# Patient Record
Sex: FEMALE | Race: WHITE | Employment: OTHER | URBAN - METROPOLITAN AREA
[De-identification: names, ages, dates, MRNs, and addresses within clinical notes are randomized per-mention and may not be internally consistent; named-entity substitution may affect disease eponyms.]

---

## 2019-03-29 ENCOUNTER — HOSPITAL ENCOUNTER (OUTPATIENT)
Dept: WOUND CARE | Age: 84
Discharge: HOME | End: 2019-03-29
Payer: MEDICARE

## 2019-03-29 VITALS
BODY MASS INDEX: 26.37 KG/M2 | HEART RATE: 60 BPM | HEIGHT: 66 IN | SYSTOLIC BLOOD PRESSURE: 141 MMHG | TEMPERATURE: 97.5 F | RESPIRATION RATE: 16 BRPM | DIASTOLIC BLOOD PRESSURE: 76 MMHG | WEIGHT: 164.1 LBS

## 2019-03-29 DIAGNOSIS — R60.0 BILATERAL LOWER EXTREMITY EDEMA: ICD-10-CM

## 2019-03-29 DIAGNOSIS — M81.0 SENILE OSTEOPOROSIS: ICD-10-CM

## 2019-03-29 DIAGNOSIS — M47.817 LUMBOSACRAL SPONDYLOSIS WITHOUT MYELOPATHY: ICD-10-CM

## 2019-03-29 DIAGNOSIS — L97.922 CHRONIC ULCER OF LEFT LEG WITH FAT LAYER EXPOSED (HCC): ICD-10-CM

## 2019-03-29 DIAGNOSIS — N18.30 CKD (CHRONIC KIDNEY DISEASE) STAGE 3, GFR 30-59 ML/MIN (HCC): ICD-10-CM

## 2019-03-29 DIAGNOSIS — D64.9 NORMOCYTIC ANEMIA: ICD-10-CM

## 2019-03-29 PROCEDURE — 11042 DBRDMT SUBQ TIS 1ST 20SQCM/<: CPT | Performed by: INTERNAL MEDICINE

## 2019-03-29 PROCEDURE — 29581 APPL MULTLAYER CMPRN SYS LEG: CPT

## 2019-03-29 PROCEDURE — 11042 DBRDMT SUBQ TIS 1ST 20SQCM/<: CPT

## 2019-03-29 PROCEDURE — 99204 OFFICE O/P NEW MOD 45 MIN: CPT

## 2019-03-29 PROCEDURE — 99203 OFFICE O/P NEW LOW 30 MIN: CPT | Performed by: INTERNAL MEDICINE

## 2019-03-29 RX ORDER — LIDOCAINE 40 MG/G
CREAM TOPICAL ONCE
Status: DISCONTINUED | OUTPATIENT
Start: 2019-03-29 | End: 2019-03-30 | Stop reason: HOSPADM

## 2019-04-05 ENCOUNTER — HOSPITAL ENCOUNTER (OUTPATIENT)
Dept: WOUND CARE | Age: 84
Discharge: HOME | End: 2019-04-05
Payer: MEDICARE

## 2019-04-05 VITALS
TEMPERATURE: 97.4 F | DIASTOLIC BLOOD PRESSURE: 77 MMHG | SYSTOLIC BLOOD PRESSURE: 140 MMHG | RESPIRATION RATE: 18 BRPM | BODY MASS INDEX: 26.2 KG/M2 | HEART RATE: 59 BPM | HEIGHT: 66 IN | WEIGHT: 163 LBS

## 2019-04-05 PROBLEM — G62.9 PERIPHERAL NEUROPATHY: Status: ACTIVE | Noted: 2019-01-21

## 2019-04-05 PROBLEM — L97.922 CHRONIC ULCER OF LEFT LEG WITH FAT LAYER EXPOSED (HCC): Status: ACTIVE | Noted: 2019-04-05

## 2019-04-05 PROBLEM — I10 ESSENTIAL HYPERTENSION, BENIGN: Status: RESOLVED | Noted: 2019-04-05 | Resolved: 2019-04-05

## 2019-04-05 PROBLEM — R60.0 BILATERAL LOWER EXTREMITY EDEMA: Status: ACTIVE | Noted: 2019-04-05

## 2019-04-05 PROBLEM — I87.303 CHRONIC VENOUS HYPERTENSION INVOLVING BOTH SIDES: Status: ACTIVE | Noted: 2019-04-05

## 2019-04-05 PROBLEM — N18.30 CKD (CHRONIC KIDNEY DISEASE) STAGE 3, GFR 30-59 ML/MIN (HCC): Status: ACTIVE | Noted: 2019-04-05

## 2019-04-05 PROBLEM — I10 ESSENTIAL HYPERTENSION, BENIGN: Status: ACTIVE | Noted: 2019-04-05

## 2019-04-05 PROBLEM — M21.371 FOOT DROP, RIGHT: Status: ACTIVE | Noted: 2019-01-21

## 2019-04-05 PROBLEM — D64.9 NORMOCYTIC ANEMIA: Status: ACTIVE | Noted: 2019-04-05

## 2019-04-05 PROCEDURE — 11042 DBRDMT SUBQ TIS 1ST 20SQCM/<: CPT

## 2019-04-05 PROCEDURE — 29581 APPL MULTLAYER CMPRN SYS LEG: CPT

## 2019-04-05 PROCEDURE — 11042 DBRDMT SUBQ TIS 1ST 20SQCM/<: CPT | Performed by: INTERNAL MEDICINE

## 2019-04-05 RX ORDER — LIDOCAINE 40 MG/G
CREAM TOPICAL ONCE
Status: DISCONTINUED | OUTPATIENT
Start: 2019-04-05 | End: 2019-04-06 | Stop reason: HOSPADM

## 2019-04-05 NOTE — PLAN OF CARE
Patient will continue with same wound care regime but will add triamcinolone and trade 100% cast padding for 1st layer of Compri 2 this week. MD discussed garment options for compression long term once wound is healed. Discharge instructions reviewed with patient, all questions answered, copy given to patient. Dressings were applied to all wounds per M.D. Instructions at this visit.

## 2019-04-12 ENCOUNTER — HOSPITAL ENCOUNTER (OUTPATIENT)
Dept: WOUND CARE | Age: 84
Discharge: HOME | End: 2019-04-12
Payer: MEDICARE

## 2019-04-12 VITALS
SYSTOLIC BLOOD PRESSURE: 142 MMHG | HEIGHT: 66 IN | DIASTOLIC BLOOD PRESSURE: 86 MMHG | WEIGHT: 165 LBS | HEART RATE: 61 BPM | RESPIRATION RATE: 18 BRPM | TEMPERATURE: 97.3 F | BODY MASS INDEX: 26.52 KG/M2

## 2019-04-12 DIAGNOSIS — L03.116 CELLULITIS OF LEFT LOWER LEG: ICD-10-CM

## 2019-04-12 PROCEDURE — 87070 CULTURE OTHR SPECIMN AEROBIC: CPT

## 2019-04-12 PROCEDURE — 97597 DBRDMT OPN WND 1ST 20 CM/<: CPT | Performed by: INTERNAL MEDICINE

## 2019-04-12 PROCEDURE — 97597 DBRDMT OPN WND 1ST 20 CM/<: CPT

## 2019-04-12 PROCEDURE — 29581 APPL MULTLAYER CMPRN SYS LEG: CPT

## 2019-04-12 PROCEDURE — 87077 CULTURE AEROBIC IDENTIFY: CPT

## 2019-04-12 PROCEDURE — 87205 SMEAR GRAM STAIN: CPT

## 2019-04-12 PROCEDURE — 87186 SC STD MICRODIL/AGAR DIL: CPT

## 2019-04-12 RX ORDER — GABAPENTIN 100 MG/1
100 CAPSULE ORAL
COMMUNITY
End: 2020-08-05

## 2019-04-12 RX ORDER — LIDOCAINE 40 MG/G
CREAM TOPICAL ONCE
Status: DISCONTINUED | OUTPATIENT
Start: 2019-04-12 | End: 2019-04-13 | Stop reason: HOSPADM

## 2019-04-12 ASSESSMENT — PAIN - FUNCTIONAL ASSESSMENT: PAIN_FUNCTIONAL_ASSESSMENT: PREVENTS OR INTERFERES SOME ACTIVE ACTIVITIES AND ADLS

## 2019-04-12 ASSESSMENT — PAIN DESCRIPTION - PROGRESSION: CLINICAL_PROGRESSION: GRADUALLY WORSENING

## 2019-04-12 ASSESSMENT — PAIN DESCRIPTION - DESCRIPTORS: DESCRIPTORS: SHOOTING;ACHING

## 2019-04-12 ASSESSMENT — PAIN SCALES - GENERAL: PAINLEVEL_OUTOF10: 9

## 2019-04-12 ASSESSMENT — PAIN DESCRIPTION - ONSET: ONSET: ON-GOING

## 2019-04-12 ASSESSMENT — PAIN DESCRIPTION - ORIENTATION: ORIENTATION: LEFT

## 2019-04-12 ASSESSMENT — PAIN DESCRIPTION - FREQUENCY: FREQUENCY: CONTINUOUS

## 2019-04-12 ASSESSMENT — PAIN DESCRIPTION - PAIN TYPE: TYPE: ACUTE PAIN

## 2019-04-12 ASSESSMENT — PAIN DESCRIPTION - LOCATION: LOCATION: LEG

## 2019-04-14 LAB
GRAM STAIN RESULT: ABNORMAL
ORGANISM: ABNORMAL
WOUND/ABSCESS: ABNORMAL
WOUND/ABSCESS: ABNORMAL

## 2019-04-14 NOTE — PROGRESS NOTES
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 Factors contributing to occurrence and/or persistence of the chronic ulcer include edema and venous stasis. Medical necessity of today's visit is shown by the above documentation. Sharp debridement is indicated today, based upon the exam findings in the wound(s) above. Procedure note:     Consent obtained. Time out performed per [facility] policy. Anesthetic  Anesthetic: 4% Lidocaine Liquid Topical     Using a curette, I sharply debrided the lower leg (left ) ulcer(s) down through and including the removal of subcutaneous tissue. The type(s) of tissue debrided included fibrin and necrotic/eschar. Total Surface Area Debrided: 1 sq cm. The ulcers were then irrigated with normal saline solution. The procedure was completed with a small amount of bleeding, and hemostasis was with pressure. The patient tolerated the procedure well, with no significant complications. The patient's level of pain during and after the procedure was monitored. Post-debridement measurements, if different from pre-debridement, are in the flowsheet as well. Discharge plan:     Treatment in the wound care center today: Wound 03/29/19 #1, left medial leg, Venous, full thickness,(onset 1/1/2019) unknown-Dressing/Treatment: Other (comment)(triamcinolone,triad,4x4,100%cotton,compri2, tubigrip).     Per physician order, left application of multilayer compression wrap was

## 2019-04-15 RX ORDER — CIPROFLOXACIN 500 MG/1
500 TABLET, FILM COATED ORAL EVERY 12 HOURS
Qty: 14 TABLET | Refills: 0 | Status: SHIPPED | OUTPATIENT
Start: 2019-04-15 | End: 2019-04-22

## 2019-04-19 ENCOUNTER — HOSPITAL ENCOUNTER (OUTPATIENT)
Dept: WOUND CARE | Age: 84
Discharge: HOME | End: 2019-04-19
Payer: MEDICARE

## 2019-04-19 VITALS
HEIGHT: 66 IN | HEART RATE: 59 BPM | RESPIRATION RATE: 20 BRPM | WEIGHT: 167 LBS | TEMPERATURE: 97.2 F | DIASTOLIC BLOOD PRESSURE: 82 MMHG | BODY MASS INDEX: 26.84 KG/M2 | SYSTOLIC BLOOD PRESSURE: 137 MMHG

## 2019-04-19 PROCEDURE — 97597 DBRDMT OPN WND 1ST 20 CM/<: CPT | Performed by: INTERNAL MEDICINE

## 2019-04-19 PROCEDURE — 29581 APPL MULTLAYER CMPRN SYS LEG: CPT

## 2019-04-19 PROCEDURE — 97597 DBRDMT OPN WND 1ST 20 CM/<: CPT

## 2019-04-19 RX ORDER — LIDOCAINE 40 MG/G
CREAM TOPICAL ONCE
Status: DISCONTINUED | OUTPATIENT
Start: 2019-04-19 | End: 2019-04-20 | Stop reason: HOSPADM

## 2019-04-19 ASSESSMENT — PAIN DESCRIPTION - ORIENTATION: ORIENTATION: LEFT

## 2019-04-19 ASSESSMENT — PAIN DESCRIPTION - DESCRIPTORS: DESCRIPTORS: ACHING

## 2019-04-19 ASSESSMENT — PAIN DESCRIPTION - LOCATION: LOCATION: LEG

## 2019-04-19 ASSESSMENT — PAIN DESCRIPTION - ONSET: ONSET: ON-GOING

## 2019-04-19 ASSESSMENT — PAIN DESCRIPTION - PROGRESSION: CLINICAL_PROGRESSION: GRADUALLY WORSENING

## 2019-04-19 ASSESSMENT — PAIN DESCRIPTION - FREQUENCY: FREQUENCY: CONTINUOUS

## 2019-04-19 ASSESSMENT — PAIN - FUNCTIONAL ASSESSMENT: PAIN_FUNCTIONAL_ASSESSMENT: PREVENTS OR INTERFERES SOME ACTIVE ACTIVITIES AND ADLS

## 2019-04-19 ASSESSMENT — PAIN DESCRIPTION - DIRECTION: RADIATING_TOWARDS: DENIES

## 2019-04-19 ASSESSMENT — PAIN SCALES - GENERAL: PAINLEVEL_OUTOF10: 4

## 2019-04-19 ASSESSMENT — PAIN DESCRIPTION - PAIN TYPE: TYPE: CHRONIC PAIN

## 2019-04-19 NOTE — PLAN OF CARE
Improvement noted in wound appearance this week after debridement. Patient is tolerating PO Cipro as ordered and will complete this weekend. Discharge instructions reviewed with patient, all questions answered, copy given to patient. Dressings were applied to all wounds per M.D. Instructions at this visit.

## 2019-04-21 PROBLEM — L03.116 CELLULITIS OF LEFT LOWER LEG: Status: ACTIVE | Noted: 2019-04-21

## 2019-04-21 NOTE — PROGRESS NOTES
[address] [facility] Progress Note    Travis Lee     : 2/3/1933    DATE OF VISIT:  2019    Subjective: Travis Lee is a 80 y.o. female who has a venous ulcer located on the lower leg (left ). Significant symptoms or pertinent wound history since last visit: increased pain this week, some mild redness, stable swelling, no fever, modest drainage. Additional ulcer(s) noted? no.      Her current medication list consists of Lisinopril, aspirin, calcium-vitamin D, furosemide, gabapentin, and metoprolol succinate. Allergies: Erythromycin; Hydrochlorothiazide; Silver sulfadiazine; and Doxycycline    Objective:     Vitals:    19 0844   BP: (!) 142/86   Pulse: 61   Resp: 18   Temp: 97.3 °F (36.3 °C)   TempSrc: Oral   Weight: 165 lb (74.8 kg)   Height: 5' 6\" (1.676 m)     AAOx3, fatigued, in mild discomfort, NAD  Intact distal pulses, foot warm, good cap refill  Mild-mod LLE edema  Possible mild cellulitis versus just some stasis erythema, no angitis, no fluctuance  Some allodynia around the ulcer  Gay-ulcer skin: indurated, pink, erythematous  and warm. Ulcer(s): more fibrotic than granular, some fibrin, debris, +/- biofilm, serous exudate, no pus. Photos also saved in electronic chart.     Today's wound measurements:  Wound 19 #1, left medial leg, Venous, full thickness,(onset 2019) unknown-Wound Length (cm): 1.3 cm    Wound 19 #1, left medial leg, Venous, full thickness,(onset 2019) unknown-Wound Width (cm): 0.6 cm    Wound 19 #1, left medial leg, Venous, full thickness,(onset 2019) unknown-Wound Depth (cm): 0.2 cm    Assessment:     Patient Active Problem List   Diagnosis Code    HTN (hypertension) I10    Localized osteoarthrosis, lower leg M17.10    Chronic venous hypertension involving both sides I87.303    Eczema L30.9    Foot drop, right M21.371    Lumbosacral spondylosis without myelopathy M47.817    Normocytic anemia D64.9    Peripheral neuropathy G62.9    Senile osteoporosis M81.0    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 Factors contributing to occurrence and/or persistence of the chronic ulcer include edema and venous stasis. Medical necessity of today's visit is shown by the above documentation. Sharp debridement is indicated today, based upon the exam findings in the wound(s) above. Procedure note:     Consent obtained. Time out performed per [facility] policy. Anesthetic  Anesthetic: 4% Lidocaine Liquid Topical     Using a curette, I sharply debrided the lower leg (left ) ulcer(s) down through and including the removal of dermis. The type(s) of tissue debrided included fibrin and exudate. Total Surface Area Debrided: 1 sq cm. The ulcers were then irrigated with normal saline solution. The procedure was completed with a small amount of bleeding, and hemostasis was with pressure. The patient tolerated the procedure well, with no significant complications. The patient's level of pain during and after the procedure was monitored. Post-debridement measurements, if different from pre-debridement, are in the flowsheet as well. I then used a sterile swab to collect fresh wound exudate for Gram stain and Cx. Discharge plan:     Treatment in the wound care center today: Wound 03/29/19 #1, left medial leg, Venous, full thickness,(onset 1/1/2019) unknown-Dressing/Treatment: Other (comment)(triamcinolone,triad,4x4,compri2 lite,100% cast,tubi).     Per

## 2019-04-26 ENCOUNTER — HOSPITAL ENCOUNTER (OUTPATIENT)
Dept: WOUND CARE | Age: 84
Discharge: HOME | End: 2019-04-26
Payer: MEDICARE

## 2019-04-26 VITALS
RESPIRATION RATE: 18 BRPM | SYSTOLIC BLOOD PRESSURE: 162 MMHG | WEIGHT: 168.4 LBS | HEIGHT: 66 IN | BODY MASS INDEX: 27.06 KG/M2 | TEMPERATURE: 97.4 F | DIASTOLIC BLOOD PRESSURE: 97 MMHG | HEART RATE: 61 BPM

## 2019-04-26 PROCEDURE — 29581 APPL MULTLAYER CMPRN SYS LEG: CPT

## 2019-04-26 PROCEDURE — 97597 DBRDMT OPN WND 1ST 20 CM/<: CPT

## 2019-04-26 PROCEDURE — 97597 DBRDMT OPN WND 1ST 20 CM/<: CPT | Performed by: INTERNAL MEDICINE

## 2019-04-26 RX ORDER — LIDOCAINE 40 MG/G
CREAM TOPICAL ONCE
Status: DISCONTINUED | OUTPATIENT
Start: 2019-04-26 | End: 2019-04-27 | Stop reason: HOSPADM

## 2019-04-26 ASSESSMENT — PAIN DESCRIPTION - FREQUENCY: FREQUENCY: CONTINUOUS

## 2019-04-26 ASSESSMENT — PAIN - FUNCTIONAL ASSESSMENT: PAIN_FUNCTIONAL_ASSESSMENT: PREVENTS OR INTERFERES SOME ACTIVE ACTIVITIES AND ADLS

## 2019-04-26 ASSESSMENT — PAIN DESCRIPTION - PROGRESSION: CLINICAL_PROGRESSION: NOT CHANGED

## 2019-04-26 ASSESSMENT — PAIN DESCRIPTION - PAIN TYPE: TYPE: CHRONIC PAIN

## 2019-04-26 ASSESSMENT — PAIN DESCRIPTION - LOCATION: LOCATION: LEG

## 2019-04-26 ASSESSMENT — PAIN SCALES - GENERAL: PAINLEVEL_OUTOF10: 5

## 2019-04-26 ASSESSMENT — PAIN DESCRIPTION - ONSET: ONSET: ON-GOING

## 2019-04-26 ASSESSMENT — PAIN DESCRIPTION - DESCRIPTORS: DESCRIPTORS: ACHING;SHOOTING

## 2019-04-26 ASSESSMENT — PAIN DESCRIPTION - ORIENTATION: ORIENTATION: LEFT

## 2019-04-26 NOTE — PLAN OF CARE
Patient with stated improvement in wound pain this week. Patient states she has 3 antibiotic pills left and after discussion it was noted she has only been taking 1 pill per day. Will begin using Triamolone and Vashe this week. Will plan for skin substitute next week. Discharge instructions reviewed with patient, all questions answered, copy given to patient. Dressings were applied to all wounds per M.D. Instructions at this visit.

## 2019-05-02 PROBLEM — L03.116 CELLULITIS OF LEFT LOWER LEG: Status: RESOLVED | Noted: 2019-04-21 | Resolved: 2019-05-02

## 2019-05-03 ENCOUNTER — HOSPITAL ENCOUNTER (OUTPATIENT)
Dept: WOUND CARE | Age: 84
Discharge: HOME | End: 2019-05-03
Payer: MEDICARE

## 2019-05-03 VITALS
SYSTOLIC BLOOD PRESSURE: 151 MMHG | WEIGHT: 166.38 LBS | HEART RATE: 58 BPM | TEMPERATURE: 97.8 F | DIASTOLIC BLOOD PRESSURE: 78 MMHG | HEIGHT: 66 IN | BODY MASS INDEX: 26.74 KG/M2 | RESPIRATION RATE: 18 BRPM

## 2019-05-03 DIAGNOSIS — I87.332 CHRONIC VENOUS HYPERTENSION (IDIOPATHIC) WITH ULCER AND INFLAMMATION OF LEFT LOWER EXTREMITY (CODE) (HCC): ICD-10-CM

## 2019-05-03 DIAGNOSIS — L97.222 NON-PRESSURE CHRONIC ULCER OF LEFT CALF WITH FAT LAYER EXPOSED (HCC): ICD-10-CM

## 2019-05-03 PROCEDURE — 97597 DBRDMT OPN WND 1ST 20 CM/<: CPT

## 2019-05-03 PROCEDURE — 97597 DBRDMT OPN WND 1ST 20 CM/<: CPT | Performed by: INTERNAL MEDICINE

## 2019-05-03 PROCEDURE — 29581 APPL MULTLAYER CMPRN SYS LEG: CPT

## 2019-05-03 RX ORDER — LIDOCAINE 40 MG/G
CREAM TOPICAL ONCE
Status: DISCONTINUED | OUTPATIENT
Start: 2019-05-03 | End: 2019-05-04 | Stop reason: HOSPADM

## 2019-05-03 ASSESSMENT — PAIN SCALES - GENERAL: PAINLEVEL_OUTOF10: 0

## 2019-05-03 NOTE — PLAN OF CARE
Debridement per Dr Britney Garcia in [facility]. Patient educated about importance of elevation and wound healing. Patient will be placed in multilayer wrap this week and follow up as scheduled.

## 2019-05-04 NOTE — PROGRESS NOTES
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 [address] L97.922    Bilateral lower extremity edema R60.0    CKD (chronic kidney disease) stage 3, GFR 30-59 ml/min ([facility]) N18.3       Assessment of today's active condition(s): venous insufficiency, chronic skin changes, LE edema, small but stubborn and fibrotic venous leg ulcer, no ischemia, possible recent Pseudomonal infection resolved; lipodermatosclerosis, fibrotic wound bed and delay in coming in for care are slowly things down the most, I think. Factors contributing to occurrence and/or persistence of the chronic ulcer include edema and venous stasis. Medical necessity of today's visit is shown by the above documentation. Sharp debridement is indicated today, based upon the exam findings in the wound(s) above. Procedure note:     Consent obtained. Time out performed per [facility] policy. Anesthetic  Anesthetic: 4% Lidocaine Liquid Topical     Using a curette, I sharply debrided the lower leg (left ) ulcer(s) down through and including the removal of dermis. The type(s) of tissue debrided included fibrin, necrotic/eschar and exudate. Total Surface Area Debrided: 1 sq cm. I also used a #15 blade to score some of the fibrotic, white tissue within the center of the wound bed, to induce a bit more bleeding    The ulcers were then irrigated with normal saline solution. The procedure was completed with a small amount of bleeding, and hemostasis was with pressure. The patient tolerated the procedure well, with no significant complications. The patient's level of pain during and after the procedure was monitored. Post-debridement measurements, if different from pre-debridement, are in the flowsheet as well.     Discharge plan:     Treatment in the wound care center today: Wound 03/29/19 #1, left medial leg, Venous, full thickness,(onset 1/1/2019) unknown-Dressing/Treatment: Other (comment)(vashe,triamcinolone,triad,4x4,100%cotton,compri2 lite,tubi). Per physician order, left application of multilayer compression wrap was performed in the wound care center today, to help manage edema, stasis dermatitis, and/or venous ulcers. Leave primary dressing and multi-layer wrap(s) in place until the next appointment. Also discussed ways to keep the wrap dry for a shower, including a plastic cast-guard, available in retail pharmacies. She should call before her next visit if there is any significant pain, significant strike-through of drainage to the surface of the wrap, or any significant sense of the wrap sliding down more than an inch or so, bunching-up and abrading her skin. I reminded the patient of the importance of weight management and smoking cessation, if applicable; also encouraged ambulation as tolerated, additional lower extremity exercises as instructed in our education sheet, leg elevation when at rest, and compliance with any recommended dietary, diuretic and compression therapies. Complete Cipro. Continue Neurontin (100 mg daily and 300 mg QHS). Will send paperwork to [facility] to confirm coverage for [employer] application, perhaps starting next week. Written discharge instructions given to patient. Follow up in 1 week.     Electronically signed by Pollo Ly MD on 5/4/2019 at 12:45 PM.

## 2019-05-04 NOTE — PROGRESS NOTES
[address] [facility] Progress Note    Catia Atkins     : 2/3/1933    DATE OF VISIT:  2019    Subjective: Catia Atkins is a 80 y.o. female who has a venous ulcer located on the lower leg (left ). Significant symptoms or pertinent wound history since last visit: still some times of at least moderate pain, sounds mostly neuropathic, radiating up the leg. Increased gabapentin from 300 QHS to 100 during the day and 300 QHS, perhaps had a bit of improvement, no dizziness or somnolence. No F/C/D, no sore throat or mouth from Cipro, no N/V/D. Additional ulcer(s) noted? no.      Her current medication list consists of Lisinopril, aspirin, calcium-vitamin D, furosemide, gabapentin, and metoprolol succinate. Also Cipro 500 mg q12 starting Monday, after results of last week's culture came back. Allergies: Erythromycin; Hydrochlorothiazide; Silver sulfadiazine; and Doxycycline    Objective:     Vitals:    19 0858   BP: 137/82   Pulse: 59   Resp: 20   Temp: 97.2 °F (36.2 °C)   TempSrc: Oral   Weight: 167 lb (75.8 kg)   Height: 5' 6\" (1.676 m)     AAO3, fatigued, NAD  Intact distal pulses, feet warm, good cap refill  Mild-moderate LE edema (mostly on the calf on the left side, with lipodermatosclerosis of the more distal tissues)  Some patchy stasis erythema, I don't think any true spreading cellulitis, no angitis or fluctuance  Some atrophe miroslava  Gay-ulcer skin: indurated, pink, erythematous  and pale. Ulcer(s): a bit larger, part granular, part fibrotic, some fibrinous debris +/- biofilm, no pus. Photos also saved in electronic chart.     Today's wound measurements:  Wound 19 #1, left medial leg, Venous, full thickness,(onset 2019) unknown-Wound Length (cm): 1.5 cm    Wound 19 #1, left medial leg, Venous, full thickness,(onset 2019) unknown-Wound Width (cm): 0.8 cm    Wound 19 #1, left medial leg, Venous, full thickness,(onset 2019) unknown-Wound Depth (cm): 0.2 cm    WCx -- light growth of pan-(S) Pseudomonas, but no WBCs or organisms on Gram stain. 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 Factors contributing to occurrence and/or persistence of the chronic ulcer include edema and venous stasis. Medical necessity of today's visit is shown by the above documentation. Sharp debridement is indicated today, based upon the exam findings in the wound(s) above. Procedure note:     Consent obtained. Time out performed per [facility] policy. Anesthetic  Anesthetic: 4% Lidocaine Liquid Topical     Using a curette, I sharply debrided the lower leg (left ) ulcer(s) down through and including the removal of dermis. The type(s) of tissue debrided included fibrin, biofilm and exudate. Total Surface Area Debrided: 1 sq cm. The ulcers were then irrigated with normal saline solution. The procedure was completed with a small amount of bleeding, and hemostasis was with pressure. The patient tolerated the procedure well, with no significant complications. The patient's level of pain during and after the procedure was monitored.  Post-debridement measurements, if different from pre-debridement, are in the flowsheet as well. Discharge plan:     Treatment in the wound care center today: Wound 03/29/19 #1, left medial leg, Venous, full thickness,(onset 1/1/2019) unknown-Dressing/Treatment: Other (comment)(triad,4x4,100% cast pad,compri2 lite,tubi). Per physician order, left application of multilayer compression wrap was performed in the wound care center today, to help manage edema, stasis dermatitis, and/or venous ulcers. Leave primary dressing and multi-layer wrap(s) in place until the next appointment. Also discussed ways to keep the wrap dry for a shower, including a plastic cast-guard, available in retail pharmacies. She should call before her next visit if there is any significant pain, significant strike-through of drainage to the surface of the wrap, or any significant sense of the wrap sliding down more than an inch or so, bunching-up and abrading her skin. I reminded the patient of the importance of weight management and smoking cessation, if applicable; also encouraged ambulation as tolerated, additional lower extremity exercises as instructed in our education sheet, leg elevation when at rest, and compliance with any recommended dietary, diuretic and compression therapies. Complete Cipro as prescribed; if infection is clearly resolved but the ulcer has signs of persistent fibrosis and cellular failure despite current therapy, would consider adjunctive Piggott in a couple of weeks. If neuropathic pains continue, and disrupt ADLs, I would be in favor of a very slow increase in dosage (no more than 100 mg/d, every few days). She can add these dosages in where she thinks they'd be most helpful (e.g., increase to 400 mg QHS, or add another 100 mg during the day, etc). Written discharge instructions given to patient. Follow up in 1 week.     Electronically signed by Chelly Pillai MD on 5/4/2019 at 12:36 PM.

## 2019-05-09 PROBLEM — L97.222 NON-PRESSURE CHRONIC ULCER OF LEFT CALF WITH FAT LAYER EXPOSED (HCC): Status: ACTIVE | Noted: 2019-05-09

## 2019-05-09 PROBLEM — I87.332 CHRONIC VENOUS HYPERTENSION (IDIOPATHIC) WITH ULCER AND INFLAMMATION OF LEFT LOWER EXTREMITY (CODE) (HCC): Status: ACTIVE | Noted: 2019-05-09

## 2019-05-09 NOTE — PROGRESS NOTES
[address] [facility] Progress Note    Morelia Dempsey     : 2/3/1933    DATE OF VISIT:  5/3/2019    Subjective: Morelia Dempsey is a 80 y.o. female who has a venous ulcer located on the lower leg (left ). Significant symptoms or pertinent wound history since last visit: feeling ok, still with some moderate pain at times, sounds partly inflammatory / nociceptive but part neuropathic. Sounds like she's doing a good job walking during the day, elevating at bedtime, but not really elevating much during the day. Modest drainage, no malodor, a bit of pruritus, no F/C/D. Additional ulcer(s) noted? no.      Her current medication list consists of Lisinopril, aspirin, calcium-vitamin D, furosemide, gabapentin, and metoprolol succinate. Allergies: Erythromycin; Hydrochlorothiazide; Silver sulfadiazine; and Doxycycline    Objective:     Vitals:    19 1521   BP: (!) 151/78   Pulse: 58   Resp: 18   Temp: 97.8 °F (36.6 °C)   TempSrc: Oral   Weight: 166 lb 6 oz (75.5 kg)   Height: 5' 6\" (1.676 m)     AAOx3, fatigued, in a bit of discomfort, NAD  Intact distal pulses, foot warm, good cap refill  Overall mild LLE edema, but mostly in the calf, with some lipodermatosclerosis and skin tightness at the ankle  No clear cellulitis, angitis, fluctuance; some persistent erythema that I think is from stasis congestion  Still a bit of allodynia  Gay-ulcer skin: some pink induration, a little less inflamed at the immediate periwound (where triamcinolone was placed), also stable atrophe miroslava. Ulcer(s): a mix of red granulation and still some SQ fibrosis, overlying fibrin and biofilm. Photos also saved in electronic chart.     Today's wound measurements:  Wound 19 #1, left medial leg, Venous, full thickness,(onset 2019) unknown-Wound Length (cm): 3.7 cm    Wound 19 #1, left medial leg, Venous, full thickness,(onset 2019) unknown-Wound Width (cm): 3 cm    Wound 19 #1, left medial leg, Venous, full thickness,(onset 1/1/2019) unknown-Wound Depth (cm): 0.3 cm    Assessment:     Patient Active Problem List   Diagnosis Code    HTN (hypertension) I10    Localized osteoarthrosis, lower leg M17.10    Eczema L30.9    Foot drop, right M21.371    Lumbosacral spondylosis without myelopathy M47.817    Normocytic anemia D64.9    Peripheral neuropathy G62.9    Senile osteoporosis M81.0    Bilateral lower extremity edema R60.0    CKD (chronic kidney disease) stage 3, GFR 30-59 ml/min ([facility]) N18.3    Chronic venous hypertension (idiopathic) with ulcer and inflammation of left lower extremity (CODE) ([facility]) I87.332    Non-pressure chronic ulcer of left calf with fat layer exposed ([facility] [address] L97.222       Assessment of today's active condition(s): venous insufficiency, chronic skin changes, LE edema, nonhealing venous leg ulcer, no infection or ischemia; still a bit more inflammation than would be ideal, I think just from venous congestion, though might need to keep the possibility of pyoderma in mind; wound still rather fibrotic also, given her underlying lipodermatosclerosis and the age of the ulcer at first presentation to us. Factors contributing to occurrence and/or persistence of the chronic ulcer include edema and venous stasis. Medical necessity of today's visit is shown by the above documentation. Sharp debridement is indicated today, based upon the exam findings in the wound(s) above. Procedure note:     Consent obtained. Time out performed per [facility] policy. Anesthetic  Anesthetic: 4% Lidocaine Liquid Topical     Using a curette, I sharply debrided the lower leg (left ) ulcer(s) down through and including the removal of dermis. The type(s) of tissue debrided included fibrin and biofilm. Total Surface Area Debrided: approx 5 sq cm.  I also used a #15 blade to make a few tiny incisions in the most fibrotic part of the wound base, to encourage a tiny bit of bleeding and hopefully a bit more granulation tissue formation. The ulcers were then irrigated with normal saline solution. The procedure was completed with a small amount of bleeding, and hemostasis was with pressure. The patient tolerated the procedure well, with no significant complications. The patient's level of pain during and after the procedure was monitored. Post-debridement measurements, if different from pre-debridement, are in the flowsheet as well. Discharge plan:     Treatment in the wound care center today: HClO, periwound triamcinolone, wound bed Triad, gauze. Per physician order, left application of multilayer compression wrap was performed in the wound care center today, to help manage edema, stasis dermatitis, and/or venous ulcers. Leave primary dressing and multi-layer wrap(s) in place until the next appointment. Also discussed ways to keep the wrap dry for a shower, including a plastic cast-guard, available in retail pharmacies. She should call before her next visit if there is any significant pain, significant strike-through of drainage to the surface of the wrap, or any significant sense of the wrap sliding down more than an inch or so, bunching-up and abrading her skin. I reminded the patient of the importance of weight management and smoking cessation, if applicable; also encouraged ambulation as tolerated, additional lower extremity exercises as instructed in our education sheet, leg elevation when at rest, and compliance with any recommended dietary, diuretic and compression therapies.  Encouraged her to think of this week as a therapeutic trial of really pushing more periods of elevation during the day; if her leg looks less inflamed and feels better, then we're really on to something; if not, then at least she tried, and we can be assured that she's elevating and exercising enough already, will continue to push ahead with debridement, local care, compression, probably addition of Oasis Wound Matrix (just

## 2019-05-10 ENCOUNTER — HOSPITAL ENCOUNTER (OUTPATIENT)
Dept: WOUND CARE | Age: 84
Discharge: HOME | End: 2019-05-10
Payer: MEDICARE

## 2019-05-10 VITALS
RESPIRATION RATE: 18 BRPM | BODY MASS INDEX: 26.26 KG/M2 | TEMPERATURE: 97.4 F | SYSTOLIC BLOOD PRESSURE: 149 MMHG | DIASTOLIC BLOOD PRESSURE: 88 MMHG | WEIGHT: 163.4 LBS | HEIGHT: 66 IN | HEART RATE: 63 BPM

## 2019-05-10 PROCEDURE — 15271 SKIN SUB GRAFT TRNK/ARM/LEG: CPT | Performed by: INTERNAL MEDICINE

## 2019-05-10 PROCEDURE — C5271 LOW COST SKIN SUBSTITUTE APP: HCPCS

## 2019-05-10 PROCEDURE — 29581 APPL MULTLAYER CMPRN SYS LEG: CPT

## 2019-05-10 RX ORDER — LIDOCAINE 40 MG/G
CREAM TOPICAL ONCE
Status: DISCONTINUED | OUTPATIENT
Start: 2019-05-10 | End: 2019-05-11 | Stop reason: HOSPADM

## 2019-05-10 ASSESSMENT — PAIN DESCRIPTION - ONSET: ONSET: ON-GOING

## 2019-05-10 ASSESSMENT — PAIN DESCRIPTION - DESCRIPTORS: DESCRIPTORS: ACHING;SHOOTING

## 2019-05-10 ASSESSMENT — PAIN DESCRIPTION - FREQUENCY: FREQUENCY: INTERMITTENT

## 2019-05-10 ASSESSMENT — PAIN - FUNCTIONAL ASSESSMENT: PAIN_FUNCTIONAL_ASSESSMENT: ACTIVITIES ARE NOT PREVENTED

## 2019-05-10 ASSESSMENT — PAIN DESCRIPTION - ORIENTATION: ORIENTATION: LEFT

## 2019-05-10 ASSESSMENT — PAIN SCALES - GENERAL: PAINLEVEL_OUTOF10: 3

## 2019-05-10 ASSESSMENT — PAIN DESCRIPTION - LOCATION: LOCATION: LEG

## 2019-05-10 ASSESSMENT — PAIN DESCRIPTION - PROGRESSION: CLINICAL_PROGRESSION: GRADUALLY IMPROVING

## 2019-05-10 ASSESSMENT — PAIN DESCRIPTION - PAIN TYPE: TYPE: CHRONIC PAIN

## 2019-05-10 NOTE — PLAN OF CARE
88 [facility]  Care Plan Summary    General --     Active wound etiologies: Venous      PCP and pertinent consultants: Galdino Hull, Sarah [facility] 635: Not applicable    Wound Care Supplies: Not applicable     Most recent [facility] lab results:    No results found for: LABA1C  No results found for: LABALBU   Lab Results   Component Value Date    CREATININE 0.9 09/05/2014       Lab Results   Component Value Date    HGB 11.9 (L) 09/05/2014            Other plans for overall health: compression    2. Circulatory status, if lower extremity ulcer --    [facility] past [facility] (counting back from 5/10/19): Right TODD: 1.07 mmHg     Left TODD: 1.04 mmHg    Most recent arterial imaging:      Most recent arterial Rx:      Current management of edema: Compri 2 Lite     Most recent venous imaging:     3. Debridement methods used, past or present -- Sharp     Pertinent surgical history for wound(s): Not applicable    4. Infection --      Recent culture results:      Recent imaging:       Recent antibiotic Rx:      5. Topical therapies --     Dressings previously tried on current wound(s):  home treatment-antibiotic ointment       Current dressing, started on 5/10/19:  OASIS #1, Mepitel AG, Compri 2 Lite    6. Offloading --     Pressure ulcer offloading: Not applicable     Neuropathic ulcer offloading:  Not Applicable    7. Adjunctive therapies --     Date       Wnd # Location  CTP   HBO  NPWT          placed  start/stop start/stop     5/10/19  LLE medial OASIS #1                        Discharge instructions reviewed with patient, all questions answered, copy given to patient. Dressings were applied to all wounds per M.D. Instructions at this visit.

## 2019-05-16 NOTE — PROGRESS NOTES
88 [facility] Progress Note    Ioana Quintero     : 2/3/1933    DATE OF VISIT:  5/10/2019    Subjective: Ioana Quintero is a 80 y.o. female who has a venous ulcer located on the left  lower leg. Significant symptoms or pertinent wound history since last visit: feeling a little better this week, with greater effort in elevating periodically during the day; mild pruritus, no F/C/D, modest drainage. Additional ulcer(s) noted? no.      Her current medication list consists of Lisinopril, aspirin, calcium-vitamin D, furosemide, gabapentin, and metoprolol succinate. Allergies: Erythromycin; Hydrochlorothiazide; Silver sulfadiazine; and Doxycycline    Objective:     Vitals:    05/10/19 0848 05/10/19 0855   BP:  (!) 149/88   Pulse:  63   Resp:  18   Temp:  97.4 °F (36.3 °C)   TempSrc:  Oral   Weight: 163 lb 6.4 oz (74.1 kg)    Height: 5' 6\" (1.676 m)      AAOx3, fatigued, NAD  Intact distal pulses, foot warm, good cap refill  Mild LLE edema, with stable lipodermatosclerosis and atrophe miroslava  No cellulitis, angitis, fluctuance, probably a bit less stasis erythema and allodynia  Gay-ulcer skin: indurated, pink, erythematous , pale and warm. Ulcer(s): part red and granular, part SQ fibrotic, overlying fibrin +/- biofilm, serous exudate. Photos also saved in electronic chart.     Today's wound measurements:  Wound 19 #1, left medial leg, Venous, full thickness,(onset 2019) unknown-Wound Length (cm): 3.5 cm    Wound 19 #1, left medial leg, Venous, full thickness,(onset 2019) unknown-Wound Width (cm): 2 cm    Wound 19 #1, left medial leg, Venous, full thickness,(onset 2019) unknown-Wound Depth (cm): 0.3 cm    Assessment:     Patient Active Problem List   Diagnosis Code    HTN (hypertension) I10    Localized osteoarthrosis, lower leg M17.10    Eczema L30.9    Foot drop, right M21.371    Lumbosacral spondylosis without myelopathy M47.817    Normocytic anemia moist wound environment, good compliance with compression) for more than 4 weeks, I recommended [place] Wound Matrix as adjunctive therapy to help speed healing of this ulcer. As per previous documentation, peripheral pulse exam is acceptable, and/or TODD is at least 0.65. There is no evidence of untreated infection today. Ms. Christian Ruiz does not currently smoke. After cleansing the left leg ulcer with saline, spraying the ulcer with HClO and applying Skin-Prep to the mauro-wound skin, an 11 sqcm piece of Oasis Wound Matrix was cut to fit the ulcer, placed into the ulcer bed, affixed to the surrounding skin with Steri-Strips, moistened with saline and covered with Mepitel Ag. Eleven sqcm of the product was used in the ulcer, and 0 sqcm of the product was discarded. If applicable, any wastage is due to the fact the smallest available product was larger than what was needed for her ulcer(s). The patient tolerated the procedure well, without complications. Discharge plan:     Treatment in the wound care center today: Wound 03/29/19 #1, left medial leg, Venous, full thickness,(onset 1/1/2019) unknown-Dressing/Treatment: Other (comment)(4x4's,compri 2 lite,single F tubi). Per physician order, left application of multilayer compression wrap was performed in the wound care center today, to help manage edema, stasis dermatitis, and/or venous ulcers. Leave primary dressing and multi-layer wrap(s) in place until the next appointment. Also discussed ways to keep the wrap dry for a shower, including a plastic cast-guard, available in retail pharmacies. She should call before her next visit if there is any significant pain, significant strike-through of drainage to the surface of the wrap, or any significant sense of the wrap sliding down more than an inch or so, bunching-up and abrading her skin.      I reminded the patient of the importance of weight management and smoking cessation, if applicable; also encouraged ambulation as tolerated, additional lower extremity exercises as instructed in our education sheet, leg elevation when at rest, and compliance with any recommended dietary, diuretic and compression therapies. Keep up with increased efforts in periodic daytime elevation. Written discharge instructions given to patient. Follow up in 1 week.     Electronically signed by Shae Packer MD on 5/16/2019 at 9:14 AM.

## 2019-05-17 ENCOUNTER — HOSPITAL ENCOUNTER (OUTPATIENT)
Dept: WOUND CARE | Age: 84
Discharge: HOME | End: 2019-05-17
Payer: MEDICARE

## 2019-05-17 VITALS
HEIGHT: 66 IN | WEIGHT: 161.2 LBS | RESPIRATION RATE: 16 BRPM | SYSTOLIC BLOOD PRESSURE: 175 MMHG | TEMPERATURE: 97.2 F | HEART RATE: 55 BPM | DIASTOLIC BLOOD PRESSURE: 89 MMHG | BODY MASS INDEX: 25.91 KG/M2

## 2019-05-17 PROCEDURE — 29581 APPL MULTLAYER CMPRN SYS LEG: CPT

## 2019-05-17 PROCEDURE — 29581 APPL MULTLAYER CMPRN SYS LEG: CPT | Performed by: INTERNAL MEDICINE

## 2019-05-17 RX ORDER — LIDOCAINE 40 MG/G
CREAM TOPICAL ONCE
Status: DISCONTINUED | OUTPATIENT
Start: 2019-05-17 | End: 2019-05-18 | Stop reason: HOSPADM

## 2019-05-17 ASSESSMENT — PAIN SCALES - GENERAL: PAINLEVEL_OUTOF10: 0

## 2019-05-17 NOTE — PLAN OF CARE
88 [facility]  Care Plan Summary     General --                 Active wound etiologies:         Venous                            PCP and pertinent consultants:          Girma Greene [address]:            Not applicable     Wound Care Supplies:            Not applicable                 Most recent [facility] lab results:     No results found for: LABA1C  No results found for: LABALBU         Lab Results   Component Value Date     CREATININE 0.9 09/05/2014               Lab Results   Component Value Date     HGB 11.9 (L) 09/05/2014               Other plans for overall health:            compression     2. Circulatory status, if lower extremity ulcer --     [facility] TODD in past year (counting back from 5/10/19): Right TODD: 1.07 mmHg      Left TODD: 1.04 mmHg     Most recent arterial imaging:                   Most recent arterial Rx:                           Current management of edema:        Compri 2 Lite                 Most recent venous imaging:       3. Debridement methods used, past or present --    Sharp                 Pertinent surgical history for wound(s):          Not applicable     4. Infection --                  Recent culture results:                             Recent imaging:                                       Recent antibiotic Rx:                    5.  Topical therapies --                 Dressings previously tried on current wound(s):  home treatment-antibiotic ointment                   Current dressing, started on 5/10/19:             OASIS #1, Mepitel AG, Compri 2 Lite     6. Offloading --                 Pressure ulcer offloading:       Not applicable                 Neuropathic ulcer offloading:   Not Applicable     7.   Adjunctive therapies --      Date           Wnd #        Location                      CTP                 HBO                NPWT placed              start/stop         start/stop              5/10/19   LLE medial OASIS #1                                      Discharge instructions reviewed with patient, all questions answered, copy given to patient. Dressings were applied to all wounds per M.D. Instructions at this visit.

## 2019-05-24 ENCOUNTER — HOSPITAL ENCOUNTER (OUTPATIENT)
Dept: WOUND CARE | Age: 84
Discharge: HOME | End: 2019-05-24
Payer: MEDICARE

## 2019-05-24 VITALS
BODY MASS INDEX: 25.88 KG/M2 | SYSTOLIC BLOOD PRESSURE: 148 MMHG | HEART RATE: 55 BPM | RESPIRATION RATE: 16 BRPM | TEMPERATURE: 97 F | DIASTOLIC BLOOD PRESSURE: 88 MMHG | WEIGHT: 161 LBS | HEIGHT: 66 IN

## 2019-05-24 PROCEDURE — 15271 SKIN SUB GRAFT TRNK/ARM/LEG: CPT | Performed by: INTERNAL MEDICINE

## 2019-05-24 PROCEDURE — C5271 LOW COST SKIN SUBSTITUTE APP: HCPCS

## 2019-05-24 PROCEDURE — 29581 APPL MULTLAYER CMPRN SYS LEG: CPT

## 2019-05-24 RX ORDER — LIDOCAINE 40 MG/G
CREAM TOPICAL ONCE
Status: DISCONTINUED | OUTPATIENT
Start: 2019-05-24 | End: 2019-05-25 | Stop reason: HOSPADM

## 2019-05-24 ASSESSMENT — PAIN SCALES - GENERAL: PAINLEVEL_OUTOF10: 0

## 2019-05-24 NOTE — PROGRESS NOTES
MD requested a layer be left on patient when removing dressing. Upon gently removing cast padding, the entire dressing underneath was attached and subsequently removed. , Nikita Oviedo RN notified.

## 2019-05-24 NOTE — PLAN OF CARE
Additional specific goal for this week: Patient will receive safe and proper application of Oasis Wound Matrix. Patient will comply with caring for wound and reporting any concerns.  guidelines for application followed. Expiration date of Oasis checked immediately prior to use. Package intact prior to use, with no damage noted. Product storage at ambient temperature, per  guideline. Product lot #:   [id number]             Expiration date:  2021-01-25        Saline lot #:     [id number]           Expiration date: 12/2020  Patient/family/caregiver instructed to keep dressing clean, dry and intact. Patient/family/caregiver instructed on signs and symptoms to report, such as draining through dressing, dressing slipping or falling down, becoming wet, or patient having severe pain, severe itching, severe malodor, fever, etc.      Date of first application of a cellular / tissue product for this current wound is May 10, 2019. Today was application #2 for this wound.      Electronically signed by Matt Daniel RN on 5/24/2019 at [id number]

## 2019-05-28 NOTE — PROGRESS NOTES
plan:     Treatment in the wound care center today: Wound 03/29/19 #1, left medial leg, Venous, full thickness,(onset 1/1/2019) unknown-Dressing/Treatment: Other (comment)(Mepitel Ag, Vashe, compri2 lite, 100% cast pad, tubi ). Leave primary dressing and multi-layer wrap(s) in place until the next appointment. She should call before her next visit if there is any significant pain, significant strike-through of drainage to the surface of the wrap, or any significant sense of the wrap sliding down more than an inch or so, bunching-up and abrading her skin. I reminded the patient of the importance of weight management and smoking cessation, if applicable; also encouraged ambulation as tolerated, additional lower extremity exercises as instructed in our education sheet, leg elevation when at rest, and compliance with any recommended dietary, diuretic and compression therapies. Written discharge instructions given to patient. Follow up in 1 week. Tentative plan for [place] #2 at that time.      Electronically signed by Donovan Nair MD on 5/28/2019 at 1:09 PM.

## 2019-05-31 ENCOUNTER — HOSPITAL ENCOUNTER (OUTPATIENT)
Dept: WOUND CARE | Age: 84
Discharge: HOME | End: 2019-05-31
Payer: MEDICARE

## 2019-05-31 VITALS
WEIGHT: 161.4 LBS | DIASTOLIC BLOOD PRESSURE: 88 MMHG | BODY MASS INDEX: 25.94 KG/M2 | HEART RATE: 59 BPM | SYSTOLIC BLOOD PRESSURE: 156 MMHG | RESPIRATION RATE: 16 BRPM | TEMPERATURE: 96.9 F | HEIGHT: 66 IN

## 2019-05-31 PROCEDURE — 29581 APPL MULTLAYER CMPRN SYS LEG: CPT

## 2019-05-31 PROCEDURE — 15271 SKIN SUB GRAFT TRNK/ARM/LEG: CPT | Performed by: INTERNAL MEDICINE

## 2019-05-31 PROCEDURE — C5271 LOW COST SKIN SUBSTITUTE APP: HCPCS

## 2019-05-31 RX ORDER — LIDOCAINE 40 MG/G
CREAM TOPICAL ONCE
Status: DISCONTINUED | OUTPATIENT
Start: 2019-05-31 | End: 2019-06-01 | Stop reason: HOSPADM

## 2019-05-31 ASSESSMENT — PAIN SCALES - GENERAL
PAINLEVEL_OUTOF10: 0
PAINLEVEL_OUTOF10: 0

## 2019-05-31 NOTE — PLAN OF CARE
Additional specific goal for this week: Patient will receive safe and proper application of Oasis Wound Matrix. Patient will comply with caring for wound and reporting any concerns.  guidelines for application followed. Expiration date of Oasis checked immediately prior to use. Package intact prior to use, with no damage noted. Product storage at ambient temperature, per  guideline. Product lot #:   [id number]             Expiration date:  2021-01-25        Saline lot #:     [id number]           Expiration date: 12/2020  Patient/family/caregiver instructed to keep dressing clean, dry and intact. Patient/family/caregiver instructed on signs and symptoms to report, such as draining through dressing, dressing slipping or falling down, becoming wet, or patient having severe pain, severe itching, severe malodor, fever, etc.      Date of first application of a cellular / tissue product for this current wound is May 10, 2019. Today was application #3 for this wound.      Electronically signed by Sebastian Cotto RN on 5/31/2019 at 2666

## 2019-06-07 ENCOUNTER — HOSPITAL ENCOUNTER (OUTPATIENT)
Dept: WOUND CARE | Age: 84
Discharge: HOME | End: 2019-06-07
Payer: MEDICARE

## 2019-06-07 VITALS
HEART RATE: 58 BPM | SYSTOLIC BLOOD PRESSURE: 131 MMHG | DIASTOLIC BLOOD PRESSURE: 76 MMHG | TEMPERATURE: 96.9 F | HEIGHT: 66 IN | RESPIRATION RATE: 16 BRPM | BODY MASS INDEX: 25.07 KG/M2 | WEIGHT: 156 LBS

## 2019-06-07 PROCEDURE — 15271 SKIN SUB GRAFT TRNK/ARM/LEG: CPT | Performed by: INTERNAL MEDICINE

## 2019-06-07 PROCEDURE — C5271 LOW COST SKIN SUBSTITUTE APP: HCPCS

## 2019-06-07 PROCEDURE — 29581 APPL MULTLAYER CMPRN SYS LEG: CPT

## 2019-06-07 RX ORDER — LIDOCAINE 40 MG/G
CREAM TOPICAL ONCE
Status: DISCONTINUED | OUTPATIENT
Start: 2019-06-07 | End: 2019-06-08 | Stop reason: HOSPADM

## 2019-06-07 ASSESSMENT — PAIN SCALES - GENERAL
PAINLEVEL_OUTOF10: 0
PAINLEVEL_OUTOF10: 0

## 2019-06-07 NOTE — PROGRESS NOTES
88 [facility] Progress Note    Gisell Whalen     : 2/3/1933    DATE OF VISIT:  2019    Subjective: Gisell Whalen is a 80 y.o. female who has a venous ulcer located on the left  lower leg. Significant symptoms or pertinent wound history since last visit: feeling ok overall, fairly mild pain this week, good control of swelling, mild pruritus, no F/C/D. Hoping that her ulcer will make faster progress toward healing, but we've discussed the fact that venous ulcers are typically slow to heal, and in her case in particular, we're working against her lipodermatosclerosis, the delay from wound onset to start of care here, and the one infection that she had recently. Additional ulcer(s) noted? no.      Her current medication list consists of Lisinopril, aspirin, calcium-vitamin D, furosemide, gabapentin, and metoprolol succinate. Allergies: Erythromycin; Hydrochlorothiazide; Silver sulfadiazine; and Doxycycline    Objective:     Vitals:    19 0850   BP: (!) 148/88   Pulse: 55   Resp: 16   Temp: 97 °F (36.1 °C)   TempSrc: Oral   Weight: 161 lb (73 kg)   Height: 5' 6\" (1.676 m)     AAOx3, a bit fatigued, NAD  Intact distal pulses, foot warm, good cap refill  Mild LLE edema, stable signs of lipodermatosclerosis  No cellulitis, angitis or fluctuance, not much allodynia, mild stasis erythema, no real contact dermatitis  Gay-ulcer skin: indurated, pink, erythematous  and warm. Ulcer(s): a mix of SQ fibrosis, a bit of increasing granulation, fibrin, biofilm, serous exudate. Photos also saved in electronic chart.     Today's wound measurements:  Wound 19 #1, left medial leg, Venous, full thickness,(onset 2019) unknown-Wound Length (cm): 4 cm    Wound 19 #1, left medial leg, Venous, full thickness,(onset 2019) unknown-Wound Width (cm): 1.5 cm    Wound 19 #1, left medial leg, Venous, full thickness,(onset 2019) unknown-Wound Depth (cm): 0.2 cm    Assessment: Patient Active Problem List   Diagnosis Code    HTN (hypertension) I10    Localized osteoarthrosis, lower leg M17.10    Eczema L30.9    Foot drop, right M21.371    Lumbosacral spondylosis without myelopathy M47.817    Normocytic anemia D64.9    Peripheral neuropathy G62.9    Senile osteoporosis M81.0    Bilateral lower extremity edema R60.0    CKD (chronic kidney disease) stage 3, GFR 30-59 ml/min ([facility]) N18.3    Chronic venous hypertension (idiopathic) with ulcer and inflammation of left lower extremity (CODE) ([facility]) I87.332    Non-pressure chronic ulcer of left calf with fat layer exposed ([facility] [address] L97.222       Assessment of today's active condition(s): venous insufficiency, chronic skin changes, lipodermatosclerosis, LE edema, slowly healing venous leg ulcer, no signs of ischemia, no active infection, starting to improve a bit with addition of Oasis, I believe, but the fibrotic tissue IS being stubborn. Factors contributing to occurrence and/or persistence of the chronic ulcer include edema and venous stasis. Medical necessity of today's visit is shown by the above documentation. Sharp debridement is indicated today, based upon the exam findings in the wound(s) above. Procedure note:     Consent obtained. Time out performed per [facility] policy. Anesthetic  Anesthetic: 4% Lidocaine Liquid Topical     Using a curette and #15 blade scalpel, I sharply debrided the left  lower leg ulcer(s) down through and including the removal of subcutaneous tissue. The type(s) of tissue debrided included fibrin, biofilm and necrotic/eschar. Total Surface Area Debrided: 6 sq cm. This also included making a small number of tiny stab incisions in the most fibrotic areas of the wound base, with the #15 blade, to encourage a bit more bleeding and hopefully some new buds of granulation there. The ulcers were then irrigated with normal saline solution.  The procedure was completed with a small amount of bleeding, and hemostasis was with pressure. The patient tolerated the procedure well, with no significant complications. The patient's level of pain during and after the procedure was monitored. Post-debridement measurements, if different from pre-debridement, are in the flowsheet as well.  ______________    Because this patient's venous leg ulcer has failed to respond to standard wound-care measures (including treatment of infection, debridement of necrosis, treatment of edema, provision of a moist wound environment, good compliance with compression) for more than 4 weeks, I recommended [place] Wound Matrix as adjunctive therapy to help speed healing of this ulcer. As per previous documentation, peripheral pulse exam is acceptable, and/or TODD is at least 0.65. There is no evidence of untreated infection today. Ms. Alfonso De Oliveira does not currently smoke. After cleansing the left leg ulcer with saline, spraying the ulcer with HClO and applying Skin-Prep to the mauro-wound skin, an 11 sqcm piece of Oasis Wound Matrix was cut to fit the ulcer, placed into the ulcer bed, affixed to the surrounding skin with Steri-Strips, moistened with saline and covered with Mepitel Ag. Eleven sqcm of the product was used in the ulcer, and 0 sqcm of the product was discarded. If applicable, any wastage is due to the fact the smallest available product was larger than what was needed for her ulcer(s). The patient tolerated the procedure well, without complications. Discharge plan:     Treatment in the wound care center today: Wound 03/29/19 #1, left medial leg, Venous, full thickness,(onset 1/1/2019) unknown-Dressing/Treatment: Other (comment)(4x4,compri2,tubi). Per physician order, left application of multilayer compression wrap was performed in the wound care center today, to help manage edema, stasis dermatitis, and/or venous ulcers. Leave primary dressing and multi-layer wrap(s) in place until the next appointment.  Also discussed ways to keep the wrap dry for a shower, including a plastic cast-guard, available in retail pharmacies. She should call before her next visit if there is any significant pain, significant strike-through of drainage to the surface of the wrap, or any significant sense of the wrap sliding down more than an inch or so, bunching-up and abrading her skin. I reminded the patient of the importance of weight management and smoking cessation, if applicable; also encouraged ambulation as tolerated, additional lower extremity exercises as instructed in our education sheet, leg elevation when at rest, and compliance with any recommended dietary, diuretic and compression therapies. Written discharge instructions given to patient. Follow up in 1 week. When I tried to skip a week between [facility] [employer] applications, I believe that did more to allow accumulation of biofilm than it did to allow the [place] to better incorporate into the wound, so tentative plans for now are for weekly re-application, if it still appears appropriate.      Electronically signed by Nando Rodney MD on 6/6/2019 at 9:34 PM.

## 2019-06-07 NOTE — PLAN OF CARE
Additional specific goal for this week: Patient will receive safe and proper application of Oasis Wound Matrix. Patient will comply with caring for wound and reporting any concerns.  guidelines for application followed. Expiration date of Oasis checked immediately prior to use. Package intact prior to use, with no damage noted. Product storage at ambient temperature, per  guideline. Product lot #:   [id number]             Expiration date:  2020-10-07        Saline lot #:     [id number]           Expiration date: 12/2020  Patient/family/caregiver instructed to keep dressing clean, dry and intact. Patient/family/caregiver instructed on signs and symptoms to report, such as draining through dressing, dressing slipping or falling down, becoming wet, or patient having severe pain, severe itching, severe malodor, fever, etc.      Date of first application of a cellular / tissue product for this current wound is May 10, 2019. Today was application #4 for this wound.      Electronically signed by Marco Zapata RN on 6/7/2019 at 4595

## 2019-06-10 NOTE — PROGRESS NOTES
88 [facility] Progress Note    Lydia Lucero     : 2/3/1933    DATE OF VISIT:  2019    Subjective: Lydia Lucero is a 80 y.o. female who has a venous ulcer located on the left  lower leg. Significant symptoms or pertinent wound history since last visit: feeling ok, pain generally mild, no F/C/D, modest drainage, no pus or malodor. Additional ulcer(s) noted? no.      Her current medication list consists of Lisinopril, aspirin, calcium-vitamin D, furosemide, gabapentin, and metoprolol succinate. Allergies: Erythromycin; Hydrochlorothiazide; Silver sulfadiazine; and Doxycycline    Objective:     Vitals:    19 0856   BP: (!) 156/88   Pulse: 59   Resp: 16   Temp: 96.9 °F (36.1 °C)   TempSrc: Oral   Weight: 161 lb 6.4 oz (73.2 kg)   Height: 5' 6\" (1.676 m)     AAOx3, fatigued, NAD  Intact distal pulses, foot warm, good cap refill  Mild LLE edema, changes of lipodermatosclerosis, minimal stasis erythema  No cellulitis, angitis or fluctuance  Gay-ulcer skin: indurated, pink, erythematous  and warm. Ulcer(s): slowly changing (for the better) mix of red granulation and pale-yellow SQ fibronecrosis, with some fibrin, biofilm, serous exudate. Photos also saved in electronic chart.     Today's wound measurements:  Wound 19 #1, left medial leg, Venous, full thickness,(onset 2019) unknown-Wound Length (cm): 4 cm    Wound 19 #1, left medial leg, Venous, full thickness,(onset 2019) unknown-Wound Width (cm): 1.5 cm    Wound 19 #1, left medial leg, Venous, full thickness,(onset 2019) unknown-Wound Depth (cm): 0.2 cm    Assessment:     Patient Active Problem List   Diagnosis Code    HTN (hypertension) I10    Localized osteoarthrosis, lower leg M17.10    Eczema L30.9    Foot drop, right M21.371    Lumbosacral spondylosis without myelopathy M47.817    Normocytic anemia D64.9    Peripheral neuropathy G62.9    Senile osteoporosis M81.0    Bilateral lower extremity edema R60.0    CKD (chronic kidney disease) stage 3, GFR 30-59 ml/min ([facility]) N18.3    Chronic venous hypertension (idiopathic) with ulcer and inflammation of left lower extremity (CODE) ([facility]) I87.332    Non-pressure chronic ulcer of left calf with fat layer exposed ([facility] [address] L97.222       Assessment of today's active condition(s): venous stasis, chronic skin changes including lipodermatosclerosis, leg edema, longstanding venous leg ulcer, starting to make a bit of progress with [place]; no infection or ischemia. Factors contributing to occurrence and/or persistence of the chronic ulcer include edema and venous stasis. Medical necessity of today's visit is shown by the above documentation. Sharp debridement is indicated today, based upon the exam findings in the wound(s) above. Procedure note:     Consent obtained. Time out performed per [facility] policy. Anesthetic  Anesthetic: 4% Lidocaine Liquid Topical     Using a curette and #15 blade scalpel, I sharply debrided the left  lower leg ulcer(s) down through and including the removal of subcutaneous tissue. The type(s) of tissue debrided included fibrin, biofilm and necrotic/eschar. Total Surface Area Debrided: 6 sq cm. The ulcers were then irrigated with normal saline solution. The procedure was completed with a small amount of bleeding, and hemostasis was with pressure. The patient tolerated the procedure well, with no significant complications. The patient's level of pain during and after the procedure was monitored.  Post-debridement measurements, if different from pre-debridement, are in the flowsheet as well.  ______________    Because this patient's venous leg ulcer has failed to respond to standard wound-care measures (including treatment of infection, debridement of necrosis, treatment of edema, provision of a moist wound environment, good compliance with compression) for more than 4 weeks, I recommended [place] Wound Matrix as adjunctive therapy to help speed healing of this ulcer. As per previous documentation, peripheral pulse exam is acceptable, and/or TODD is at least 0.65. There is no evidence of untreated infection today. Ms. Bharath Yung does not currently smoke. After cleansing the left lower leg ulcer with saline, spraying the ulcer with HClO and applying Skin-Prep and Calmoseptine to the mauro-wound skin, an 11 sqcm piece of Oasis Wound Matrix was cut to fit the ulcer, placed into the ulcer bed, affixed to the surrounding skin with Steri-Strips, moistened with saline and covered with Mepitel Ag. Eleven sqcm of the product was used in the ulcer, and 0 sqcm of the product was discarded. If applicable, any wastage is due to the fact the smallest available product was larger than what was needed for her ulcer(s). The patient tolerated the procedure well, without complications. Discharge plan:     Treatment in the wound care center today: Wound 03/29/19 #1, left medial leg, Venous, full thickness,(onset 1/1/2019) unknown-Dressing/Treatment: Other (comment)(cast pad,compri2 lite,tubi). Per physician order, left application of multilayer compression wrap was performed in the wound care center today, to help manage edema, stasis dermatitis, and/or venous ulcers. Leave primary dressing and multi-layer wrap(s) in place until the next appointment. Also discussed ways to keep the wrap dry for a shower, including a plastic cast-guard, available in retail pharmacies. She should call before her next visit if there is any significant pain, significant strike-through of drainage to the surface of the wrap, or any significant sense of the wrap sliding down more than an inch or so, bunching-up and abrading her skin.      I reminded the patient of the importance of weight management and smoking cessation, if applicable; also encouraged ambulation as tolerated, additional lower extremity exercises as instructed in our education sheet, leg elevation when at rest, and compliance with any recommended dietary, diuretic and compression therapies. Written discharge instructions given to patient. Follow up in 1 week.     Electronically signed by Yulisa Mccord MD on 6/10/2019 at 7:02 PM.

## 2019-06-13 NOTE — PROGRESS NOTES
88 [facility] Progress Note    Kinsey Gaytan     : 2/3/1933    DATE OF VISIT:  2019    Subjective: Kinsey Gaytan is a 80 y.o. female who has a venous ulcer located on the left  lower leg. Significant symptoms or pertinent wound history since last visit: feeling well, very little pain (still some back pain unfortunately), swelling very mild, no F/C/D, no real pruritus, no malodor from within wrap. Additional ulcer(s) noted? no.      Her current medication list consists of Lisinopril, aspirin, calcium-vitamin D, furosemide, gabapentin, and metoprolol succinate. Allergies: Erythromycin; Hydrochlorothiazide; Silver sulfadiazine; and Doxycycline    Objective:     Vitals:    19 0849   BP: 131/76   Pulse: 58   Resp: 16   Temp: 96.9 °F (36.1 °C)   TempSrc: Oral   Weight: 156 lb (70.8 kg)   Height: 5' 6\" (1.676 m)     AAOx3, a bit fatigued, NAD  Intact distal pulses, foot warm, good cap refill  Mild LLE edema, stable lipodermatosclerosis changes, minimal stasis erythema  No cellulitis, angitis, fluctuance; no real allodynia, but some increased proximal wound tenderness (at a focus of increased depth)   Gay-ulcer skin: indurated, pink and warm. Ulcer(s): overall still slowly getting more granular, still some SQ fibronecrosis, fibrin + biofilm, serous exudate, a couple of areas with enouraging new epidermal coverage this week. Photos also saved in electronic chart.     Today's wound measurements:  Wound 19 #1, left medial leg, Venous, full thickness,(onset 2019) unknown-Wound Length (cm): 3.9 cm    Wound 19 #1, left medial leg, Venous, full thickness,(onset 2019) unknown-Wound Width (cm): 1.4 cm    Wound 19 #1, left medial leg, Venous, full thickness,(onset 2019) unknown-Wound Depth (cm): 0.2 cm    Assessment:     Patient Active Problem List   Diagnosis Code    HTN (hypertension) I10    Localized osteoarthrosis, lower leg M17.10    Eczema L30.9    bunching-up and abrading her skin. I reminded the patient of the importance of weight management and smoking cessation, if applicable; also encouraged ambulation as tolerated, additional lower extremity exercises as instructed in our education sheet, leg elevation when at rest, and compliance with any recommended dietary, diuretic and compression therapies. Written discharge instructions given to patient. Follow up in 1 week.     Electronically signed by Augusto Duarte MD on 6/13/2019 at 3:14 PM.

## 2019-06-14 ENCOUNTER — HOSPITAL ENCOUNTER (OUTPATIENT)
Dept: WOUND CARE | Age: 84
Discharge: HOME | End: 2019-06-14
Payer: MEDICARE

## 2019-06-14 VITALS
WEIGHT: 159.6 LBS | TEMPERATURE: 97.2 F | HEIGHT: 66 IN | HEART RATE: 57 BPM | BODY MASS INDEX: 25.65 KG/M2 | DIASTOLIC BLOOD PRESSURE: 72 MMHG | RESPIRATION RATE: 18 BRPM | SYSTOLIC BLOOD PRESSURE: 129 MMHG

## 2019-06-14 PROCEDURE — 29581 APPL MULTLAYER CMPRN SYS LEG: CPT

## 2019-06-14 PROCEDURE — 15271 SKIN SUB GRAFT TRNK/ARM/LEG: CPT | Performed by: INTERNAL MEDICINE

## 2019-06-14 RX ORDER — LIDOCAINE 40 MG/G
CREAM TOPICAL ONCE
Status: DISCONTINUED | OUTPATIENT
Start: 2019-06-14 | End: 2019-06-15 | Stop reason: HOSPADM

## 2019-06-14 ASSESSMENT — PAIN SCALES - GENERAL: PAINLEVEL_OUTOF10: 0

## 2019-06-14 NOTE — PLAN OF CARE
Skin substitute placed today per MD. Patient wounds continue to improve with weekly visits and proper wound management. Patient provided with discharge instructions and follow up appointment.

## 2019-06-14 NOTE — PLAN OF CARE
Additional specific goal for this week: Patient will receive safe and proper application of Oasis Wound Matrix. Patient will comply with caring for wound and reporting any concerns.  guidelines for application followed. Expiration date of Oasis checked immediately prior to use. Package intact prior to use, with no damage noted. Product storage at ambient temperature, per  guideline. Product lot #: [id number] Expiration date:10/07/2020   Saline lot #: [id number] Expiration date: 7/2020   Patient/family/caregiver instructed to keep dressing clean, dry and intact. Patient/family/caregiver instructed on signs and symptoms to report, such as draining through dressing, dressing slipping or falling down, becoming wet, or patient having severe pain, severe itching, severe malodor, fever, etc.     Date of first application of a cellular / tissue product for this current wound is 5/ 10/2019. Today was application # 5 for this wound.     Electronically signed by Sean Linn RN on 6/14/2019 at 5:20 PM.

## 2019-06-21 ENCOUNTER — HOSPITAL ENCOUNTER (OUTPATIENT)
Dept: WOUND CARE | Age: 84
Discharge: HOME | End: 2019-06-21
Payer: MEDICARE

## 2019-06-21 NOTE — PROGRESS NOTES
88 [facility] Progress Note    Imani Pulido     : 2/3/1933    DATE OF VISIT:  2019    Subjective: Imani Pulido is a 80 y.o. female who has a venous ulcer located on the left  lower leg. Significant symptoms or pertinent wound history since last visit: feeling ok, minimal leg pain, minimal swelling, mild-mod drainage, no fever, mild pruritus. Back feeling a little better. Wearing her right AFO now. Additional ulcer(s) noted? no.      Her current medication list consists of Lisinopril, aspirin, calcium-vitamin D, furosemide, gabapentin, and metoprolol succinate. Allergies: Erythromycin; Hydrochlorothiazide; Silver sulfadiazine; and Doxycycline    Objective:     Vitals:    19 0858 19 0906   BP:  129/72   Pulse:  57   Resp:  18   Temp:  97.2 °F (36.2 °C)   TempSrc:  Oral   Weight: 159 lb 9.6 oz (72.4 kg)    Height: 5' 6\" (1.676 m)      AAOx3, fatigued, NAD  Intact distal pulses, foot warm, good cap refill  Minimal LLE edema, stable lipodermatosclerosis changes; mild pretib dermatitis, I think from dressings more than stasis  No cellulitis, angitis, allodynia, with one deeper area still having some tenderness, but not unexpected  Gay-ulcer skin: indurated, pink and warm. Ulcer(s): overall smaller, more granulation, less SQ fibronecrosis, a bit of new epidermal coverage, some fibrin and biofilm, serous exudate. Photos also saved in electronic chart.     Today's wound measurements:  Wound 19 #1, left medial leg, Venous, full thickness,(onset 2019) unknown-Wound Length (cm): 3.8 cm    Wound 19 #1, left medial leg, Venous, full thickness,(onset 2019) unknown-Wound Width (cm): 1.4 cm    Wound 19 #1, left medial leg, Venous, full thickness,(onset 2019) unknown-Wound Depth (cm): 0.3 cm    Assessment:     Patient Active Problem List   Diagnosis Code    HTN (hypertension) I10    Localized osteoarthrosis, lower leg M17.10    Eczema L30.9    Foot drop, right M21.371    Lumbosacral spondylosis without myelopathy M47.817    Normocytic anemia D64.9    Peripheral neuropathy G62.9    Senile osteoporosis M81.0    Bilateral lower extremity edema R60.0    CKD (chronic kidney disease) stage 3, GFR 30-59 ml/min ([facility] N18.3    Chronic venous hypertension (idiopathic) with ulcer and inflammation of left lower extremity (CODE) ([facility]) I87.332    Non-pressure chronic ulcer of left calf with fat layer exposed ([facility]) L97.222       Assessment of today's active condition(s): venous insufficiency, chronic skin changes, including lipodermatosclerosis, chronic leg edema, longstanding venous leg ulcer, making some progress with debridement, compression and Oasis, no signs of ischemia or infection. Factors contributing to occurrence and/or persistence of the chronic ulcer include edema and venous stasis. Medical necessity of today's visit is shown by the above documentation. Sharp debridement is indicated today, based upon the exam findings in the wound(s) above. Procedure note:     Consent obtained. Time out performed per [facility] policy. Anesthetic  Anesthetic: 4% Lidocaine Liquid Topical     Using a curette and #15 blade scalpel, I sharply debrided the left , anterior lower leg ulcer(s) down through and including the removal of subcutaneous tissue. The type(s) of tissue debrided included fibrin, biofilm and necrotic/eschar. Total Surface Area Debrided: 5 sq cm. The ulcers were then irrigated with normal saline solution. The procedure was completed with a small amount of bleeding, and hemostasis was with pressure. The patient tolerated the procedure well, with no significant complications. The patient's level of pain during and after the procedure was monitored.  Post-debridement measurements, if different from pre-debridement, are in the flowsheet as well.  ________________    Because this patient's venous leg ulcer has failed to respond to standard wound-care measures (including treatment of infection, debridement of necrosis, treatment of edema, provision of a moist wound environment, good compliance with compression) for more than 4 weeks, I recommended [place] Wound Matrix as adjunctive therapy to help speed healing of this ulcer. As per previous documentation, peripheral pulse exam is acceptable, and/or TODD is at least 0.65. There is no evidence of untreated infection today. Ms. Joseph Vale does not currently smoke. After cleansing the left leg ulcer with saline, spraying the ulcer with HClO and applying Calmoseptine to the mauro-wound skin, an 11 sqcm piece of Oasis Wound Matrix was cut to fit the ulcer, placed into the ulcer bed, affixed to the surrounding skin with Steri-Strips, moistened with saline and covered with Mepitel Ag. Nine sqcm of the product was used in the ulcer, and two sqcm of the product was discarded. If applicable, any wastage is due to the fact the smallest available product was larger than what was needed for her ulcer(s). The patient tolerated the procedure well, without complications. Discharge plan:     Treatment in the wound care center today: Wound 03/29/19 #1, left medial leg, Venous, full thickness,(onset 1/1/2019) unknown-Dressing/Treatment: (compri2 lite, F-tubi). Also a bit of triamcinolone to the pretibial dermatitis. Per physician order, left application of multilayer compression wrap was performed in the wound care center today, to help manage edema, stasis dermatitis, and/or venous ulcers. Leave primary dressing and multi-layer wrap(s) in place until the next appointment. Also discussed ways to keep the wrap dry for a shower, including a plastic cast-guard, available in retail pharmacies.  She should call before her next visit if there is any significant pain, significant strike-through of drainage to the surface of the wrap, or any significant sense of the wrap sliding down more than an inch or so, bunching-up and abrading her skin. I reminded the patient of the importance of weight management and smoking cessation, if applicable; also encouraged ambulation as tolerated, additional lower extremity exercises as instructed in our education sheet, leg elevation when at rest, and compliance with any recommended dietary, diuretic and compression therapies. Written discharge instructions given to patient. Follow up in 1 week.     Electronically signed by Yoseph Au MD on 6/20/2019 at 9:44 PM.

## 2019-06-24 ENCOUNTER — HOSPITAL ENCOUNTER (OUTPATIENT)
Dept: WOUND CARE | Age: 84
Discharge: HOME | End: 2019-06-24
Payer: MEDICARE

## 2019-06-24 VITALS
HEIGHT: 66 IN | DIASTOLIC BLOOD PRESSURE: 78 MMHG | SYSTOLIC BLOOD PRESSURE: 127 MMHG | HEART RATE: 66 BPM | WEIGHT: 155 LBS | BODY MASS INDEX: 24.91 KG/M2 | RESPIRATION RATE: 18 BRPM | TEMPERATURE: 97.9 F

## 2019-06-24 PROCEDURE — C5271 LOW COST SKIN SUBSTITUTE APP: HCPCS

## 2019-06-24 PROCEDURE — 29581 APPL MULTLAYER CMPRN SYS LEG: CPT

## 2019-06-24 PROCEDURE — 15271 SKIN SUB GRAFT TRNK/ARM/LEG: CPT | Performed by: INTERNAL MEDICINE

## 2019-06-24 RX ORDER — LIDOCAINE 40 MG/G
CREAM TOPICAL ONCE
Status: DISCONTINUED | OUTPATIENT
Start: 2019-06-24 | End: 2019-06-25 | Stop reason: HOSPADM

## 2019-06-24 ASSESSMENT — PAIN SCALES - GENERAL
PAINLEVEL_OUTOF10: 0
PAINLEVEL_OUTOF10: 0

## 2019-06-24 NOTE — PLAN OF CARE
Signed                    Additional specific goal for this week: Patient will receive safe and proper application of Oasis Wound Matrix. Patient will comply with caring for wound and reporting any concerns.  guidelines for application followed. Expiration date of Oasis checked immediately prior to use. Package intact prior to use, with no damage noted. Product storage at ambient temperature, per  guideline. Product lot #:   [id number]      Expiration date:10/07/2020        Saline lot #:     [id number] Expiration date: 12/2020             Patient/family/caregiver instructed to keep dressing clean, dry and intact. Patient/family/caregiver instructed on signs and symptoms to report, such as draining through dressing, dressing slipping or falling down, becoming wet, or patient having severe pain, severe itching, severe malodor, fever, etc.      Date of first application of a cellular / tissue product for this current wound is 5/10/2019. Today was application # 6 for this wound.      Electronically signed by Madhav Vázquez RN on 6/24/2019 at [phone] PM.

## 2019-06-26 NOTE — PROGRESS NOTES
88 [facility] Progress Note    Yony Adams     : 2/3/1933    DATE OF VISIT:  2019    Subjective: Yony Adams is a 80 y.o. female who has a venous ulcer located on the left  lower leg. Significant symptoms or pertinent wound history since last visit: was out of town on vacation this past week, did well in general, though probably stressed her leg a bit more than in recent months (a couple of car rides, a bit more walking). It looks like the wrap may have slid down a bit (upper calf more edematous, lower calf very thin, with a couple of ridges from the wrap getting tighter). She denies any real pain in the leg, however, no pruritus, moderate drainage, no F/C/D. Additional ulcer(s) noted? no.      Her current medication list consists of Lisinopril, aspirin, calcium-vitamin D, furosemide, gabapentin, and metoprolol succinate. Allergies: Erythromycin; Hydrochlorothiazide; Silver sulfadiazine; and Doxycycline    Objective:     Vitals:    19 1349   BP: 127/78   Pulse: 66   Resp: 18   Temp: 97.9 °F (36.6 °C)   TempSrc: Oral   Weight: 155 lb (70.3 kg)   Height: 5' 6\" (1.676 m)     AAOx3, NAD  Intact distal pulses, foot warm, good cap refill  No ankle edema, mild-mod proximal calf  No cellulitis, angitis, fluctuance; faded pretib dermatitis this week  No allodynia  Gay-ulcer skin: indurated, pink, warm and very slight erythema. Ulcer(s): smaller overall, distal portion mostly red and granular, some fibrin +/- biofilm, proximal portion still with a small focus of SQ fibronecrosis but a bit more granulation there too. Photos also saved in electronic chart.     Today's wound measurements:  Wound 19 #1, left medial leg, Venous, full thickness,(onset 2019) unknown-Wound Length (cm): 3.8 cm    Wound 19 #1, left medial leg, Venous, full thickness,(onset 2019) unknown-Wound Width (cm): 1.8 cm    Wound 19 #1, left medial leg, Venous, full thickness,(onset 1/1/2019) unknown-Wound Depth (cm): 0.1 cm    Assessment:     Patient Active Problem List   Diagnosis Code    HTN (hypertension) I10    Localized osteoarthrosis, lower leg M17.10    Eczema L30.9    Foot drop, right M21.371    Lumbosacral spondylosis without myelopathy M47.817    Normocytic anemia D64.9    Peripheral neuropathy G62.9    Senile osteoporosis M81.0    Bilateral lower extremity edema R60.0    CKD (chronic kidney disease) stage 3, GFR 30-59 ml/min ([facility]) N18.3    Chronic venous hypertension (idiopathic) with ulcer and inflammation of left lower extremity (CODE) ([facility]) I87.332    Non-pressure chronic ulcer of left calf with fat layer exposed ([facility] [address] L97.222       Assessment of today's active condition(s): venous stasis, lipodermatosclerosis, chronic leg edema, slowly healing VLU with debridement, good local care, compression, adjunctive Oasis; no infection or ischemia; if I can get that one deeper proximal focus to start to granulate, she ought to be close to healed in the coming weeks. Factors contributing to occurrence and/or persistence of the chronic ulcer include edema and venous stasis. Medical necessity of today's visit is shown by the above documentation. Sharp debridement is indicated today, based upon the exam findings in the wound(s) above. Procedure note:     Consent obtained. Time out performed per [facility] policy. Anesthetic  Anesthetic: 4% Lidocaine Liquid Topical     Using a curette and #15 blade scalpel, I sharply debrided the left  lower leg ulcer(s) down through and including the removal of subcutaneous tissue. The type(s) of tissue debrided included fibrin, biofilm and necrotic/eschar. Total Surface Area Debrided: 7 sq cm. The ulcers were then irrigated with normal saline solution. The procedure was completed with a small amount of bleeding, and hemostasis was with pressure. The patient tolerated the procedure well, with no significant complications.  The patient's level of pain during and after the procedure was monitored. Post-debridement measurements, if different from pre-debridement, are in the flowsheet as well.  ______________    Because this patient's venous leg ulcer has failed to respond to standard wound-care measures (including treatment of infection, debridement of necrosis, treatment of edema, provision of a moist wound environment, good compliance with compression) for more than 4 weeks, I recommended [place] Wound Matrix as adjunctive therapy to help speed healing of this ulcer. As per previous documentation, peripheral pulse exam is acceptable, and/or TODD is at least 0.65. There is no evidence of untreated infection today. Ms. Devora Goodwin does not currently smoke. After cleansing the left lower leg ulcer with saline, spraying the ulcer with HClO and applying Clamoseptine to the mauro-wound skin, an 11 sqcm piece of [place] Wound Matrix was cut to fit the ulcer, placed into the ulcer bed, affixed to the surrounding skin with Steri-Strips, moistened with saline and covered with Mepitel Ag. Eleven sqcm of the product was used in the ulcer, and 0 sqcm of the product was discarded. If applicable, any wastage is due to the fact the smallest available product was larger than what was needed for her ulcer(s). The patient tolerated the procedure well, without complications. Discharge plan:     Treatment in the wound care center today: Wound 03/29/19 #1, left medial leg, Venous, full thickness,(onset 1/1/2019) unknown-Dressing/Treatment: Other (comment)(4x4's, ABD, Compri 2 Lite). Per physician order, left application of multilayer compression wrap was performed in the wound care center today, to help manage edema, stasis dermatitis, and/or venous ulcers. Leave primary dressing and multi-layer wrap(s) in place until the next appointment. Also discussed ways to keep the wrap dry for a shower, including a plastic cast-guard, available in retail pharmacies.  She should call before her next visit if there is any significant pain, significant strike-through of drainage to the surface of the wrap, or any significant sense of the wrap sliding down more than an inch or so, bunching-up and abrading her skin. I reminded the patient of the importance of weight management and smoking cessation, if applicable; also encouraged ambulation as tolerated, additional lower extremity exercises as instructed in our education sheet, leg elevation when at rest, and compliance with any recommended dietary, diuretic and compression therapies. Written discharge instructions given to patient. Follow up in 1 week.     Electronically signed by Nando Rodney MD on 6/26/2019 at 8:29 AM.

## 2019-07-05 ENCOUNTER — HOSPITAL ENCOUNTER (OUTPATIENT)
Dept: WOUND CARE | Age: 84
Discharge: HOME | End: 2019-07-05
Payer: MEDICARE

## 2019-07-05 VITALS
WEIGHT: 160.6 LBS | HEIGHT: 66 IN | TEMPERATURE: 97.6 F | HEART RATE: 63 BPM | BODY MASS INDEX: 25.81 KG/M2 | RESPIRATION RATE: 18 BRPM | DIASTOLIC BLOOD PRESSURE: 75 MMHG | SYSTOLIC BLOOD PRESSURE: 127 MMHG

## 2019-07-05 PROCEDURE — 29581 APPL MULTLAYER CMPRN SYS LEG: CPT

## 2019-07-05 PROCEDURE — 11042 DBRDMT SUBQ TIS 1ST 20SQCM/<: CPT | Performed by: INTERNAL MEDICINE

## 2019-07-05 PROCEDURE — 17250 CHEM CAUT OF GRANLTJ TISSUE: CPT

## 2019-07-05 PROCEDURE — 11042 DBRDMT SUBQ TIS 1ST 20SQCM/<: CPT

## 2019-07-05 RX ORDER — LIDOCAINE 40 MG/G
CREAM TOPICAL ONCE
Status: DISCONTINUED | OUTPATIENT
Start: 2019-07-05 | End: 2019-07-06 | Stop reason: HOSPADM

## 2019-07-05 ASSESSMENT — PAIN SCALES - GENERAL: PAINLEVEL_OUTOF10: 0

## 2019-07-12 ENCOUNTER — HOSPITAL ENCOUNTER (OUTPATIENT)
Dept: WOUND CARE | Age: 84
Discharge: HOME | End: 2019-07-12
Payer: MEDICARE

## 2019-07-12 VITALS
HEART RATE: 59 BPM | DIASTOLIC BLOOD PRESSURE: 89 MMHG | TEMPERATURE: 98.1 F | BODY MASS INDEX: 25.01 KG/M2 | RESPIRATION RATE: 18 BRPM | SYSTOLIC BLOOD PRESSURE: 134 MMHG | HEIGHT: 66 IN | WEIGHT: 155.6 LBS

## 2019-07-12 PROCEDURE — 29581 APPL MULTLAYER CMPRN SYS LEG: CPT

## 2019-07-12 PROCEDURE — 15271 SKIN SUB GRAFT TRNK/ARM/LEG: CPT | Performed by: INTERNAL MEDICINE

## 2019-07-12 PROCEDURE — C5271 LOW COST SKIN SUBSTITUTE APP: HCPCS

## 2019-07-12 RX ORDER — LIDOCAINE 40 MG/G
CREAM TOPICAL ONCE
Status: DISCONTINUED | OUTPATIENT
Start: 2019-07-12 | End: 2019-07-13 | Stop reason: HOSPADM

## 2019-07-12 ASSESSMENT — PAIN SCALES - GENERAL: PAINLEVEL_OUTOF10: 0

## 2019-07-12 NOTE — PROGRESS NOTES
[facility] Progress Note    Isabel Ortega     : 2/3/1933    DATE OF VISIT:  2019    Subjective: Isabel Ortega is a 80 y.o. female who has a venous ulcer located on the left  lower leg. Significant symptoms or pertinent wound history since last visit: feeling well overall, minimal pain, mild pruritus, minimal swelling, no fever, modest drainage. Additional ulcer(s) noted? no.      Her current medication list consists of Lisinopril, aspirin, calcium-vitamin D, furosemide, gabapentin, and metoprolol succinate. Allergies: Erythromycin; Hydrochlorothiazide; Silver sulfadiazine; and Doxycycline    Objective:     Vitals:    19 0903   BP: 127/75   Pulse: 63   Resp: 18   Temp: 97.6 °F (36.4 °C)   TempSrc: Oral   Weight: 160 lb 9.6 oz (72.8 kg)   Height: 5' 6\" (1.676 m)     AAOx3, NAD  Intact distal pulses, foot warm, good cap refill  Very mild LLE edema, stable features of lipodermatosclerosis  No cellulitis, angitis, fluctuance, some mild stasis dermatitis  Gay-ulcer skin: indurated, pink, warm and stasis dermatitis. Ulcer(s): proximal aspect small but still with a bit of depth, part granular, part SQ fibronecrosis, mild epibole; distal aspect red, hypergranular, clean, modest serous exudate. Photos also saved in electronic chart.     Today's wound measurements:  Wound 19 #1, left medial leg, Venous, full thickness,(onset 2019) unknown-Wound Length (cm): 3.7 cm    Wound 19 #1, left medial leg, Venous, full thickness,(onset 2019) unknown-Wound Width (cm): 1.7 cm    Wound 19 #1, left medial leg, Venous, full thickness,(onset 2019) unknown-Wound Depth (cm): 0.1 cm    Assessment:     Patient Active Problem List   Diagnosis Code    HTN (hypertension) I10    Localized osteoarthrosis, lower leg M17.10    Eczema L30.9    Foot drop, right M21.371    Lumbosacral spondylosis without myelopathy M47.817    Normocytic anemia D64.9    Peripheral neuropathy G62.9    Senile osteoporosis M81.0    Bilateral lower extremity edema R60.0    CKD (chronic kidney disease) stage 3, GFR 30-59 ml/min ([facility]) N18.3    Chronic venous hypertension (idiopathic) with ulcer and inflammation of left lower extremity (CODE) ([facility]) I87.332    Non-pressure chronic ulcer of left calf with fat layer exposed ([address] L97.222       Assessment of today's active condition(s): venous stasis, lipodermatosclerosis, mild chronic leg edema, slowly healing venous leg ulcer, had a series of Oasis applications with some definite help toward healing, but I'm hopeful that she has enough momentum now to heal with ongoing conservative care alone. Factors contributing to occurrence and/or persistence of the chronic ulcer include edema and venous stasis. Medical necessity of today's visit is shown by the above documentation. Sharp debridement is indicated today, based upon the exam findings in the wound(s) above. Procedure note:     Consent obtained. Time out performed per [facility] policy. Anesthetic  Anesthetic: 4% Lidocaine Liquid Topical     Using a curette and #15 blade scalpel, I sharply debrided the left  lower leg ulcer(s) down through and including the removal of subcutaneous tissue. The type(s) of tissue debrided included fibrin and necrotic/eschar. Total Surface Area Debrided: 1 sq cm. The ulcers were then irrigated with normal saline solution. The procedure was completed with a small amount of bleeding, and hemostasis was with pressure. The patient tolerated the procedure well, with no significant complications. The patient's level of pain during and after the procedure was monitored. Post-debridement measurements, if different from pre-debridement, are in the flowsheet as well.  ______________    To encourage better epithelial cell coverage, I did use AgNO3 to chemically cauterize hypergranulation tissue on the left leg ulcer(s), after application of 4% lidocaine topical solution.

## 2019-07-18 NOTE — PROGRESS NOTES
significant strike-through of drainage to the surface of the wrap, or any significant sense of the wrap sliding down more than an inch or so, bunching-up and abrading her skin. I reminded the patient of the importance of weight management and smoking cessation, if applicable; also encouraged ambulation as tolerated, additional lower extremity exercises as instructed in our education sheet, leg elevation when at rest, and compliance with any recommended dietary, diuretic and compression therapies. Written discharge instructions given to patient. Follow up in 1 week.     Electronically signed by Dedrick Mullins MD on 7/18/2019 at 2:52 PM.

## 2019-07-19 ENCOUNTER — HOSPITAL ENCOUNTER (OUTPATIENT)
Dept: WOUND CARE | Age: 84
Discharge: HOME | End: 2019-07-19
Payer: MEDICARE

## 2019-07-19 VITALS
HEART RATE: 65 BPM | SYSTOLIC BLOOD PRESSURE: 121 MMHG | DIASTOLIC BLOOD PRESSURE: 73 MMHG | TEMPERATURE: 97.2 F | RESPIRATION RATE: 18 BRPM | BODY MASS INDEX: 24.91 KG/M2 | HEIGHT: 66 IN | WEIGHT: 155 LBS

## 2019-07-19 PROCEDURE — 29581 APPL MULTLAYER CMPRN SYS LEG: CPT | Performed by: INTERNAL MEDICINE

## 2019-07-19 PROCEDURE — 29580 STRAPPING UNNA BOOT: CPT

## 2019-07-19 PROCEDURE — 97597 DBRDMT OPN WND 1ST 20 CM/<: CPT

## 2019-07-19 PROCEDURE — 29581 APPL MULTLAYER CMPRN SYS LEG: CPT

## 2019-07-19 RX ORDER — LIDOCAINE 40 MG/G
CREAM TOPICAL
Status: DISPENSED | OUTPATIENT
Start: 2019-07-19 | End: 2019-07-19

## 2019-07-25 NOTE — PROGRESS NOTES
[facility] Progress Note    Lisa Ho     : 2/3/1933    DATE OF VISIT:  2019    Subjective: Lisa Ho is a 80 y.o. female who has a venous ulcer located on the left  lower leg. Significant symptoms or pertinent wound history since last visit: feeling ok, no real pain, minimal swelling, no pruritus, no F/C/D, modest drainage. Additional ulcer(s) noted? no.      Her current medication list consists of Alendronate Sodium, Lisinopril, calcium-vitamin D, furosemide, gabapentin, and metoprolol succinate. Allergies: Erythromycin; Hydrochlorothiazide; Silver sulfadiazine; and Doxycycline    Objective:     Vitals:    19 0900   BP: 121/73   Pulse: 65   Resp: 18   Temp: 97.2 °F (36.2 °C)   TempSrc: Oral   Weight: 155 lb (70.3 kg)   Height: 5' 6\" (1.676 m)     AAOx3, a bit fatigued, NAD  Wearing her right AFO for foot drop  Intact left pedal pulses, foot warm, good cap refill  Mild LLE edema, stable lipodermatosclerosis findings  Mild acute stasis dermatitis, no cellulitis, angitis or fluctuance, no allodynia  Gay-ulcer skin: indurated, pink, warm and stasis dermatitis. Ulcer(s): distal hypergranular area delicately epithelialized; proximal area with relatively more depth, with probably some residual Oasis mixed with fibrin, biofilm, serous exudate,  no signs of infection. Photos also saved in electronic chart.     Today's Wound Measurements:  Wound 19 #1, left medial leg, Venous, full thickness,(onset 2019) unknown-Wound Length (cm): 0.2 cm    Wound 19 #1, left medial leg, Venous, full thickness,(onset 2019) unknown-Wound Width (cm): 0.2 cm    Wound 19 #1, left medial leg, Venous, full thickness,(onset 2019) unknown-Wound Depth (cm): 0.2 cm    Assessment:     Patient Active Problem List   Diagnosis Code    HTN (hypertension) I10    Localized osteoarthrosis, lower leg M17.10    Eczema L30.9    Foot drop, right M21.371    Lumbosacral spondylosis without myelopathy M47.817    Normocytic anemia D64.9    Peripheral neuropathy G62.9    Senile osteoporosis M81.0    Bilateral lower extremity edema R60.0    CKD (chronic kidney disease) stage 3, GFR 30-59 ml/min ([facility]) N18.3    Chronic venous hypertension (idiopathic) with ulcer and inflammation of left lower extremity (CODE) ([facility]) I87.332    Non-pressure chronic ulcer of left calf with fat layer exposed ([address] L97.222       Assessment of today's active condition(s): venous insufficiency, chronic skin changes, LE edema, steadily healing venous leg ulcer, no infection or ischemia; enough [place] material remains that I don't think we need to debride and potentially reapply this week. Factors contributing to occurrence and/or persistence of the chronic ulcer include edema and venous stasis. Medical necessity of today's visit is shown by the above documentation. Sharp debridement is not indicated today, based upon the exam findings in the wound(s) above. I just used a 27g needle to puncture a few tiny holes through the [place] and into the underlying wound bed, to cause just a tiny amount of bleeding, and encourage her wound to continue to incorporate the graft material -- tolerated well, with no pain (after lidocaine application), minimal bleeding that stopped on its own. She does have an indication for a weekly compression wrap. Procedure note:     Consent obtained. Time out performed per [facility] policy. Anesthetic  Anesthetic: 4% Lidocaine Liquid Topical     After cleansing of the wounds with saline and applying skin-care and/or dressing materials as listed below, Left unilateral application of a multi-layer compression wrap was performed per physician order, to help manage edema, stasis dermatitis, and/or venous ulcers. The patient's level of pain during and after the procedure was monitored, and is noted in the wound documentation flowsheet.     Discharge plan:     Treatment in the wound care center today: Wound 03/29/19 #1, left medial leg, Venous, full thickness,(onset 1/1/2019) unknown-Dressing/Treatment: (4x4, comp 2 lite, tubi ). Leave primary dressing and multi-layer wrap(s) in place until the next appointment. She should call before her next visit if there is any significant pain, significant strike-through of drainage to the surface of the wrap, or any significant sense of the wrap sliding down more than an inch or so, bunching-up and abrading her skin. I reminded the patient of the importance of weight management and smoking cessation, if applicable; also encouraged ambulation as tolerated, additional lower extremity exercises as instructed in our education sheet, leg elevation when at rest, and compliance with any recommended dietary, diuretic and compression therapies. She'll bring in her compression stocking next week, for me to take a look at. If it doesn't seem adequate for secondary prevention of ulcers, we'll discuss other options. Still planning on saphenous vein ablation, preferably after the ulcer is healed. Written discharge instructions given to patient. Follow up in 1 week.     Electronically signed by Dedrick Mullins MD on 7/25/2019 at 3:29 PM.

## 2019-07-26 ENCOUNTER — HOSPITAL ENCOUNTER (OUTPATIENT)
Dept: WOUND CARE | Age: 84
Discharge: HOME | End: 2019-07-26
Payer: MEDICARE

## 2019-07-26 VITALS
DIASTOLIC BLOOD PRESSURE: 77 MMHG | HEIGHT: 66 IN | WEIGHT: 154 LBS | TEMPERATURE: 97 F | HEART RATE: 61 BPM | BODY MASS INDEX: 24.75 KG/M2 | SYSTOLIC BLOOD PRESSURE: 129 MMHG | RESPIRATION RATE: 18 BRPM

## 2019-07-26 PROCEDURE — 29581 APPL MULTLAYER CMPRN SYS LEG: CPT

## 2019-07-26 PROCEDURE — 29581 APPL MULTLAYER CMPRN SYS LEG: CPT | Performed by: INTERNAL MEDICINE

## 2019-07-26 RX ORDER — LIDOCAINE 40 MG/G
CREAM TOPICAL ONCE
Status: DISCONTINUED | OUTPATIENT
Start: 2019-07-26 | End: 2019-07-27 | Stop reason: HOSPADM

## 2019-07-26 ASSESSMENT — PAIN SCALES - GENERAL: PAINLEVEL_OUTOF10: 0

## 2019-07-31 ENCOUNTER — HOSPITAL ENCOUNTER (OUTPATIENT)
Dept: WOUND CARE | Age: 84
Discharge: HOME | End: 2019-07-31
Payer: MEDICARE

## 2019-07-31 VITALS
BODY MASS INDEX: 25.5 KG/M2 | DIASTOLIC BLOOD PRESSURE: 79 MMHG | RESPIRATION RATE: 18 BRPM | TEMPERATURE: 97.7 F | WEIGHT: 158 LBS | SYSTOLIC BLOOD PRESSURE: 123 MMHG | HEART RATE: 59 BPM

## 2019-07-31 PROCEDURE — 15271 SKIN SUB GRAFT TRNK/ARM/LEG: CPT | Performed by: INTERNAL MEDICINE

## 2019-07-31 PROCEDURE — 29581 APPL MULTLAYER CMPRN SYS LEG: CPT

## 2019-07-31 PROCEDURE — C5271 LOW COST SKIN SUBSTITUTE APP: HCPCS

## 2019-07-31 ASSESSMENT — PAIN DESCRIPTION - PROGRESSION: CLINICAL_PROGRESSION: RESOLVED

## 2019-07-31 ASSESSMENT — PAIN SCALES - GENERAL
PAINLEVEL_OUTOF10: 0
PAINLEVEL_OUTOF10: 0

## 2019-07-31 NOTE — PLAN OF CARE
CTP Rx           HBOT              NPWT     7/12/19 1 Left Lower Leg OASIS #7       7/31/19 1 Left Lower Leg [place] #8                        Wound injected with 2% Lidocaine with epinephrine & debridement per MD. Elton Cox applied per MD & will cont. With compression wrap as ordered above. F/u in [address] [place] [address],[address] in 1 week as ordered. Discharge instructions reviewed with patient, all questions answered, copy given to patient. Dressings were applied to all wounds per M.D. Instructions at this visit. Additional specific goal for this week: Patient will receive safe and proper application of Oasis Wound Matrix. Patient will comply with caring for wound and reporting any concerns.  guidelines for application followed. Expiration date of Oasis checked immediately prior to use. Package intact prior to use, with no damage noted. Product storage at ambient temperature, per  guideline. Product lot #: [id number]  Expiration date: 10/07/2020   Saline lot #: [id number]  Expiration date: 1/2021  Patient/family/caregiver instructed to keep dressing clean, dry and intact. Patient/family/caregiver instructed on signs and symptoms to report, such as draining through dressing, dressing slipping or falling down, becoming wet, or patient having severe pain, severe itching, severe malodor, fever, etc.     Date of first application of a cellular / tissue product for this current wound is May 10, 2019. Today was application # 8 for this wound.

## 2019-08-03 NOTE — PROGRESS NOTES
more completely, etc.     Follow up in 1 week.     Electronically signed by Carmela Hickman MD on 8/2/2019 at 8:34 PM.

## 2019-08-09 ENCOUNTER — HOSPITAL ENCOUNTER (OUTPATIENT)
Dept: WOUND CARE | Age: 84
Discharge: HOME | End: 2019-08-09
Payer: MEDICARE

## 2019-08-09 VITALS
HEART RATE: 62 BPM | BODY MASS INDEX: 25.39 KG/M2 | HEIGHT: 66 IN | DIASTOLIC BLOOD PRESSURE: 84 MMHG | SYSTOLIC BLOOD PRESSURE: 137 MMHG | TEMPERATURE: 97.4 F | WEIGHT: 158 LBS | RESPIRATION RATE: 18 BRPM

## 2019-08-09 PROCEDURE — 29581 APPL MULTLAYER CMPRN SYS LEG: CPT

## 2019-08-09 PROCEDURE — 29581 APPL MULTLAYER CMPRN SYS LEG: CPT | Performed by: INTERNAL MEDICINE

## 2019-08-09 ASSESSMENT — PAIN SCALES - GENERAL: PAINLEVEL_OUTOF10: 0

## 2019-08-09 NOTE — PLAN OF CARE
[facility] Summary     1. General --     Active wound etiologies:                     venous. PCP and pertinent consultants:          Raphael Ryan MD         Other plans for overall health:                             [address]:                        [address]:                        n/a     Most recent [address] lab results:     No results found for: LABA1C  No results found for: LABALBU             Lab Results   Component Value Date     CREATININE 0.9 09/05/2014                Lab Results   Component Value Date     HGB 11.9 (L) 09/05/2014         2. Circulatory status, if lower extremity ulcer --     Most recent TODD:                     Right TODD: 1.07 mmHg     Left TODD: 1.04 mmHg     Most recent arterial imaging:                   Most recent arterial Rx:                           Current management of edema:        Compri 2      Most recent venous imaging:              n/a     3.         Debridement --     Debridement methods, past or present:         Sharp     Pertinent surgical history for wound(s):          n/a     4. Infection --      Recent culture results:             n/a     Recent imaging:                           Recent antibiotic Rx:                    5.         Topical therapies --     Previous dressings on current wound(s):            Current dressings being used:      Left Lower Leg:    Vashe, Oasis, Meptiel Ag applied per MD     Use ABD to build up above ankle before the cast padding  Compri 2 LITE BUT use 100% cotton cast pad in place of 1st layer of Compri 2 Lite  Single Tubi to keep dressing in place  Leave this dressing in place, clean and dry until you follow up next week in wound clinic           6. Offloading --     Pressure ulcer offloading:       n/a     Neuropathic ulcer offloading:  n/a     7.          Adjunctive therapies --      Date           Wnd #        Location Date           Wnd #        Location                      CTP Rx           HBOT              NPWT     7/12/19 1 Left Lower Leg OASIS #7       7/31/19 1 Left Lower Leg [place] #8                        Improvement noted in wound today and patient will have same primary dressing and compression again this week. Discharge instructions reviewed with patient, all questions answered, copy given to patient. Dressings were applied to all wounds per M.D. Instructions at this visit.

## 2019-08-16 ENCOUNTER — HOSPITAL ENCOUNTER (OUTPATIENT)
Dept: WOUND CARE | Age: 84
Discharge: HOME | End: 2019-08-16
Payer: MEDICARE

## 2019-08-16 VITALS
DIASTOLIC BLOOD PRESSURE: 90 MMHG | BODY MASS INDEX: 25.39 KG/M2 | TEMPERATURE: 98.3 F | WEIGHT: 158 LBS | SYSTOLIC BLOOD PRESSURE: 146 MMHG | RESPIRATION RATE: 16 BRPM | HEIGHT: 66 IN | HEART RATE: 61 BPM

## 2019-08-16 PROCEDURE — 97597 DBRDMT OPN WND 1ST 20 CM/<: CPT | Performed by: INTERNAL MEDICINE

## 2019-08-16 PROCEDURE — 29581 APPL MULTLAYER CMPRN SYS LEG: CPT

## 2019-08-16 PROCEDURE — 97597 DBRDMT OPN WND 1ST 20 CM/<: CPT

## 2019-08-16 RX ORDER — LIDOCAINE 40 MG/G
CREAM TOPICAL ONCE
Status: DISCONTINUED | OUTPATIENT
Start: 2019-08-16 | End: 2019-08-17 | Stop reason: HOSPADM

## 2019-08-16 ASSESSMENT — PAIN SCALES - GENERAL: PAINLEVEL_OUTOF10: 0

## 2019-08-16 NOTE — PLAN OF CARE
CTP Rx           HBOT              NPWT     7/12/19 1 Left Lower Leg OASIS #7       7/31/19 1 Left Lower Leg [place] #8                      [facility] Summary     1. General --     Active wound etiologies:                     venous. PCP and pertinent consultants:          Yoselyn Rabago MD         Other plans for overall health:                             [address]:                        [address]:                        n/a     Most recent [facility] lab results:     No results found for: LABA1C  No results found for: LABALBU             Lab Results   Component Value Date     CREATININE 0.9 09/05/2014                Lab Results   Component Value Date     HGB 11.9 (L) 09/05/2014         2. Circulatory status, if lower extremity ulcer --     Most recent TODD:                     Right TODD: 1.07 mmHg     Left TODD: 1.04 mmHg     Most recent arterial imaging:                   Most recent arterial Rx:                           Current management of edema:        Compri 2      Most recent venous imaging:              n/a     3.         Debridement --     Debridement methods, past or present:         Sharp     Pertinent surgical history for wound(s):          n/a     4. Infection --      Recent culture results:             n/a     Recent imaging:                           Recent antibiotic Rx:                    5.         Topical therapies --     Previous dressings on current wound(s):            Current dressings being used: 8/16/19     Left Lower Leg:    Glo to open wound  Hydrogel then 4x4  Foam on dorsal ankle  Compri 2 LITE BUT use 100% cotton cast pad in place of 1st layer of Compri 2 Lite  Single Tubi to keep dressing in place  Leave this dressing in place, clean and dry until you follow up next week in wound clinic        6.          Offloading --     Pressure ulcer offloading:       n/a     Neuropathic ulcer offloading:

## 2019-08-23 ENCOUNTER — HOSPITAL ENCOUNTER (OUTPATIENT)
Dept: WOUND CARE | Age: 84
Discharge: HOME | End: 2019-08-23
Payer: MEDICARE

## 2019-08-23 VITALS
SYSTOLIC BLOOD PRESSURE: 121 MMHG | WEIGHT: 160.6 LBS | DIASTOLIC BLOOD PRESSURE: 75 MMHG | HEART RATE: 66 BPM | RESPIRATION RATE: 16 BRPM | BODY MASS INDEX: 25.81 KG/M2 | HEIGHT: 66 IN | TEMPERATURE: 97 F

## 2019-08-23 PROCEDURE — 99212 OFFICE O/P EST SF 10 MIN: CPT

## 2019-08-23 PROCEDURE — 99212 OFFICE O/P EST SF 10 MIN: CPT | Performed by: INTERNAL MEDICINE

## 2019-08-23 ASSESSMENT — PAIN SCALES - GENERAL
PAINLEVEL_OUTOF10: 0
PAINLEVEL_OUTOF10: 0

## 2019-08-29 PROBLEM — I87.2 PERIPHERAL VENOUS INSUFFICIENCY: Status: ACTIVE | Noted: 2019-05-09

## 2019-08-29 PROBLEM — L97.221 NON-PRESSURE CHRONIC ULCER OF LEFT CALF, LIMITED TO BREAKDOWN OF SKIN (HCC): Status: ACTIVE | Noted: 2019-05-09

## 2019-08-29 PROBLEM — L97.221 NON-PRESSURE CHRONIC ULCER OF LEFT CALF, LIMITED TO BREAKDOWN OF SKIN (HCC): Status: RESOLVED | Noted: 2019-05-09 | Resolved: 2019-08-29

## 2020-08-05 ENCOUNTER — HOSPITAL ENCOUNTER (OUTPATIENT)
Dept: WOUND CARE | Age: 85
Discharge: HOME | End: 2020-08-05
Payer: MEDICARE

## 2020-08-05 VITALS
SYSTOLIC BLOOD PRESSURE: 155 MMHG | WEIGHT: 155 LBS | BODY MASS INDEX: 24.91 KG/M2 | HEIGHT: 66 IN | RESPIRATION RATE: 16 BRPM | DIASTOLIC BLOOD PRESSURE: 89 MMHG | HEART RATE: 68 BPM | TEMPERATURE: 99 F

## 2020-08-05 PROCEDURE — 29580 STRAPPING UNNA BOOT: CPT

## 2020-08-05 PROCEDURE — 29580 STRAPPING UNNA BOOT: CPT | Performed by: INTERNAL MEDICINE

## 2020-08-05 PROCEDURE — 99213 OFFICE O/P EST LOW 20 MIN: CPT | Performed by: INTERNAL MEDICINE

## 2020-08-05 PROCEDURE — 99213 OFFICE O/P EST LOW 20 MIN: CPT

## 2020-08-05 RX ORDER — LIDOCAINE 40 MG/G
CREAM TOPICAL ONCE
Status: DISCONTINUED | OUTPATIENT
Start: 2020-08-05 | End: 2020-08-06 | Stop reason: HOSPADM

## 2020-08-05 ASSESSMENT — PAIN SCALES - GENERAL
PAINLEVEL_OUTOF10: 0
PAINLEVEL_OUTOF10: 0

## 2020-08-05 NOTE — PLAN OF CARE
Pt. New to [address] today for small right medial ankle wound. No debridement today. TODD noted per MD. Will apply dressing with unna boot & coban for compression. Reinforced importance of exercise, elevation & compression to help with circulation, edema control & wound healing. Pt. States she has compression stockings, but has not been able to wear them d/t complications with her drop foot. Pt. Has an AFO brace, but states she hasn't been wearing it at home. Pt. Has an old brace she ordered online & also a custom brace from [facility], but states it doesn't fit properly. Dr Ammy Zhang advised pt. To bring in her brace next week to be evaluated. [facility] [facility] 1 week as ordered. Discharge instructions reviewed with patient, all questions answered, copy given to patient. Dressings were applied to all wounds per M.D. Instructions at this visit.

## 2020-08-06 PROBLEM — L97.311 ULCER OF RIGHT ANKLE, LIMITED TO BREAKDOWN OF SKIN (HCC): Status: ACTIVE | Noted: 2020-08-06

## 2020-08-07 NOTE — PROGRESS NOTES
[facility]), and Venous ulcer of right leg ([facility] [address] She has a past surgical history that includes Tonsillectomy and Vein Surgery (Right, 10/27/2016). Her family history includes Stroke in her father and mother. Ms. Shanthi Mata reports that she has never smoked. She has never used smokeless tobacco. She reports current alcohol use. She reports that she does not use drugs. Her current medication list consists of Lisinopril, calcium-vitamin D, furosemide, and metoprolol succinate. Allergies: Erythromycin; Hydrochlorothiazide; Silver sulfadiazine; and Doxycycline    Pertinent items from the review of systems are discussed in the HPI; the remainder of the ROS was reviewed and is negative. Objective:     Vitals:    08/05/20 1013   BP: (!) 155/89   Pulse: 68   Resp: 16   Temp: 99 °F (37.2 °C)   TempSrc: Oral   Weight: 155 lb (70.3 kg)   Height: 5' 6\" (1.676 m)     TODD today -- right 0.89, left 9.05 (arm systolics 635 & 883, right ankle 122 & 124, left ankle 134 & 138). Constitutional:  well-developed, well-nourished, NAD  Psychiatric:  oriented to person, place and time; mood and affect appropriate for the situation   Eyes:  pupils equal, round and reactive to light; sclerae anicteric, conjunctivae not pale  Cardiovascular:  bilateral pedal pulses palpable, feet warm, good cap refill; mild-mod BL lower extremity edema, mild hemosiderin changes  Lymphatic:  no inguinal or popliteal adenopathy, no angitis or cellulitis  Musculoskeletal:  no clubbing, cyanosis or petechiae; RLE and LLE with no gross effusions, joint misalignment or acute arthritis; right foot drop noted  Gay-ulcer skin: indurated, pink and atrophe miroslava, scarring from prior venous ulcers. Ulcer(s): small area of red granulation and fibrosis covered with a bit of loose hyperkeratotic skin and fibrinous debris. Photos also saved in electronic chart.     Today's Wound Measurements, per RN documentation:  Wound 08/05/20 #2, Right Medial Ankle, Right Medial Ankle, Venous, Partial Thickness, Onset 7/15/20-Dressing/Treatment: Other (comment)(Purachol Ag, Hydrogel, Gauze Bolster, Pink Unna, coban). Per physician order, right application of Unna boot was performed in the wound care center today, to help manage edema, stasis dermatitis, and/or venous ulcers. Leave primary dressing, Unna boot and secondary layer(s) in place until the next appointment. Also discussed ways to keep the wrap dry for a shower, including a plastic cast-guard, available in retail pharmacies. I reminded the patient of the importance of weight management and smoking cessation, if applicable; also encouraged ambulation as tolerated, additional lower extremity exercises as instructed in our education sheet, leg elevation when at rest, and compliance with any recommended dietary, diuretic and compression therapies. I asked her to bring both AFOs with her next week, and then I'll be in touch with a local orthotist to see what they can do for the long-term, to give her one that feels like it fits better, without as much bulk and weight as her old one. Written discharge instructions given to patient. Follow up in 1 week.     Electronically signed by Cassie House MD on 8/6/2020 at 8:53 PM.

## 2020-08-14 ENCOUNTER — HOSPITAL ENCOUNTER (OUTPATIENT)
Dept: WOUND CARE | Age: 85
Discharge: HOME | End: 2020-08-14
Payer: MEDICARE

## 2020-08-14 VITALS
HEIGHT: 66 IN | TEMPERATURE: 98.7 F | WEIGHT: 157.6 LBS | RESPIRATION RATE: 18 BRPM | HEART RATE: 63 BPM | BODY MASS INDEX: 25.33 KG/M2 | SYSTOLIC BLOOD PRESSURE: 142 MMHG | DIASTOLIC BLOOD PRESSURE: 92 MMHG

## 2020-08-14 PROCEDURE — 97597 DBRDMT OPN WND 1ST 20 CM/<: CPT

## 2020-08-14 PROCEDURE — 97597 DBRDMT OPN WND 1ST 20 CM/<: CPT | Performed by: INTERNAL MEDICINE

## 2020-08-14 PROCEDURE — 29580 STRAPPING UNNA BOOT: CPT

## 2020-08-14 RX ORDER — LIDOCAINE 40 MG/G
CREAM TOPICAL PRN
Status: DISCONTINUED | OUTPATIENT
Start: 2020-08-14 | End: 2020-08-15 | Stop reason: HOSPADM

## 2020-08-19 NOTE — PROGRESS NOTES
88 [facility] Progress Note    Juanita Jett     : 2/3/1933    DATE OF VISIT:  2020    Subjective: Juanita Jett is a 80 y.o. female who has a venous ulcer located on the right, medial ankle. Significant symptoms or pertinent wound history since last visit: feeling ok overall, minor wound pain, minimal drainage, no fever. She brought her previous AFO with her today to show to me (it's an articulated device, a bit bulkier and heavier than the off-the-shelf one that she later purchased, but that off-the-shelf product doesn't fit appropriately). Mild pruritus of the RLE. Additional ulcer(s) noted? no.      Her current medication list consists of Lisinopril, calcium-vitamin D, furosemide, and metoprolol succinate. Allergies: Erythromycin; Hydrochlorothiazide; Silver sulfadiazine; and Doxycycline    Objective:     Vitals:    20 1104   BP: (!) 142/92   Pulse: 63   Resp: 18   Temp: 98.7 °F (37.1 °C)   TempSrc: Oral   Weight: 157 lb 9.6 oz (71.5 kg)   Height: 5' 6\" (1.676 m)     AAOx3, NAD  Intact distal pulses, foot warm, good cap refill  Mild RLE edema  No cellulitis, angitis, fluctuance  Mild atrophe miroslava, some prominent distal venous telangiectasias, mild hyperkeratosis  Gay-ulcer skin: pink, warm and partly atrophic, part fibrotic. Ulcer(s): a couple of tiny buds of granulation with a bit of fibrotic dermal tissue, fibrin, no signs of infection, no deeper tissue exposure. Photos also saved in electronic chart.     Today's wound measurements, per RN documentation:  Wound 20 #2, Right Medial Ankle, Venous, Partial Thickness, Onset 7/15/20-Wound Length (cm): 0.8 cm    Wound 20 #2, Right Medial Ankle, Venous, Partial Thickness, Onset 7/15/20-Wound Width (cm): 0.5 cm    Wound 20 #2, Right Medial Ankle, Venous, Partial Thickness, Onset 7/15/20-Wound Depth (cm): 0.1 cm    Assessment:     Patient Active Problem List   Diagnosis Code    HTN (hypertension) I10  Localized osteoarthrosis, lower leg M17.10    Eczema L30.9    Foot drop, right M21.371    Lumbosacral spondylosis without myelopathy M47.817    Normocytic anemia D64.9    Peripheral neuropathy G62.9    Senile osteoporosis M81.0    Bilateral lower extremity edema R60.0    CKD (chronic kidney disease) stage 3, GFR 30-59 ml/min ([facility]) N18.3    Peripheral venous insufficiency I87.2    Ulcer of right ankle, limited to breakdown of skin ([address] L97.311       Assessment of today's active condition(s): venous insufficiency, chronic skin changes, recurrent venous ankle ulcer in the setting of difficulty wearing her compression stockings (because she feels they accentuate her foot drop, and she's not completely happy with either of her AFOs right now). No signs of infection or ischemia. Factors contributing to occurrence and/or persistence of the chronic ulcer include edema and venous stasis. Medical necessity of today's visit is shown by the above documentation. Sharp debridement is indicated today, based upon the exam findings in the wound(s) above. Procedure note:     Consent obtained. Time out performed per [facility] policy. Anesthetic  Anesthetic: 4% Lidocaine Cream     Using a curette, I sharply debrided the right, medial ankle ulcer(s) down through and including the removal of dermis. The type(s) of tissue debrided included fibrin and necrotic/eschar. Total Surface Area Debrided: 1 sq cm. The ulcers were then irrigated with normal saline solution. The procedure was completed with a small amount of bleeding, and hemostasis was with pressure. The patient tolerated the procedure well, with no significant complications. The patient's level of pain during and after the procedure was monitored. Post-debridement measurements, if different from pre-debridement, are in the flowsheet as well.     Discharge plan:     Treatment in the wound care center today, per RN documentation: Wound 08/05/20 #2, Right Medial

## 2020-08-21 ENCOUNTER — HOSPITAL ENCOUNTER (OUTPATIENT)
Dept: WOUND CARE | Age: 85
Discharge: HOME | End: 2020-08-21
Payer: MEDICARE

## 2020-08-21 VITALS
BODY MASS INDEX: 25.1 KG/M2 | HEART RATE: 61 BPM | HEIGHT: 66 IN | RESPIRATION RATE: 20 BRPM | TEMPERATURE: 98.3 F | DIASTOLIC BLOOD PRESSURE: 78 MMHG | SYSTOLIC BLOOD PRESSURE: 128 MMHG | WEIGHT: 156.2 LBS

## 2020-08-21 PROCEDURE — 97597 DBRDMT OPN WND 1ST 20 CM/<: CPT

## 2020-08-21 PROCEDURE — 97597 DBRDMT OPN WND 1ST 20 CM/<: CPT | Performed by: INTERNAL MEDICINE

## 2020-08-21 PROCEDURE — 29580 STRAPPING UNNA BOOT: CPT

## 2020-08-21 RX ORDER — LIDOCAINE 40 MG/G
CREAM TOPICAL ONCE
Status: DISCONTINUED | OUTPATIENT
Start: 2020-08-21 | End: 2020-08-22 | Stop reason: HOSPADM

## 2020-08-21 ASSESSMENT — PAIN SCALES - GENERAL: PAINLEVEL_OUTOF10: 0

## 2020-08-21 NOTE — PLAN OF CARE
Wound debrided and patient tolerated well. Patient with some noted improvement in wound today and will continue with same wound care regime as last week and follow up as ordered in 1 week in wound clinic. Discharge instructions reviewed with patient, all questions answered, copy given to patient. Dressings were applied to all wounds per M.D. Instructions at this visit.

## 2020-08-24 NOTE — PROGRESS NOTES
88 [facility] Progress Note    Justina Akhtar     : 2/3/1933    DATE OF VISIT:  2020    Subjective: Justina Akhtar is a 80 y.o. female who has a venous ulcer located on the right, medial ankle. Significant symptoms or pertinent wound history since last visit: feeling ok overall, little pain in the ankle, tolerating compression well, no fever. Says that she's not heard from [facility] about an appointment to get a new AFO, but I spoke to their orthotist, and they called her, and left a message. Additional ulcer(s) noted? no.      Her current medication list consists of Lisinopril, calcium-vitamin D, furosemide, and metoprolol succinate. Allergies: Erythromycin; Hydrochlorothiazide; Silver sulfadiazine; and Doxycycline    Objective:     Vitals:    20 1000   BP: 128/78   Pulse: 61   Resp: 20   Temp: 98.3 °F (36.8 °C)   TempSrc: Oral   Weight: 156 lb 3.2 oz (70.9 kg)   Height: 5' 6\" (1.676 m)     AAOx3, fatigued, NAD  Intact distal pulses, foot warm, good cap refill  Very mild RLE stage 2 lymphedema, with some skin thickening  Mild venous erythema and hemosiderin changes, atrophe miroslava  Gay-ulcer skin: indurated, pink, pale and warm. Ulcer(s): a bit of superficial fibrinous debris, biofilm and I think yellowish dermal necrosis, but then a layer of dense white fibrous tissue beneath that, viable, but not bleeding and not really supporting new granulation tissue. Photos also saved in electronic chart.     Today's wound measurements, per RN documentation:  Wound 20 #2, Right Medial Ankle, Venous, Partial Thickness, Onset 7/15/20-Wound Length (cm): 1 cm    Wound 20 #2, Right Medial Ankle, Venous, Partial Thickness, Onset 7/15/20-Wound Width (cm): 0.6 cm    Wound 20 #2, Right Medial Ankle, Venous, Partial Thickness, Onset 7/15/20-Wound Depth (cm): 0.1 cm    Assessment:     Patient Active Problem List   Diagnosis Code    HTN (hypertension) I10    Localized osteoarthrosis, lower leg M17.10    Eczema L30.9    Foot drop, right M21.371    Lumbosacral spondylosis without myelopathy M47.817    Normocytic anemia D64.9    Peripheral neuropathy G62.9    Senile osteoporosis M81.0    Bilateral lower extremity edema R60.0    CKD (chronic kidney disease) stage 3, GFR 30-59 ml/min ([facility]) N18.3    Peripheral venous insufficiency I87.2    Ulcer of right ankle, limited to breakdown of skin ([address] L97.311       Assessment of today's active condition(s): venous insufficiency, chronic skin changes, lymphedema, recurrent venous ankle ulcer, I think healing with difficulty largely because of the fibrotic wound base in the setting of prior ulceration there; no signs of ischemia or infection. Factors contributing to occurrence and/or persistence of the chronic ulcer include venous stasis and lymphedema. Medical necessity of today's visit is shown by the above documentation. Sharp debridement is indicated today, based upon the exam findings in the wound(s) above. Procedure note:     Consent obtained. Time out performed per [facility] policy. Anesthetic  Anesthetic: 4% Lidocaine Cream     Using a curette, #15 blade scalpel and forceps, I sharply debrided the right, medial ankle ulcer(s) down through and including the removal of dermis. The type(s) of tissue debrided included fibrin, biofilm and necrotic/eschar. Total Surface Area Debrided: 1 sq cm. I also used the #15 blade to make a few small vertical incisions through the fibrotic wound base, to try to cause a bit of bleeding and encourage a bit more granulation. The ulcers were then irrigated with normal saline solution. The procedure was completed with a small amount of bleeding, and hemostasis was with pressure. The patient tolerated the procedure well, with no significant complications. The patient's level of pain during and after the procedure was monitored.  Post-debridement measurements, if different from pre-debridement, are in the flowsheet as well. Discharge plan:     Treatment in the wound care center today, per RN documentation: Wound 08/05/20 #2, Right Medial Ankle, Venous, Partial Thickness, Onset 7/15/20-Dressing/Treatment: Other (comment)(vashe,hydrogel,PuracholAg,4x4,pink unna boot,coban). Per physician order, right application of Unna boot was performed in the wound care center today, to help manage edema, stasis dermatitis, and/or venous ulcers. Leave primary dressing, Unna boot and secondary layer(s) in place until the next appointment. Also discussed ways to keep the wrap dry for a shower, including a plastic cast-guard, available in retail pharmacies. I reminded the patient of the importance of weight management and smoking cessation, if applicable; also encouraged ambulation as tolerated, additional lower extremity exercises as instructed in our education sheet, leg elevation when at rest, and compliance with any recommended dietary, diuretic and compression therapies. I'll make sure that she and [address] [place] [address] [employer] connect with one another, to work on a new right AFO for her foot drop. Written discharge instructions given to patient. Follow up in 1 week.     Electronically signed by Amada Elias MD on 8/24/2020 at 3:25 PM.

## 2020-08-28 ENCOUNTER — HOSPITAL ENCOUNTER (OUTPATIENT)
Dept: WOUND CARE | Age: 85
Discharge: HOME | End: 2020-08-28
Payer: MEDICARE

## 2020-08-28 VITALS
TEMPERATURE: 98.5 F | HEIGHT: 66 IN | HEART RATE: 58 BPM | RESPIRATION RATE: 20 BRPM | WEIGHT: 158.2 LBS | SYSTOLIC BLOOD PRESSURE: 153 MMHG | BODY MASS INDEX: 25.43 KG/M2 | DIASTOLIC BLOOD PRESSURE: 81 MMHG

## 2020-08-28 PROCEDURE — 29581 APPL MULTLAYER CMPRN SYS LEG: CPT

## 2020-08-28 PROCEDURE — 11042 DBRDMT SUBQ TIS 1ST 20SQCM/<: CPT | Performed by: INTERNAL MEDICINE

## 2020-08-28 PROCEDURE — 29580 STRAPPING UNNA BOOT: CPT

## 2020-08-28 PROCEDURE — 11042 DBRDMT SUBQ TIS 1ST 20SQCM/<: CPT

## 2020-08-28 RX ORDER — LIDOCAINE 40 MG/G
CREAM TOPICAL ONCE
Status: DISCONTINUED | OUTPATIENT
Start: 2020-08-28 | End: 2020-08-29 | Stop reason: HOSPADM

## 2020-08-28 ASSESSMENT — PAIN DESCRIPTION - PAIN TYPE: TYPE: CHRONIC PAIN

## 2020-08-28 ASSESSMENT — PAIN DESCRIPTION - ORIENTATION: ORIENTATION: LEFT

## 2020-08-28 ASSESSMENT — PAIN SCALES - GENERAL: PAINLEVEL_OUTOF10: 2

## 2020-08-28 ASSESSMENT — PAIN DESCRIPTION - PROGRESSION: CLINICAL_PROGRESSION: NOT CHANGED

## 2020-08-28 ASSESSMENT — PAIN DESCRIPTION - LOCATION: LOCATION: LEG

## 2020-08-28 ASSESSMENT — PAIN DESCRIPTION - DESCRIPTORS: DESCRIPTORS: DISCOMFORT

## 2020-08-28 ASSESSMENT — PAIN DESCRIPTION - ONSET: ONSET: ON-GOING

## 2020-08-28 ASSESSMENT — PAIN DESCRIPTION - FREQUENCY: FREQUENCY: INTERMITTENT

## 2020-08-28 ASSESSMENT — PAIN - FUNCTIONAL ASSESSMENT: PAIN_FUNCTIONAL_ASSESSMENT: ACTIVITIES ARE NOT PREVENTED

## 2020-08-28 NOTE — PLAN OF CARE
Patient states she does not want to see hangar while she has a wrap and wound. Wound debrided today per MD and patient tolerated well. Some improvement noted today. Will continue with same wound are regime this week. Discharge instructions reviewed with patient, all questions answered, copy given to patient. Dressings were applied to all wounds per M.D. Instructions at this visit.

## 2020-09-01 PROBLEM — L97.312 ULCER OF RIGHT ANKLE, WITH FAT LAYER EXPOSED (HCC): Status: ACTIVE | Noted: 2020-08-06

## 2020-09-01 NOTE — PROGRESS NOTES
88 [facility] Progress Note    Alf Barrera     : 2/3/1933    DATE OF VISIT:  2020    Subjective: Alf Barrera is a 80 y.o. female who has a venous ulcer located on the right, medial ankle. Significant symptoms or pertinent wound history since last visit: feeling ok overall, mild pain, no real swelling. Decided not to go back to [facility] right now for a new AFO, because she's concerned about leaving her  at home alone. Happened to mention today that she has a compression pump from [employer], which I didn't realize. Is using it once in a while now, asked what her goal for usage should be (in terms of time and pressure). No F/C/D, modest wound drainage, no pruritus. Additional ulcer(s) noted? no.      Her current medication list consists of Lisinopril, calcium-vitamin D, furosemide, and metoprolol succinate. Allergies: Erythromycin; Hydrochlorothiazide; Silver sulfadiazine; and Doxycycline    Objective:     Vitals:    20 1112 20 1114   BP:  (!) 153/81   Pulse:  58   Resp: 20    Temp: 98.5 °F (36.9 °C)    TempSrc: Oral    Weight: 158 lb 3.2 oz (71.8 kg)    Height: 5' 6\" (1.676 m)      AAOx3, fatigued, NAD  Intact distal pulses, foot warm, good cap refill  Very mild RLE edema, some chronic skin thickening interspersed with atrophe miroslava  No acute stasis dermatitis, no cellulitis, angitis, fluctuance, allodynia  Gay-ulcer skin: indurated, pink and warm. Ulcer(s): a bit larger this week, still mostly fibrotic tissue into the SQ layer, a bit of fibrin and biofilm, but in one area that I incised some of that fibrotic wound bed last week, there IS just a bit of red tissue starting to appear. Photos also saved in electronic chart.     Today's wound measurements, per RN documentation:  Wound 20 #2, Right Medial Ankle, Venous, Partial Thickness, Onset 7/15/20-Wound Length (cm): 1.2 cm    Wound 20 #2, Right Medial Ankle, Venous, Partial Thickness, Onset 7/15/20-Wound Width (cm): 0.7 cm    Wound 08/05/20 #2, Right Medial Ankle, Venous, Partial Thickness, Onset 7/15/20-Wound Depth (cm): 0.2 cm    Assessment:     Patient Active Problem List   Diagnosis Code    HTN (hypertension) I10    Localized osteoarthrosis, lower leg M17.10    Eczema L30.9    Foot drop, right M21.371    Lumbosacral spondylosis without myelopathy M47.817    Normocytic anemia D64.9    Peripheral neuropathy G62.9    Senile osteoporosis M81.0    Bilateral lower extremity edema R60.0    CKD (chronic kidney disease) stage 3, GFR 30-59 ml/min ([facility]) N18.3    Peripheral venous insufficiency I87.2    Ulcer of right ankle, with fat layer exposed ([facility] L97.312       Assessment of today's active condition(s): venous stasis, chronic skin changes, very little in the way of edema, but a recurrent fibrotic venous ankle ulcer; no signs of arterial disease or infection - I think we need to maintain compression, be a bit more aggressive with debridement of fibrotic tissue, and if she doesn't start to heal soon, would consider a short series of cellular-tissue products. Factors contributing to occurrence and/or persistence of the chronic ulcer include edema and venous stasis. Medical necessity of today's visit is shown by the above documentation. Sharp debridement is indicated today, based upon the exam findings in the wound(s) above. Procedure note:     Consent obtained. Time out performed per [facility]. Anesthetic  Anesthetic: 4% Lidocaine Cream     Using a curette, #15 blade scalpel and forceps, I sharply debrided the bilateral ankle ulcer(s) down through and including the removal of subcutaneous tissue. The type(s) of tissue debrided included fibrin, biofilm and necrotic/eschar. Total Surface Area Debrided: 1 sq cm. The ulcers were then irrigated with normal saline solution. The procedure was completed with a small amount of bleeding, and hemostasis was with pressure.  The patient tolerated the procedure well, with no significant complications. The patient's level of pain during and after the procedure was monitored. Post-debridement measurements, if different from pre-debridement, are in the flowsheet as well. Discharge plan:     Treatment in the wound care center today, per RN documentation: Wound 08/05/20 #2, Right Medial Ankle, Venous, Partial Thickness, Onset 7/15/20-Dressing/Treatment: Other (comment)(Vashe,Triad/PSO,4x4's,pink unna,coban). Per physician order, right application of Unna boot was performed in the wound care center today, to help manage edema, stasis dermatitis, and/or venous ulcers. Leave primary dressing, Unna boot and secondary layer(s) in place until the next appointment. Also discussed ways to keep the wrap dry for a shower, including a plastic cast-guard, available in retail pharmacies. I reminded the patient of the importance of weight management and smoking cessation, if applicable; also encouraged ambulation as tolerated, additional lower extremity exercises as instructed in our education sheet, leg elevation when at rest, and compliance with any recommended dietary, diuretic and compression therapies. In terms of the compression pump, one hour BID is the standard. From her description of the controls, I'm not sure how to advise her on how to set the pressure. If she can call back with a model number of the exact pump that she has, I can look into it and let her know. Written discharge instructions given to patient. Follow up in 1 week.     Electronically signed by Ángela Ho MD on 9/1/2020 at 2:50 PM.

## 2020-09-04 ENCOUNTER — HOSPITAL ENCOUNTER (OUTPATIENT)
Dept: WOUND CARE | Age: 85
Discharge: HOME | End: 2020-09-04
Payer: MEDICARE

## 2020-09-04 VITALS
TEMPERATURE: 98.7 F | HEIGHT: 66 IN | SYSTOLIC BLOOD PRESSURE: 141 MMHG | DIASTOLIC BLOOD PRESSURE: 83 MMHG | WEIGHT: 157.6 LBS | RESPIRATION RATE: 18 BRPM | BODY MASS INDEX: 25.33 KG/M2 | HEART RATE: 56 BPM

## 2020-09-04 PROCEDURE — 29580 STRAPPING UNNA BOOT: CPT

## 2020-09-04 PROCEDURE — 29580 STRAPPING UNNA BOOT: CPT | Performed by: INTERNAL MEDICINE

## 2020-09-04 PROCEDURE — 97597 DBRDMT OPN WND 1ST 20 CM/<: CPT | Performed by: INTERNAL MEDICINE

## 2020-09-04 PROCEDURE — 97597 DBRDMT OPN WND 1ST 20 CM/<: CPT

## 2020-09-04 RX ORDER — LIDOCAINE 40 MG/G
CREAM TOPICAL PRN
Status: DISCONTINUED | OUTPATIENT
Start: 2020-09-04 | End: 2020-09-05 | Stop reason: HOSPADM

## 2020-09-04 ASSESSMENT — PAIN SCALES - GENERAL: PAINLEVEL_OUTOF10: 0

## 2020-09-04 NOTE — PLAN OF CARE
Wound debrided today per MD and patient tolerated well. Dr. Jaja Green went over benefits of skin sub application. Patient is agreeable with this plan. Dr. Jaja rGeen explained that he will reach out to patients insurance for approval and pre auth if needed, otherwise we will continue with same wound care regime this week. Discharge instructions reviewed with patient, all questions answered, copy given to patient. Dressings were applied to all wounds per M.D. Instructions at this visit.

## 2020-09-08 NOTE — PROGRESS NOTES
88 [facility] Progress Note    Marbin Richardson     : 2/3/1933    DATE OF VISIT:  2020    Subjective: Marbin Richardson is a 80 y.o. female who has a venous ulcer located on the right, medial ankle. Significant symptoms or pertinent wound history since last visit: feeling ok overall, rarely a bit of pain, modest drainage, no pruritus, no F/C/D. Tolerating Unna boot well, elevating her leg frequently. No falls. Additional ulcer(s) noted? no.      Her current medication list consists of Lisinopril, calcium-vitamin D, furosemide, and metoprolol succinate. Allergies: Erythromycin; Hydrochlorothiazide; Silver sulfadiazine; and Doxycycline    Objective:     Vitals:    20 0948   BP: (!) 141/83   Pulse: 56   Resp: 18   Temp: 98.7 °F (37.1 °C)   TempSrc: Oral   Weight: 157 lb 9.6 oz (71.5 kg)   Height: 5' 6\" (1.676 m)     AAOx3, a bit fatigued, NAD  Palpable right pedal pulses, foot warm, good cap refill  Very mild RLE edema  No cellulitis, angitis, fluctuance  No stasis or contact dermatitis  No allodynia in or around the ulcer  Gay-ulcer skin: combination of pink induration and some atrophe miroslava. Ulcer(s): not getting smaller, still mostly fibrotic, a bit of fibrin and presumed biofilm, modest serous exudate, minimal epidermal necrosis, just a couple of buds of new granulation starting to show through the fibrotic wound base into which I made a few small stab incisions last week. Photos also saved in electronic chart.     Today's wound measurements, per RN documentation:  Wound 20 #2, Right Medial Ankle, Venous, Partial Thickness, Onset 7/15/20-Wound Length (cm): 1.8 cm    Wound 20 #2, Right Medial Ankle, Venous, Partial Thickness, Onset 7/15/20-Wound Width (cm): 1.2 cm    Wound 20 #2, Right Medial Ankle, Venous, Partial Thickness, Onset 7/15/20-Wound Depth (cm): 0.2 cm   _____________________________    Overall wound measurement trends since she first came here 4 weeks ago --        Assessment:     Patient Active Problem List   Diagnosis Code    HTN (hypertension) I10    Localized osteoarthrosis, lower leg M17.10    Eczema L30.9    Foot drop, right M21.371    Lumbosacral spondylosis without myelopathy M47.817    Normocytic anemia D64.9    Peripheral neuropathy G62.9    Senile osteoporosis M81.0    Bilateral lower extremity edema R60.0    CKD (chronic kidney disease) stage 3, GFR 30-59 ml/min ([facility]) N18.3    Peripheral venous insufficiency I87.2    Ulcer of right ankle, with fat layer exposed ([facility] L97.312       Assessment of today's active condition(s): venous insufficiency, chronic skin changes, mild leg edema, recurrent right medial ankle venous ulcer; no signs of infection or ischemia; ulcer larger than it was several weeks ago, and most of the wound bed still fibrotic, owing to scar tissue from prior venous ulcer, and her chronic venous HTN; no smoking, no diabetes, no signs of recent infection, no excess edema, no signs of ischemia (TODD from a month ago was 0.89), and with no progress despite weekly debridement, standard dressings and good compression, I believe we need to add a series of cellular-tissue products to try to stimulate better granulation tissue formation, and healing. Factors contributing to occurrence and/or persistence of the chronic ulcer include edema and venous stasis. Medical necessity of today's visit is shown by the above documentation. Sharp debridement is indicated today, based upon the exam findings in the wound(s) above. Procedure note:     Consent obtained. Time out performed per [facility] policy. Anesthetic  Anesthetic: 4% Lidocaine Cream     Using a curette and #15 blade scalpel, I sharply debrided the right, medial ankle ulcer(s) down through and including the removal of dermis. The type(s) of tissue debrided included fibrin, biofilm and necrotic/eschar. Total Surface Area Debrided: 2 sq cm.     The ulcers were then irrigated with normal saline solution. The procedure was completed with a small amount of bleeding, and hemostasis was with pressure. The patient tolerated the procedure well, with no significant complications. The patient's level of pain during and after the procedure was monitored. Post-debridement measurements, if different from pre-debridement, are in the flowsheet as well. Discharge plan:     Treatment in the wound care center today, per RN documentation: Wound 08/05/20 #2, Right Medial Ankle, Venous, Partial Thickness, Onset 7/15/20-Dressing/Treatment: Other (comment)(Vashe, triad/PSO, 4x4, pink unna, coban, stocking). Per physician order, right application of Unna boot was performed in the wound care center today, to help manage edema, stasis dermatitis, and/or venous ulcers. Leave primary dressing, Unna boot and secondary layer(s) in place until the next appointment. Also discussed ways to keep the wrap dry for a shower, including a plastic cast-guard, available in retail pharmacies. I reminded the patient of the importance of weight management and smoking cessation, if applicable; also encouraged ambulation as tolerated, additional lower extremity exercises as instructed in our education sheet, leg elevation when at rest, and compliance with any recommended dietary, diuretic and compression therapies. [facility] to check on coverage of adjunctive EpiFix. Written discharge instructions given to patient. Follow up in 1 week.     Electronically signed by Dionna Lo MD on 9/8/2020 at 9:51 AM.

## 2020-09-11 ENCOUNTER — HOSPITAL ENCOUNTER (OUTPATIENT)
Dept: WOUND CARE | Age: 85
Discharge: HOME | End: 2020-09-11
Payer: MEDICARE

## 2020-09-11 VITALS
HEART RATE: 61 BPM | WEIGHT: 156.2 LBS | BODY MASS INDEX: 25.1 KG/M2 | TEMPERATURE: 98.3 F | HEIGHT: 66 IN | SYSTOLIC BLOOD PRESSURE: 152 MMHG | DIASTOLIC BLOOD PRESSURE: 86 MMHG | RESPIRATION RATE: 18 BRPM

## 2020-09-11 PROCEDURE — 15271 SKIN SUB GRAFT TRNK/ARM/LEG: CPT

## 2020-09-11 PROCEDURE — 97597 DBRDMT OPN WND 1ST 20 CM/<: CPT

## 2020-09-11 PROCEDURE — 15271 SKIN SUB GRAFT TRNK/ARM/LEG: CPT | Performed by: INTERNAL MEDICINE

## 2020-09-11 PROCEDURE — 99213 OFFICE O/P EST LOW 20 MIN: CPT | Performed by: INTERNAL MEDICINE

## 2020-09-11 PROCEDURE — 29580 STRAPPING UNNA BOOT: CPT

## 2020-09-11 RX ORDER — LIDOCAINE 40 MG/G
CREAM TOPICAL ONCE
Status: DISCONTINUED | OUTPATIENT
Start: 2020-09-11 | End: 2020-09-12 | Stop reason: HOSPADM

## 2020-09-11 RX ORDER — SULFAMETHOXAZOLE AND TRIMETHOPRIM 400; 80 MG/1; MG/1
1 TABLET ORAL 2 TIMES DAILY
Qty: 28 TABLET | Refills: 0 | Status: SHIPPED | OUTPATIENT
Start: 2020-09-11 | End: 2020-09-25 | Stop reason: ALTCHOICE

## 2020-09-11 ASSESSMENT — PAIN SCALES - GENERAL: PAINLEVEL_OUTOF10: 0

## 2020-09-11 NOTE — PLAN OF CARE
Additional specific goal for this week: Patient will receive safe and proper application of EpiFix. Patient will comply with caring for wound and reporting any concerns.  guidelines for application followed. Expiration date of EpiFix checked immediately prior to use. Package intact prior to use, with no damage noted. Product storage at ambient temperature, per  guideline. Product lot #: [id number]   Expiration date:   2025-05-01   Saline lot #: N/a  Expiration date: N/a  Patient/family/caregiver instructed to keep dressing clean, dry and intact. Patient/family/caregiver instructed on signs and symptoms to report, such as draining through dressing, dressing slipping or falling down, becoming wet, or patient having severe pain, severe itching, severe malodor, fever, etc.     Date of first application of a cellular / tissue product for this current wound is September 11, 2020. Today was application # 1 for this wound.     Electronically signed by Jeovanny Lynch RN on 9/11/2020 at 5:58 PM.

## 2020-09-15 PROBLEM — M70.41 PREPATELLAR BURSITIS OF RIGHT KNEE: Status: ACTIVE | Noted: 2020-09-15

## 2020-09-15 NOTE — PROGRESS NOTES
[facility] Progress Note    Abdi Beck     : 2/3/1933    DATE OF VISIT:  2020    Subjective: Abdi Beck is a 80 y.o. female who has a venous ulcer located on the right, medial ankle. Current complaint of pain in this ulcer? yes. Quality of pain: aching and sharp  Timing: intermittent and stable  Severity: mild  Associated Signs/Symptoms:  drainage (light, clear)  Other significant symptoms or pertinent ulcer history: no F/C/D, no falls. Does have a new area of redness, swelling and intermittent pain at the anterior aspect of her right knee this week; started spontaneously as far as she knows, no recalled trauma, only really hurts if she happens to kneel down to do something for a moment. No systemic malaise, nausea, etc. Doesn't recall a similar problem before; no other new joint pains elsewhere. Additional ulcer(s) noted? no.      Ms. Betty Chung has a past medical history of Cellulitis of left lower leg, Closed pelvic rim fracture, Foot drop, right foot, Fractures, History of blood transfusion, Hx of blood clots, PNA (pneumonia), Venous ulcer of left leg ([address] and Venous ulcer of right leg ([address] She has a past surgical history that includes Tonsillectomy and Vein Surgery (Right, 10/27/2016). Her family history includes Stroke in her father and mother. Ms. Betty Chung reports that she has never smoked. She has never used smokeless tobacco. She reports current alcohol use. She reports that she does not use drugs. Her current medication list consists of Lisinopril, calcium-vitamin D, furosemide, metoprolol succinate. Allergies: Erythromycin; Hydrochlorothiazide; Silver sulfadiazine; and Doxycycline    Pertinent items from the review of systems are discussed in the HPI; the remainder of the ROS was reviewed and is negative.      Objective:     Vitals:    20 0956   BP: (!) 152/86   Pulse: 61   Resp: 18   Temp: 98.3 °F (36.8 °C)   TempSrc: Oral tissue. The ulcers were then irrigated with normal saline solution. The procedure was completed with a small amount of bleeding, and hemostasis was with pressure. The patient tolerated the procedure well, with no significant complications. The patient's level of pain during and after the procedure was monitored. Post-debridement measurements, if different from pre-debridement, are in the flowsheet as well.   _______________    Because this patient's venous ankle ulcer has failed to respond to standard wound-care measures (including treatment of infection, debridement of necrosis, treatment of edema, provision of a moist wound environment, good compliance with compression) for more than 4 weeks, I recommended EpiFix as adjunctive therapy to help speed healing of this ulcer. As per previous documentation, peripheral pulse exam is acceptable, and/or TODD is at least 0.65. There is no evidence of untreated infection today. Ms. Sam Flores does not currently smoke. After cleansing the right ankle ulcer with saline, spraying the ulcer with HClO and applying Skin-Prep to the mauro-wound skin, a four sqcm piece of EpiFix was cut to fit the ulcer, placed into the ulcer bed, affixed to the surrounding skin with Steri-Strips, moistened with saline and covered with Mepitel Ag. Four sqcm of the product was used in the ulcer, and 0 sqcm of the product was discarded. If applicable, any wastage is due to the fact the smallest available product was larger than what was needed for her ulcer(s). The patient tolerated the procedure well, without complications. Discharge plan:     Treatment in the wound care center today, per RN documentation: Wound 08/05/20 #2, Right Medial Ankle, Venous, Partial Thickness, Onset 7/15/20-Dressing/Treatment: (4x4, pink unna, coban). Per physician order, right application of Unna boot was performed in the wound care center today, to help manage edema, stasis dermatitis, and/or venous ulcers. Leave primary dressing, Unna boot and secondary layer(s) in place until the next appointment. Also discussed ways to keep the wrap dry for a shower, including a plastic cast-guard, available in retail pharmacies. I reminded the patient of the importance of weight management and smoking cessation, if applicable; also encouraged ambulation as tolerated, additional lower extremity exercises as instructed in our education sheet, leg elevation when at rest, and compliance with any recommended dietary, diuretic and compression therapies. Called in an empiric course of Bactrim, 2 weeks, just a single-strength tablet BID. Discussed possible adverse effects, and symptoms that should prompt a phone call to me, or even an ER visit if severe. Can use cool compresses PRN to help swelling and mild pain. Written discharge instructions given to patient. Follow up in 1 week.     Electronically signed by Justin Bello MD on 9/15/2020 at 10:54 AM.

## 2020-09-18 ENCOUNTER — HOSPITAL ENCOUNTER (OUTPATIENT)
Dept: WOUND CARE | Age: 85
Discharge: HOME | End: 2020-09-18
Payer: MEDICARE

## 2020-09-18 VITALS
BODY MASS INDEX: 24.59 KG/M2 | RESPIRATION RATE: 16 BRPM | DIASTOLIC BLOOD PRESSURE: 66 MMHG | SYSTOLIC BLOOD PRESSURE: 119 MMHG | WEIGHT: 153 LBS | TEMPERATURE: 98.7 F | HEIGHT: 66 IN | HEART RATE: 57 BPM

## 2020-09-18 PROCEDURE — 29580 STRAPPING UNNA BOOT: CPT

## 2020-09-18 PROCEDURE — 29580 STRAPPING UNNA BOOT: CPT | Performed by: INTERNAL MEDICINE

## 2020-09-18 ASSESSMENT — PAIN SCALES - GENERAL: PAINLEVEL_OUTOF10: 0

## 2020-09-18 NOTE — PLAN OF CARE
Patient wound checked per MD after skin graft placed last week. Wound continues to show improvement. Patient to continue with triamcinlone to red areas, vashe, collagen, 4x4, pink unna and coban applied as ordered. Discharge instructions reviewed and follow up provided next Friday with dr. Ammy Zhang.

## 2020-09-22 NOTE — PROGRESS NOTES
08/05/20 #2, Right Medial Ankle, Venous, Partial Thickness, Onset 7/15/20-Dressing/Treatment: Other (comment)(Vashe, triam to rash,puracolAG,4x4,pink unna,coban). Leave primary dressing, Unna boot and secondary layer(s) in place until the next appointment. She should call before her next visit if there is any significant pain, significant strike-through of drainage to the surface of the wrap, or any significant sense of the wrap sliding down more than an inch or so, bunching-up and abrading her skin. I reminded the patient of the importance of weight management and smoking cessation, if applicable; also encouraged ambulation as tolerated, additional lower extremity exercises as instructed in our education sheet, leg elevation when at rest, and compliance with any recommended dietary, diuretic and compression therapies. Complete the 2nd week of bactrim; call me if any perceived side effects, or if knee symptoms flare up. Written discharge instructions given to patient. Follow up in 1 week.     Electronically signed by Yocasta Lomeli MD on 9/22/2020 at 8:59 AM.

## 2020-09-25 ENCOUNTER — HOSPITAL ENCOUNTER (OUTPATIENT)
Dept: WOUND CARE | Age: 85
Discharge: HOME | End: 2020-09-25
Payer: MEDICARE

## 2020-09-25 VITALS
WEIGHT: 154 LBS | SYSTOLIC BLOOD PRESSURE: 153 MMHG | RESPIRATION RATE: 16 BRPM | HEIGHT: 66 IN | TEMPERATURE: 98.8 F | DIASTOLIC BLOOD PRESSURE: 87 MMHG | HEART RATE: 63 BPM | BODY MASS INDEX: 24.75 KG/M2

## 2020-09-25 PROCEDURE — 15271 SKIN SUB GRAFT TRNK/ARM/LEG: CPT

## 2020-09-25 PROCEDURE — 29580 STRAPPING UNNA BOOT: CPT

## 2020-09-25 PROCEDURE — 15271 SKIN SUB GRAFT TRNK/ARM/LEG: CPT | Performed by: INTERNAL MEDICINE

## 2020-09-25 RX ORDER — PREDNISONE 1 MG/1
7.5 TABLET ORAL DAILY
COMMUNITY
End: 2022-07-15

## 2020-09-25 RX ORDER — LIDOCAINE 40 MG/G
CREAM TOPICAL ONCE
Status: DISCONTINUED | OUTPATIENT
Start: 2020-09-25 | End: 2020-09-26 | Stop reason: HOSPADM

## 2020-09-25 ASSESSMENT — PAIN SCALES - GENERAL: PAINLEVEL_OUTOF10: 0

## 2020-09-29 PROBLEM — M35.3 PMR (POLYMYALGIA RHEUMATICA) (HCC): Status: ACTIVE | Noted: 2020-09-29

## 2020-09-29 PROBLEM — M70.41 PREPATELLAR BURSITIS OF RIGHT KNEE: Status: RESOLVED | Noted: 2020-09-15 | Resolved: 2020-09-29

## 2020-09-29 NOTE — PROGRESS NOTES
88 [facility] Progress Note    Marbin Richardson     : 2/3/1933    DATE OF VISIT:  2020    Subjective: Marbin Richardson is a 80 y.o. female who has a venous ulcer located on the right, medial ankle. Significant symptoms or pertinent wound history since last visit: feeling ok overall, mild pain, mild pruritus, little swelling. Knee did fine these last two weeks, no more redness or swelling. Tolerated Bactrim well, no F/C/D, no sore throat or mouth, no rash, no N/V/D. Additional ulcer(s) noted? no.      Her current medication list consists of Lisinopril, calcium-vitamin D, furosemide, metoprolol succinate, and predniSONE. The prednisone is for a new (I believe) diagnosis of PMR. Allergies: Erythromycin; Hydrochlorothiazide; Silver sulfadiazine; and Doxycycline    Objective:     Vitals:    20 0959 20 1000   BP:  (!) 153/87   Pulse:  63   Resp: 16    Temp: 98.8 °F (37.1 °C)    TempSrc: Oral    Weight: 154 lb (69.9 kg)    Height: 5' 6\" (1.676 m)      AAOx3, NAD  No icterus, thrush, drug rash  Intact distal pulses, foot warm, good cap refill  Acute phase of right prepatellar bursitis resolved, just mild residual swelling  Very mild RLE edema overall, stable chronic fibrotic and atrophic skin changes  Gay-ulcer skin: indurated, pink, warm, dry and hyperkeratotic. Ulcer(s): perhaps a bit smaller, more fibrotic than granular still, a bit of fibrin and biofilm, mild serous exudate. Photos also saved in electronic chart.     Today's wound measurements, per RN documentation:  Wound 20 #2, Right Medial Ankle, Venous, Partial Thickness, Onset 7/15/20-Wound Length (cm): 1.7 cm    Wound 20 #2, Right Medial Ankle, Venous, Partial Thickness, Onset 7/15/20-Wound Width (cm): 0.8 cm    Wound 20 #2, Right Medial Ankle, Venous, Partial Thickness, Onset 7/15/20-Wound Depth (cm): 0.2 cm    Assessment:     Patient Active Problem List   Diagnosis Code    HTN (hypertension) I10  Localized osteoarthrosis, lower leg M17.10    Eczema L30.9    Foot drop, right M21.371    Lumbosacral spondylosis without myelopathy M47.817    Normocytic anemia D64.9    Peripheral neuropathy G62.9    Senile osteoporosis M81.0    Bilateral lower extremity edema R60.0    CKD (chronic kidney disease) stage 3, GFR 30-59 ml/min ([facility]) N18.3    Peripheral venous insufficiency I87.2    Ulcer of right ankle, with fat layer exposed ([address] L97.312       Assessment of today's active condition(s): venous insufficiency, chronic skin changes, mild leg edema, recurrent venous ankle ulcer, no good progress with standard care, hopefully stabilizing and soon starting to heal with addition of EpiFix. Unrelated (unless the ulcer was the portal of entry for the pathogen that then migrated via lymphatics to her knee?) prepatellar bursitis, seems resolved this week. Factors contributing to occurrence and/or persistence of the chronic ulcer include edema and venous stasis. Medical necessity of today's visit is shown by the above documentation. Sharp debridement is indicated today, based upon the exam findings in the wound(s) above. Procedure note:     Consent obtained. Time out performed per [facility]. Anesthetic  Anesthetic: 4% Lidocaine Cream     Using a curette and #15 blade, I sharply debrided the right, medial ankle ulcer(s) down through and including the removal of dermis. The type(s) of tissue debrided included fibrin and biofilm. Total Surface Area Debrided: 2 sq cm. The ulcers were then irrigated with normal saline solution. The procedure was completed with a small amount of bleeding, and hemostasis was with pressure. The patient tolerated the procedure well, with no significant complications. The patient's level of pain during and after the procedure was monitored.  Post-debridement measurements, if different from pre-debridement, are in the flowsheet as well.  _____________    Because this patient's also encouraged ambulation as tolerated, additional lower extremity exercises as instructed in our education sheet, leg elevation when at rest, and compliance with any recommended dietary, diuretic and compression therapies. Written discharge instructions given to patient. Follow up in 1 week.     Electronically signed by Kat Lyons MD on 9/29/2020 at 2:35 PM.

## 2020-10-02 ENCOUNTER — HOSPITAL ENCOUNTER (OUTPATIENT)
Dept: WOUND CARE | Age: 85
Discharge: HOME | End: 2020-10-02
Payer: MEDICARE

## 2020-10-02 VITALS
HEART RATE: 63 BPM | BODY MASS INDEX: 24.72 KG/M2 | HEIGHT: 66 IN | SYSTOLIC BLOOD PRESSURE: 121 MMHG | RESPIRATION RATE: 16 BRPM | DIASTOLIC BLOOD PRESSURE: 78 MMHG | TEMPERATURE: 98.3 F | WEIGHT: 153.8 LBS

## 2020-10-02 PROCEDURE — 29580 STRAPPING UNNA BOOT: CPT

## 2020-10-02 PROCEDURE — 29580 STRAPPING UNNA BOOT: CPT | Performed by: INTERNAL MEDICINE

## 2020-10-02 ASSESSMENT — PAIN SCALES - GENERAL: PAINLEVEL_OUTOF10: 0

## 2020-10-02 NOTE — PLAN OF CARE
Pt seen in [address] [place] [address],[address] - no bebride per Dr. Lisa Lopez from last week remains in place - applied shaquille to mauor wound, collagen w/ ag to wound w/ bolster and unna /coban for compression - reviewed AVS - f/u in 1 week

## 2020-10-06 NOTE — PROGRESS NOTES
88 [facility] Progress Note    Trenton Coe     : 2/3/1933    DATE OF VISIT:  10/2/2020    Subjective: Trenton Coe is a 80 y.o. female who has a venous ulcer located on the right, medial ankle. Significant symptoms or pertinent wound history since last visit: feeling ok overall, little ankle pain, modest drainage, no pruritus, no fever. Some muscle aches better on low-dose prednisone, and she tells me that there are plans for a temporal artery biopsy soon. Also sounds like she's considering a second cardiology opinion for her , in lieu of continuing hospice care. Additional ulcer(s) noted? no.      Her current medication list consists of Lisinopril, calcium-vitamin D, furosemide, metoprolol succinate, and predniSONE. Allergies: Erythromycin; Hydrochlorothiazide; Silver sulfadiazine; and Doxycycline    Objective:     Vitals:    10/02/20 1000 10/02/20 1002   BP:  121/78   Pulse:  63   Resp: 16    Temp: 98.3 °F (36.8 °C)    TempSrc: Oral    Weight: 153 lb 12.8 oz (69.8 kg)    Height: 5' 6\" (1.676 m)      AAOx3, fatigued, NAD  Intact distal pulses, foot warm, good cap refill  Trace RLE edema  Usual skin thickening, atrophe miroslava, mild hemosiderin changes  No cellulitis, angitis, fluctuance, no contact dermatitis with Versatel  Gay-ulcer skin: indurated, pink, warm and dry. Ulcer(s): maybe a bit smaller, still more fibrotic than granular, some dissolving EpiFix mixed with fibrin +/- biofilm, serous exudate. Photos also saved in electronic chart.     Today's Wound Measurements, per RN documentation:  Wound 20 #2, Right Medial Ankle, Venous, Partial Thickness, Onset 7/15/20-Wound Length (cm): (Bib Gong left in place per MD order)              Assessment:     Patient Active Problem List   Diagnosis Code    HTN (hypertension) I10    Localized osteoarthrosis, lower leg M17.10    Eczema L30.9    Foot drop, right M21.371    Lumbosacral spondylosis without myelopathy M47.817    Normocytic anemia D64.9    Peripheral neuropathy G62.9    Senile osteoporosis M81.0    Bilateral lower extremity edema R60.0    CKD (chronic kidney disease) stage 3, GFR 30-59 ml/min N18.30    Peripheral venous insufficiency I87.2    Ulcer of right ankle, with fat layer exposed ([address] L97.312    PMR (polymyalgia rheumatica) ([facility]) M35.3       Assessment of today's active condition(s): venous insufficiency, chronic skin changes, mild LE edema, recurrent venous ankle ulcer, making slow progress toward closure, doing a bit better with recent addition of EpiFix. Factors contributing to occurrence and/or persistence of the chronic ulcer include edema and venous stasis. Medical necessity of today's visit is shown by the above documentation. Sharp debridement is not indicated today, based upon the exam findings in the wound(s) above. She does have an indication for a weekly compression wrap. Procedure note:     Consent obtained. Time out performed per [facility]. Anesthetic  Anesthetic: 4% Lidocaine Cream     I just used a 27g needle to puncture a few tiny holes through the graft and into the underlying wound bed, to encourage a small amount of bleeding (which stopped spontaneously), and allow the wound bed to continue to be stimulated by the graft material.   _____________________    After cleansing of the wounds with saline and applying skin-care and/or dressing materials as listed below, Right unilateral application of Unna boot was performed per physician order, to help manage edema, stasis dermatitis, and/or venous ulcers. The patient's level of pain during and after the procedure was monitored, and is noted in the wound documentation flowsheet.     Discharge plan:     Treatment in the wound care center today, per RN documentation: Wound 08/05/20 #2, Right Medial Ankle, Venous, Partial Thickness, Onset 7/15/20-Dressing/Treatment: (shaquille(mauro), collagen w/ ag,gauze bolster , pink unna Abdiel Sandoval). Leave primary dressing, Unna boot and secondary layer(s) in place until the next appointment. She should call before her next visit if there is any significant pain, significant strike-through of drainage to the surface of the wrap, or any significant sense of the wrap sliding down more than an inch or so, bunching-up and abrading her skin. I reminded the patient of the importance of weight management and smoking cessation, if applicable; also encouraged ambulation as tolerated, additional lower extremity exercises as instructed in our education sheet, leg elevation when at rest, and compliance with any recommended dietary, diuretic and compression therapies. Written discharge instructions given to patient. Follow up in 1 week.     Electronically signed by Marley Mcgrath MD on 10/6/2020 at 11:08 AM.

## 2020-10-09 ENCOUNTER — HOSPITAL ENCOUNTER (OUTPATIENT)
Dept: WOUND CARE | Age: 85
Discharge: HOME | End: 2020-10-09
Payer: MEDICARE

## 2020-10-09 VITALS
WEIGHT: 156 LBS | DIASTOLIC BLOOD PRESSURE: 84 MMHG | TEMPERATURE: 97.8 F | RESPIRATION RATE: 18 BRPM | BODY MASS INDEX: 25.07 KG/M2 | HEIGHT: 66 IN | HEART RATE: 57 BPM | SYSTOLIC BLOOD PRESSURE: 149 MMHG

## 2020-10-09 PROCEDURE — 29580 STRAPPING UNNA BOOT: CPT

## 2020-10-09 PROCEDURE — 15271 SKIN SUB GRAFT TRNK/ARM/LEG: CPT

## 2020-10-09 PROCEDURE — 15271 SKIN SUB GRAFT TRNK/ARM/LEG: CPT | Performed by: INTERNAL MEDICINE

## 2020-10-09 RX ORDER — LIDOCAINE 40 MG/G
CREAM TOPICAL PRN
Status: DISCONTINUED | OUTPATIENT
Start: 2020-10-09 | End: 2020-10-10 | Stop reason: HOSPADM

## 2020-10-09 ASSESSMENT — PAIN SCALES - GENERAL: PAINLEVEL_OUTOF10: 0

## 2020-10-13 NOTE — PROGRESS NOTES
88 [facility] Progress Note    Ankit Mota     : 2/3/1933    DATE OF VISIT:  10/9/2020    Subjective: Ankit Mota is a 80 y.o. female who has a venous ulcer located on the right, medial ankle. Significant symptoms or pertinent wound history since last visit: feeling ok overall, minimal ankle pain, no pruritus, minimal swelling, no F/C/D. Had her temporal artery biopsy last week, getting results soon. Additional ulcer(s) noted? no.      Her current medication list consists of Lisinopril, calcium-vitamin D, furosemide, metoprolol succinate, and predniSONE. Allergies: Erythromycin; Hydrochlorothiazide; Silver sulfadiazine; and Doxycycline    Objective:     Vitals:    10/09/20 1019   BP: (!) 149/84   Pulse: 57   Resp: 18   Temp: 97.8 °F (36.6 °C)   TempSrc: Oral   Weight: 156 lb (70.8 kg)   Height: 5' 6\" (1.676 m)     AAOx3, fatigued, NAD  Intact distal pulses, foot warm, good cap refill  Very mild RLE edema  No cellulitis, angitis, fluctuance, usual fibrotic changes and atrophe miroslava  Gay-ulcer skin: indurated, pink, warm and hyperkeratotic. Ulcer(s): a bit smaller, some early granulation, fibrosis, some fibrin and biofilm, no signs of infection. Photos also saved in electronic chart.     Today's wound measurements, per RN documentation:  Wound 20 #2, Right Medial Ankle, Venous, Partial Thickness, Onset 7/15/20-Wound Length (cm): 1.9 cm    Wound 20 #2, Right Medial Ankle, Venous, Partial Thickness, Onset 7/15/20-Wound Width (cm): 0.5 cm    Wound 20 #2, Right Medial Ankle, Venous, Partial Thickness, Onset 7/15/20-Wound Depth (cm): 0.2 cm    Assessment:     Patient Active Problem List   Diagnosis Code    HTN (hypertension) I10    Localized osteoarthrosis, lower leg M17.10    Eczema L30.9    Foot drop, right M21.371    Lumbosacral spondylosis without myelopathy M47.817    Normocytic anemia D64.9    Peripheral neuropathy G62.9    Senile osteoporosis M81.0  Bilateral lower extremity edema R60.0    CKD (chronic kidney disease) stage 3, GFR 30-59 ml/min N18.30    Peripheral venous insufficiency I87.2    Ulcer of right ankle, with fat layer exposed ([address] L97.312    PMR (polymyalgia rheumatica) ([facility]) M35.3       Assessment of today's active condition(s): venous insufficiency, chronic skin changes, mild edema, recurrent venous ankle ulcer, healing slowly, no signs of infection or ischemia. Factors contributing to occurrence and/or persistence of the chronic ulcer include edema and venous stasis. Medical necessity of today's visit is shown by the above documentation. Sharp debridement is indicated today, based upon the exam findings in the wound(s) above. Procedure note:     Consent obtained. Time out performed per [facility]. Anesthetic  Anesthetic: 4% Lidocaine Cream     Using a curette, I sharply debrided the right, medial ankle ulcer(s) down through and including the removal of dermis. The type(s) of tissue debrided included fibrin and biofilm. Total Surface Area Debrided: 1 sq cm. The ulcers were then irrigated with normal saline solution. The procedure was completed with a small amount of bleeding, and hemostasis was with pressure. The patient tolerated the procedure well, with no significant complications. The patient's level of pain during and after the procedure was monitored. Post-debridement measurements, if different from pre-debridement, are in the flowsheet as well.  _____________    Because this patient's venous ankle ulcer has failed to respond to standard wound-care measures (including treatment of infection, debridement of necrosis, treatment of edema, provision of a moist wound environment, good compliance with compression) for more than 4 weeks, I recommended EpiFix as adjunctive therapy to help speed healing of this ulcer. As per previous documentation, peripheral pulse exam is acceptable, and/or TODD is at least 0.65.  There is no evidence of untreated infection today. Ms. Denis Desai does not currently smoke. After cleansing the right ankle ulcer with saline, spraying the ulcer with HClO and applying Skin-Prep to the mauro-wound skin, a two sqcm piece of EpiFix was cut to fit the ulcer, placed into the ulcer bed, affixed to the surrounding skin with Steri-Strips, moistened with saline and covered with Versatel, hydrogel and gauze. Two sqcm of the product was used in the ulcer, and 0 sqcm of the product was discarded. If applicable, any wastage is due to the fact the smallest available product was larger than what was needed for her ulcer(s). The patient tolerated the procedure well, without complications. Discharge plan:     Treatment in the wound care center today, per RN documentation: Wound 08/05/20 #2, Right Medial Ankle, Venous, Partial Thickness, Onset 7/15/20-Dressing/Treatment: Other (comment)(unna boot, coban). Per physician order, right application of Unna boot was performed in the wound care center today, to help manage edema, stasis dermatitis, and/or venous ulcers. Leave primary dressing, Unna boot and secondary layer(s) in place until the next appointment. Also discussed ways to keep the wrap dry for a shower, including a plastic cast-guard, available in retail pharmacies. I reminded the patient of the importance of weight management and smoking cessation, if applicable; also encouraged ambulation as tolerated, additional lower extremity exercises as instructed in our education sheet, leg elevation when at rest, and compliance with any recommended dietary, diuretic and compression therapies. Written discharge instructions given to patient. Follow up in 1 week.     Electronically signed by Marley Mcgrath MD on 10/12/2020 at 11:06 PM.

## 2020-10-16 ENCOUNTER — HOSPITAL ENCOUNTER (OUTPATIENT)
Dept: WOUND CARE | Age: 85
Discharge: HOME | End: 2020-10-16
Payer: MEDICARE

## 2020-10-16 VITALS
RESPIRATION RATE: 18 BRPM | TEMPERATURE: 98.6 F | HEIGHT: 66 IN | HEART RATE: 65 BPM | WEIGHT: 158.4 LBS | SYSTOLIC BLOOD PRESSURE: 139 MMHG | DIASTOLIC BLOOD PRESSURE: 86 MMHG | BODY MASS INDEX: 25.46 KG/M2

## 2020-10-16 PROCEDURE — 29580 STRAPPING UNNA BOOT: CPT

## 2020-10-16 PROCEDURE — 15271 SKIN SUB GRAFT TRNK/ARM/LEG: CPT | Performed by: INTERNAL MEDICINE

## 2020-10-16 PROCEDURE — 15271 SKIN SUB GRAFT TRNK/ARM/LEG: CPT

## 2020-10-16 RX ORDER — LIDOCAINE 40 MG/G
CREAM TOPICAL PRN
Status: DISCONTINUED | OUTPATIENT
Start: 2020-10-16 | End: 2020-10-17 | Stop reason: HOSPADM

## 2020-10-16 ASSESSMENT — PAIN SCALES - GENERAL
PAINLEVEL_OUTOF10: 0
PAINLEVEL_OUTOF10: 0

## 2020-10-16 NOTE — PLAN OF CARE
Additional specific goal for this week: Patient will receive safe and proper application of EpiFix. Patient will comply with caring for wound and reporting any concerns.  guidelines for application followed. Expiration date of EpiFix checked immediately prior to use. Package intact prior to use, with no damage noted. Product storage at ambient temperature, per  guideline. Product lot #: [id number]   Expiration date: 2025-05-01   Saline lot #: N/a  Expiration date: N/a  Patient/family/caregiver instructed to keep dressing clean, dry and intact. Patient/family/caregiver instructed on signs and symptoms to report, such as draining through dressing, dressing slipping or falling down, becoming wet, or patient having severe pain, severe itching, severe malodor, fever, etc.     Date of first application of a cellular / tissue product for this current wound is September 11, 2020. Today was application # 4 for this wound.     Electronically signed by Mya Joya RN on 10/16/2020 at 5:38 PM.

## 2020-10-19 PROBLEM — M31.6 GIANT CELL ARTERITIS (HCC): Status: ACTIVE | Noted: 2020-10-19

## 2020-10-19 NOTE — PROGRESS NOTES
osteoarthrosis, lower leg M17.10    Eczema L30.9    Foot drop, right M21.371    Lumbosacral spondylosis without myelopathy M47.817    Normocytic anemia D64.9    Peripheral neuropathy G62.9    Senile osteoporosis M81.0    Bilateral lower extremity edema R60.0    CKD (chronic kidney disease) stage 3, GFR 30-59 ml/min N18.30    Peripheral venous insufficiency I87.2    Ulcer of right ankle, with fat layer exposed ([address] L97.312    PMR (polymyalgia rheumatica) ([facility]) M35.3    Giant cell arteritis ([facility]) M31.6       Assessment of today's active condition(s): venous stasis, chronic skin changes, mild lower extremity edema, very slowly healing venous ankle ulcer. Factors contributing to occurrence and/or persistence of the chronic ulcer include edema, venous stasis and immunosuppression. Medical necessity of today's visit is shown by the above documentation. Sharp debridement is indicated today, based upon the exam findings in the wound(s) above. Procedure note:     Consent obtained. Time out performed per [facility]. Anesthetic  Anesthetic: 4% Lidocaine Cream     Using a curette and #15 blade scalpel, I sharply debrided the right, medial ankle ulcer(s) down through and including the removal of dermis. The type(s) of tissue debrided included fibrin, biofilm and exudate. Total Surface Area Debrided: 2 sq cm. The ulcers were then irrigated with normal saline solution. The procedure was completed with a small amount of bleeding, and hemostasis was with pressure. The patient tolerated the procedure well, with no significant complications. The patient's level of pain during and after the procedure was monitored.  Post-debridement measurements, if different from pre-debridement, are in the flowsheet as well.  ______________    Because this patient's venous ankle ulcer has failed to respond to standard wound-care measures (including treatment of infection, debridement of necrosis, treatment of edema, provision of a moist wound environment, good compliance with compression) for more than 4 weeks, I recommended EpiFix as adjunctive therapy to help speed healing of this ulcer. As per previous documentation, peripheral pulse exam is acceptable, and/or TODD is at least 0.65. There is no evidence of untreated infection today. Ms. Jade Varela does not currently smoke. After cleansing the right ankle ulcer with saline, spraying the ulcer with HClO and applying Skin-Prep to the mauro-wound skin, a two sqcm piece of EpiFix was cut to fit the ulcer, placed into the ulcer bed, affixed to the surrounding skin with Steri-Strips, moistened with saline and covered with Versatel, hydrogel and gauze. Two sqcm of the product was used in the ulcer, and 0 sqcm of the product was discarded. If applicable, any wastage is due to the fact the smallest available product was larger than what was needed for her ulcer(s). The patient tolerated the procedure well, without complications. Discharge plan:     Treatment in the wound care center today, per RN documentation: Wound 08/05/20 #2, Right Medial Ankle, Venous, Partial Thickness, Onset 7/15/20-Dressing/Treatment: Other (comment)(pink unna, coban, nylon ). Per physician order, right application of Unna boot was performed in the wound care center today, to help manage edema, stasis dermatitis, and/or venous ulcers. Leave primary dressing, Unna boot and secondary layer(s) in place until the next appointment. Also discussed ways to keep the wrap dry for a shower, including a plastic cast-guard, available in retail pharmacies. I reminded the patient of the importance of weight management and smoking cessation, if applicable; also encouraged ambulation as tolerated, additional lower extremity exercises as instructed in our education sheet, leg elevation when at rest, and compliance with any recommended dietary, diuretic and compression therapies.     Written discharge instructions given to patient. Follow up in 1 week.     Electronically signed by Jennifer Sharma MD on 10/19/2020 at 4:26 PM.

## 2020-10-23 ENCOUNTER — HOSPITAL ENCOUNTER (OUTPATIENT)
Dept: WOUND CARE | Age: 85
Discharge: HOME | End: 2020-10-23
Payer: MEDICARE

## 2020-10-23 VITALS
SYSTOLIC BLOOD PRESSURE: 140 MMHG | BODY MASS INDEX: 25.07 KG/M2 | DIASTOLIC BLOOD PRESSURE: 73 MMHG | TEMPERATURE: 98.3 F | RESPIRATION RATE: 18 BRPM | HEART RATE: 58 BPM | HEIGHT: 66 IN | WEIGHT: 156 LBS

## 2020-10-23 PROCEDURE — 15271 SKIN SUB GRAFT TRNK/ARM/LEG: CPT

## 2020-10-23 PROCEDURE — 29580 STRAPPING UNNA BOOT: CPT

## 2020-10-23 PROCEDURE — 15271 SKIN SUB GRAFT TRNK/ARM/LEG: CPT | Performed by: INTERNAL MEDICINE

## 2020-10-23 RX ORDER — LIDOCAINE 40 MG/G
CREAM TOPICAL ONCE
Status: DISCONTINUED | OUTPATIENT
Start: 2020-10-23 | End: 2020-10-24 | Stop reason: HOSPADM

## 2020-10-23 ASSESSMENT — PAIN SCALES - GENERAL: PAINLEVEL_OUTOF10: 0

## 2020-10-28 NOTE — PROGRESS NOTES
[facility] Progress Note    Diane Jimenes     : 2/3/1933    DATE OF VISIT:  10/23/2020    Subjective: Diane Jimenes is a 80 y.o. female who has a venous ulcer located on the right, medial ankle. Significant symptoms or pertinent wound history since last visit: doing ok in terms of the ankle; little pain, no pruritus, no increased swelling. 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 Still on a modest dose of prednisone for now. Additional ulcer(s) noted? no.      Her current medication list consists of Lisinopril, calcium-vitamin D, furosemide, metoprolol succinate, and predniSONE. Allergies: Erythromycin; Hydrochlorothiazide; Silver sulfadiazine; and Doxycycline    Objective:     Vitals:    10/23/20 0958   BP: (!) 140/73   Pulse: 58   Resp: 18   Temp: 98.3 °F (36.8 °C)   TempSrc: Oral   Weight: 156 lb (70.8 kg)   Height: 5' 6\" (1.676 m)     AAOx3, fatigued, NAD  Intact distal pulses, foot warm, good cap refill  Trace RLE edema  No cellulitis, angitis, fluctuance, acute stasis dermatitis  Gay-ulcer skin: indurated, pink, warm and dry. Ulcer(s): slowly smaller, less fibrotic, a bit more granular, fibrin, biofilm, serous exudate, 1 or 2 edges still a bit steep. Photos also saved in electronic chart.     Today's wound measurements, per RN documentation:  Wound 20 #2, Right Medial Ankle, Venous, Full Thickness, Onset 7/15/20-Wound Length (cm): 1.8 cm    Wound 20 #2, Right Medial Ankle, Venous, Full Thickness, Onset 7/15/20-Wound Width (cm): 0.8 cm    Wound 20 #2, Right Medial Ankle, Venous, Full Thickness, Onset 7/15/20-Wound Depth (cm): 0.2 cm    Assessment:     Patient Active Problem List   Diagnosis Code    HTN (hypertension) I10    Localized a moist wound environment, good compliance with compression) for more than 4 weeks, I recommended EpiFix as adjunctive therapy to help speed healing of this ulcer. As per previous documentation, peripheral pulse exam is acceptable, and/or TODD is at least 0.65. There is no evidence of untreated infection today. Ms. Lionel Griffin does not currently smoke. After cleansing the right ankle ulcer with saline, spraying the ulcer with HClO and applying Skin-Prep to the mauro-wound skin, a two sqcm piece of EpiFix was cut to fit the ulcer, placed into the ulcer bed, affixed to the surrounding skin with Steri-Strips, moistened with saline and covered with Versatel. Two sqcm of the product was used in the ulcer, and 0 sqcm of the product was discarded. If applicable, any wastage is due to the fact the smallest available product was larger than what was needed for her ulcer(s). The patient tolerated the procedure well, without complications. Discharge plan:     Treatment in the wound care center today, per RN documentation: Wound 08/05/20 #2, Right Medial Ankle, Venous, Full Thickness, Onset 7/15/20-Dressing/Treatment: Other (comment)(pink unna, coban ). Per physician order, right application of Unna boot was performed in the wound care center today, to help manage edema, stasis dermatitis, and/or venous ulcers. Leave primary dressing, Unna boot and secondary layer(s) in place until the next appointment. Also discussed ways to keep the wrap dry for a shower, including a plastic cast-guard, available in retail pharmacies. I reminded the patient of the importance of weight management and smoking cessation, if applicable; also encouraged ambulation as tolerated, additional lower extremity exercises as instructed in our education sheet, leg elevation when at rest, and compliance with any recommended dietary, diuretic and compression therapies. Written discharge instructions given to patient.  Follow up in 1 week.    Electronically signed by Prateek Burr MD on 10/28/2020 at 3:11 PM.

## 2020-10-30 ENCOUNTER — HOSPITAL ENCOUNTER (OUTPATIENT)
Dept: WOUND CARE | Age: 85
Discharge: HOME | End: 2020-10-30
Payer: MEDICARE

## 2020-10-30 VITALS
SYSTOLIC BLOOD PRESSURE: 133 MMHG | HEART RATE: 59 BPM | TEMPERATURE: 98.7 F | BODY MASS INDEX: 25.26 KG/M2 | WEIGHT: 157.2 LBS | RESPIRATION RATE: 18 BRPM | HEIGHT: 66 IN | DIASTOLIC BLOOD PRESSURE: 81 MMHG

## 2020-10-30 PROCEDURE — 15271 SKIN SUB GRAFT TRNK/ARM/LEG: CPT

## 2020-10-30 PROCEDURE — 29581 APPL MULTLAYER CMPRN SYS LEG: CPT

## 2020-10-30 PROCEDURE — 15271 SKIN SUB GRAFT TRNK/ARM/LEG: CPT | Performed by: INTERNAL MEDICINE

## 2020-10-30 RX ORDER — LIDOCAINE 40 MG/G
CREAM TOPICAL ONCE
Status: DISCONTINUED | OUTPATIENT
Start: 2020-10-30 | End: 2020-10-31 | Stop reason: HOSPADM

## 2020-10-30 ASSESSMENT — PAIN SCALES - GENERAL: PAINLEVEL_OUTOF10: 0

## 2020-10-30 NOTE — PLAN OF CARE
Additional specific goal for this week: Patient will receive safe and proper application of EpiFix. Patient will comply with caring for wound and reporting any concerns.  guidelines for application followed. Expiration date of EpiFix checked immediately prior to use. Package intact prior to use, with no damage noted. Product storage at ambient temperature, per  guideline. Product lot #: [id number]  Expiration date:  2025-08-01   Saline lot #: N/a  Expiration date: N/a  Patient/family/caregiver instructed to keep dressing clean, dry and intact. Patient/family/caregiver instructed on signs and symptoms to report, such as draining through dressing, dressing slipping or falling down, becoming wet, or patient having severe pain, severe itching, severe malodor, fever, etc.     Date of first application of a cellular / tissue product for this current wound is September 11, 2020. Today was application # 6 for this wound.     Electronically signed by Marilia Toney RN on 10/30/2020 at 5:40 PM.

## 2020-11-03 NOTE — PROGRESS NOTES
[facility] Progress Note    Florencia Tran     : 2/3/1933    DATE OF VISIT:  10/30/2020    Subjective: Florencia Tran is a 80 y.o. female who has a venous ulcer located on the right, medial ankle. Significant symptoms or pertinent wound history since last visit: feeling well overall, little ankle pain, no pruritus, no fever, a bit of increased forefoot edema distal to her Unna boot. Started her injectable therapy for GCA this week. Additional ulcer(s) noted? no.      Her current medication list consists of Lisinopril, Tocilizumab, calcium-vitamin D, furosemide, metoprolol succinate, and predniSONE. Allergies: Erythromycin; Hydrochlorothiazide; Silver sulfadiazine; and Doxycycline    Objective:     Vitals:    10/30/20 1005   BP: 133/81   Pulse: 59   Resp: 18   Temp: 98.7 °F (37.1 °C)   TempSrc: Oral   Weight: 157 lb 3.2 oz (71.3 kg)   Height: 5' 6\" (1.676 m)     AAOx3, fatigued, NAD  Intact distal pulses, foot warm, good cap refill  A bit of increased right forefoot edema, but minimal in the lower leg and ankle  No cellulitis, angitis, fluctuance, no allodynia  No contact dermatitis from dressings or wrap  Gay-ulcer skin: indurated, pink, warm and dry. Ulcer(s): very slowly smaller, still a good bit of fibrosis, a couple of buds of granulation, some fibrin, biofilm, serous exudate, no signs of infection, no undermining. Photos also saved in electronic chart.     Today's wound measurements, per RN documentation:  Wound 20 #2, Right Medial Ankle, Venous, Full Thickness, Onset 7/15/20-Wound Length (cm): 1.5 cm    Wound 20 #2, Right Medial Ankle, Venous, Full Thickness, Onset 7/15/20-Wound Width (cm): 0.3 cm    Wound 20 #2, Right Medial Ankle, Venous, Full Thickness, Onset 7/15/20-Wound Depth (cm): 0.2 cm    Assessment:     Patient Active Problem List   Diagnosis Code    HTN (hypertension) I10    Localized osteoarthrosis, lower leg M17.10    Eczema L30.9    Foot drop, right M21.371    Lumbosacral spondylosis without myelopathy M47.817    Normocytic anemia D64.9    Peripheral neuropathy G62.9    Senile osteoporosis M81.0    Bilateral lower extremity edema R60.0    CKD (chronic kidney disease) stage 3, GFR 30-59 ml/min N18.30    Peripheral venous insufficiency I87.2    Ulcer of right ankle, with fat layer exposed ([address] L97.312    PMR (polymyalgia rheumatica) ([facility]) M35.3    Giant cell arteritis ([facility]) M31.6       Assessment of today's active condition(s): venous stasis, chronic skin changes, edema, recurrent venous ankle ulcer, making slow progress with debridement, compression, adjunctive EpiFix, no signs of infection or ischemia. Factors contributing to occurrence and/or persistence of the chronic ulcer include edema, venous stasis and immunosuppression. Medical necessity of today's visit is shown by the above documentation. Sharp debridement is indicated today, based upon the exam findings in the wound(s) above. Procedure note:     Consent obtained. Time out performed per [facility] policy. Anesthetic  Anesthetic: 4% Lidocaine Cream     Using a curette and #15 blade scalpel, I sharply debrided the right ankle ulcer(s) down through and including the removal of subcutaneous tissue. The type(s) of tissue debrided included fibrin, biofilm and necrotic/eschar. Total Surface Area Debrided: 1 sq cm. The ulcers were then irrigated with normal saline solution. The procedure was completed with a small amount of bleeding, and hemostasis was with pressure. The patient tolerated the procedure well, with no significant complications. The patient's level of pain during and after the procedure was monitored.  Post-debridement measurements, if different from pre-debridement, are in the flowsheet as well.  _________________    Because this patient's venous ankle ulcer has failed to respond to standard wound-care measures (including treatment of infection, debridement of necrosis, treatment of edema, provision of a moist wound environment, good compliance with compression) for more than 4 weeks, I recommended EpiFix as adjunctive therapy to help speed healing of this ulcer. As per previous documentation, peripheral pulse exam is acceptable, and/or TODD is at least 0.65. There is no evidence of untreated infection today. Ms. Neris Diaz does not currently smoke. \    After cleansing the right ankle ulcer with saline, spraying the ulcer with HClO and applying Skin-Prep to the mauro-wound skin, a two sqcm piece of EpiFix was cut to fit the ulcer, placed into the ulcer bed, affixed to the surrounding skin with Steri-Strips, moistened with saline and covered with Versatel and hydrogel with gauze. Two sqcm of the product was used in the ulcer, and 0 sqcm of the product was discarded. If applicable, any wastage is due to the fact the smallest available product was larger than what was needed for her ulcer(s). The patient tolerated the procedure well, without complications. Discharge plan:     Treatment in the wound care center today, per RN documentation: Wound 08/05/20 #2, Right Medial Ankle, Venous, Full Thickness, Onset 7/15/20-Dressing/Treatment: Other (comment)(pink unna,coban,nylon). Per physician order, right application of Unna boot was performed in the wound care center today, to help manage edema, stasis dermatitis, and/or venous ulcers. Leave primary dressing, Unna boot and secondary layer(s) in place until the next appointment. Also discussed ways to keep the wrap dry for a shower, including a plastic cast-guard, available in retail pharmacies. I reminded the patient of the importance of weight management and smoking cessation, if applicable; also encouraged ambulation as tolerated, additional lower extremity exercises as instructed in our education sheet, leg elevation when at rest, and compliance with any recommended dietary, diuretic and compression therapies.     Written discharge instructions given to patient. Follow up in 1 week.     Electronically signed by Nessa Adkins MD on 11/3/2020 at 10:26 AM.

## 2020-11-06 ENCOUNTER — HOSPITAL ENCOUNTER (OUTPATIENT)
Dept: WOUND CARE | Age: 85
Discharge: HOME | End: 2020-11-06
Payer: MEDICARE

## 2020-11-06 VITALS
HEIGHT: 66 IN | WEIGHT: 162 LBS | SYSTOLIC BLOOD PRESSURE: 157 MMHG | HEART RATE: 58 BPM | BODY MASS INDEX: 26.03 KG/M2 | TEMPERATURE: 98.6 F | DIASTOLIC BLOOD PRESSURE: 89 MMHG | RESPIRATION RATE: 16 BRPM

## 2020-11-06 PROCEDURE — 29580 STRAPPING UNNA BOOT: CPT

## 2020-11-06 PROCEDURE — 15271 SKIN SUB GRAFT TRNK/ARM/LEG: CPT

## 2020-11-06 PROCEDURE — C5271 LOW COST SKIN SUBSTITUTE APP: HCPCS

## 2020-11-06 PROCEDURE — 15271 SKIN SUB GRAFT TRNK/ARM/LEG: CPT | Performed by: INTERNAL MEDICINE

## 2020-11-06 RX ORDER — LIDOCAINE 40 MG/G
CREAM TOPICAL ONCE
Status: DISCONTINUED | OUTPATIENT
Start: 2020-11-06 | End: 2020-11-07 | Stop reason: HOSPADM

## 2020-11-06 ASSESSMENT — PAIN SCALES - GENERAL: PAINLEVEL_OUTOF10: 0

## 2020-11-06 NOTE — PLAN OF CARE
Additional specific goal for this week: Patient will receive safe and proper application of EpiFix. Patient will comply with caring for wound and reporting any concerns.  guidelines for application followed. Expiration date of EpiFix checked immediately prior to use. Package intact prior to use, with no damage noted. Product storage at ambient temperature, per  guideline. Product lot #: [id number]  Expiration date:  2025-04-01   Saline lot #: N/a  Expiration date: N/a  Patient/family/caregiver instructed to keep dressing clean, dry and intact. Patient/family/caregiver instructed on signs and symptoms to report, such as draining through dressing, dressing slipping or falling down, becoming wet, or patient having severe pain, severe itching, severe malodor, fever, etc.     Date of first application of a cellular / tissue product for this current wound is September 11, 2020. Today was application # 7 for this wound.     Electronically signed by Brian Mays RN on 11/6/2020 at 5:12 PM.

## 2020-11-12 NOTE — PROGRESS NOTES
[facility] Progress Note    Alysha Tamayo     : 2/3/1933    DATE OF VISIT:  2020    Subjective: Alysha Tamayo is a 80 y.o. female who has a venous ulcer located on the right, medial ankle. Significant symptoms or pertinent wound history since last visit: feeling well overall, just a bit more edema, perhaps from her prednisone. No recent headache or neck pain. No F/C/D.  going back into the hospital this weekend, for what sounds like an upgrade of his cardiac device to a Bi-V device, to help symptoms of CHF. Additional ulcer(s) noted? no.      Her current medication list consists of Lisinopril, Tocilizumab, calcium-vitamin D, furosemide, metoprolol succinate, and predniSONE. Allergies: Erythromycin; Hydrochlorothiazide; Silver sulfadiazine; and Doxycycline    Objective:     Vitals:    20 1000 20 1002   BP:  (!) 157/89   Pulse:  58   Resp: 16    Temp: 98.6 °F (37 °C)    TempSrc: Oral    Weight: 162 lb (73.5 kg)    Height: 5' 6\" (1.676 m)      AAOx3, fatigued, NAD  Intact distal pulses, foot warm, good cap refill  Mild right pedal edema  No cellulitis, angitis, fluctuance  No acute stasis or contact dermatitis  Gay-ulcer skin: indurated, pink, warm and dry. Ulcer(s): slowly smaller, still mostly fibrotic, a couple of buds of granulation, some fibrin +/- biofilm, one wound edge still rather steep, no deep necrosis, no signs of infection. Photos also saved in electronic chart.     Today's wound measurements, per RN documentation:  Wound 20 #2, Right Medial Ankle, Venous, Full Thickness, Onset 7/15/20-Wound Length (cm): 1.8 cm    Wound 20 #2, Right Medial Ankle, Venous, Full Thickness, Onset 7/15/20-Wound Width (cm): 0.7 cm    Wound 20 #2, Right Medial Ankle, Venous, Full Thickness, Onset 7/15/20-Wound Depth (cm): 0.2 cm    Assessment:     Patient Active Problem List   Diagnosis Code    HTN (hypertension) I10    Localized osteoarthrosis, lower leg M17.10    Eczema L30.9    Foot drop, right M21.371    Lumbosacral spondylosis without myelopathy M47.817    Normocytic anemia D64.9    Peripheral neuropathy G62.9    Senile osteoporosis M81.0    Bilateral lower extremity edema R60.0    CKD (chronic kidney disease) stage 3, GFR 30-59 ml/min N18.30    Peripheral venous insufficiency I87.2    Ulcer of right ankle, with fat layer exposed ([facility]) L97.312    PMR (polymyalgia rheumatica) ([facility]) M35.3    Giant cell arteritis ([facility]) M31.6       Assessment of today's active condition(s): venous stasis, chronic skin changes and lipodermatofibrosis, mild edema, recurrent venous ankle ulcer, making slow but steady progress toward healing with debridement, compression, addition of EpiFix, no signs of ischemia or infection. Factors contributing to occurrence and/or persistence of the chronic ulcer include edema, venous stasis and decreased mobility. Medical necessity of today's visit is shown by the above documentation. Sharp debridement is indicated today, based upon the exam findings in the wound(s) above. Procedure note:     Consent obtained. Time out performed per [facility]. Anesthetic  Anesthetic: 4% Lidocaine Cream     Using a curette and #15 blade scalpel, I sharply debrided the right ankle ulcer(s) down through and including the removal of subcutaneous tissue. The type(s) of tissue debrided included fibrin, biofilm and necrotic/eschar. Total Surface Area Debrided: 1 sq cm. The ulcers were then irrigated with normal saline solution. The procedure was completed with a small amount of bleeding, and hemostasis was with pressure. The patient tolerated the procedure well, with no significant complications. The patient's level of pain during and after the procedure was monitored.  Post-debridement measurements, if different from pre-debridement, are in the flowsheet as well.  ______________    Because this patient's venous ankle ulcer has failed to compliance with any recommended dietary, diuretic and compression therapies. Written discharge instructions given to patient. Follow up in 1 week.     Electronically signed by Brisa Logan MD on 11/12/2020 at 10:25 AM.

## 2020-11-13 ENCOUNTER — HOSPITAL ENCOUNTER (OUTPATIENT)
Dept: WOUND CARE | Age: 85
Discharge: HOME | End: 2020-11-13
Payer: MEDICARE

## 2020-11-13 VITALS
WEIGHT: 160.6 LBS | DIASTOLIC BLOOD PRESSURE: 90 MMHG | SYSTOLIC BLOOD PRESSURE: 142 MMHG | RESPIRATION RATE: 16 BRPM | HEART RATE: 57 BPM | TEMPERATURE: 98.8 F | HEIGHT: 66 IN | BODY MASS INDEX: 25.81 KG/M2

## 2020-11-13 PROCEDURE — 15271 SKIN SUB GRAFT TRNK/ARM/LEG: CPT

## 2020-11-13 PROCEDURE — 29580 STRAPPING UNNA BOOT: CPT

## 2020-11-13 PROCEDURE — 15271 SKIN SUB GRAFT TRNK/ARM/LEG: CPT | Performed by: INTERNAL MEDICINE

## 2020-11-13 RX ORDER — LIDOCAINE 40 MG/G
CREAM TOPICAL PRN
Status: DISCONTINUED | OUTPATIENT
Start: 2020-11-13 | End: 2020-11-14 | Stop reason: HOSPADM

## 2020-11-13 ASSESSMENT — PAIN SCALES - GENERAL: PAINLEVEL_OUTOF10: 0

## 2020-11-13 NOTE — PLAN OF CARE
Additional specific goal for this week: Patient will receive safe and proper application of EpiFix. Patient will comply with caring for wound and reporting any concerns.  guidelines for application followed. Expiration date of EpiFix checked immediately prior to use. Package intact prior to use, with no damage noted. Product storage at ambient temperature, per  guideline. Product lot #: [id number]  Expiration date:  2025-0801   Saline lot #: N/a Expiration date: N/a  Patient/family/caregiver instructed to keep dressing clean, dry and intact. Patient/family/caregiver instructed on signs and symptoms to report, such as draining through dressing, dressing slipping or falling down, becoming wet, or patient having severe pain, severe itching, severe malodor, fever, etc.     Date of first application of a cellular / tissue product for this current wound is September 11, 2020. Today was application # 8 for this wound.     Electronically signed by Alee Boswell RN on 11/13/2020 at 6:14 PM.

## 2020-11-17 NOTE — PROGRESS NOTES
88 [facility] Progress Note    Mayte Hooks     : 2/3/1933    DATE OF VISIT:  2020    Subjective: Mayte Hooks is a 80 y.o. female who has a venous ulcer located on the right, medial ankle. Significant symptoms or pertinent wound history since last visit: feeling ok in general, not bothered by increased forefoot swelling, no pruritus, modest drainage, no fever. Additional ulcer(s) noted? no.      Her current medication list consists of Lisinopril, Tocilizumab, calcium-vitamin D, furosemide, metoprolol succinate, and predniSONE. Allergies: Erythromycin; Hydrochlorothiazide; Silver sulfadiazine; and Doxycycline    Objective:     Vitals:    20 1008   BP: (!) 142/90   Pulse: 57   Resp: 16   Temp: 98.8 °F (37.1 °C)   TempSrc: Oral   Weight: 160 lb 9.6 oz (72.8 kg)   Height: 5' 6\" (1.676 m)     AAOx3, fatigued, NAD  Intact distal pulses, foot warm, good cap refill  Trace right lower leg and ankle edema, mild-mod at forefoot and toes  No cellulitis, angitis, fluctuance  No allodynia, no contact dermatitis from dressings  Gay-ulcer skin: indurated, pink, hot and dry. Ulcer(s): small distal focus healed; main proximal focus slowly getting smaller, probably more fibrotic than granular, one steep edge with a bit of epibole, some fibrin and biofilm, serous exudate. Photos also saved in electronic chart.     Today's wound measurements, per RN documentation:  Wound 20 #2, Right Medial Ankle, Venous, Full Thickness, Onset 7/15/20-Wound Length (cm): 1 cm    Wound 20 #2, Right Medial Ankle, Venous, Full Thickness, Onset 7/15/20-Wound Width (cm): 0.2 cm    Wound 20 #2, Right Medial Ankle, Venous, Full Thickness, Onset 7/15/20-Wound Depth (cm): 0.2 cm    Assessment:     Patient Active Problem List   Diagnosis Code    HTN (hypertension) I10    Localized osteoarthrosis, lower leg M17.10    Eczema L30.9    Foot drop, right M21.371    Lumbosacral spondylosis without environment, good compliance with compression) for more than 4 weeks, I recommended EpiFix as adjunctive therapy to help speed healing of this ulcer. As per previous documentation, peripheral pulse exam is acceptable, and/or TODD is at least 0.65. There is no evidence of untreated infection today. Ms. Christine Serna does not currently smoke. After cleansing the right ankle ulcer with saline, spraying the ulcer with HClO and applying Skin-Prep to the mauro-wound skin, a two sqcm piece of EpiFix was cut to fit the ulcer, placed into the ulcer bed, affixed to the surrounding skin with Steri-Strips, moistened with hydrogel and covered with Versatel and gauze. Two sqcm of the product was used in the ulcer, and 0 sqcm of the product was discarded. If applicable, any wastage is due to the fact the smallest available product was larger than what was needed for her ulcer(s). The patient tolerated the procedure well, without complications. Discharge plan:     Treatment in the wound care center today, per RN documentation: Wound 08/05/20 #2, Right Medial Ankle, Venous, Full Thickness, Onset 7/15/20-Dressing/Treatment: (pink unna, coban, nylon). Per physician order, right application of Unna boot was performed in the wound care center today, to help manage edema, stasis dermatitis, and/or venous ulcers. Leave primary dressing, Unna boot and secondary layer(s) in place until the next appointment. Also discussed ways to keep the wrap dry for a shower, including a plastic cast-guard, available in retail pharmacies. I reminded the patient of the importance of weight management and smoking cessation, if applicable; also encouraged ambulation as tolerated, additional lower extremity exercises as instructed in our education sheet, leg elevation when at rest, and compliance with any recommended dietary, diuretic and compression therapies. Written discharge instructions given to patient.  Follow up in 1 week.    Electronically signed by Fred Kovacs MD on 11/17/2020 at 1:35 PM.

## 2020-11-20 ENCOUNTER — HOSPITAL ENCOUNTER (OUTPATIENT)
Dept: WOUND CARE | Age: 85
Discharge: HOME | End: 2020-11-20
Payer: MEDICARE

## 2020-11-20 VITALS
DIASTOLIC BLOOD PRESSURE: 86 MMHG | WEIGHT: 162 LBS | RESPIRATION RATE: 16 BRPM | HEIGHT: 66 IN | BODY MASS INDEX: 26.03 KG/M2 | TEMPERATURE: 98.7 F | HEART RATE: 60 BPM | SYSTOLIC BLOOD PRESSURE: 147 MMHG

## 2020-11-20 PROCEDURE — 29580 STRAPPING UNNA BOOT: CPT | Performed by: INTERNAL MEDICINE

## 2020-11-20 PROCEDURE — 29580 STRAPPING UNNA BOOT: CPT

## 2020-11-20 RX ORDER — LIDOCAINE 40 MG/G
CREAM TOPICAL ONCE
Status: DISCONTINUED | OUTPATIENT
Start: 2020-11-20 | End: 2020-11-21 | Stop reason: HOSPADM

## 2020-11-20 ASSESSMENT — PAIN SCALES - GENERAL: PAINLEVEL_OUTOF10: 0

## 2020-11-20 NOTE — PLAN OF CARE
Dr. Michelle Duarte stimulated wound bed with 27G needle today and reapplied the Versatil. No changes on secondary dressing today. May potentially apply another Epifix next week. Discharge instructions reviewed with patient, all questions answered, copy given to patient. Dressings were applied to all wounds per M.D. Instructions at this visit.

## 2020-11-23 NOTE — PROGRESS NOTES
88 [facility] Progress Note    Magdalena Iraheta     : 2/3/1933    DATE OF VISIT:  2020    Subjective: Magdalena Iraheta is a 80 y.o. female who has a venous ulcer located on the right, medial ankle. Significant symptoms or pertinent wound history since last visit: feeling well overall, no ankle pain, no pruritus, I think less forefoot swelling. No fever. Additional ulcer(s) noted? no.      Her current medication list consists of Lisinopril, Tocilizumab, calcium-vitamin D, furosemide, metoprolol succinate, and predniSONE. Allergies: Erythromycin; Hydrochlorothiazide; Silver sulfadiazine; and Doxycycline    Objective:     Vitals:    20 0950   BP: (!) 147/86   Pulse: 60   Resp: 16   Temp: 98.7 °F (37.1 °C)   TempSrc: Oral   Weight: 162 lb (73.5 kg)   Height: 5' 6\" (1.676 m)     AAOx3, NAD  Intact distal pulses, foot warm, good cap refill  Mild RLE edema, no cellulitis, angitis, fluctuance  Usual mild hemosiderin changes, lipodermatosclerosis, atrophe miroslava  Gay-ulcer skin: indurated, pink, warm, dry and hyperkeratotic. Ulcer(s): smaller, maybe a bit more granular, less fibrotic, some residual EpiFix material, mixed with a bit of fibrin +/- biofilm, serous exudate. Photos also saved in electronic chart.       Assessment:     Patient Active Problem List   Diagnosis Code    HTN (hypertension) I10    Localized osteoarthrosis, lower leg M17.10    Eczema L30.9    Foot drop, right M21.371    Lumbosacral spondylosis without myelopathy M47.817    Normocytic anemia D64.9    Peripheral neuropathy G62.9    Senile osteoporosis M81.0    Bilateral lower extremity edema R60.0    CKD (chronic kidney disease) stage 3, GFR 30-59 ml/min N18.30    Peripheral venous insufficiency I87.2    Ulcer of right ankle, with fat layer exposed ([facility] [address] L97.312    PMR (polymyalgia rheumatica) (HCC) M35.3    Giant cell arteritis ([facility] [address] M31.6       Assessment of today's active condition(s): venous insufficiency, chronic skin changes, lipodermatosclerosis, mild edema, slowly healing venous ankle ulcer, no signs of infection or ischemia, good progress with EpiFix. Factors contributing to occurrence and/or persistence of the chronic ulcer include edema and venous stasis. Medical necessity of today's visit is shown by the above documentation. Sharp debridement is not indicated today, based upon the exam findings in the wound(s) above. She does have an indication for a weekly compression wrap. Procedure note:     Consent obtained. Time out performed per [facility]. Anesthetic  Anesthetic: 4% Lidocaine Cream     I just used a 27g needle to puncture a few tiny holes through the graft and into the underlying wound bed, to encourage a small amount of bleeding (which stopped spontaneously), and allow the wound bed to continue to be stimulated by the graft material.     After cleansing of the wounds with saline and applying skin-care and/or dressing materials as listed below, Right unilateral application of Unna boot was performed per physician order, to help manage edema, stasis dermatitis, and/or venous ulcers. The patient's level of pain during and after the procedure was monitored, and is noted in the wound documentation flowsheet. Discharge plan:     Treatment in the wound care center today, per RN documentation: Wound 08/05/20 #2, Right Medial Ankle, Venous, Full Thickness, Onset 7/15/20-Dressing/Treatment: Other (comment)(hydrogel, pink unna, coban, nylon stocking ). Leave primary dressing, Unna boot and secondary layer(s) in place until the next appointment. She should call before her next visit if there is any significant pain, significant strike-through of drainage to the surface of the wrap, or any significant sense of the wrap sliding down more than an inch or so, bunching-up and abrading her skin.      I reminded the patient of the importance of weight management and smoking cessation, if applicable; also encouraged ambulation as tolerated, additional lower extremity exercises as instructed in our education sheet, leg elevation when at rest, and compliance with any recommended dietary, diuretic and compression therapies. Written discharge instructions given to patient. Follow up in 1 week.     Electronically signed by Jennifer Sharma MD on 11/23/2020 at 12:41 PM.

## 2020-11-25 ENCOUNTER — HOSPITAL ENCOUNTER (OUTPATIENT)
Dept: WOUND CARE | Age: 85
Discharge: HOME | End: 2020-11-25
Payer: MEDICARE

## 2020-11-25 VITALS
WEIGHT: 160.8 LBS | HEIGHT: 66 IN | SYSTOLIC BLOOD PRESSURE: 139 MMHG | HEART RATE: 60 BPM | BODY MASS INDEX: 25.84 KG/M2 | TEMPERATURE: 98.1 F | RESPIRATION RATE: 16 BRPM | DIASTOLIC BLOOD PRESSURE: 80 MMHG

## 2020-11-25 PROCEDURE — 29580 STRAPPING UNNA BOOT: CPT

## 2020-11-25 PROCEDURE — 15271 SKIN SUB GRAFT TRNK/ARM/LEG: CPT

## 2020-11-25 PROCEDURE — 15271 SKIN SUB GRAFT TRNK/ARM/LEG: CPT | Performed by: INTERNAL MEDICINE

## 2020-11-25 RX ORDER — LIDOCAINE 40 MG/G
CREAM TOPICAL ONCE
Status: DISCONTINUED | OUTPATIENT
Start: 2020-11-25 | End: 2020-11-26 | Stop reason: HOSPADM

## 2020-11-25 ASSESSMENT — PAIN SCALES - GENERAL: PAINLEVEL_OUTOF10: 0

## 2020-11-30 NOTE — PROGRESS NOTES
88 [facility] Progress Note    Micaela Soto     : 2/3/1933    DATE OF VISIT:  2020    Subjective: Micaela Soto is a 80 y.o. female who has a venous ulcer located on the right, medial ankle. Significant symptoms or pertinent wound history since last visit: feeling ok overall, no real ankle pain, very mild foot and toe swelling, no pruritus, no fever. GCA symptoms seem well controlled right now. Additional ulcer(s) noted? no.      Her current medication list consists of Lisinopril, Tocilizumab, calcium-vitamin D, furosemide, metoprolol succinate, and predniSONE. Allergies: Erythromycin; Hydrochlorothiazide; Silver sulfadiazine; and Doxycycline    Objective:     Vitals:    20 1031 20 1033   BP:  139/80   Pulse:  60   Resp: 16    Temp: 98.1 °F (36.7 °C)    TempSrc: Oral    Weight: 160 lb 12.8 oz (72.9 kg)    Height: 5' 6\" (1.676 m)      AAOx3, fatigued, NAD  Intact distal pulses, foot warm, good cap refill  Mild forefoot and toe edema  Stable lipodermatosclerosis, atrophe miroslava  No cellulitis, angitis, fluctuance  Gay-ulcer skin: indurated, pink and warm. Ulcer(s): smaller, one edge still with epibole, slight depth, fibrosis mixed with granulation, some fibrin and biofilm, no signs of infection. Photos also saved in electronic chart.     Today's wound measurements, per RN documentation:  Wound 20 #2, Right Medial Ankle, Venous, Full Thickness, Onset 7/15/20-Wound Length (cm): 0.9 cm    Wound 20 #2, Right Medial Ankle, Venous, Full Thickness, Onset 7/15/20-Wound Width (cm): 0.4 cm    Wound 20 #2, Right Medial Ankle, Venous, Full Thickness, Onset 7/15/20-Wound Depth (cm): 0.2 cm    Assessment:     Patient Active Problem List   Diagnosis Code    HTN (hypertension) I10    Localized osteoarthrosis, lower leg M17.10    Eczema L30.9    Foot drop, right M21.371    Lumbosacral spondylosis without myelopathy M47.817    Normocytic anemia D64.9    Peripheral neuropathy G62.9    Senile osteoporosis M81.0    Bilateral lower extremity edema R60.0    CKD (chronic kidney disease) stage 3, GFR 30-59 ml/min N18.30    Peripheral venous insufficiency I87.2    Ulcer of right ankle, with fat layer exposed ([address] L97.312    PMR (polymyalgia rheumatica) ([facility]) M35.3    Giant cell arteritis ([facility]) M31.6       Assessment of today's active condition(s): venous stasis, chronic skin changes, lipodermatosclerosis > edema, very slowly healing venous ankle ulcer, no signs of infection or ischemia. Factors contributing to occurrence and/or persistence of the chronic ulcer include edema and venous stasis. Medical necessity of today's visit is shown by the above documentation. Sharp debridement is indicated today, based upon the exam findings in the wound(s) above. Procedure note:     Consent obtained. Time out performed per [facility]. Anesthetic  Anesthetic: 4% Lidocaine Cream     Using a curette and #15 blade scalpel, I sharply debrided the right ankle ulcer(s) down through and including the removal of dermis. The type(s) of tissue debrided included fibrin, biofilm, necrotic/eschar and epibole. Total Surface Area Debrided: 1 sq cm. The ulcers were then irrigated with normal saline solution. The procedure was completed with a small amount of bleeding, and hemostasis was with pressure. The patient tolerated the procedure well, with no significant complications. The patient's level of pain during and after the procedure was monitored.  Post-debridement measurements, if different from pre-debridement, are in the flowsheet as well.  _______________    Because this patient's venous ankle ulcer has failed to respond to standard wound-care measures (including treatment of infection, debridement of necrosis, treatment of edema, provision of a moist wound environment, good compliance with compression) for more than 4 weeks, I recommended EpiFix as adjunctive therapy to help compression stocking in place, to help her ulcer to remain healed. Written discharge instructions given to patient. Follow up in 1 week.     Electronically signed by Galilea Mayer MD on 11/30/2020 at 11:44 AM.

## 2020-12-04 ENCOUNTER — HOSPITAL ENCOUNTER (OUTPATIENT)
Dept: WOUND CARE | Age: 85
Discharge: HOME | End: 2020-12-04
Payer: MEDICARE

## 2020-12-04 VITALS
SYSTOLIC BLOOD PRESSURE: 117 MMHG | BODY MASS INDEX: 25.88 KG/M2 | DIASTOLIC BLOOD PRESSURE: 72 MMHG | HEIGHT: 66 IN | TEMPERATURE: 98.2 F | WEIGHT: 161 LBS | RESPIRATION RATE: 18 BRPM | HEART RATE: 59 BPM

## 2020-12-04 PROCEDURE — 29580 STRAPPING UNNA BOOT: CPT | Performed by: INTERNAL MEDICINE

## 2020-12-04 PROCEDURE — 29580 STRAPPING UNNA BOOT: CPT

## 2020-12-04 ASSESSMENT — PAIN SCALES - GENERAL: PAINLEVEL_OUTOF10: 0

## 2020-12-07 NOTE — PROGRESS NOTES
88 [facility] Progress Note    Piotr Cruz     : 2/3/1933    DATE OF VISIT:  2020    Subjective: Piotr Cruz is a 80 y.o. female who has a venous ulcer located on the right, medial ankle. Significant symptoms or pertinent wound history since last visit: feeling ok overall, no real wound pain, minor swelling, no pruritus, no fever, no falls. Additional ulcer(s) noted? no.      Her current medication list consists of Lisinopril, Tocilizumab, calcium-vitamin D, furosemide, metoprolol succinate, and predniSONE. Allergies: Erythromycin; Hydrochlorothiazide; Silver sulfadiazine; and Doxycycline    Objective:     Vitals:    20 1012   BP: 117/72   Pulse: 59   Resp: 18   Temp: 98.2 °F (36.8 °C)   TempSrc: Oral   Weight: 161 lb (73 kg)   Height: 5' 6\" (1.676 m)     AAOx3, fatigued, NAD  Intact distal pulses, foot warm, good cap refill  Mild forefoot edema, stable lipodermatosclerosis and atrophe miroslava at the ankle, mild hemosiderin  No cellulitis, angitis, fluctuance  Gay-ulcer skin: indurated, pink, warm, dry and hyperkeratotic. Ulcer(s): small, pink, more granular than fibrotic, serous exudate, a bit of fibrin, dissolving EpiFix. Photos also saved in electronic chart.     Today's Wound Measurements, per RN documentation:  Wound 20 #2, Right Medial Ankle, Venous, Full Thickness, Onset 7/15/20-Wound Length (cm): 0.6 cm    Wound 20 #2, Right Medial Ankle, Venous, Full Thickness, Onset 7/15/20-Wound Width (cm): 0.4 cm    Wound 20 #2, Right Medial Ankle, Venous, Full Thickness, Onset 7/15/20-Wound Depth (cm): 0.2 cm    Assessment:     Patient Active Problem List   Diagnosis Code    HTN (hypertension) I10    Localized osteoarthrosis, lower leg M17.10    Eczema L30.9    Foot drop, right M21.371    Lumbosacral spondylosis without myelopathy M47.817    Normocytic anemia D64.9    Peripheral neuropathy G62.9    Senile osteoporosis M81.0    Bilateral lower extremity edema R60.0    CKD (chronic kidney disease) stage 3, GFR 30-59 ml/min N18.30    Peripheral venous insufficiency I87.2    Ulcer of right ankle, with fat layer exposed ([facility] [address] L97.312    PMR (polymyalgia rheumatica) ([facility]) M35.3    Giant cell arteritis ([facility]) M31.6       Assessment of today's active condition(s): venous stasis, chronic skin changes, more lipodermatosclerosis than edema, very slowly healing venous ankle ulcer, no signs of infection or ischemia. Factors contributing to occurrence and/or persistence of the chronic ulcer include edema, venous stasis and decreased mobility. Medical necessity of today's visit is shown by the above documentation. Sharp debridement is not indicated today, based upon the exam findings in the wound(s) above. She does have an indication for a weekly compression wrap. Procedure note:     Consent obtained. Time out performed per [facility]. Anesthetic  Anesthetic: None(N/A)     I just used a 27g needle to puncture a few tiny holes through the graft and into the underlying wound bed, to encourage a small amount of bleeding (which stopped spontaneously), and allow the wound bed to continue to be stimulated by the graft material.     After cleansing of the wounds with saline and applying skin-care and/or dressing materials as listed below, Right unilateral application of Unna boot was performed per physician order, to help manage edema, stasis dermatitis, and/or venous ulcers. The patient's level of pain during and after the procedure was monitored, and is noted in the wound documentation flowsheet. Discharge plan:     Treatment in the wound care center today, per RN documentation: Wound 08/05/20 #2, Right Medial Ankle, Venous, Full Thickness, Onset 7/15/20-Dressing/Treatment: Other (comment)(Vashe, Versatel, hydrogel, gauze, pink unna boot, coban). Leave primary dressing, Unna boot and secondary layer(s) in place until the next appointment.  She should call before her next visit if there is any significant pain, significant strike-through of drainage to the surface of the wrap, or any significant sense of the wrap sliding down more than an inch or so, bunching-up and abrading her skin. I reminded the patient of the importance of weight management and smoking cessation, if applicable; also encouraged ambulation as tolerated, additional lower extremity exercises as instructed in our education sheet, leg elevation when at rest, and compliance with any recommended dietary, diuretic and compression therapies. Written discharge instructions given to patient. Follow up in 1 week.     Electronically signed by Alonzo Blackburn MD on 12/7/2020 at 2:43 PM.

## 2020-12-07 NOTE — PLAN OF CARE
Improvement noted in wound size this visit. MD stimulated wound bed with 27G needle today plan to apply another Epifix next visit if needed. Discharge instructions reviewed with patient, all questions answered, copy given to patient. Dressings were applied to all wounds per M.D. Instructions at this visit.

## 2020-12-11 ENCOUNTER — HOSPITAL ENCOUNTER (OUTPATIENT)
Dept: WOUND CARE | Age: 85
Discharge: HOME | End: 2020-12-11
Payer: MEDICARE

## 2020-12-11 VITALS
TEMPERATURE: 98.3 F | DIASTOLIC BLOOD PRESSURE: 82 MMHG | HEART RATE: 59 BPM | RESPIRATION RATE: 18 BRPM | SYSTOLIC BLOOD PRESSURE: 149 MMHG | HEIGHT: 66 IN | WEIGHT: 160 LBS | BODY MASS INDEX: 25.71 KG/M2

## 2020-12-11 PROCEDURE — 29580 STRAPPING UNNA BOOT: CPT | Performed by: INTERNAL MEDICINE

## 2020-12-11 PROCEDURE — 15271 SKIN SUB GRAFT TRNK/ARM/LEG: CPT | Performed by: INTERNAL MEDICINE

## 2020-12-11 PROCEDURE — 29580 STRAPPING UNNA BOOT: CPT

## 2020-12-11 PROCEDURE — 15271 SKIN SUB GRAFT TRNK/ARM/LEG: CPT

## 2020-12-11 RX ORDER — LIDOCAINE 40 MG/G
CREAM TOPICAL ONCE
Status: DISCONTINUED | OUTPATIENT
Start: 2020-12-11 | End: 2020-12-12 | Stop reason: HOSPADM

## 2020-12-11 RX ORDER — ACETAMINOPHEN 500 MG
500 TABLET ORAL EVERY 6 HOURS PRN
COMMUNITY

## 2020-12-11 ASSESSMENT — PAIN SCALES - GENERAL: PAINLEVEL_OUTOF10: 0

## 2020-12-11 NOTE — PLAN OF CARE
Additional specific goal for this week: Patient will receive safe and proper application of EpiFix. Patient will comply with caring for wound and reporting any concerns.  guidelines for application followed. Expiration date of EpiFix checked immediately prior to use. Package intact prior to use, with no damage noted. Product storage at ambient temperature, per  guideline. Product lot #: [id number]  Expiration date: 2025-08-01   Saline lot #: n/a  Expiration date: n/a  Patient/family/caregiver instructed to keep dressing clean, dry and intact. Patient/family/caregiver instructed on signs and symptoms to report, such as draining through dressing, dressing slipping or falling down, becoming wet, or patient having severe pain, severe itching, severe malodor, fever, etc.     Date of first application of a cellular / tissue product for this current wound is September 11, 2020. Today was application # 10 for this wound.     Electronically signed by Celia العلي RN on 12/11/2020 at 5:46 PM.

## 2020-12-14 NOTE — PROGRESS NOTES
[facility] Progress Note    Bree Mtz     : 2/3/1933    DATE OF VISIT:  2020    Subjective: Bree Mtz is a 80 y.o. female who has a venous ulcer located on the right, medial ankle. Significant symptoms or pertinent wound history since last visit: feeling well overall, no real wound pain or pruritus, not bothered by forefoot swelling (it looks like her Unna boot migrated proximally during the week). No F/C/D. No falls. Additional ulcer(s) noted? no.      Her current medication list consists of Lisinopril, Tocilizumab, acetaminophen, calcium-vitamin D, furosemide, metoprolol succinate, and predniSONE. Allergies: Erythromycin, Hydrochlorothiazide, Silver sulfadiazine, and Doxycycline    Objective:     Vitals:    20 0958 20 1000   BP:  (!) 149/82   Pulse:  59   Resp: 18    Temp: 98.3 °F (36.8 °C)    TempSrc: Oral    Weight: 160 lb (72.6 kg)    Height: 5' 6\" (1.676 m)      AAOx3, fatigued, NAD  Intact distal pulses, foot warm, good cap refill  Very mild RLE edema overall, mild-mod at forefoot  Mild hemosiderin changes, some atrophe miroslava, lipodermatosclerosis  No cellulitis, angitis, fluctuance  No allodynia  Gay-ulcer skin: indurated, pink, warm and hyperkeratotic. Ulcer(s): smaller, part fibrotic, part granular, fibrinous debris, a bit of biofilm, serous exudate. Photos also saved in electronic chart.     Today's wound measurements, per RN documentation:  Wound 20 #2, Right Medial Ankle, Venous, Full Thickness, Onset 7/15/20-Wound Length (cm): 0.4 cm    Wound 20 #2, Right Medial Ankle, Venous, Full Thickness, Onset 7/15/20-Wound Width (cm): 0.3 cm    Wound 20 #2, Right Medial Ankle, Venous, Full Thickness, Onset 7/15/20-Wound Depth (cm): 0.2 cm    Assessment:     Patient Active Problem List   Diagnosis Code    HTN (hypertension) I10    Localized osteoarthrosis, lower leg M17.10    Eczema L30.9    Foot drop, right M21.371    Lumbosacral spondylosis without myelopathy M47.817    Normocytic anemia D64.9    Peripheral neuropathy G62.9    Senile osteoporosis M81.0    Bilateral lower extremity edema R60.0    CKD (chronic kidney disease) stage 3, GFR 30-59 ml/min N18.30    Peripheral venous insufficiency I87.2    Ulcer of right ankle, with fat layer exposed ([address] L97.312    PMR (polymyalgia rheumatica) ([facility]) M35.3    Giant cell arteritis ([facility]) M31.6       Assessment of today's active condition(s): venous stasis, more lipodermatosclerosis than edema, chronic skin changes, small but slowly healing venous ankle ulcer, doing well overall with debridement, Unna boots, EpiFix -- today would be #10 (and the final one) of that graft. Factors contributing to occurrence and/or persistence of the chronic ulcer include edema, venous stasis and decreased mobility. Medical necessity of today's visit is shown by the above documentation. Sharp debridement is indicated today, based upon the exam findings in the wound(s) above. Procedure note:     Consent obtained. Time out performed per [facility]. Anesthetic  Anesthetic: 4% Lidocaine Cream     Using a curette and #15 blade scalpel, I sharply debrided the right, medial ankle ulcer(s) down through and including the removal of dermis. The type(s) of tissue debrided included fibrin, biofilm and necrotic/eschar. Total Surface Area Debrided: 1 sq cm. The ulcers were then irrigated with normal saline solution. The procedure was completed with a small amount of bleeding, and hemostasis was with pressure. The patient tolerated the procedure well, with no significant complications. The patient's level of pain during and after the procedure was monitored.  Post-debridement measurements, if different from pre-debridement, are in the flowsheet as well.  _____________    Because this patient's venous ankle ulcer has failed to respond to standard wound-care measures (including treatment of infection, debridement of necrosis, treatment of edema, provision of a moist wound environment, good compliance with compression) for more than 4 weeks, I recommended EpiFix as adjunctive therapy to help speed healing of this ulcer. As per previous documentation, peripheral pulse exam is acceptable, and/or TODD is at least 0.65. There is no evidence of untreated infection today. Ms. Grace Cash does not currently smoke. After cleansing the right ankle ulcer with saline, spraying the ulcer with HClO and applying Skin-Prep to the mauro-wound skin, a two sqcm piece of EpiFix was cut to fit the ulcer, placed into the ulcer bed, affixed to the surrounding skin with Steri-Strips, moistened with saline and covered with Versatel. Two sqcm of the product was used in the ulcer, and 0 sqcm of the product was discarded. If applicable, any wastage is due to the fact the smallest available product was larger than what was needed for her ulcer(s). The patient tolerated the procedure well, without complications. Discharge plan:     Treatment in the wound care center today, per RN documentation: Wound 08/05/20 #2, Right Medial Ankle, Venous, Full Thickness, Onset 7/15/20-Dressing/Treatment: Other (comment)(hydrogel, gauze, unna boot coban stockinette). Per physician order, right application of Unna boot was performed in the wound care center today, to help manage edema, stasis dermatitis, and/or venous ulcers. Leave primary dressing, Unna boot and secondary layer(s) in place until the next appointment. Also discussed ways to keep the wrap dry for a shower, including a plastic cast-guard, available in retail pharmacies.      I reminded the patient of the importance of weight management and smoking cessation, if applicable; also encouraged ambulation as tolerated, additional lower extremity exercises as instructed in our education sheet, leg elevation when at rest, and compliance with any recommended dietary, diuretic and compression therapies. Plan to refer her back to [facility] for a new, thinner and less bulky, AFO once her wound is healed and she can be back in daily compression stockings. Written discharge instructions given to patient. Follow up in 1 week.     Electronically signed by Talita Truong MD on 12/14/2020 at 2:46 PM.

## 2020-12-18 ENCOUNTER — HOSPITAL ENCOUNTER (OUTPATIENT)
Dept: WOUND CARE | Age: 85
Discharge: HOME | End: 2020-12-18
Payer: MEDICARE

## 2020-12-18 VITALS
RESPIRATION RATE: 18 BRPM | SYSTOLIC BLOOD PRESSURE: 130 MMHG | BODY MASS INDEX: 26.16 KG/M2 | HEART RATE: 60 BPM | HEIGHT: 66 IN | DIASTOLIC BLOOD PRESSURE: 80 MMHG | WEIGHT: 162.8 LBS | TEMPERATURE: 97.6 F

## 2020-12-18 PROCEDURE — 29580 STRAPPING UNNA BOOT: CPT

## 2020-12-18 PROCEDURE — 29580 STRAPPING UNNA BOOT: CPT | Performed by: INTERNAL MEDICINE

## 2020-12-18 RX ORDER — LIDOCAINE 40 MG/G
CREAM TOPICAL PRN
Status: DISCONTINUED | OUTPATIENT
Start: 2020-12-18 | End: 2020-12-19 | Stop reason: HOSPADM

## 2020-12-21 NOTE — PLAN OF CARE
Continued improvement noted in wound and very close to healing. No changes in wound car erigme as patient is responding well. Discharge instructions reviewed with patient, all questions answered, copy given to patient. Dressings were applied to all wounds per M.D. Instructions at this visit.

## 2020-12-23 ENCOUNTER — HOSPITAL ENCOUNTER (OUTPATIENT)
Dept: WOUND CARE | Age: 85
Discharge: HOME | End: 2020-12-23
Payer: MEDICARE

## 2020-12-23 VITALS
SYSTOLIC BLOOD PRESSURE: 137 MMHG | RESPIRATION RATE: 18 BRPM | BODY MASS INDEX: 26.03 KG/M2 | HEART RATE: 69 BPM | TEMPERATURE: 98.7 F | HEIGHT: 66 IN | WEIGHT: 162 LBS | DIASTOLIC BLOOD PRESSURE: 85 MMHG

## 2020-12-23 PROCEDURE — 97597 DBRDMT OPN WND 1ST 20 CM/<: CPT

## 2020-12-23 PROCEDURE — 97597 DBRDMT OPN WND 1ST 20 CM/<: CPT | Performed by: INTERNAL MEDICINE

## 2020-12-23 PROCEDURE — 29580 STRAPPING UNNA BOOT: CPT

## 2020-12-23 RX ORDER — LIDOCAINE 40 MG/G
CREAM TOPICAL
Status: DISPENSED | OUTPATIENT
Start: 2020-12-23 | End: 2020-12-23

## 2020-12-23 ASSESSMENT — PAIN SCALES - GENERAL: PAINLEVEL_OUTOF10: 0

## 2020-12-23 NOTE — PLAN OF CARE
Wound stable, debridement per MD & pt. Tolerated well. Distal foot swelling noted, appears that the wrap was pushed back on her foot. Will return to using coban over the unna boot & make sure the wraps are started at the base of toes. Reinforced importance of elevation & compression to help with circulation, edema control & wound healing. F/u in [address] [place] [address],[address] in 1 week as ordered. Discharge instructions reviewed with patient, all questions answered, copy given to patient. Dressings were applied to all wounds per M.D. Instructions at this visit.

## 2020-12-23 NOTE — PROGRESS NOTES
[facility] Progress Note    Isa Smoker     : 2/3/1933    DATE OF VISIT:  2020    Subjective: Isa Smoker is a 80 y.o. female who has a venous ulcer located on the right, medial ankle. Significant symptoms or pertinent wound history since last visit: feeling well, no pain, minimal wound drainage, no fever, no falls. Forefoot is a bit more swollen and discolored again today (light reddish purple), but she denies discomfort there. Additional ulcer(s) noted? no.      Her current medication list consists of Lisinopril, Tocilizumab, acetaminophen, calcium-vitamin D, furosemide, metoprolol succinate, and predniSONE. Allergies: Erythromycin, Hydrochlorothiazide, Silver sulfadiazine, and Doxycycline    Objective:     Vitals:    20 1017   BP: 130/80   Pulse: 60   Resp: 18   Temp: 97.6 °F (36.4 °C)   TempSrc: Oral   Weight: 162 lb 12.8 oz (73.8 kg)   Height: 5' 6\" (1.676 m)     AAOx3, fatigued, NAD  Intact distal pulses, most of foot warm with good cap refill, toes cooler  No real right lower leg or ankle edema, mild-mod at forefoot  Stable hemosiderin, lipodermatosclerosis, atrophe miroslava  No cellulitis, angitis, fluctuance, no allodynia  Gay-ulcer skin: indurated, pink, warm and hyperkeratotic. Ulcer(s): smaller, more red and granular, less fibrotic, some EpiFix material, fibrin, serous exudate, no signs of infection. Photos also saved in electronic chart.     Today's Wound Measurements, per RN documentation:  Wound 20 #2, Right Medial Ankle, Venous, Full Thickness, Onset 7/15/20-Wound Length (cm): 0.4 cm    Wound 20 #2, Right Medial Ankle, Venous, Full Thickness, Onset 7/15/20-Wound Width (cm): 0.3 cm    Wound 20 #2, Right Medial Ankle, Venous, Full Thickness, Onset 7/15/20-Wound Depth (cm): 0.2 cm    Assessment:     Patient Active Problem List   Diagnosis Code    HTN (hypertension) I10    Localized osteoarthrosis, lower leg M17.10    Eczema L30.9  Foot drop, right M21.371    Lumbosacral spondylosis without myelopathy M47.817    Normocytic anemia D64.9    Peripheral neuropathy G62.9    Senile osteoporosis M81.0    Bilateral lower extremity edema R60.0    CKD (chronic kidney disease) stage 3, GFR 30-59 ml/min N18.30    Peripheral venous insufficiency I87.2    Ulcer of right ankle, with fat layer exposed ([address] L97.312    PMR (polymyalgia rheumatica) ([facility]) M35.3    Giant cell arteritis ([facility]) M31.6       Assessment of today's active condition(s): venous insufficiency, chronic skin changes, more lipodermatosclerosis than edema, small but very slowly healing venous ankle ulcer, making decent progress with Unna boots and EpiFix, but with a bit more forefoot swelling than I'd like, though asymptomatic so far. Factors contributing to occurrence and/or persistence of the chronic ulcer include edema, venous stasis and immunosuppression. Medical necessity of today's visit is shown by the above documentation. Sharp debridement is not indicated today, based upon the exam findings in the wound(s) above. She does have an indication for a weekly compression wrap. Procedure note:     Consent obtained. Time out performed per [facility]. Anesthetic  Anesthetic: 4% Lidocaine Cream     I just used a 27g needle to puncture a few tiny holes through the graft and into the underlying wound bed, to encourage a small amount of bleeding (which stopped spontaneously), and allow the wound bed to continue to be stimulated by the graft material.     After cleansing of the wounds with saline and applying skin-care and/or dressing materials as listed below, Right unilateral application of Unna boot was performed per physician order, to help manage edema, stasis dermatitis, and/or venous ulcers. The patient's level of pain during and after the procedure was monitored, and is noted in the wound documentation flowsheet.     Discharge plan:     Treatment in the wound care center today, per RN documentation: Wound 08/05/20 #2, Right Medial Ankle, Venous, Full Thickness, Onset 7/15/20-Dressing/Treatment: (vashe,hydrogel,4x4,pink unna, ace, nylon). Backed off on the secondary Coban layer to just an Ace wrap today. Leave primary dressing, Unna boot and secondary layer(s) in place until the next appointment. She should call before her next visit if there is any significant pain, significant strike-through of drainage to the surface of the wrap, or any significant sense of the wrap sliding down more than an inch or so, bunching-up and abrading her skin. I reminded the patient of the importance of weight management and smoking cessation, if applicable; also encouraged ambulation as tolerated, additional lower extremity exercises as instructed in our education sheet, leg elevation when at rest, and compliance with any recommended dietary, diuretic and compression therapies. When just about healed, will refer her back to [facility] for a thinner AFO, for her foot drop. Written discharge instructions given to patient. Follow up in 1 week.     Electronically signed by Remedios Serrano MD on 12/23/2020 at 8:20 AM.

## 2020-12-28 NOTE — PROGRESS NOTES
[facility] Progress Note    Ankit Mota     : 2/3/1933    DATE OF VISIT:  2020    Subjective: Ankit Mota is a 80 y.o. female who has a venous ulcer located on the right, medial ankle. Significant symptoms or pertinent wound history since last visit: feeling well, minor pain, mild forefoot swelling and occasional toe discoloration, no pruritus, no fever. Additional ulcer(s) noted? no.      Her current medication list consists of Lisinopril, Tocilizumab, acetaminophen, calcium-vitamin D, furosemide, metoprolol succinate, and predniSONE. Allergies: Erythromycin, Hydrochlorothiazide, Silver sulfadiazine, and Doxycycline    Objective:     Vitals:    20 1100 20 1104   BP: 137/85    Pulse: 69    Resp: 18    Temp:  98.7 °F (37.1 °C)   TempSrc: Oral    Weight: 162 lb (73.5 kg)    Height: 5' 6\" (1.676 m)      AAOx3, fatigued, NAD  Intact distal pulses, foot warm, good cap refill  Mild RLE edema, mostly at the forefoot; edema still accentuated there, from the Unna boot being pushed back by her shoe, I think  No cellulitis, angitis, fluctuance  No allodynia, no acute stasis dermatitis  Gay-ulcer skin: indurated, pink and warm. Ulcer(s): quite small, still a bit fibrotic, fibrin, biofilm, a bit of red tissue working through the fibrotic base, no signs of infection. Photos also saved in electronic chart.     Today's wound measurements, per RN documentation:  Wound 20 #2, Right Medial Ankle, Venous, Full Thickness, Onset 7/15/20-Wound Length (cm): 0.4 cm    Wound 20 #2, Right Medial Ankle, Venous, Full Thickness, Onset 7/15/20-Wound Width (cm): 0.4 cm    Wound 20 #2, Right Medial Ankle, Venous, Full Thickness, Onset 7/15/20-Wound Depth (cm): 0.2 cm    Assessment:     Patient Active Problem List   Diagnosis Code    HTN (hypertension) I10    Localized osteoarthrosis, lower leg M17.10    Eczema L30.9    Foot drop, right M21.371    Lumbosacral spondylosis without myelopathy M47.817    Normocytic anemia D64.9    Peripheral neuropathy G62.9    Senile osteoporosis M81.0    Bilateral lower extremity edema R60.0    CKD (chronic kidney disease) stage 3, GFR 30-59 ml/min N18.30    Peripheral venous insufficiency I87.2    Ulcer of right ankle, with fat layer exposed ([facility] [address] L97.312    PMR (polymyalgia rheumatica) ([facility]) M35.3    Giant cell arteritis ([facility]) M31.6       Assessment of today's active condition(s): venous stasis, chronic skin changes, lipodermatosclerosis more than chronic edema, slowly healing venous ankle ulcer, completed a course of EpiFix, hopefully has enough healing momentum to get there in the next few weeks. No signs of infection or ischemia. Factors contributing to occurrence and/or persistence of the chronic ulcer include edema and venous stasis. Medical necessity of today's visit is shown by the above documentation. Sharp debridement is indicated today, based upon the exam findings in the wound(s) above. Procedure note:     Consent obtained. Time out performed per [facility]. Anesthetic  Anesthetic: 4% Lidocaine Cream     Using a curette and #15 blade scalpel, I sharply debrided the right, medial ankle ulcer(s) down through and including the removal of dermis. The type(s) of tissue debrided included fibrin and biofilm. Total Surface Area Debrided: 1 sq cm. The ulcers were then irrigated with normal saline solution. The procedure was completed with a small amount of bleeding, and hemostasis was with pressure. The patient tolerated the procedure well, with no significant complications. The patient's level of pain during and after the procedure was monitored. Post-debridement measurements, if different from pre-debridement, are in the flowsheet as well.     Discharge plan:     Treatment in the wound care center today, per RN documentation: Wound 08/05/20 #2, Right Medial Ankle, Venous, Full Thickness, Onset 7/15/20-Dressing/Treatment: Other (comment)(Purachol Ag, hydrogel, 4x4's,pink,Unna boot, coban). Per physician order, right application of Unna boot was performed in the wound care center today, to help manage edema, stasis dermatitis, and/or venous ulcers. Leave primary dressing, Unna boot and secondary layer(s) in place until the next appointment. Also discussed ways to keep the wrap dry for a shower, including a plastic cast-guard, available in retail pharmacies. I reminded the patient of the importance of weight management and smoking cessation, if applicable; also encouraged ambulation as tolerated, additional lower extremity exercises as instructed in our education sheet, leg elevation when at rest, and compliance with any recommended dietary, diuretic and compression therapies. Written discharge instructions given to patient. Follow up in 1 week.     Electronically signed by Deion Vallecillo MD on 12/28/2020 at [address] PM.

## 2021-01-08 ENCOUNTER — HOSPITAL ENCOUNTER (OUTPATIENT)
Dept: WOUND CARE | Age: 86
Discharge: HOME | End: 2021-01-08
Payer: MEDICARE

## 2021-01-08 VITALS
WEIGHT: 161.2 LBS | BODY MASS INDEX: 25.91 KG/M2 | SYSTOLIC BLOOD PRESSURE: 121 MMHG | HEIGHT: 66 IN | HEART RATE: 68 BPM | DIASTOLIC BLOOD PRESSURE: 69 MMHG | RESPIRATION RATE: 16 BRPM | TEMPERATURE: 98.5 F

## 2021-01-08 PROCEDURE — 29580 STRAPPING UNNA BOOT: CPT

## 2021-01-08 PROCEDURE — 97607 NEG PRS WND THR NDME<=50SQCM: CPT

## 2021-01-08 PROCEDURE — 97597 DBRDMT OPN WND 1ST 20 CM/<: CPT | Performed by: INTERNAL MEDICINE

## 2021-01-08 PROCEDURE — 97597 DBRDMT OPN WND 1ST 20 CM/<: CPT

## 2021-01-08 RX ORDER — LIDOCAINE 40 MG/G
CREAM TOPICAL ONCE
Status: DISCONTINUED | OUTPATIENT
Start: 2021-01-08 | End: 2021-01-09 | Stop reason: HOSPADM

## 2021-01-08 ASSESSMENT — PAIN SCALES - GENERAL: PAINLEVEL_OUTOF10: 0

## 2021-01-11 NOTE — PROGRESS NOTES
and necrotic/eschar. Total Surface Area Debrided: 1 sq cm. Also used the #15 blade to make a couple of tiny stab incisions in the wound base, to encourage a bit more bleeding from the fibrotic layer there. The ulcers were then irrigated with normal saline solution. The procedure was completed with a small amount of bleeding, and hemostasis was with pressure. The patient tolerated the procedure well, with no significant complications. The patient's level of pain during and after the procedure was monitored. Post-debridement measurements, if different from pre-debridement, are in the flowsheet as well. Discharge plan:     Treatment in the wound care center today, per RN documentation: Wound 08/05/20 #2, Right Medial Ankle, Venous, Full Thickness, Onset 7/15/20-Dressing/Treatment: (vashe, purachol, hydrogel). To try to improve granulation tissue, encourage the ulcer to contract, and/or to manage the higher-than usual level of exudate, we elected to apply a disposable NPWT system today (YANET). Wound was cleansed with saline after debridement; skin prep applied to periwound skin; first dressing layer(s) were applied as above, then a 10 x 20 cm YANET dressing was applied, with the Port in the superior position, and secured with adhesive strips. Pump turned on, functioning well, no leaks. Patient educated to keep dressing dry, watch occasionally for alarm lights, and note if drainage becomes excessive, or accumulates around [place]. Per physician order, right application of Unna boot was performed in the wound care center today, to help manage edema, stasis dermatitis, and/or venous ulcers. Leave primary dressing, Unna boot and secondary layer(s) in place until the next appointment. Also discussed ways to keep the wrap dry for a shower, including a plastic cast-guard, available in retail pharmacies.      I reminded the patient of the importance of weight management and smoking cessation, if applicable; also encouraged ambulation as tolerated, additional lower extremity exercises as instructed in our education sheet, leg elevation when at rest, and compliance with any recommended dietary, diuretic and compression therapies. Written discharge instructions given to patient. Follow up in 1 week.     Electronically signed by Sarah Rosales MD on 1/11/2021 at 1:28 PM.

## 2021-01-11 NOTE — PLAN OF CARE
Patient with minimal change in wound size noted today. Wound debrided today per MD and angel tolerated well. Will begin using YANET NPWT in addition to current dressing regime. Discharge instructions reviewed with patient, all questions answered, copy given to patient. Dressings were applied to all wounds per M.D. Instructions at this visit.

## 2021-01-15 ENCOUNTER — HOSPITAL ENCOUNTER (OUTPATIENT)
Dept: WOUND CARE | Age: 86
Discharge: HOME | End: 2021-01-15
Payer: MEDICARE

## 2021-01-15 VITALS
WEIGHT: 162.2 LBS | DIASTOLIC BLOOD PRESSURE: 85 MMHG | RESPIRATION RATE: 16 BRPM | TEMPERATURE: 98 F | BODY MASS INDEX: 26.07 KG/M2 | HEART RATE: 59 BPM | HEIGHT: 66 IN | SYSTOLIC BLOOD PRESSURE: 162 MMHG

## 2021-01-15 PROCEDURE — 29580 STRAPPING UNNA BOOT: CPT

## 2021-01-15 PROCEDURE — 97607 NEG PRS WND THR NDME<=50SQCM: CPT

## 2021-01-15 PROCEDURE — 11042 DBRDMT SUBQ TIS 1ST 20SQCM/<: CPT | Performed by: INTERNAL MEDICINE

## 2021-01-15 PROCEDURE — 97597 DBRDMT OPN WND 1ST 20 CM/<: CPT

## 2021-01-15 PROCEDURE — 11042 DBRDMT SUBQ TIS 1ST 20SQCM/<: CPT

## 2021-01-15 RX ORDER — LIDOCAINE 40 MG/G
CREAM TOPICAL ONCE
Status: DISCONTINUED | OUTPATIENT
Start: 2021-01-15 | End: 2021-01-16 | Stop reason: HOSPADM

## 2021-01-15 NOTE — PLAN OF CARE
Some improvement noted in wound bed this week per MD.  Debrided per MD and patient tolerated well. Will continue with YANET and same primary wound dressing. Discharge instructions reviewed with patient, all questions answered, copy given to patient. Dressings were applied to all wounds per M.D. Instructions at this visit.

## 2021-01-18 NOTE — PROGRESS NOTES
[facility] Progress Note    Elena Estrada     : 2/3/1933    DATE OF VISIT:  1/15/2021    Subjective: Elena Estrada is a 80 y.o. female who has a venous ulcer located on the right, medial ankle. Significant symptoms or pertinent wound history since last visit: feeling ok overall, no concerns with YANET, no F/C/D, no pruritus, mild forefoot edema. Additional ulcer(s) noted? no.      Her current medication list consists of Denosumab, Lisinopril, Tocilizumab, acetaminophen, furosemide, metoprolol succinate, and predniSONE. Allergies: Erythromycin, Hydrochlorothiazide, Silver sulfadiazine, and Doxycycline    Objective:     Vitals:    01/15/21 0933 01/15/21 0936   BP:  (!) 162/85   Pulse:  59   Resp: 16    Temp: 98 °F (36.7 °C)    TempSrc: Oral    Weight: 162 lb 3.2 oz (73.6 kg)    Height: 5' 6\" (1.676 m)      AAOx3, fatigued, NAD  Intact distal pulses, foot warm, good cap refill  Mild right forefoot stage 2 lymphedema  No cellulitis, angitis, fluctuance  Stable mild hemosiderin changes, atrophe miroslava, no acute stasis dermatitis  Gay-ulcer skin: indurated, pink and warm. Ulcer(s): a bit smaller, still with a bit of depth, but it does look more granular and less fibrotic, small amount of SQ necrosis, biofilm, serous exudate. Photos also saved in electronic chart.     Today's wound measurements, per RN documentation:  Wound 20 #2, Right Medial Ankle, Venous, Full Thickness, Onset 7/15/20-Wound Length (cm): 0.3 cm    Wound 20 #2, Right Medial Ankle, Venous, Full Thickness, Onset 7/15/20-Wound Width (cm): 0.2 cm    Wound 20 #2, Right Medial Ankle, Venous, Full Thickness, Onset 7/15/20-Wound Depth (cm): 0.2 cm    Assessment:     Patient Active Problem List   Diagnosis Code    HTN (hypertension) I10    Localized osteoarthrosis, lower leg M17.10    Eczema L30.9    Foot drop, right M21.371    Lumbosacral spondylosis without myelopathy M47.817    Normocytic anemia D64.9    Peripheral neuropathy G62.9    Senile osteoporosis M81.0    Bilateral lower extremity edema R60.0    CKD (chronic kidney disease) stage 3, GFR 30-59 ml/min N18.30    Peripheral venous insufficiency I87.2    Ulcer of right ankle, with fat layer exposed ([address] L97.312    PMR (polymyalgia rheumatica) ([facility]) M35.3    Giant cell arteritis ([facility]) M31.6       Assessment of today's active condition(s): venous insufficiency, chronic skin changes, lipodermatosclerosis, mild RLE edema, small but very stubborn venous ankle ulcer, no signs of ischemia of infection, had improved somewhat with EpiFix, then stagnated again, looks a bit more granular and lively again with YANET NPWT. Factors contributing to occurrence and/or persistence of the chronic ulcer include edema and venous stasis. Medical necessity of today's visit is shown by the above documentation. Sharp debridement is indicated today, based upon the exam findings in the wound(s) above. Procedure note:     Consent obtained. Time out performed per [facility]. Anesthetic  Anesthetic: 4% Lidocaine Cream     Using a curette and #15 blade scalpel, I sharply debrided the right ankle ulcer(s) down through and including the removal of subcutaneous tissue. The type(s) of tissue debrided included biofilm and slough. Total Surface Area Debrided: 1 sq cm. The ulcers were then irrigated with normal saline solution. The procedure was completed with a small amount of bleeding, and hemostasis was with pressure. The patient tolerated the procedure well, with no significant complications. The patient's level of pain during and after the procedure was monitored. Post-debridement measurements, if different from pre-debridement, are in the flowsheet as well. Discharge plan:     Treatment in the wound care center today, per RN documentation: Wound 08/05/20 #2, Right Medial Ankle, Venous, Full Thickness, Onset 7/15/20-Dressing/Treatment: (vashe, purachol).     To try to improve granulation tissue, encourage the ulcer to contract, and/or to manage the higher-than usual level of exudate, we elected to apply a disposable NPWT system today (YANET). Wound was cleansed with saline after debridement; skin prep applied to periwound skin; first dressing layer(s) were applied as above, then a 10 x 20 cm YANET dressing was applied, with the Port in the superior position, and secured with adhesive strips. Pump turned on, functioning well, no leaks. Patient educated to keep dressing dry, watch occasionally for alarm lights, and note if drainage becomes excessive, or accumulates around [place]. Per physician order, right application of Unna boot was performed in the wound care center today, to help manage edema, stasis dermatitis, and/or venous ulcers. Leave primary dressing, Unna boot and secondary layer(s) in place until the next appointment. Also discussed ways to keep the wrap dry for a shower, including a plastic cast-guard, available in retail pharmacies. I reminded the patient of the importance of weight management and smoking cessation, if applicable; also encouraged ambulation as tolerated, additional lower extremity exercises as instructed in our education sheet, leg elevation when at rest, and compliance with any recommended dietary, diuretic and compression therapies. Written discharge instructions given to patient. Follow up in 1 week.     Electronically signed by Shaji Boswell MD on 1/18/2021 at 3:31 PM.

## 2021-01-22 ENCOUNTER — HOSPITAL ENCOUNTER (OUTPATIENT)
Dept: WOUND CARE | Age: 86
Discharge: HOME | End: 2021-01-22
Payer: MEDICARE

## 2021-01-22 VITALS
DIASTOLIC BLOOD PRESSURE: 78 MMHG | HEIGHT: 66 IN | BODY MASS INDEX: 25.58 KG/M2 | HEART RATE: 60 BPM | TEMPERATURE: 98.2 F | RESPIRATION RATE: 16 BRPM | SYSTOLIC BLOOD PRESSURE: 139 MMHG | WEIGHT: 159.2 LBS

## 2021-01-22 PROCEDURE — 97597 DBRDMT OPN WND 1ST 20 CM/<: CPT

## 2021-01-22 PROCEDURE — 97607 NEG PRS WND THR NDME<=50SQCM: CPT

## 2021-01-22 PROCEDURE — 97597 DBRDMT OPN WND 1ST 20 CM/<: CPT | Performed by: INTERNAL MEDICINE

## 2021-01-22 PROCEDURE — 29580 STRAPPING UNNA BOOT: CPT

## 2021-01-22 RX ORDER — LIDOCAINE 40 MG/G
CREAM TOPICAL ONCE
Status: DISCONTINUED | OUTPATIENT
Start: 2021-01-22 | End: 2021-01-23 | Stop reason: HOSPADM

## 2021-01-25 NOTE — PROGRESS NOTES
88 [facility] Progress Note    Shakila Person     : 2/3/1933    DATE OF VISIT:  2021    Subjective: Shakila Perosn is a 80 y.o. female who has a venous ulcer located on the right, medial ankle. Significant symptoms or pertinent wound history since last visit: feeling ok, no real ankle pain, mild forefoot swelling, stable color change there, no pruritus, no fever, no problems with YANET. Additional ulcer(s) noted? no.      Her current medication list consists of Denosumab, Lisinopril, Tocilizumab, acetaminophen, furosemide, metoprolol succinate, and predniSONE. Allergies: Erythromycin, Hydrochlorothiazide, Silver sulfadiazine, and Doxycycline    Objective:     Vitals:    21 0956   BP: 139/78   Pulse: 60   Resp: 16   Temp: 98.2 °F (36.8 °C)   TempSrc: Oral   Weight: 159 lb 3.2 oz (72.2 kg)   Height: 5' 6\" (1.676 m)     AAOx3, fatigued, NAD  Intact distal pulses, toes a bit purplish, edematous and cool, but that's not new for her  Mild RLE edema overall, stable hemosiderin and atrophe miroslava  No acute dermatitis, no allodynia, no active cellulitis, angitis, fluctuance  Gay-ulcer skin: indurated, pink, warm and dry. Ulcer(s): still with a bit of depth, but mostly granular, a bit of fibrin and biofilm, no definite SQ layer necrosis, no signs of infection. Photos also saved in electronic chart.     Today's wound measurements, per RN documentation:  Wound 20 #2, Right Medial Ankle, Venous, Full Thickness, Onset 7/15/20-Wound Length (cm): 0.2 cm    Wound 20 #2, Right Medial Ankle, Venous, Full Thickness, Onset 7/15/20-Wound Width (cm): 0.1 cm    Wound 20 #2, Right Medial Ankle, Venous, Full Thickness, Onset 7/15/20-Wound Depth (cm): 0.2 cm    Assessment:     Patient Active Problem List   Diagnosis Code    HTN (hypertension) I10    Localized osteoarthrosis, lower leg M17.10    Eczema L30.9    Foot drop, right M21.371    Lumbosacral spondylosis without myelopathy M47.817    Normocytic anemia D64.9    Peripheral neuropathy G62.9    Senile osteoporosis M81.0    Bilateral lower extremity edema R60.0    CKD (chronic kidney disease) stage 3, GFR 30-59 ml/min N18.30    Peripheral venous insufficiency I87.2    Ulcer of right ankle, with fat layer exposed ([facility] [address] L97.312    PMR (polymyalgia rheumatica) ([facility]) M35.3    Giant cell arteritis ([facility]) M31.6       Assessment of today's active condition(s): venous insufficiency, chronic skin changes, lipodermatosclerosis more than chronic edema, recurrent and very slowly healing venous ankle ulcer, doing better again these last couple of weeks with addition of YANET NPWT; no signs of infection or ischemia. Factors contributing to occurrence and/or persistence of the chronic ulcer include edema and venous stasis. Medical necessity of today's visit is shown by the above documentation. Sharp debridement is indicated today, based upon the exam findings in the wound(s) above. Procedure note:     Consent obtained. Time out performed per [facility]. Anesthetic  Anesthetic: 4% Lidocaine Cream     Using a curette, I sharply debrided the right, medial ankle ulcer(s) down through and including the removal of dermis. The type(s) of tissue debrided included fibrin and biofilm. Total Surface Area Debrided: 1 sq cm. The ulcers were then irrigated with normal saline solution. The procedure was completed with a small amount of bleeding, and hemostasis was with pressure. The patient tolerated the procedure well, with no significant complications. The patient's level of pain during and after the procedure was monitored. Post-debridement measurements, if different from pre-debridement, are in the flowsheet as well.     Discharge plan:     Treatment in the wound care center today, per RN documentation: Wound 08/05/20 #2, Right Medial Ankle, Venous, Full Thickness, Onset 7/15/20-Dressing/Treatment: Other (comment)(Vashe, puracholAg, hydrogel). To try to improve granulation tissue, encourage the ulcer to contract, and/or to manage the higher-than usual level of exudate, we elected to apply a disposable NPWT system today (YANET). Wound was cleansed with saline after debridement; skin prep applied to periwound skin; first dressing layer(s) were applied as above, then a 10 x 20 cm YANET dressing was applied, with the Port in the superior position, and secured with adhesive strips. Pump turned on, functioning well, no leaks. Patient educated to keep dressing dry, watch occasionally for alarm lights, and note if drainage becomes excessive, or accumulates around [place]. Per physician order, right application of Unna boot was performed in the wound care center today, to help manage edema, stasis dermatitis, and/or venous ulcers. Leave primary dressing, Unna boot and secondary layer(s) in place until the next appointment. Also discussed ways to keep the wrap dry for a shower, including a plastic cast-guard, available in retail pharmacies. I reminded the patient of the importance of weight management and smoking cessation, if applicable; also encouraged ambulation as tolerated, additional lower extremity exercises as instructed in our education sheet, leg elevation when at rest, and compliance with any recommended dietary, diuretic and compression therapies. Written discharge instructions given to patient. Follow up in 1 week.     Electronically signed by Jeff Rodriguez MD on 1/25/2021 at 4:51 PM.

## 2021-01-29 ENCOUNTER — HOSPITAL ENCOUNTER (OUTPATIENT)
Dept: WOUND CARE | Age: 86
Discharge: HOME | End: 2021-01-29
Payer: MEDICARE

## 2021-01-29 VITALS
RESPIRATION RATE: 16 BRPM | HEART RATE: 61 BPM | BODY MASS INDEX: 25.07 KG/M2 | WEIGHT: 156 LBS | DIASTOLIC BLOOD PRESSURE: 88 MMHG | TEMPERATURE: 98.1 F | SYSTOLIC BLOOD PRESSURE: 142 MMHG | HEIGHT: 66 IN

## 2021-01-29 PROCEDURE — 29580 STRAPPING UNNA BOOT: CPT | Performed by: INTERNAL MEDICINE

## 2021-01-29 PROCEDURE — 29580 STRAPPING UNNA BOOT: CPT

## 2021-01-29 PROCEDURE — 97607 NEG PRS WND THR NDME<=50SQCM: CPT

## 2021-01-29 RX ORDER — LIDOCAINE 40 MG/G
CREAM TOPICAL ONCE
Status: DISCONTINUED | OUTPATIENT
Start: 2021-01-29 | End: 2021-01-30 | Stop reason: HOSPADM

## 2021-01-29 ASSESSMENT — PAIN SCALES - GENERAL: PAINLEVEL_OUTOF10: 0

## 2021-02-01 NOTE — PROGRESS NOTES
88 [facility] Progress Note    Yovany Kearney     : 2/3/1933    DATE OF VISIT:  2021    Subjective: Yovany Kearney is a 80 y.o. female who has a venous ulcer located on the right, medial ankle. Significant symptoms or pertinent wound history since last visit: feeling well overall, no real wound pain, no pruritus, no fever, very mild swelling. No falls; wearing her old AFO. Would like to get a new one (thin and lightweight) when she's out of weekly compression wraps. Also would like to get back into compression stockings at that point, but she had some trouble donning and doffing those, which I wasn't aware of. Additional ulcer(s) noted? no.      Her current medication list consists of Denosumab, Lisinopril, Tocilizumab, acetaminophen, furosemide, metoprolol succinate, and predniSONE. Allergies: Erythromycin, Hydrochlorothiazide, Silver sulfadiazine, and Doxycycline    Objective:     Vitals:    21 1006   BP: (!) 142/88   Pulse: 61   Resp: 16   Temp: 98.1 °F (36.7 °C)   TempSrc: Oral   Weight: 156 lb (70.8 kg)   Height: 5' 6\" (1.676 m)     AAOx3, fatigued, thin, NAD  Intact distal pulses, toes cool and a bit purplish, but that's been her intermittent baseline for years  Very mild RLE edema, mild-mod at forefoot and toes  No cellulitis, angitis, fluctuance  Stable hemosiderin changes and atrophe miroslava, no contact dermatitis from YANET or Unna boot  Gay-ulcer skin: indurated, pink, warm, dry and hemosiderin changes. Ulcer(s): down to a very tiny dimple, I thought perhaps healed, but on probing with a cotton-tipped swab, there was still just a bit of depth with a bit of crusted exudate and fibrinous debris, base of the ulcer red, very close to being healed. Photos also saved in electronic chart.     Today's Wound Measurements, per RN documentation:  Wound 20 #2, Right Medial Ankle, Venous, Full Thickness, Onset 7/15/20-Wound Length (cm): 0.2 cm    Wound 20 #2, Right Medial Ankle, Venous, Full Thickness, Onset 7/15/20-Wound Width (cm): 0.1 cm    Wound 08/05/20 #2, Right Medial Ankle, Venous, Full Thickness, Onset 7/15/20-Wound Depth (cm): 0.2 cm    Assessment:     Patient Active Problem List   Diagnosis Code    HTN (hypertension) I10    Localized osteoarthrosis, lower leg M17.10    Eczema L30.9    Foot drop, right M21.371    Lumbosacral spondylosis without myelopathy M47.817    Normocytic anemia D64.9    Peripheral neuropathy G62.9    Senile osteoporosis M81.0    Bilateral lower extremity edema R60.0    CKD (chronic kidney disease) stage 3, GFR 30-59 ml/min N18.30    Peripheral venous insufficiency I87.2    Ulcer of right ankle, with fat layer exposed ([facility] [address] L97.312    PMR (polymyalgia rheumatica) ([facility]) M35.3    Giant cell arteritis ([facility]) M31.6       Assessment of today's active condition(s): venous insufficiency, chronic skin changes, lipodermatosclerosis > edema, small but very slowly healing venous ankle ulcer, actually might be making quicker progress now with the YANET than she was with her last few EpiFix grafts. Factors contributing to occurrence and/or persistence of the chronic ulcer include edema, venous stasis and immunosuppression. Medical necessity of today's visit is shown by the above documentation. Sharp debridement is not indicated today, based upon the exam findings in the wound(s) above. Procedure note:     Consent obtained. Time out performed per [facility] policy. Anesthetic  Anesthetic: 4% Lidocaine Cream     To try to improve granulation tissue, encourage the ulcer to contract, and/or to manage the higher-than usual level of exudate, we elected to apply a disposable NPWT system today (YANET).  Wound was cleansed with saline after debridement; skin prep applied to periwound skin; first dressing layer(s) were applied as below, then a 10 x 20 cm YANET dressing was applied, with the Port in the superior position, and secured with adhesive

## 2021-02-01 NOTE — PLAN OF CARE
Wound does have some depth but is very close to healing. No changes in wound care regime, no debridement today. Very likely wound will heal in next couple of weeks. Discharge instructions reviewed with patient, all questions answered, copy given to patient. Dressings were applied to all wounds per M.D. Instructions at this visit.

## 2021-02-05 ENCOUNTER — HOSPITAL ENCOUNTER (OUTPATIENT)
Dept: WOUND CARE | Age: 86
Discharge: HOME | End: 2021-02-05
Payer: MEDICARE

## 2021-02-05 VITALS
HEART RATE: 63 BPM | RESPIRATION RATE: 16 BRPM | DIASTOLIC BLOOD PRESSURE: 80 MMHG | TEMPERATURE: 97.6 F | WEIGHT: 157 LBS | BODY MASS INDEX: 25.23 KG/M2 | SYSTOLIC BLOOD PRESSURE: 127 MMHG | HEIGHT: 66 IN

## 2021-02-05 PROCEDURE — 99212 OFFICE O/P EST SF 10 MIN: CPT | Performed by: INTERNAL MEDICINE

## 2021-02-05 PROCEDURE — 99212 OFFICE O/P EST SF 10 MIN: CPT

## 2021-02-05 RX ORDER — LIDOCAINE 40 MG/G
CREAM TOPICAL PRN
Status: DISCONTINUED | OUTPATIENT
Start: 2021-02-05 | End: 2021-02-05

## 2021-02-05 ASSESSMENT — PAIN SCALES - GENERAL: PAINLEVEL_OUTOF10: 0

## 2021-02-08 PROBLEM — L97.312 ULCER OF RIGHT ANKLE, WITH FAT LAYER EXPOSED (HCC): Status: RESOLVED | Noted: 2020-08-06 | Resolved: 2021-02-08

## 2021-02-08 NOTE — PROGRESS NOTES
88 [facility] Progress Note    Eduar Gutiérrez     : 2/3/1933    DATE OF VISIT:  2021    Subjective: Eduar Gutiérrez is a 80 y.o. female who has a venous ulcer located on the right, medial ankle. Current complaint of pain in this ulcer? yes. Quality of pain: aching  Timing: intermittent  Severity: mild  Associated Signs/Symptoms: none  Other significant symptoms or pertinent ulcer history: no drainage noted this week, no redness, no fever. She found some zippered stockings that she has an easier time with, so she's not sure that she needs the grippy gloves to don her stockings, or the trick with the plastic shopping bag to get them on. Additional ulcer(s) noted? no.      Ms. Deon Tobin has a past medical history of Cellulitis of left lower leg, Closed pelvic rim fracture, Fibromyalgia, Foot drop, right foot, Fractures, History of blood transfusion, History of temporal artery biopsy, Hx of blood clots, PNA (pneumonia), Prepatellar bursitis of right knee, Rheumatoid arthritis ([address] Venous ulcer of left leg ([address] and Venous ulcer of right leg ([address] She has a past surgical history that includes Tonsillectomy and Vein Surgery (Right, 10/27/2016). Her family history includes Stroke in her father and mother. Ms. Deon Tobin reports that she has never smoked. She has never used smokeless tobacco. She reports current alcohol use. She reports that she does not use drugs. Her current medication list consists of Denosumab, Lisinopril, Tocilizumab, acetaminophen, furosemide, metoprolol succinate, and predniSONE. Allergies: Erythromycin, Hydrochlorothiazide, Silver sulfadiazine, and Doxycycline    Pertinent items from the review of systems are discussed in the HPI; the remainder of the ROS was reviewed and is negative.      Objective:     Vitals:    21 0953   BP: 127/80   Pulse: 63   Resp: 16   Temp: 97.6 °F (36.4 °C)   TempSrc: Oral   Weight: 157 lb (71.2 kg)   Height: 5' 6\" (1.676 m)       Constitutional:  well-developed, well-nourished, fatigued, NAD  Psychiatric:  oriented to person, place and time; mood and affect appropriate for the situation   Eyes:  pupils equal, round and reactive to light; sclerae anicteric, conjunctivae not pale  Cardiovascular:  bilateral pedal pulses palpable, feet warm, good cap refill; trace right lower extremity edema, stable hemosiderin and atrophe miroslava, and some patchy areas of indurated skin thickening  Lymphatic:  no inguinal or popliteal adenopathy, no angitis or cellulitis   Musculoskeletal:  no clubbing, cyanosis or petechiae; RLE and LLE with no gross effusions, joint misalignment or acute arthritis  Gay-ulcer skin: indurated, pink, warm and dry. Ulcer(s): still a couple of mm of a depression there, but I think it's healed. Photos also saved in electronic chart.     Today's Wound Measurements, per RN documentation:  [REMOVED] Wound 08/05/20 #2, Right Medial Ankle, Venous, Full Thickness, Onset 7/15/20-Wound Length (cm): 0 cm    [REMOVED] Wound 08/05/20 #2, Right Medial Ankle, Venous, Full Thickness, Onset 7/15/20-Wound Width (cm): 0 cm    [REMOVED] Wound 08/05/20 #2, Right Medial Ankle, Venous, Full Thickness, Onset 7/15/20-Wound Depth (cm): 0 cm  ______________________________    No results found for: LABALBU  Lab Results   Component Value Date    CREATININE 0.9 09/05/2014     Lab Results   Component Value Date    HGB 11.9 (L) 09/05/2014     Assessment:     Patient Active Problem List   Diagnosis Code    HTN (hypertension) I10    Localized osteoarthrosis, lower leg M17.10    Eczema L30.9    Foot drop, right M21.371    Lumbosacral spondylosis without myelopathy M47.817    Normocytic anemia D64.9    Peripheral neuropathy G62.9    Senile osteoporosis M81.0    Bilateral lower extremity edema R60.0    CKD (chronic kidney disease) stage 3, GFR 30-59 ml/min N18.30    Peripheral venous insufficiency I87.2    PMR (polymyalgia rheumatica)

## 2021-09-29 NOTE — PLAN OF CARE
Wound stable, debridement & EpiFix #9 per MD. Will cont. With pink unna & coban wrap as ordered. Reinforced importance of exercise, elevation & compression to help with circulation, edema control & wound healing. F/u in [facility] in 1 week with Dr Meg Noel as ordered. Discharge instructions reviewed with patient, all questions answered, copy given to patient. Dressings were applied to all wounds per M.D. Instructions at this visit. Additional specific goal for this week: Patient will receive safe and proper application of EpiFix. Patient will comply with caring for wound and reporting any concerns.  guidelines for application followed. Expiration date of EpiFix checked immediately prior to use. Package intact prior to use, with no damage noted. Product storage at ambient temperature, per  guideline. Product lot #: [id number]  Expiration date: 05/01/2025   Saline lot #: None Used  Expiration date: N/a  Patient/family/caregiver instructed to keep dressing clean, dry and intact. Patient/family/caregiver instructed on signs and symptoms to report, such as draining through dressing, dressing slipping or falling down, becoming wet, or patient having severe pain, severe itching, severe malodor, fever, etc.     Date of first application of a cellular / tissue product for this current wound is September 11, 2020. Today was application # 9 for this wound.
Introduction Text (Please End With A Colon): The following procedure was deferred:
Detail Level: Detailed
Procedure To Be Performed At Next Visit: Cryotherapy

## 2021-11-12 ENCOUNTER — APPOINTMENT (OUTPATIENT)
Dept: CT IMAGING | Age: 86
DRG: 177 | End: 2021-11-12
Payer: MEDICARE

## 2021-11-12 ENCOUNTER — HOSPITAL ENCOUNTER (INPATIENT)
Age: 86
LOS: 18 days | Discharge: NURSING, REHAB OR LONG-TERM CARE | DRG: 177 | End: 2021-11-30
Attending: EMERGENCY MEDICINE | Admitting: INTERNAL MEDICINE
Payer: MEDICARE

## 2021-11-12 ENCOUNTER — APPOINTMENT (OUTPATIENT)
Dept: GENERAL RADIOLOGY | Age: 86
DRG: 177 | End: 2021-11-12
Payer: MEDICARE

## 2021-11-12 DIAGNOSIS — U07.1 COVID-19: ICD-10-CM

## 2021-11-12 DIAGNOSIS — J96.01 ACUTE RESPIRATORY FAILURE WITH HYPOXIA (HCC): Primary | ICD-10-CM

## 2021-11-12 DIAGNOSIS — N30.00 ACUTE CYSTITIS WITHOUT HEMATURIA: ICD-10-CM

## 2021-11-12 PROBLEM — J96.00 ACUTE RESPIRATORY FAILURE DUE TO SEVERE ACUTE RESPIRATORY SYNDROME CORONAVIRUS 2 (SARS-COV-2) INFECTION (HCC): Status: ACTIVE | Noted: 2021-11-12

## 2021-11-12 PROBLEM — Z79.52 CURRENT CHRONIC USE OF SYSTEMIC STEROIDS: Status: ACTIVE | Noted: 2021-11-12

## 2021-11-12 LAB
A/G RATIO: 1.6 (ref 1.1–2.2)
ALBUMIN SERPL-MCNC: 4 G/DL (ref 3.4–5)
ALP BLD-CCNC: 69 U/L (ref 40–129)
ALT SERPL-CCNC: 24 U/L (ref 10–40)
ANION GAP SERPL CALCULATED.3IONS-SCNC: 10 MMOL/L (ref 3–16)
AST SERPL-CCNC: 42 U/L (ref 15–37)
BACTERIA: ABNORMAL /HPF
BASOPHILS ABSOLUTE: 0 K/UL (ref 0–0.2)
BASOPHILS RELATIVE PERCENT: 0.2 %
BILIRUB SERPL-MCNC: 0.4 MG/DL (ref 0–1)
BILIRUBIN URINE: NEGATIVE
BLOOD, URINE: NEGATIVE
BUN BLDV-MCNC: 17 MG/DL (ref 7–20)
CALCIUM SERPL-MCNC: 10 MG/DL (ref 8.3–10.6)
CHLORIDE BLD-SCNC: 102 MMOL/L (ref 99–110)
CLARITY: CLEAR
CO2: 26 MMOL/L (ref 21–32)
COLOR: YELLOW
CREAT SERPL-MCNC: 0.8 MG/DL (ref 0.6–1.2)
D DIMER: 572 NG/ML DDU (ref 0–229)
EKG ATRIAL RATE: 71 BPM
EKG DIAGNOSIS: NORMAL
EKG P AXIS: 76 DEGREES
EKG P-R INTERVAL: 190 MS
EKG Q-T INTERVAL: 406 MS
EKG QRS DURATION: 82 MS
EKG QTC CALCULATION (BAZETT): 441 MS
EKG R AXIS: -7 DEGREES
EKG T AXIS: 0 DEGREES
EKG VENTRICULAR RATE: 71 BPM
EOSINOPHILS ABSOLUTE: 0 K/UL (ref 0–0.6)
EOSINOPHILS RELATIVE PERCENT: 0.1 %
EPITHELIAL CELLS, UA: ABNORMAL /HPF (ref 0–5)
GFR AFRICAN AMERICAN: >60
GFR NON-AFRICAN AMERICAN: >60
GLUCOSE BLD-MCNC: 129 MG/DL (ref 70–99)
GLUCOSE URINE: NEGATIVE MG/DL
HCT VFR BLD CALC: 44.2 % (ref 36–48)
HEMOGLOBIN: 14.9 G/DL (ref 12–16)
KETONES, URINE: NEGATIVE MG/DL
LACTATE DEHYDROGENASE: 398 U/L (ref 100–190)
LACTIC ACID: 1.6 MMOL/L (ref 0.4–2)
LEUKOCYTE ESTERASE, URINE: ABNORMAL
LYMPHOCYTES ABSOLUTE: 0.6 K/UL (ref 1–5.1)
LYMPHOCYTES RELATIVE PERCENT: 15.1 %
MAGNESIUM: 1.9 MG/DL (ref 1.8–2.4)
MCH RBC QN AUTO: 32.9 PG (ref 26–34)
MCHC RBC AUTO-ENTMCNC: 33.6 G/DL (ref 31–36)
MCV RBC AUTO: 97.9 FL (ref 80–100)
MICROSCOPIC EXAMINATION: YES
MONOCYTES ABSOLUTE: 0.4 K/UL (ref 0–1.3)
MONOCYTES RELATIVE PERCENT: 8.5 %
NEUTROPHILS ABSOLUTE: 3.2 K/UL (ref 1.7–7.7)
NEUTROPHILS RELATIVE PERCENT: 76.1 %
NITRITE, URINE: POSITIVE
PDW BLD-RTO: 13.7 % (ref 12.4–15.4)
PH UA: 6.5 (ref 5–8)
PLATELET # BLD: 120 K/UL (ref 135–450)
PMV BLD AUTO: 7.7 FL (ref 5–10.5)
POTASSIUM SERPL-SCNC: 4.7 MMOL/L (ref 3.5–5.1)
PRO-BNP: 966 PG/ML (ref 0–449)
PROCALCITONIN: 0.06 NG/ML (ref 0–0.15)
PROTEIN UA: NEGATIVE MG/DL
RAPID INFLUENZA  B AGN: NEGATIVE
RAPID INFLUENZA A AGN: NEGATIVE
RBC # BLD: 4.52 M/UL (ref 4–5.2)
RBC UA: ABNORMAL /HPF (ref 0–4)
RENAL EPITHELIAL, UA: ABNORMAL /HPF (ref 0–1)
SARS-COV-2, NAAT: DETECTED
SODIUM BLD-SCNC: 138 MMOL/L (ref 136–145)
SPECIFIC GRAVITY UA: 1.02 (ref 1–1.03)
TOTAL PROTEIN: 6.5 G/DL (ref 6.4–8.2)
TROPONIN: <0.01 NG/ML
URINE TYPE: ABNORMAL
UROBILINOGEN, URINE: 1 E.U./DL
WBC # BLD: 4.2 K/UL (ref 4–11)
WBC UA: ABNORMAL /HPF (ref 0–5)

## 2021-11-12 PROCEDURE — 87086 URINE CULTURE/COLONY COUNT: CPT

## 2021-11-12 PROCEDURE — 93005 ELECTROCARDIOGRAM TRACING: CPT | Performed by: EMERGENCY MEDICINE

## 2021-11-12 PROCEDURE — 87635 SARS-COV-2 COVID-19 AMP PRB: CPT

## 2021-11-12 PROCEDURE — 94761 N-INVAS EAR/PLS OXIMETRY MLT: CPT

## 2021-11-12 PROCEDURE — 85379 FIBRIN DEGRADATION QUANT: CPT

## 2021-11-12 PROCEDURE — 93010 ELECTROCARDIOGRAM REPORT: CPT | Performed by: INTERNAL MEDICINE

## 2021-11-12 PROCEDURE — 96375 TX/PRO/DX INJ NEW DRUG ADDON: CPT

## 2021-11-12 PROCEDURE — 84145 PROCALCITONIN (PCT): CPT

## 2021-11-12 PROCEDURE — 71045 X-RAY EXAM CHEST 1 VIEW: CPT

## 2021-11-12 PROCEDURE — 2700000000 HC OXYGEN THERAPY PER DAY

## 2021-11-12 PROCEDURE — 6370000000 HC RX 637 (ALT 250 FOR IP): Performed by: EMERGENCY MEDICINE

## 2021-11-12 PROCEDURE — 86140 C-REACTIVE PROTEIN: CPT

## 2021-11-12 PROCEDURE — 2580000003 HC RX 258: Performed by: EMERGENCY MEDICINE

## 2021-11-12 PROCEDURE — 80053 COMPREHEN METABOLIC PANEL: CPT

## 2021-11-12 PROCEDURE — 83615 LACTATE (LD) (LDH) ENZYME: CPT

## 2021-11-12 PROCEDURE — 83880 ASSAY OF NATRIURETIC PEPTIDE: CPT

## 2021-11-12 PROCEDURE — 87186 SC STD MICRODIL/AGAR DIL: CPT

## 2021-11-12 PROCEDURE — 71260 CT THORAX DX C+: CPT

## 2021-11-12 PROCEDURE — 87804 INFLUENZA ASSAY W/OPTIC: CPT

## 2021-11-12 PROCEDURE — 6360000004 HC RX CONTRAST MEDICATION: Performed by: EMERGENCY MEDICINE

## 2021-11-12 PROCEDURE — 81001 URINALYSIS AUTO W/SCOPE: CPT

## 2021-11-12 PROCEDURE — 99285 EMERGENCY DEPT VISIT HI MDM: CPT

## 2021-11-12 PROCEDURE — 85025 COMPLETE CBC W/AUTO DIFF WBC: CPT

## 2021-11-12 PROCEDURE — 83605 ASSAY OF LACTIC ACID: CPT

## 2021-11-12 PROCEDURE — 83735 ASSAY OF MAGNESIUM: CPT

## 2021-11-12 PROCEDURE — 84484 ASSAY OF TROPONIN QUANT: CPT

## 2021-11-12 PROCEDURE — 6370000000 HC RX 637 (ALT 250 FOR IP): Performed by: INTERNAL MEDICINE

## 2021-11-12 PROCEDURE — 87088 URINE BACTERIA CULTURE: CPT

## 2021-11-12 PROCEDURE — 96365 THER/PROPH/DIAG IV INF INIT: CPT

## 2021-11-12 PROCEDURE — 1200000000 HC SEMI PRIVATE

## 2021-11-12 PROCEDURE — 94640 AIRWAY INHALATION TREATMENT: CPT

## 2021-11-12 PROCEDURE — 82728 ASSAY OF FERRITIN: CPT

## 2021-11-12 PROCEDURE — 6360000002 HC RX W HCPCS: Performed by: EMERGENCY MEDICINE

## 2021-11-12 RX ORDER — DEXAMETHASONE SODIUM PHOSPHATE 4 MG/ML
4 INJECTION, SOLUTION INTRA-ARTICULAR; INTRALESIONAL; INTRAMUSCULAR; INTRAVENOUS; SOFT TISSUE ONCE
Status: DISCONTINUED | OUTPATIENT
Start: 2021-11-12 | End: 2021-11-13

## 2021-11-12 RX ORDER — 0.9 % SODIUM CHLORIDE 0.9 %
30 INTRAVENOUS SOLUTION INTRAVENOUS PRN
Status: DISCONTINUED | OUTPATIENT
Start: 2021-11-12 | End: 2021-11-30 | Stop reason: HOSPADM

## 2021-11-12 RX ORDER — ALBUTEROL SULFATE 90 UG/1
2 AEROSOL, METERED RESPIRATORY (INHALATION) ONCE
Status: COMPLETED | OUTPATIENT
Start: 2021-11-12 | End: 2021-11-12

## 2021-11-12 RX ORDER — ALBUTEROL SULFATE 90 UG/1
2 AEROSOL, METERED RESPIRATORY (INHALATION) EVERY 4 HOURS PRN
Status: DISCONTINUED | OUTPATIENT
Start: 2021-11-12 | End: 2021-11-30 | Stop reason: HOSPADM

## 2021-11-12 RX ORDER — BENZONATATE 100 MG/1
200 CAPSULE ORAL 3 TIMES DAILY PRN
Status: DISCONTINUED | OUTPATIENT
Start: 2021-11-12 | End: 2021-11-30 | Stop reason: HOSPADM

## 2021-11-12 RX ORDER — DEXAMETHASONE SODIUM PHOSPHATE 10 MG/ML
6 INJECTION INTRAMUSCULAR; INTRAVENOUS ONCE
Status: COMPLETED | OUTPATIENT
Start: 2021-11-12 | End: 2021-11-12

## 2021-11-12 RX ADMIN — DEXAMETHASONE SODIUM PHOSPHATE 6 MG: 10 INJECTION INTRAMUSCULAR; INTRAVENOUS at 16:14

## 2021-11-12 RX ADMIN — Medication 2 PUFF: at 16:53

## 2021-11-12 RX ADMIN — IOPAMIDOL 75 ML: 755 INJECTION, SOLUTION INTRAVENOUS at 18:03

## 2021-11-12 RX ADMIN — CEFTRIAXONE SODIUM 1000 MG: 1 INJECTION, POWDER, FOR SOLUTION INTRAMUSCULAR; INTRAVENOUS at 21:32

## 2021-11-12 ASSESSMENT — PAIN DESCRIPTION - PAIN TYPE: TYPE: ACUTE PAIN

## 2021-11-12 ASSESSMENT — PAIN SCALES - GENERAL: PAINLEVEL_OUTOF10: 2

## 2021-11-12 NOTE — ED NOTES
Pt brought to bathroom via wheelchair. Pt remained on O2 4L via NC. Upon return to room pt's oxygen down to 87%. Pt's oxygen up to 98% after sitting in bed for short time. Dr. Argenis Acosta notified.      Dieter Dutton RN  11/12/21 3497

## 2021-11-12 NOTE — ED PROVIDER NOTES
Negative for flank pain. Musculoskeletal: Positive for gait problem (due to generalized weakness). Negative for back pain and neck pain. Skin: Negative for color change. Neurological: Positive for weakness (generalized). Negative for syncope, light-headedness and numbness. Psychiatric/Behavioral: Negative for confusion. The patient is nervous/anxious. Positives and Pertinent negatives as per HPI. PASTMEDICAL HISTORY     Past Medical History:   Diagnosis Date    Cellulitis of left lower leg 4/21/2019    Closed pelvic rim fracture 9/4/2014    Fibromyalgia     Foot drop, right foot     Fractures     History of blood transfusion     1997    History of temporal artery biopsy 10/06/2020    Hx of blood clots     PNA (pneumonia) 12/1997    Prepatellar bursitis of right knee 9/15/2020    Rheumatoid arthritis ([address]     Venous ulcer of left leg ([address] 08/2019    Venous ulcer of right leg ([address] 10/2016         SURGICAL HISTORY       Past Surgical History:   Procedure Laterality Date    TONSILLECTOMY      VEIN SURGERY Right 10/27/2016    saphenous VNUS closure         CURRENT MEDICATIONS       Current Discharge Medication List      CONTINUE these medications which have NOT CHANGED    Details   Denosumab (PROLIA SC) Inject into the skin Injection Q 6 months      acetaminophen (TYLENOL) 500 MG tablet Take 500 mg by mouth every 6 hours as needed for Pain      Tocilizumab (ACTEMRA SC) Inject into the skin every 14 days      predniSONE (DELTASONE) 5 MG tablet Take 7.5 mg by mouth daily       LISINOPRIL PO Take 10 mg by mouth daily       furosemide (LASIX) 20 MG tablet Take 20 mg by mouth daily. metoprolol (TOPROL-XL) 100 MG XL tablet Take 100 mg by mouth daily.              ALLERGIES     Erythromycin, Hydrochlorothiazide, Silver sulfadiazine, and Doxycycline    FAMILY HISTORY       Family History   Problem Relation Age of Onset    Stroke Mother     Stroke Father           SOCIAL HISTORY Social History     Socioeconomic History    Marital status:      Spouse name: None    Number of children: None    Years of education: None    Highest education level: None   Occupational History    None   Tobacco Use    Smoking status: Never Smoker    Smokeless tobacco: Never Used   Vaping Use    Vaping Use: Never used   Substance and Sexual Activity    Alcohol use: Yes     Comment: socially    Drug use: No    Sexual activity: None   Other Topics Concern    None   Social History Narrative    None     Social Determinants of Health     Financial Resource Strain:     Difficulty of Paying Living Expenses: Not on file   Food Insecurity:     Worried About Running Out of Food in the Last Year: Not on file    Nona of Food in the Last Year: Not on file   Transportation Needs:     Lack of Transportation (Medical): Not on file    Lack of Transportation (Non-Medical):  Not on file   Physical Activity:     Days of Exercise per Week: Not on file    Minutes of Exercise per Session: Not on file   Stress:     Feeling of Stress : Not on file   Social Connections:     Frequency of Communication with Friends and Family: Not on file    Frequency of Social Gatherings with Friends and Family: Not on file    Attends Evangelical Services: Not on file    Active Member of [address] [place] [address] or Organizations: Not on file    Attends Club or Organization Meetings: Not on file    Marital Status: Not on file   Intimate Partner Violence:     Fear of Current or Ex-Partner: Not on file    Emotionally Abused: Not on file    Physically Abused: Not on file    Sexually Abused: Not on file   Housing Stability:     Unable to Pay for Housing in the Last Year: Not on file    Number of [place] in the Last Year: Not on file    Unstable Housing in the Last Year: Not on file       SCREENINGS    Debbie Coma Scale  Eye Opening: Spontaneous  Best Verbal Response: Oriented  Best Motor Response: Obeys commands  Debbie Coma Scale Score: 15           PHYSICAL EXAM    (up to 7 for level 4, 8 or more for level 5)     ED Triage Vitals   BP Temp Temp src Pulse Resp SpO2 Height Weight   -- -- -- -- -- -- -- --       Physical Exam  Vitals and nursing note reviewed. Constitutional:       General: She is awake. She is not in acute distress. Appearance: Normal appearance. She is well-developed and well-groomed. She is ill-appearing. She is not toxic-appearing or diaphoretic. Interventions: She is not intubated. HENT:      Head: Normocephalic and atraumatic. Right Ear: External ear normal.      Left Ear: External ear normal.      Nose: Nose normal.      Mouth/Throat:      Mouth: Mucous membranes are dry. Eyes:      General:         Right eye: No discharge. Left eye: No discharge. Conjunctiva/sclera: Conjunctivae normal.      Pupils: Pupils are equal, round, and reactive to light. Neck:      Trachea: No tracheal deviation. Cardiovascular:      Rate and Rhythm: Normal rate and regular rhythm. Pulses: Normal pulses. Radial pulses are 2+ on the right side and 2+ on the left side. Pulmonary:      Effort: Pulmonary effort is normal. No tachypnea, bradypnea, accessory muscle usage, prolonged expiration, respiratory distress or retractions. She is not intubated. Breath sounds: Normal air entry. No stridor, decreased air movement or transmitted upper airway sounds. Examination of the right-upper field reveals rhonchi. Examination of the left-upper field reveals rhonchi. Examination of the right-middle field reveals rhonchi. Examination of the left-middle field reveals rhonchi. Examination of the right-lower field reveals rhonchi. Examination of the left-lower field reveals rhonchi. Rhonchi present. No decreased breath sounds, wheezing or rales. Chest:      Chest wall: No tenderness. Abdominal:      General: Bowel sounds are normal. There is no distension. Palpations: Abdomen is soft.  Abdomen is not rigid. Tenderness: There is no abdominal tenderness. There is no guarding or rebound. Negative signs include Palacios's sign and McBurney's sign. Musculoskeletal:         General: No tenderness, deformity or signs of injury. Normal range of motion. Cervical back: Full passive range of motion without pain, normal range of motion and neck supple. No edema, erythema, rigidity or tenderness. Normal range of motion. Right lower leg: No edema. Left lower leg: No edema. Skin:     General: Skin is warm and dry. Coloration: Skin is not jaundiced or pale. Findings: No erythema or rash. Neurological:      General: No focal deficit present. Mental Status: She is alert and oriented to person, place, and time. Mental status is at baseline. GCS: GCS eye subscore is 4. GCS verbal subscore is 5. GCS motor subscore is 6. Cranial Nerves: No dysarthria. Sensory: Sensation is intact. No sensory deficit. Motor: Motor function is intact. No weakness, tremor, atrophy, abnormal muscle tone or seizure activity. Psychiatric:         Attention and Perception: Attention normal.         Mood and Affect: Mood is depressed (briefly but overall mood was normal unless talking about her ). Affect is tearful (appropriate with recent loss of ). Speech: Speech normal. Speech is not slurred. Behavior: Behavior normal. Behavior is cooperative.              DIAGNOSTIC RESULTS   :    Labs Reviewed   COVID-19, RAPID - Abnormal; Notable for the following components:       Result Value    SARS-CoV-2, NAAT DETECTED (*)     All other components within normal limits    Narrative:     Eliud Rosales tel. [phone],  Microbiology results called to and read back by Aydee ISAAC, 11/12/2021  16:39, by Rex Piper  Performed at:  [address],  [place], [address] [facility], [place]   Phone [phone]   CBC WITH AUTO DIFFERENTIAL - Abnormal; Notable for the following components:    Platelets 452 (*)     Lymphocytes Absolute 0.6 (*)     All other components within normal limits    Narrative:     Performed at:  [address],  [address] [place] [address], [address]   Phone [phone]   COMPREHENSIVE METABOLIC PANEL - Abnormal; Notable for the following components:    Glucose 129 (*)     AST 42 (*)     All other components within normal limits    Narrative:     Performed at:  [address],  [address] [place] [address], [address]   Phone [phone]   D-DIMER, QUANTITATIVE - Abnormal; Notable for the following components:    D-Dimer, Quant 572 (*)     All other components within normal limits    Narrative:     Performed at:  [address],  [address] [place] [address], [address]   Phone [phone] - Abnormal; Notable for the following components:    Pro- (*)     All other components within normal limits    Narrative:     Performed at:  [address],  [address] [place] [address], [address]   Phone [phone] - Abnormal; Notable for the following components:    Nitrite, Urine POSITIVE (*)     Leukocyte Esterase, Urine SMALL (*)     All other components within normal limits    Narrative:     Performed at:  [address],  [address] [place] [address], [address]   Phone [phone]   MICROSCOPIC URINALYSIS - Abnormal; Notable for the following components:    WBC, UA 10-20 (*)     Renal Epithelial, UA 2-5 (*)     Bacteria, UA 4+ (*)     All other components within normal limits    Narrative:     Performed at:  [address],  [address] [place] [address], [address]   Phone [phone]   LACTATE DEHYDROGENASE - Abnormal; Notable for the following components:     (*)     All other components within normal limits    Narrative:     Performed at:  [address], [facility]   Phone [phone]   RAPID INFLUENZA A/B ANTIGENS    Narrative:     Performed at:  [address], [facility]   Phone [phone]   CULTURE, URINE   MAGNESIUM    Narrative:     Performed at:  [address], [facility]   Phone [phone]   TROPONIN    Narrative:     Performed at:  [address], [facility]   Phone [phone]   LACTIC ACID, PLASMA    Narrative:     Performed at:  [place] [address] [facility]   Phone [phone]   PROCALCITONIN    Narrative:     Performed at:  [address], [facility]   Phone [phone]   FIBRINOGEN   PROTIME-INR   C-REACTIVE PROTEIN   FERRITIN   CBC WITH AUTO DIFFERENTIAL   COMPREHENSIVE METABOLIC PANEL W/ REFLEX TO MG FOR LOW K   PROTIME-INR   APTT   FIBRINOGEN   PROCALCITONIN   C-REACTIVE PROTEIN   LACTATE DEHYDROGENASE   FERRITIN   D-DIMER, QUANTITATIVE       All other labs were within normal range or not returned asof this dictation. EKG: All EKG's are interpreted by the Emergency Department Physician who either signs or Co-signs this chart in the absence of a cardiologist.    The Ekg interpreted by me shows  normal sinus rhythm with a rate of 71  Axis is   Normal  QTc is  normal  Intervals and Durations are unremarkable.       ST Segments: nonspecific changes  No significant change from prior EKG dated - no old EKG  No STEMI           RADIOLOGY:   Non-plain film images such as CT, Ultrasound and MRI are read by the radiologist. Plainradiographic images are visualized and preliminarily interpreted by the  ED Provider with the belowfindings:        Interpretation per the Radiologist below, if available at the time of this note:    CT CHEST PULMONARY EMBOLISM W CONTRAST   Final Result   No evidence of a pulmonary embolus. Mildly dilated and atherosclerotic thoracic aorta with no aneurysm or   dissection. Moderate subpleural and interstitial ground-glass opacities scattered   throughout both lungs which probably represents multisegmental   bronchopneumonia secondary to the patient's known COVID-19 disease      Single moderate compression fracture of T11 which may be chronic. Recommend   clinical follow-up of the area. XR CHEST PORTABLE   Final Result   Bilateral airspace disease suggestive of atypical viral pneumonia. Superimposed edema not excluded. PROCEDURES   Unless otherwise noted below, none     Procedures    CRITICAL CARE TIME   Time: 30 minutes  Includes repeat examinations, lab interpretation, charting, treating for acute respiratory failure requiring new oxygen requirement with nasal cannula along with giving IV steroids due to concern for acute respiratory failure related to COVID-19  Excludes separate billable procedures. Patient at risk for serious decompensation if not treated for this life-threatening condition. CONSULTS: Paged hospitalist for admission.   IP CONSULT TO HOSPITALIST  IP CONSULT TO PHARMACY    EMERGENCY DEPARTMENT COURSE and DIFFERENTIAL DIAGNOSIS/MDM:   Vitals:    Vitals:    11/12/21 2147 11/12/21 2249 11/12/21 2317 11/13/21 0000   BP: (!) 151/80 (!) 154/104 (!) 158/97 (!) 167/82   Pulse: 64 68 60 61   Resp: 21 19 23 16   Temp:   97.9 °F (36.6 °C) 97.5 °F (36.4 °C)   TempSrc:   Oral Oral   SpO2: 97% 94% 95% 95%   Weight:    149 lb 11.1 oz (67.9 kg)   Height:    5' 5\" (1.651 m)       Patient was given the following medications:  Medications   Dexamethasone Sodium Phosphate injection 4 mg (has no administration in time range)   benzonatate (TESSALON) capsule 200 mg (has no administration in time range)   albuterol sulfate  (90 Base) MCG/ACT inhaler 2 puff (has no administration in time range)   metoprolol succinate (TOPROL XL) extended release tablet 100 mg (has no administration in time range)   sodium chloride flush 0.9 % injection 10 mL (has no administration in time range)   0.9 % sodium chloride infusion (has no administration in time range)   potassium chloride (KLOR-CON M) extended release tablet 40 mEq (has no administration in time range)     Or   potassium bicarb-citric acid (EFFER-K) effervescent tablet 40 mEq (has no administration in time range)     Or   potassium chloride 10 mEq/100 mL IVPB (Peripheral Line) (has no administration in time range)   magnesium sulfate 1000 mg in dextrose 5% 100 mL IVPB (has no administration in time range)   ondansetron (ZOFRAN-ODT) disintegrating tablet 4 mg (has no administration in time range)     Or   ondansetron (ZOFRAN) injection 4 mg (has no administration in time range)   acetaminophen (TYLENOL) tablet 650 mg (has no administration in time range)     Or   acetaminophen (TYLENOL) suppository 650 mg (has no administration in time range)   guaiFENesin-dextromethorphan (ROBITUSSIN DM) 100-10 MG/5ML syrup 5 mL (has no administration in time range)   Vitamin D (CHOLECALCIFEROL) tablet 2,000 Units (has no administration in time range)   dexamethasone (DECADRON) injection 10 mg (has no administration in time range)     Followed by   dexamethasone (DECADRON) injection 10 mg (has no administration in time range)   hydrALAZINE (APRESOLINE) tablet 25 mg (has no administration in time range)   remdesivir 200 mg in sodium chloride 0.9 % 250 mL IVPB (has no administration in time range)     Followed by   remdesivir 100 mg in sodium chloride 0.9 % 250 mL IVPB (has no administration in time range)   0.9 % sodium chloride bolus (has no administration in time range)   enoxaparin (LOVENOX) injection 30 mg (has no administration in time range)   albuterol sulfate  (90 Base) MCG/ACT inhaler 2 puff (2 puffs Inhalation Given 11/12/21 1653)   dexamethasone (DECADRON) injection 6 mg (6 mg IntraVENous Given 11/12/21 1614)   iopamidol (ISOVUE-370) 76 % injection 75 mL (75 mLs IntraVENous Given 11/12/21 1803)   cefTRIAXone (ROCEPHIN) 1000 mg IVPB in 50 mL D5W minibag (0 mg IntraVENous Stopped 11/12/21 2306)     Patient was evaluated due to concern for increasing shortness of breath with cough and generalized fatigue over the last couple of days and having a Covid test positive as an outpatient yesterday. She initially was hypoxic and required nasal cannula. She did receive IV steroids along with albuterol inhaler. She did report having some improvement of symptoms on repeat assessment although nurse states that she was hypoxic again during attempted transfer in the bed. She also received Rocephin due to concern for possibility of urinary tract infection although denies any significant urinary symptoms. She will need further evaluation in the hospital due to concern for Covid. She did get her vaccine. D-dimer was elevated and therefore CT was obtained showing concern for pulmonary embolism per radiologist but did show concern for Covid pneumonia. Troponin was negative and story not suggestive of acute coronary syndrome. The patient tolerated their visit well. The patient and / or the family were informed of the results of any tests, a time was given to answer questions. FINAL IMPRESSION      1. Acute respiratory failure with hypoxia ([address]    2. COVID-19    3. Acute cystitis without hematuria          DISPOSITION/PLAN   DISPOSITION Admitted 11/12/2021 10:19:34 PM      PATIENT REFERRED TO:  No follow-up provider specified.     DISCHARGEMEDICATIONS:  Current Discharge Medication List          DISCONTINUED MEDICATIONS:  Current Discharge Medication List                 (Please note that portions of this note were completed with a voicerecognition program.  Efforts were made to edit the dictations but occasionally words are mis-transcribed.)    Adrian Pyle MD (electronically signed)            Adrian Pyle MD  11/13/21 1284

## 2021-11-13 LAB
A/G RATIO: 1.4 (ref 1.1–2.2)
ALBUMIN SERPL-MCNC: 3.2 G/DL (ref 3.4–5)
ALP BLD-CCNC: 59 U/L (ref 40–129)
ALT SERPL-CCNC: 22 U/L (ref 10–40)
ANION GAP SERPL CALCULATED.3IONS-SCNC: 10 MMOL/L (ref 3–16)
APTT: 27.1 SEC (ref 26.2–38.6)
AST SERPL-CCNC: 36 U/L (ref 15–37)
BASOPHILS ABSOLUTE: 0 K/UL (ref 0–0.2)
BASOPHILS RELATIVE PERCENT: 0.1 %
BILIRUB SERPL-MCNC: <0.2 MG/DL (ref 0–1)
BUN BLDV-MCNC: 18 MG/DL (ref 7–20)
C-REACTIVE PROTEIN: 11.6 MG/L (ref 0–5.1)
C-REACTIVE PROTEIN: 7.6 MG/L (ref 0–5.1)
CALCIUM SERPL-MCNC: 9 MG/DL (ref 8.3–10.6)
CHLORIDE BLD-SCNC: 105 MMOL/L (ref 99–110)
CO2: 22 MMOL/L (ref 21–32)
CREAT SERPL-MCNC: 0.7 MG/DL (ref 0.6–1.2)
D DIMER: 553 NG/ML DDU (ref 0–229)
EOSINOPHILS ABSOLUTE: 0 K/UL (ref 0–0.6)
EOSINOPHILS RELATIVE PERCENT: 0 %
FERRITIN: 590.8 NG/ML (ref 15–150)
FERRITIN: 711.8 NG/ML (ref 15–150)
FIBRINOGEN: 196 MG/DL (ref 200–397)
FIBRINOGEN: 197 MG/DL (ref 200–397)
GFR AFRICAN AMERICAN: >60
GFR NON-AFRICAN AMERICAN: >60
GLUCOSE BLD-MCNC: 152 MG/DL (ref 70–99)
HCT VFR BLD CALC: 38.9 % (ref 36–48)
HEMOGLOBIN: 13.3 G/DL (ref 12–16)
INR BLD: 0.97 (ref 0.88–1.12)
INR BLD: 0.97 (ref 0.88–1.12)
LACTATE DEHYDROGENASE: 319 U/L (ref 100–190)
LYMPHOCYTES ABSOLUTE: 0.5 K/UL (ref 1–5.1)
LYMPHOCYTES RELATIVE PERCENT: 17.3 %
MCH RBC QN AUTO: 32.9 PG (ref 26–34)
MCHC RBC AUTO-ENTMCNC: 34.1 G/DL (ref 31–36)
MCV RBC AUTO: 96.4 FL (ref 80–100)
MONOCYTES ABSOLUTE: 0.2 K/UL (ref 0–1.3)
MONOCYTES RELATIVE PERCENT: 7.5 %
NEUTROPHILS ABSOLUTE: 2.3 K/UL (ref 1.7–7.7)
NEUTROPHILS RELATIVE PERCENT: 75.1 %
PDW BLD-RTO: 13.6 % (ref 12.4–15.4)
PLATELET # BLD: 116 K/UL (ref 135–450)
PMV BLD AUTO: 8.5 FL (ref 5–10.5)
POTASSIUM REFLEX MAGNESIUM: 4.2 MMOL/L (ref 3.5–5.1)
PROCALCITONIN: 0.05 NG/ML (ref 0–0.15)
PROTHROMBIN TIME: 10.9 SEC (ref 9.9–12.7)
PROTHROMBIN TIME: 11 SEC (ref 9.9–12.7)
RBC # BLD: 4.04 M/UL (ref 4–5.2)
SODIUM BLD-SCNC: 137 MMOL/L (ref 136–145)
TOTAL PROTEIN: 5.5 G/DL (ref 6.4–8.2)
WBC # BLD: 3.1 K/UL (ref 4–11)

## 2021-11-13 PROCEDURE — 85379 FIBRIN DEGRADATION QUANT: CPT

## 2021-11-13 PROCEDURE — 2580000003 HC RX 258: Performed by: INTERNAL MEDICINE

## 2021-11-13 PROCEDURE — 6370000000 HC RX 637 (ALT 250 FOR IP): Performed by: INTERNAL MEDICINE

## 2021-11-13 PROCEDURE — 85730 THROMBOPLASTIN TIME PARTIAL: CPT

## 2021-11-13 PROCEDURE — 85025 COMPLETE CBC W/AUTO DIFF WBC: CPT

## 2021-11-13 PROCEDURE — XW033E5 INTRODUCTION OF REMDESIVIR ANTI-INFECTIVE INTO PERIPHERAL VEIN, PERCUTANEOUS APPROACH, NEW TECHNOLOGY GROUP 5: ICD-10-PCS | Performed by: INTERNAL MEDICINE

## 2021-11-13 PROCEDURE — 6360000002 HC RX W HCPCS: Performed by: INTERNAL MEDICINE

## 2021-11-13 PROCEDURE — 1200000000 HC SEMI PRIVATE

## 2021-11-13 PROCEDURE — 2500000003 HC RX 250 WO HCPCS: Performed by: INTERNAL MEDICINE

## 2021-11-13 PROCEDURE — 2700000000 HC OXYGEN THERAPY PER DAY

## 2021-11-13 PROCEDURE — 85384 FIBRINOGEN ACTIVITY: CPT

## 2021-11-13 PROCEDURE — 80053 COMPREHEN METABOLIC PANEL: CPT

## 2021-11-13 PROCEDURE — 82728 ASSAY OF FERRITIN: CPT

## 2021-11-13 PROCEDURE — 94761 N-INVAS EAR/PLS OXIMETRY MLT: CPT

## 2021-11-13 PROCEDURE — 83615 LACTATE (LD) (LDH) ENZYME: CPT

## 2021-11-13 PROCEDURE — 85610 PROTHROMBIN TIME: CPT

## 2021-11-13 PROCEDURE — 86140 C-REACTIVE PROTEIN: CPT

## 2021-11-13 PROCEDURE — 36415 COLL VENOUS BLD VENIPUNCTURE: CPT

## 2021-11-13 PROCEDURE — 84145 PROCALCITONIN (PCT): CPT

## 2021-11-13 RX ORDER — POTASSIUM CHLORIDE 20 MEQ/1
40 TABLET, EXTENDED RELEASE ORAL PRN
Status: DISCONTINUED | OUTPATIENT
Start: 2021-11-13 | End: 2021-11-30 | Stop reason: HOSPADM

## 2021-11-13 RX ORDER — VITAMIN B COMPLEX
2000 TABLET ORAL DAILY
Status: DISCONTINUED | OUTPATIENT
Start: 2021-11-13 | End: 2021-11-30 | Stop reason: HOSPADM

## 2021-11-13 RX ORDER — HYDRALAZINE HYDROCHLORIDE 25 MG/1
25 TABLET, FILM COATED ORAL 2 TIMES DAILY
Status: DISCONTINUED | OUTPATIENT
Start: 2021-11-13 | End: 2021-11-15

## 2021-11-13 RX ORDER — ONDANSETRON 2 MG/ML
4 INJECTION INTRAMUSCULAR; INTRAVENOUS EVERY 6 HOURS PRN
Status: DISCONTINUED | OUTPATIENT
Start: 2021-11-13 | End: 2021-11-30 | Stop reason: HOSPADM

## 2021-11-13 RX ORDER — DEXAMETHASONE SODIUM PHOSPHATE 4 MG/ML
4 INJECTION, SOLUTION INTRA-ARTICULAR; INTRALESIONAL; INTRAMUSCULAR; INTRAVENOUS; SOFT TISSUE ONCE
Status: COMPLETED | OUTPATIENT
Start: 2021-11-13 | End: 2021-11-13

## 2021-11-13 RX ORDER — DEXAMETHASONE SODIUM PHOSPHATE 10 MG/ML
10 INJECTION INTRAMUSCULAR; INTRAVENOUS EVERY 24 HOURS
Status: DISCONTINUED | OUTPATIENT
Start: 2021-11-17 | End: 2021-11-20

## 2021-11-13 RX ORDER — METOPROLOL SUCCINATE 50 MG/1
100 TABLET, EXTENDED RELEASE ORAL DAILY
Status: DISCONTINUED | OUTPATIENT
Start: 2021-11-13 | End: 2021-11-30 | Stop reason: HOSPADM

## 2021-11-13 RX ORDER — SODIUM CHLORIDE 0.9 % (FLUSH) 0.9 %
10 SYRINGE (ML) INJECTION PRN
Status: DISCONTINUED | OUTPATIENT
Start: 2021-11-13 | End: 2021-11-30 | Stop reason: HOSPADM

## 2021-11-13 RX ORDER — SODIUM CHLORIDE 9 MG/ML
25 INJECTION, SOLUTION INTRAVENOUS PRN
Status: DISCONTINUED | OUTPATIENT
Start: 2021-11-13 | End: 2021-11-30 | Stop reason: HOSPADM

## 2021-11-13 RX ORDER — ACETAMINOPHEN 650 MG/1
650 SUPPOSITORY RECTAL EVERY 6 HOURS PRN
Status: DISCONTINUED | OUTPATIENT
Start: 2021-11-13 | End: 2021-11-30 | Stop reason: HOSPADM

## 2021-11-13 RX ORDER — ACETAMINOPHEN 325 MG/1
650 TABLET ORAL EVERY 6 HOURS PRN
Status: DISCONTINUED | OUTPATIENT
Start: 2021-11-13 | End: 2021-11-30 | Stop reason: HOSPADM

## 2021-11-13 RX ORDER — ONDANSETRON 4 MG/1
4 TABLET, ORALLY DISINTEGRATING ORAL EVERY 8 HOURS PRN
Status: DISCONTINUED | OUTPATIENT
Start: 2021-11-13 | End: 2021-11-30 | Stop reason: HOSPADM

## 2021-11-13 RX ORDER — DEXAMETHASONE SODIUM PHOSPHATE 10 MG/ML
10 INJECTION INTRAMUSCULAR; INTRAVENOUS EVERY 12 HOURS
Status: COMPLETED | OUTPATIENT
Start: 2021-11-13 | End: 2021-11-17

## 2021-11-13 RX ORDER — GUAIFENESIN/DEXTROMETHORPHAN 100-10MG/5
5 SYRUP ORAL EVERY 4 HOURS PRN
Status: DISCONTINUED | OUTPATIENT
Start: 2021-11-13 | End: 2021-11-30 | Stop reason: HOSPADM

## 2021-11-13 RX ORDER — POTASSIUM CHLORIDE 7.45 MG/ML
10 INJECTION INTRAVENOUS PRN
Status: DISCONTINUED | OUTPATIENT
Start: 2021-11-13 | End: 2021-11-30 | Stop reason: HOSPADM

## 2021-11-13 RX ORDER — MAGNESIUM SULFATE 1 G/100ML
1000 INJECTION INTRAVENOUS PRN
Status: DISCONTINUED | OUTPATIENT
Start: 2021-11-13 | End: 2021-11-30 | Stop reason: HOSPADM

## 2021-11-13 RX ADMIN — ENOXAPARIN SODIUM 30 MG: 30 INJECTION SUBCUTANEOUS at 09:38

## 2021-11-13 RX ADMIN — METOPROLOL SUCCINATE 100 MG: 50 TABLET, EXTENDED RELEASE ORAL at 09:38

## 2021-11-13 RX ADMIN — Medication 2000 UNITS: at 09:37

## 2021-11-13 RX ADMIN — ACETAMINOPHEN 650 MG: 325 TABLET ORAL at 14:42

## 2021-11-13 RX ADMIN — REMDESIVIR 200 MG: 100 INJECTION, POWDER, LYOPHILIZED, FOR SOLUTION INTRAVENOUS at 01:31

## 2021-11-13 RX ADMIN — ACETAMINOPHEN 650 MG: 325 TABLET ORAL at 01:17

## 2021-11-13 RX ADMIN — DEXAMETHASONE SODIUM PHOSPHATE 10 MG: 10 INJECTION INTRAMUSCULAR; INTRAVENOUS at 09:38

## 2021-11-13 RX ADMIN — ENOXAPARIN SODIUM 30 MG: 30 INJECTION SUBCUTANEOUS at 22:55

## 2021-11-13 RX ADMIN — REMDESIVIR 100 MG: 100 INJECTION, POWDER, LYOPHILIZED, FOR SOLUTION INTRAVENOUS at 23:03

## 2021-11-13 RX ADMIN — ACETAMINOPHEN 650 MG: 325 TABLET ORAL at 23:17

## 2021-11-13 RX ADMIN — BENZONATATE 200 MG: 100 CAPSULE ORAL at 01:17

## 2021-11-13 RX ADMIN — HYDRALAZINE HYDROCHLORIDE 25 MG: 25 TABLET, FILM COATED ORAL at 09:37

## 2021-11-13 RX ADMIN — DEXAMETHASONE SODIUM PHOSPHATE 4 MG: 4 INJECTION, SOLUTION INTRAMUSCULAR; INTRAVENOUS at 02:09

## 2021-11-13 RX ADMIN — DEXAMETHASONE SODIUM PHOSPHATE 10 MG: 10 INJECTION INTRAMUSCULAR; INTRAVENOUS at 23:05

## 2021-11-13 RX ADMIN — HYDRALAZINE HYDROCHLORIDE 25 MG: 25 TABLET, FILM COATED ORAL at 22:56

## 2021-11-13 RX ADMIN — CEFTRIAXONE SODIUM 1000 MG: 1 INJECTION, POWDER, FOR SOLUTION INTRAMUSCULAR; INTRAVENOUS at 12:45

## 2021-11-13 RX ADMIN — SODIUM CHLORIDE 25 ML: 9 INJECTION, SOLUTION INTRAVENOUS at 12:43

## 2021-11-13 ASSESSMENT — PAIN SCALES - GENERAL
PAINLEVEL_OUTOF10: 0
PAINLEVEL_OUTOF10: 0
PAINLEVEL_OUTOF10: 2
PAINLEVEL_OUTOF10: 4
PAINLEVEL_OUTOF10: 2

## 2021-11-13 ASSESSMENT — ENCOUNTER SYMPTOMS
SHORTNESS OF BREATH: 1
COLOR CHANGE: 0
VOMITING: 0
CHEST TIGHTNESS: 0
BACK PAIN: 0
COUGH: 1
ABDOMINAL PAIN: 0

## 2021-11-13 ASSESSMENT — PAIN DESCRIPTION - LOCATION: LOCATION: GENERALIZED

## 2021-11-13 ASSESSMENT — PAIN DESCRIPTION - PAIN TYPE: TYPE: ACUTE PAIN

## 2021-11-13 ASSESSMENT — PAIN DESCRIPTION - DESCRIPTORS: DESCRIPTORS: ACHING;DISCOMFORT

## 2021-11-13 NOTE — PLAN OF CARE
Problem: Falls - Risk of:  Goal: Will remain free from falls  Description: Will remain free from falls  11/13/2021 1102 by Himanshu Mercado RN  Outcome: Ongoing     Problem: Gas Exchange - Impaired  Goal: Absence of hypoxia  11/13/2021 1102 by Himnashu Mercado RN  Outcome: Ongoing     Problem: Pain:  Goal: Pain level will decrease  Description: Pain level will decrease  11/13/2021 1102 by Himanshu Mercado RN  Outcome: Ongoing

## 2021-11-13 NOTE — RT PROTOCOL NOTE
RT Inhaler-Nebulizer Bronchodilator Protocol Note    There is a bronchodilator order in the chart from a provider indicating to follow the RT Bronchodilator Protocol and there is an Initiate RT Inhaler-Nebulizer Bronchodilator Protocol order as well (see protocol at bottom of note). CXR Findings:  XR CHEST PORTABLE    Result Date: 11/12/2021  Bilateral airspace disease suggestive of atypical viral pneumonia. Superimposed edema not excluded. The findings from the last RT Protocol Assessment were as follows:   History Pulmonary Disease: None or smoker <15 pack years  Respiratory Pattern: Regular pattern and RR 12-20 bpm  Breath Sounds: Slightly diminished and/or crackles  Cough: Strong, spontaneous, non-productive  Indication for Bronchodilator Therapy:    Bronchodilator Assessment Score: 2    Aerosolized bronchodilator medication orders have been revised according to the RT Inhaler-Nebulizer Bronchodilator Protocol below. Respiratory Therapist to perform RT Therapy Protocol Assessment initially then follow the protocol. Repeat RT Therapy Protocol Assessment PRN for score 0-3 or on second treatment, BID, and PRN for scores above 3. No Indications - adjust the frequency to every 6 hours PRN wheezing or bronchospasm, if no treatments needed after 48 hours then discontinue using Per Protocol order mode. If indication present, adjust the RT bronchodilator orders based on the Bronchodilator Assessment Score as indicated below. Use Inhaler orders unless patient has one or more of the following: on home nebulizer, not able to hold breath for 10 seconds, is not alert and oriented, cannot activate and use MDI correctly, or respiratory rate 25 breaths per minute or more, then use the equivalent nebulizer order(s) with same Frequency and PRN reasons based on the score. If a patient is on this medication at home then do not decrease Frequency below that used at home.     0-3 - enter or revise RT bronchodilator order(s) to equivalent RT Bronchodilator order with Frequency of every 4 hours PRN for wheezing or increased work of breathing using Per Protocol order mode. 4-6 - enter or revise RT Bronchodilator order(s) to two equivalent RT bronchodilator orders with one order with BID Frequency and one order with Frequency of every 4 hours PRN wheezing or increased work of breathing using Per Protocol order mode. 7-10 - enter or revise RT Bronchodilator order(s) to two equivalent RT bronchodilator orders with one order with TID Frequency and one order with Frequency of every 4 hours PRN wheezing or increased work of breathing using Per Protocol order mode. 11-13 - enter or revise RT Bronchodilator order(s) to one equivalent RT bronchodilator order with QID Frequency and an Albuterol order with Frequency of every 4 hours PRN wheezing or increased work of breathing using Per Protocol order mode. Greater than 13 - enter or revise RT Bronchodilator order(s) to one equivalent RT bronchodilator order with every 4 hours Frequency and an Albuterol order with Frequency of every 2 hours PRN wheezing or increased work of breathing using Per Protocol order mode. RT to enter RT Home Evaluation for COPD & MDI Assessment order using Per Protocol order mode.     Electronically signed by Mustapha Padilla RCP on 11/13/2021 at 2:02 AM

## 2021-11-13 NOTE — PROGRESS NOTES
[place] or Facility: [facility] From: Leatha Paul RE: Aydee Teran 02/03/1933 RM: 402 patient complaining of getting the hiccups when eating, and has some epigastric pain and into her back. I sat her up higher in bed to eat. anything to do or just watch her?  Need Callback: NO CALLBACK REQ  Unread

## 2021-11-13 NOTE — ED NOTES
Pt assisted too and from bedside commode. Pt visibly SOB upon return to bed. Pt verbalizes feeling SOB. Pt's oxygen 91%. Pt back to bed, call light in reach, bed rails up x2.      Caryl Guillen RN  11/12/21 3088

## 2021-11-13 NOTE — PROGRESS NOTES
Patient admitted from ED to bed 521. Pt alert and oriented x4. Patient oriented to room, call light, bed rails, phone, lights and bathroom. Patient instructed about the schedule of the day including: vital sign frequency, lab draws, possible tests, frequency of MD and staff rounds, daily weights, and I &O's. Telemetry box in place, patient aware of placement and reason. Bed alarm on. Bed locked, in lowest position, side rails up 2/4, call light within reach.  Will continue to monitor

## 2021-11-13 NOTE — H&P
Hospital Medicine History & Physical      Patient: Timur Sharma  :   2/3/1933  MRN:   [id number]  Date of Service: 21    Chief Complaint   Patient presents with    Positive For Covid-19     Symptoms started on Wednesday, +COVID test yesterday. pt went to Urgent Care and told that her O2 levels were low and to go to ED. upon arrival, pt is 88% on RA. Placed on 2L and increased to 95%       HISTORY OF PRESENT ILLNESS:    Timur Sharma is a 80 y.o. female. She began to feel generally ill 11/10/21. She noticed severe fatigue which started after participating in PT. She thought she might have over exerted herself, but fatigue persisted. She sought care at an urgent care center. When evaluated there she has noted to be mildly hypoxemic so was sent to the ER. While in the ER O2 flow rate was increased to 4L/min. Patient would still desaturate into the mid 80's with ambulation even on 4L/min. She has received 2 doses of the Moderna vaccine. Per [address] her second dose was 21. Review of Systems:  All pertinent positives and negatives are as noted in the HPI section. All other systems were reviewed and are negative. Past Medical History:   Diagnosis Date    Cellulitis of left lower leg 2019    Closed pelvic rim fracture 2014    Fibromyalgia     Foot drop, right foot     Fractures     History of blood transfusion         History of temporal artery biopsy 10/06/2020    Hx of blood clots     PNA (pneumonia) 1997    Prepatellar bursitis of right knee 9/15/2020    Rheumatoid arthritis ([address]     Venous ulcer of left leg ([address] 2019    Venous ulcer of right leg ([address] 10/2016       Past Surgical History:   Procedure Laterality Date    TONSILLECTOMY      VEIN SURGERY Right 10/27/2016    saphenous VNUS closure         Prior to Admission medications    Medication Sig Start Date End Date Taking?  Authorizing Provider   Denosumab (PROLIA SC) Inject into the skin Injection Q 6 months    Historical Provider, MD   acetaminophen (TYLENOL) 500 MG tablet Take 500 mg by mouth every 6 hours as needed for Pain    Historical Provider, MD   Tocilizumab (ACTEMRA SC) Inject into the skin every 14 days    Historical Provider, MD   predniSONE (DELTASONE) 5 MG tablet Take 7.5 mg by mouth daily     Historical Provider, MD   LISINOPRIL PO Take 10 mg by mouth daily     Historical Provider, MD   furosemide (LASIX) 20 MG tablet Take 20 mg by mouth daily. Historical Provider, MD   metoprolol (TOPROL-XL) 100 MG XL tablet Take 100 mg by mouth daily. Historical Provider, MD       Allergies:   Erythromycin, Hydrochlorothiazide, Silver sulfadiazine, and Doxycycline    Social:   reports that she has never smoked. She has never used smokeless tobacco.   reports current alcohol use. Social History     Substance and Sexual Activity   Drug Use No       Family History   Problem Relation Age of Onset    Stroke Mother     Stroke Father        PHYSICAL EXAM:  I performed this physical examination. Vitals:  Patient Vitals for the past 24 hrs:   BP Temp Temp src Pulse Resp SpO2 Height Weight   11/12/21 2048 (!) 154/101 -- -- 65 21 96 % -- --   11/12/21 2018 (!) 171/93 -- -- 63 16 98 % -- --   11/12/21 1918 (!) 169/88 -- -- 62 22 97 % -- --   11/12/21 1848 (!) 162/95 -- -- 67 14 97 % -- --   11/12/21 1747 (!) 166/94 -- -- 64 23 96 % -- --   11/12/21 1718 (!) 164/91 -- -- 68 24 96 % -- --   11/12/21 1653 -- -- -- -- 24 98 % -- --   11/12/21 1617 (!) 172/102 -- -- 72 21 98 % -- --   11/12/21 1545 (!) 181/109 -- -- 66 20 97 % -- --   11/12/21 1455 (!) 159/90 98.9 °F (37.2 °C) Oral 70 17 96 % 5' 5\" (1.651 m) 146 lb (66.2 kg)     4L/min O2    GEN:  Appearance:  Age appropriate female in NAD . Level of Consciousness:  alert . Orientation:  full    HEENT: Sclera anicteric.  no conjunctival chemosis. moist mucus membranes. no specific or diagnostic oral lesions. NECK:  no signs of meningismus.   Jugular veins not distended. Carotid pulses  2+.  no cervical lymphadenopathy. no thyromegaly. CV:  regular rhythm. normal S1 & S2.    no murmur. no rub.  no gallop. PULM:  Chest excursion is symmetric. Breath sounds are generally vesicular. Adventitious sounds:  Scattered inspiratory crackles. AB:  Abdominal shape is normal.  Bowel sounds are active. Generally soft to palpation. no tenderness is present. No bladder pain. no involuntary guarding. no rebound guarding. EXTR:  Skin is no. Capillary refill brisk. No specific or pathognomic rash. no clubbing. no pitting edema. no active wound or ulcer. Pulses 2+ x 4  Right ankle posterior brace in place. LABS:  Lab Results   Component Value Date    WBC 4.2 11/12/2021    HGB 14.9 11/12/2021    HCT 44.2 11/12/2021    MCV 97.9 11/12/2021     (L) 11/12/2021     Lab Results   Component Value Date    CREATININE 0.8 11/12/2021    BUN 17 11/12/2021     11/12/2021    K 4.7 11/12/2021     11/12/2021    CO2 26 11/12/2021     Lab Results   Component Value Date    ALT 24 11/12/2021    AST 42 (H) 11/12/2021    ALKPHOS 69 11/12/2021    BILITOT 0.4 11/12/2021     Lab Results   Component Value Date    TROPONINI <0.01 11/12/2021     No results for input(s): PHART, AFL4FWV, PO2ART in the last 72 hours. IMAGING:  XR CHEST PORTABLE    Result Date: 11/12/2021  EXAMINATION: ONE XRAY VIEW OF THE CHEST 11/12/2021 3:32 pm COMPARISON: None. HISTORY: ORDERING SYSTEM PROVIDED HISTORY: hypoxia, sob, covid TECHNOLOGIST PROVIDED HISTORY: Reason for exam:->hypoxia, sob, covid Reason for Exam: covid + Acuity: Acute Type of Exam: Initial FINDINGS: There is patchy bilateral airspace disease, most pronounced in the right upper lung. There is mild prominence of the pulmonary vasculature and interstitial thickening. No pneumothorax or pleural effusion identified.  Cardiac and mediastinal contours are without acute process on limited evaluation due to rotation. No acute osseous abnormality. Bilateral airspace disease suggestive of atypical viral pneumonia. Superimposed edema not excluded. CT CHEST PULMONARY EMBOLISM W CONTRAST    Result Date: 11/12/2021  EXAMINATION: CTA OF THE CHEST 11/12/2021 5:46 pm TECHNIQUE: CTA of the chest was performed after the administration of intravenous contrast.  Multiplanar reformatted images are provided for review. MIP images are provided for review. Dose modulation, iterative reconstruction, and/or weight based adjustment of the mA/kV was utilized to reduce the radiation dose to as low as reasonably achievable. COMPARISON: None. HISTORY: ORDERING SYSTEM PROVIDED HISTORY: shortness of breath TECHNOLOGIST PROVIDED HISTORY: Reason for exam:->shortness of breath Decision Support Exception - unselect if not a suspected or confirmed emergency medical condition->Emergency Medical Condition (MA) Reason for Exam: low oxygen level-sob x 3 days-Covid + Acuity: Acute Type of Exam: Initial Additional signs and symptoms: non-smoker FINDINGS: Pulmonary Arteries: Pulmonary arteries are adequately opacified for evaluation. No evidence of intraluminal filling defect to suggest pulmonary embolism. Main pulmonary artery is normal in caliber. Mediastinum: No evidence of mediastinal lymphadenopathy. The heart is mildly enlarged. The pericardium demonstrates no acute abnormality. There is moderate calcified plaque throughout the aorta which is mildly dilated throughout with no obvious aneurysm or dissection is seen. Lungs/pleura: There are moderate subpleural and interstitial ground-glass opacities scattered along the upper lobes peripherally and extending inferiorly into the perihilar regions and both lung bases. No effusion is seen. There is no pulmonary nodule or mass. There is no pneumothorax. Soft tissue/bones:  There is a moderate wedge compression fracture of T11 with no displaced a retropulsed fragment Upper Abdomen: Limited images of the upper abdomen are unremarkable. No evidence of a pulmonary embolus. Mildly dilated and atherosclerotic thoracic aorta with no aneurysm or dissection. Moderate subpleural and interstitial ground-glass opacities scattered throughout both lungs which probably represents multisegmental bronchopneumonia secondary to the patient's known COVID-19 disease Single moderate compression fracture of T11 which may be chronic. Recommend clinical follow-up of the area. I directly reviewed all recent imaging studies as well as pertinent prior studies. Radiology reports may or may not be available at the time of my review. EKG:  New and pertinent prior tracings were directly reviewed. My interpretation is as follows:  Normal sinus rhythm. Active Hospital Problems    Diagnosis Date Noted    Acute respiratory failure due to severe acute respiratory syndrome coronavirus 2 (SARS-CoV-2) infection ([facility]) [U07.1, J96.00] 11/12/2021    Current chronic use of systemic steroids [Z79.52] 11/12/2021    Giant cell arteritis [facility] [M31.6] 10/19/2020    PMR (polymyalgia rheumatica) ([facility] [M35.3] 09/29/2020    HTN (hypertension) [I10] 09/04/2014       ASSESSMENT & PLAN  Respiratory Failure, COVID-19  -  Symptom onset 11/10. Diagnosis 11/12.  -  Start dexamethasone 10mg IV q12h x 5 days then 10mg IV daily.  -  Presenting early enough to benefit from remdesivir. Start 5-day infusion protocol.  -  Supportive care. Target SpO2 = 90-94%  -  Monitor inflammatory indices. Asymptomatic bacturia  -  Received ceftraixone in the ER. No plan to continue. HTN  -  Continue home metoprolol XL, hydralazine. PMR & temporal arteritis  -  Chronic steroid user. Taper back to baseline as tolerated. DVT prophylaxis: SCDs, lovenox 30 BID  Code Status:  LIMITED per our discussion. She has a HCPOA, her daughter Camilo Vera. She would not want to be on a ventilator under any circumstances.   She was confused about the difference between CPR and ventilator support. I made code status LIMITED x 1. Other components of code status may be addressed during future discussions.   Disposition:  Inpatient    Radha Kimball MD MD

## 2021-11-14 LAB
A/G RATIO: 1.3 (ref 1.1–2.2)
ALBUMIN SERPL-MCNC: 3.3 G/DL (ref 3.4–5)
ALP BLD-CCNC: 65 U/L (ref 40–129)
ALT SERPL-CCNC: 27 U/L (ref 10–40)
ANION GAP SERPL CALCULATED.3IONS-SCNC: 12 MMOL/L (ref 3–16)
APTT: 27.4 SEC (ref 26.2–38.6)
AST SERPL-CCNC: 39 U/L (ref 15–37)
BASOPHILS ABSOLUTE: 0 K/UL (ref 0–0.2)
BASOPHILS RELATIVE PERCENT: 0.2 %
BILIRUB SERPL-MCNC: 0.3 MG/DL (ref 0–1)
BUN BLDV-MCNC: 34 MG/DL (ref 7–20)
C-REACTIVE PROTEIN: 4.7 MG/L (ref 0–5.1)
CALCIUM SERPL-MCNC: 9.8 MG/DL (ref 8.3–10.6)
CHLORIDE BLD-SCNC: 104 MMOL/L (ref 99–110)
CO2: 19 MMOL/L (ref 21–32)
CREAT SERPL-MCNC: 0.7 MG/DL (ref 0.6–1.2)
D DIMER: 716 NG/ML DDU (ref 0–229)
EOSINOPHILS ABSOLUTE: 0 K/UL (ref 0–0.6)
EOSINOPHILS RELATIVE PERCENT: 0 %
FIBRINOGEN: 200 MG/DL (ref 200–397)
GFR AFRICAN AMERICAN: >60
GFR NON-AFRICAN AMERICAN: >60
GLUCOSE BLD-MCNC: 145 MG/DL (ref 70–99)
HCT VFR BLD CALC: 43 % (ref 36–48)
HEMOGLOBIN: 14.5 G/DL (ref 12–16)
INR BLD: 0.98 (ref 0.88–1.12)
LYMPHOCYTES ABSOLUTE: 0.7 K/UL (ref 1–5.1)
LYMPHOCYTES RELATIVE PERCENT: 14.1 %
MCH RBC QN AUTO: 33 PG (ref 26–34)
MCHC RBC AUTO-ENTMCNC: 33.8 G/DL (ref 31–36)
MCV RBC AUTO: 97.7 FL (ref 80–100)
MONOCYTES ABSOLUTE: 0.5 K/UL (ref 0–1.3)
MONOCYTES RELATIVE PERCENT: 8.7 %
NEUTROPHILS ABSOLUTE: 4 K/UL (ref 1.7–7.7)
NEUTROPHILS RELATIVE PERCENT: 77 %
PDW BLD-RTO: 13.8 % (ref 12.4–15.4)
PLATELET # BLD: 150 K/UL (ref 135–450)
PLATELET SLIDE REVIEW: ADEQUATE
PMV BLD AUTO: 8.2 FL (ref 5–10.5)
POTASSIUM REFLEX MAGNESIUM: 4.2 MMOL/L (ref 3.5–5.1)
PROTHROMBIN TIME: 11.1 SEC (ref 9.9–12.7)
RBC # BLD: 4.4 M/UL (ref 4–5.2)
SLIDE REVIEW: ABNORMAL
SODIUM BLD-SCNC: 135 MMOL/L (ref 136–145)
TOTAL PROTEIN: 5.8 G/DL (ref 6.4–8.2)
WBC # BLD: 5.3 K/UL (ref 4–11)

## 2021-11-14 PROCEDURE — 6370000000 HC RX 637 (ALT 250 FOR IP): Performed by: INTERNAL MEDICINE

## 2021-11-14 PROCEDURE — 86140 C-REACTIVE PROTEIN: CPT

## 2021-11-14 PROCEDURE — 80053 COMPREHEN METABOLIC PANEL: CPT

## 2021-11-14 PROCEDURE — 94761 N-INVAS EAR/PLS OXIMETRY MLT: CPT

## 2021-11-14 PROCEDURE — 2580000003 HC RX 258: Performed by: INTERNAL MEDICINE

## 2021-11-14 PROCEDURE — 85610 PROTHROMBIN TIME: CPT

## 2021-11-14 PROCEDURE — 6360000002 HC RX W HCPCS: Performed by: INTERNAL MEDICINE

## 2021-11-14 PROCEDURE — 85384 FIBRINOGEN ACTIVITY: CPT

## 2021-11-14 PROCEDURE — 36415 COLL VENOUS BLD VENIPUNCTURE: CPT

## 2021-11-14 PROCEDURE — 85730 THROMBOPLASTIN TIME PARTIAL: CPT

## 2021-11-14 PROCEDURE — 85025 COMPLETE CBC W/AUTO DIFF WBC: CPT

## 2021-11-14 PROCEDURE — 1200000000 HC SEMI PRIVATE

## 2021-11-14 PROCEDURE — 2700000000 HC OXYGEN THERAPY PER DAY

## 2021-11-14 PROCEDURE — 85379 FIBRIN DEGRADATION QUANT: CPT

## 2021-11-14 RX ADMIN — DEXAMETHASONE SODIUM PHOSPHATE 10 MG: 10 INJECTION INTRAMUSCULAR; INTRAVENOUS at 22:51

## 2021-11-14 RX ADMIN — DEXAMETHASONE SODIUM PHOSPHATE 10 MG: 10 INJECTION INTRAMUSCULAR; INTRAVENOUS at 09:40

## 2021-11-14 RX ADMIN — HYDRALAZINE HYDROCHLORIDE 25 MG: 25 TABLET, FILM COATED ORAL at 09:41

## 2021-11-14 RX ADMIN — CEFTRIAXONE SODIUM 1000 MG: 1 INJECTION, POWDER, FOR SOLUTION INTRAMUSCULAR; INTRAVENOUS at 12:49

## 2021-11-14 RX ADMIN — SODIUM CHLORIDE 25 ML: 9 INJECTION, SOLUTION INTRAVENOUS at 12:47

## 2021-11-14 RX ADMIN — Medication 10 ML: at 21:56

## 2021-11-14 RX ADMIN — Medication 10 ML: at 09:40

## 2021-11-14 RX ADMIN — ACETAMINOPHEN 650 MG: 325 TABLET ORAL at 07:21

## 2021-11-14 RX ADMIN — Medication 2000 UNITS: at 09:40

## 2021-11-14 RX ADMIN — ENOXAPARIN SODIUM 30 MG: 30 INJECTION SUBCUTANEOUS at 09:41

## 2021-11-14 RX ADMIN — HYDRALAZINE HYDROCHLORIDE 25 MG: 25 TABLET, FILM COATED ORAL at 21:55

## 2021-11-14 RX ADMIN — METOPROLOL SUCCINATE 100 MG: 50 TABLET, EXTENDED RELEASE ORAL at 09:40

## 2021-11-14 RX ADMIN — ENOXAPARIN SODIUM 30 MG: 30 INJECTION SUBCUTANEOUS at 21:55

## 2021-11-14 RX ADMIN — ACETAMINOPHEN 650 MG: 325 TABLET ORAL at 21:55

## 2021-11-14 ASSESSMENT — PAIN SCALES - GENERAL
PAINLEVEL_OUTOF10: 2
PAINLEVEL_OUTOF10: 2
PAINLEVEL_OUTOF10: 0
PAINLEVEL_OUTOF10: 4
PAINLEVEL_OUTOF10: 0
PAINLEVEL_OUTOF10: 4

## 2021-11-14 ASSESSMENT — PAIN DESCRIPTION - LOCATION: LOCATION: HEAD

## 2021-11-14 ASSESSMENT — PAIN DESCRIPTION - DESCRIPTORS: DESCRIPTORS: ACHING

## 2021-11-14 ASSESSMENT — PAIN DESCRIPTION - PAIN TYPE: TYPE: ACUTE PAIN

## 2021-11-14 NOTE — PROGRESS NOTES
Hospitalist Progress Note      PCP: Tor Hampton DO    Chief Complaint. Patient is a 17-year-old female with past medical history of fibromyalgia, rheumatoid arthritis who presented to hospital for shortness of breath. Date of Admission: 11/12/2021    Subjective: Patient is on 6 L nasal cannula, denies any acute events overnight mentions overall her shortness of breath feels better, has no questions, discussed with family over the phone, updated on plan of care-answered all questions to the best of their satisfaction. .  Denies chest pain, nausea, vomiting, shortness of breath, fever or chills. mention feels overall better    Medications:  Reviewed    Infusion Medications    sodium chloride 25 mL (11/14/21 1247)     Scheduled Medications    metoprolol succinate  100 mg Oral Daily    Vitamin D  2,000 Units Oral Daily    dexamethasone  10 mg IntraVENous Q12H    Followed by   Sunny Weaver ON 11/17/2021] dexamethasone  10 mg IntraVENous Q24H    hydrALAZINE  25 mg Oral BID    enoxaparin  30 mg SubCUTAneous BID    cefTRIAXone (ROCEPHIN) IV  1,000 mg IntraVENous Q24H    remdesivir IVPB  100 mg IntraVENous Q24H     PRN Meds: sodium chloride flush, sodium chloride, potassium chloride **OR** potassium alternative oral replacement **OR** potassium chloride, magnesium sulfate, ondansetron **OR** ondansetron, acetaminophen **OR** acetaminophen, guaiFENesin-dextromethorphan, benzonatate, albuterol sulfate HFA, sodium chloride    No intake or output data in the 24 hours ending 11/14/21 1811    Physical Exam Performed:    BP (!) 147/76   Pulse 68   Temp 97.2 °F (36.2 °C) (Oral)   Resp 18   Ht 5' 5\" (1.651 m)   Wt 148 lb (67.1 kg)   SpO2 91%   BMI 24.63 kg/m²     General appearance: No apparent distress, on 6 L  HEENT:  Conjunctivae/corneas clear. Neck: Supple, with full range of motion. Respiratory:  Normal respiratory effort.  Clear to auscultation, bilaterally without Rales/Wheezes/Rhonchi. Cardiovascular: Regular rate and rhythm with normal S1/S2 without murmurs or rubs  Abdomen: Soft, non-tender, non-distended, normal bowel sounds. Musculoskeletal: No cyanosis or edema bilaterally  Neurologic:  without any focal sensory/motor deficits. grossly non-focal.  Psychiatric: Alert and oriented, Normal mood  Peripheral Pulses: +2 palpable, equal bilaterally       Labs:   Recent Labs     11/12/21  1602 11/13/21  0429 11/14/21  0555   WBC 4.2 3.1* 5.3   HGB 14.9 13.3 14.5   HCT 44.2 38.9 43.0   * 116* 150     Recent Labs     11/12/21  1602 11/13/21  0429 11/14/21  0555    137 135*   K 4.7 4.2 4.2    105 104   CO2 26 22 19*   BUN 17 18 34*   CREATININE 0.8 0.7 0.7   CALCIUM 10.0 9.0 9.8     Recent Labs     11/12/21  1602 11/13/21  0429 11/14/21  0555   AST 42* 36 39*   ALT 24 22 27   BILITOT 0.4 <0.2 0.3   ALKPHOS 69 59 65     Recent Labs     11/13/21  0429 11/14/21  0556   INR 0.97  0.97 0.98     Recent Labs     11/12/21 1602   TROPONINI <0.01       Urinalysis:      Lab Results   Component Value Date    NITRU POSITIVE 11/12/2021    WBCUA 10-20 11/12/2021    BACTERIA 4+ 11/12/2021    RBCUA 3-4 11/12/2021    BLOODU Negative 11/12/2021    SPECGRAV 1.020 11/12/2021    GLUCOSEU Negative 11/12/2021       Radiology:  CT CHEST PULMONARY EMBOLISM W CONTRAST   Final Result   No evidence of a pulmonary embolus. Mildly dilated and atherosclerotic thoracic aorta with no aneurysm or   dissection. Moderate subpleural and interstitial ground-glass opacities scattered   throughout both lungs which probably represents multisegmental   bronchopneumonia secondary to the patient's known COVID-19 disease      Single moderate compression fracture of T11 which may be chronic. Recommend   clinical follow-up of the area. XR CHEST PORTABLE   Final Result   Bilateral airspace disease suggestive of atypical viral pneumonia. Superimposed edema not excluded.

## 2021-11-14 NOTE — PROGRESS NOTES
Hospitalist Progress Note      PCP: Marcello Negrete DO    Chief Complaint. Patient is a 70-year-old female with past medical history of fibromyalgia, rheumatoid arthritis who presented to hospital for shortness of breath. Date of Admission: 11/12/2021      Subjective:   denies chest pain, nausea, vomiting, shortness of breath, fever or chills. mention feels overall better    Medications:  Reviewed    Infusion Medications    sodium chloride 25 mL (11/13/21 1243)     Scheduled Medications    metoprolol succinate  100 mg Oral Daily    Vitamin D  2,000 Units Oral Daily    dexamethasone  10 mg IntraVENous Q12H    Followed by   Indu Back ON 11/17/2021] dexamethasone  10 mg IntraVENous Q24H    hydrALAZINE  25 mg Oral BID    enoxaparin  30 mg SubCUTAneous BID    cefTRIAXone (ROCEPHIN) IV  1,000 mg IntraVENous Q24H    remdesivir IVPB  100 mg IntraVENous Q24H     PRN Meds: sodium chloride flush, sodium chloride, potassium chloride **OR** potassium alternative oral replacement **OR** potassium chloride, magnesium sulfate, ondansetron **OR** ondansetron, acetaminophen **OR** acetaminophen, guaiFENesin-dextromethorphan, benzonatate, albuterol sulfate HFA, sodium chloride      Intake/Output Summary (Last 24 hours) at 11/13/2021 1928  Last data filed at 11/13/2021 0542  Gross per 24 hour   Intake 420 ml   Output --   Net 420 ml       Physical Exam Performed:    BP (!) 147/73   Pulse 59   Temp 97.2 °F (36.2 °C) (Oral)   Resp 16   Ht 5' 5\" (1.651 m)   Wt 149 lb 4 oz (67.7 kg)   SpO2 91%   BMI 24.84 kg/m²     General appearance: No apparent distress,   HEENT:  Conjunctivae/corneas clear. Neck: Supple, with full range of motion. Respiratory:  Normal respiratory effort. Clear to auscultation, bilaterally without Rales/Wheezes/Rhonchi. Cardiovascular: Regular rate and rhythm with normal S1/S2 without murmurs or rubs  Abdomen: Soft, non-tender, non-distended, normal bowel sounds.   Musculoskeletal: No cyanosis or edema bilaterally  Neurologic:  without any focal sensory/motor deficits. grossly non-focal.  Psychiatric: Alert and oriented, Normal mood  Peripheral Pulses: +2 palpable, equal bilaterally       Labs:   Recent Labs     11/12/21  1602 11/13/21 0429   WBC 4.2 3.1*   HGB 14.9 13.3   HCT 44.2 38.9   * 116*     Recent Labs     11/12/21  1602 11/13/21  0429    137   K 4.7 4.2    105   CO2 26 22   BUN 17 18   CREATININE 0.8 0.7   CALCIUM 10.0 9.0     Recent Labs     11/12/21  1602 11/13/21 0429   AST 42* 36   ALT 24 22   BILITOT 0.4 <0.2   ALKPHOS 69 59     Recent Labs     11/13/21 0429   INR 0.97  0.97     Recent Labs     11/12/21 1602   TROPONINI <0.01       Urinalysis:      Lab Results   Component Value Date    NITRU POSITIVE 11/12/2021    WBCUA 10-20 11/12/2021    BACTERIA 4+ 11/12/2021    RBCUA 3-4 11/12/2021    BLOODU Negative 11/12/2021    SPECGRAV 1.020 11/12/2021    GLUCOSEU Negative 11/12/2021       Radiology:  CT CHEST PULMONARY EMBOLISM W CONTRAST   Final Result   No evidence of a pulmonary embolus. Mildly dilated and atherosclerotic thoracic aorta with no aneurysm or   dissection. Moderate subpleural and interstitial ground-glass opacities scattered   throughout both lungs which probably represents multisegmental   bronchopneumonia secondary to the patient's known COVID-19 disease      Single moderate compression fracture of T11 which may be chronic. Recommend   clinical follow-up of the area. XR CHEST PORTABLE   Final Result   Bilateral airspace disease suggestive of atypical viral pneumonia. Superimposed edema not excluded.                Assessment/Plan:    Active Hospital Problems    Diagnosis     Acute respiratory failure due to severe acute respiratory syndrome coronavirus 2 (SARS-CoV-2) infection ([facility]) [U07.1, J96.00]     Current chronic use of systemic steroids [Z79.52]     Giant cell arteritis (HCC) [M31.6]     PM[facility] [M35.3]     HTN (hypertension) [I10]        Patient is a 80-year-old female with past medical history of fibromyalgia, rheumatoid arthritis who presented to hospital for shortness of breath. Assessment  Acute hypoxic respiratory failure, COVID-19 pneumonia  Urinary tract infection  Hypertension  Polymyalgia rheumatica, temporal arteritis    Plan  Started on dexamethasone  Continue remdesivir  Started on Rocephin, follow-up on urine culture  Checked CRP-level 7.6, patient does not meet criteria for baricitinib  DVT prophylaxis-on Lovenox twice daily dosing  Resume home medications  Diet: ADULT DIET;  Regular; Low Fat/Low Chol/High Fiber/ABDI  Code Status: Limited    PT/OT Eval Status: ordered    Dispo -continue IV dexamethasone, Roger Chen MD

## 2021-11-14 NOTE — CARE COORDINATION
CASE MANAGEMENT INITIAL ASSESSMENT    Reviewed chart and completed assessment with: Patient      Explained Case Management role/services. Primary contact information: [facility] Decision Maker :   Primary Decision Maker: Robb Suarez - [phone]    Secondary Decision Maker: Palak Hailey - Child - [phone]          Can this person be reached and be able to respond quickly, such as within a few minutes or hours? Yes    Admit date/status:11/12/2021 Inpatient   Diagnosis: Acute respiratory failure due to severe acute respiratory syndrome coronavirus 2 (SARS-CoV-2) infection  Is this a Readmission?:  No      Insurance:[employer] Medicare    Precert required for SNF: Yes       3 night stay required: No    Living arrangements, Adls, care needs, prior to admission: Home alone, IPTA spouse passed 3 weeks ago was his primary care giver    Transportation:family can support    Durable Medical Equipment at home:  Denies     Services in the home and/or outpatient, prior to admission: Denies     PT/OT recs: not ordered at this time    [address] Notification (HEN):NA    Barriers to discharge: 02 needs, reports weaker could benefit from PT/OT eval     Plan/comments: Patient reports living at home alone, IPTA. Patient reports  Being primary care giver for spouse who passed about three weeks ago, has supportive family and doing fair- tearful. Writer provided validation for feeling referral made to spiritual care with Patient consent. Patient reports feeling weaker than normal may benefit from PT/OT eval when medically appropriate. SW following for needs. SYLVIA Jiménez      [facility] on chart for MD signature

## 2021-11-14 NOTE — FLOWSHEET NOTE
21 [id number]   Encounter Summary   Services provided to: Patient   Referral/Consult From: Nurse   Support System Family members   Continue Visiting   (, grief/loss spouse, ACP)   Complexity of Encounter Moderate   Length of Encounter 30 minutes   Advance Care Planning Yes   Grief and Life Adjustment   Type Grief and loss   Assessment Calm; Tearful; Grieving   Intervention Active listening; Explored feelings, thoughts, concerns; Explored coping resources; Prayer; Provided reading materials/devotional materials; Grief care   Outcome Engaged in conversation; Expressed feelings/needs/concerns; Receptive    visit on phone due to Jossie isolation. Patient lost her spouse a few weeks ago after a prolonged illness and hospice. She states he  peacefully and she expressed gratitude for the time the had. She has 5 children and is not sure who she wants to be her health care power of . See ACP note. Offered prayer and ongoing prn follow-up.

## 2021-11-14 NOTE — ACP (ADVANCE CARE PLANNING)
Advance Care Planning     Advance Care Planning Inpatient Note  [facility] Department    Today's Date: 11/14/2021  Unit: [facility] C5 - MED SURG/ORTHO    Received request from IDT Member. Upon review of chart and communication with care team, patient's decision making abilities are not in question. . Patient and Spoke with patient on phone was/were present in the room during visit. Goals of ACP Conversation:  Discuss advance care planning documents    Health Care Decision Makers:       Primary Decision Maker: Vikram Brizuela Child - [phone]    Secondary Decision Maker: Douglas Kaur Child - [phone]    Summary:  No Decision Maker named by patient at this time    Advance Care Planning Documents (Patient Wishes):  Living Will/Advance Directive     Assessment:  Patient currently experiencing grief over the recent loss of her . She does have a living will and we reviewed her wishes and confirmed they were still accurately conveyed. She does not have a health care power of . Interventions:  Provided education on documents for clarity and greater understanding  Discussed and provided education on state decision maker hierarchy  Encouraged ongoing ACP conversation with future decision makers and loved ones    Care Preferences Communicated:   No    Outcomes/Plan:  ACP Discussion:  offered forms via RN. We discussed that all 5 of her adult children collectively would be her proxy medical decision makers in light of her husbands death unless she designated someone in a legal health care power of . She states she does not know if she wants this to be her daughter and \"I might pick Papa. \"  I offered to follow-up another day to continue the conversation after she reviews the documents. Offered grief support.     Electronically signed by Mukesh Barrera, [address] on 11/14/2021 at 1:44 PM

## 2021-11-15 LAB
A/G RATIO: 1.2 (ref 1.1–2.2)
ALBUMIN SERPL-MCNC: 3 G/DL (ref 3.4–5)
ALP BLD-CCNC: 65 U/L (ref 40–129)
ALT SERPL-CCNC: 32 U/L (ref 10–40)
ANION GAP SERPL CALCULATED.3IONS-SCNC: 12 MMOL/L (ref 3–16)
APTT: 28.7 SEC (ref 26.2–38.6)
AST SERPL-CCNC: 38 U/L (ref 15–37)
BASOPHILS ABSOLUTE: 0 K/UL (ref 0–0.2)
BASOPHILS RELATIVE PERCENT: 0.1 %
BILIRUB SERPL-MCNC: 0.3 MG/DL (ref 0–1)
BUN BLDV-MCNC: 35 MG/DL (ref 7–20)
C-REACTIVE PROTEIN: <3 MG/L (ref 0–5.1)
CALCIUM SERPL-MCNC: 9.6 MG/DL (ref 8.3–10.6)
CHLORIDE BLD-SCNC: 107 MMOL/L (ref 99–110)
CO2: 19 MMOL/L (ref 21–32)
CREAT SERPL-MCNC: 0.7 MG/DL (ref 0.6–1.2)
D DIMER: 499 NG/ML DDU (ref 0–229)
EOSINOPHILS ABSOLUTE: 0 K/UL (ref 0–0.6)
EOSINOPHILS RELATIVE PERCENT: 0 %
FIBRINOGEN: 173 MG/DL (ref 200–397)
GFR AFRICAN AMERICAN: >60
GFR NON-AFRICAN AMERICAN: >60
GLUCOSE BLD-MCNC: 153 MG/DL (ref 70–99)
HCT VFR BLD CALC: 43.5 % (ref 36–48)
HEMOGLOBIN: 14.7 G/DL (ref 12–16)
INR BLD: 1.14 (ref 0.88–1.12)
LYMPHOCYTES ABSOLUTE: 0.6 K/UL (ref 1–5.1)
LYMPHOCYTES RELATIVE PERCENT: 10.1 %
MCH RBC QN AUTO: 32.9 PG (ref 26–34)
MCHC RBC AUTO-ENTMCNC: 33.7 G/DL (ref 31–36)
MCV RBC AUTO: 97.4 FL (ref 80–100)
MONOCYTES ABSOLUTE: 0.6 K/UL (ref 0–1.3)
MONOCYTES RELATIVE PERCENT: 9.9 %
NEUTROPHILS ABSOLUTE: 4.9 K/UL (ref 1.7–7.7)
NEUTROPHILS RELATIVE PERCENT: 79.9 %
ORGANISM: ABNORMAL
PDW BLD-RTO: 13.8 % (ref 12.4–15.4)
PLATELET # BLD: 156 K/UL (ref 135–450)
PLATELET SLIDE REVIEW: ADEQUATE
PMV BLD AUTO: 8.1 FL (ref 5–10.5)
POTASSIUM REFLEX MAGNESIUM: 4.6 MMOL/L (ref 3.5–5.1)
PROCALCITONIN: 0.04 NG/ML (ref 0–0.15)
PROTHROMBIN TIME: 12.9 SEC (ref 9.9–12.7)
RBC # BLD: 4.47 M/UL (ref 4–5.2)
SLIDE REVIEW: ABNORMAL
SODIUM BLD-SCNC: 138 MMOL/L (ref 136–145)
TOTAL PROTEIN: 5.5 G/DL (ref 6.4–8.2)
URINE CULTURE, ROUTINE: ABNORMAL
WBC # BLD: 6.2 K/UL (ref 4–11)

## 2021-11-15 PROCEDURE — 85379 FIBRIN DEGRADATION QUANT: CPT

## 2021-11-15 PROCEDURE — 94761 N-INVAS EAR/PLS OXIMETRY MLT: CPT

## 2021-11-15 PROCEDURE — 6370000000 HC RX 637 (ALT 250 FOR IP): Performed by: HOSPITALIST

## 2021-11-15 PROCEDURE — 2580000003 HC RX 258: Performed by: INTERNAL MEDICINE

## 2021-11-15 PROCEDURE — 97166 OT EVAL MOD COMPLEX 45 MIN: CPT

## 2021-11-15 PROCEDURE — 97530 THERAPEUTIC ACTIVITIES: CPT

## 2021-11-15 PROCEDURE — 85730 THROMBOPLASTIN TIME PARTIAL: CPT

## 2021-11-15 PROCEDURE — 2700000000 HC OXYGEN THERAPY PER DAY

## 2021-11-15 PROCEDURE — 84145 PROCALCITONIN (PCT): CPT

## 2021-11-15 PROCEDURE — 6360000002 HC RX W HCPCS: Performed by: INTERNAL MEDICINE

## 2021-11-15 PROCEDURE — 2500000003 HC RX 250 WO HCPCS: Performed by: INTERNAL MEDICINE

## 2021-11-15 PROCEDURE — 85025 COMPLETE CBC W/AUTO DIFF WBC: CPT

## 2021-11-15 PROCEDURE — 80053 COMPREHEN METABOLIC PANEL: CPT

## 2021-11-15 PROCEDURE — 36415 COLL VENOUS BLD VENIPUNCTURE: CPT

## 2021-11-15 PROCEDURE — 1200000000 HC SEMI PRIVATE

## 2021-11-15 PROCEDURE — 85384 FIBRINOGEN ACTIVITY: CPT

## 2021-11-15 PROCEDURE — 85610 PROTHROMBIN TIME: CPT

## 2021-11-15 PROCEDURE — 86140 C-REACTIVE PROTEIN: CPT

## 2021-11-15 PROCEDURE — 97535 SELF CARE MNGMENT TRAINING: CPT

## 2021-11-15 PROCEDURE — 6370000000 HC RX 637 (ALT 250 FOR IP): Performed by: INTERNAL MEDICINE

## 2021-11-15 RX ORDER — HYDRALAZINE HYDROCHLORIDE 25 MG/1
25 TABLET, FILM COATED ORAL EVERY 8 HOURS SCHEDULED
Status: DISCONTINUED | OUTPATIENT
Start: 2021-11-15 | End: 2021-11-15

## 2021-11-15 RX ORDER — HYDRALAZINE HYDROCHLORIDE 25 MG/1
25 TABLET, FILM COATED ORAL EVERY 8 HOURS SCHEDULED
Status: DISCONTINUED | OUTPATIENT
Start: 2021-11-15 | End: 2021-11-17

## 2021-11-15 RX ADMIN — REMDESIVIR 100 MG: 100 INJECTION, POWDER, LYOPHILIZED, FOR SOLUTION INTRAVENOUS at 23:53

## 2021-11-15 RX ADMIN — ENOXAPARIN SODIUM 30 MG: 30 INJECTION SUBCUTANEOUS at 20:54

## 2021-11-15 RX ADMIN — Medication 10 ML: at 10:18

## 2021-11-15 RX ADMIN — Medication 2000 UNITS: at 10:16

## 2021-11-15 RX ADMIN — Medication 10 ML: at 20:52

## 2021-11-15 RX ADMIN — HYDRALAZINE HYDROCHLORIDE 25 MG: 25 TABLET, FILM COATED ORAL at 10:16

## 2021-11-15 RX ADMIN — HYDRALAZINE HYDROCHLORIDE 25 MG: 25 TABLET, FILM COATED ORAL at 20:54

## 2021-11-15 RX ADMIN — DEXAMETHASONE SODIUM PHOSPHATE 10 MG: 10 INJECTION INTRAMUSCULAR; INTRAVENOUS at 20:53

## 2021-11-15 RX ADMIN — DEXAMETHASONE SODIUM PHOSPHATE 10 MG: 10 INJECTION INTRAMUSCULAR; INTRAVENOUS at 10:18

## 2021-11-15 RX ADMIN — METOPROLOL SUCCINATE 100 MG: 50 TABLET, EXTENDED RELEASE ORAL at 10:16

## 2021-11-15 RX ADMIN — REMDESIVIR 100 MG: 100 INJECTION, POWDER, LYOPHILIZED, FOR SOLUTION INTRAVENOUS at 00:32

## 2021-11-15 RX ADMIN — CEFTRIAXONE SODIUM 1000 MG: 1 INJECTION, POWDER, FOR SOLUTION INTRAMUSCULAR; INTRAVENOUS at 13:03

## 2021-11-15 RX ADMIN — HYDRALAZINE HYDROCHLORIDE 25 MG: 25 TABLET, FILM COATED ORAL at 14:41

## 2021-11-15 RX ADMIN — SODIUM CHLORIDE 25 ML: 9 INJECTION, SOLUTION INTRAVENOUS at 23:53

## 2021-11-15 RX ADMIN — ENOXAPARIN SODIUM 30 MG: 30 INJECTION SUBCUTANEOUS at 10:17

## 2021-11-15 ASSESSMENT — PAIN SCALES - GENERAL
PAINLEVEL_OUTOF10: 0

## 2021-11-15 NOTE — ACP (ADVANCE CARE PLANNING)
Follow-up call to continue ACP discussion. Patient not feeling well and declined discussion. She will call us if she desires further discussion and is able. PRN follow-up.

## 2021-11-15 NOTE — PLAN OF CARE
Problem: Falls - Risk of:  Goal: Will remain free from falls  Description: Will remain free from falls  11/15/2021 1222 by Dakota Barry RN  Outcome: Ongoing     Problem: Falls - Risk of:  Goal: Absence of physical injury  Description: Absence of physical injury  Outcome: Ongoing     Problem: Airway Clearance - Ineffective  Goal: Achieve or maintain patent airway  11/15/2021 1222 by Dakota Barry RN  Outcome: Ongoing     Problem: Gas Exchange - Impaired  Goal: Absence of hypoxia  Outcome: Ongoing  Goal: Promote optimal lung function  Outcome: Ongoing     Problem: Breathing Pattern - Ineffective  Goal: Ability to achieve and maintain a regular respiratory rate  Outcome: Ongoing     Problem:  Body Temperature -  Risk of, Imbalanced  Goal: Ability to maintain a body temperature within defined limits  Outcome: Ongoing  Goal: Will regain or maintain usual level of consciousness  Outcome: Ongoing  Goal: Complications related to the disease process, condition or treatment will be avoided or minimized  Outcome: Ongoing     Problem: Isolation Precautions - Risk of Spread of Infection  Goal: Prevent transmission of infection  Outcome: Ongoing     Problem: Nutrition Deficits  Goal: Optimize nutritional status  Outcome: Ongoing     Problem: Risk for Fluid Volume Deficit  Goal: Maintain normal heart rhythm  Outcome: Ongoing  Goal: Maintain absence of muscle cramping  Outcome: Ongoing  Goal: Maintain normal serum potassium, sodium, calcium, phosphorus, and pH  Outcome: Ongoing     Problem: Loneliness or Risk for Loneliness  Goal: Demonstrate positive use of time alone when socialization is not possible  Outcome: Ongoing     Problem: Fatigue  Goal: Verbalize increase energy and improved vitality  Outcome: Ongoing     Problem: Patient Education: Go to Patient Education Activity  Goal: Patient/Family Education  Outcome: Ongoing     Problem: Pain:  Goal: Pain level will decrease  Description: Pain level will decrease  Outcome: Ongoing  Goal: Control of acute pain  Description: Control of acute pain  Outcome: Ongoing  Goal: Control of chronic pain  Description: Control of chronic pain  Outcome: Ongoing

## 2021-11-15 NOTE — PROGRESS NOTES
Physician Progress Note      Maki MCCAULEY #:                  [id number]  :                       2/3/1933  ADMIT DATE:       2021 1:58 PM  DISCH DATE:  RESPONDING  PROVIDER #:        Lon Mendoza MD          QUERY TEXT:    Pt admitted with Covid 19. Noted documentation of \"acute cystitis\" per ED   provider. If possible, please document in progress notes and discharge   summary:    The medical record reflects the following:  Risk Factors: Covid +    Clinical Indicators: \"acute cystitis\" per ED provider on arrival, UA - WBC   10-20, Urine Cx- E Coli    Treatment: Rocephin x1 in ED, labs, cultures, ongoing droplet isolation for   Covid    thank you,  brian resendiz RN, BSN  [email or web address]  Options provided:  -- Acute cystitis confirmed present on admission  -- acute cystitis ruled out  -- Other - I will add my own diagnosis  -- Disagree - Not applicable / Not valid  -- Disagree - Clinically unable to determine / Unknown  -- Refer to Clinical Documentation Reviewer    PROVIDER RESPONSE TEXT:    The diagnosis of acute cystitis was confirmed as present on admission.     Query created by: Antonio Kunz on 11/15/2021 11:37 AM      Electronically signed by:  Lon Mendoza MD 11/15/2021 12:28 PM

## 2021-11-15 NOTE — PROGRESS NOTES
Physical Therapy  PT referral received and appreciated. Pt fatigued from OT evaluation and requested to rest. Will follow up as schedule permits.      Samantha Jimenez, PT, DPT

## 2021-11-15 NOTE — PROGRESS NOTES
Patient Diagnosis(es): The primary encounter diagnosis was Acute respiratory failure with hypoxia ([address] Diagnoses of COVID-19 and Acute cystitis without hematuria were also pertinent to this visit. has a past medical history of Cellulitis of left lower leg, Closed pelvic rim fracture, Fibromyalgia, Foot drop, right foot, Fractures, History of blood transfusion, History of temporal artery biopsy, Hx of blood clots, PNA (pneumonia), Prepatellar bursitis of right knee, Rheumatoid arthritis ([address] Venous ulcer of left leg ([address] and Venous ulcer of right leg ([address] has a past surgical history that includes Tonsillectomy and Vein Surgery (Right, 10/27/2016). Restrictions  Restrictions/Precautions  Restrictions/Precautions: General Precautions, Up as Tolerated  Position Activity Restriction  Other position/activity restrictions: O2 5 L nasal cannula, telemetry    Subjective   General  Chart Reviewed: Yes, Orders, History and Physical, Imaging, Labs  Patient assessed for rehabilitation services?: Yes  Additional Pertinent Hx: Patient is a 80-year-old female with past medical history of fibromyalgia, rheumatoid arthritis who presented to hospital for shortness of breath.   Response to previous treatment: Patient with no complaints from previous session  Family / Caregiver Present: No  Referring Practitioner: Carlee Marcano MD  Subjective  Subjective: \"I'm not doing too well today\"  Patient Currently in Pain: Denies  Vital Signs  Temp: 97.2 °F (36.2 °C)  Temp Source: Oral  Pulse: (!) 49  Heart Rate Source: Monitor  Resp: 24  BP: (!) 184/94  BP Location: Right upper arm  MAP (mmHg): 124  Patient Position: Semi fowlers  Level of Consciousness: Alert (0)  MEWS Score: 3  Patient Currently in Pain: Denies  Oxygen Therapy  SpO2: 94 %  Pulse Oximeter Device Mode: Continuous  Pulse Oximeter Device Location: Left; Finger  O2 Device: Nasal cannula  O2 Flow Rate (L/min): 5 L/min  Social/Functional History  Social/Functional History  Lives With: Spouse  Type of Home: House  Home Layout: One level  Home Access: Stairs to enter with rails  Entrance Stairs - Number of Steps: 3 DURGA, grippers on steps  Entrance Stairs - Rails: Both  Bathroom Shower/Tub: Walk-in shower (built in shower seat)  Bathroom Toilet: Handicap height  Bathroom Equipment: Grab bars in shower, Built-in shower seat, Hand-held shower  Home Equipment: 4 wheeled walker  ADL Assistance: [address]: Independent  Homemaking Responsibilities: Yes  Meal Prep Responsibility: Primary  Laundry Responsibility: Primary  Cleaning Responsibility: Primary  Bill Paying/Finance Responsibility: Primary  Shopping Responsibility: Primary  Ambulation Assistance: Independent  Transfer Assistance: Independent  Active : Yes  Mode of Transportation: Car  Occupation: Caregiver  Additional Comments: if needed help, daughter and son-in-law live close by       Objective   Vision: Impaired  Vision Exceptions: Wears glasses at all times; Wears glasses for distance; Wears glasses for reading  Hearing: Exceptions to [facility]  Hearing Exceptions: Hard of hearing/hearing concerns (bilareal hearing aides; does not have them currently)    Orientation  Overall Orientation Status: Within Normal Limits  Observation/Palpation  Posture:  (2-3 years of drop foot in R foot, neuropathy in BLE,)  Balance  Sitting Balance: Independent  Standing Balance: Contact guard assistance (for balance)  Standing Balance  Time: x2 minutes for pants managment and toileting hygiene at [facility]  Functional - Mobility Device: Rolling Walker  Activity: Other (to [facility])  Assist Level: Contact guard assistance  Toilet Transfers  Toilet - Technique: Stand step  Equipment Used: Standard bedside commode  Toilet Transfer: Contact guard assistance  ADL  LE Dressing: Maximum assistance; Moderate assistance (max A don socks. mod A don underwear and pants.  Independent elkin.)  Toileting: Contact guard assistance (for standing balance during hygiene)        Bed mobility  Bridging: Independent  Rolling to Left: Independent  Rolling to Right: Independent  Supine to Sit: Minimal assistance  Sit to Supine: Contact guard assistance  Scooting: Independent  Transfers  Stand Step Transfers: Contact guard assistance (with RW)  Sit to stand: Contact guard assistance  Stand to sit: Contact guard assistance                 Type of ROM/Therapeutic Exercise  Comment: educated on 5 BUE exercises to do for endurance training     LUE PROM (degrees)  LUE PROM: WNL  LUE AROM (degrees)  LUE AROM : WNL  Left Hand PROM (degrees)  Left Hand PROM: WNL  Left Hand AROM (degrees)  Left Hand AROM: WNL  RUE PROM (degrees)  RUE PROM: WNL  RUE AROM (degrees)  RUE AROM : WNL  Right Hand PROM (degrees)  Right Hand PROM: WNL  Right Hand AROM (degrees)  Right Hand AROM: WNL                 Plan   Plan  Times per week: 3-5x per week  Times per day: Daily  Current Treatment Recommendations: Strengthening, Endurance Training, ROM, Self-Care / ADL, Home Management Training, Patient/Caregiver Education & Training, Functional Mobility Training    AM-[facility] Score        [facility] Inpatient Daily Activity Raw Score: 16 (11/15/21 1445)  AM-[facility] Inpatient ADL T-Scale Score : 35.96 (11/15/21 1445)  ADL Inpatient CMS 0-100% Score: 53.32 (11/15/21 1445)  ADL Inpatient CMS G-Code Modifier : CK (11/15/21 1445)    Goals  Short term goals  Time Frame for Short term goals: 1 week (11/21/21)  Short term goal 1: Pt will complete toilet transfer with CGA  Short term goal 2: Pt will complete LBD with min A  Short term goal 3: Pt will complete grooming tasks in standing for 5 minutes for improvd endurance with O2 stats >90%  Short term goal 4: Pt will complete toileting with SBA.   Patient Goals   Patient goals : \"change myself\"       Therapy Time   Individual Concurrent Group Co-treatment   Time In 1326         Time Out 1420         Minutes 47 Timed Code Treatment Minutes: 40 Minutes (10 minute evaluation)       Melissa Motta OT   If pt is unable to be seen after this session, please let this note serve as discharge summary. Please see case management note for discharge disposition. Thank you.

## 2021-11-15 NOTE — PROGRESS NOTES
No rashes or lesions. Neurologic:  Neurovascularly intact without any focal sensory/motor deficits. Cranial nerves: II-XII intact, grossly non-focal.  Psychiatric: Alert and oriented, thought content appropriate, normal insight  Capillary Refill: Brisk,3 seconds, normal   Peripheral Pulses: +2 palpable, equal bilaterally       Labs:   Recent Labs     11/12/21  1602 11/13/21  0429 11/14/21  0555   WBC 4.2 3.1* 5.3   HGB 14.9 13.3 14.5   HCT 44.2 38.9 43.0   * 116* 150     Recent Labs     11/12/21  1602 11/13/21  0429 11/14/21  0555    137 135*   K 4.7 4.2 4.2    105 104   CO2 26 22 19*   BUN 17 18 34*   CREATININE 0.8 0.7 0.7   CALCIUM 10.0 9.0 9.8     Recent Labs     11/12/21  1602 11/13/21  0429 11/14/21  0555   AST 42* 36 39*   ALT 24 22 27   BILITOT 0.4 <0.2 0.3   ALKPHOS 69 59 65     Recent Labs     11/13/21  0429 11/14/21  0556   INR 0.97  0.97 0.98     Recent Labs     11/12/21  1602   TROPONINI <0.01       Urinalysis:      Lab Results   Component Value Date    NITRU POSITIVE 11/12/2021    WBCUA 10-20 11/12/2021    BACTERIA 4+ 11/12/2021    RBCUA 3-4 11/12/2021    BLOODU Negative 11/12/2021    SPECGRAV 1.020 11/12/2021    GLUCOSEU Negative 11/12/2021       Radiology:  CT CHEST PULMONARY EMBOLISM W CONTRAST   Final Result   No evidence of a pulmonary embolus. Mildly dilated and atherosclerotic thoracic aorta with no aneurysm or   dissection. Moderate subpleural and interstitial ground-glass opacities scattered   throughout both lungs which probably represents multisegmental   bronchopneumonia secondary to the patient's known COVID-19 disease      Single moderate compression fracture of T11 which may be chronic. Recommend   clinical follow-up of the area. XR CHEST PORTABLE   Final Result   Bilateral airspace disease suggestive of atypical viral pneumonia. Superimposed edema not excluded.                  Assessment/Plan:    -Pneumonia due to COVID-19--continue IV remdesivir and dexamethasone. . Did not meet criteria for Baricitinib with low crp    -Acute respiratory failure with hypoxia due to Covid pneumonia--has required 4 to 6 L nasal cannula--continue supplemental oxygen wean as tolerated    -UTI due to E. Coli, uncomplicated--denies having any significant LUTS on presentation--has been on IV Rocephin since 11/12--day 4 which is adequate--stop antibiotics. . Serum procalcitonin has been normal    -Essential hypertension -\"uncontrolled--continue metoprolol and hydralazine--increased dose of hydralazine    -History of PMR/GCA--- on chronic prednisone, it appears she has also been treated with  Tocilizumab previously--currently stable    DVT Prophylaxis: Lovenox  Diet: ADULT DIET;  Regular; Low Fat/Low Chol/High Fiber/ABDI  Code Status: Limited        Cory Jimenez MD

## 2021-11-16 LAB
A/G RATIO: 1.2 (ref 1.1–2.2)
ALBUMIN SERPL-MCNC: 3 G/DL (ref 3.4–5)
ALP BLD-CCNC: 66 U/L (ref 40–129)
ALT SERPL-CCNC: 47 U/L (ref 10–40)
ANION GAP SERPL CALCULATED.3IONS-SCNC: 11 MMOL/L (ref 3–16)
APTT: 29 SEC (ref 26.2–38.6)
AST SERPL-CCNC: 48 U/L (ref 15–37)
BANDED NEUTROPHILS RELATIVE PERCENT: 5 % (ref 0–7)
BASOPHILS ABSOLUTE: 0 K/UL (ref 0–0.2)
BASOPHILS RELATIVE PERCENT: 0 %
BILIRUB SERPL-MCNC: 0.4 MG/DL (ref 0–1)
BUN BLDV-MCNC: 29 MG/DL (ref 7–20)
CALCIUM SERPL-MCNC: 9.4 MG/DL (ref 8.3–10.6)
CHLORIDE BLD-SCNC: 106 MMOL/L (ref 99–110)
CO2: 19 MMOL/L (ref 21–32)
CREAT SERPL-MCNC: 0.7 MG/DL (ref 0.6–1.2)
D DIMER: 516 NG/ML DDU (ref 0–229)
EOSINOPHILS ABSOLUTE: 0 K/UL (ref 0–0.6)
EOSINOPHILS RELATIVE PERCENT: 0 %
FIBRINOGEN: 186 MG/DL (ref 200–397)
GFR AFRICAN AMERICAN: >60
GFR NON-AFRICAN AMERICAN: >60
GLUCOSE BLD-MCNC: 149 MG/DL (ref 70–99)
HCT VFR BLD CALC: 46.1 % (ref 36–48)
HEMOGLOBIN: 15.7 G/DL (ref 12–16)
INR BLD: 1.21 (ref 0.88–1.12)
LYMPHOCYTES ABSOLUTE: 0.6 K/UL (ref 1–5.1)
LYMPHOCYTES RELATIVE PERCENT: 9 %
MCH RBC QN AUTO: 32.7 PG (ref 26–34)
MCHC RBC AUTO-ENTMCNC: 34.1 G/DL (ref 31–36)
MCV RBC AUTO: 96.1 FL (ref 80–100)
MONOCYTES ABSOLUTE: 0.5 K/UL (ref 0–1.3)
MONOCYTES RELATIVE PERCENT: 7 %
NEUTROPHILS ABSOLUTE: 6 K/UL (ref 1.7–7.7)
NEUTROPHILS RELATIVE PERCENT: 79 %
PDW BLD-RTO: 14.1 % (ref 12.4–15.4)
PLATELET # BLD: 161 K/UL (ref 135–450)
PLATELET SLIDE REVIEW: ADEQUATE
PMV BLD AUTO: 8.3 FL (ref 5–10.5)
POTASSIUM REFLEX MAGNESIUM: 4.5 MMOL/L (ref 3.5–5.1)
PROTHROMBIN TIME: 13.8 SEC (ref 9.9–12.7)
RBC # BLD: 4.8 M/UL (ref 4–5.2)
SLIDE REVIEW: ABNORMAL
SODIUM BLD-SCNC: 136 MMOL/L (ref 136–145)
TOTAL PROTEIN: 5.6 G/DL (ref 6.4–8.2)
WBC # BLD: 7.1 K/UL (ref 4–11)

## 2021-11-16 PROCEDURE — 2500000003 HC RX 250 WO HCPCS: Performed by: INTERNAL MEDICINE

## 2021-11-16 PROCEDURE — 97162 PT EVAL MOD COMPLEX 30 MIN: CPT

## 2021-11-16 PROCEDURE — 85730 THROMBOPLASTIN TIME PARTIAL: CPT

## 2021-11-16 PROCEDURE — 6370000000 HC RX 637 (ALT 250 FOR IP): Performed by: INTERNAL MEDICINE

## 2021-11-16 PROCEDURE — 2580000003 HC RX 258: Performed by: INTERNAL MEDICINE

## 2021-11-16 PROCEDURE — 36415 COLL VENOUS BLD VENIPUNCTURE: CPT

## 2021-11-16 PROCEDURE — 85384 FIBRINOGEN ACTIVITY: CPT

## 2021-11-16 PROCEDURE — 97530 THERAPEUTIC ACTIVITIES: CPT

## 2021-11-16 PROCEDURE — 2700000000 HC OXYGEN THERAPY PER DAY

## 2021-11-16 PROCEDURE — 6360000002 HC RX W HCPCS: Performed by: INTERNAL MEDICINE

## 2021-11-16 PROCEDURE — 85610 PROTHROMBIN TIME: CPT

## 2021-11-16 PROCEDURE — 97535 SELF CARE MNGMENT TRAINING: CPT

## 2021-11-16 PROCEDURE — 94761 N-INVAS EAR/PLS OXIMETRY MLT: CPT

## 2021-11-16 PROCEDURE — 1200000000 HC SEMI PRIVATE

## 2021-11-16 PROCEDURE — 6370000000 HC RX 637 (ALT 250 FOR IP): Performed by: HOSPITALIST

## 2021-11-16 PROCEDURE — 97116 GAIT TRAINING THERAPY: CPT

## 2021-11-16 PROCEDURE — 97110 THERAPEUTIC EXERCISES: CPT

## 2021-11-16 PROCEDURE — 80053 COMPREHEN METABOLIC PANEL: CPT

## 2021-11-16 PROCEDURE — 85025 COMPLETE CBC W/AUTO DIFF WBC: CPT

## 2021-11-16 PROCEDURE — 85379 FIBRIN DEGRADATION QUANT: CPT

## 2021-11-16 RX ADMIN — DEXAMETHASONE SODIUM PHOSPHATE 10 MG: 10 INJECTION INTRAMUSCULAR; INTRAVENOUS at 10:09

## 2021-11-16 RX ADMIN — ACETAMINOPHEN 650 MG: 325 TABLET ORAL at 05:32

## 2021-11-16 RX ADMIN — SODIUM CHLORIDE 25 ML: 9 INJECTION, SOLUTION INTRAVENOUS at 23:12

## 2021-11-16 RX ADMIN — HYDRALAZINE HYDROCHLORIDE 25 MG: 25 TABLET, FILM COATED ORAL at 05:31

## 2021-11-16 RX ADMIN — SODIUM CHLORIDE 25 ML: 9 INJECTION, SOLUTION INTRAVENOUS at 12:04

## 2021-11-16 RX ADMIN — Medication 10 ML: at 10:09

## 2021-11-16 RX ADMIN — HYDRALAZINE HYDROCHLORIDE 25 MG: 25 TABLET, FILM COATED ORAL at 15:53

## 2021-11-16 RX ADMIN — ENOXAPARIN SODIUM 30 MG: 30 INJECTION SUBCUTANEOUS at 21:22

## 2021-11-16 RX ADMIN — DEXAMETHASONE SODIUM PHOSPHATE 10 MG: 10 INJECTION INTRAMUSCULAR; INTRAVENOUS at 21:22

## 2021-11-16 RX ADMIN — METOPROLOL SUCCINATE 100 MG: 50 TABLET, EXTENDED RELEASE ORAL at 10:08

## 2021-11-16 RX ADMIN — HYDRALAZINE HYDROCHLORIDE 25 MG: 25 TABLET, FILM COATED ORAL at 21:22

## 2021-11-16 RX ADMIN — Medication 10 ML: at 21:21

## 2021-11-16 RX ADMIN — Medication 2000 UNITS: at 10:09

## 2021-11-16 RX ADMIN — ENOXAPARIN SODIUM 30 MG: 30 INJECTION SUBCUTANEOUS at 10:09

## 2021-11-16 RX ADMIN — CEFTRIAXONE SODIUM 1000 MG: 1 INJECTION, POWDER, FOR SOLUTION INTRAMUSCULAR; INTRAVENOUS at 12:05

## 2021-11-16 RX ADMIN — REMDESIVIR 100 MG: 100 INJECTION, POWDER, LYOPHILIZED, FOR SOLUTION INTRAVENOUS at 23:13

## 2021-11-16 ASSESSMENT — PAIN DESCRIPTION - LOCATION
LOCATION: THROAT
LOCATION: THROAT

## 2021-11-16 ASSESSMENT — PAIN DESCRIPTION - PAIN TYPE
TYPE: ACUTE PAIN
TYPE: ACUTE PAIN

## 2021-11-16 ASSESSMENT — PAIN SCALES - GENERAL
PAINLEVEL_OUTOF10: 3
PAINLEVEL_OUTOF10: 3
PAINLEVEL_OUTOF10: 0

## 2021-11-16 NOTE — PROGRESS NOTES
Hospitalist Progress Note      PCP: Loulou Rasmussen DO    Date of Admission: 11/12/2021    Chief Complaint: +covid, hypoxemia, fatigue    Hospital Course: Chana Hodgkins is a 80 y.o. female. She began to feel generally ill 11/10/21. She noticed severe fatigue which started after participating in PT. She thought she might have over exerted herself, but fatigue persisted. She sought care at an urgent care center. When evaluated there she has noted to be mildly hypoxemic so was sent to the ER. While in the ER O2 flow rate was increased to 4L/min. Patient would still desaturate into the mid 80's with ambulation even on 4L/min. She has received 2 doses of the Moderna vaccine. Per [address] her second dose was 2/16/21. Subjective: SOB. Fatigue, discussed poc with patient and family      Medications:  Reviewed    Infusion Medications    sodium chloride 25 mL (11/16/21 1204)     Scheduled Medications    hydrALAZINE  25 mg Oral 3 times per day    metoprolol succinate  100 mg Oral Daily    Vitamin D  2,000 Units Oral Daily    dexamethasone  10 mg IntraVENous Q12H    Followed by   Amarilis Feldman ON 11/17/2021] dexamethasone  10 mg IntraVENous Q24H    enoxaparin  30 mg SubCUTAneous BID    cefTRIAXone (ROCEPHIN) IV  1,000 mg IntraVENous Q24H    remdesivir IVPB  100 mg IntraVENous Q24H     PRN Meds: sodium chloride flush, sodium chloride, potassium chloride **OR** potassium alternative oral replacement **OR** potassium chloride, magnesium sulfate, ondansetron **OR** ondansetron, acetaminophen **OR** acetaminophen, guaiFENesin-dextromethorphan, benzonatate, albuterol sulfate HFA, sodium chloride      Intake/Output Summary (Last 24 hours) at 11/16/2021 1745  Last data filed at 11/16/2021 0937  Gross per 24 hour   Intake 210 ml   Output 175 ml   Net 35 ml       Physical Exam Performed:    BP (!) 148/97   Pulse 51   Temp 97.3 °F (36.3 °C) (Oral)   Resp 18   Ht 5' 5\" (1.651 m)   Wt 147 lb 11.3 oz (67 kg)   SpO2 98% BMI 24.58 kg/m²     General appearance: No apparent distress, appears stated age and cooperative. HEENT: Pupils equal, round, and reactive to light. Conjunctivae/corneas clear. Neck: Supple, with full range of motion. No jugular venous distention. Trachea midline. Respiratory:  Normal respiratory effort. Clear to auscultation, bilaterally without Rales/Wheezes/Rhonchi. Cardiovascular: Regular rate and rhythm with normal S1/S2 without murmurs, rubs or gallops. Abdomen: Soft, non-tender, non-distended with normal bowel sounds. Musculoskeletal: No clubbing, cyanosis or edema bilaterally. Full range of motion without deformity. Skin: Skin color, texture, turgor normal.  No rashes or lesions. Neurologic:  Neurovascularly intact without any focal sensory/motor deficits. Cranial nerves: II-XII intact, grossly non-focal.  Psychiatric: Alert and oriented, thought content appropriate, normal insight  Capillary Refill: Brisk,3 seconds, normal   Peripheral Pulses: +2 palpable, equal bilaterally       Labs:   Recent Labs     11/14/21  0555 11/15/21  0639 11/16/21  0643   WBC 5.3 6.2 7.1   HGB 14.5 14.7 15.7   HCT 43.0 43.5 46.1    156 161     Recent Labs     11/14/21  0555 11/15/21  0639 11/16/21  0643   * 138 136   K 4.2 4.6 4.5    107 106   CO2 19* 19* 19*   BUN 34* 35* 29*   CREATININE 0.7 0.7 0.7   CALCIUM 9.8 9.6 9.4     Recent Labs     11/14/21  0555 11/15/21  0639 11/16/21  0643   AST 39* 38* 48*   ALT 27 32 47*   BILITOT 0.3 0.3 0.4   ALKPHOS 65 65 66     Recent Labs     11/14/21  0556 11/15/21  0639 11/16/21  0643   INR 0.98 1.14* 1.21*     No results for input(s): CKTOTAL, TROPONINI in the last 72 hours.     Urinalysis:      Lab Results   Component Value Date    NITRU POSITIVE 11/12/2021    WBCUA 10-20 11/12/2021    BACTERIA 4+ 11/12/2021    RBCUA 3-4 11/12/2021    BLOODU Negative 11/12/2021    SPECGRAV 1.020 11/12/2021    GLUCOSEU Negative 11/12/2021       Radiology:  CT CHEST PULMONARY EMBOLISM W CONTRAST   Final Result   No evidence of a pulmonary embolus. Mildly dilated and atherosclerotic thoracic aorta with no aneurysm or   dissection. Moderate subpleural and interstitial ground-glass opacities scattered   throughout both lungs which probably represents multisegmental   bronchopneumonia secondary to the patient's known COVID-19 disease      Single moderate compression fracture of T11 which may be chronic. Recommend   clinical follow-up of the area. XR CHEST PORTABLE   Final Result   Bilateral airspace disease suggestive of atypical viral pneumonia. Superimposed edema not excluded. Assessment/Plan:    Active Hospital Problems    Diagnosis     Acute respiratory failure due to severe acute respiratory syndrome coronavirus 2 (SARS-CoV-2) infection ([facility]) [U07.1, J96.00]     Current chronic use of systemic steroids [Z79.52]     Giant cell arteritis ([facility]) [M31.6]     PMR (polymyalgia rheumatica) ([facility]) [M35.3]     HTN (hypertension) [I10]      -Pneumonia due to COVID-19--continue IV remdesivir and dexamethasone. Did not meet criteria for Baricitinib with low crp-downtrending     -Acute respiratory failure with hypoxia due to Covid pneumonia--has required 4 to 6 L nasal cannula--continue supplemental oxygen wean as tolerated     -UTI due to E. Coli, uncomplicated--denies having any significant LUTS on presentation--has been on IV Rocephin since 11/12-dc tomorrow. Serum procalcitonin has been normal     -Essential hypertension -uncontrolled--continue metoprolol and hydralazine--increased dose of hydralazine     -History of PMR/GCA--- on chronic prednisone, it appears she has also been treated with Tocilizumab previously--currently stable    DVT Prophylaxis: Lovenox BID  Diet: ADULT DIET;  Regular; Low Fat/Low Chol/High Fiber/ABDI  Code Status: Full Code    PT/OT Eval Status: Consulted    Dispo - pending clinical improvement, approx 2 days    MARK Mckay - CNP

## 2021-11-16 NOTE — PLAN OF CARE
Problem: Falls - Risk of:  Goal: Will remain free from falls  Description: Will remain free from falls  Outcome: Ongoing     Problem: Gas Exchange - Impaired  Goal: Absence of hypoxia  Outcome: Ongoing     Problem:  Body Temperature -  Risk of, Imbalanced  Goal: Ability to maintain a body temperature within defined limits  Outcome: Ongoing     Problem: Nutrition Deficits  Goal: Optimize nutritional status  Outcome: Ongoing     Problem: Fatigue  Goal: Verbalize increase energy and improved vitality  Outcome: Ongoing     Problem: Pain:  Goal: Pain level will decrease  Description: Pain level will decrease  Outcome: Ongoing

## 2021-11-16 NOTE — ACP (ADVANCE CARE PLANNING)
ADVANCED CARE PLANNING    Name:Anne Quinonez       :  2/3/1933              MRN:  [id number]  Admission Date: 2021  1:58 PM  Date of Discussion:  2021      Purpose of Encounter: Advanced care planning in light of admission, resp failure, covid  Parties in attendance: :Lenin Cruz, Allen Forman, MARK - CNP, Family members: Daughters  Decisional Capacity:Yes      Objective/Medical Story: Rashaad Ghosh a 80 y. o. female.   She began to feel generally ill 11/10/21.  She noticed severe fatigue which started after participating in PT.  She thought she might have over exerted herself, but fatigue persisted.  She sought care at an urgent care center. When evaluated there she has noted to be mildly hypoxemic so was sent to the ER.  While in the ER O2 flow rate was increased to 4L/min.  Patient would still desaturate into the mid 80's with ambulation even on 4L/min. She has received 2 doses of the Moderna vaccine.  Per [address] her second dose was 21. Active Diagnoses: Active Hospital Problems    Diagnosis Date Noted    Acute respiratory failure due to severe acute respiratory syndrome coronavirus 2 (SARS-CoV-2) infection ([facility]) [U07.1, J96.00] 2021    Current chronic use of systemic steroids [Z79.52] 2021    Giant cell arteritis ([facility]) [M31.6] 10/19/2020    PMR (polymyalgia rheumatica) ([facility] [M35.3] 2020    HTN (hypertension) [I10] 2014       These active diagnoses are of sufficient risk that focused discussion on advance care planning is indicated in order to allow the patient to thoughtfully consider personal goals of care; and if situations arise that prevent the ability to personally give input, to ensure appropriate representation of their personal desires for different levels and agressiveness of care. Goals of Care Determinations: Patient wishes to focus on treatment and recovery. Code Status:  At this time patient wishes to be Full Code         Time Spent on Advanced Planning Documents: 16 mins. The following items were considered in medical decision making:  Independent review of images , Review / order clinical lab tests. Review / order radiology tests, Decision to obtain old records. Advanced Care Planning Documents: Has ACP docs,not on record. Electronically signed by MARK Alvarado CNP on 11/16/2021 at 5:49 PM    Thank you Nini Stern DO for the opportunity to be involved in this patient's care. If you have any questions or concerns please feel free to contact me at [phone].

## 2021-11-16 NOTE — PROGRESS NOTES
Physical Therapy    Facility/Department: [facility] C5 - MED SURG/ORTHO  Initial Assessment    NAME: Syed Klein  : 2/3/1933  MRN: [id number]    Date of Service: 2021    Discharge Recommendations:  IP Rehab   PT Equipment Recommendations  Equipment Needed: No    Assessment   Body structures, Functions, Activity limitations: Decreased functional mobility ; Decreased strength; Decreased endurance; Decreased balance  Assessment: Pt referred for PT evaluation during current hospital stay with dx of COVID-19 PNA. Pt currently functioning below her baseline, requiring use of RW and min/CGA x 1 for safe transfers/amb within hospital room. Amb distance limited to ~35 feet due to decreased stamina and endurance. Pt demonstrates increased SOB/work of breathing with standing activity longer than ~1-2 minutes and during supine LE exercise. Recommend IP rehab at D/C in light of current deficits and pt's below baseline functioning. Pt reports she lives alone and does not have 24-hr sup/assist available at home. Treatment Diagnosis: Decreased endurance  Specific instructions for Next Treatment: Progress ther ex and mobility as tolerated  Prognosis: Good  Decision Making: Medium Complexity  PT Education: Goals; General Safety; Gait Training; PT Role; Plan of Care; Disease Specific Education; Functional Mobility Training; Equipment; Precautions; Transfer Training; Energy Conservation; Home Exercise Program  Patient Education: Disease-specific education: Pt educated on role of PT in hospital, current D/C recs, and benefits of regular OOB activity while in hospital in order to decrease risks of prolonged bedrest.  Pt verbalizes understanding.   REQUIRES PT FOLLOW UP: Yes  Activity Tolerance: Patient Tolerated treatment well; Patient limited by endurance  Activity Tolerance: Pt with increased SOB/work of breathing noted during supine LE ther ex; rest breaks of 1-2 minutes needed between SAQ, heel slides, and hip ABD/ADD due to increasing SOB. O2 sats remained in the mid-90s during most of session, decreasing to 85% immediately after amb 35 feet in hospital room. O2 sats rebounded to >92% after ~30 seconds of seated rest with cues for pursed-lip breathing. Patient Diagnosis(es): The primary encounter diagnosis was Acute respiratory failure with hypoxia ([address] Diagnoses of COVID-19 and Acute cystitis without hematuria were also pertinent to this visit. has a past medical history of Cellulitis of left lower leg, Closed pelvic rim fracture, Fibromyalgia, Foot drop, right foot, Fractures, History of blood transfusion, History of temporal artery biopsy, Hx of blood clots, PNA (pneumonia), Prepatellar bursitis of right knee, Rheumatoid arthritis ([address] Venous ulcer of left leg ([address] and Venous ulcer of right leg ([address] has a past surgical history that includes Tonsillectomy and Vein Surgery (Right, 10/27/2016). Restrictions  Restrictions/Precautions  Restrictions/Precautions: General Precautions, Up as Tolerated  Position Activity Restriction  Other position/activity restrictions: High fall risk per nursing assessment, up with assistance, 5L O2 via NC, telemetry with continuous pulse oximetry  Vision/Hearing  Vision: Impaired  Vision Exceptions: Wears glasses for reading  Hearing: Exceptions to [facility]  Hearing Exceptions: Hard of hearing/hearing concerns; Bilateral hearing aid (pt does not have her hearing aids with her at hospital)     Subjective  General  Chart Reviewed: Yes  Patient assessed for rehabilitation services?: Yes  Additional Pertinent Hx: R foot drop  Family / Caregiver Present: No  Referring Practitioner: Diya Castillo MD  Referral Date : 11/15/21  Diagnosis: PNA due to COVID-19, acute cystitis  Follows Commands: Within Functional Limits  General Comment  Comments: Pt sitting up in chair upon entry of PT  Subjective  Subjective: Pt agreeable to work with PT this afternoon, very pleasant and cooperative.   Pt estimates she's been sitting up in chair for about an hour and now feels ready to return to bed. Pt states she feels she'll need some rehab prior to returning home. Pain Screening  Patient Currently in Pain: Denies   Intervention List: Patient able to continue with treatment    Vital Signs  Pulse: 51  Heart Rate Source: Monitor  BP: (!) 148/97  BP Location: Left upper arm  Patient Position: Sitting; Up in chair  Patient Currently in Pain: Denies  Oxygen Therapy  SpO2: 98 %  Pulse Oximeter Device Mode: Continuous  Pulse Oximeter Device Location: Finger; Left  O2 Device: Nasal cannula  O2 Flow Rate (L/min): 5 L/min    Orientation  Orientation  Overall Orientation Status: Within Normal Limits     Social/Functional History  Social/Functional History  Lives With: Alone  Type of Home: House  Home Layout: One level  Home Access: Stairs to enter with rails  Entrance Stairs - Number of Steps: 3 DURGA, grippers on steps  Entrance Stairs - Rails: Both  Bathroom Shower/Tub: Walk-in shower  Bathroom Toilet: Handicap height  Bathroom Equipment: Grab bars in shower, Built-in shower seat, Hand-held shower  Home Equipment: 4 wheeled walker (pt does not typically wear home O2)  ADL Assistance: Independent  Homemaking Assistance: Independent  Homemaking Responsibilities: Yes  Meal Prep Responsibility: Primary  Laundry Responsibility: Primary  Cleaning Responsibility: Primary  Bill Paying/Finance Responsibility: Primary  Shopping Responsibility: Primary  Ambulation Assistance: Independent (using [id number])  Transfer Assistance: Independent  Active : Yes  Mode of Transportation: Car  Occupation: Retired  Type of occupation:   Additional Comments: Pt's  recently passed away. If pt needed help, daughter and son-in-law live close by but cannot provide 24-hr sup/assist.    Objective  Observation/Palpation  Posture: Good    RLE AROM: WFL (WFL hip & knee);  Exception: Pt lacks active ankle DF due to baseline foot drop; ankle PF WFL  LLE AROM : WFL  Strength RLE: Grossly 4-/5 to 4/5 throughout hip & knee, 0/5 ankle DF, 4-/5 ankle PF  Strength LLE: Grossly 4-/5 to 4/5 throughout    Sensation  Overall Sensation Status: Impaired (pt with some baseline numbness in B feet due to neuropathy)     Bed mobility  Supine to Sit: Unable to assess (pt OOB upon entry of PT)  Sit to Supine: Supervision  Scooting: Supervision (to scoot up in bed)     Transfers  Sit to Stand: Contact guard assistance  Stand to sit: Contact guard assistance  Bed to Chair: Contact guard assistance (using RW, moving from chair>bed)     Ambulation  Surface: level tile  Device: Rolling Walker  Other Apparatus: O2 (5L O2)  Assistance: Minimal assistance; Contact guard assistance  Quality of Gait: Pt amb with slow cindy and decreased stride length B. Steppage gait noted on R secondary to baseline foot drop. Mildly unsteady with assist level increasing to Vero x 1 during turns. Gait Deviations: Slow Cindy; Decreased step length; Decreased step height  Distance: x 35 feet  Comments: Amb distance limited by c/o fatigue and increased work of breathing. Balance  Posture: Good  Sitting - Static: Good  Sitting - Dynamic: Fair; +  Standing - Static: Fair; + (using RW)  Standing - Dynamic: Fair (using RW)     Exercises  Quad Sets: x 15 BLE  Heelslides: x 10 BLE (I)  Gluteal Sets: x 15 bilat  Hip Abduction: x 10 BLE in supine  Knee Short Arc Quad: x 15 BLE (I)  Ankle Pumps: x 15 BLE (passive DF on R due to baseline foot drop)     Plan   Times per week: 2-3x/week in acute care  Times per day: Daily  Specific instructions for Next Treatment: Progress ther ex and mobility as tolerated  Current Treatment Recommendations: Strengthening, Balance Training, Endurance Training, Functional Mobility Training, Transfer Training, Gait Training, Home Exercise Program, Safety Education & Training, Equipment Evaluation, Education, & procurement  Safety Devices:  All fall risk

## 2021-11-16 NOTE — PROGRESS NOTES
Occupational Therapy  Facility/Department: [facility] C5 - MED SURG/ORTHO  Daily Treatment Note  NAME: Jacques Enrique  : 2/3/1933  MRN: [id number]    Date of Service: 2021    Discharge Recommendations:  IP Rehab       Assessment   Performance deficits / Impairments: Decreased functional mobility ; Decreased ADL status; Decreased strength; Decreased endurance; Decreased high-level IADLs; Decreased balance; Decreased safe awareness  Assessment: Pt tolerated OT session well, demos good progress toward OT goals requiring CGA for functional transfers. Pt with gabriella at start of session, pt verbalizes continence at baseline and educated on getting up to [facility] with staff assist to increase activity level for toileting needs. Pt verbalizes and demos understanding of UE therex to complete as HEP when therapy not present. Pt functioning below her baseline and would benefit from continued skilled OT to address the above noted occupational performance deficits in IPR setting at d/c. Prognosis: Good  OT Education: OT Role; Plan of Care; Home Exercise Program; Energy Conservation; Transfer Training; ADL Adaptive Strategies  Patient Education: disease specific: pursed lip breathing, energy conservation, importance of OOB  Disease Specific Education: Pt educated on importance of OOB mobility, prevention of complications of bedrest, and general safety during hospitalization. Pt verbalized understanding.   Barriers to Learning: none perceived  REQUIRES OT FOLLOW UP: Yes  Activity Tolerance  Activity Tolerance: Patient Tolerated treatment well  Activity Tolerance: Vitals: BP= 176/82, HR= 56 at start of session up to 145 with activity, SPO2= 86-94% on 5L, pt recovers quickly from desaturation with seated rest and pursed lip breathing  Safety Devices  Safety Devices in place: Yes  Type of devices: Left in chair; Chair alarm in place; Call light within reach; Nurse notified; Gait belt         Patient Diagnosis(es): The primary encounter diagnosis was Acute respiratory failure with hypoxia ([facility] [address] Diagnoses of COVID-19 and Acute cystitis without hematuria were also pertinent to this visit. has a past medical history of Cellulitis of left lower leg, Closed pelvic rim fracture, Fibromyalgia, Foot drop, right foot, Fractures, History of blood transfusion, History of temporal artery biopsy, Hx of blood clots, PNA (pneumonia), Prepatellar bursitis of right knee, Rheumatoid arthritis ([address] Venous ulcer of left leg ([address] and Venous ulcer of right leg ([address] has a past surgical history that includes Tonsillectomy and Vein Surgery (Right, 10/27/2016). Restrictions  Restrictions/Precautions  Restrictions/Precautions: General Precautions, Up as Tolerated  Position Activity Restriction  Other position/activity restrictions: 5L O2 NC, tele     Subjective   General  Chart Reviewed: Yes, Orders, History and Physical, Imaging, Labs  Patient assessed for rehabilitation services?: Yes  Additional Pertinent Hx: Patient is a 55-year-old female with past medical history of fibromyalgia, rheumatoid arthritis who presented to hospital for shortness of breath. Response to previous treatment: Patient with no complaints from previous session  Family / Caregiver Present: No  Referring Practitioner: Cory Jimenez MD    Subjective  Subjective: Pt resting in bed, pleasant and agreeable to OT treatment.     Vital Signs  Patient Currently in Pain: Denies     Orientation  Orientation  Overall Orientation Status: Within Normal Limits     Objective    ADL  LE Dressing: Minimal assistance (for balance donning pants over hips)  Toileting: Contact guard assistance     Balance  Sitting Balance: Independent  Standing Balance: Minimal assistance (primarily CGA, min A when donning pants over hips)  Standing Balance  Activity: multiple transfers from EOB to [facility], BS to bedside chair    Toilet Transfers  Toilet - Technique: Stand step  Equipment Used: Standard bedside commode  Toilet Transfer: Contact guard assistance    Bed mobility  Supine to Sit: Stand by assistance (to R with HOB elevated)     Transfers  Sit to stand: Contact guard assistance  Stand to sit: Contact guard assistance     Cognition  Overall Cognitive Status: WFL     Type of ROM/Therapeutic Exercise  Comment: Re-educated pt on UE therex, pt verbalizes and demos understanding      Plan   Plan  Times per week: 3-5x per week  Times per day: Daily  Current Treatment Recommendations: Strengthening, Endurance Training, ROM, Self-Care / ADL, Home Management Training, Patient/Caregiver Education & Training, Functional Mobility Training    AM-PAC Score  AM-[facility] Inpatient Daily Activity Raw Score: 19 (11/16/21 1440)  AM-PAC Inpatient ADL T-Scale Score : 40.22 (11/16/21 1440)  ADL Inpatient CMS 0-100% Score: 42.8 (11/16/21 1440)  ADL Inpatient CMS G-Code Modifier : CK (11/16/21 1440)    Goals  Short term goals  Time Frame for Short term goals: 1 week (11/21/21)  Short term goal 1: Pt will complete toilet transfer with CGA-- ongoing, CGA for BSC transfers 11/16/21  Short term goal 2: Pt will complete LBD with min A-- goal partially met for pants 11/16/21  Short term goal 3: Pt will complete grooming tasks in standing for 5 minutes for improvd endurance with O2 stats >90%-- ongoing 11/16/21  Short term goal 4: Pt will complete toileting with SBA. -- ongoing 11/16/21  Patient Goals   Patient goals : \"change myself\"       Therapy Time   Individual Concurrent Group Co-treatment   Time In 1330         Time Out 1426         Minutes [address], LINO/L

## 2021-11-16 NOTE — CARE COORDINATION
Writer spoke with pt, explained PT/OT recs for IPR. Pt would like referral to ARU, they would likely keep pt in room and change to IPR status at discharge. Will need [employer] precert. Writer gave referral to ARU liaison.   SHARIFA Harris-RN

## 2021-11-16 NOTE — PROGRESS NOTES
received consult for \"paperwork\" and patient's will related to her husbands death. She is not available presently to discern her needs. We have had advance directive conversations. We can help her complete advance directives if offered.

## 2021-11-17 LAB
A/G RATIO: 1.3 (ref 1.1–2.2)
ALBUMIN SERPL-MCNC: 2.9 G/DL (ref 3.4–5)
ALP BLD-CCNC: 62 U/L (ref 40–129)
ALT SERPL-CCNC: 44 U/L (ref 10–40)
ANION GAP SERPL CALCULATED.3IONS-SCNC: 12 MMOL/L (ref 3–16)
APTT: 27.8 SEC (ref 26.2–38.6)
AST SERPL-CCNC: 34 U/L (ref 15–37)
BASOPHILS ABSOLUTE: 0 K/UL (ref 0–0.2)
BASOPHILS RELATIVE PERCENT: 0.1 %
BILIRUB SERPL-MCNC: 0.4 MG/DL (ref 0–1)
BUN BLDV-MCNC: 33 MG/DL (ref 7–20)
C-REACTIVE PROTEIN: <3 MG/L (ref 0–5.1)
CALCIUM SERPL-MCNC: 9.2 MG/DL (ref 8.3–10.6)
CHLORIDE BLD-SCNC: 106 MMOL/L (ref 99–110)
CO2: 19 MMOL/L (ref 21–32)
CREAT SERPL-MCNC: 0.6 MG/DL (ref 0.6–1.2)
D DIMER: 506 NG/ML DDU (ref 0–229)
EOSINOPHILS ABSOLUTE: 0 K/UL (ref 0–0.6)
EOSINOPHILS RELATIVE PERCENT: 0 %
FERRITIN: 1058 NG/ML (ref 15–150)
FIBRINOGEN: 174 MG/DL (ref 200–397)
GFR AFRICAN AMERICAN: >60
GFR NON-AFRICAN AMERICAN: >60
GLUCOSE BLD-MCNC: 151 MG/DL (ref 70–99)
HCT VFR BLD CALC: 45.1 % (ref 36–48)
HEMOGLOBIN: 15.4 G/DL (ref 12–16)
INR BLD: 1.21 (ref 0.88–1.12)
LYMPHOCYTES ABSOLUTE: 0.4 K/UL (ref 1–5.1)
LYMPHOCYTES RELATIVE PERCENT: 7 %
MCH RBC QN AUTO: 32.9 PG (ref 26–34)
MCHC RBC AUTO-ENTMCNC: 34.1 G/DL (ref 31–36)
MCV RBC AUTO: 96.5 FL (ref 80–100)
MONOCYTES ABSOLUTE: 0.5 K/UL (ref 0–1.3)
MONOCYTES RELATIVE PERCENT: 7.3 %
NEUTROPHILS ABSOLUTE: 5.5 K/UL (ref 1.7–7.7)
NEUTROPHILS RELATIVE PERCENT: 85.6 %
PDW BLD-RTO: 13.5 % (ref 12.4–15.4)
PLATELET # BLD: 166 K/UL (ref 135–450)
PLATELET SLIDE REVIEW: ADEQUATE
PMV BLD AUTO: 8.1 FL (ref 5–10.5)
POTASSIUM REFLEX MAGNESIUM: 4.3 MMOL/L (ref 3.5–5.1)
PROTHROMBIN TIME: 13.8 SEC (ref 9.9–12.7)
RBC # BLD: 4.68 M/UL (ref 4–5.2)
SLIDE REVIEW: ABNORMAL
SODIUM BLD-SCNC: 137 MMOL/L (ref 136–145)
TOTAL PROTEIN: 5.1 G/DL (ref 6.4–8.2)
WBC # BLD: 6.4 K/UL (ref 4–11)

## 2021-11-17 PROCEDURE — 6360000002 HC RX W HCPCS: Performed by: INTERNAL MEDICINE

## 2021-11-17 PROCEDURE — 2580000003 HC RX 258: Performed by: NURSE PRACTITIONER

## 2021-11-17 PROCEDURE — 85025 COMPLETE CBC W/AUTO DIFF WBC: CPT

## 2021-11-17 PROCEDURE — 94761 N-INVAS EAR/PLS OXIMETRY MLT: CPT

## 2021-11-17 PROCEDURE — 82728 ASSAY OF FERRITIN: CPT

## 2021-11-17 PROCEDURE — 99223 1ST HOSP IP/OBS HIGH 75: CPT | Performed by: HOSPITALIST

## 2021-11-17 PROCEDURE — 80053 COMPREHEN METABOLIC PANEL: CPT

## 2021-11-17 PROCEDURE — 6370000000 HC RX 637 (ALT 250 FOR IP): Performed by: HOSPITALIST

## 2021-11-17 PROCEDURE — 85379 FIBRIN DEGRADATION QUANT: CPT

## 2021-11-17 PROCEDURE — 97535 SELF CARE MNGMENT TRAINING: CPT

## 2021-11-17 PROCEDURE — 6360000002 HC RX W HCPCS: Performed by: NURSE PRACTITIONER

## 2021-11-17 PROCEDURE — 6370000000 HC RX 637 (ALT 250 FOR IP): Performed by: INTERNAL MEDICINE

## 2021-11-17 PROCEDURE — 85730 THROMBOPLASTIN TIME PARTIAL: CPT

## 2021-11-17 PROCEDURE — 1200000000 HC SEMI PRIVATE

## 2021-11-17 PROCEDURE — 2580000003 HC RX 258: Performed by: INTERNAL MEDICINE

## 2021-11-17 PROCEDURE — 85610 PROTHROMBIN TIME: CPT

## 2021-11-17 PROCEDURE — 86140 C-REACTIVE PROTEIN: CPT

## 2021-11-17 PROCEDURE — 97110 THERAPEUTIC EXERCISES: CPT

## 2021-11-17 PROCEDURE — 2700000000 HC OXYGEN THERAPY PER DAY

## 2021-11-17 PROCEDURE — 85384 FIBRINOGEN ACTIVITY: CPT

## 2021-11-17 PROCEDURE — 97530 THERAPEUTIC ACTIVITIES: CPT

## 2021-11-17 PROCEDURE — 36415 COLL VENOUS BLD VENIPUNCTURE: CPT

## 2021-11-17 RX ORDER — HYDRALAZINE HYDROCHLORIDE 50 MG/1
50 TABLET, FILM COATED ORAL EVERY 8 HOURS SCHEDULED
Status: DISCONTINUED | OUTPATIENT
Start: 2021-11-17 | End: 2021-11-18

## 2021-11-17 RX ORDER — SODIUM BICARBONATE 650 MG/1
650 TABLET ORAL 2 TIMES DAILY
Status: DISCONTINUED | OUTPATIENT
Start: 2021-11-17 | End: 2021-11-18

## 2021-11-17 RX ADMIN — SODIUM BICARBONATE 650 MG TABLET 650 MG: at 20:36

## 2021-11-17 RX ADMIN — METOPROLOL SUCCINATE 100 MG: 50 TABLET, EXTENDED RELEASE ORAL at 09:05

## 2021-11-17 RX ADMIN — HYDRALAZINE HYDROCHLORIDE 25 MG: 25 TABLET, FILM COATED ORAL at 05:21

## 2021-11-17 RX ADMIN — ACETAMINOPHEN 650 MG: 325 TABLET ORAL at 22:31

## 2021-11-17 RX ADMIN — DEXAMETHASONE SODIUM PHOSPHATE 10 MG: 10 INJECTION INTRAMUSCULAR; INTRAVENOUS at 20:36

## 2021-11-17 RX ADMIN — SODIUM BICARBONATE 650 MG TABLET 650 MG: at 14:33

## 2021-11-17 RX ADMIN — Medication 10 ML: at 20:36

## 2021-11-17 RX ADMIN — ACETAMINOPHEN 650 MG: 325 TABLET ORAL at 05:21

## 2021-11-17 RX ADMIN — SODIUM CHLORIDE 25 ML: 9 INJECTION, SOLUTION INTRAVENOUS at 12:30

## 2021-11-17 RX ADMIN — Medication 2000 UNITS: at 09:05

## 2021-11-17 RX ADMIN — DEXAMETHASONE SODIUM PHOSPHATE 10 MG: 10 INJECTION INTRAMUSCULAR; INTRAVENOUS at 09:05

## 2021-11-17 RX ADMIN — HYDRALAZINE HYDROCHLORIDE 50 MG: 50 TABLET, FILM COATED ORAL at 20:36

## 2021-11-17 RX ADMIN — ENOXAPARIN SODIUM 30 MG: 30 INJECTION SUBCUTANEOUS at 09:05

## 2021-11-17 RX ADMIN — ENOXAPARIN SODIUM 30 MG: 30 INJECTION SUBCUTANEOUS at 20:36

## 2021-11-17 RX ADMIN — CEFTRIAXONE SODIUM 1000 MG: 1 INJECTION, POWDER, FOR SOLUTION INTRAMUSCULAR; INTRAVENOUS at 12:31

## 2021-11-17 ASSESSMENT — PAIN DESCRIPTION - LOCATION
LOCATION: HEAD
LOCATION: HEAD;THROAT

## 2021-11-17 ASSESSMENT — PAIN SCALES - GENERAL
PAINLEVEL_OUTOF10: 0
PAINLEVEL_OUTOF10: 2
PAINLEVEL_OUTOF10: 0
PAINLEVEL_OUTOF10: 3

## 2021-11-17 ASSESSMENT — PAIN DESCRIPTION - DESCRIPTORS: DESCRIPTORS: ACHING

## 2021-11-17 ASSESSMENT — PAIN DESCRIPTION - ONSET: ONSET: ON-GOING

## 2021-11-17 ASSESSMENT — PAIN DESCRIPTION - PAIN TYPE
TYPE: ACUTE PAIN
TYPE: ACUTE PAIN

## 2021-11-17 ASSESSMENT — PAIN DESCRIPTION - FREQUENCY: FREQUENCY: CONTINUOUS

## 2021-11-17 ASSESSMENT — PAIN DESCRIPTION - ORIENTATION: ORIENTATION: MID

## 2021-11-17 NOTE — PLAN OF CARE
Problem: Falls - Risk of:  Goal: Will remain free from falls  Description: Will remain free from falls  Outcome: Ongoing     Problem: Risk for Fluid Volume Deficit  Goal: Maintain normal heart rhythm  Outcome: Ongoing     Problem: Loneliness or Risk for Loneliness  Goal: Demonstrate positive use of time alone when socialization is not possible  Outcome: Ongoing     Problem: Pain:  Goal: Pain level will decrease  Description: Pain level will decrease  Outcome: Ongoing

## 2021-11-17 NOTE — CONSULTS
Patient: Chana Hodgkins  [id number]  Date: 11/17/2021      Chief Complaint: covid    History of Present Illness/Hospital Course:  Ms Carey Hammer is an 80year old female admitted 11/12/2021 after beginning to feel ill on 11/10. She initially sought care at an urgent care center but was noted to be hypoxemic and was sent to the ED. She was found to be hypoxic requiring 4L of oxygen. She tested positive for covid in the context of having received both doses of moderna vaccine in early 2021. She was treated with remdesivir and dexamethasone. Oxygen requirement improving. Hospital stay notable for UTI. Agreeable to ARU. has a past medical history of Cellulitis of left lower leg, Closed pelvic rim fracture, Fibromyalgia, Foot drop, right foot, Fractures, History of blood transfusion, History of temporal artery biopsy, Hx of blood clots, PNA (pneumonia), Prepatellar bursitis of right knee, Rheumatoid arthritis ([address] Venous ulcer of left leg ([address] and Venous ulcer of right leg ([address] has a past surgical history that includes Tonsillectomy and Vein Surgery (Right, 10/27/2016). reports that she has never smoked. She has never used smokeless tobacco. She reports current alcohol use. She reports that she does not use drugs. family history includes Stroke in her father and mother. REVIEW OF SYSTEMS:   CONSTITUTIONAL: negative for fevers, chills, diaphoresis, activity change, appetite change, fatigue, night sweats and unexpected weight change.    EYES: negative for blurred vision, eye discharge, visual disturbance and icterus  HEENT: negative for hearing loss, tinnitus, ear drainage, sinus pressure, nasal congestion, epistaxis and snoring  RESPIRATORY: Negative for hemoptysis, cough, sputum production  CARDIOVASCULAR: negative for chest pain, palpitations, exertional chest pressure/discomfort, edema, syncope  GASTROINTESTINAL: negative for nausea, vomiting, diarrhea, constipation, blood in stool and abdominal pain  GENITOURINARY: negative for frequency, dysuria, urinary incontinence, decreased urine volume, and hematuria  HEMATOLOGIC/LYMPHATIC: negative for easy bruising, bleeding and lymphadenopathy  ALLERGIC/IMMUNOLOGIC: negative for recurrent infections, angioedema, anaphylaxis and drug reactions  ENDOCRINE: negative for weight changes and diabetic symptoms including polyuria, polydipsia and polyphagia  MUSCULOSKELETAL: negative for pain, joint swelling, decreased range of motion and muscle weakness  NEUROLOGICAL: negative for headaches, slurred speech, unilateral weakness  PSYCHIATRIC/BEHAVIORAL: negative for hallucinations, behavioral problems, confusion and agitation. Physical Examination:  Vitals: Patient Vitals for the past 24 hrs:   BP Temp Temp src Pulse Resp SpO2   11/17/21 0843 (!) 148/77 97.3 °F (36.3 °C) Oral 55 18 95 %   11/17/21 0258 (!) 169/94 97.3 °F (36.3 °C) Oral 51 16 90 %   11/16/21 2305 (!) 163/85 97.2 °F (36.2 °C) Oral 50 16 91 %   11/16/21 1940 (!) 171/88 97.4 °F (36.3 °C) Oral 52 16 92 %   11/16/21 1453 (!) 148/97 -- -- 51 -- 98 %     Mood: Stable  Const: No distress  ENT: Oral mucosa moist  Eyes: No discharge or injection  CV: Regular rate and rhythm, no murmur rub or gallop noted  Resp: Lungs clear to auscultation bilaterally, no rales wheezes or ronchi  GI: Soft, nontender, nondistended. Normal bowel sounds. Neuro: Alert, oriented, appropriate. No cranial nerve deficits appreciated. Sensation intact to light touch. Motor examination reveals nonfocal weakness  Skin: No lesions or rashes noted. MSK: No joint abnormalities noted.        Lab Results   Component Value Date    WBC 6.4 11/17/2021    HGB 15.4 11/17/2021    HCT 45.1 11/17/2021    MCV 96.5 11/17/2021     11/17/2021     Lab Results   Component Value Date    INR 1.21 (H) 11/17/2021    INR 1.21 (H) 11/16/2021    INR 1.14 (H) 11/15/2021    PROTIME 13.8 (H) 11/17/2021    PROTIME 13.8 (H) 11/16/2021    PROTIME 12.9 (H) 11/15/2021     Lab Results   Component Value Date    CREATININE 0.6 11/17/2021    BUN 33 (H) 11/17/2021     11/17/2021    K 4.3 11/17/2021     11/17/2021    CO2 19 (L) 11/17/2021     Lab Results   Component Value Date    ALT 44 (H) 11/17/2021    AST 34 11/17/2021    ALKPHOS 62 11/17/2021    BILITOT 0.4 11/17/2021     CT CHEST PULMONARY EMBOLISM W CONTRAST   Final Result   No evidence of a pulmonary embolus.       Mildly dilated and atherosclerotic thoracic aorta with no aneurysm or   dissection.       Moderate subpleural and interstitial ground-glass opacities scattered   throughout both lungs which probably represents multisegmental   bronchopneumonia secondary to the patient's known COVID-19 disease       Single moderate compression fracture of T11 which may be chronic. Recommend   clinical follow-up of the area.           XR CHEST PORTABLE   Final Result   Bilateral airspace disease suggestive of atypical viral pneumonia. Superimposed edema not excluded.           Assessment:  1. covid pneumonia   2. Resp failure   3. uti   4. debility     Recommendations:  Patient with new functional deficits and ongoing medical complexity. Demonstrates ability to tolerate 3 hours therapy/day. She is a good candidate for acute inpatient rehab when medically appropriate. Insurance precert required. Thank you for this consult. Please contact me with any questions or concerns. Walt Kellogg MD 11/17/2021, 11:35 AM

## 2021-11-17 NOTE — PROGRESS NOTES
Hospitalist Progress Note      PCP: Ramond Schaumann, DO    Date of Admission: 11/12/2021    Chief Complaint: +covid, hypoxemia, fatigue    Hospital Course: Yash Carson is a 80 y.o. female. She began to feel generally ill 11/10/21. She noticed severe fatigue which started after participating in PT. She thought she might have over exerted herself, but fatigue persisted. She sought care at an urgent care center. When evaluated there she has noted to be mildly hypoxemic so was sent to the ER. While in the ER O2 flow rate was increased to 4L/min. Patient would still desaturate into the mid 80's with ambulation even on 4L/min. She has received 2 doses of the Moderna vaccine. Per [address] her second dose was 2/16/21. Subjective: SOB/Fatigue, discussed poc with patient and family      Medications:  Reviewed    Infusion Medications    sodium chloride 25 mL (11/16/21 7372)     Scheduled Medications    hydrALAZINE  25 mg Oral 3 times per day    metoprolol succinate  100 mg Oral Daily    Vitamin D  2,000 Units Oral Daily    dexamethasone  10 mg IntraVENous Q24H    enoxaparin  30 mg SubCUTAneous BID    cefTRIAXone (ROCEPHIN) IV  1,000 mg IntraVENous Q24H     PRN Meds: sodium chloride flush, sodium chloride, potassium chloride **OR** potassium alternative oral replacement **OR** potassium chloride, magnesium sulfate, ondansetron **OR** ondansetron, acetaminophen **OR** acetaminophen, guaiFENesin-dextromethorphan, benzonatate, albuterol sulfate HFA, sodium chloride    No intake or output data in the 24 hours ending 11/17/21 1225    Physical Exam Performed:    BP (!) 148/77   Pulse 55   Temp 97.3 °F (36.3 °C) (Oral)   Resp 18   Ht 5' 5\" (1.651 m)   Wt 147 lb 11.3 oz (67 kg)   SpO2 95%   BMI 24.58 kg/m²     General appearance: No apparent distress, appears stated age and cooperative. HEENT: Pupils equal, round, and reactive to light. Conjunctivae/corneas clear. Neck: Supple, with full range of motion.  No jugular venous distention. Trachea midline. Respiratory:  Sl labored with exertion. No wheezes  Cardiovascular: Regular rate and rhythm with normal S1/S2 without murmurs, rubs or gallops. Abdomen: Soft, non-tender, non-distended with normal bowel sounds. Musculoskeletal: No clubbing, cyanosis or edema bilaterally. Full range of motion without deformity. Skin: Skin color, texture, turgor normal.  No rashes or lesions. Neurologic:  Neurovascularly intact without any focal sensory/motor deficits. Cranial nerves: II-XII intact, grossly non-focal.  Psychiatric: Alert and oriented, thought content appropriate, normal insight  Capillary Refill: Brisk,3 seconds, normal   Peripheral Pulses: +2 palpable, equal bilaterally       Labs:   Recent Labs     11/15/21  0639 11/16/21  0643 11/17/21  0614   WBC 6.2 7.1 6.4   HGB 14.7 15.7 15.4   HCT 43.5 46.1 45.1    161 166     Recent Labs     11/15/21  0639 11/16/21  0643 11/17/21  0614    136 137   K 4.6 4.5 4.3    106 106   CO2 19* 19* 19*   BUN 35* 29* 33*   CREATININE 0.7 0.7 0.6   CALCIUM 9.6 9.4 9.2     Recent Labs     11/15/21  0639 11/16/21  0643 11/17/21  0614   AST 38* 48* 34   ALT 32 47* 44*   BILITOT 0.3 0.4 0.4   ALKPHOS 65 66 62     Recent Labs     11/15/21  0639 11/16/21  0643 11/17/21  0614   INR 1.14* 1.21* 1.21*     No results for input(s): CKTOTAL, TROPONINI in the last 72 hours. Urinalysis:      Lab Results   Component Value Date    NITRU POSITIVE 11/12/2021    WBCUA 10-20 11/12/2021    BACTERIA 4+ 11/12/2021    RBCUA 3-4 11/12/2021    BLOODU Negative 11/12/2021    SPECGRAV 1.020 11/12/2021    GLUCOSEU Negative 11/12/2021       Radiology:  CT CHEST PULMONARY EMBOLISM W CONTRAST   Final Result   No evidence of a pulmonary embolus. Mildly dilated and atherosclerotic thoracic aorta with no aneurysm or   dissection.       Moderate subpleural and interstitial ground-glass opacities scattered   throughout both lungs which probably represents multisegmental   bronchopneumonia secondary to the patient's known COVID-19 disease      Single moderate compression fracture of T11 which may be chronic. Recommend   clinical follow-up of the area. XR CHEST PORTABLE   Final Result   Bilateral airspace disease suggestive of atypical viral pneumonia. Superimposed edema not excluded. Assessment/Plan:    Active Hospital Problems    Diagnosis     Acute respiratory failure due to severe acute respiratory syndrome coronavirus 2 (SARS-CoV-2) infection ([facility]) [U07.1, J96.00]     Current chronic use of systemic steroids [Z79.52]     Giant cell arteritis ([facility]) [M31.6]     PMR (polymyalgia rheumatica) ([facility]) [M35.3]     HTN (hypertension) [I10]      -Pneumonia due to COVID-19--continue IV remdesivir and dexamethasone. Did not meet criteria for Baricitinib with low crp-downtrending     -Acute respiratory failure with hypoxia due to Covid pneumonia--has required 4 to 6 L nasal cannula--continue supplemental oxygen wean as tolerated     -UTI due to E. Coli, uncomplicated--denies having any significant LUTS on presentation--has been on IV Rocephin since 11/12-dc tomorrow. Serum procalcitonin has been normal    -Acid-base imbalance  Nephrology consulted  Bicarb tabs     -Essential hypertension -uncontrolled--continue metoprolol and hydralazine--increased dose of hydralazine     -History of PMR/GCA--- on chronic prednisone, it appears she has also been treated with Tocilizumab previously--currently stable    DVT Prophylaxis: Lovenox BID  Diet: ADULT DIET;  Regular; Low Fat/Low Chol/High Fiber/ABDI  Code Status: Full Code    PT/OT Eval Status: Consulted    Dispo - pending clinical improvement-ARU 1-2 days    Kori Riley, APRN - CNP

## 2021-11-17 NOTE — PROGRESS NOTES
Occupational Therapy  Facility/Department: [facility] C5 - MED SURG/ORTHO  Daily Treatment Note  NAME: Harl Moritz  : 2/3/1933  MRN: [id number]    Date of Service: 2021    Discharge Recommendations:  IP Rehab  OT Equipment Recommendations  Other: defer  Harl Moritz scored a 16/24 on the AM-PAC ADL Inpatient form. Current research shows that an AM-PAC score of 17 or less is typically not associated with a discharge to the patient's home setting. Based on the patient's AM-PAC score and their current ADL deficits, it is recommended that the patient have 5-7 sessions per week of Occupational Therapy at d/c to increase the patient's independence. At this time, this patient demonstrates the endurance, and/or tolerance for 3 hours of therapy each day, with a treatment frequency of 5-7x/wk. Please see assessment section for further patient specific details. If patient discharges prior to next session this note will serve as a discharge summary. Please see below for the latest assessment towards goals. Assessment   Performance deficits / Impairments: Decreased functional mobility ; Decreased ADL status; Decreased strength; Decreased endurance; Decreased high-level IADLs; Decreased balance; Decreased safe awareness  Assessment: Pt demonstrated decreased activity tolerance during funcitonal mobility and bed mobility this date. Pt completed bed level grooming, however unable to tolerate OOB tasks 2/2 dizziness and likely low BP. Pt highly motivated however to participate in therapy, was eager to be able to move around when she is feeling better. Pt would benefit from continued OT while in acute care. At this point feel pt would benefit from IPR at discharge prior to home. Continue OT POC. Prognosis: Good  OT Education: OT Role; Plan of Care; Home Exercise Program; Energy Conservation; Transfer Training; ADL Adaptive Strategies  Patient Education: Ed pt on importance of OOB- verb understanding.  Ed pt on orthostatic session  Family / Caregiver Present: No  Referring Practitioner: Alee Miles MD  Subjective  Subjective: Pt semi supine in bed, agreeable to OT session for mobility and ADLs. Orientation  Orientation  Overall Orientation Status: Within Normal Limits  Objective    ADL  Feeding: Beverage management; Setup  Grooming:  (washing face in bed)        Balance  Sitting Balance: Contact guard assistance (CGA 2/2 reported dizziness. Seated EOB x ~4 mins)  Standing Balance: Unable to assess(comment) (2/2 dizziness and low BP (see activity tolerance section for further details))  Bed mobility  Supine to Sit: Contact guard assistance  Sit to Supine: Stand by assistance                          Cognition  Cognition Comment: pt reported difficulty remembering things lately. Appearing lethargic throughout session                    Type of ROM/Therapeutic Exercise  Comment: Performed while sitting EOB in effort to increase BP/ decrease dizziness symptoms with sitting upright. Pt reporting having completed variety of UE exercises (written on board) while in bed. Exercises  Scapular Protraction: B UE x 10  Elbow Flexion: B UE x 10  Grasp/Release: B UE x 10                    Plan   Plan  Times per week: 3-5x per week  Times per day: Daily  Current Treatment Recommendations: Strengthening, Endurance Training, ROM, Self-Care / ADL, Home Management Training, Patient/Caregiver Education & Training, Functional Mobility Training    Goals  Short term goals  Time Frame for Short term goals: 1 week (11/21/21)  Short term goal 1: Pt will complete toilet transfer with CGA-- ongoing, not assessed 11/17 2/2 dizziness  Short term goal 2: Pt will complete LBD with min A-- goal partially met for pants 11/16/21, ongoing 11/17  Short term goal 3: Pt will complete grooming tasks in standing for 5 minutes for improvd endurance with O2 stats >90%-- ongoing 11/17/21  Short term goal 4: Pt will complete toileting with SBA. -- ongoing 11/17/21  Patient Goals   Patient goals : Cirilo Clarosve myself\"       Therapy Time   Individual Concurrent Group Co-treatment   Time In 3267         Time Out 7564         Minutes 40         Timed Code Treatment Minutes: [address] JOYCE  [address] [facility], OTR/L [id number]

## 2021-11-17 NOTE — CARE COORDINATION
Chart review- ARU has accepted and have started precert. Pt will need to start her therapy in room then transition to ARU once 20 days out from testing. Cm will continue to follow. 4:17 PM  CM spoke with pt's daughter, Bartolome Villar. Explained Precert started for ARU. CM explained may take 1-2 more days. Cm emailed SNF list in case they need it. Explained SNF list for COVID is very limited. CM emailed list to [email or web address].

## 2021-11-17 NOTE — CONSULTS
Nephrology Consult Note                                                                                                                                                                                                                                                                                                                                                               Office : [phone]     Fax :[phone]              Patient's Name: Kirby Postal  12:51 PM  11/17/2021    Reason for Consult:  Metabolic acidosis    Requesting Physician:  Mode Danielson DO      Chief Complaint:  SOB, PNA    History of Present Ilness: Kirby Balderas is a 80 y.o. female with PMH of RA , HTN     Being treated for Acute respiratory failure 2/2 COVID 19 PNA    Getting decadron   S/p remdesivir    Denies diarrhea    O2 requirement stable    Making urine        Past Medical History:   Diagnosis Date    Cellulitis of left lower leg 4/21/2019    Closed pelvic rim fracture 9/4/2014    Fibromyalgia     Foot drop, right foot     Fractures     History of blood transfusion     1997    History of temporal artery biopsy 10/06/2020    Hx of blood clots     PNA (pneumonia) 12/1997    Prepatellar bursitis of right knee 9/15/2020    Rheumatoid arthritis ([address]     Venous ulcer of left leg ([address] 08/2019    Venous ulcer of right leg ([address] 10/2016       Past Surgical History:   Procedure Laterality Date    TONSILLECTOMY      VEIN SURGERY Right 10/27/2016    saphenous VNUS closure       Family History   Problem Relation Age of Onset    Stroke Mother     Stroke Father         reports that she has never smoked. She has never used smokeless tobacco. She reports current alcohol use. She reports that she does not use drugs.     Allergies:  Erythromycin, Hydrochlorothiazide, Silver sulfadiazine, and Doxycycline    Current Medications:    hydrALAZINE (APRESOLINE) tablet 25 mg, 3 times per day  metoprolol succinate (TOPROL XL) extended release tablet 100 mg, Daily  sodium chloride flush 0.9 % injection 10 mL, PRN  0.9 % sodium chloride infusion, PRN  potassium chloride (KLOR-CON M) extended release tablet 40 mEq, PRN   Or  potassium bicarb-citric acid (EFFER-K) effervescent tablet 40 mEq, PRN   Or  potassium chloride 10 mEq/100 mL IVPB (Peripheral Line), PRN  magnesium sulfate 1000 mg in dextrose 5% 100 mL IVPB, PRN  ondansetron (ZOFRAN-ODT) disintegrating tablet 4 mg, Q8H PRN   Or  ondansetron (ZOFRAN) injection 4 mg, Q6H PRN  acetaminophen (TYLENOL) tablet 650 mg, Q6H PRN   Or  acetaminophen (TYLENOL) suppository 650 mg, Q6H PRN  guaiFENesin-dextromethorphan (ROBITUSSIN DM) 100-10 MG/5ML syrup 5 mL, Q4H PRN  Vitamin D (CHOLECALCIFEROL) tablet 2,000 Units, Daily  dexamethasone (DECADRON) injection 10 mg, Q24H  enoxaparin (LOVENOX) injection 30 mg, BID  cefTRIAXone (ROCEPHIN) 1000 mg IVPB in 50 mL D5W minibag, Q24H  benzonatate (TESSALON) capsule 200 mg, TID PRN  albuterol sulfate  (90 Base) MCG/ACT inhaler 2 puff, Q4H PRN  0.9 % sodium chloride bolus, PRN        Review of Systems:   14 point ROS obtained but were negative except mentioned in HPI      Physical exam:     Vitals:  BP (!) 148/77   Pulse 55   Temp 97.3 °F (36.3 °C) (Oral)   Resp 18   Ht 5' 5\" (1.651 m)   Wt 147 lb 11.3 oz (67 kg)   SpO2 95%   BMI 24.58 kg/m²   Constitutional:  OAA X3 NAD  Skin: no rash, turgor wnl  Heent:  eomi, mmm  Neck: no bruits or jvd noted  Cardiovascular:  S1, S2 without m/r/g  Respiratory: CTA B without w/r/r  Abdomen:  +bs, soft, nt, nd  Ext: no  lower extremity edema  Psychiatric: mood and affect appropriate  Musculoskeletal:  Rom, muscular strength intact    Data:   Labs:  CBC:   Recent Labs     11/15/21  0639 11/16/21  0643 11/17/21 0614   WBC 6.2 7.1 6.4   HGB 14.7 15.7 15.4    161 166     BMP:    Recent Labs     11/15/21  0639 11/16/21  0643 11/17/21 0614    136 137   K 4.6 4.5 4.3    106 106   CO2 19* 19* 19*   BUN 35* diarrhea    Renal fn wnl, GFR > 60 ml/hr    Plan    Will give sodium bicarb 650 mg PO BID     moniter bicarb and electrolytes        2.  COVID PNA    Acute hypoxic respiratory failure     On decadron  remdesvir  O2      3 HTN : uncontrolled      On Metoprolol  Increase Hydralazine to 50 mg  TID       4 Dementia          Thank you for allowing us to participate in care of Kevin Carrillo MD  Feel free to contact me   [facility] [address]  Office : [phone]  Fax :[phone]

## 2021-11-17 NOTE — PROGRESS NOTES
Consult Call Back    Who: Alex Adkins- nephrology  Date:11/17/2021,  Time:12:33 PM    Electronically signed by Sai Siegel on 11/17/21 at 12:33 PM EST

## 2021-11-17 NOTE — CARE COORDINATION
After review, this patient is felt to be:       []  Appropriate for Acute Inpatient Rehab    [x]  Appropriate for Acute Inpatient Rehab Pending Insurance Authorization    []  Not appropriate for Acute Inpatient Rehab    []  Not appropriate at this time, however evaluation ongoing           Precert initiated 81/36/32FI ARU admission. Will notify DCP with further updates.  Thank you for the Ashok Sparrow RN

## 2021-11-18 LAB
ANION GAP SERPL CALCULATED.3IONS-SCNC: 13 MMOL/L (ref 3–16)
APTT: 28.7 SEC (ref 26.2–38.6)
BASOPHILS ABSOLUTE: 0 K/UL (ref 0–0.2)
BASOPHILS RELATIVE PERCENT: 0 %
BUN BLDV-MCNC: 32 MG/DL (ref 7–20)
CALCIUM SERPL-MCNC: 9.2 MG/DL (ref 8.3–10.6)
CHLORIDE BLD-SCNC: 103 MMOL/L (ref 99–110)
CO2: 17 MMOL/L (ref 21–32)
CREAT SERPL-MCNC: 0.7 MG/DL (ref 0.6–1.2)
D DIMER: 531 NG/ML DDU (ref 0–229)
EOSINOPHILS ABSOLUTE: 0 K/UL (ref 0–0.6)
EOSINOPHILS RELATIVE PERCENT: 0 %
FERRITIN: 1114 NG/ML (ref 15–150)
FIBRINOGEN: 165 MG/DL (ref 200–397)
GFR AFRICAN AMERICAN: >60
GFR NON-AFRICAN AMERICAN: >60
GLUCOSE BLD-MCNC: 148 MG/DL (ref 70–99)
HCT VFR BLD CALC: 45.1 % (ref 36–48)
HEMOGLOBIN: 15.3 G/DL (ref 12–16)
INR BLD: 1.19 (ref 0.88–1.12)
LYMPHOCYTES ABSOLUTE: 0.3 K/UL (ref 1–5.1)
LYMPHOCYTES RELATIVE PERCENT: 5 %
MCH RBC QN AUTO: 32.9 PG (ref 26–34)
MCHC RBC AUTO-ENTMCNC: 34 G/DL (ref 31–36)
MCV RBC AUTO: 96.8 FL (ref 80–100)
MONOCYTES ABSOLUTE: 0.1 K/UL (ref 0–1.3)
MONOCYTES RELATIVE PERCENT: 2 %
NEUTROPHILS ABSOLUTE: 6 K/UL (ref 1.7–7.7)
NEUTROPHILS RELATIVE PERCENT: 93 %
PDW BLD-RTO: 13.9 % (ref 12.4–15.4)
PLATELET # BLD: 164 K/UL (ref 135–450)
PLATELET SLIDE REVIEW: ADEQUATE
PMV BLD AUTO: 8.2 FL (ref 5–10.5)
POTASSIUM SERPL-SCNC: 4.2 MMOL/L (ref 3.5–5.1)
PROTHROMBIN TIME: 13.6 SEC (ref 9.9–12.7)
RBC # BLD: 4.66 M/UL (ref 4–5.2)
SLIDE REVIEW: ABNORMAL
SODIUM BLD-SCNC: 133 MMOL/L (ref 136–145)
WBC # BLD: 6.5 K/UL (ref 4–11)

## 2021-11-18 PROCEDURE — 6370000000 HC RX 637 (ALT 250 FOR IP): Performed by: INTERNAL MEDICINE

## 2021-11-18 PROCEDURE — 85730 THROMBOPLASTIN TIME PARTIAL: CPT

## 2021-11-18 PROCEDURE — 2700000000 HC OXYGEN THERAPY PER DAY

## 2021-11-18 PROCEDURE — 85384 FIBRINOGEN ACTIVITY: CPT

## 2021-11-18 PROCEDURE — 36415 COLL VENOUS BLD VENIPUNCTURE: CPT

## 2021-11-18 PROCEDURE — 97116 GAIT TRAINING THERAPY: CPT

## 2021-11-18 PROCEDURE — 97530 THERAPEUTIC ACTIVITIES: CPT

## 2021-11-18 PROCEDURE — 1200000000 HC SEMI PRIVATE

## 2021-11-18 PROCEDURE — 6360000002 HC RX W HCPCS: Performed by: INTERNAL MEDICINE

## 2021-11-18 PROCEDURE — 82728 ASSAY OF FERRITIN: CPT

## 2021-11-18 PROCEDURE — 94761 N-INVAS EAR/PLS OXIMETRY MLT: CPT

## 2021-11-18 PROCEDURE — 85379 FIBRIN DEGRADATION QUANT: CPT

## 2021-11-18 PROCEDURE — 80048 BASIC METABOLIC PNL TOTAL CA: CPT

## 2021-11-18 PROCEDURE — 2580000003 HC RX 258: Performed by: INTERNAL MEDICINE

## 2021-11-18 PROCEDURE — 97110 THERAPEUTIC EXERCISES: CPT

## 2021-11-18 PROCEDURE — 99232 SBSQ HOSP IP/OBS MODERATE 35: CPT | Performed by: HOSPITALIST

## 2021-11-18 PROCEDURE — 6370000000 HC RX 637 (ALT 250 FOR IP): Performed by: HOSPITALIST

## 2021-11-18 PROCEDURE — 85610 PROTHROMBIN TIME: CPT

## 2021-11-18 PROCEDURE — 85025 COMPLETE CBC W/AUTO DIFF WBC: CPT

## 2021-11-18 RX ORDER — HYDRALAZINE HYDROCHLORIDE 50 MG/1
100 TABLET, FILM COATED ORAL EVERY 8 HOURS SCHEDULED
Status: DISCONTINUED | OUTPATIENT
Start: 2021-11-18 | End: 2021-11-30 | Stop reason: HOSPADM

## 2021-11-18 RX ORDER — SODIUM BICARBONATE 650 MG/1
650 TABLET ORAL 4 TIMES DAILY
Status: DISCONTINUED | OUTPATIENT
Start: 2021-11-18 | End: 2021-11-19

## 2021-11-18 RX ADMIN — DEXAMETHASONE SODIUM PHOSPHATE 10 MG: 10 INJECTION INTRAMUSCULAR; INTRAVENOUS at 22:55

## 2021-11-18 RX ADMIN — ACETAMINOPHEN 650 MG: 325 TABLET ORAL at 05:11

## 2021-11-18 RX ADMIN — HYDRALAZINE HYDROCHLORIDE 100 MG: 50 TABLET, FILM COATED ORAL at 22:56

## 2021-11-18 RX ADMIN — ENOXAPARIN SODIUM 30 MG: 30 INJECTION SUBCUTANEOUS at 22:55

## 2021-11-18 RX ADMIN — HYDRALAZINE HYDROCHLORIDE 50 MG: 50 TABLET, FILM COATED ORAL at 05:05

## 2021-11-18 RX ADMIN — Medication 2000 UNITS: at 10:23

## 2021-11-18 RX ADMIN — SODIUM BICARBONATE 650 MG TABLET 650 MG: at 22:56

## 2021-11-18 RX ADMIN — ENOXAPARIN SODIUM 30 MG: 30 INJECTION SUBCUTANEOUS at 10:24

## 2021-11-18 RX ADMIN — SODIUM BICARBONATE 650 MG TABLET 650 MG: at 16:14

## 2021-11-18 RX ADMIN — HYDRALAZINE HYDROCHLORIDE 100 MG: 50 TABLET, FILM COATED ORAL at 16:14

## 2021-11-18 RX ADMIN — ACETAMINOPHEN 650 MG: 325 TABLET ORAL at 22:56

## 2021-11-18 RX ADMIN — Medication 10 ML: at 22:57

## 2021-11-18 ASSESSMENT — PAIN DESCRIPTION - FREQUENCY: FREQUENCY: CONTINUOUS

## 2021-11-18 ASSESSMENT — PAIN SCALES - WONG BAKER: WONGBAKER_NUMERICALRESPONSE: 4

## 2021-11-18 ASSESSMENT — PAIN SCALES - GENERAL
PAINLEVEL_OUTOF10: 0
PAINLEVEL_OUTOF10: 0
PAINLEVEL_OUTOF10: 3
PAINLEVEL_OUTOF10: 0
PAINLEVEL_OUTOF10: 3
PAINLEVEL_OUTOF10: 0

## 2021-11-18 ASSESSMENT — PAIN DESCRIPTION - ORIENTATION: ORIENTATION: MID

## 2021-11-18 ASSESSMENT — PAIN DESCRIPTION - DESCRIPTORS: DESCRIPTORS: ACHING

## 2021-11-18 ASSESSMENT — PAIN DESCRIPTION - PAIN TYPE: TYPE: ACUTE PAIN

## 2021-11-18 ASSESSMENT — PAIN DESCRIPTION - LOCATION: LOCATION: HEAD

## 2021-11-18 ASSESSMENT — PAIN DESCRIPTION - ONSET: ONSET: ON-GOING

## 2021-11-18 NOTE — PROGRESS NOTES
Physical Therapy  Facility/Department: [place] [address] - MED SURG/ORTHO  Daily Treatment Note  NAME: Ajay Wells  : 2/3/1933  MRN: [id number]    Date of Service: 2021    Discharge Recommendations:  IP Rehab   PT Equipment Recommendations  Equipment Needed: No    Assessment   Body structures, Functions, Activity limitations: Decreased functional mobility ; Decreased strength; Decreased endurance; Decreased balance  Assessment: Pt currently functioning below her baseline, requiring use of RW and min/CGA x 1 for safe transfers/amb within hospital room. Amb distance limited to 25 feet due to decreased stamina and endurance. Recommend IP rehab at D/C in light of current deficits and pt's below baseline functioning. Pt reports she lives alone and does not have 24-hr sup/assist available at home. Treatment Diagnosis: Decreased endurance  Prognosis: Good  Decision Making: Medium Complexity  PT Education: Goals; General Safety; Gait Training; PT Role; Plan of Care; Disease Specific Education; Functional Mobility Training; Equipment; Precautions; Transfer Training; Energy Conservation; Home Exercise Program  Patient Education: Disease-specific education: Pt educated on role of PT in hospital, current D/C recs, and benefits of regular OOB activity while in hospital in order to decrease risks of prolonged bedrest.  Pt verbalizes understanding. REQUIRES PT FOLLOW UP: Yes  Activity Tolerance  Activity Tolerance: Patient Tolerated treatment well; Patient limited by endurance  Activity Tolerance: 164/87  HR 50  O2 94% 5L spo2     Patient Diagnosis(es): The primary encounter diagnosis was Acute respiratory failure with hypoxia ([facility] [address] [facility] Diagnoses of COVID-19 and Acute cystitis without hematuria were also pertinent to this visit.      has a past medical history of Cellulitis of left lower leg, Closed pelvic rim fracture, Fibromyalgia, Foot drop, right foot, Fractures, History of blood transfusion, History of temporal artery biopsy, Hx of blood clots, PNA (pneumonia), Prepatellar bursitis of right knee, Rheumatoid arthritis ([address] Venous ulcer of left leg ([address] and Venous ulcer of right leg ([address] has a past surgical history that includes Tonsillectomy and Vein Surgery (Right, 10/27/2016). Restrictions  Restrictions/Precautions  Restrictions/Precautions: General Precautions, Up as Tolerated  Position Activity Restriction  Other position/activity restrictions: High fall risk per nursing assessment, up with assistance, 4L O2 via NC, telemetry with continuous pulse oximetry  Subjective   General  Chart Reviewed: Yes  Additional Pertinent Hx: R foot drop  Family / Caregiver Present: No  Referring Practitioner: Sarah Beth Schneider MD  Subjective  Subjective: Pt agreeable to work with PT this afternoon  General Comment  Comments: Pt sitting up in chair upon entry of PT  Pain Screening  Patient Currently in Pain: Yes  Pain Assessment  Pain Assessment: Faces  Colón-Baker Pain Rating: Hurts little more  Non-Pharmaceutical Pain Intervention(s): Ambulation/Increased Activity; Repositioned  Vital Signs  Patient Currently in Pain: Yes       Orientation  Orientation  Overall Orientation Status: Within Normal Limits  Cognition      Objective   Bed mobility  Supine to Sit: Contact guard assistance  Sit to Supine: Contact guard assistance  Transfers  Sit to Stand: Minimal Assistance  Stand to sit: Minimal Assistance  Ambulation  Ambulation?: Yes  Ambulation 1  Surface: level tile  Device: Rolling Walker  Assistance: Minimal assistance; Contact guard assistance  Quality of Gait: Pt amb with slow cindy and decreased stride length B. Steppage gait noted on R secondary to baseline foot drop. Gait Deviations: Slow Cindy; Decreased step length; Decreased step height  Distance: 25 feet x2, 15' x 2  Comments: Amb distance limited by c/o fatigue and increased work of breathing.         Exercises  Quad Sets: x 15 BLE  Heelslides: x 10 BLE (I)  Gluteal Sets: x 15

## 2021-11-18 NOTE — FLOWSHEET NOTE
21 1315   Encounter Summary   Services provided to: Patient   Referral/Consult From: Nurse   Support System Children   Continue Visiting   (-18, offered support, [employer] contact info.)   Complexity of Encounter Low   Length of Encounter 15 minutes   Grief and Life Adjustment   Type Grief and loss; Adjustment to illness   Assessment Calm; Approachable; Grieving   Intervention Active listening; Explored feelings, thoughts, concerns; Prayer   Outcome Engaged in conversation; Receptive   Patient states she wants to amend her legal will since her  . I offered her contact information for [employer] legal hotline. She shared her struggle with her current illness. Offered support and prn follow-up.

## 2021-11-18 NOTE — CARE COORDINATION
Received word from [facility] that [employer] denied. May attempt Peer- Peer by [phone] today or family can appeal- following. Toni Rutherford RN     36 [facility] NP will attempt peer- peer for pt. Toni Rutherford RN     [phone] Call to daughter Joyce Epps at [phone] and informed her that [facility] has declined IPR but that NP plans to do Peer-Peer. Reviewed [facility] SNF list and she will call w choice as a back up plan- will however also need cert for that. Continue to follow. Toni Rutherford RN     1500 Peer to Peer is sched for tomorrow. Continue to follow.   Toni Rutherford RN

## 2021-11-18 NOTE — PROGRESS NOTES
[facility] NP aware of elevated /85, pt asymptomatic. No new orders. PO hydralazine scheduled given early.

## 2021-11-18 NOTE — PROGRESS NOTES
Nephrology Progress  Note                                                                                                                                                                                                                                                                                                                                                               Office : [phone]     Fax :[phone]              Patient's Name: Ajay Wells  2:49 PM  11/18/2021    Reason for Consult:  Metabolic acidosis    Requesting Physician:  Marcelino Ledesma DO      Chief Complaint:  SOB, PNA      Interval History :    Serum Na 133  Bicarb 17  On 4-5 L O2  SBP elevated    History of Present Ilness: Ajay Wells is a 80 y.o. female with PMH of RA , HTN     Being treated for Acute respiratory failure 2/2 COVID 19 PNA    Getting decadron   S/p remdesivir    Denies diarrhea    O2 requirement stable    Making urine        Past Medical History:   Diagnosis Date    Cellulitis of left lower leg 4/21/2019    Closed pelvic rim fracture 9/4/2014    Fibromyalgia     Foot drop, right foot     Fractures     History of blood transfusion     1997    History of temporal artery biopsy 10/06/2020    Hx of blood clots     PNA (pneumonia) 12/1997    Prepatellar bursitis of right knee 9/15/2020    Rheumatoid arthritis ([address]     Venous ulcer of left leg ([address] 08/2019    Venous ulcer of right leg ([address] 10/2016       Past Surgical History:   Procedure Laterality Date    TONSILLECTOMY      VEIN SURGERY Right 10/27/2016    saphenous VNUS closure       Family History   Problem Relation Age of Onset    Stroke Mother     Stroke Father         reports that she has never smoked. She has never used smokeless tobacco. She reports current alcohol use. She reports that she does not use drugs.     Allergies:  Erythromycin, Hydrochlorothiazide, Silver sulfadiazine, and Doxycycline    Current Medications:    sodium bicarbonate tablet 650 mg, 4x Daily  hydrALAZINE (APRESOLINE) tablet 100 mg, 3 times per day  metoprolol succinate (TOPROL XL) extended release tablet 100 mg, Daily  sodium chloride flush 0.9 % injection 10 mL, PRN  0.9 % sodium chloride infusion, PRN  potassium chloride (KLOR-CON M) extended release tablet 40 mEq, PRN   Or  potassium bicarb-citric acid (EFFER-K) effervescent tablet 40 mEq, PRN   Or  potassium chloride 10 mEq/100 mL IVPB (Peripheral Line), PRN  magnesium sulfate 1000 mg in dextrose 5% 100 mL IVPB, PRN  ondansetron (ZOFRAN-ODT) disintegrating tablet 4 mg, Q8H PRN   Or  ondansetron (ZOFRAN) injection 4 mg, Q6H PRN  acetaminophen (TYLENOL) tablet 650 mg, Q6H PRN   Or  acetaminophen (TYLENOL) suppository 650 mg, Q6H PRN  guaiFENesin-dextromethorphan (ROBITUSSIN DM) 100-10 MG/5ML syrup 5 mL, Q4H PRN  Vitamin D (CHOLECALCIFEROL) tablet 2,000 Units, Daily  dexamethasone (DECADRON) injection 10 mg, Q24H  enoxaparin (LOVENOX) injection 30 mg, BID  benzonatate (TESSALON) capsule 200 mg, TID PRN  albuterol sulfate  (90 Base) MCG/ACT inhaler 2 puff, Q4H PRN  0.9 % sodium chloride bolus, PRN        Review of Systems:   14 point ROS obtained but were negative except mentioned in HPI      Physical exam:     Vitals:  BP (!) 164/87   Pulse 50   Temp 97.3 °F (36.3 °C) (Axillary)   Resp 16   Ht 5' 5\" (1.651 m)   Wt 147 lb 11.3 oz (67 kg)   SpO2 96%   BMI 24.58 kg/m²   Constitutional:  OAA X3 NAD  Skin: no rash, turgor wnl  Heent:  eomi, mmm  Neck: no bruits or jvd noted  Cardiovascular:  S1, S2 without m/r/g  Respiratory: CTA B without w/r/r  Abdomen:  +bs, soft, nt, nd  Ext: no  lower extremity edema  Psychiatric: mood and affect appropriate  Musculoskeletal:  Rom, muscular strength intact    Data:   Labs:  CBC:   Recent Labs     11/16/21  0643 11/17/21  0614 11/18/21  0605   WBC 7.1 6.4 6.5   HGB 15.7 15.4 15.3    166 164     BMP:    Recent Labs     11/16/21  0643 11/17/21  0614 11/18/21  0605    137 133*   K 4.5 4.3 4.2    106 103   CO2 19* 19* 17*   BUN 29* 33* 32*   CREATININE 0.7 0.6 0.7   GLUCOSE 149* 151* 148*     Ca/Mg/Phos:   Recent Labs     11/16/21  0643 11/17/21  0614 11/18/21  0605   CALCIUM 9.4 9.2 9.2     Hepatic:   Recent Labs     11/16/21  0643 11/17/21  0614   AST 48* 34   ALT 47* 44*   BILITOT 0.4 0.4   ALKPHOS 66 62     Troponin: No results for input(s): TROPONINI in the last 72 hours. BNP: No results for input(s): BNP in the last 72 hours. Lipids: No results for input(s): CHOL, TRIG, HDL, LDLCALC, LABVLDL in the last 72 hours. ABGs: No results for input(s): PHART, PO2ART, [id number] in the last 72 hours. INR:   Recent Labs     11/16/21  0643 11/17/21  0614 11/18/21  0605   INR 1.21* 1.21* 1.19*     UA:No results for input(s): Delwin Minion, GLUCOSEU, BILIRUBINUR, Frankey Raker, BLOODU, PHUR, PROTEINU, UROBILINOGEN, NITRU, LEUKOCYTESUR, Riya Corti in the last 72 hours. Urine Microscopic: No results for input(s): LABCAST, BACTERIA, COMU, HYALCAST, WBCUA, RBCUA, EPIU in the last 72 hours. Urine Culture: No results for input(s): LABURIN in the last 72 hours. Urine Chemistry: No results for input(s): Volney Ly, PROTEINUR, NAUR in the last 72 hours. IMAGING:  CT CHEST PULMONARY EMBOLISM W CONTRAST   Final Result   No evidence of a pulmonary embolus. Mildly dilated and atherosclerotic thoracic aorta with no aneurysm or   dissection. Moderate subpleural and interstitial ground-glass opacities scattered   throughout both lungs which probably represents multisegmental   bronchopneumonia secondary to the patient's known COVID-19 disease      Single moderate compression fracture of T11 which may be chronic. Recommend   clinical follow-up of the area. XR CHEST PORTABLE   Final Result   Bilateral airspace disease suggestive of atypical viral pneumonia. Superimposed edema not excluded. Assessment/Plan     1.   NAGMA     Bicarb 19 since 11/14  Bicarb 26 on admission  Denies diarrhea    Renal fn wnl, GFR > 60 ml/hr    Plan    Increase  sodium bicarb 650 mg PO QID     moniter bicarb and electrolytes        2.  COVID PNA    Acute hypoxic respiratory failure     On decadron  remdesvir  O2      3 HTN : uncontrolled      On Metoprolol  Increase Hydralazine to 100 mg  TID       4 Dementia          Thank you for allowing us to participate in care of Rafita Jolley MD  Feel free to contact me   [facility] [address]  Office : [phone]  Fax :[phone]

## 2021-11-18 NOTE — PROGRESS NOTES
Hospitalist Progress Note      PCP: Bertram Erazo DO    Date of Admission: 11/12/2021    Chief Complaint: +covid, hypoxemia, fatigue    Hospital Course: Fahad Martinez is a 80 y.o. female. She began to feel generally ill 11/10/21. She noticed severe fatigue which started after participating in PT. She thought she might have over exerted herself, but fatigue persisted. She sought care at an urgent care center. When evaluated there she has noted to be mildly hypoxemic so was sent to the ER. While in the ER O2 flow rate was increased to 4L/min. Patient would still desaturate into the mid 80's with ambulation even on 4L/min. She has received 2 doses of the Moderna vaccine. Per [address] her second dose was 2/16/21. Subjective: SOB/Fatigue, discussed poc with patient, on 5lpm n/c      Medications:  Reviewed    Infusion Medications    sodium chloride 25 mL (11/17/21 1230)     Scheduled Medications    sodium bicarbonate  650 mg Oral BID    hydrALAZINE  50 mg Oral 3 times per day    metoprolol succinate  100 mg Oral Daily    Vitamin D  2,000 Units Oral Daily    dexamethasone  10 mg IntraVENous Q24H    enoxaparin  30 mg SubCUTAneous BID     PRN Meds: sodium chloride flush, sodium chloride, potassium chloride **OR** potassium alternative oral replacement **OR** potassium chloride, magnesium sulfate, ondansetron **OR** ondansetron, acetaminophen **OR** acetaminophen, guaiFENesin-dextromethorphan, benzonatate, albuterol sulfate HFA, sodium chloride      Intake/Output Summary (Last 24 hours) at 11/18/2021 0846  Last data filed at 11/17/2021 2210  Gross per 24 hour   Intake 840 ml   Output --   Net 840 ml       Physical Exam Performed:    BP (!) 173/89   Pulse 53   Temp 98.1 °F (36.7 °C) (Oral)   Resp 19   Ht 5' 5\" (1.651 m)   Wt 147 lb 11.3 oz (67 kg)   SpO2 93%   BMI 24.58 kg/m²     General appearance: Fatigue. appears stated age and cooperative. HEENT: Pupils equal, round, and reactive to light. Conjunctivae/corneas clear. Neck: Supple, with full range of motion. No jugular venous distention. Trachea midline. Respiratory: Unlabored. No wheezes  Cardiovascular: Regular rate and rhythm with normal S1/S2 without murmurs, rubs or gallops. Abdomen: Soft, non-tender, non-distended with normal bowel sounds. Musculoskeletal: No clubbing, cyanosis or edema bilaterally. Full range of motion without deformity. Skin: Skin color, texture, turgor normal.  No rashes or lesions. Neurologic:  Neurovascularly intact without any focal sensory/motor deficits. Cranial nerves: II-XII intact, grossly non-focal.  Psychiatric: Alert and oriented, thought content appropriate, normal insight  Capillary Refill: Brisk,3 seconds, normal   Peripheral Pulses: +2 palpable, equal bilaterally       Labs:   Recent Labs     11/16/21 0643 11/17/21 0614 11/18/21 0605   WBC 7.1 6.4 6.5   HGB 15.7 15.4 15.3   HCT 46.1 45.1 45.1    166 164     Recent Labs     11/16/21 0643 11/17/21 0614 11/18/21  0605    137  --    K 4.5 4.3 4.2    106  --    CO2 19* 19*  --    BUN 29* 33*  --    CREATININE 0.7 0.6  --    CALCIUM 9.4 9.2  --      Recent Labs     11/16/21 0643 11/17/21 0614   AST 48* 34   ALT 47* 44*   BILITOT 0.4 0.4   ALKPHOS 66 62     Recent Labs     11/16/21 0643 11/17/21 0614 11/18/21 0605   INR 1.21* 1.21* 1.19*     No results for input(s): Curlie Lat in the last 72 hours. Urinalysis:      Lab Results   Component Value Date    NITRU POSITIVE 11/12/2021    WBCUA 10-20 11/12/2021    BACTERIA 4+ 11/12/2021    RBCUA 3-4 11/12/2021    BLOODU Negative 11/12/2021    SPECGRAV 1.020 11/12/2021    GLUCOSEU Negative 11/12/2021       Radiology:  CT CHEST PULMONARY EMBOLISM W CONTRAST   Final Result   No evidence of a pulmonary embolus. Mildly dilated and atherosclerotic thoracic aorta with no aneurysm or   dissection.       Moderate subpleural and interstitial ground-glass opacities scattered   throughout both lungs which probably represents multisegmental   bronchopneumonia secondary to the patient's known COVID-19 disease      Single moderate compression fracture of T11 which may be chronic. Recommend   clinical follow-up of the area. XR CHEST PORTABLE   Final Result   Bilateral airspace disease suggestive of atypical viral pneumonia. Superimposed edema not excluded. Assessment/Plan:    Active Hospital Problems    Diagnosis     Acute respiratory failure due to severe acute respiratory syndrome coronavirus 2 (SARS-CoV-2) infection ([facility]) [U07.1, J96.00]     Current chronic use of systemic steroids [Z79.52]     Giant cell arteritis (HCC) [M31.6]     PMR (polymyalgia rheumatica) ([facility]) [M35.3]     HTN (hypertension) [I10]      -Pneumonia due to COVID-19--continue IV remdesivir and dexamethasone. Did not meet criteria for Baricitinib with low crp-downtrending     -Acute respiratory failure with hypoxia due to Covid pneumonia--has required 4 to 6 L nasal cannula--continue supplemental oxygen wean as tolerated     -UTI due to E. Coli, uncomplicated--denies having any significant LUTS on presentation--has been on IV Rocephin since 11/12-dc tomorrow. Serum procalcitonin has been normal    -Acid-base imbalance  Nephrology consulted  Bicarb tabs     -Essential hypertension -uncontrolled--continue metoprolol and hydralazine--increased dose of hydralazine     -History of PMR/GCA--- on chronic prednisone, it appears she has also been treated with Tocilizumab previously--currently stable    DVT Prophylaxis: Lovenox BID  Diet: ADULT DIET;  Regular; Low Fat/Low Chol/High Fiber/ABDI  Code Status: Full Code    PT/OT Eval Status: Consulted    Dispo - pending dc planning, peer to peer scheduled for 930am 11/19    Tianna Tracey APRN - CNP

## 2021-11-19 LAB
ANION GAP SERPL CALCULATED.3IONS-SCNC: 10 MMOL/L (ref 3–16)
APTT: 29.2 SEC (ref 26.2–38.6)
BASOPHILS ABSOLUTE: 0 K/UL (ref 0–0.2)
BASOPHILS RELATIVE PERCENT: 0 %
BUN BLDV-MCNC: 31 MG/DL (ref 7–20)
CALCIUM SERPL-MCNC: 9.7 MG/DL (ref 8.3–10.6)
CHLORIDE BLD-SCNC: 101 MMOL/L (ref 99–110)
CO2: 23 MMOL/L (ref 21–32)
CREAT SERPL-MCNC: 0.8 MG/DL (ref 0.6–1.2)
D DIMER: 459 NG/ML DDU (ref 0–229)
EOSINOPHILS ABSOLUTE: 0 K/UL (ref 0–0.6)
EOSINOPHILS RELATIVE PERCENT: 0 %
FIBRINOGEN: 163 MG/DL (ref 200–397)
GFR AFRICAN AMERICAN: >60
GFR NON-AFRICAN AMERICAN: >60
GLUCOSE BLD-MCNC: 162 MG/DL (ref 70–99)
HCT VFR BLD CALC: 47 % (ref 36–48)
HEMOGLOBIN: 15.8 G/DL (ref 12–16)
INR BLD: 1.05 (ref 0.88–1.12)
LYMPHOCYTES ABSOLUTE: 0.2 K/UL (ref 1–5.1)
LYMPHOCYTES RELATIVE PERCENT: 4.2 %
MCH RBC QN AUTO: 32.7 PG (ref 26–34)
MCHC RBC AUTO-ENTMCNC: 33.5 G/DL (ref 31–36)
MCV RBC AUTO: 97.5 FL (ref 80–100)
MONOCYTES ABSOLUTE: 0.1 K/UL (ref 0–1.3)
MONOCYTES RELATIVE PERCENT: 2.7 %
NEUTROPHILS ABSOLUTE: 5.2 K/UL (ref 1.7–7.7)
NEUTROPHILS RELATIVE PERCENT: 93.1 %
PDW BLD-RTO: 13.8 % (ref 12.4–15.4)
PLATELET # BLD: 177 K/UL (ref 135–450)
PMV BLD AUTO: 8.4 FL (ref 5–10.5)
POTASSIUM SERPL-SCNC: 5.1 MMOL/L (ref 3.5–5.1)
PROTHROMBIN TIME: 11.9 SEC (ref 9.9–12.7)
RBC # BLD: 4.83 M/UL (ref 4–5.2)
SODIUM BLD-SCNC: 134 MMOL/L (ref 136–145)
WBC # BLD: 5.5 K/UL (ref 4–11)

## 2021-11-19 PROCEDURE — 97530 THERAPEUTIC ACTIVITIES: CPT

## 2021-11-19 PROCEDURE — 85610 PROTHROMBIN TIME: CPT

## 2021-11-19 PROCEDURE — 6370000000 HC RX 637 (ALT 250 FOR IP): Performed by: HOSPITALIST

## 2021-11-19 PROCEDURE — 2700000000 HC OXYGEN THERAPY PER DAY

## 2021-11-19 PROCEDURE — 1200000000 HC SEMI PRIVATE

## 2021-11-19 PROCEDURE — 6370000000 HC RX 637 (ALT 250 FOR IP): Performed by: INTERNAL MEDICINE

## 2021-11-19 PROCEDURE — 2580000003 HC RX 258: Performed by: INTERNAL MEDICINE

## 2021-11-19 PROCEDURE — 85025 COMPLETE CBC W/AUTO DIFF WBC: CPT

## 2021-11-19 PROCEDURE — 85379 FIBRIN DEGRADATION QUANT: CPT

## 2021-11-19 PROCEDURE — 6360000002 HC RX W HCPCS: Performed by: INTERNAL MEDICINE

## 2021-11-19 PROCEDURE — 94761 N-INVAS EAR/PLS OXIMETRY MLT: CPT

## 2021-11-19 PROCEDURE — 85730 THROMBOPLASTIN TIME PARTIAL: CPT

## 2021-11-19 PROCEDURE — 99232 SBSQ HOSP IP/OBS MODERATE 35: CPT | Performed by: HOSPITALIST

## 2021-11-19 PROCEDURE — 80048 BASIC METABOLIC PNL TOTAL CA: CPT

## 2021-11-19 PROCEDURE — 36415 COLL VENOUS BLD VENIPUNCTURE: CPT

## 2021-11-19 PROCEDURE — 85384 FIBRINOGEN ACTIVITY: CPT

## 2021-11-19 RX ORDER — SODIUM BICARBONATE 650 MG/1
650 TABLET ORAL DAILY
Status: DISCONTINUED | OUTPATIENT
Start: 2021-11-19 | End: 2021-11-30 | Stop reason: HOSPADM

## 2021-11-19 RX ADMIN — SODIUM BICARBONATE 650 MG TABLET 650 MG: at 09:07

## 2021-11-19 RX ADMIN — ENOXAPARIN SODIUM 30 MG: 30 INJECTION SUBCUTANEOUS at 09:08

## 2021-11-19 RX ADMIN — DEXAMETHASONE SODIUM PHOSPHATE 10 MG: 10 INJECTION INTRAMUSCULAR; INTRAVENOUS at 21:09

## 2021-11-19 RX ADMIN — ACETAMINOPHEN 650 MG: 325 TABLET ORAL at 21:09

## 2021-11-19 RX ADMIN — HYDRALAZINE HYDROCHLORIDE 100 MG: 50 TABLET, FILM COATED ORAL at 07:44

## 2021-11-19 RX ADMIN — METOPROLOL SUCCINATE 100 MG: 50 TABLET, EXTENDED RELEASE ORAL at 09:07

## 2021-11-19 RX ADMIN — ENOXAPARIN SODIUM 30 MG: 30 INJECTION SUBCUTANEOUS at 21:09

## 2021-11-19 RX ADMIN — HYDRALAZINE HYDROCHLORIDE 100 MG: 50 TABLET, FILM COATED ORAL at 15:25

## 2021-11-19 RX ADMIN — Medication 10 ML: at 21:09

## 2021-11-19 RX ADMIN — Medication 2000 UNITS: at 09:06

## 2021-11-19 RX ADMIN — HYDRALAZINE HYDROCHLORIDE 100 MG: 50 TABLET, FILM COATED ORAL at 22:59

## 2021-11-19 ASSESSMENT — PAIN DESCRIPTION - ONSET: ONSET: ON-GOING

## 2021-11-19 ASSESSMENT — PAIN SCALES - GENERAL
PAINLEVEL_OUTOF10: 1
PAINLEVEL_OUTOF10: 0
PAINLEVEL_OUTOF10: 4

## 2021-11-19 ASSESSMENT — PAIN DESCRIPTION - DESCRIPTORS: DESCRIPTORS: ACHING

## 2021-11-19 ASSESSMENT — PAIN DESCRIPTION - LOCATION: LOCATION: HEAD

## 2021-11-19 ASSESSMENT — PAIN DESCRIPTION - PAIN TYPE: TYPE: ACUTE PAIN

## 2021-11-19 ASSESSMENT — PAIN DESCRIPTION - FREQUENCY: FREQUENCY: CONTINUOUS

## 2021-11-19 NOTE — PROGRESS NOTES
Comprehensive Nutrition Assessment    Type and Reason for Visit:  Initial    Nutrition Recommendations/Plan:   1. Recommend regular diet   2. Encourage po intakes  3. Add ensure daily - monitor need to adjust  4. Monitor po intakes, nutrition adequacy, weights, pertinent labs, BMs    Nutrition Assessment:  LOS assessment. Pt admitted with pneumonia due to COVID-19, in droplet plus precautions. Unable to reach pt via phone today. Pt on a heart healthy diet with po intakes %. Noted 13 lb loss over the past year (8% loss). Will liberalize diet and add ONS to help pt meet needs. Malnutrition Assessment:  Malnutrition Status: At risk for malnutrition (Comment)      Estimated Daily Nutrient Needs:  Energy (kcal):  0840-4166; Weight Used for Energy Requirements:  Current (67 kg)     Protein (g):  67-80; Weight Used for Protein Requirements:  Current (1.0-1.2 g/kg)        Fluid (ml/day): 1 ml/kcal      Nutrition Related Findings:  Last BM 11/17      Wounds:  None       Current Nutrition Therapies:    ADULT DIET;  Regular    Anthropometric Measures:  · Height: 5' 5\" (165.1 cm)  · Current Body Weight: 147 lb 7.8 oz (66.9 kg)   · Ideal Body Weight: 125 lbs  · BMI: 24.5  · BMI Categories: Normal Weight (BMI 22.0 to 24.9) age over 72       Nutrition Diagnosis:   · Inadequate oral intake related to impaired respiratory function (COVID) as evidenced by intake 26-50%, intake 51-75%    Nutrition Interventions:   Food and/or Nutrient Delivery:  Modify Current Diet, Start Oral Nutrition Supplement  Nutrition Education/Counseling:  No recommendation at this time   Coordination of Nutrition Care:  Continue to monitor while inpatient    Goals:  Pt will have po intakes 50% or greater this admission       Nutrition Monitoring and Evaluation:   Behavioral-Environmental Outcomes:  None Identified   Food/Nutrient Intake Outcomes:  Food and Nutrient Intake, Supplement Intake  Physical Signs/Symptoms Outcomes:  Weight     Discharge

## 2021-11-19 NOTE — PROGRESS NOTES
Occupational Therapy  Facility/Department: [facility] C5 - MED SURG/ORTHO  Daily Treatment Note  NAME: Mary Guillen  : 2/3/1933  MRN: [id number]    Date of Service: 2021    Discharge Recommendations:  IP Rehab       Assessment   Performance deficits / Impairments: Decreased functional mobility ; Decreased ADL status; Decreased strength; Decreased endurance; Decreased high-level IADLs; Decreased balance; Decreased safe awareness  Assessment: Pt demos decreased activity tolerance, reports fatigue with minimal activity, educated on gradually increasing activity, trial sitting in chair 2-3x/day, pt verbalizes understanding. Pt PLOF I with BADLs and IADLs, no use of AD or AE. Pt now requiring use of RW to walk around foot of bed to chair and with drop in SPO2. Pt recovers quickly and very motivated to return to PLOF and participate in treatment. Pt would benefit from continued skilled OT in IPR setting at d/c. Prognosis: Good  OT Education: OT Role; Plan of Care; Energy Conservation; Transfer Training  Disease Specific Education: Pt educated on importance of OOB mobility, prevention of complications of bedrest, and general safety during hospitalization. Pt verbalized understanding. Barriers to Learning: none perceived  REQUIRES OT FOLLOW UP: Yes  Activity Tolerance  Activity Tolerance: Patient Tolerated treatment well  Activity Tolerance: Vitals: BP= 124/72, HR= 72, SPO2= 84% after functional mobility, recovers to 92% in ~ 1 min on 4L via NC  Safety Devices  Safety Devices in place: Yes  Type of devices: Left in chair; Chair alarm in place; Call light within reach; Nurse notified; Gait belt         Patient Diagnosis(es): The primary encounter diagnosis was Acute respiratory failure with hypoxia ([facility] Diagnoses of COVID-19 and Acute cystitis without hematuria were also pertinent to this visit.       has a past medical history of Cellulitis of left lower leg, Closed pelvic rim fracture, Fibromyalgia, Foot drop, right foot, Fractures, History of blood transfusion, History of temporal artery biopsy, Hx of blood clots, PNA (pneumonia), Prepatellar bursitis of right knee, Rheumatoid arthritis ([address] Venous ulcer of left leg ([address] and Venous ulcer of right leg ([address] has a past surgical history that includes Tonsillectomy and Vein Surgery (Right, 10/27/2016). Restrictions  Restrictions/Precautions  Restrictions/Precautions: General Precautions, Up as Tolerated  Position Activity Restriction  Other position/activity restrictions: High fall risk per nursing assessment, up with assistance, 4L O2 via NC, telemetry with continuous pulse oximetry     Subjective   General  Chart Reviewed: Yes, Orders, History and Physical, Imaging, Labs  Patient assessed for rehabilitation services?: Yes  Additional Pertinent Hx: Patient is a 59-year-old female with past medical history of fibromyalgia, rheumatoid arthritis who presented to hospital for shortness of breath. Response to previous treatment: Patient with no complaints from previous session  Family / Caregiver Present: No  Referring Practitioner: Venkatesh Ferrera MD    Subjective  Subjective: Pt resting in bed, agreeable to OT treatment. Vital Signs  Temp: 97.4 °F (36.3 °C)  Pulse: 62  BP: 124/72  MAP (mmHg): 89  Patient Position: Semi fowlers  Patient Currently in Pain: Denies  Oxygen Therapy  SpO2: 93 %  O2 Device: Nasal cannula  O2 Flow Rate (L/min): 4 L/min     Orientation  Orientation  Overall Orientation Status: Within Normal Limits     Objective    ADL  Feeding: Setup  Additional Comments: Pt declined need for ADLs.      Balance  Standing Balance: Contact guard assistance (RW)  Standing Balance  Activity: functional mobility    Functional Mobility  Functional - Mobility Device: Rolling Walker  Activity: Other  Assist Level: Contact guard assistance    Bed mobility  Supine to Sit: Stand by assistance (to L with HOB elevated)     Transfers  Sit to stand: Contact guard assistance  Stand to sit: Contact guard assistance  Transfer Comments: VCs for safe hand placement     Cognition  Safety Judgement: Decreased awareness of need for safety     Type of ROM/Therapeutic Exercise  Comment: Pt reports she has been completing HEP provided during prior sessions 20 reps 2x/day. Plan   Plan  Times per week: 3-5x per week  Times per day: Daily  Current Treatment Recommendations: Strengthening, Endurance Training, ROM, Self-Care / ADL, Home Management Training, Patient/Caregiver Education & Training, Functional Mobility Training     AM-PAC Score  AM-PAC Inpatient Daily Activity Raw Score: 15 (11/19/21 0854)  AM-PAC Inpatient ADL T-Scale Score : 34.69 (11/19/21 0854)  ADL Inpatient CMS 0-100% Score: 56.46 (11/19/21 0854)  ADL Inpatient CMS G-Code Modifier : CK (11/19/21 0854)    Goals  Short term goals  Time Frame for Short term goals: 1 week (11/21/21)  Short term goal 1: Pt will complete toilet transfer with CGA-- ongoing 11/19/21  Short term goal 2: Pt will complete LBD with min A-- goal partially met for pants 11/16/21, ongoing 11/19  Short term goal 3: Pt will complete grooming tasks in standing for 5 minutes for improvd endurance with O2 stats >90%-- ongoing 11/19/21  Short term goal 4: Pt will complete toileting with SBA. -- ongoing 11/19/21  Patient Goals   Patient goals : \"change myself\"       Therapy Time   Individual Concurrent Group Co-treatment   Time In 0805         Time Out 0843         [facility]

## 2021-11-19 NOTE — PROGRESS NOTES
Hospitalist Progress Note      PCP: Lorri Strong DO    Date of Admission: 11/12/2021    Chief Complaint: +covid, hypoxemia, fatigue    Hospital Course: Jacques Enrique is a 80 y.o. female. She began to feel generally ill 11/10/21. She noticed severe fatigue which started after participating in PT. She thought she might have over exerted herself, but fatigue persisted. She sought care at an urgent care center. When evaluated there she has noted to be mildly hypoxemic so was sent to the ER. While in the ER O2 flow rate was increased to 4L/min. Patient would still desaturate into the mid 80's with ambulation even on 4L/min. She has received 2 doses of the Moderna vaccine. Per [address] her second dose was 2/16/21. Subjective: SOB/Fatigue, discussed poc with patient/family, on 4lpm n/c      Medications:  Reviewed    Infusion Medications    sodium chloride 25 mL (11/17/21 1230)     Scheduled Medications    sodium bicarbonate  650 mg Oral Daily    hydrALAZINE  100 mg Oral 3 times per day    metoprolol succinate  100 mg Oral Daily    Vitamin D  2,000 Units Oral Daily    dexamethasone  10 mg IntraVENous Q24H    enoxaparin  30 mg SubCUTAneous BID     PRN Meds: sodium chloride flush, sodium chloride, potassium chloride **OR** potassium alternative oral replacement **OR** potassium chloride, magnesium sulfate, ondansetron **OR** ondansetron, acetaminophen **OR** acetaminophen, guaiFENesin-dextromethorphan, benzonatate, albuterol sulfate HFA, sodium chloride      Intake/Output Summary (Last 24 hours) at 11/19/2021 1456  Last data filed at 11/19/2021 1357  Gross per 24 hour   Intake 600 ml   Output --   Net 600 ml       Physical Exam Performed:    BP (!) 146/80   Pulse 60   Temp 97.4 °F (36.3 °C) (Axillary)   Resp 16   Ht 5' 5\" (1.651 m)   Wt 147 lb 7.8 oz (66.9 kg)   SpO2 95%   BMI 24.54 kg/m²     General appearance: Fatigue. appears stated age and cooperative.   HEENT: Pupils equal, round, and reactive to light. Conjunctivae/corneas clear. Neck: Supple, with full range of motion. No jugular venous distention. Trachea midline. Respiratory: Unlabored. CTA  Cardiovascular: Regular rate and rhythm with normal S1/S2 without murmurs, rubs or gallops. Abdomen: Soft, non-tender, non-distended with normal bowel sounds. Musculoskeletal: No clubbing, cyanosis or edema bilaterally. Full range of motion without deformity. Skin: Skin color, texture, turgor normal.  No rashes or lesions. Neurologic:  Neurovascularly intact without any focal sensory/motor deficits. Cranial nerves: II-XII intact, grossly non-focal.  Psychiatric: Alert and oriented, thought content appropriate, normal insight  Capillary Refill: Brisk,3 seconds, normal   Peripheral Pulses: +2 palpable, equal bilaterally       Labs:   Recent Labs     11/17/21 0614 11/18/21 0605 11/19/21 0521   WBC 6.4 6.5 5.5   HGB 15.4 15.3 15.8   HCT 45.1 45.1 47.0    164 177     Recent Labs     11/17/21 0614 11/18/21 0605 11/19/21 0521    133* 134*   K 4.3 4.2 5.1    103 101   CO2 19* 17* 23   BUN 33* 32* 31*   CREATININE 0.6 0.7 0.8   CALCIUM 9.2 9.2 9.7     Recent Labs     11/17/21 0614   AST 34   ALT 44*   BILITOT 0.4   ALKPHOS 62     Recent Labs     11/17/21 0614 11/18/21 0605 11/19/21 0521   INR 1.21* 1.19* 1.05     No results for input(s): Emilia Lomeli in the last 72 hours. Urinalysis:      Lab Results   Component Value Date    NITRU POSITIVE 11/12/2021    WBCUA 10-20 11/12/2021    BACTERIA 4+ 11/12/2021    RBCUA 3-4 11/12/2021    BLOODU Negative 11/12/2021    SPECGRAV 1.020 11/12/2021    GLUCOSEU Negative 11/12/2021       Radiology:  CT CHEST PULMONARY EMBOLISM W CONTRAST   Final Result   No evidence of a pulmonary embolus. Mildly dilated and atherosclerotic thoracic aorta with no aneurysm or   dissection.       Moderate subpleural and interstitial ground-glass opacities scattered   throughout both lungs which probably represents multisegmental   bronchopneumonia secondary to the patient's known COVID-19 disease      Single moderate compression fracture of T11 which may be chronic. Recommend   clinical follow-up of the area. XR CHEST PORTABLE   Final Result   Bilateral airspace disease suggestive of atypical viral pneumonia. Superimposed edema not excluded. Assessment/Plan:    Active Hospital Problems    Diagnosis     Acute respiratory failure due to severe acute respiratory syndrome coronavirus 2 (SARS-CoV-2) infection ([facility]) [U07.1, J96.00]     Current chronic use of systemic steroids [Z79.52]     Giant cell arteritis (HCC) [M31.6]     PMR (polymyalgia rheumatica) ([facility]) [M35.3]     HTN (hypertension) [I10]      -Pneumonia due to COVID-19--continue IV remdesivir and dexamethasone. Did not meet criteria for Baricitinib with low crp-downtrending     -Acute respiratory failure with hypoxia due to Covid pneumonia--has required 4 to 6 L nasal cannula--continue supplemental oxygen wean as tolerated. CTPA neg PE, GGO, mod comp fx H37-thlzit chronic, dilated thoracic aorta, no aneurysm/dissection     -UTI due to E. Coli, uncomplicated--denies having any significant LUTS on presentation--has been on IV Rocephin since 11/12-dc tomorrow. Serum procalcitonin has been normal    -Acid-base imbalance  Nephrology consulted  Bicarb tabs-improved     -Essential hypertension -uncontrolled--continue metoprolol and hydralazine--increased dose of hydralazine     -History of PMR/GCA--- on chronic prednisone, it appears she has also been treated with Tocilizumab previously--currently stable    DVT Prophylaxis: Lovenox BID  Diet: ADULT DIET; Regular; Low Fat/Low Chol/High Fiber/ABDI  Code Status: Full Code    PT/OT Eval Status: Consulted    Dispo - pending dc planning, patient denied IP rehab, Family to appeal, will need rehab or snf, dc when arrangement in place.      Nery Sahu, APRN - CNP

## 2021-11-19 NOTE — PLAN OF CARE
Problem: Nutrition  Goal: Optimal nutrition therapy  Outcome: Ongoing  Note: Nutrition Problem #1: Inadequate oral intake  Intervention: Food and/or Nutrient Delivery: Modify Current Diet, Start Oral Nutrition Supplement  Nutritional Goals: Pt will have po intakes 50% or greater this admission

## 2021-11-19 NOTE — PROGRESS NOTES
Nephrology Progress  Note                                                                                                                                                                                                                                                                                                                                                               Office : [phone]     Fax :[phone]              Patient's Name: Jacques Enrique  1:11 PM  11/19/2021    Reason for Consult:  Metabolic acidosis    Requesting Physician:  Lorri Strong DO      Chief Complaint:  SOB, PNA      Interval History :    Serum Na 133   -----> 134  Bicarb 17 ------> 23  On 4 L O2  SBP better controlled     History of Present Ilness: Jacques Enrique is a 80 y.o. female with PMH of RA , HTN     Being treated for Acute respiratory failure 2/2 COVID 19 PNA    Getting decadron   S/p remdesivir    Denies diarrhea    O2 requirement stable    Making urine        Past Medical History:   Diagnosis Date    Cellulitis of left lower leg 4/21/2019    Closed pelvic rim fracture 9/4/2014    Fibromyalgia     Foot drop, right foot     Fractures     History of blood transfusion     1997    History of temporal artery biopsy 10/06/2020    Hx of blood clots     PNA (pneumonia) 12/1997    Prepatellar bursitis of right knee 9/15/2020    Rheumatoid arthritis ([address]     Venous ulcer of left leg ([address] 08/2019    Venous ulcer of right leg ([address] 10/2016       Past Surgical History:   Procedure Laterality Date    TONSILLECTOMY      VEIN SURGERY Right 10/27/2016    saphenous VNUS closure       Family History   Problem Relation Age of Onset    Stroke Mother     Stroke Father         reports that she has never smoked. She has never used smokeless tobacco. She reports current alcohol use. She reports that she does not use drugs.     Allergies:  Erythromycin, Hydrochlorothiazide, Silver sulfadiazine, and Doxycycline    Current Medications:    sodium bicarbonate tablet 650 mg, Daily  hydrALAZINE (APRESOLINE) tablet 100 mg, 3 times per day  metoprolol succinate (TOPROL XL) extended release tablet 100 mg, Daily  sodium chloride flush 0.9 % injection 10 mL, PRN  0.9 % sodium chloride infusion, PRN  potassium chloride (KLOR-CON M) extended release tablet 40 mEq, PRN   Or  potassium bicarb-citric acid (EFFER-K) effervescent tablet 40 mEq, PRN   Or  potassium chloride 10 mEq/100 mL IVPB (Peripheral Line), PRN  magnesium sulfate 1000 mg in dextrose 5% 100 mL IVPB, PRN  ondansetron (ZOFRAN-ODT) disintegrating tablet 4 mg, Q8H PRN   Or  ondansetron (ZOFRAN) injection 4 mg, Q6H PRN  acetaminophen (TYLENOL) tablet 650 mg, Q6H PRN   Or  acetaminophen (TYLENOL) suppository 650 mg, Q6H PRN  guaiFENesin-dextromethorphan (ROBITUSSIN DM) 100-10 MG/5ML syrup 5 mL, Q4H PRN  Vitamin D (CHOLECALCIFEROL) tablet 2,000 Units, Daily  dexamethasone (DECADRON) injection 10 mg, Q24H  enoxaparin (LOVENOX) injection 30 mg, BID  benzonatate (TESSALON) capsule 200 mg, TID PRN  albuterol sulfate  (90 Base) MCG/ACT inhaler 2 puff, Q4H PRN  0.9 % sodium chloride bolus, PRN        Review of Systems:   14 point ROS obtained but were negative except mentioned in HPI      Physical exam:     Vitals:  /72   Pulse 62   Temp 97.4 °F (36.3 °C)   Resp 18   Ht 5' 5\" (1.651 m)   Wt 147 lb 7.8 oz (66.9 kg)   SpO2 93%   BMI 24.54 kg/m²   Constitutional:  OAA X3 NAD  Skin: no rash, turgor wnl  Heent:  eomi, mmm  Neck: no bruits or jvd noted  Cardiovascular:  S1, S2 without m/r/g  Respiratory: CTA B without w/r/r  Abdomen:  +bs, soft, nt, nd  Ext: no  lower extremity edema  Psychiatric: mood and affect appropriate  Musculoskeletal:  Rom, muscular strength intact    Data:   Labs:  CBC:   Recent Labs     11/17/21  0614 11/18/21  0605 11/19/21  0521   WBC 6.4 6.5 5.5   HGB 15.4 15.3 15.8    164 177     BMP:    Recent Labs     11/17/21  0614 11/18/21  5210 11/19/21 0521    133* 134*   K 4.3 4.2 5.1    103 101   CO2 19* 17* 23   BUN 33* 32* 31*   CREATININE 0.6 0.7 0.8   GLUCOSE 151* 148* 162*     Ca/Mg/Phos:   Recent Labs     11/17/21  0614 11/18/21  0605 11/19/21 0521   CALCIUM 9.2 9.2 9.7     Hepatic:   Recent Labs     11/17/21 0614   AST 34   ALT 44*   BILITOT 0.4   ALKPHOS 62     Troponin: No results for input(s): TROPONINI in the last 72 hours. BNP: No results for input(s): BNP in the last 72 hours. Lipids: No results for input(s): CHOL, TRIG, HDL, LDLCALC, LABVLDL in the last 72 hours. ABGs: No results for input(s): PHART, PO2ART, CFE3RKE in the last 72 hours. INR:   Recent Labs     11/17/21 0614 11/18/21 0605 11/19/21 0521   INR 1.21* 1.19* 1.05     UA:No results for input(s): Thereasa Fairwater, GLUCOSEU, BILIRUBINUR, KETUA, SPECGRAV, BLOODU, PHUR, PROTEINU, UROBILINOGEN, NITRU, LEUKOCYTESUR, Burt  in the last 72 hours. Urine Microscopic: No results for input(s): LABCAST, BACTERIA, COMU, HYALCAST, WBCUA, RBCUA, EPIU in the last 72 hours. Urine Culture: No results for input(s): LABURIN in the last 72 hours. Urine Chemistry: No results for input(s): Yvonne Brittle, PROTEINUR, NAUR in the last 72 hours. IMAGING:  CT CHEST PULMONARY EMBOLISM W CONTRAST   Final Result   No evidence of a pulmonary embolus. Mildly dilated and atherosclerotic thoracic aorta with no aneurysm or   dissection. Moderate subpleural and interstitial ground-glass opacities scattered   throughout both lungs which probably represents multisegmental   bronchopneumonia secondary to the patient's known COVID-19 disease      Single moderate compression fracture of T11 which may be chronic. Recommend   clinical follow-up of the area. XR CHEST PORTABLE   Final Result   Bilateral airspace disease suggestive of atypical viral pneumonia. Superimposed edema not excluded. Assessment/Plan     1.   NAGMA     Bicarb 19 since 11/14  Bicarb 26 on admission  Denies diarrhea    Renal fn wnl, GFR > 60 ml/hr    Plan    Bicarb improved to 23    Decrease Sodium Bicarb tab once/day    moniter bicarb and electrolytes        2.  COVID PNA    Acute hypoxic respiratory failure     On decadron  remdesvir  O2      3 HTN : controlled      On Metoprolol  Increase Hydralazine to 100 mg  TID       4 Dementia          Thank you for allowing us to participate in care of Adams Kaplan MD  Feel free to contact me   [facility] [id number]  Office : [phone]  Fax :[phone]

## 2021-11-20 ENCOUNTER — APPOINTMENT (OUTPATIENT)
Dept: GENERAL RADIOLOGY | Age: 86
DRG: 177 | End: 2021-11-20
Payer: MEDICARE

## 2021-11-20 LAB
ANION GAP SERPL CALCULATED.3IONS-SCNC: 12 MMOL/L (ref 3–16)
APTT: 26.5 SEC (ref 26.2–38.6)
BASE EXCESS ARTERIAL: -8 (ref -3–3)
BASOPHILS ABSOLUTE: 0 K/UL (ref 0–0.2)
BASOPHILS RELATIVE PERCENT: 0 %
BUN BLDV-MCNC: 55 MG/DL (ref 7–20)
CALCIUM IONIZED: 1.29 MMOL/L (ref 1.12–1.32)
CALCIUM SERPL-MCNC: 9 MG/DL (ref 8.3–10.6)
CHLORIDE BLD-SCNC: 105 MMOL/L (ref 99–110)
CO2: 16 MMOL/L (ref 21–32)
CREAT SERPL-MCNC: 0.6 MG/DL (ref 0.6–1.2)
D DIMER: 326 NG/ML DDU (ref 0–229)
EKG ATRIAL RATE: 76 BPM
EKG DIAGNOSIS: NORMAL
EKG P AXIS: 46 DEGREES
EKG P-R INTERVAL: 178 MS
EKG Q-T INTERVAL: 388 MS
EKG QRS DURATION: 84 MS
EKG QTC CALCULATION (BAZETT): 436 MS
EKG R AXIS: -1 DEGREES
EKG T AXIS: 34 DEGREES
EKG VENTRICULAR RATE: 76 BPM
EOSINOPHILS ABSOLUTE: 0 K/UL (ref 0–0.6)
EOSINOPHILS RELATIVE PERCENT: 0 %
FIBRINOGEN: 148 MG/DL (ref 200–397)
GFR AFRICAN AMERICAN: >60
GFR NON-AFRICAN AMERICAN: >60
GLUCOSE BLD-MCNC: 146 MG/DL (ref 70–99)
GLUCOSE BLD-MCNC: 154 MG/DL (ref 70–99)
GLUCOSE BLD-MCNC: 162 MG/DL (ref 70–99)
HCO3 ARTERIAL: 16.1 MMOL/L (ref 21–29)
HCT VFR BLD CALC: 41.3 % (ref 36–48)
HEMOGLOBIN: 12.6 GM/DL (ref 12–16)
HEMOGLOBIN: 13.9 G/DL (ref 12–16)
INR BLD: 1.05 (ref 0.88–1.12)
LACTATE: 5.1 MMOL/L (ref 0.4–2)
LACTIC ACID, SEPSIS: 2.2 MMOL/L (ref 0.4–1.9)
LACTIC ACID, SEPSIS: 2.5 MMOL/L (ref 0.4–1.9)
LYMPHOCYTES ABSOLUTE: 0.1 K/UL (ref 1–5.1)
LYMPHOCYTES RELATIVE PERCENT: 2 %
MCH RBC QN AUTO: 32.6 PG (ref 26–34)
MCHC RBC AUTO-ENTMCNC: 33.7 G/DL (ref 31–36)
MCV RBC AUTO: 96.8 FL (ref 80–100)
MONOCYTES ABSOLUTE: 0.2 K/UL (ref 0–1.3)
MONOCYTES RELATIVE PERCENT: 4 %
NEUTROPHILS ABSOLUTE: 4.5 K/UL (ref 1.7–7.7)
NEUTROPHILS RELATIVE PERCENT: 94 %
O2 SAT, ARTERIAL: 98 % (ref 93–100)
PCO2 ARTERIAL: 25.2 MM HG (ref 35–45)
PDW BLD-RTO: 13.9 % (ref 12.4–15.4)
PERFORMED ON: ABNORMAL
PERFORMED ON: ABNORMAL
PH ARTERIAL: 7.41 (ref 7.35–7.45)
PLATELET # BLD: 162 K/UL (ref 135–450)
PMV BLD AUTO: 9 FL (ref 5–10.5)
PO2 ARTERIAL: 100.7 MM HG (ref 75–108)
POC HEMATOCRIT: 37 % (ref 36–48)
POC POTASSIUM: 5 MMOL/L (ref 3.5–5.1)
POC SAMPLE TYPE: ABNORMAL
POC SODIUM: 132 MMOL/L (ref 136–145)
POTASSIUM SERPL-SCNC: 4.9 MMOL/L (ref 3.5–5.1)
PROTHROMBIN TIME: 11.9 SEC (ref 9.9–12.7)
RBC # BLD: 4.26 M/UL (ref 4–5.2)
RBC # BLD: NORMAL 10*6/UL
SODIUM BLD-SCNC: 133 MMOL/L (ref 136–145)
TCO2 ARTERIAL: 17 MMOL/L
TROPONIN: <0.01 NG/ML
WBC # BLD: 4.8 K/UL (ref 4–11)

## 2021-11-20 PROCEDURE — 6370000000 HC RX 637 (ALT 250 FOR IP): Performed by: HOSPITALIST

## 2021-11-20 PROCEDURE — 83605 ASSAY OF LACTIC ACID: CPT

## 2021-11-20 PROCEDURE — 85730 THROMBOPLASTIN TIME PARTIAL: CPT

## 2021-11-20 PROCEDURE — 6360000002 HC RX W HCPCS: Performed by: INTERNAL MEDICINE

## 2021-11-20 PROCEDURE — 85610 PROTHROMBIN TIME: CPT

## 2021-11-20 PROCEDURE — 82803 BLOOD GASES ANY COMBINATION: CPT

## 2021-11-20 PROCEDURE — 1200000000 HC SEMI PRIVATE

## 2021-11-20 PROCEDURE — 94761 N-INVAS EAR/PLS OXIMETRY MLT: CPT

## 2021-11-20 PROCEDURE — 80048 BASIC METABOLIC PNL TOTAL CA: CPT

## 2021-11-20 PROCEDURE — 99233 SBSQ HOSP IP/OBS HIGH 50: CPT | Performed by: HOSPITALIST

## 2021-11-20 PROCEDURE — 2700000000 HC OXYGEN THERAPY PER DAY

## 2021-11-20 PROCEDURE — 82330 ASSAY OF CALCIUM: CPT

## 2021-11-20 PROCEDURE — 85014 HEMATOCRIT: CPT

## 2021-11-20 PROCEDURE — 6370000000 HC RX 637 (ALT 250 FOR IP): Performed by: INTERNAL MEDICINE

## 2021-11-20 PROCEDURE — 84484 ASSAY OF TROPONIN QUANT: CPT

## 2021-11-20 PROCEDURE — 85025 COMPLETE CBC W/AUTO DIFF WBC: CPT

## 2021-11-20 PROCEDURE — 2580000003 HC RX 258: Performed by: HOSPITALIST

## 2021-11-20 PROCEDURE — 93005 ELECTROCARDIOGRAM TRACING: CPT | Performed by: STUDENT IN AN ORGANIZED HEALTH CARE EDUCATION/TRAINING PROGRAM

## 2021-11-20 PROCEDURE — 2580000003 HC RX 258: Performed by: STUDENT IN AN ORGANIZED HEALTH CARE EDUCATION/TRAINING PROGRAM

## 2021-11-20 PROCEDURE — 85384 FIBRINOGEN ACTIVITY: CPT

## 2021-11-20 PROCEDURE — 84295 ASSAY OF SERUM SODIUM: CPT

## 2021-11-20 PROCEDURE — 93010 ELECTROCARDIOGRAM REPORT: CPT | Performed by: INTERNAL MEDICINE

## 2021-11-20 PROCEDURE — 84132 ASSAY OF SERUM POTASSIUM: CPT

## 2021-11-20 PROCEDURE — 36415 COLL VENOUS BLD VENIPUNCTURE: CPT

## 2021-11-20 PROCEDURE — 82947 ASSAY GLUCOSE BLOOD QUANT: CPT

## 2021-11-20 PROCEDURE — 71045 X-RAY EXAM CHEST 1 VIEW: CPT

## 2021-11-20 PROCEDURE — 85379 FIBRIN DEGRADATION QUANT: CPT

## 2021-11-20 PROCEDURE — 6360000002 HC RX W HCPCS: Performed by: STUDENT IN AN ORGANIZED HEALTH CARE EDUCATION/TRAINING PROGRAM

## 2021-11-20 PROCEDURE — 2500000003 HC RX 250 WO HCPCS: Performed by: HOSPITALIST

## 2021-11-20 RX ORDER — SODIUM CHLORIDE, SODIUM LACTATE, POTASSIUM CHLORIDE, CALCIUM CHLORIDE 600; 310; 30; 20 MG/100ML; MG/100ML; MG/100ML; MG/100ML
INJECTION, SOLUTION INTRAVENOUS CONTINUOUS
Status: DISCONTINUED | OUTPATIENT
Start: 2021-11-20 | End: 2021-11-20

## 2021-11-20 RX ORDER — SODIUM CHLORIDE, SODIUM LACTATE, POTASSIUM CHLORIDE, CALCIUM CHLORIDE 600; 310; 30; 20 MG/100ML; MG/100ML; MG/100ML; MG/100ML
INJECTION, SOLUTION INTRAVENOUS ONCE
Status: COMPLETED | OUTPATIENT
Start: 2021-11-20 | End: 2021-11-20

## 2021-11-20 RX ORDER — DEXAMETHASONE 4 MG/1
6 TABLET ORAL DAILY
Status: DISCONTINUED | OUTPATIENT
Start: 2021-11-20 | End: 2021-11-21

## 2021-11-20 RX ORDER — SODIUM CHLORIDE 9 MG/ML
INJECTION, SOLUTION INTRAVENOUS CONTINUOUS
Status: DISCONTINUED | OUTPATIENT
Start: 2021-11-20 | End: 2021-11-20

## 2021-11-20 RX ADMIN — SODIUM BICARBONATE 650 MG TABLET 650 MG: at 09:54

## 2021-11-20 RX ADMIN — ONDANSETRON 4 MG: 2 INJECTION INTRAMUSCULAR; INTRAVENOUS at 07:37

## 2021-11-20 RX ADMIN — Medication 2000 UNITS: at 09:54

## 2021-11-20 RX ADMIN — ENOXAPARIN SODIUM 30 MG: 30 INJECTION SUBCUTANEOUS at 09:54

## 2021-11-20 RX ADMIN — HYDRALAZINE HYDROCHLORIDE 100 MG: 50 TABLET, FILM COATED ORAL at 06:25

## 2021-11-20 RX ADMIN — ENOXAPARIN SODIUM 30 MG: 30 INJECTION SUBCUTANEOUS at 20:22

## 2021-11-20 RX ADMIN — SODIUM CHLORIDE, POTASSIUM CHLORIDE, SODIUM LACTATE AND CALCIUM CHLORIDE: 600; 310; 30; 20 INJECTION, SOLUTION INTRAVENOUS at 08:21

## 2021-11-20 RX ADMIN — SODIUM BICARBONATE: 84 INJECTION, SOLUTION INTRAVENOUS at 17:20

## 2021-11-20 RX ADMIN — DEXAMETHASONE 6 MG: 4 TABLET ORAL at 09:53

## 2021-11-20 ASSESSMENT — PAIN SCALES - GENERAL
PAINLEVEL_OUTOF10: 0
PAINLEVEL_OUTOF10: 0

## 2021-11-20 NOTE — PROGRESS NOTES
Report and transfer of care given to [facility]. Pt with eyes closed in bed.

## 2021-11-20 NOTE — PROGRESS NOTES
Hospitalist Progress Note      PCP: Loulou Rasmussen DO    Date of Admission: 11/12/2021    Chief Complaint: dyspnea, viral illness    Hospital Course: 80 yoF with viral like illness which started 11/10 found to have acute hypoxic respiratory failure from severe covid-19 pneumonia. Hospital course complicated by perhaps vasovagal episode versus syncopal episode related to hypoxia. Subjective: rapid response called this morning for change in mental status after going to rests room. Pt endorsed chest discomfort and dyspnea while ambulating to rest room. No palpitations. After voiding. Nursing noted a syncopal episode? Though unclear if true LOC occurred. Pt was transferred to bed. Per [facility] pts [place] 80's on 4L. Tele also demonstrated sinus tachycardia >120. Once in bed placed on 9L nasal cannula with improvement in o2 sats. Mental status improved. Repeat VS stable. Pt endorsed dyspnea but no cp, headache, efver, palpitations, abdominal pain.       Medications:  Reviewed    Infusion Medications    sodium chloride 25 mL (11/17/21 1230)     Scheduled Medications    dexamethasone  6 mg Oral Daily    sodium bicarbonate  650 mg Oral Daily    hydrALAZINE  100 mg Oral 3 times per day    metoprolol succinate  100 mg Oral Daily    Vitamin D  2,000 Units Oral Daily    enoxaparin  30 mg SubCUTAneous BID     PRN Meds: sodium chloride flush, sodium chloride, potassium chloride **OR** potassium alternative oral replacement **OR** potassium chloride, magnesium sulfate, ondansetron **OR** ondansetron, acetaminophen **OR** acetaminophen, guaiFENesin-dextromethorphan, benzonatate, albuterol sulfate HFA, sodium chloride      Intake/Output Summary (Last 24 hours) at 11/20/2021 0851  Last data filed at 11/19/2021 1845  Gross per 24 hour   Intake 840 ml   Output --   Net 840 ml       Physical Exam Performed:    /74   Pulse 74   Temp 98 °F (36.7 °C) (Oral)   Resp 16   Ht 5' 5\" (1.651 m)   Wt 147 lb 7.8 oz (66.9 kg) SpO2 96%   BMI 24.54 kg/m²     General appearance: drowsy, uncomfortable, eyes closed with increased oxygen. HEENT: Pupils equal, round, and reactive to light. Conjunctivae/corneas clear. Neck: Supple, with full range of motion. Respiratory:  Normal respiratory effort. With bilateral lung sounds and crackles. Cardiovascular: Regular rate and rhythm with normal S1/S2 without murmurs, rubs or gallops. Abdomen: Soft, non-tender, non-distended  Musculoskeletal: No clubbing, cyanosis or edema bilaterally. Full range of motion without deformity. Skin: Skin color, texture, turgor normal.  No rashes or lesions. Neurologic:  Neurovascularly intact with right lower extremity weakness (4/5 dorsiflexion/plantarflexion, hip flexors) 5/5 strength LLE and bilateral UE. Cranial nerves: II-XII intact, grossly non-focal.  Psychiatric: Alert and oriented, thought content appropriate, normal insight  Capillary Refill: Brisk,3 seconds, normal   Peripheral Pulses: +2 palpable, equal bilaterally       Labs:   Recent Labs     11/18/21  0605 11/18/21  0605 11/19/21  0521 11/20/21  0603 11/20/21  0740   WBC 6.5  --  5.5 4.8  --    HGB 15.3   < > 15.8 13.9 12.6   HCT 45.1  --  47.0 41.3  --      --  177 162  --     < > = values in this interval not displayed. Recent Labs     11/18/21  0605 11/19/21  0521 11/20/21  0603   * 134* 133*   K 4.2 5.1 4.9    101 105   CO2 17* 23 16*   BUN 32* 31* 55*   CREATININE 0.7 0.8 0.6   CALCIUM 9.2 9.7 9.0     No results for input(s): AST, ALT, BILIDIR, BILITOT, ALKPHOS in the last 72 hours.   Recent Labs     11/18/21  0605 11/19/21  0521 11/20/21  0603   INR 1.19* 1.05 1.05     Recent Labs     11/20/21 0603   TROPONINI <0.01       Urinalysis:      Lab Results   Component Value Date    NITRU POSITIVE 11/12/2021    WBCUA 10-20 11/12/2021    BACTERIA 4+ 11/12/2021    RBCUA 3-4 11/12/2021    BLOODU Negative 11/12/2021    SPECGRAV 1.020 11/12/2021    GLUCOSEU Negative 11/12/2021 Radiology:  CT CHEST PULMONARY EMBOLISM W CONTRAST   Final Result   No evidence of a pulmonary embolus. Mildly dilated and atherosclerotic thoracic aorta with no aneurysm or   dissection. Moderate subpleural and interstitial ground-glass opacities scattered   throughout both lungs which probably represents multisegmental   bronchopneumonia secondary to the patient's known COVID-19 disease      Single moderate compression fracture of T11 which may be chronic. Recommend   clinical follow-up of the area. XR CHEST PORTABLE   Final Result   Bilateral airspace disease suggestive of atypical viral pneumonia. Superimposed edema not excluded. XR CHEST PORTABLE    (Results Pending)           Assessment/Plan:    Active Hospital Problems    Diagnosis     Acute respiratory failure due to severe acute respiratory syndrome coronavirus 2 (SARS-CoV-2) infection ([facility]) [U07.1, J96.00]     Current chronic use of systemic steroids [Z79.52]     Giant cell arteritis ([facility]) [M31.6]     PMR (polymyalgia rheumatica) ([facility]) [M35.3]     HTN (hypertension) [I10]      Severe COVID-19 Pneumonia- d/t inability to maintain sats >94% on RA  - Sxs onset 11/10  - Vaccination status 2 doses completed 02/16/2021  - Date of positive test 11/12  - Goal O2 sat >90%  - Remdesivir ordered on 11/13 as the patient is presenting within 7 days of symptom onset. Treatment length 5 days total. End date 11/16   - Dexamethasone PO previously on 20 then 10 now will complete ten day course with 6mg daily (11/15-11/21)  -crp <3.0, ddimer 326 (both decreasing  - CTA PE without PE, GGO with mod frx T11 chronic  - Anticoagulation:enoxparin 30mg BID  - Isolation end date: 10-20 days from symptom onset depending on severity of illness and underlying co-morbidities/immune status with evidence of improvement in symptoms and with 24 hours of no fever without the use of fever reducing medications.     Syncopal episode- presumed due to worsening exertional hypoxia versus vasovagal episode. Now improving. Awaiting EKG, troponin, am labs and cxr.    lactic acidosis- suspect related to cellular hypoxia rather than from hypoperfusion or increased metabolic demand. Given IVF and will reassess lactate. Less likely sepsis but will monitor for infection closely. Uncomplicated e. Coli UTI vs asymptomatic bacteriurea- treated with ceftriaxone x 6 days. Non anion gap metabolic acidosis- nephrology following  - s/p bicarb PO    -Essential hypertension -controlled--continue metoprolol and hydralazine-     -History of PMR/GCA--- on chronic prednisone (7.5mg daily), it appears she has also been treated with Tocilizumab previously--currently stable       DVT Prophylaxis: lovenox bid  Diet: ADULT DIET;  Regular  ADULT ORAL NUTRITION SUPPLEMENT; Lunch; Standard High Calorie/High Protein Oral Supplement  Code Status: Full Code    PT/OT Eval Status: ordered    Dispo - pending clinical course    Gilbert Castanon MD

## 2021-11-20 NOTE — PROGRESS NOTES
Nephrology Progress  Note                                                                                                                                                                                                                                                                                                                                                               Office : [phone]     Fax :[phone]              Patient's Name: Carey Zheng  2:24 PM  11/20/2021    Reason for Consult:  Metabolic acidosis    Requesting Physician:  Camilo Davis DO      Chief Complaint:  SOB, PNA      Interval History :      Felt dizziness this morning, SBP soft   Serum Na 133   -----> 134 ---> 133  Bicarb 17 ------> 23 ---> 16  Serum Lactate ~ 5   On 4 L O2  SBP better controlled     History of Present Ilness: Carey Zheng is a 80 y.o. female with PMH of RA , HTN     Being treated for Acute respiratory failure 2/2 COVID 19 PNA    Getting decadron   S/p remdesivir    Denies diarrhea    O2 requirement stable    Making urine        Past Medical History:   Diagnosis Date    Cellulitis of left lower leg 4/21/2019    Closed pelvic rim fracture 9/4/2014    Fibromyalgia     Foot drop, right foot     Fractures     History of blood transfusion     1997    History of temporal artery biopsy 10/06/2020    Hx of blood clots     PNA (pneumonia) 12/1997    Prepatellar bursitis of right knee 9/15/2020    Rheumatoid arthritis ([address]     Venous ulcer of left leg ([address] 08/2019    Venous ulcer of right leg ([address] 10/2016       Past Surgical History:   Procedure Laterality Date    TONSILLECTOMY      VEIN SURGERY Right 10/27/2016    saphenous VNUS closure       Family History   Problem Relation Age of Onset    Stroke Mother     Stroke Father         reports that she has never smoked. She has never used smokeless tobacco. She reports current alcohol use. She reports that she does not use drugs.     Allergies: Erythromycin, Hydrochlorothiazide, Silver sulfadiazine, and Doxycycline    Current Medications:    dexamethasone (DECADRON) tablet 6 mg, Daily  sodium bicarbonate 150 mEq in dextrose 5 % 1,000 mL infusion, Continuous  sodium bicarbonate tablet 650 mg, Daily  hydrALAZINE (APRESOLINE) tablet 100 mg, 3 times per day  metoprolol succinate (TOPROL XL) extended release tablet 100 mg, Daily  sodium chloride flush 0.9 % injection 10 mL, PRN  0.9 % sodium chloride infusion, PRN  potassium chloride (KLOR-CON M) extended release tablet 40 mEq, PRN   Or  potassium bicarb-citric acid (EFFER-K) effervescent tablet 40 mEq, PRN   Or  potassium chloride 10 mEq/100 mL IVPB (Peripheral Line), PRN  magnesium sulfate 1000 mg in dextrose 5% 100 mL IVPB, PRN  ondansetron (ZOFRAN-ODT) disintegrating tablet 4 mg, Q8H PRN   Or  ondansetron (ZOFRAN) injection 4 mg, Q6H PRN  acetaminophen (TYLENOL) tablet 650 mg, Q6H PRN   Or  acetaminophen (TYLENOL) suppository 650 mg, Q6H PRN  guaiFENesin-dextromethorphan (ROBITUSSIN DM) 100-10 MG/5ML syrup 5 mL, Q4H PRN  Vitamin D (CHOLECALCIFEROL) tablet 2,000 Units, Daily  enoxaparin (LOVENOX) injection 30 mg, BID  benzonatate (TESSALON) capsule 200 mg, TID PRN  albuterol sulfate  (90 Base) MCG/ACT inhaler 2 puff, Q4H PRN  0.9 % sodium chloride bolus, PRN        Review of Systems:   14 point ROS obtained but were negative except mentioned in HPI      Physical exam:     Vitals:  BP 93/64   Pulse 82   Temp 97.5 °F (36.4 °C) (Oral)   Resp 20   Ht 5' 5\" (1.651 m)   Wt 147 lb 7.8 oz (66.9 kg)   SpO2 96%   BMI 24.54 kg/m²   Constitutional:  OAA X3 NAD  Skin: no rash, turgor wnl  Heent:  eomi, mmm  Neck: no bruits or jvd noted  Cardiovascular:  S1, S2 without m/r/g  Respiratory: CTA B without w/r/r  Abdomen:  +bs, soft, nt, nd  Ext: no  lower extremity edema  Psychiatric: mood and affect appropriate  Musculoskeletal:  Rom, muscular strength intact    Data:   Labs:  CBC:   Recent Labs 11/18/21  0605 11/18/21  0605 11/19/21  0521 11/20/21  0603 11/20/21  0740   WBC 6.5  --  5.5 4.8  --    HGB 15.3   < > 15.8 13.9 12.6     --  177 162  --     < > = values in this interval not displayed. BMP:    Recent Labs     11/18/21  0605 11/19/21  0521 11/20/21  0603   * 134* 133*   K 4.2 5.1 4.9    101 105   CO2 17* 23 16*   BUN 32* 31* 55*   CREATININE 0.7 0.8 0.6   GLUCOSE 148* 162* 154*     Ca/Mg/Phos:   Recent Labs     11/18/21  0605 11/19/21  0521 11/20/21  0603   CALCIUM 9.2 9.7 9.0     Hepatic:   No results for input(s): AST, ALT, ALB, BILITOT, ALKPHOS in the last 72 hours. Troponin:   Recent Labs     11/20/21 0603   TROPONINI <0.01     BNP: No results for input(s): BNP in the last 72 hours. Lipids: No results for input(s): CHOL, TRIG, HDL, LDLCALC, LABVLDL in the last 72 hours. ABGs:   Recent Labs     11/20/21  0740   PHART 7.414   PO2ART 100.7   VLH1AJA 25.2*     INR:   Recent Labs     11/18/21  0605 11/19/21  0521 11/20/21  0603   INR 1.19* 1.05 1.05     UA:No results for input(s): [place], GLUCOSEU, BILIRUBINUR, Marvie Trivedi, BLOODU, PHUR, PROTEINU, UROBILINOGEN, NITRU, LEUKOCYTESUR, Shelvia Mews in the last 72 hours. Urine Microscopic: No results for input(s): LABCAST, BACTERIA, COMU, HYALCAST, WBCUA, RBCUA, EPIU in the last 72 hours. Urine Culture: No results for input(s): LABURIN in the last 72 hours. Urine Chemistry: No results for input(s): Sharene Gilda, PROTEINUR, NAUR in the last 72 hours. IMAGING:  XR CHEST PORTABLE   Preliminary Result   1. No significant change in appearance of multifocal pneumonia, to include   atypical viral causes. 2. Stable cardiomegaly. CT CHEST PULMONARY EMBOLISM W CONTRAST   Final Result   No evidence of a pulmonary embolus. Mildly dilated and atherosclerotic thoracic aorta with no aneurysm or   dissection.       Moderate subpleural and interstitial ground-glass opacities scattered   throughout both lungs which probably represents multisegmental   bronchopneumonia secondary to the patient's known COVID-19 disease      Single moderate compression fracture of T11 which may be chronic. Recommend   clinical follow-up of the area. XR CHEST PORTABLE   Final Result   Bilateral airspace disease suggestive of atypical viral pneumonia. Superimposed edema not excluded. Assessment/Plan     1. NAGMA     Bicarb 19 since 11/14  Bicarb 26 on admission  Denies diarrhea    Renal fn wnl, GFR > 60 ml/hr    Plan    Give sodium bicarb IVF today on 11/20    Bicarb dropped to 16      moniter bicarb and electrolytes        2.  COVID PNA    Acute hypoxic respiratory failure     On decadron  remdesvir  O2      3 HTN : controlled      On Metoprolol  Increase Hydralazine to 100 mg  TID       4 Dementia    5 hypotension    Give gentle IV hydration  Keep MAP > 65 mmhg      Thank you for allowing us to participate in care of Mary Ann Hill MD  Feel free to contact me   [facility] [id number]  Office : [phone]  Fax :[phone]

## 2021-11-20 NOTE — PROGRESS NOTES
Shift assessment completed. Pt is alert and oriented. Vital signs are WNL. Respirations are even & easy. Patient c/o feeling worm out today. SR up x 2 and bed in low position. Call light is within reach. Will monitor.

## 2021-11-20 NOTE — PROGRESS NOTES
Staff assist called for patient. PCA in room with patient. PCA was assisting patient to the bed side commode and the patient became short of breath. PCA assisted patient to the floor safely. Patient with O2 in 80's on 4L. Rapid Response called. Increased O2 to get O2 saturation at an acceptable level. Rapid response team and MD at bedside. Patient responsive and oriented. Orders entered. Will monitor.

## 2021-11-21 LAB
ANION GAP SERPL CALCULATED.3IONS-SCNC: 7 MMOL/L (ref 3–16)
BANDED NEUTROPHILS RELATIVE PERCENT: 1 % (ref 0–7)
BASOPHILS ABSOLUTE: 0 K/UL (ref 0–0.2)
BASOPHILS RELATIVE PERCENT: 0 %
BUN BLDV-MCNC: 41 MG/DL (ref 7–20)
CALCIUM SERPL-MCNC: 9.2 MG/DL (ref 8.3–10.6)
CHLORIDE BLD-SCNC: 95 MMOL/L (ref 99–110)
CO2: 26 MMOL/L (ref 21–32)
CREAT SERPL-MCNC: 0.7 MG/DL (ref 0.6–1.2)
CREATININE URINE: 60.8 MG/DL (ref 28–259)
EOSINOPHILS ABSOLUTE: 0 K/UL (ref 0–0.6)
EOSINOPHILS RELATIVE PERCENT: 0 %
GFR AFRICAN AMERICAN: >60
GFR NON-AFRICAN AMERICAN: >60
GLUCOSE BLD-MCNC: 141 MG/DL (ref 70–99)
HCT VFR BLD CALC: 38.9 % (ref 36–48)
HEMOGLOBIN: 12.9 G/DL (ref 12–16)
LYMPHOCYTES ABSOLUTE: 0.8 K/UL (ref 1–5.1)
LYMPHOCYTES RELATIVE PERCENT: 12 %
MCH RBC QN AUTO: 32.5 PG (ref 26–34)
MCHC RBC AUTO-ENTMCNC: 33.3 G/DL (ref 31–36)
MCV RBC AUTO: 97.8 FL (ref 80–100)
MONOCYTES ABSOLUTE: 0.4 K/UL (ref 0–1.3)
MONOCYTES RELATIVE PERCENT: 5 %
NEUTROPHILS ABSOLUTE: 5.8 K/UL (ref 1.7–7.7)
NEUTROPHILS RELATIVE PERCENT: 82 %
PDW BLD-RTO: 13.7 % (ref 12.4–15.4)
PLATELET # BLD: 166 K/UL (ref 135–450)
PLATELET SLIDE REVIEW: ADEQUATE
PMV BLD AUTO: 8.6 FL (ref 5–10.5)
POTASSIUM SERPL-SCNC: 4.9 MMOL/L (ref 3.5–5.1)
RBC # BLD: 3.97 M/UL (ref 4–5.2)
SLIDE REVIEW: ABNORMAL
SODIUM BLD-SCNC: 128 MMOL/L (ref 136–145)
SODIUM URINE: 39 MMOL/L
WBC # BLD: 7 K/UL (ref 4–11)

## 2021-11-21 PROCEDURE — 83935 ASSAY OF URINE OSMOLALITY: CPT

## 2021-11-21 PROCEDURE — 84540 ASSAY OF URINE/UREA-N: CPT

## 2021-11-21 PROCEDURE — 6360000002 HC RX W HCPCS: Performed by: INTERNAL MEDICINE

## 2021-11-21 PROCEDURE — 6370000000 HC RX 637 (ALT 250 FOR IP): Performed by: INTERNAL MEDICINE

## 2021-11-21 PROCEDURE — 6370000000 HC RX 637 (ALT 250 FOR IP): Performed by: HOSPITALIST

## 2021-11-21 PROCEDURE — 6360000002 HC RX W HCPCS: Performed by: STUDENT IN AN ORGANIZED HEALTH CARE EDUCATION/TRAINING PROGRAM

## 2021-11-21 PROCEDURE — 84300 ASSAY OF URINE SODIUM: CPT

## 2021-11-21 PROCEDURE — 1200000000 HC SEMI PRIVATE

## 2021-11-21 PROCEDURE — 82570 ASSAY OF URINE CREATININE: CPT

## 2021-11-21 PROCEDURE — 2700000000 HC OXYGEN THERAPY PER DAY

## 2021-11-21 PROCEDURE — 94761 N-INVAS EAR/PLS OXIMETRY MLT: CPT

## 2021-11-21 PROCEDURE — 80048 BASIC METABOLIC PNL TOTAL CA: CPT

## 2021-11-21 PROCEDURE — 2580000003 HC RX 258: Performed by: HOSPITALIST

## 2021-11-21 PROCEDURE — 36415 COLL VENOUS BLD VENIPUNCTURE: CPT

## 2021-11-21 PROCEDURE — 85025 COMPLETE CBC W/AUTO DIFF WBC: CPT

## 2021-11-21 PROCEDURE — 2500000003 HC RX 250 WO HCPCS: Performed by: HOSPITALIST

## 2021-11-21 PROCEDURE — 99232 SBSQ HOSP IP/OBS MODERATE 35: CPT | Performed by: HOSPITALIST

## 2021-11-21 RX ORDER — PREDNISONE 20 MG/1
20 TABLET ORAL DAILY
Status: COMPLETED | OUTPATIENT
Start: 2021-11-22 | End: 2021-11-22

## 2021-11-21 RX ORDER — PREDNISONE 10 MG/1
10 TABLET ORAL DAILY
Status: COMPLETED | OUTPATIENT
Start: 2021-11-23 | End: 2021-11-23

## 2021-11-21 RX ORDER — PREDNISONE 1 MG/1
7.5 TABLET ORAL DAILY
Status: DISCONTINUED | OUTPATIENT
Start: 2021-11-24 | End: 2021-11-30 | Stop reason: HOSPADM

## 2021-11-21 RX ADMIN — DEXAMETHASONE 6 MG: 4 TABLET ORAL at 09:40

## 2021-11-21 RX ADMIN — ENOXAPARIN SODIUM 30 MG: 30 INJECTION SUBCUTANEOUS at 19:55

## 2021-11-21 RX ADMIN — HYDRALAZINE HYDROCHLORIDE 100 MG: 50 TABLET, FILM COATED ORAL at 21:34

## 2021-11-21 RX ADMIN — SODIUM BICARBONATE: 84 INJECTION, SOLUTION INTRAVENOUS at 06:25

## 2021-11-21 RX ADMIN — ENOXAPARIN SODIUM 30 MG: 30 INJECTION SUBCUTANEOUS at 09:40

## 2021-11-21 RX ADMIN — Medication 2000 UNITS: at 09:40

## 2021-11-21 RX ADMIN — SODIUM BICARBONATE 650 MG TABLET 650 MG: at 09:40

## 2021-11-21 ASSESSMENT — PAIN SCALES - GENERAL: PAINLEVEL_OUTOF10: 0

## 2021-11-21 NOTE — PROGRESS NOTES
respiratory effort. On 6L initally but lowered to 3.5L with sats 92-98% even during MSK exam  Cardiovascular: Regular rate and rhythm with normal S1/S2 without murmurs, rubs or gallops. Abdomen: Soft, non-tender, non-distended  Musculoskeletal: No clubbing, cyanosis or edema bilaterally. Full range of motion without deformity. strength 4/5 on RLE plantar flexion and dorsiflexion as well as UE bilaterally. Remaining limbs with 5/5 strength. Normal MSK exam for pt due to known foot drop. Skin: Skin color, texture, turgor normal.  No rashes or lesions. Neurologic:  Cranial nerves: II-XII intact, grossly non-focal.  Psychiatric: Alert and oriented, thought content appropriate, normal insight  Peripheral Pulses: +2 palpable, equal bilaterally       Labs:   Recent Labs     11/19/21  0521 11/19/21  0521 11/20/21  0603 11/20/21  0740 11/21/21  0618   WBC 5.5  --  4.8  --  7.0   HGB 15.8   < > 13.9 12.6 12.9   HCT 47.0  --  41.3  --  38.9     --  162  --  166    < > = values in this interval not displayed. Recent Labs     11/19/21  0521 11/20/21  0603 11/21/21  0619   * 133* 128*   K 5.1 4.9 4.9    105 95*   CO2 23 16* 26   BUN 31* 55* 41*   CREATININE 0.8 0.6 0.7   CALCIUM 9.7 9.0 9.2     No results for input(s): AST, ALT, BILIDIR, BILITOT, ALKPHOS in the last 72 hours. Recent Labs     11/19/21  0521 11/20/21  0603   INR 1.05 1.05     Recent Labs     11/20/21  0603   TROPONINI <0.01       Urinalysis:      Lab Results   Component Value Date    NITRU POSITIVE 11/12/2021    WBCUA 10-20 11/12/2021    BACTERIA 4+ 11/12/2021    RBCUA 3-4 11/12/2021    BLOODU Negative 11/12/2021    SPECGRAV 1.020 11/12/2021    GLUCOSEU Negative 11/12/2021       Radiology:  XR CHEST PORTABLE   Preliminary Result   1. No significant change in appearance of multifocal pneumonia, to include   atypical viral causes. 2. Stable cardiomegaly.          CT CHEST PULMONARY EMBOLISM W CONTRAST   Final Result   No evidence of a pulmonary embolus. Mildly dilated and atherosclerotic thoracic aorta with no aneurysm or   dissection. Moderate subpleural and interstitial ground-glass opacities scattered   throughout both lungs which probably represents multisegmental   bronchopneumonia secondary to the patient's known COVID-19 disease      Single moderate compression fracture of T11 which may be chronic. Recommend   clinical follow-up of the area. XR CHEST PORTABLE   Final Result   Bilateral airspace disease suggestive of atypical viral pneumonia. Superimposed edema not excluded. Assessment/Plan:    Active Hospital Problems    Diagnosis     Acute respiratory failure due to severe acute respiratory syndrome coronavirus 2 (SARS-CoV-2) infection ([facility]) [U07.1, J96.00]     Current chronic use of systemic steroids [Z79.52]     Giant cell arteritis ([facility]) [M31.6]     PMR (polymyalgia rheumatica) ([facility]) [M35.3]     HTN (hypertension) [I10]      Severe COVID-19 Pneumonia- d/t inability to maintain sats >94% on RA  - Sxs onset 11/10  - Vaccination status 2 doses completed 02/16/2021  - Date of positive test 11/12  - Goal O2 sat >90%  - Remdesivir ordered on 11/13 as the patient is presenting within 7 days of symptom onset. Treatment length 5 days total. End date 11/16   - Dexamethasone PO previously on 20 then 10 now will complete ten day course with 6mg daily (11/15-11/21)  -crp <3.0, ddimer 326 (both decreasing  - CTA PE without PE, GGO with mod frx T11 chronic  - Anticoagulation:enoxparin 30mg BID  - Isolation end date: 20 days from symptom onset depending on severity of illness and underlying co-morbidities/immune status with evidence of improvement in symptoms and with 24 hours of no fever without the use of fever reducing medications. End date 11/29. Syncopal episode- presumed due to worsening exertional hypoxia. Now improving. remaining work up reassuring.     lactic acidosis- suspect related to cellular

## 2021-11-21 NOTE — PLAN OF CARE
Problem: Falls - Risk of:  Goal: Will remain free from falls  Description: Will remain free from falls  Outcome: Ongoing  Note: Pt will remain free from falls throughout hospital stay. Fall precautions in place, bed alarm on, bed in lowest position with wheels locked and side rails 2/4 up. Room door open and hourly rounding completed. Will continue to monitor throughout shift.        Problem: Gas Exchange - Impaired  Goal: Absence of hypoxia  Outcome: Ongoing

## 2021-11-21 NOTE — PROGRESS NOTES
Nephrology Progress  Note                                                                                                                                                                                                                                                                                                                                                               Office : [phone]     Fax :[phone]              Patient's Name: Alma Gilmore  12:40 PM  11/21/2021    Reason for Consult:  Metabolic acidosis    Requesting Physician:  Darvin Hurtado DO      Chief Complaint:  SOB, PNA      Interval History :      Serum Na 133   -----> 134 ---> 133 ----> 128  Bicarb 17 ------> 23 ---> 16 ----> 26   Serum Lactate ~ 5   On  3.5 L O2  SBP improved     History of Present Ilness: Alma Gilmore is a 80 y.o. female with PMH of RA , HTN     Being treated for Acute respiratory failure 2/2 COVID 19 PNA    Getting decadron   S/p remdesivir    Denies diarrhea    O2 requirement stable    Making urine        Past Medical History:   Diagnosis Date    Cellulitis of left lower leg 4/21/2019    Closed pelvic rim fracture 9/4/2014    Fibromyalgia     Foot drop, right foot     Fractures     History of blood transfusion     1997    History of temporal artery biopsy 10/06/2020    Hx of blood clots     PNA (pneumonia) 12/1997    Prepatellar bursitis of right knee 9/15/2020    Rheumatoid arthritis ([address]     Venous ulcer of left leg ([address] 08/2019    Venous ulcer of right leg ([address] 10/2016       Past Surgical History:   Procedure Laterality Date    TONSILLECTOMY      VEIN SURGERY Right 10/27/2016    saphenous VNUS closure       Family History   Problem Relation Age of Onset    Stroke Mother     Stroke Father         reports that she has never smoked. She has never used smokeless tobacco. She reports current alcohol use. She reports that she does not use drugs.     Allergies:  Erythromycin, Hydrochlorothiazide, Silver sulfadiazine, and Doxycycline    Current Medications:    [START ON 11/24/2021] predniSONE (DELTASONE) tablet 7.5 mg, Daily  [START ON 11/22/2021] predniSONE (DELTASONE) tablet 20 mg, Daily   Followed by  Carli Bird ON 11/23/2021] predniSONE (DELTASONE) tablet 10 mg, Daily  sodium bicarbonate tablet 650 mg, Daily  hydrALAZINE (APRESOLINE) tablet 100 mg, 3 times per day  metoprolol succinate (TOPROL XL) extended release tablet 100 mg, Daily  sodium chloride flush 0.9 % injection 10 mL, PRN  0.9 % sodium chloride infusion, PRN  potassium chloride (KLOR-CON M) extended release tablet 40 mEq, PRN   Or  potassium bicarb-citric acid (EFFER-K) effervescent tablet 40 mEq, PRN   Or  potassium chloride 10 mEq/100 mL IVPB (Peripheral Line), PRN  magnesium sulfate 1000 mg in dextrose 5% 100 mL IVPB, PRN  ondansetron (ZOFRAN-ODT) disintegrating tablet 4 mg, Q8H PRN   Or  ondansetron (ZOFRAN) injection 4 mg, Q6H PRN  acetaminophen (TYLENOL) tablet 650 mg, Q6H PRN   Or  acetaminophen (TYLENOL) suppository 650 mg, Q6H PRN  guaiFENesin-dextromethorphan (ROBITUSSIN DM) 100-10 MG/5ML syrup 5 mL, Q4H PRN  Vitamin D (CHOLECALCIFEROL) tablet 2,000 Units, Daily  enoxaparin (LOVENOX) injection 30 mg, BID  benzonatate (TESSALON) capsule 200 mg, TID PRN  albuterol sulfate  (90 Base) MCG/ACT inhaler 2 puff, Q4H PRN  0.9 % sodium chloride bolus, PRN        Review of Systems:   14 point ROS obtained but were negative except mentioned in HPI      Physical exam:     Vitals:  /63   Pulse (!) 45   Temp 97.2 °F (36.2 °C) (Oral)   Resp 16   Ht 5' 5\" (1.651 m)   Wt 145 lb 11.6 oz (66.1 kg)   SpO2 94%   BMI 24.25 kg/m²   Constitutional:  OAA X3 NAD  Skin: no rash, turgor wnl  Heent:  eomi, mmm  Neck: no bruits or jvd noted  Cardiovascular:  S1, S2 without m/r/g  Respiratory: CTA B without w/r/r  Abdomen:  +bs, soft, nt, nd  Ext: no  lower extremity edema  Psychiatric: mood and affect Moderate subpleural and interstitial ground-glass opacities scattered   throughout both lungs which probably represents multisegmental   bronchopneumonia secondary to the patient's known COVID-19 disease      Single moderate compression fracture of T11 which may be chronic. Recommend   clinical follow-up of the area. XR CHEST PORTABLE   Final Result   Bilateral airspace disease suggestive of atypical viral pneumonia. Superimposed edema not excluded. Assessment/Plan     1. NAGMA     Bicarb 19 since 11/14  Bicarb 26 on admission  Denies diarrhea    Renal fn wnl, GFR > 60 ml/hr    Plan    DC IVF with sodium bicarb  Bicarb improved to 26  moniter bicarb and electrolytes        2.  COVID PNA    Acute hypoxic respiratory failure     On decadron  remdesvir  O2      3 HTN : controlled      On Metoprolol  Increase Hydralazine to 100 mg  TID       4 Dementia    5 hypotension    Give gentle IV hydration  Keep MAP > 65 mmhg      Thank you for allowing us to participate in care of Zain Aldridge MD  Feel free to contact me   [facility] [address]  Office : [phone]  Fax :[phone]

## 2021-11-22 LAB
ANION GAP SERPL CALCULATED.3IONS-SCNC: 6 MMOL/L (ref 3–16)
ATYPICAL LYMPHOCYTE RELATIVE PERCENT: 1 % (ref 0–6)
BANDED NEUTROPHILS RELATIVE PERCENT: 1 % (ref 0–7)
BASOPHILS ABSOLUTE: 0 K/UL (ref 0–0.2)
BASOPHILS RELATIVE PERCENT: 0 %
BUN BLDV-MCNC: 34 MG/DL (ref 7–20)
C-REACTIVE PROTEIN: <3 MG/L (ref 0–5.1)
CALCIUM SERPL-MCNC: 9.4 MG/DL (ref 8.3–10.6)
CHLORIDE BLD-SCNC: 98 MMOL/L (ref 99–110)
CO2: 28 MMOL/L (ref 21–32)
CREAT SERPL-MCNC: 0.7 MG/DL (ref 0.6–1.2)
D DIMER: 239 NG/ML DDU (ref 0–229)
EOSINOPHILS ABSOLUTE: 0 K/UL (ref 0–0.6)
EOSINOPHILS RELATIVE PERCENT: 0 %
GFR AFRICAN AMERICAN: >60
GFR NON-AFRICAN AMERICAN: >60
GLUCOSE BLD-MCNC: 114 MG/DL (ref 70–99)
HCT VFR BLD CALC: 35.2 % (ref 36–48)
HEMOGLOBIN: 12.1 G/DL (ref 12–16)
LYMPHOCYTES ABSOLUTE: 0.6 K/UL (ref 1–5.1)
LYMPHOCYTES RELATIVE PERCENT: 9 %
MCH RBC QN AUTO: 32.7 PG (ref 26–34)
MCHC RBC AUTO-ENTMCNC: 34.3 G/DL (ref 31–36)
MCV RBC AUTO: 95.4 FL (ref 80–100)
METAMYELOCYTES RELATIVE PERCENT: 1 %
MONOCYTES ABSOLUTE: 1 K/UL (ref 0–1.3)
MONOCYTES RELATIVE PERCENT: 17 %
NEUTROPHILS ABSOLUTE: 4.3 K/UL (ref 1.7–7.7)
NEUTROPHILS RELATIVE PERCENT: 71 %
OSMOLALITY URINE: 875 MOSM/KG (ref 390–1070)
PDW BLD-RTO: 13.5 % (ref 12.4–15.4)
PLATELET # BLD: 166 K/UL (ref 135–450)
PMV BLD AUTO: 9 FL (ref 5–10.5)
POTASSIUM SERPL-SCNC: 4.7 MMOL/L (ref 3.5–5.1)
RBC # BLD: 3.69 M/UL (ref 4–5.2)
RBC # BLD: NORMAL 10*6/UL
SODIUM BLD-SCNC: 132 MMOL/L (ref 136–145)
UREA NITROGEN, UR: 1758.5 MG/DL (ref 800–1666)
WBC # BLD: 5.9 K/UL (ref 4–11)

## 2021-11-22 PROCEDURE — 94761 N-INVAS EAR/PLS OXIMETRY MLT: CPT

## 2021-11-22 PROCEDURE — 85379 FIBRIN DEGRADATION QUANT: CPT

## 2021-11-22 PROCEDURE — 84443 ASSAY THYROID STIM HORMONE: CPT

## 2021-11-22 PROCEDURE — 2700000000 HC OXYGEN THERAPY PER DAY

## 2021-11-22 PROCEDURE — 36415 COLL VENOUS BLD VENIPUNCTURE: CPT

## 2021-11-22 PROCEDURE — 97110 THERAPEUTIC EXERCISES: CPT

## 2021-11-22 PROCEDURE — 80048 BASIC METABOLIC PNL TOTAL CA: CPT

## 2021-11-22 PROCEDURE — 1200000000 HC SEMI PRIVATE

## 2021-11-22 PROCEDURE — 97530 THERAPEUTIC ACTIVITIES: CPT

## 2021-11-22 PROCEDURE — 97116 GAIT TRAINING THERAPY: CPT

## 2021-11-22 PROCEDURE — 6360000002 HC RX W HCPCS: Performed by: INTERNAL MEDICINE

## 2021-11-22 PROCEDURE — 6370000000 HC RX 637 (ALT 250 FOR IP): Performed by: STUDENT IN AN ORGANIZED HEALTH CARE EDUCATION/TRAINING PROGRAM

## 2021-11-22 PROCEDURE — 85025 COMPLETE CBC W/AUTO DIFF WBC: CPT

## 2021-11-22 PROCEDURE — 6370000000 HC RX 637 (ALT 250 FOR IP): Performed by: INTERNAL MEDICINE

## 2021-11-22 PROCEDURE — 6370000000 HC RX 637 (ALT 250 FOR IP): Performed by: HOSPITALIST

## 2021-11-22 PROCEDURE — 86140 C-REACTIVE PROTEIN: CPT

## 2021-11-22 PROCEDURE — 97535 SELF CARE MNGMENT TRAINING: CPT

## 2021-11-22 PROCEDURE — 99232 SBSQ HOSP IP/OBS MODERATE 35: CPT | Performed by: HOSPITALIST

## 2021-11-22 RX ADMIN — HYDRALAZINE HYDROCHLORIDE 100 MG: 50 TABLET, FILM COATED ORAL at 22:54

## 2021-11-22 RX ADMIN — HYDRALAZINE HYDROCHLORIDE 100 MG: 50 TABLET, FILM COATED ORAL at 05:56

## 2021-11-22 RX ADMIN — PREDNISONE 20 MG: 20 TABLET ORAL at 08:27

## 2021-11-22 RX ADMIN — SODIUM BICARBONATE 650 MG TABLET 650 MG: at 08:28

## 2021-11-22 RX ADMIN — Medication 2000 UNITS: at 08:28

## 2021-11-22 RX ADMIN — ACETAMINOPHEN 650 MG: 325 TABLET ORAL at 15:11

## 2021-11-22 RX ADMIN — ENOXAPARIN SODIUM 30 MG: 30 INJECTION SUBCUTANEOUS at 19:40

## 2021-11-22 RX ADMIN — ACETAMINOPHEN 650 MG: 325 TABLET ORAL at 23:05

## 2021-11-22 RX ADMIN — ENOXAPARIN SODIUM 30 MG: 30 INJECTION SUBCUTANEOUS at 08:29

## 2021-11-22 ASSESSMENT — PAIN SCALES - GENERAL
PAINLEVEL_OUTOF10: 2
PAINLEVEL_OUTOF10: 1

## 2021-11-22 ASSESSMENT — PAIN DESCRIPTION - PAIN TYPE: TYPE: CHRONIC PAIN;ACUTE PAIN

## 2021-11-22 ASSESSMENT — PAIN DESCRIPTION - ORIENTATION: ORIENTATION: LEFT

## 2021-11-22 ASSESSMENT — PAIN DESCRIPTION - LOCATION: LOCATION: HIP

## 2021-11-22 NOTE — PROGRESS NOTES
Physical Therapy  Facility/Department: [facility] C5 - MED SURG/ORTHO  Daily Treatment Note  NAME: Pepito Gooden  : 2/3/1933  MRN: [id number]    Date of Service: 2021    Discharge Recommendations:  Subacute/Skilled Nursing Facility   PT Equipment Recommendations  Equipment Needed: No    Assessment   Body structures, Functions, Activity limitations: Decreased functional mobility ; Decreased strength; Decreased endurance; Decreased balance  Assessment: Pt now needing more A than before, reports overwhelming fatigue and weakness, not wanting to do anything and needing multiple cues to complete tasks and follow commands. Pt required max A bed mobility and up to mod A of 2 STS with RW. Pt sat EOB 15 -20 miuntes for ADLs before transferring bed ot chair with the RW. Pt lives alone and does not have A at D/C. She is recommended for con't skilled PT and SNFat D/c. Treatment Diagnosis: Decreased endurance  Specific instructions for Next Treatment: Progress ther ex and mobility as tolerated  Prognosis: Good  Decision Making: Medium Complexity  PT Education: Goals; General Safety; Gait Training; PT Role; Plan of Care; Disease Specific Education; Functional Mobility Training; Equipment; Precautions; Transfer Training; Energy Conservation; Home Exercise Program  Patient Education: Disease-specific education: Pt educated on role of PT in hospital, current D/C recs, and benefits of regular OOB activity while in hospital in order to decrease risks of prolonged bedrest.  Pt verbalizes understanding.   REQUIRES PT FOLLOW UP: Yes  Activity Tolerance  Activity Tolerance: Patient Tolerated treatment well; Patient limited by endurance  Activity Tolerance: Pt with overwhelming fatigue, all vital signs WNL but barely able to keep eyes open and needing repeated cues to complete each task and follow commands    Vitals:    21    BP: 105/68   Pulse: 95   Resp:    Temp:    SpO2: 97%         Patient Diagnosis(es): The primary encounter diagnosis was Acute respiratory failure with hypoxia ([facility] Diagnoses of COVID-19 and Acute cystitis without hematuria were also pertinent to this visit. has a past medical history of Cellulitis of left lower leg, Closed pelvic rim fracture, Fibromyalgia, Foot drop, right foot, Fractures, History of blood transfusion, History of temporal artery biopsy, Hx of blood clots, PNA (pneumonia), Prepatellar bursitis of right knee, Rheumatoid arthritis ([address] Venous ulcer of left leg ([address] and Venous ulcer of right leg ([address] has a past surgical history that includes Tonsillectomy and Vein Surgery (Right, 10/27/2016). Restrictions  Restrictions/Precautions  Restrictions/Precautions: General Precautions, Up as Tolerated, Contact Precautions  Position Activity Restriction  Other position/activity restrictions: droplet+ d/t COVID, High fall risk per nursing assessment, up with assistance, 4L O2 via NC, telemetry with continuous pulse oximetry     Subjective   General  Chart Reviewed: Yes  Additional Pertinent Hx: R foot drop  Family / Caregiver Present: No  Referring Practitioner: Geoffrey Coronado MD  Subjective  Subjective: Pt reports fatigue, is agreeable but states \"I'm not sure I can do it\"  General Comment  Comments: supine in bed on arrival  Pain Screening  Patient Currently in Pain: Denies  Vital Signs  Patient Currently in Pain: Denies       Orientation  Orientation  Overall Orientation Status: Within Functional Limits       Objective   Bed mobility  Rolling to Right: Moderate assistance; Maximum assistance  Supine to Sit: Maximum assistance (to R with HOB elevated)  Sit to Supine: Unable to assess (remained up)  Transfers  Sit to Stand: Dependent/Total; 2 Person Assistance; Minimal Assistance;  Moderate Assistance (min A of 2 first time, mod A of 2 second time)  Stand to sit: Dependent/Total; 2 Person Assistance; Minimal Assistance  Ambulation  Ambulation?: Yes  Ambulation 1  Surface: level tile  Device: Rolling Walker  Other Apparatus: O2 (4L)  Assistance: Dependent/Total; 2 Person assistance; Minimal assistance; Moderate assistance  Quality of Gait: Pt amb with slow cindy and decreased stride length B. Steppage gait noted on R secondary to baseline foot drop. Gait Deviations: Slow Cindy; Decreased step length; Decreased step height  Distance: 3' to [facility], 3' to chair  Comments: Amb distance limited by c/o fatigue   SpO2 on 4L O2 96-98% with activity        Exercises  Hip Flexion: X 10 BLE seated marches  Knee Long Arc Quad: X 7 BLE  Ankle Pumps: X 15 LLE, R foot stretched into DF 30 seconds X 5        AM-PAC Score  AM-PAC Inpatient Mobility Raw Score : 11 (11/22/21 1555)  AM-PAC Inpatient T-Scale Score : 33.86 (11/22/21 1555)  Mobility Inpatient CMS 0-100% Score: 72.57 (11/22/21 1555)  Mobility Inpatient CMS G-Code Modifier : CL (11/22/21 1555)          Goals  Short term goals  Time Frame for Short term goals: 1 week, 11/23/21 (unless otherwise specified)  Short term goal 1: Pt will transfer supine <-> sit with (I)  -11/22 max A  Short term goal 2: Pt will transfer sit <-> stand and bed>chair using RW/4WW with modified(I)   -11/22 A of 2 with walker  Short term goal 3: Pt will ambulate x 125 feet using RW/4WW with SBA   -11/22 few steps A of 2 with walker  Short term goal 4: By 11/19/21: Pt will tolerate 15-20 reps BLE exercise for strengthening, balance, and endurance   -11/22 on-going  Patient Goals   Patient goals : \"To be able to keep house and do all of my cooking and laundry like I used to. \"    Plan    Plan  Times per week: 2-3x/week in acute care  Times per day: Daily  Specific instructions for Next Treatment: Progress ther ex and mobility as tolerated  Current Treatment Recommendations: Strengthening, Balance Training, Endurance Training, Functional Mobility Training, Transfer Training, Gait Training, Home Exercise Program, Safety Education & Training, Equipment Evaluation, Education, & procurement  Safety Devices  Type of devices:  All fall risk precautions in place, Call light within reach, Chair alarm in place, Gait belt, Left in chair, Nurse notified     Therapy Time   Individual Concurrent Group Co-treatment   Time In 1007         Time Out [address], [facility]

## 2021-11-22 NOTE — CARE COORDINATION
Writer received call from pt's daughter, Timbo, her email is [email or web address], she needs [employer] denial letter sent to her. Writer left vmail for ARU admissions. TRUPTI Ferreira     1100 Addendum:  OT informed writer that pt cannot tolerate 3hours of therapy and they are recommending SNF. Writer spoke with pt's daughter Timbo, explained pt's decline, she was aware and thinks pt is depressed. Writer provided Timbo with phone number for [facility], she will try calling herself as ARU staff was not able to get anywhere with [employer]. Writer spoke with Candace Greer they have insurance approval for SNF and can accept. Pt discussed in huddle, not medically stable for discharge today. CM will continue to follow pt's progress and coordinate discharge arrangements as appropriate. Janna Iraheta, [address] Addendum:  Timbo updated writer, she was able to file appeal with [employer] retire[employer] account with Anna Danisha, record# [id number], they have up to 72hours to make determination. CM will continue to follow pt's progress and coordinate discharge arrangements as appropriate.   TRUPTI Ferreira

## 2021-11-22 NOTE — PROGRESS NOTES
Occupational Therapy  Facility/Department: [facility] C5 - MED SURG/ORTHO  Daily Treatment Note  NAME: Jeanne Ely  : 2/3/1933  MRN: [id number]    Date of Service: 2021    Discharge Recommendations:  Subacute/Skilled Nursing Facility     Assessment   Performance deficits / Impairments: Decreased functional mobility ; Decreased ADL status; Decreased strength; Decreased endurance; Decreased high-level IADLs; Decreased balance; Decreased safe awareness  Assessment: Pt's level of function has declined significantly from baseline. Pt had been CGA for last week for transfers. She currently requires mod x2 for standing balance and transfers. Extensive assist needed for LE ADLs d/t poor balance, endurance and cognition. Pt needed max cues for initiation and sequencing ADL tasks. Recommend SNF level of care for skilled OT to improve function. Am-PAC score has declined since last OT tx. Cont OT in acute care. Co-tx with PT performed to safely and effectively address ADLs and mobility due to extent of pt's deficits. Prognosis: Good  OT Education: OT Role; Plan of Care; Energy Conservation; Transfer Training; ADL Adaptive Strategies  Disease Specific Education: Pt educated on importance of OOB mobility, prevention of complications of bedrest, and general safety during hospitalization. Pt verbalized understanding  Barriers to Learning: low endurance  REQUIRES OT FOLLOW UP: Yes  Activity Tolerance  Activity Tolerance: Patient limited by fatigue; Treatment limited secondary to decreased cognition  Activity Tolerance: /76, HR 95, O2 sats 97% on 4L  Safety Devices  Safety Devices in place: Yes  Type of devices: Left in chair; Chair alarm in place; Call light within reach; Nurse notified; Gait belt       Patient Diagnosis(es): The primary encounter diagnosis was Acute respiratory failure with hypoxia ([facility] [address] Diagnoses of COVID-19 and Acute cystitis without hematuria were also pertinent to this visit.       has a past medical history of Cellulitis of left lower leg, Closed pelvic rim fracture, Fibromyalgia, Foot drop, right foot, Fractures, History of blood transfusion, History of temporal artery biopsy, Hx of blood clots, PNA (pneumonia), Prepatellar bursitis of right knee, Rheumatoid arthritis ([address] Venous ulcer of left leg ([facility] [address] and Venous ulcer of right leg ([facility] [address] has a past surgical history that includes Tonsillectomy and Vein Surgery (Right, 10/27/2016). Restrictions  Restrictions/Precautions  Restrictions/Precautions: General Precautions, Up as Tolerated, Contact Precautions  Position Activity Restriction  Other position/activity restrictions: droplet+ d/t COVID, High fall risk per nursing assessment, up with assistance, 4L O2 via NC, telemetry with continuous pulse oximetry  Subjective   General  Chart Reviewed: Yes  Patient assessed for rehabilitation services?: Yes  Additional Pertinent Hx: Patient is a 80-year-old female with past medical history of fibromyalgia, rheumatoid arthritis who presented to hospital for shortness of breath. Response to previous treatment: Patient with no complaints from previous session  Family / Caregiver Present: No  Referring Practitioner: Cory Jimenez MD  Diagnosis: acute cystitis, COVID+  Subjective  Subjective: Pt agreeable to OT. Pt reports general fatigue and weaknes  General Comment  Comments: RN approved therapy  Vital Signs  Pulse: 97  Heart Rate Source: Monitor  BP: 106/66  BP Location: Right upper arm  Patient Position: Semi fowlers  Level of Consciousness: Alert (0)  Patient Currently in Pain: Denies  Oxygen Therapy  SpO2: 96 %  Pulse Oximeter Device Mode: Continuous  Pulse Oximeter Device Location: Finger  O2 Device: Nasal cannula   Orientation  Orientation  Overall Orientation Status: Within Normal Limits  Objective    ADL  Feeding: Supervision; Setup; Beverage management  Grooming: Moderate assistance;  Increased time to complete; Verbal cueing (while seated)  UE Bathing: Minimal assistance (Sat EOB and sponge bathed with bath wipes)  LE Bathing: Dependent/Total (Assist x2 to stand for pericare)  UE Dressing: Moderate assistance  LE Dressing: Dependent/Total (Assist x2 to change brief)  Toileting: Dependent/Total (purewick)  Additional Comments: Vc's needed for pt to initiate and carryout ADL tasks. Balance  Sitting Balance: Contact guard assistance  Standing Balance: Dependent/Total (min/mod x2)  Standing Balance  Activity: Stood at EOB for pericare and LE ADLs with min x2. Transferred to chair with mod x2 and RW. Transfers  Stand Pivot Transfers: Moderate assistance; 2 Person assistance (RW)  Sit to stand: Moderate assistance; 2 Person assistance  Stand to sit: Moderate assistance; 2 Person assistance      Cognition  Overall Cognitive Status: Exceptions  Arousal/Alertness: Delayed responses to stimuli  Following Commands: Follows one step commands with repetition; Follows one step commands with increased time  Attention Span: Attends with cues to redirect;  Difficulty attending to directions  Memory: Decreased recall of recent events  Safety Judgement: Decreased awareness of need for safety; Decreased awareness of need for assistance  Problem Solving: Assistance required to correct errors made; Assistance required to identify errors made; Decreased awareness of errors  Insights: Decreased awareness of deficits  Initiation: Requires cues for all  Sequencing: Requires cues for all  Cognition Comment: vc's for safety      Type of ROM/Therapeutic Exercise  Type of ROM/Therapeutic Exercise: AROM  Comment: Performed while seated, max cues for full ROM  Exercises  Shoulder Elevation: 10x  Shoulder Flexion: 10x  Elbow Flexion: 10x  Elbow Extension: 10x  Supination: 10x  Pronation: 10x  Grasp/Release: 10x      Plan   Plan  Times per week: 3-5x per week  Times per day: Daily  Current Treatment Recommendations: Strengthening, Endurance Training, ROM, Self-Care / ADL, Home Management Training, Patient/Caregiver Education & Training, Functional Mobility Training    AM-PAC Score   AM-PAC Inpatient Daily Activity Raw Score: 10 (11/22/21 1647)  AM-PAC Inpatient ADL T-Scale Score : 27.31 (11/22/21 1647)  ADL Inpatient CMS 0-100% Score: 74.7 (11/22/21 1647)  ADL Inpatient CMS G-Code Modifier : CL (11/22/21 1647)  Goals  Short term goals  Time Frame for Short term goals: 1 week (11/21/21--extend to 11/28)  Short term goal 1: Pt will complete toilet transfer with CGA-- ongoing, mod x2 transfer to chair 11/22  Short term goal 2: Pt will complete LBD with min A by 11/24-- ongoing, total assist 11/22  Short term goal 3: Pt will complete grooming tasks in standing for 5 minutes for improvd endurance with O2 stats >90%-- ongoing, mod A while seated 11/22  Short term goal 4: Pt will complete toileting with SBA. -- ongoing 11/22, total assist  Patient Goals   Patient goals : \"change myself\"     Therapy Time   Individual Concurrent Group Co-treatment   Time In       1007   Time Out       1052   Minutes       45   Timed Code Treatment Minutes: 39 Minutes    If pt is discharged prior to next OT session, this note will serve as the discharge summary.   Manny Cortes OT

## 2021-11-23 PROBLEM — F06.30 DEPRESSIVE DISORDER DUE TO SEPARATE MEDICAL CONDITION: Status: ACTIVE | Noted: 2021-11-23

## 2021-11-23 LAB
ANION GAP SERPL CALCULATED.3IONS-SCNC: 6 MMOL/L (ref 3–16)
BANDED NEUTROPHILS RELATIVE PERCENT: 1 % (ref 0–7)
BASOPHILS ABSOLUTE: 0 K/UL (ref 0–0.2)
BASOPHILS RELATIVE PERCENT: 0 %
BUN BLDV-MCNC: 39 MG/DL (ref 7–20)
CALCIUM SERPL-MCNC: 9.4 MG/DL (ref 8.3–10.6)
CHLORIDE BLD-SCNC: 100 MMOL/L (ref 99–110)
CO2: 26 MMOL/L (ref 21–32)
CREAT SERPL-MCNC: 0.7 MG/DL (ref 0.6–1.2)
EOSINOPHILS ABSOLUTE: 0 K/UL (ref 0–0.6)
EOSINOPHILS RELATIVE PERCENT: 0 %
GFR AFRICAN AMERICAN: >60
GFR NON-AFRICAN AMERICAN: >60
GLUCOSE BLD-MCNC: 92 MG/DL (ref 70–99)
HCT VFR BLD CALC: 33.5 % (ref 36–48)
HEMOGLOBIN: 11.3 G/DL (ref 12–16)
LYMPHOCYTES ABSOLUTE: 0.8 K/UL (ref 1–5.1)
LYMPHOCYTES RELATIVE PERCENT: 15 %
MCH RBC QN AUTO: 32.4 PG (ref 26–34)
MCHC RBC AUTO-ENTMCNC: 33.7 G/DL (ref 31–36)
MCV RBC AUTO: 96.2 FL (ref 80–100)
MONOCYTES ABSOLUTE: 0.6 K/UL (ref 0–1.3)
MONOCYTES RELATIVE PERCENT: 10 %
NEUTROPHILS ABSOLUTE: 4.1 K/UL (ref 1.7–7.7)
NEUTROPHILS RELATIVE PERCENT: 74 %
PDW BLD-RTO: 13.5 % (ref 12.4–15.4)
PLATELET # BLD: 160 K/UL (ref 135–450)
PMV BLD AUTO: 8.8 FL (ref 5–10.5)
POTASSIUM SERPL-SCNC: 4.8 MMOL/L (ref 3.5–5.1)
RBC # BLD: 3.49 M/UL (ref 4–5.2)
RBC # BLD: NORMAL 10*6/UL
SLIDE REVIEW: ABNORMAL
SODIUM BLD-SCNC: 132 MMOL/L (ref 136–145)
TSH REFLEX FT4: 1.71 UIU/ML (ref 0.27–4.2)
WBC # BLD: 5.5 K/UL (ref 4–11)

## 2021-11-23 PROCEDURE — 99232 SBSQ HOSP IP/OBS MODERATE 35: CPT | Performed by: HOSPITALIST

## 2021-11-23 PROCEDURE — 6360000002 HC RX W HCPCS: Performed by: INTERNAL MEDICINE

## 2021-11-23 PROCEDURE — 94761 N-INVAS EAR/PLS OXIMETRY MLT: CPT

## 2021-11-23 PROCEDURE — 2700000000 HC OXYGEN THERAPY PER DAY

## 2021-11-23 PROCEDURE — 6370000000 HC RX 637 (ALT 250 FOR IP): Performed by: INTERNAL MEDICINE

## 2021-11-23 PROCEDURE — 97535 SELF CARE MNGMENT TRAINING: CPT

## 2021-11-23 PROCEDURE — 1200000000 HC SEMI PRIVATE

## 2021-11-23 PROCEDURE — 6370000000 HC RX 637 (ALT 250 FOR IP): Performed by: HOSPITALIST

## 2021-11-23 PROCEDURE — 99223 1ST HOSP IP/OBS HIGH 75: CPT | Performed by: PSYCHIATRY & NEUROLOGY

## 2021-11-23 PROCEDURE — 2580000003 HC RX 258: Performed by: INTERNAL MEDICINE

## 2021-11-23 PROCEDURE — 6370000000 HC RX 637 (ALT 250 FOR IP): Performed by: STUDENT IN AN ORGANIZED HEALTH CARE EDUCATION/TRAINING PROGRAM

## 2021-11-23 PROCEDURE — 85025 COMPLETE CBC W/AUTO DIFF WBC: CPT

## 2021-11-23 PROCEDURE — 97110 THERAPEUTIC EXERCISES: CPT

## 2021-11-23 PROCEDURE — 6360000002 HC RX W HCPCS: Performed by: EMERGENCY MEDICINE

## 2021-11-23 PROCEDURE — 97116 GAIT TRAINING THERAPY: CPT

## 2021-11-23 PROCEDURE — 80048 BASIC METABOLIC PNL TOTAL CA: CPT

## 2021-11-23 PROCEDURE — 2580000003 HC RX 258: Performed by: EMERGENCY MEDICINE

## 2021-11-23 PROCEDURE — 97530 THERAPEUTIC ACTIVITIES: CPT

## 2021-11-23 PROCEDURE — 36415 COLL VENOUS BLD VENIPUNCTURE: CPT

## 2021-11-23 RX ADMIN — Medication 10 ML: at 22:03

## 2021-11-23 RX ADMIN — ACETAMINOPHEN 650 MG: 325 TABLET ORAL at 23:42

## 2021-11-23 RX ADMIN — PREDNISONE 10 MG: 10 TABLET ORAL at 08:53

## 2021-11-23 RX ADMIN — ACETAMINOPHEN 650 MG: 325 TABLET ORAL at 17:35

## 2021-11-23 RX ADMIN — ENOXAPARIN SODIUM 30 MG: 30 INJECTION SUBCUTANEOUS at 08:53

## 2021-11-23 RX ADMIN — SODIUM BICARBONATE 650 MG TABLET 650 MG: at 08:53

## 2021-11-23 RX ADMIN — CEFTRIAXONE SODIUM 1000 MG: 1 INJECTION, POWDER, FOR SOLUTION INTRAMUSCULAR; INTRAVENOUS at 13:39

## 2021-11-23 RX ADMIN — Medication 2000 UNITS: at 08:53

## 2021-11-23 RX ADMIN — ACETAMINOPHEN 650 MG: 325 TABLET ORAL at 09:01

## 2021-11-23 RX ADMIN — ENOXAPARIN SODIUM 30 MG: 30 INJECTION SUBCUTANEOUS at 22:03

## 2021-11-23 ASSESSMENT — PAIN DESCRIPTION - LOCATION
LOCATION: ELBOW;BUTTOCKS
LOCATION: GENERALIZED
LOCATION: GENERALIZED

## 2021-11-23 ASSESSMENT — PAIN SCALES - WONG BAKER: WONGBAKER_NUMERICALRESPONSE: 4

## 2021-11-23 ASSESSMENT — PAIN DESCRIPTION - DESCRIPTORS
DESCRIPTORS: ACHING

## 2021-11-23 ASSESSMENT — PAIN DESCRIPTION - PAIN TYPE
TYPE: ACUTE PAIN

## 2021-11-23 ASSESSMENT — PAIN SCALES - GENERAL
PAINLEVEL_OUTOF10: 2
PAINLEVEL_OUTOF10: 5
PAINLEVEL_OUTOF10: 3
PAINLEVEL_OUTOF10: 2
PAINLEVEL_OUTOF10: 2

## 2021-11-23 NOTE — PROGRESS NOTES
Nephrology Progress  Note                                                                                                                                                                                                                                                                                                                                                               Office : [phone]     Fax :[phone]              Patient's Name: Nicko Dominguez  10:38 AM  11/23/2021    Reason for Consult:  Metabolic acidosis    Requesting Physician:  Karri Aranda DO      Chief Complaint:  SOB, PNA      Interval History :      Serum Na 133   -----> 134 ---> 133 ----> 128  ----> 132  Bicarb 17 ------> 23 ---> 16 ----> 26  ---> 28  SBP improved   No diarrhea or vomiting  O2 requirement improving    History of Present Ilness: Nicko Dominguez is a 80 y.o. female with PMH of RA , HTN     Being treated for Acute respiratory failure 2/2 COVID 19 PNA    Getting decadron   S/p remdesivir    Denies diarrhea    O2 requirement stable    Making urine        Past Medical History:   Diagnosis Date    Cellulitis of left lower leg 4/21/2019    Closed pelvic rim fracture 9/4/2014    Fibromyalgia     Foot drop, right foot     Fractures     History of blood transfusion     1997    History of temporal artery biopsy 10/06/2020    Hx of blood clots     PNA (pneumonia) 12/1997    Prepatellar bursitis of right knee 9/15/2020    Rheumatoid arthritis ([address]     Venous ulcer of left leg ([address] 08/2019    Venous ulcer of right leg ([address] 10/2016       Past Surgical History:   Procedure Laterality Date    TONSILLECTOMY      VEIN SURGERY Right 10/27/2016    saphenous VNUS closure       Family History   Problem Relation Age of Onset    Stroke Mother     Stroke Father         reports that she has never smoked. She has never used smokeless tobacco. She reports current alcohol use.  She reports that she does not use drugs.     Allergies:  Erythromycin, Hydrochlorothiazide, Silver sulfadiazine, and Doxycycline    Current Medications:    cefTRIAXone (ROCEPHIN) 1000 mg IVPB in 50 mL D5W minibag, Q24H  [START ON 11/24/2021] predniSONE (DELTASONE) tablet 7.5 mg, Daily  sodium bicarbonate tablet 650 mg, Daily  hydrALAZINE (APRESOLINE) tablet 100 mg, 3 times per day  metoprolol succinate (TOPROL XL) extended release tablet 100 mg, Daily  sodium chloride flush 0.9 % injection 10 mL, PRN  0.9 % sodium chloride infusion, PRN  potassium chloride (KLOR-CON M) extended release tablet 40 mEq, PRN   Or  potassium bicarb-citric acid (EFFER-K) effervescent tablet 40 mEq, PRN   Or  potassium chloride 10 mEq/100 mL IVPB (Peripheral Line), PRN  magnesium sulfate 1000 mg in dextrose 5% 100 mL IVPB, PRN  ondansetron (ZOFRAN-ODT) disintegrating tablet 4 mg, Q8H PRN   Or  ondansetron (ZOFRAN) injection 4 mg, Q6H PRN  acetaminophen (TYLENOL) tablet 650 mg, Q6H PRN   Or  acetaminophen (TYLENOL) suppository 650 mg, Q6H PRN  guaiFENesin-dextromethorphan (ROBITUSSIN DM) 100-10 MG/5ML syrup 5 mL, Q4H PRN  Vitamin D (CHOLECALCIFEROL) tablet 2,000 Units, Daily  enoxaparin (LOVENOX) injection 30 mg, BID  benzonatate (TESSALON) capsule 200 mg, TID PRN  albuterol sulfate  (90 Base) MCG/ACT inhaler 2 puff, Q4H PRN  0.9 % sodium chloride bolus, PRN        Review of Systems:   14 point ROS obtained but were negative except mentioned in HPI      Physical exam:     Vitals:  BP 99/61   Pulse 102   Temp 97.4 °F (36.3 °C) (Oral) Comment: 97.4  Resp 18   Ht 5' 5\" (1.651 m)   Wt 145 lb 8.1 oz (66 kg)   SpO2 93%   BMI 24.21 kg/m²   Constitutional:  OAA X3 NAD  Skin: no rash, turgor wnl  Heent:  eomi, mmm  Neck: no bruits or jvd noted  Cardiovascular:  S1, S2 without m/r/g  Respiratory: CTA B without w/r/r  Abdomen:  +bs, soft, nt, nd  Ext: no  lower extremity edema  Psychiatric: mood and affect appropriate  Musculoskeletal:  Rom, muscular strength intact    Data:   Labs:  CBC:   Recent Labs     11/21/21  0618 11/22/21  0602 11/23/21  0712   WBC 7.0 5.9 5.5   HGB 12.9 12.1 11.3*    166 160     BMP:    Recent Labs     11/21/21  0619 11/22/21  0602 11/23/21  0712   * 132* 132*   K 4.9 4.7 4.8   CL 95* 98* 100   CO2 26 28 26   BUN 41* 34* 39*   CREATININE 0.7 0.7 0.7   GLUCOSE 141* 114* 92     Ca/Mg/Phos:   Recent Labs     11/21/21  0619 11/22/21  0602 11/23/21  0712   CALCIUM 9.2 9.4 9.4     Hepatic:   No results for input(s): AST, ALT, ALB, BILITOT, ALKPHOS in the last 72 hours. Troponin:   No results for input(s): TROPONINI in the last 72 hours. BNP: No results for input(s): BNP in the last 72 hours. Lipids: No results for input(s): CHOL, TRIG, HDL, LDLCALC, LABVLDL in the last 72 hours. ABGs:   No results for input(s): PHART, PO2ART, LBA1NTP in the last 72 hours. INR:   No results for input(s): INR in the last 72 hours. UA:No results for input(s): Elnita Mylene, GLUCOSEU, BILIRUBINUR, Hallie Claudio, BLOODU, PHUR, PROTEINU, UROBILINOGEN, NITRU, LEUKOCYTESUR, LABMICR, URINETYPE in the last 72 hours. Urine Microscopic: No results for input(s): LABCAST, BACTERIA, COMU, HYALCAST, WBCUA, RBCUA, EPIU in the last 72 hours. Urine Culture: No results for input(s): LABURIN in the last 72 hours. Urine Chemistry:   Recent Labs     11/21/21  1445   LABCREA 60.8   NAUR 39             IMAGING:  XR CHEST PORTABLE   Final Result   1. No significant change in appearance of multifocal pneumonia, to include   atypical viral causes. 2. Stable cardiomegaly. CT CHEST PULMONARY EMBOLISM W CONTRAST   Final Result   No evidence of a pulmonary embolus. Mildly dilated and atherosclerotic thoracic aorta with no aneurysm or   dissection.       Moderate subpleural and interstitial ground-glass opacities scattered   throughout both lungs which probably represents multisegmental   bronchopneumonia secondary to the patient's known COVID-19 disease

## 2021-11-23 NOTE — PROGRESS NOTES
Physical Therapy  Facility/Department: [facility] C5 - MED SURG/ORTHO  Daily Treatment Note  NAME: Faahd Martinez  : 2/3/1933  MRN: [id number]    Date of Service: 2021    Discharge Recommendations:  Subacute/Skilled Nursing Facility   PT Equipment Recommendations  Equipment Needed: No    Assessment   Body structures, Functions, Activity limitations: Decreased functional mobility ; Decreased strength; Decreased endurance; Decreased balance  Assessment: Pt feeling some better and looking much better today. Pt able ot come supine to sit Vero and STS with min Aof 1-2. She ambulated iw RW 15' + 25' and used the commode with SBA. Pt is recommended for con't skilled PTand SNF at D/c. Treatment Diagnosis: Decreased endurance  Specific instructions for Next Treatment: Progress ther ex and mobility as tolerated  Prognosis: Good  Decision Making: Medium Complexity  PT Education: Goals; General Safety; Gait Training; PT Role; Plan of Care; Disease Specific Education; Functional Mobility Training; Equipment; Precautions; Transfer Training; Energy Conservation; Home Exercise Program  Patient Education: Disease-specific education: Pt educated on role of PT in hospital, current D/C recs, and benefits of regular OOB activity while in hospital in order to decrease risks of prolonged bedrest.  Pt verbalizes understanding. REQUIRES PT FOLLOW UP: Yes  Activity Tolerance  Activity Tolerance: Patient Tolerated treatment well; Patient limited by endurance  Activity Tolerance: Pt fatigued after session Vitals: O2 RA 91%, , /67     Patient Diagnosis(es): The primary encounter diagnosis was Acute respiratory failure with hypoxia ([address] Diagnoses of COVID-19 and Acute cystitis without hematuria were also pertinent to this visit.      has a past medical history of Cellulitis of left lower leg, Closed pelvic rim fracture, Fibromyalgia, Foot drop, right foot, Fractures, History of blood transfusion, History of temporal artery biopsy, Hx of blood clots, PNA (pneumonia), Prepatellar bursitis of right knee, Rheumatoid arthritis ([address] Venous ulcer of left leg ([address] and Venous ulcer of right leg ([address] has a past surgical history that includes Tonsillectomy and Vein Surgery (Right, 10/27/2016). Restrictions  Restrictions/Precautions  Restrictions/Precautions: General Precautions, Up as Tolerated, Contact Precautions  Position Activity Restriction  Other position/activity restrictions: droplet+ d/t COVID, High fall risk per nursing assessment, up with assistance, 4L O2 via NC, telemetry with continuous pulse oximetry     Subjective   General  Chart Reviewed: Yes  Additional Pertinent Hx: R foot drop  Family / Caregiver Present: No  Referring Practitioner: Candace Robertson MD  Subjective  Subjective: Pt looks much better today, still reports fatigue but is motivated  General Comment  Comments: supine in bed on arrival  Pain Screening  Patient Currently in Pain: Denies  Vital Signs  Patient Currently in Pain: Denies       Orientation  Orientation  Overall Orientation Status: Within Functional Limits       Objective   Bed mobility  Supine to Sit: Minimal assistance  Sit to Supine: Unable to assess  Transfers  Sit to Stand: Dependent/Total; 2 Person Assistance; Minimal Assistance (at times min A of 1 other time Vero of 2 (lower surfaces, like commode))  Stand to sit: Minimal Assistance; Contact guard assistance (verbal cues for safety and technique)  Ambulation  Ambulation?: Yes  Ambulation 1  Surface: level tile  Device: Rolling Walker  Assistance: Contact guard assistance; Minimal assistance  Quality of Gait: Pt amb with slow cindy and decreased stride length B. Steppage gait noted on R secondary to baseline foot drop.   Gait Deviations: Slow Cindy; Decreased step length; Decreased step height  Distance: 15', 25'        Exercises  Hip Flexion: X 10 BLE seated marches  Knee Long Arc Quad: X 10 BLE, needed max verbal and tactile cues

## 2021-11-23 NOTE — PROGRESS NOTES
Hospitalist Progress Note      PCP: Flavio Rosario DO    Date of Admission: 2021    LOS: 10    Chief Complaint: Pt admitted for COVID illness 10 days ago. No change in condition over last 24 hours. Patients daughter spoke with her on the phone last night and call RN and asked for a psych evaluation for depression. Daughter says pt was sounding very sad on the phone and crying and depressed. Pt did express  Feeling sad to me about her  dying several years ago and she feels like she wants to give up. She denied   being suicidal when I asked her but said she did not care if she . Active Hospital Problems    Diagnosis Date Noted    Acute respiratory failure due to severe acute respiratory syndrome coronavirus 2 (SARS-CoV-2) infection (HCC) [U07.1, J96.00] 2021    Current chronic use of systemic steroids [Z79.52] 2021    Giant cell arteritis ([facility] [address] [M31.6] 10/19/2020    PMR (polymyalgia rheumatica) ([facility] [address] [M35.3] 2020    HTN (hypertension) [I10] 2014         Active Problems:    HTN (hypertension)    PMR (polymyalgia rheumatica) (HCC)    Giant cell arteritis (HCC)    Acute respiratory failure due to severe acute respiratory syndrome coronavirus 2 (SARS-CoV-2) infection (HCC)    Current chronic use of systemic steroids  Resolved Problems:    * No resolved hospital problems.  *          Medications:  Reviewed  Infusion Medications    sodium chloride 25 mL (21 1230)     Scheduled Medications    [START ON 2021] predniSONE  7.5 mg Oral Daily    [START ON 2021] predniSONE  10 mg Oral Daily    sodium bicarbonate  650 mg Oral Daily    hydrALAZINE  100 mg Oral 3 times per day    metoprolol succinate  100 mg Oral Daily    Vitamin D  2,000 Units Oral Daily    enoxaparin  30 mg SubCUTAneous BID     PRN Meds: sodium chloride flush, sodium chloride, potassium chloride **OR** potassium alternative oral replacement **OR** potassium chloride, magnesium sulfate, ondansetron **OR** ondansetron, acetaminophen **OR** acetaminophen, guaiFENesin-dextromethorphan, benzonatate, albuterol sulfate HFA, sodium chloride      DVT Prophylaxis:   Diet: ADULT DIET; Regular  ADULT ORAL NUTRITION SUPPLEMENT; Lunch; Standard High Calorie/High Protein Oral Supplement  Code Status: Full Code    Dispo: Anticipate discharge in the next 48 hrs    ____________________________________________________________________________    Subjective:   Overnight Events:         Physical Exam:  /68   Pulse 95   Temp 97.9 °F (36.6 °C) (Axillary)   Resp 18   Ht 5' 5\" (1.651 m)   Wt 145 lb 8.1 oz (66 kg)   SpO2 97%   BMI 24.21 kg/m²   General appearance: No apparent distress-very dysthmic  HEENT: Normocephalic, atraumatic, MMM, No sclera icterus/conjuctival palor  Neck: Supple, no thyromegally. No jugular venous distention. Respiratory:  Normal respiratory effort. Clear to auscultation, no Rales/Wheezes/Rhonchi. Cardiovascular: S1/S2 without murmurs, rubs or gallops. RRR  Abdomen: Soft, non-tender, non-distended, bowel sounds present. Musculoskeletal: No clubbing, cyanosis or edema bilaterally. Skin: Skin color, texture, turgor normal.  No rashes or lesions. Neurologic:  Cranial nerves: II-XII intact, KIERA, No focal sensory/motor deficits  Psychiatric: Alert oriented and dysthmic    Intake/Output Summary (Last 24 hours) at 11/22/2021 2204  Last data filed at 11/22/2021 1515  Gross per 24 hour   Intake 600 ml   Output 700 ml   Net -100 ml       Labs:   Recent Labs     11/20/21  0603 11/20/21  0603 11/20/21  0740 11/21/21  0618 11/22/21  0602   WBC 4.8  --   --  7.0 5.9   HGB 13.9   < > 12.6 12.9 12.1   HCT 41.3  --   --  38.9 35.2*     --   --  166 166    < > = values in this interval not displayed.       Recent Labs     11/20/21  0603 11/21/21  0619 11/22/21  0602   * 128* 132*   K 4.9 4.9 4.7    95* 98*   CO2 16* 26 28   BUN 55* 41* 34*   CREATININE 0.6 0.7 0.7 CALCIUM 9.0 9.2 9.4     Recent Labs     11/20/21  0603   TROPONINI <0.01       Urinalysis:    Lab Results   Component Value Date    NITRU POSITIVE 11/12/2021    WBCUA 10-20 11/12/2021    BACTERIA 4+ 11/12/2021    RBCUA 3-4 11/12/2021    BLOODU Negative 11/12/2021    SPECGRAV 1.020 11/12/2021    GLUCOSEU Negative 11/12/2021       Radiology:  XR CHEST PORTABLE   Final Result   1. No significant change in appearance of multifocal pneumonia, to include   atypical viral causes. 2. Stable cardiomegaly. CT CHEST PULMONARY EMBOLISM W CONTRAST   Final Result   No evidence of a pulmonary embolus. Mildly dilated and atherosclerotic thoracic aorta with no aneurysm or   dissection. Moderate subpleural and interstitial ground-glass opacities scattered   throughout both lungs which probably represents multisegmental   bronchopneumonia secondary to the patient's known COVID-19 disease      Single moderate compression fracture of T11 which may be chronic. Recommend   clinical follow-up of the area. XR CHEST PORTABLE   Final Result   Bilateral airspace disease suggestive of atypical viral pneumonia. Superimposed edema not excluded. ASSESSMENT: 1.COVID INFECTION                              2.DEPRESSION                              3. UTI (e.coli grew in culture 11-). No treatment was done. Will give rocephin 1gm qd for next                                 5 days.     PLAN:                PSYCHIATRIC CONSULT ORDERED        Burton Lilly DO

## 2021-11-23 NOTE — PROGRESS NOTES
Occupational Therapy  Facility/Department: [facility] C5 - MED SURG/ORTHO  Daily Treatment Note  NAME: Perry Forrest  : 2/3/1933  MRN: [id number]    Date of Service: 2021    Discharge Recommendations:  Subacute/Skilled Nursing Facility  OT Equipment Recommendations  Other: defer    Assessment   Performance deficits / Impairments: Decreased functional mobility ; Decreased ADL status; Decreased strength; Decreased endurance; Decreased high-level IADLs; Decreased balance; Decreased safe awareness  Assessment: Pt with improvements this day towards goals and improved motivation for therapy. Pt completed functional t/fs with Mod A x2 and LB ADLs with Max A x1. Pt improved tolerance for activity and good tolerance for therex. Cont Per POC. Prognosis: Good  OT Education: OT Role; Plan of Care; Energy Conservation; Transfer Training; ADL Adaptive Strategies  Patient Education: Ed pt on importance of OOB- verb understanding. Ed pt on orthostatic symptoms/ strategies to reduce falls at home, verb understanding. Continue to ed as needed 2/2 report of decreased memory. Barriers to Learning: low endurance  REQUIRES OT FOLLOW UP: Yes  Activity Tolerance  Activity Tolerance: Patient limited by fatigue; Treatment limited secondary to decreased cognition; Patient Tolerated treatment well  Activity Tolerance: see vitals section  Safety Devices  Safety Devices in place: Yes  Type of devices: Left in chair; Chair alarm in place; Call light within reach; Nurse notified; Gait belt  Restraints  Initially in place: No         Patient Diagnosis(es): The primary encounter diagnosis was Acute respiratory failure with hypoxia ([facility] [address] Diagnoses of COVID-19 and Acute cystitis without hematuria were also pertinent to this visit.       has a past medical history of Cellulitis of left lower leg, Closed pelvic rim fracture, Fibromyalgia, Foot drop, right foot, Fractures, History of blood transfusion, History of temporal artery biopsy, Hx of blood clots, PNA (pneumonia), Prepatellar bursitis of right knee, Rheumatoid arthritis ([address] Venous ulcer of left leg ([address] and Venous ulcer of right leg ([address] has a past surgical history that includes Tonsillectomy and Vein Surgery (Right, 10/27/2016). Restrictions  Restrictions/Precautions  Restrictions/Precautions: General Precautions, Up as Tolerated, Contact Precautions  Position Activity Restriction  Other position/activity restrictions: droplet+ d/t COVID, High fall risk per nursing assessment, up with assistance, 4L O2 via NC, telemetry with continuous pulse oximetry  Subjective   General  Chart Reviewed: Yes  Patient assessed for rehabilitation services?: Yes  Additional Pertinent Hx: Patient is a 42-year-old female with past medical history of fibromyalgia, rheumatoid arthritis who presented to hospital for shortness of breath. Response to previous treatment: Patient with no complaints from previous session  Family / Caregiver Present: No  Referring Practitioner: Patricia Denis MD  Diagnosis: acute cystitis, COVID+  Subjective  Subjective: Pt agreeable to OT. Pt reports general fatigue and weakness however more alert this day  General Comment  Comments: RN approved therapy  Vital Signs  Pulse: 104  BP: 120/82  BP Location: Right upper arm  Patient Position: Sitting  Patient Currently in Pain: Denies  Oxygen Therapy  SpO2: 92 %  Pulse Oximeter Device Location: Finger  O2 Device: None (Room air)   Orientation  Orientation  Overall Orientation Status: Within Normal Limits  Objective    ADL  Grooming: Minimal assistance  LE Dressing: Maximum assistance (Max A to thread BLE and assist to pull over R hip)  Toileting: Maximum assistance (Max A for clothing mgmt)           Bed mobility  Supine to Sit: Minimal assistance  Sit to Supine: Unable to assess  Transfers  Sit to stand:  Moderate assistance  Stand to sit: Moderate assistance                       Cognition  Overall Cognitive Status: Exceptions  Arousal/Alertness: Delayed responses to stimuli  Following Commands: Follows one step commands with repetition; Follows one step commands with increased time  Attention Span: Attends with cues to redirect; Difficulty attending to directions  Memory: Decreased recall of recent events  Safety Judgement: Decreased awareness of need for safety; Decreased awareness of need for assistance  Problem Solving: Assistance required to correct errors made; Assistance required to identify errors made; Decreased awareness of errors  Insights: Decreased awareness of deficits  Initiation: Requires cues for all  Sequencing: Requires cues for all  Cognition Comment: vc's for safety                    Type of ROM/Therapeutic Exercise  Type of ROM/Therapeutic Exercise: AROM  Exercises  Shoulder Elevation: 10x  Shoulder Flexion: 10x  Elbow Flexion: 10x  Elbow Extension: 10x  Grasp/Release: 10x                    Plan   Plan  Times per week: 3-5x per week  Times per day: Daily  Current Treatment Recommendations: Strengthening, Endurance Training, ROM, Self-Care / ADL, Home Management Training, Patient/Caregiver Education & Training, Functional Mobility Training    [facility] Score        [facility] Inpatient Daily Activity Raw Score: 12 (11/23/21 1551)  AM-[facility] Inpatient ADL T-Scale Score : 30.6 (11/23/21 1551)  ADL Inpatient CMS 0-100% Score: 66.57 (11/23/21 1551)  ADL Inpatient CMS G-Code Modifier : CL (11/23/21 1551)    Goals  Short term goals  Time Frame for Short term goals: 1 week (11/21/21--extend to 11/28)  Short term goal 1: Pt will complete toilet transfer with CGA-- ongoing, mod x1 transfer to chair 11/23  Short term goal 2: Pt will complete LBD with min A by 11/24-- ongoing, Max A 11/23  Short term goal 3: Pt will complete grooming tasks in standing for 5 minutes for improvd endurance with O2 stats >90%-- ongoing, 11/23  Short term goal 4: Pt will complete toileting with SBA. -- ongoing 11/23 Max A  Patient Goals   Patient goals : \"change myself\"       Therapy Time   Individual Concurrent Group Co-treatment   Time In 1158         Time Out 1226         Minutes 28         Timed Code Treatment Minutes: 28 Minutes       Janell Fuentes OTR/L  If pt is unable to be seen after this session, please let this note serve as discharge summary. Please see case management note for discharge disposition. Thank you.

## 2021-11-23 NOTE — CARE COORDINATION
Per discussion with ARU liaison , there is an appeal with [facility] in progress for IP rehab. Per notes [facility]  has 72 hours to make a determination. Davonna Rad has accepted and has pre-cert for SNF level of care. Unclear how long SNF pre-cert is good for. Left message for Don Pamrar in admissions at [facility] to verify status of pre-cert and when it will . Appeal for ARU pending. Reference number #[id number].

## 2021-11-23 NOTE — FLOWSHEET NOTE
paged to provide Communion for Roman Catholic patient in Droplet+ isolation.  spoke with patient's dtr Dayna Villasenor) by phone to clarify family's request to give Communion. Per C5 staff, pt has declined Communion because she is confused about what is being offered to her, and believes that [facility] is offering \"Last Rites\". Patient's daughter requests that Spiritual Care attempt again to provide Communion, saying to pt. \"We're offering Communion today. \"    Per Spiritual Care protocol,  has reached out to Clinical  to request permission to enter room of a COVID patient. Once permission is granted, a  will provide Communion for patient.      11/23/21 1500   Encounter Summary   Services provided to: Family   Referral/Consult From: Other disciplines   Support System Children   Complexity of Encounter Low   Length of Encounter 15 minutes   Routine   Type Initial   Assessment Approachable   Intervention Placed on communion list   Outcome Expressed gratitude

## 2021-11-23 NOTE — PROGRESS NOTES
Nephrology Progress  Note                                                                                                                                                                                                                                                                                                                                                               Office : [phone]     Fax :[phone]              Patient's Name: Crawford Kehr  8:23 PM  11/22/2021    Reason for Consult:  Metabolic acidosis    Requesting Physician:  Daylin Cosme DO      Chief Complaint:  SOB, PNA      Interval History :      Serum Na 133   -----> 134 ---> 133 ----> 128  ----> 132  Bicarb 17 ------> 23 ---> 16 ----> 26  ---> 28  SBP improved   No diarrhea or vomiting    History of Present Ilness: Crawford Kehr is a 80 y.o. female with PMH of RA , HTN     Being treated for Acute respiratory failure 2/2 COVID 19 PNA    Getting decadron   S/p remdesivir    Denies diarrhea    O2 requirement stable    Making urine        Past Medical History:   Diagnosis Date    Cellulitis of left lower leg 4/21/2019    Closed pelvic rim fracture 9/4/2014    Fibromyalgia     Foot drop, right foot     Fractures     History of blood transfusion     1997    History of temporal artery biopsy 10/06/2020    Hx of blood clots     PNA (pneumonia) 12/1997    Prepatellar bursitis of right knee 9/15/2020    Rheumatoid arthritis ([address]     Venous ulcer of left leg ([address] 08/2019    Venous ulcer of right leg ([address] 10/2016       Past Surgical History:   Procedure Laterality Date    TONSILLECTOMY      VEIN SURGERY Right 10/27/2016    saphenous VNUS closure       Family History   Problem Relation Age of Onset    Stroke Mother     Stroke Father         reports that she has never smoked. She has never used smokeless tobacco. She reports current alcohol use. She reports that she does not use drugs.     Allergies:  Erythromycin, 11/20/21  0603 11/20/21  0603 11/20/21  0740 11/21/21  0618 11/22/21  0602   WBC 4.8  --   --  7.0 5.9   HGB 13.9   < > 12.6 12.9 12.1     --   --  166 166    < > = values in this interval not displayed. BMP:    Recent Labs     11/20/21  0603 11/21/21  0619 11/22/21  0602   * 128* 132*   K 4.9 4.9 4.7    95* 98*   CO2 16* 26 28   BUN 55* 41* 34*   CREATININE 0.6 0.7 0.7   GLUCOSE 154* 141* 114*     Ca/Mg/Phos:   Recent Labs     11/20/21  0603 11/21/21  0619 11/22/21  0602   CALCIUM 9.0 9.2 9.4     Hepatic:   No results for input(s): AST, ALT, ALB, BILITOT, ALKPHOS in the last 72 hours. Troponin:   Recent Labs     11/20/21 0603   TROPONINI <0.01     BNP: No results for input(s): BNP in the last 72 hours. Lipids: No results for input(s): CHOL, TRIG, HDL, LDLCALC, LABVLDL in the last 72 hours. ABGs:   Recent Labs     11/20/21  0740   PHART 7.414   PO2ART 100.7   ISO5BAO 25.2*     INR:   Recent Labs     11/20/21  0603   INR 1.05     UA:No results for input(s): Laverda Rhody, GLUCOSEU, BILIRUBINUR, Bobbi [place], BLOODU, PHUR, PROTEINU, UROBILINOGEN, NITRU, LEUKOCYTESUR, Brandi Penta in the last 72 hours. Urine Microscopic: No results for input(s): LABCAST, BACTERIA, COMU, HYALCAST, WBCUA, RBCUA, EPIU in the last 72 hours. Urine Culture: No results for input(s): LABURIN in the last 72 hours. Urine Chemistry:   Recent Labs     11/21/21  1445   LABCREA 60.8   NAUR 39             IMAGING:  XR CHEST PORTABLE   Final Result   1. No significant change in appearance of multifocal pneumonia, to include   atypical viral causes. 2. Stable cardiomegaly. CT CHEST PULMONARY EMBOLISM W CONTRAST   Final Result   No evidence of a pulmonary embolus. Mildly dilated and atherosclerotic thoracic aorta with no aneurysm or   dissection.       Moderate subpleural and interstitial ground-glass opacities scattered   throughout both lungs which probably represents multisegmental bronchopneumonia secondary to the patient's known COVID-19 disease      Single moderate compression fracture of T11 which may be chronic. Recommend   clinical follow-up of the area. XR CHEST PORTABLE   Final Result   Bilateral airspace disease suggestive of atypical viral pneumonia. Superimposed edema not excluded. Assessment/Plan     1. NAGMA     Bicarb 19 since 11/14  Bicarb 26 on admission  Denies diarrhea    Renal fn wnl, GFR > 60 ml/hr    Plan    DC IVF with sodium bicarb  Bicarb improved  moniter bicarb and electrolytes        2.  COVID PNA    Acute hypoxic respiratory failure     On decadron  remdesvir  O2      3 HTN : controlled      On Metoprolol  Increase Hydralazine to 100 mg  TID       4 Dementia    5 hypotension    Give gentle IV hydration  Keep MAP > 65 mmhg      Thank you for allowing us to participate in care of Payal Cary MD  Feel free to contact me   [facility] [address]  Office : [phone]  Fax :[phone]

## 2021-11-24 LAB
ANION GAP SERPL CALCULATED.3IONS-SCNC: 5 MMOL/L (ref 3–16)
BASOPHILS ABSOLUTE: 0 K/UL (ref 0–0.2)
BASOPHILS RELATIVE PERCENT: 0.3 %
BUN BLDV-MCNC: 36 MG/DL (ref 7–20)
C-REACTIVE PROTEIN: <3 MG/L (ref 0–5.1)
CALCIUM SERPL-MCNC: 9.6 MG/DL (ref 8.3–10.6)
CHLORIDE BLD-SCNC: 100 MMOL/L (ref 99–110)
CO2: 28 MMOL/L (ref 21–32)
CREAT SERPL-MCNC: 0.7 MG/DL (ref 0.6–1.2)
D DIMER: 238 NG/ML DDU (ref 0–229)
EOSINOPHILS ABSOLUTE: 0.1 K/UL (ref 0–0.6)
EOSINOPHILS RELATIVE PERCENT: 1.2 %
GFR AFRICAN AMERICAN: >60
GFR NON-AFRICAN AMERICAN: >60
GLUCOSE BLD-MCNC: 113 MG/DL (ref 70–99)
HCT VFR BLD CALC: 31.6 % (ref 36–48)
HEMOGLOBIN: 10.8 G/DL (ref 12–16)
LYMPHOCYTES ABSOLUTE: 0.4 K/UL (ref 1–5.1)
LYMPHOCYTES RELATIVE PERCENT: 5.4 %
MCH RBC QN AUTO: 32.7 PG (ref 26–34)
MCHC RBC AUTO-ENTMCNC: 34.1 G/DL (ref 31–36)
MCV RBC AUTO: 96 FL (ref 80–100)
MONOCYTES ABSOLUTE: 0.4 K/UL (ref 0–1.3)
MONOCYTES RELATIVE PERCENT: 5.1 %
NEUTROPHILS ABSOLUTE: 6.9 K/UL (ref 1.7–7.7)
NEUTROPHILS RELATIVE PERCENT: 88 %
PDW BLD-RTO: 13.2 % (ref 12.4–15.4)
PLATELET # BLD: 155 K/UL (ref 135–450)
PMV BLD AUTO: 8.8 FL (ref 5–10.5)
POTASSIUM SERPL-SCNC: 4.3 MMOL/L (ref 3.5–5.1)
RBC # BLD: 3.29 M/UL (ref 4–5.2)
SODIUM BLD-SCNC: 133 MMOL/L (ref 136–145)
WBC # BLD: 7.8 K/UL (ref 4–11)

## 2021-11-24 PROCEDURE — 6370000000 HC RX 637 (ALT 250 FOR IP): Performed by: HOSPITALIST

## 2021-11-24 PROCEDURE — 6360000002 HC RX W HCPCS: Performed by: EMERGENCY MEDICINE

## 2021-11-24 PROCEDURE — 87088 URINE BACTERIA CULTURE: CPT

## 2021-11-24 PROCEDURE — 94761 N-INVAS EAR/PLS OXIMETRY MLT: CPT

## 2021-11-24 PROCEDURE — 87186 SC STD MICRODIL/AGAR DIL: CPT

## 2021-11-24 PROCEDURE — 6370000000 HC RX 637 (ALT 250 FOR IP): Performed by: STUDENT IN AN ORGANIZED HEALTH CARE EDUCATION/TRAINING PROGRAM

## 2021-11-24 PROCEDURE — 80048 BASIC METABOLIC PNL TOTAL CA: CPT

## 2021-11-24 PROCEDURE — 85379 FIBRIN DEGRADATION QUANT: CPT

## 2021-11-24 PROCEDURE — 2580000003 HC RX 258: Performed by: EMERGENCY MEDICINE

## 2021-11-24 PROCEDURE — 2580000003 HC RX 258: Performed by: INTERNAL MEDICINE

## 2021-11-24 PROCEDURE — 99232 SBSQ HOSP IP/OBS MODERATE 35: CPT | Performed by: HOSPITALIST

## 2021-11-24 PROCEDURE — 6370000000 HC RX 637 (ALT 250 FOR IP): Performed by: INTERNAL MEDICINE

## 2021-11-24 PROCEDURE — 1200000000 HC SEMI PRIVATE

## 2021-11-24 PROCEDURE — 85025 COMPLETE CBC W/AUTO DIFF WBC: CPT

## 2021-11-24 PROCEDURE — 51798 US URINE CAPACITY MEASURE: CPT

## 2021-11-24 PROCEDURE — 86140 C-REACTIVE PROTEIN: CPT

## 2021-11-24 PROCEDURE — 36415 COLL VENOUS BLD VENIPUNCTURE: CPT

## 2021-11-24 PROCEDURE — 2700000000 HC OXYGEN THERAPY PER DAY

## 2021-11-24 PROCEDURE — 87086 URINE CULTURE/COLONY COUNT: CPT

## 2021-11-24 PROCEDURE — 6360000002 HC RX W HCPCS: Performed by: INTERNAL MEDICINE

## 2021-11-24 RX ORDER — LEVOFLOXACIN 5 MG/ML
500 INJECTION, SOLUTION INTRAVENOUS EVERY 24 HOURS
Status: DISCONTINUED | OUTPATIENT
Start: 2021-11-24 | End: 2021-11-28

## 2021-11-24 RX ORDER — BISACODYL 10 MG
10 SUPPOSITORY, RECTAL RECTAL DAILY
Status: DISPENSED | OUTPATIENT
Start: 2021-11-24 | End: 2021-11-26

## 2021-11-24 RX ADMIN — Medication 2000 UNITS: at 08:26

## 2021-11-24 RX ADMIN — ACETAMINOPHEN 650 MG: 325 TABLET ORAL at 05:05

## 2021-11-24 RX ADMIN — SODIUM CHLORIDE 25 ML: 9 INJECTION, SOLUTION INTRAVENOUS at 15:05

## 2021-11-24 RX ADMIN — ACETAMINOPHEN 650 MG: 325 TABLET ORAL at 18:50

## 2021-11-24 RX ADMIN — LEVOFLOXACIN 500 MG: 5 INJECTION, SOLUTION INTRAVENOUS at 15:08

## 2021-11-24 RX ADMIN — ENOXAPARIN SODIUM 30 MG: 30 INJECTION SUBCUTANEOUS at 08:25

## 2021-11-24 RX ADMIN — SODIUM CHLORIDE 25 ML: 9 INJECTION, SOLUTION INTRAVENOUS at 10:33

## 2021-11-24 RX ADMIN — PREDNISONE 7.5 MG: 5 TABLET ORAL at 08:26

## 2021-11-24 RX ADMIN — ENOXAPARIN SODIUM 30 MG: 30 INJECTION SUBCUTANEOUS at 21:12

## 2021-11-24 RX ADMIN — Medication 10 ML: at 21:12

## 2021-11-24 RX ADMIN — HYDRALAZINE HYDROCHLORIDE 100 MG: 50 TABLET, FILM COATED ORAL at 21:12

## 2021-11-24 RX ADMIN — SODIUM BICARBONATE 650 MG TABLET 650 MG: at 08:26

## 2021-11-24 RX ADMIN — CEFTRIAXONE SODIUM 1000 MG: 1 INJECTION, POWDER, FOR SOLUTION INTRAMUSCULAR; INTRAVENOUS at 10:35

## 2021-11-24 ASSESSMENT — PAIN SCALES - GENERAL
PAINLEVEL_OUTOF10: 0
PAINLEVEL_OUTOF10: 4
PAINLEVEL_OUTOF10: 3

## 2021-11-24 ASSESSMENT — PAIN DESCRIPTION - LOCATION: LOCATION: FOOT

## 2021-11-24 ASSESSMENT — PAIN DESCRIPTION - ORIENTATION: ORIENTATION: RIGHT

## 2021-11-24 ASSESSMENT — PAIN DESCRIPTION - PAIN TYPE: TYPE: CHRONIC PAIN

## 2021-11-24 NOTE — CONSULTS
[address]                                  CONSULTATION    PATIENT NAME: Karley Rice                        :        1933  MED REC NO:   [id number]                          ROOM:       0522  ACCOUNT NO:   [de-identified]                           ADMIT DATE: 2021  PROVIDER:     Yobany Vargas MD    CONSULT DATE:  2021    PSYCHIATRIC CONSULT    IDENTIFICATION:  This is a domiciled, recently , retired  60-year-old without psychiatric history, who was admitted about 10 days  ago with COVID infection. Psychiatry was consulted because of her  depressives statement she has made to family. SOURCES OF INFORMATION:  The patient. Focused record review. Discussion with attending physician. CHIEF COMPLAINT:  \"I feel awful, I hurt all over. \"    HISTORY OF PRESENT ILLNESS:  The patient reports that she has no  psychiatric history. She has never struggled with symptoms of  depression including in the days leading to this admission. Since admission; however, she has felt despondent about her condition. She is fatigued, constantly short of breath, achy all over, and struggles to walk. Being isolated in the hospital for so long has been a challenge for her too. In this setting, she has thought about her mortality. She has thought about her 's death too and how sudden it was. She feels jealous he  suddenly, yet she is miserable. She has not had thoughts of suicide, but has felt like giving up at times. She worries about being a burden to her family if continues to be  sick. She does not want to die; but she does not want to suffer. She would never hurt herself. She believes in a [address]. PSYCHIATRIC REVIEW OF SYSTEMS:  No dylon or psychosis. STRESSORS:   . 's death was COVID illness. PAST PSYCHIATRIC HISTORY:  None.     SUBSTANCE USE HISTORY:  None. MEDICAL HISTORY:  Dropped foot, neuropathy, arthritis, hypertension. She has never had a traumatic brain injury, seizure, or major surgery. FAMILY PSYCHIATRIC HISTORY:  She believes her  may have had  bipolar disorder. Cousin completed suicide. CURRENT MEDICATIONS:  Rocephin 1000 mg every 24 hours, Lovenox 30 mg  twice daily, hydralazine 100 mg every eight hours, Toprol- mg  daily, prednisone taper, sodium bicarbonate 650 mg daily, vitamin D 2000  units daily. ALLERGIES:  Nonspecific allergies to ERYTHROMYCIN, HYDROCHLOROTHIAZIDE,  SULFA, and DOXYCYCLINE. SOCIAL HISTORY:  [place] in [place]. One sibling. Parents . Growing up [place]. \"  She finished high school and then became an  . She was  for 70 years.  . She has five children and 10 grandchildren. She is retired. LEGAL HISTORY:  None. REVIEW OF SYSTEMS:  She is vaccinated for COVID. She endorses multiple  COVID-related symptoms including shortness of breath. MENTAL STATUS EXAMINATION:  The patient was in a hospital gown in bed. She was uncomfortable. She spoke freely, pleasant, and made fair  eye contact. She described her mood as \"miserable\" and had a concurrent  affect. She had mild-to-moderate psychomotor retardation. She spoke softly. She was not pressured. She was oriented to the day,  date, place in the context of this evaluation. Her memory was grossly  intact. She was able to track and sustain conversation without  difficulty. Her use of language, speech and educational attainment suggested above  average level of intellectual functioning. Her thought process was organized and goal directed. She did not  describe or endorse hallucinations, delusions, homicidal thinking, or  suicidal thinking. She reported feeling safe here.     Her ability for abstract thought was fair based on her interpretation of  simple

## 2021-11-24 NOTE — CARE COORDINATION
Received notification from [facility] appeals to send updated clinical notes.  Information faxed per request to Elana Tobin   Fax: [phone]  Case number [id number] Updated Jonatan Rank via voice message  Nikky Bernal RN

## 2021-11-24 NOTE — PLAN OF CARE
Nutrition Problem #1: Inadequate oral intake  Intervention: Food and/or Nutrient Delivery: Continue Current Diet, Start Oral Nutrition Supplement  Nutritional Goals: Pt will have po intakes 50% or greater this admission

## 2021-11-24 NOTE — PROGRESS NOTES
Nephrology Progress  Note                                                                                                                                                                                                                                                                                                                                                               Office : [phone]     Fax :[phone]              Patient's Name: Alma Gilmore  4:10 PM  11/24/2021    Reason for Consult:  Metabolic acidosis    Requesting Physician:  Darvin Hurtado DO      Chief Complaint:  SOB, PNA      Interval History :      Serum Na 133   -----> 134 ---> 133 ----> 128  ----> 132 --> 133  Bicarb 17 ------> 23 ---> 16 ----> 26  ---> 28  SBP improved   No diarrhea or vomiting  O2 requirement improving    History of Present Ilness: Alma Gilmore is a 80 y.o. female with PMH of RA , HTN     Being treated for Acute respiratory failure 2/2 COVID 19 PNA    Getting decadron   S/p remdesivir    Denies diarrhea    O2 requirement stable    Making urine        Past Medical History:   Diagnosis Date    Cellulitis of left lower leg 4/21/2019    Closed pelvic rim fracture 9/4/2014    Fibromyalgia     Foot drop, right foot     Fractures     History of blood transfusion     1997    History of temporal artery biopsy 10/06/2020    Hx of blood clots     PNA (pneumonia) 12/1997    Prepatellar bursitis of right knee 9/15/2020    Rheumatoid arthritis ([address]     Venous ulcer of left leg ([address] 08/2019    Venous ulcer of right leg ([address] 10/2016       Past Surgical History:   Procedure Laterality Date    TONSILLECTOMY      VEIN SURGERY Right 10/27/2016    saphenous VNUS closure       Family History   Problem Relation Age of Onset    Stroke Mother     Stroke Father         reports that she has never smoked. She has never used smokeless tobacco. She reports current alcohol use.  She reports that she does not use drugs.     Allergies:  Erythromycin, Hydrochlorothiazide, Silver sulfadiazine, and Doxycycline    Current Medications:    bisacodyl (DULCOLAX) suppository 10 mg, Daily  levoFLOXacin (LEVAQUIN) 500 MG/100ML infusion 500 mg, Q24H  predniSONE (DELTASONE) tablet 7.5 mg, Daily  sodium bicarbonate tablet 650 mg, Daily  hydrALAZINE (APRESOLINE) tablet 100 mg, 3 times per day  metoprolol succinate (TOPROL XL) extended release tablet 100 mg, Daily  sodium chloride flush 0.9 % injection 10 mL, PRN  0.9 % sodium chloride infusion, PRN  potassium chloride (KLOR-CON M) extended release tablet 40 mEq, PRN   Or  potassium bicarb-citric acid (EFFER-K) effervescent tablet 40 mEq, PRN   Or  potassium chloride 10 mEq/100 mL IVPB (Peripheral Line), PRN  magnesium sulfate 1000 mg in dextrose 5% 100 mL IVPB, PRN  ondansetron (ZOFRAN-ODT) disintegrating tablet 4 mg, Q8H PRN   Or  ondansetron (ZOFRAN) injection 4 mg, Q6H PRN  acetaminophen (TYLENOL) tablet 650 mg, Q6H PRN   Or  acetaminophen (TYLENOL) suppository 650 mg, Q6H PRN  guaiFENesin-dextromethorphan (ROBITUSSIN DM) 100-10 MG/5ML syrup 5 mL, Q4H PRN  Vitamin D (CHOLECALCIFEROL) tablet 2,000 Units, Daily  enoxaparin (LOVENOX) injection 30 mg, BID  benzonatate (TESSALON) capsule 200 mg, TID PRN  albuterol sulfate  (90 Base) MCG/ACT inhaler 2 puff, Q4H PRN  0.9 % sodium chloride bolus, PRN        Review of Systems:   14 point ROS obtained but were negative except mentioned in HPI      Physical exam:     Vitals:  BP (!) 165/90   Pulse 80   Temp 97.6 °F (36.4 °C) (Oral)   Resp 20   Ht 5' 5\" (1.651 m)   Wt 146 lb 9.7 oz (66.5 kg)   SpO2 95%   BMI 24.40 kg/m²   Constitutional:  OAA X3 NAD  Skin: no rash, turgor wnl  Heent:  eomi, mmm  Neck: no bruits or jvd noted  Cardiovascular:  S1, S2 without m/r/g  Respiratory: CTA B without w/r/r  Abdomen:  +bs, soft, nt, nd  Ext: no  lower extremity edema  Psychiatric: mood and affect appropriate  Musculoskeletal:  Rom, muscular strength intact    Data:   Labs:  CBC:   Recent Labs     11/22/21  0602 11/23/21  0712 11/24/21  0456   WBC 5.9 5.5 7.8   HGB 12.1 11.3* 10.8*    160 155     BMP:    Recent Labs     11/22/21  0602 11/23/21  0712 11/24/21  0456   * 132* 133*   K 4.7 4.8 4.3   CL 98* 100 100   CO2 28 26 28   BUN 34* 39* 36*   CREATININE 0.7 0.7 0.7   GLUCOSE 114* 92 113*     Ca/Mg/Phos:   Recent Labs     11/22/21  0602 11/23/21  0712 11/24/21  0456   CALCIUM 9.4 9.4 9.6     Hepatic:   No results for input(s): AST, ALT, ALB, BILITOT, ALKPHOS in the last 72 hours. Troponin:   No results for input(s): TROPONINI in the last 72 hours. BNP: No results for input(s): BNP in the last 72 hours. Lipids: No results for input(s): CHOL, TRIG, HDL, LDLCALC, LABVLDL in the last 72 hours. ABGs:   No results for input(s): PHART, PO2ART, CLI5FBP in the last 72 hours. INR:   No results for input(s): INR in the last 72 hours. UA:No results for input(s): Delwin Minion, GLUCOSEU, BILIRUBINUR, Frankey Raker, BLOODU, PHUR, PROTEINU, UROBILINOGEN, NITRU, LEUKOCYTESUR, LABMICR, URINETYPE in the last 72 hours. Urine Microscopic: No results for input(s): LABCAST, BACTERIA, COMU, HYALCAST, WBCUA, RBCUA, EPIU in the last 72 hours. Urine Culture: No results for input(s): LABURIN in the last 72 hours. Urine Chemistry:   No results for input(s): Volney Ly, PROTEINUR, NAUR in the last 72 hours. IMAGING:  XR CHEST PORTABLE   Final Result   1. No significant change in appearance of multifocal pneumonia, to include   atypical viral causes. 2. Stable cardiomegaly. CT CHEST PULMONARY EMBOLISM W CONTRAST   Final Result   No evidence of a pulmonary embolus. Mildly dilated and atherosclerotic thoracic aorta with no aneurysm or   dissection.       Moderate subpleural and interstitial ground-glass opacities scattered   throughout both lungs which probably represents multisegmental   bronchopneumonia secondary to the

## 2021-11-24 NOTE — PROGRESS NOTES
Comprehensive Nutrition Assessment    Type and Reason for Visit:  Reassess    Nutrition Recommendations/Plan:   1. Regular diet   2. Continue Ensure as tolerated, offer Magic cup bid to further enhance protein intake  3. Will monitor nutritional adequacy, nutrition-related labs, weights, BMs, and clinical progress     Nutrition Assessment:  Follow up:  Currently on a regular diet with Ensure as desired. Appetite has been decreased, did eat better today. Many barriers to po intake during this admission including pain, shortness of breath and recent depressive thoughts. Psych consulted and provided insight. Will continue to offer nutrition supplements and monitor goals of care. Malnutrition Assessment:  Malnutrition Status: At risk for malnutrition (Comment)      Estimated Daily Nutrient Needs:  Energy (kcal):  9318-6485; Weight Used for Energy Requirements:  Current (67 kg)     Protein (g):  67-80; Weight Used for Protein Requirements:  Current (1.0-1.2 g/kg)        Fluid (ml/day):   ; Method Used for Fluid Requirements:  1 ml/kcal      Nutrition Related Findings:  Na 133 mmol/L this am; stool softener ordered today, no BM since 11/19; decreased appetite      Wounds:   (no redness and bruising)       Current Nutrition Therapies:    ADULT DIET; Regular  ADULT ORAL NUTRITION SUPPLEMENT; Lunch; Standard High Calorie/High Protein Oral Supplement    Anthropometric Measures:  · Height: 5' 5\" (165.1 cm)  · Current Body Weight: 146 lb 9 oz (66.5 kg)     · Ideal Body Weight: 125 lbs; % Ideal Body Weight     · BMI: 24.4   · BMI Categories: Normal Weight (BMI 18.5-24. 9)       Nutrition Diagnosis:   · Inadequate oral intake related to impaired respiratory function (COVID) as evidenced by intake 26-50%, intake 51-75%    Nutrition Interventions:   Food and/or Nutrient Delivery:  Continue Current Diet, Start Oral Nutrition Supplement  Nutrition Education/Counseling:  No recommendation at this time   Coordination of Nutrition Care:  Continue to monitor while inpatient    Goals:  Pt will have po intakes 50% or greater this admission       Nutrition Monitoring and Evaluation:   Behavioral-Environmental Outcomes:  None Identified   Food/Nutrient Intake Outcomes:  Supplement Intake, Food and Nutrient Intake  Physical Signs/Symptoms Outcomes:  Nutrition Focused Physical Findings, Biochemical Data     Discharge Planning:     Too soon to determine     Electronically signed by Wilver Rodriguez RD, LD on 11/24/21 at 4:30 PM EST    Contact: [phone]

## 2021-11-24 NOTE — PROGRESS NOTES
Comprehensive Nutrition Assessment    Type and Reason for Visit:  Reassess    Nutrition Recommendations/Plan:   1. Nutrition Assessment:  Follow up:  Currently on a regular diet with Ensure as desired. Appetite has been decreased, did eat better today. Many barriers to po intake during this admission including pain, shortness of breath and recent depressive thoughts. Psych consulted and provided insight. Will continue to monitor. Malnutrition Assessment:  Malnutrition Status: At risk for malnutrition (Comment)      Estimated Daily Nutrient Needs:  Energy (kcal):  3548-0157; Weight Used for Energy Requirements:  Current (67 kg)     Protein (g):  67-80; Weight Used for Protein Requirements:  Current (1.0-1.2 g/kg)        Fluid (ml/day):   ; Method Used for Fluid Requirements:  1 ml/kcal      Nutrition Related Findings:  Na 133 mmol/L this am; stool softener ordered today, no BM since 11/19; decreased appetite      Wounds:   (no redness and bruising)       Current Nutrition Therapies:    ADULT DIET; Regular  ADULT ORAL NUTRITION SUPPLEMENT; Lunch; Standard High Calorie/High Protein Oral Supplement    Anthropometric Measures:  · Height: 5' 5\" (165.1 cm)  · Current Body Weight: 146 lb 9 oz (66.5 kg)    · Ideal Body Weight: 125 lbs; % Ideal Body Weight     · BMI: 24.4   · BMI Categories: Normal Weight (BMI 18.5-24. 9)       Nutrition Diagnosis:   · Inadequate oral intake related to impaired respiratory function (COVID) as evidenced by intake 26-50%, intake 51-75%    Nutrition Interventions:   Food and/or Nutrient Delivery:  Continue Current Diet, Start Oral Nutrition Supplement  Nutrition Education/Counseling:  No recommendation at this time   Coordination of Nutrition Care:  Continue to monitor while inpatient    Goals:  Pt will have po intakes 50% or greater this admission       Nutrition Monitoring and Evaluation:   Behavioral-Environmental Outcomes:  None Identified   Food/Nutrient Intake Outcomes: Supplement Intake, Food and Nutrient Intake  Physical Signs/Symptoms Outcomes:  Nutrition Focused Physical Findings, Biochemical Data     Discharge Planning:     Too soon to determine     Electronically signed by Maylin Jones RD, LD on 11/24/21 at 4:26 PM EST    Contact: [phone]

## 2021-11-24 NOTE — PLAN OF CARE
Problem: Falls - Risk of:  Goal: Will remain free from falls  Description: Will remain free from falls  Outcome: Ongoing     Problem: Airway Clearance - Ineffective  Goal: Achieve or maintain patent airway  Outcome: Ongoing     Problem: Gas Exchange - Impaired  Goal: Absence of hypoxia  Outcome: Ongoing     Problem: Pain:  Goal: Pain level will decrease  Description: Pain level will decrease  Outcome: Ongoing

## 2021-11-24 NOTE — CARE COORDINATION
Hospital day 12: Patient on C5 re PNA followed by IM, Psych, and nephrology. Patient ct with IV ATB. Patient 95% on 2L 02, baseline room air. Patient with appeal thru [employer] to ARU, spoke with liaison remians pending at this time. Patient if not over turned was accepted at [address]- will require new precert. SW will ct to follow for DCP needs. SYLVIA Swain  Family requesting [facility] if ARU not approved, out of net work. Writer email in [employer] as will be 14 post + COVID test. SW will follow. SYLVIA Swain

## 2021-11-24 NOTE — CARE COORDINATION
Call received from [place], [place] that per Sandro Stuart wants the clinical information faxed to [phone]. Information faxed.  Lindsay Elam RN

## 2021-11-24 NOTE — PROGRESS NOTES
Patient's bp back up in good range. Apresoline held. Patient complain of pain in right foot 2/10 . Too soon to medicate with tylenol . Declined repositioning. Patient states she did not eat dinner. Given vegetable soup but only ate half of it with sips of water.

## 2021-11-24 NOTE — PROGRESS NOTES
Hospitalist Progress Note      PCP: Leander Gonzalez DO    Date of Admission: 11/12/2021    LOS: 11    Chief Complaint: No new changes over last 24 hours. Pt eating well and taking fluids. She continues to    talk about not wanting to live to RN staff. She a psychiatric consult pending today. 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] 11/23/2021    Acute respiratory failure due to severe acute respiratory syndrome coronavirus 2 (SARS-CoV-2) infection (HCC) [U07.1, J96.00] 11/12/2021    Current chronic use of systemic steroids [Z79.52] 11/12/2021    Giant cell arteritis (HCC) [M31.6] 10/19/2020    PMR (polymyalgia rheumatica) ([facility] [address] [M35.3] 09/29/2020    HTN (hypertension) [I10] 09/04/2014         Active Problems:    HTN (hypertension)    PMR (polymyalgia rheumatica) (HCC)    Giant cell arteritis (HCC)    Acute respiratory failure due to severe acute respiratory syndrome coronavirus 2 (SARS-CoV-2) infection (HCC)    Current chronic use of systemic steroids    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  *          Medications:  Reviewed  Infusion Medications    sodium chloride 25 mL (11/17/21 1230)     Scheduled Medications    cefTRIAXone (ROCEPHIN) IV  1,000 mg IntraVENous Q24H    [START ON 11/24/2021] predniSONE  7.5 mg Oral Daily    sodium bicarbonate  650 mg Oral Daily    hydrALAZINE  100 mg Oral 3 times per day    metoprolol succinate  100 mg Oral Daily    Vitamin D  2,000 Units Oral Daily    enoxaparin  30 mg SubCUTAneous BID     PRN Meds: sodium chloride flush, sodium chloride, potassium chloride **OR** potassium alternative oral replacement **OR** potassium chloride, magnesium sulfate, ondansetron **OR** ondansetron, acetaminophen **OR** acetaminophen, guaiFENesin-dextromethorphan, benzonatate, albuterol sulfate HFA, sodium chloride      DVT Prophylaxis:   Diet: ADULT DIET; Regular  ADULT ORAL NUTRITION SUPPLEMENT; Lunch; Standard High Calorie/High Protein Oral Supplement  Code Status: Full Code    Dispo: Anticipate discharge in the next 3 days. ____________________________________________________________________________    Subjective:   Overnight Events:          Physical Exam:  /66   Pulse 77   Temp 97.9 °F (36.6 °C) (Oral)   Resp 18   Ht 5' 5\" (1.651 m)   Wt 145 lb 8.1 oz (66 kg)   SpO2 95%   BMI 24.21 kg/m²   General appearance: No apparent distress, appears stated age and cooperative. HEENT: Normocephalic, atraumatic, MMM, No sclera icterus/conjuctival palor  Neck: Supple, no thyromegally. No jugular venous distention. Respiratory:  Normal respiratory effort. Clear to auscultation, no Rales/Wheezes/Rhonchi. Cardiovascular: S1/S2 without murmurs, rubs or gallops. RRR  Abdomen: Soft, non-tender, non-distended, bowel sounds present. Musculoskeletal: No clubbing, cyanosis or edema bilaterally. Skin: Skin color, texture, turgor normal.  No rashes or lesions. Neurologic:  Cranial nerves: II-XII intact, KIERA, No focal sensory/motor deficits  Psychiatric: Alert and oriented, thought content appropriate  Capillary Refill: Brisk,< 3 seconds   Peripheral Pulses: +2 palpable, equal bilaterally       Intake/Output Summary (Last 24 hours) at 11/23/2021 2032  Last data filed at 11/23/2021 1012  Gross per 24 hour   Intake --   Output 250 ml   Net -250 ml       Labs:   Recent Labs     11/21/21  0618 11/22/21  0602 11/23/21  0712   WBC 7.0 5.9 5.5   HGB 12.9 12.1 11.3*   HCT 38.9 35.2* 33.5*    166 160      Recent Labs     11/21/21  0619 11/22/21  0602 11/23/21  0712   * 132* 132*   K 4.9 4.7 4.8   CL 95* 98* 100   CO2 26 28 26   BUN 41* 34* 39*   CREATININE 0.7 0.7 0.7   CALCIUM 9.2 9.4 9.4     No results for input(s): Ángel Newberry in the last 72 hours.     Urinalysis:    Lab Results   Component Value Date    NITRU POSITIVE 11/12/2021    WBCUA 10-20 11/12/2021    BACTERIA 4+ 11/12/2021    RBCUA 3-4 11/12/2021    BLOODU Negative 11/12/2021    SPECGRAV 1.020 11/12/2021    GLUCOSEU Negative 11/12/2021       Radiology:  XR CHEST PORTABLE   Final Result   1. No significant change in appearance of multifocal pneumonia, to include   atypical viral causes. 2. Stable cardiomegaly. CT CHEST PULMONARY EMBOLISM W CONTRAST   Final Result   No evidence of a pulmonary embolus. Mildly dilated and atherosclerotic thoracic aorta with no aneurysm or   dissection. Moderate subpleural and interstitial ground-glass opacities scattered   throughout both lungs which probably represents multisegmental   bronchopneumonia secondary to the patient's known COVID-19 disease      Single moderate compression fracture of T11 which may be chronic. Recommend   clinical follow-up of the area. XR CHEST PORTABLE   Final Result   Bilateral airspace disease suggestive of atypical viral pneumonia. Superimposed edema not excluded. ASSESSMENT: 1. PNEUMONIA                              2.DEPRESSION                              3.UTI    PLAN: WILL CONTINUE CURRENT CARE AND PSYCHIATRIC CONSULT PENDING. MIKALA STEWART, DO      Please excuse brevity and/or typos. This report was transcribed using voice recognition software. Every effort was made to ensure accuracy, however, inadvertent computerized transcription errors may be present.

## 2021-11-25 LAB
ANION GAP SERPL CALCULATED.3IONS-SCNC: 7 MMOL/L (ref 3–16)
BASOPHILS ABSOLUTE: 0 K/UL (ref 0–0.2)
BASOPHILS RELATIVE PERCENT: 0.3 %
BUN BLDV-MCNC: 23 MG/DL (ref 7–20)
CALCIUM SERPL-MCNC: 9.5 MG/DL (ref 8.3–10.6)
CHLORIDE BLD-SCNC: 102 MMOL/L (ref 99–110)
CO2: 26 MMOL/L (ref 21–32)
CREAT SERPL-MCNC: 0.7 MG/DL (ref 0.6–1.2)
EOSINOPHILS ABSOLUTE: 0.3 K/UL (ref 0–0.6)
EOSINOPHILS RELATIVE PERCENT: 4.4 %
GFR AFRICAN AMERICAN: >60
GFR NON-AFRICAN AMERICAN: >60
GLUCOSE BLD-MCNC: 108 MG/DL (ref 70–99)
HCT VFR BLD CALC: 31.9 % (ref 36–48)
HEMOGLOBIN: 10.8 G/DL (ref 12–16)
LYMPHOCYTES ABSOLUTE: 0.4 K/UL (ref 1–5.1)
LYMPHOCYTES RELATIVE PERCENT: 7.7 %
MCH RBC QN AUTO: 33.2 PG (ref 26–34)
MCHC RBC AUTO-ENTMCNC: 33.9 G/DL (ref 31–36)
MCV RBC AUTO: 97.8 FL (ref 80–100)
MONOCYTES ABSOLUTE: 0.7 K/UL (ref 0–1.3)
MONOCYTES RELATIVE PERCENT: 12.1 %
NEUTROPHILS ABSOLUTE: 4.3 K/UL (ref 1.7–7.7)
NEUTROPHILS RELATIVE PERCENT: 75.5 %
PDW BLD-RTO: 13.8 % (ref 12.4–15.4)
PLATELET # BLD: 157 K/UL (ref 135–450)
PMV BLD AUTO: 8.3 FL (ref 5–10.5)
POTASSIUM SERPL-SCNC: 4.2 MMOL/L (ref 3.5–5.1)
RBC # BLD: 3.26 M/UL (ref 4–5.2)
SODIUM BLD-SCNC: 135 MMOL/L (ref 136–145)
WBC # BLD: 5.7 K/UL (ref 4–11)

## 2021-11-25 PROCEDURE — 80048 BASIC METABOLIC PNL TOTAL CA: CPT

## 2021-11-25 PROCEDURE — 2580000003 HC RX 258: Performed by: INTERNAL MEDICINE

## 2021-11-25 PROCEDURE — 6370000000 HC RX 637 (ALT 250 FOR IP): Performed by: STUDENT IN AN ORGANIZED HEALTH CARE EDUCATION/TRAINING PROGRAM

## 2021-11-25 PROCEDURE — 6370000000 HC RX 637 (ALT 250 FOR IP): Performed by: INTERNAL MEDICINE

## 2021-11-25 PROCEDURE — 1200000000 HC SEMI PRIVATE

## 2021-11-25 PROCEDURE — 85025 COMPLETE CBC W/AUTO DIFF WBC: CPT

## 2021-11-25 PROCEDURE — 94761 N-INVAS EAR/PLS OXIMETRY MLT: CPT

## 2021-11-25 PROCEDURE — 6360000002 HC RX W HCPCS: Performed by: EMERGENCY MEDICINE

## 2021-11-25 PROCEDURE — 36415 COLL VENOUS BLD VENIPUNCTURE: CPT

## 2021-11-25 PROCEDURE — 2700000000 HC OXYGEN THERAPY PER DAY

## 2021-11-25 PROCEDURE — 6370000000 HC RX 637 (ALT 250 FOR IP): Performed by: HOSPITALIST

## 2021-11-25 PROCEDURE — 99232 SBSQ HOSP IP/OBS MODERATE 35: CPT | Performed by: HOSPITALIST

## 2021-11-25 PROCEDURE — 6360000002 HC RX W HCPCS: Performed by: INTERNAL MEDICINE

## 2021-11-25 RX ADMIN — SODIUM CHLORIDE 25 ML: 9 INJECTION, SOLUTION INTRAVENOUS at 14:45

## 2021-11-25 RX ADMIN — METOPROLOL SUCCINATE 100 MG: 50 TABLET, EXTENDED RELEASE ORAL at 08:06

## 2021-11-25 RX ADMIN — LEVOFLOXACIN 500 MG: 5 INJECTION, SOLUTION INTRAVENOUS at 14:50

## 2021-11-25 RX ADMIN — Medication 2000 UNITS: at 08:06

## 2021-11-25 RX ADMIN — ENOXAPARIN SODIUM 30 MG: 30 INJECTION SUBCUTANEOUS at 08:06

## 2021-11-25 RX ADMIN — ACETAMINOPHEN 650 MG: 325 TABLET ORAL at 19:47

## 2021-11-25 RX ADMIN — SODIUM BICARBONATE 650 MG TABLET 650 MG: at 08:06

## 2021-11-25 RX ADMIN — PREDNISONE 7.5 MG: 5 TABLET ORAL at 08:06

## 2021-11-25 RX ADMIN — ENOXAPARIN SODIUM 30 MG: 30 INJECTION SUBCUTANEOUS at 19:47

## 2021-11-25 RX ADMIN — ACETAMINOPHEN 650 MG: 325 TABLET ORAL at 06:31

## 2021-11-25 RX ADMIN — HYDRALAZINE HYDROCHLORIDE 100 MG: 50 TABLET, FILM COATED ORAL at 06:30

## 2021-11-25 ASSESSMENT — PAIN SCALES - GENERAL
PAINLEVEL_OUTOF10: 2
PAINLEVEL_OUTOF10: 4
PAINLEVEL_OUTOF10: 0
PAINLEVEL_OUTOF10: 2

## 2021-11-25 NOTE — PROGRESS NOTES
Nephrology Progress  Note                                                                                                                                                                                                                                                                                                                                                               Office : [phone]     Fax :[phone]              Patient's Name: Timur Sharma  4:28 PM  11/25/2021    Reason for Consult:  Metabolic acidosis    Requesting Physician:  Fredy Suarez DO      Chief Complaint:  SOB, PNA      Interval History :      Serum Na 133   -----> 134 ---> 133 ----> 128  ----> 132 --> 133 --> 135  Bicarb 17 ------> 23 ---> 16 ----> 26  ---> 28  SBP improved   No diarrhea or vomiting  O2 requirement improving  Weakness and deconditioning +    History of Present Ilness: Timur Sharma is a 80 y.o. female with PMH of RA , HTN     Being treated for Acute respiratory failure 2/2 COVID 19 PNA    Getting decadron   S/p remdesivir    Denies diarrhea    O2 requirement stable    Making urine        Past Medical History:   Diagnosis Date    Cellulitis of left lower leg 4/21/2019    Closed pelvic rim fracture 9/4/2014    Fibromyalgia     Foot drop, right foot     Fractures     History of blood transfusion     1997    History of temporal artery biopsy 10/06/2020    Hx of blood clots     PNA (pneumonia) 12/1997    Prepatellar bursitis of right knee 9/15/2020    Rheumatoid arthritis ([address]     Venous ulcer of left leg ([address] 08/2019    Venous ulcer of right leg ([address] 10/2016       Past Surgical History:   Procedure Laterality Date    TONSILLECTOMY      VEIN SURGERY Right 10/27/2016    saphenous VNUS closure       Family History   Problem Relation Age of Onset    Stroke Mother     Stroke Father         reports that she has never smoked. She has never used smokeless tobacco. She reports current alcohol use.  She reports that she does not use drugs.     Allergies:  Erythromycin, Hydrochlorothiazide, Silver sulfadiazine, and Doxycycline    Current Medications:    bisacodyl (DULCOLAX) suppository 10 mg, Daily  levoFLOXacin (LEVAQUIN) 500 MG/100ML infusion 500 mg, Q24H  predniSONE (DELTASONE) tablet 7.5 mg, Daily  sodium bicarbonate tablet 650 mg, Daily  hydrALAZINE (APRESOLINE) tablet 100 mg, 3 times per day  metoprolol succinate (TOPROL XL) extended release tablet 100 mg, Daily  sodium chloride flush 0.9 % injection 10 mL, PRN  0.9 % sodium chloride infusion, PRN  potassium chloride (KLOR-CON M) extended release tablet 40 mEq, PRN   Or  potassium bicarb-citric acid (EFFER-K) effervescent tablet 40 mEq, PRN   Or  potassium chloride 10 mEq/100 mL IVPB (Peripheral Line), PRN  magnesium sulfate 1000 mg in dextrose 5% 100 mL IVPB, PRN  ondansetron (ZOFRAN-ODT) disintegrating tablet 4 mg, Q8H PRN   Or  ondansetron (ZOFRAN) injection 4 mg, Q6H PRN  acetaminophen (TYLENOL) tablet 650 mg, Q6H PRN   Or  acetaminophen (TYLENOL) suppository 650 mg, Q6H PRN  guaiFENesin-dextromethorphan (ROBITUSSIN DM) 100-10 MG/5ML syrup 5 mL, Q4H PRN  Vitamin D (CHOLECALCIFEROL) tablet 2,000 Units, Daily  enoxaparin (LOVENOX) injection 30 mg, BID  benzonatate (TESSALON) capsule 200 mg, TID PRN  albuterol sulfate  (90 Base) MCG/ACT inhaler 2 puff, Q4H PRN  0.9 % sodium chloride bolus, PRN        Review of Systems:   14 point ROS obtained but were negative except mentioned in HPI      Physical exam:     Vitals:  BP 96/61   Pulse 91   Temp 97.9 °F (36.6 °C) (Oral)   Resp 18   Ht 5' 5\" (1.651 m)   Wt 146 lb 9.7 oz (66.5 kg)   SpO2 94%   BMI 24.40 kg/m²   Constitutional:  OAA X3 NAD  Skin: no rash, turgor wnl  Heent:  eomi, mmm  Neck: no bruits or jvd noted  Cardiovascular:  S1, S2 without m/r/g  Respiratory: CTA B without w/r/r  Abdomen:  +bs, soft, nt, nd  Ext: no  lower extremity edema  Psychiatric: mood and affect appropriate  Musculoskeletal:  Rom, muscular strength intact    Data:   Labs:  CBC:   Recent Labs     11/23/21  0712 11/24/21  0456 11/25/21  0559   WBC 5.5 7.8 5.7   HGB 11.3* 10.8* 10.8*    155 157     BMP:    Recent Labs     11/23/21  0712 11/24/21  0456 11/25/21  0559   * 133* 135*   K 4.8 4.3 4.2    100 102   CO2 26 28 26   BUN 39* 36* 23*   CREATININE 0.7 0.7 0.7   GLUCOSE 92 113* 108*     Ca/Mg/Phos:   Recent Labs     11/23/21  0712 11/24/21  0456 11/25/21  0559   CALCIUM 9.4 9.6 9.5     Hepatic:   No results for input(s): AST, ALT, ALB, BILITOT, ALKPHOS in the last 72 hours. Troponin:   No results for input(s): TROPONINI in the last 72 hours. BNP: No results for input(s): BNP in the last 72 hours. Lipids: No results for input(s): CHOL, TRIG, HDL, LDLCALC, LABVLDL in the last 72 hours. ABGs:   No results for input(s): PHART, PO2ART, ZUX7NYX in the last 72 hours. INR:   No results for input(s): INR in the last 72 hours. UA:No results for input(s): Delwin Minion, GLUCOSEU, BILIRUBINUR, Frankey Raker, BLOODU, PHUR, PROTEINU, UROBILINOGEN, NITRU, LEUKOCYTESUR, LABMICR, URINETYPE in the last 72 hours. Urine Microscopic: No results for input(s): LABCAST, BACTERIA, COMU, HYALCAST, WBCUA, RBCUA, EPIU in the last 72 hours. Urine Culture:   Recent Labs     11/24/21  1315   LABURIN >100,000 CFU/ml  Sensitivity to follow       Urine Chemistry:   No results for input(s): CLUR, LABCREA, PROTEINUR, NAUR in the last 72 hours. IMAGING:  XR CHEST PORTABLE   Final Result   1. No significant change in appearance of multifocal pneumonia, to include   atypical viral causes. 2. Stable cardiomegaly. CT CHEST PULMONARY EMBOLISM W CONTRAST   Final Result   No evidence of a pulmonary embolus. Mildly dilated and atherosclerotic thoracic aorta with no aneurysm or   dissection.       Moderate subpleural and interstitial ground-glass opacities scattered   throughout both lungs which probably represents multisegmental   bronchopneumonia secondary to the patient's known COVID-19 disease      Single moderate compression fracture of T11 which may be chronic. Recommend   clinical follow-up of the area. XR CHEST PORTABLE   Final Result   Bilateral airspace disease suggestive of atypical viral pneumonia. Superimposed edema not excluded. Assessment/Plan     1. NAGMA     Bicarb 19 since 11/14  Bicarb 26 on admission  Denies diarrhea    Renal fn wnl, GFR > 60 ml/hr    Plan    Stable renal standpoint    Encourage PO intake of water to thirst  Bicarb improved  moniter bicarb and electrolytes        2.  COVID PNA    Acute hypoxic respiratory failure     On decadron  remdesvir  O2      3 HTN : controlled      On Metoprolol  Increase Hydralazine to 100 mg  TID       4 Dementia    5 hypotension    Give gentle IV hydration  Keep MAP > 65 mmhg    6 Hyponatremia    Improving    moniter    Encourage high protein/solute diet      Thank you for allowing us to participate in care of Sid Hua MD  Feel free to contact me   [facility] [address]  Office : [phone]  Fax :[phone]

## 2021-11-25 NOTE — FLOWSHEET NOTE
11/25/21 1107   Encounter Summary   Services provided to: Patient   Referral/Consult From: Patient   Support System Taoist/iva community   Place of [address] [facility] [employer] Completed   Continue Visiting   (11/25 communion and prayer, left message for )   Complexity of Encounter Moderate   Length of Encounter 30 minutes   Spiritual/Amish   Type Spiritual struggle   Assessment Approachable; Anxious   Intervention Active listening; Explored feelings, thoughts, concerns; Prayer; Communion; Discussed death; Discussed afterlife;  Discussed relationship with God; Contacted support as requested per patient/family request   Who? joe serrano   Why? sacrament of the sick   At Request Of Pt   Outcome Expressed gratitude; Engaged in conversation; Expressed feelings/needs/concerns   Sacraments   Sacrament of Sick-Anointing Patient requested anointing   Communion Patient received communion

## 2021-11-25 NOTE — PROGRESS NOTES
Hospitalist Progress Note      PCP: Sandy Beaver DO    Date of Admission: 11/12/2021    LOS: 12    Chief Complaint: Pt has no new problems since seen yesterday. She was seen by psychiatry yesterday-. He thinks she has depression associated with COVID. He would like her reassessed by a mental health provider  In one month. He does not think she will benefit from an antidepressant at this time. 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] 11/23/2021    Acute respiratory failure due to severe acute respiratory syndrome coronavirus 2 (SARS-CoV-2) infection (HCC) [U07.1, J96.00] 11/12/2021    Current chronic use of systemic steroids [Z79.52] 11/12/2021    Giant cell arteritis (HCC) [M31.6] 10/19/2020    PMR (polymyalgia rheumatica) ([facility] [address] [M35.3] 09/29/2020    HTN (hypertension) [I10] 09/04/2014         Active Problems:    HTN (hypertension)    PMR (polymyalgia rheumatica) (HCC)    Giant cell arteritis (HCC)    Acute respiratory failure due to severe acute respiratory syndrome coronavirus 2 (SARS-CoV-2) infection (HCC)    Current chronic use of systemic steroids    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  *          Medications:  Reviewed  Infusion Medications    sodium chloride 25 mL (11/24/21 1505)     Scheduled Medications    bisacodyl  10 mg Rectal Daily    levofloxacin  500 mg IntraVENous Q24H    predniSONE  7.5 mg Oral Daily    sodium bicarbonate  650 mg Oral Daily    hydrALAZINE  100 mg Oral 3 times per day    metoprolol succinate  100 mg Oral Daily    Vitamin D  2,000 Units Oral Daily    enoxaparin  30 mg SubCUTAneous BID     PRN Meds: sodium chloride flush, sodium chloride, potassium chloride **OR** potassium alternative oral replacement **OR** potassium chloride, magnesium sulfate, ondansetron **OR** ondansetron, acetaminophen **OR** acetaminophen, guaiFENesin-dextromethorphan, benzonatate, albuterol sulfate HFA, sodium chloride      DVT Prophylaxis:  Diet: ADULT DIET; Regular  ADULT ORAL NUTRITION SUPPLEMENT; Lunch; Standard High Calorie/High Protein Oral Supplement  Code Status: Full Code    Dispo: Anticipate discharge in the next 2-3 days. ____________________________________________________________________________    Subjective:   Overnight Events:         Physical Exam:  BP (!) 165/90   Pulse 80   Temp 97.6 °F (36.4 °C) (Oral)   Resp 20   Ht 5' 5\" (1.651 m)   Wt 146 lb 9.7 oz (66.5 kg)   SpO2 95%   BMI 24.40 kg/m²   General appearance: No apparent distress a little confusion  HEENT: Normocephalic, atraumatic, MMM, No sclera icterus/conjuctival palor  Neck: Supple, no thyromegally. No jugular venous distention. Respiratory:  Normal respiratory effort. Clear to auscultation, no Rales/Wheezes/Rhonchi. Cardiovascular: S1/S2 without murmurs, rubs or gallops. RRR  Abdomen: Soft, non-tender, non-distended, bowel sounds present. Musculoskeletal: No clubbing, cyanosis or edema bilaterally. Skin: Skin color, texture, turgor normal.  No rashes or lesions. Neurologic:  Cranial nerves: II-XII intact, KIERA, No focal sensory/motor deficits  Psychiatric: Alert and flat affect      Intake/Output Summary (Last 24 hours) at 11/24/2021 2109  Last data filed at 11/24/2021 1424  Gross per 24 hour   Intake 690 ml   Output 0 ml   Net 690 ml       Labs:   Recent Labs     11/22/21  0602 11/23/21  0712 11/24/21  0456   WBC 5.9 5.5 7.8   HGB 12.1 11.3* 10.8*   HCT 35.2* 33.5* 31.6*    160 155      Recent Labs     11/22/21  0602 11/23/21  0712 11/24/21  0456   * 132* 133*   K 4.7 4.8 4.3   CL 98* 100 100   CO2 28 26 28   BUN 34* 39* 36*   CREATININE 0.7 0.7 0.7   CALCIUM 9.4 9.4 9.6     No results for input(s): Adonica Hoose in the last 72 hours.     Urinalysis:    Lab Results   Component Value Date    NITRU POSITIVE 11/12/2021

## 2021-11-26 LAB
ANION GAP SERPL CALCULATED.3IONS-SCNC: 7 MMOL/L (ref 3–16)
BANDED NEUTROPHILS RELATIVE PERCENT: 6 % (ref 0–7)
BASOPHILS ABSOLUTE: 0 K/UL (ref 0–0.2)
BASOPHILS RELATIVE PERCENT: 0 %
BUN BLDV-MCNC: 27 MG/DL (ref 7–20)
C-REACTIVE PROTEIN: <3 MG/L (ref 0–5.1)
CALCIUM SERPL-MCNC: 9.7 MG/DL (ref 8.3–10.6)
CHLORIDE BLD-SCNC: 103 MMOL/L (ref 99–110)
CO2: 25 MMOL/L (ref 21–32)
CREAT SERPL-MCNC: 0.7 MG/DL (ref 0.6–1.2)
D DIMER: 232 NG/ML DDU (ref 0–229)
EOSINOPHILS ABSOLUTE: 0.3 K/UL (ref 0–0.6)
EOSINOPHILS RELATIVE PERCENT: 4 %
GFR AFRICAN AMERICAN: >60
GFR NON-AFRICAN AMERICAN: >60
GLUCOSE BLD-MCNC: 93 MG/DL (ref 70–99)
HCT VFR BLD CALC: 29.6 % (ref 36–48)
HEMOGLOBIN: 10 G/DL (ref 12–16)
LYMPHOCYTES ABSOLUTE: 0.9 K/UL (ref 1–5.1)
LYMPHOCYTES RELATIVE PERCENT: 14 %
MCH RBC QN AUTO: 33.5 PG (ref 26–34)
MCHC RBC AUTO-ENTMCNC: 33.9 G/DL (ref 31–36)
MCV RBC AUTO: 98.8 FL (ref 80–100)
METAMYELOCYTES RELATIVE PERCENT: 2 %
MONOCYTES ABSOLUTE: 0.6 K/UL (ref 0–1.3)
MONOCYTES RELATIVE PERCENT: 10 %
NEUTROPHILS ABSOLUTE: 4.6 K/UL (ref 1.7–7.7)
NEUTROPHILS RELATIVE PERCENT: 64 %
NUCLEATED RED BLOOD CELLS: 1 /100 WBC
ORGANISM: ABNORMAL
PDW BLD-RTO: 13.7 % (ref 12.4–15.4)
PLATELET # BLD: 175 K/UL (ref 135–450)
PMV BLD AUTO: 8.6 FL (ref 5–10.5)
POTASSIUM SERPL-SCNC: 4.5 MMOL/L (ref 3.5–5.1)
RBC # BLD: 3 M/UL (ref 4–5.2)
RBC # BLD: NORMAL 10*6/UL
SODIUM BLD-SCNC: 135 MMOL/L (ref 136–145)
URINE CULTURE, ROUTINE: ABNORMAL
WBC # BLD: 6.4 K/UL (ref 4–11)

## 2021-11-26 PROCEDURE — 97530 THERAPEUTIC ACTIVITIES: CPT

## 2021-11-26 PROCEDURE — 97116 GAIT TRAINING THERAPY: CPT

## 2021-11-26 PROCEDURE — 6370000000 HC RX 637 (ALT 250 FOR IP): Performed by: HOSPITALIST

## 2021-11-26 PROCEDURE — 2580000003 HC RX 258: Performed by: INTERNAL MEDICINE

## 2021-11-26 PROCEDURE — 6370000000 HC RX 637 (ALT 250 FOR IP): Performed by: INTERNAL MEDICINE

## 2021-11-26 PROCEDURE — 97535 SELF CARE MNGMENT TRAINING: CPT

## 2021-11-26 PROCEDURE — 6360000002 HC RX W HCPCS: Performed by: EMERGENCY MEDICINE

## 2021-11-26 PROCEDURE — 1200000000 HC SEMI PRIVATE

## 2021-11-26 PROCEDURE — 85379 FIBRIN DEGRADATION QUANT: CPT

## 2021-11-26 PROCEDURE — 2580000003 HC RX 258: Performed by: EMERGENCY MEDICINE

## 2021-11-26 PROCEDURE — 36415 COLL VENOUS BLD VENIPUNCTURE: CPT

## 2021-11-26 PROCEDURE — 6360000002 HC RX W HCPCS: Performed by: INTERNAL MEDICINE

## 2021-11-26 PROCEDURE — 80048 BASIC METABOLIC PNL TOTAL CA: CPT

## 2021-11-26 PROCEDURE — 99232 SBSQ HOSP IP/OBS MODERATE 35: CPT | Performed by: HOSPITALIST

## 2021-11-26 PROCEDURE — 97110 THERAPEUTIC EXERCISES: CPT

## 2021-11-26 PROCEDURE — 85025 COMPLETE CBC W/AUTO DIFF WBC: CPT

## 2021-11-26 PROCEDURE — 6370000000 HC RX 637 (ALT 250 FOR IP): Performed by: STUDENT IN AN ORGANIZED HEALTH CARE EDUCATION/TRAINING PROGRAM

## 2021-11-26 PROCEDURE — 86140 C-REACTIVE PROTEIN: CPT

## 2021-11-26 RX ADMIN — MEROPENEM 1000 MG: 1 INJECTION, POWDER, FOR SOLUTION INTRAVENOUS at 10:27

## 2021-11-26 RX ADMIN — HYDRALAZINE HYDROCHLORIDE 100 MG: 50 TABLET, FILM COATED ORAL at 04:18

## 2021-11-26 RX ADMIN — Medication 2000 UNITS: at 09:48

## 2021-11-26 RX ADMIN — Medication 10 ML: at 09:48

## 2021-11-26 RX ADMIN — ACETAMINOPHEN 650 MG: 325 TABLET ORAL at 21:16

## 2021-11-26 RX ADMIN — SODIUM BICARBONATE 650 MG TABLET 650 MG: at 09:47

## 2021-11-26 RX ADMIN — PREDNISONE 7.5 MG: 5 TABLET ORAL at 09:47

## 2021-11-26 RX ADMIN — ENOXAPARIN SODIUM 30 MG: 30 INJECTION SUBCUTANEOUS at 21:16

## 2021-11-26 RX ADMIN — ACETAMINOPHEN 650 MG: 325 TABLET ORAL at 04:18

## 2021-11-26 RX ADMIN — LEVOFLOXACIN 500 MG: 5 INJECTION, SOLUTION INTRAVENOUS at 15:11

## 2021-11-26 RX ADMIN — MEROPENEM 1000 MG: 1 INJECTION, POWDER, FOR SOLUTION INTRAVENOUS at 17:41

## 2021-11-26 RX ADMIN — ENOXAPARIN SODIUM 30 MG: 30 INJECTION SUBCUTANEOUS at 09:48

## 2021-11-26 ASSESSMENT — PAIN SCALES - GENERAL
PAINLEVEL_OUTOF10: 2
PAINLEVEL_OUTOF10: 7
PAINLEVEL_OUTOF10: 3
PAINLEVEL_OUTOF10: 3
PAINLEVEL_OUTOF10: 7
PAINLEVEL_OUTOF10: 7

## 2021-11-26 ASSESSMENT — PAIN SCALES - WONG BAKER
WONGBAKER_NUMERICALRESPONSE: 4
WONGBAKER_NUMERICALRESPONSE: 4

## 2021-11-26 ASSESSMENT — PAIN DESCRIPTION - ONSET: ONSET: ON-GOING

## 2021-11-26 ASSESSMENT — PAIN DESCRIPTION - LOCATION
LOCATION: FOOT
LOCATION: FOOT

## 2021-11-26 ASSESSMENT — PAIN DESCRIPTION - DESCRIPTORS: DESCRIPTORS: ACHING

## 2021-11-26 ASSESSMENT — PAIN DESCRIPTION - ORIENTATION
ORIENTATION: RIGHT
ORIENTATION: RIGHT

## 2021-11-26 ASSESSMENT — PAIN DESCRIPTION - PAIN TYPE
TYPE: CHRONIC PAIN
TYPE: CHRONIC PAIN

## 2021-11-26 ASSESSMENT — PAIN DESCRIPTION - FREQUENCY: FREQUENCY: CONTINUOUS

## 2021-11-26 NOTE — PROGRESS NOTES
Occupational Therapy  Facility/Department: [facility] C5 - MED SURG/ORTHO  Daily Treatment Note  NAME: Ajay Wells  : 2/3/1933  MRN: [id number]    Date of Service: 2021    Discharge Recommendations:  Subacute/Skilled Nursing Facility       Assessment   Performance deficits / Impairments: Decreased functional mobility ; Decreased ADL status; Decreased strength; Decreased endurance; Decreased high-level IADLs; Decreased balance; Decreased safe awareness  Assessment: Pt with fair tolerance of OT treatment, demos fatigue requiring increased time and rest breaks with all activities. Pt max A for sit to stand from chair and toilet, once standing CGA with RW. Pt mod-max A for all LE ADLs. At baseline pt PLOF I with BADLs and IADLs, pt functioning well below her baseline and would benefit from continued skilled OT to address the above noted occupational performance deficits. Prognosis: Good  OT Education: OT Role; Plan of Care; Energy Conservation; Transfer Training; ADL Adaptive Strategies; Home Exercise Program  Disease Specific Education: Pt educated on importance of OOB mobility, prevention of complications of bedrest, and general safety during hospitalization. Pt verbalized understanding. Patient Education: Pt previously reported completing HEP, reports not been completing lately, re-educated on importance of increasing activity gradually, pt verbalizes understanding. Barriers to Learning: fatigue  REQUIRES OT FOLLOW UP: Yes  Activity Tolerance  Activity Tolerance: Patient limited by fatigue  Activity Tolerance: Vitals: BP= 92/60, HR= 88, SPO2= 92% RA  Safety Devices  Safety Devices in place: Yes  Type of devices: Left in chair; Chair alarm in place; Call light within reach; Nurse notified; Gait belt         Patient Diagnosis(es): The primary encounter diagnosis was Acute respiratory failure with hypoxia ([facility] [address] Diagnoses of COVID-19 and Acute cystitis without hematuria were also pertinent to this visit.       has a past medical history of Cellulitis of left lower leg, Closed pelvic rim fracture, Fibromyalgia, Foot drop, right foot, Fractures, History of blood transfusion, History of temporal artery biopsy, Hx of blood clots, PNA (pneumonia), Prepatellar bursitis of right knee, Rheumatoid arthritis ([address] Venous ulcer of left leg ([address] and Venous ulcer of right leg ([facility] [address] has a past surgical history that includes Tonsillectomy and Vein Surgery (Right, 10/27/2016). Restrictions  Restrictions/Precautions  Restrictions/Precautions: General Precautions, Up as Tolerated, Contact Precautions  Position Activity Restriction  Other position/activity restrictions: droplet+ d/t COVID, High fall risk per nursing assessment, up with assistance, telemetry with continuous pulse oximetry     Subjective   General  Chart Reviewed: Yes  Patient assessed for rehabilitation services?: Yes  Additional Pertinent Hx: Patient is a 26-year-old female with past medical history of fibromyalgia, rheumatoid arthritis who presented to hospital for shortness of breath. Response to previous treatment: Patient with no complaints from previous session  Family / Caregiver Present: No  Referring Practitioner: Raj Palencia MD  Diagnosis: acute cystitis, COVID+    Subjective  Subjective: Pt up in chair, reporting fatigue but agreeable to OT treatment. Vital Signs  Patient Currently in Pain: Denies     Orientation  Orientation  Overall Orientation Status: Within Functional Limits     Objective    ADL  Toileting: Moderate assistance;  Increased time to complete (greater than 15 mins spent toileting due to fatigue)     Balance  Sitting Balance: Stand by assistance  Standing Balance: Contact guard assistance (RW)  Standing Balance  Activity: functional/bathroom mobility, toileting    Functional Mobility  Functional - Mobility Device: Rolling Walker  Activity: To/from bathroom  Assist Level: Contact guard assistance    Toilet Transfers  Toilet - Technique: Ambulating  Equipment Used: Standard toilet (with use of grab bars)  Toilet Transfer: Maximum assistance     Transfers  Sit to stand: Maximum assistance  Stand to sit: Minimal assistance     Cognition  Overall Cognitive Status: Exceptions  Arousal/Alertness: Delayed responses to stimuli  Following Commands: Follows one step commands with repetition; Follows one step commands with increased time  Attention Span: Attends with cues to redirect; Difficulty attending to directions  Safety Judgement: Decreased awareness of need for safety; Decreased awareness of need for assistance  Insights: Decreased awareness of deficits  Initiation: Requires cues for some  Sequencing: Requires cues for some     Plan   Plan  Times per week: 3-5x per week  Times per day: Daily  Current Treatment Recommendations: Strengthening, Endurance Training, ROM, Self-Care / ADL, Home Management Training, Patient/Caregiver Education & Training, Functional Mobility Training    AM-PAC Score  AM-[facility] Inpatient Daily Activity Raw Score: 14 (11/26/21 1309)  AM-PAC Inpatient ADL T-Scale Score : 33.39 (11/26/21 1309)  ADL Inpatient CMS 0-100% Score: 59.67 (11/26/21 1309)  ADL Inpatient CMS G-Code Modifier : CK (11/26/21 1309)    Goals  Short term goals  Time Frame for Short term goals: 1 week (11/21/21--extend to 11/28)  Short term goal 1: Pt will complete toilet transfer with CGA-- ongoing, max A 11/26/21  Short term goal 2: Pt will complete LBD with min A by 11/24-- GOAL NOT MET, max A 11/26/21  Short term goal 3: Pt will complete grooming tasks in standing for 5 minutes for improvd endurance with O2 stats >90%-- ongoing 11/26/21  Short term goal 4: Pt will complete toileting with SBA. -- ongoing 11/26/21  Patient Goals   Patient goals : \"change myself\"       Therapy Time   Individual Concurrent Group Co-treatment   Time In 1130         Time Out 1223         Minutes [address], LINO/L

## 2021-11-26 NOTE — PROGRESS NOTES
Hospitalist Progress Note      PCP: Lorri Strong DO    Date of Admission: 11/12/2021    LOS: 15    Chief Complaint: Pt reports  No new problems since seen yesterday. Eating well and taking fluids. Denies diarrhea,cough,sob,fever or chills. She had a urine that came back positive for EBSL. I spoke   with pharmacy and they wanted meropenem ordered over ertrapenum due to restriction of ertrapenum   For GI and ID. Pt continues to be dysthmic denies any suicidal plan but feels like she wants to die. 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  *          Medications:  Reviewed  Infusion Medications    sodium chloride 25 mL (11/25/21 1445)     Scheduled Medications    meropenem  1,000 mg IntraVENous Q8H    levofloxacin  500 mg IntraVENous Q24H    predniSONE  7.5 mg Oral Daily    sodium bicarbonate  650 mg Oral Daily    hydrALAZINE  100 mg Oral 3 times per day    metoprolol succinate  100 mg Oral Daily    Vitamin D  2,000 Units Oral Daily    enoxaparin  30 mg SubCUTAneous BID     PRN Meds: sodium chloride flush, sodium chloride, potassium chloride **OR** potassium alternative oral replacement **OR** potassium chloride, magnesium sulfate, ondansetron **OR** ondansetron, acetaminophen **OR** acetaminophen, guaiFENesin-dextromethorphan, benzonatate, albuterol sulfate HFA, sodium chloride      DVT Prophylaxis:   Diet: ADULT DIET; Regular  ADULT ORAL NUTRITION SUPPLEMENT; Lunch; Standard High Calorie/High Protein Oral Supplement  Code Status: Full Code    Dispo: Anticipate discharge in the next2-3 DAYS    ____________________________________________________________________________    Subjective:   Overnight Events:          Physical Exam:  BP 97/61   Pulse 77   Temp 97.6 °F (36.4 °C) (Oral)   Resp 16   Ht 5' 5\" (1.651 m)   Wt 146 lb 9.7 oz (66.5 kg)   SpO2 91%   BMI 24.40 kg/m²   General appearance: No apparent distress, appears stated age and cooperative. HEENT: Normocephalic, atraumatic, MMM, No sclera icterus/conjuctival palor  Neck: Supple, no thyromegally. No jugular venous distention. Respiratory:  Normal respiratory effort. Clear to auscultation, no Rales/Wheezes/Rhonchi. Cardiovascular: S1/S2 without murmurs, rubs or gallops. RRR  Abdomen: Soft, non-tender, non-distended, bowel sounds present. Musculoskeletal: No clubbing, cyanosis or edema bilaterally. Skin: Skin color, texture, turgor normal.  No rashes or lesions. Neurologic:  Cranial nerves: II-XII intact, KIERA, No focal sensory/motor deficits  Psychiatric: Alert and oriented sad affect      Labs:   Recent Labs     11/24/21 0456 11/25/21  0559 11/26/21  0439   WBC 7.8 5.7 6.4   HGB 10.8* 10.8* 10.0*   HCT 31.6* 31.9* 29.6*    157 175      Recent Labs     11/24/21  0456 11/25/21  0559 11/26/21  0439   * 135* 135*   K 4.3 4.2 4.5    102 103   CO2 28 26 25   BUN 36* 23* 27*   CREATININE 0.7 0.7 0.7   CALCIUM 9.6 9.5 9.7     No results for input(s): CKTOTAL, TROPONINI in the last 72 hours.     Urinalysis:    Lab Results   Component Value Date    NITRU POSITIVE 11/12/2021    WBCUA 10-20 11/12/2021    BACTERIA 4+ 11/12/2021    RBCUA 3-4 11/12/2021    BLOODU Negative 11/12/2021    SPECGRAV 1.020 11/12/2021    GLUCOSEU Negative 11/12/2021       Radiology:  XR CHEST PORTABLE   Final Result   1. No significant change in appearance of multifocal pneumonia, to include   atypical viral causes. 2. Stable cardiomegaly. CT CHEST PULMONARY EMBOLISM W CONTRAST   Final Result   No evidence of a pulmonary embolus. Mildly dilated and atherosclerotic thoracic aorta with no aneurysm or   dissection. Moderate subpleural and interstitial ground-glass opacities scattered   throughout both lungs which probably represents multisegmental   bronchopneumonia secondary to the patient's known COVID-19 disease      Single moderate compression fracture of T11 which may be chronic. Recommend   clinical follow-up of the area. XR CHEST PORTABLE   Final Result   Bilateral airspace disease suggestive of atypical viral pneumonia. Superimposed edema not excluded. ASSESSMENT: 1. COVID PNEUMONIA                              2. UTI (EBSL)                              3.DEPRESSION    PLAN:WILL CONTINUE CURRENT CARE. MEROPENUM 500MG TID ORDERED. DISCHARGE   IN 2-3 DAYS. MIKALA STEWART, DO      Please excuse brevity and/or typos. This report was transcribed using voice recognition software. Every effort was made to ensure accuracy, however, inadvertent computerized transcription errors may be present.

## 2021-11-26 NOTE — CARE COORDINATION
Call placed to [facility] and notified by representative, Ann Claudio that claim for ARU is still denied and did not get overturned as of 11/25/21 2:22 pm. [address] [place] [address]  will send fax with information. Updated TESFAYE Babb of appeal being denied.  Dean Lopez RN

## 2021-11-26 NOTE — CARE COORDINATION
Per Cornelius Saucedo in [facility] was denied. Call placed to family to see where they want patient to go for skilled rehab. Previous notes stated they want [facility] however it is out of network and they are not taking covid patients unless they are over 21 days out of isolation. Son Ace Jimenes thinks that his sister wants her to go to [facility] [place]. Call placed to [facility] and left VM. Will need updated PT/OT notes for pre-cert to SNF.

## 2021-11-26 NOTE — CARE COORDINATION
Referral initiated to [facility] [place]. Per Jannie she thinks they should be able to accept and she will start pre-cert as soon as updated therapy notes are in epic. Spoke with Steve Duffy OT and relayed need for PT to also see and to get notes documented as soon as possible to get a pre-cert started today. Addendum: Received a call from [address] [place] [address] in admission at [facility] [place] and she stated pre-cert team attempted to initiate pre-cert once notes were in but they had already closed. They will start pre-cert as soon as insurance reopens.

## 2021-11-26 NOTE — PROGRESS NOTES
Physical Therapy  Facility/Department: [place] [address] - MED SURG/ORTHO  Daily Treatment Note  NAME: Fahad Martinez  : 2/3/1933  MRN: [id number]    Date of Service: 2021    Discharge Recommendations:  Subacute/Skilled Nursing Facility   PT Equipment Recommendations  Equipment Needed: No    Assessment   Body structures, Functions, Activity limitations: Decreased functional mobility ; Decreased strength; Decreased endurance; Decreased balance  Assessment: Pt rpts feeling tired today but willing to get OOB. Pt able to come supine to sit Vero and STS with min Aof 1. She ambulated wt RW 5' + 25'. Pt is recommended for con't skilled PT and SNF at D/c. Treatment Diagnosis: Decreased endurance  Prognosis: Good  Decision Making: Medium Complexity  PT Education: Goals; General Safety; Gait Training; PT Role; Plan of Care; Disease Specific Education; Functional Mobility Training; Equipment; Precautions; Transfer Training; Energy Conservation; Home Exercise Program  Patient Education: Disease-specific education: Pt educated on role of PT in hospital, current D/C recs, and benefits of regular OOB activity while in hospital in order to decrease risks of prolonged bedrest.  Pt verbalizes understanding. REQUIRES PT FOLLOW UP: Yes  Activity Tolerance  Activity Tolerance: Patient limited by endurance; Patient Tolerated treatment well  Activity Tolerance: 101/63  HR 82  O2 91% RA initial in bed , 117/70  HR 81  O2 92% RA in chair     Patient Diagnosis(es): The primary encounter diagnosis was Acute respiratory failure with hypoxia ([facility] [address] Diagnoses of COVID-19 and Acute cystitis without hematuria were also pertinent to this visit.      has a past medical history of Cellulitis of left lower leg, Closed pelvic rim fracture, Fibromyalgia, Foot drop, right foot, Fractures, History of blood transfusion, History of temporal artery biopsy, Hx of blood clots, PNA (pneumonia), Prepatellar bursitis of right knee, Rheumatoid arthritis ([address] Venous ulcer of left leg ([facility] [address] and Venous ulcer of right leg ([facility] [address] has a past surgical history that includes Tonsillectomy and Vein Surgery (Right, 10/27/2016). Restrictions  Restrictions/Precautions  Restrictions/Precautions: General Precautions, Up as Tolerated, Contact Precautions  Position Activity Restriction  Other position/activity restrictions: droplet+ d/t COVID, High fall risk per nursing assessment, up with assistance, 4L O2 via NC, telemetry with continuous pulse oximetry  Subjective   General  Chart Reviewed: Yes  Additional Pertinent Hx: R foot drop  Response To Previous Treatment: Patient reporting fatigue but able to participate. Family / Caregiver Present: No  Referring Practitioner: Lit Hanna MD  Subjective  Subjective: Pt reports fatigue but is motivated  General Comment  Comments: supine in bed on arrival  Pain Screening  Patient Currently in Pain: Denies  Vital Signs  Patient Currently in Pain: Denies       Orientation  Orientation  Overall Orientation Status: Within Functional Limits  Cognition      Objective   Bed mobility  Rolling to Right: Minimal assistance  Supine to Sit: Minimal assistance  Transfers  Sit to Stand: Minimal Assistance  Stand to sit: Minimal Assistance  Ambulation  Ambulation?: Yes  Ambulation 1  Surface: level tile  Device: Rolling Walker  Other Apparatus:  (RA)  Assistance: Minimal assistance  Quality of Gait: Pt amb with slow cindy and decreased stride length B. Steppage gait noted on R secondary to baseline foot drop.   Gait Deviations: Slow Cindy; Decreased step length; Decreased step height  Distance: 5', 25'  Comments: Amb distance limited by c/o fatigue     Balance  Sitting - Static: Good  Sitting - Dynamic: Fair; +  Standing - Static: Fair; +  Standing - Dynamic: Fair (rw)  Exercises  Hip Flexion: X 10 BLE seated marches  Knee Long Arc Quad: X 10 BLE, needed max verbal and tactile cues to isolate quad  Ankle Pumps: X 15 LLE  Comments: limited ther ex d/t pt c/o fatigue after ambulation. Comment: standing balance in rw for 1 min including lateral WS x 10 prior to ambulation. AM-PAC Score  AM-PAC Inpatient Mobility Raw Score : 15 (11/26/21 1121)  AM-PAC Inpatient T-Scale Score : 39.45 (11/26/21 1121)  Mobility Inpatient CMS 0-100% Score: 57.7 (11/26/21 1121)  Mobility Inpatient CMS G-Code Modifier : CK (11/26/21 1121)          Goals  Short term goals  Time Frame for Short term goals: 1 week, 11/23/21 (unless otherwise specified)  Short term goal 1: Pt will transfer supine <-> sit with (I)  -11/26 Vero  Short term goal 2: Pt will transfer sit <-> stand and bed>chair using RW/4WW with modified(I)   -11/26 min A  Short term goal 3: Pt will ambulate x 125 feet using RW/4WW with SBA   -11/26 25' with RW min A  Short term goal 4: By 11/19/21: Pt will tolerate 15-20 reps BLE exercise for strengthening, balance, and endurance   -11/26 on-going  Patient Goals   Patient goals : \"To be able to keep house and do all of my cooking and laundry like I used to. \"    Plan    Plan  Times per week: 2-3x/week in acute care  Times per day: Daily  Specific instructions for Next Treatment: Progress ther ex and mobility as tolerated  Current Treatment Recommendations: Strengthening, Balance Training, Endurance Training, Functional Mobility Training, Transfer Training, Gait Training, Home Exercise Program, Safety Education & Training, Equipment Evaluation, Education, & procurement  Safety Devices  Type of devices:  All fall risk precautions in place, Call light within reach, Chair alarm in place, Gait belt, Left in chair, Nurse notified     Therapy Time   Individual Concurrent Group Co-treatment   Time In 1019         Time Out 1059         Minutes 40         Timed Code Treatment Minutes: Gary Schilling

## 2021-11-26 NOTE — PROGRESS NOTES
Nephrology Progress  Note                                                                                                                                                                                                                                                                                                                                                               Office : [phone]     Fax :[phone]              Patient's Name: Chana Hodgkins  5:27 PM  11/26/2021    Reason for Consult:  Metabolic acidosis    Requesting Physician:  Loulou Rasmussen DO      Chief Complaint:  SOB, PNA      Interval History :    On room air  Serum Na 133   -----> 134 ---> 133 ----> 128  ----> 132 --> 133 --> 135  Bicarb 17 ------> 23 ---> 16 ----> 26  ---> 28 ---> 25  SBP stable , soft side  No diarrhea or vomiting    Weakness and deconditioning +    History of Present Ilness: Chana Hodgkins is a 80 y.o. female with PMH of RA , HTN     Being treated for Acute respiratory failure 2/2 COVID 19 PNA    Getting decadron   S/p remdesivir    Denies diarrhea    O2 requirement stable    Making urine        Past Medical History:   Diagnosis Date    Cellulitis of left lower leg 4/21/2019    Closed pelvic rim fracture 9/4/2014    Fibromyalgia     Foot drop, right foot     Fractures     History of blood transfusion     1997    History of temporal artery biopsy 10/06/2020    Hx of blood clots     PNA (pneumonia) 12/1997    Prepatellar bursitis of right knee 9/15/2020    Rheumatoid arthritis ([address]     Venous ulcer of left leg ([address] 08/2019    Venous ulcer of right leg ([address] 10/2016       Past Surgical History:   Procedure Laterality Date    TONSILLECTOMY      VEIN SURGERY Right 10/27/2016    saphenous VNUS closure       Family History   Problem Relation Age of Onset    Stroke Mother     Stroke Father         reports that she has never smoked. She has never used smokeless tobacco. She reports current alcohol use.  She reports that she does not use drugs.     Allergies:  Erythromycin, Hydrochlorothiazide, Silver sulfadiazine, and Doxycycline    Current Medications:    meropenem (MERREM) 1,000 mg in sodium chloride 0.9 % 100 mL IVPB (mini-bag), Q8H  levoFLOXacin (LEVAQUIN) 500 MG/100ML infusion 500 mg, Q24H  predniSONE (DELTASONE) tablet 7.5 mg, Daily  sodium bicarbonate tablet 650 mg, Daily  hydrALAZINE (APRESOLINE) tablet 100 mg, 3 times per day  metoprolol succinate (TOPROL XL) extended release tablet 100 mg, Daily  sodium chloride flush 0.9 % injection 10 mL, PRN  0.9 % sodium chloride infusion, PRN  potassium chloride (KLOR-CON M) extended release tablet 40 mEq, PRN   Or  potassium bicarb-citric acid (EFFER-K) effervescent tablet 40 mEq, PRN   Or  potassium chloride 10 mEq/100 mL IVPB (Peripheral Line), PRN  magnesium sulfate 1000 mg in dextrose 5% 100 mL IVPB, PRN  ondansetron (ZOFRAN-ODT) disintegrating tablet 4 mg, Q8H PRN   Or  ondansetron (ZOFRAN) injection 4 mg, Q6H PRN  acetaminophen (TYLENOL) tablet 650 mg, Q6H PRN   Or  acetaminophen (TYLENOL) suppository 650 mg, Q6H PRN  guaiFENesin-dextromethorphan (ROBITUSSIN DM) 100-10 MG/5ML syrup 5 mL, Q4H PRN  Vitamin D (CHOLECALCIFEROL) tablet 2,000 Units, Daily  enoxaparin (LOVENOX) injection 30 mg, BID  benzonatate (TESSALON) capsule 200 mg, TID PRN  albuterol sulfate  (90 Base) MCG/ACT inhaler 2 puff, Q4H PRN  0.9 % sodium chloride bolus, PRN        Review of Systems:   14 point ROS obtained but were negative except mentioned in HPI      Physical exam:     Vitals:  BP 98/65   Pulse 94   Temp 97.8 °F (36.6 °C) (Oral)   Resp 18   Ht 5' 5\" (1.651 m)   Wt 146 lb 9.7 oz (66.5 kg)   SpO2 94%   BMI 24.40 kg/m²   Constitutional:  OAA X3 NAD  Skin: no rash, turgor wnl  Heent:  eomi, mmm  Neck: no bruits or jvd noted  Cardiovascular:  S1, S2 without m/r/g  Respiratory: CTA B without w/r/r  Abdomen:  +bs, soft, nt, nd  Ext: no  lower extremity edema  Psychiatric: mood and affect appropriate  Musculoskeletal:  Rom, muscular strength intact    Data:   Labs:  CBC:   Recent Labs     11/24/21 0456 11/25/21  0559 11/26/21  0439   WBC 7.8 5.7 6.4   HGB 10.8* 10.8* 10.0*    157 175     BMP:    Recent Labs     11/24/21 0456 11/25/21  0559 11/26/21  0439   * 135* 135*   K 4.3 4.2 4.5    102 103   CO2 28 26 25   BUN 36* 23* 27*   CREATININE 0.7 0.7 0.7   GLUCOSE 113* 108* 93     Ca/Mg/Phos:   Recent Labs     11/24/21 0456 11/25/21  0559 11/26/21  0439   CALCIUM 9.6 9.5 9.7     Hepatic:   No results for input(s): AST, ALT, ALB, BILITOT, ALKPHOS in the last 72 hours. Troponin:   No results for input(s): TROPONINI in the last 72 hours. BNP: No results for input(s): BNP in the last 72 hours. Lipids: No results for input(s): CHOL, TRIG, HDL, LDLCALC, LABVLDL in the last 72 hours. ABGs:   No results for input(s): PHART, PO2ART, [id number] in the last 72 hours. INR:   No results for input(s): INR in the last 72 hours. UA:No results for input(s): [place], GLUCOSEU, BILIRUBINUR, Jinx Going, BLOODU, PHUR, PROTEINU, UROBILINOGEN, NITRU, LEUKOCYTESUR, LABMICR, URINETYPE in the last 72 hours. Urine Microscopic: No results for input(s): LABCAST, BACTERIA, COMU, HYALCAST, WBCUA, RBCUA, EPIU in the last 72 hours. Urine Culture:   Recent Labs     11/24/21  1315   LABURIN >100,000 CFU/ml  CONTACT PRECAUTIONS INDICATED  Carbapenem antibiotics are the best option for infections caused  by ESBL producing organisms. Other antibiotic classes are  likely to result in treatment failure, even for organisms  demonstrating in vitro susceptibility. *     Urine Chemistry:   No results for input(s): Suann Pummel, PROTEINUR, NAUR in the last 72 hours. IMAGING:  XR CHEST PORTABLE   Final Result   1. No significant change in appearance of multifocal pneumonia, to include   atypical viral causes. 2. Stable cardiomegaly.          CT CHEST PULMONARY EMBOLISM W CONTRAST   Final Result   No evidence of a pulmonary embolus. Mildly dilated and atherosclerotic thoracic aorta with no aneurysm or   dissection. Moderate subpleural and interstitial ground-glass opacities scattered   throughout both lungs which probably represents multisegmental   bronchopneumonia secondary to the patient's known COVID-19 disease      Single moderate compression fracture of T11 which may be chronic. Recommend   clinical follow-up of the area. XR CHEST PORTABLE   Final Result   Bilateral airspace disease suggestive of atypical viral pneumonia. Superimposed edema not excluded. Assessment/Plan     1. NAGMA     Bicarb 19 since 11/14  Bicarb 26 on admission  Denies diarrhea    Renal fn wnl, GFR > 60 ml/hr    Plan    Stable renal standpoint    Encourage PO intake of water to thirst  Bicarb improved  moniter bicarb and electrolytes        2.  COVID PNA    Acute hypoxic respiratory failure     On decadron  remdesvir  O2      3 HTN : controlled      On Metoprolol  Increase Hydralazine to 100 mg  TID       4 Dementia    5 hypotension    Give gentle IV hydration  Keep MAP > 65 mmhg    6 Hyponatremia    Improving    moniter    Encourage high protein/solute diet      Thank you for allowing us to participate in care of Gadiel Love MD  Feel free to contact me   [facility] [address]  Office : [phone]  Fax :[phone]

## 2021-11-26 NOTE — PROGRESS NOTES
Hospitalist Progress Note      PCP: Ganesh Streeter DO    Date of Admission: 11/12/2021    LOS: 15    Chief Complaint:Pt reports feeling better today. She reports better appetite and taking fluids. Denies vomiting  or diarrhea. 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] 11/23/2021    Acute respiratory failure due to severe acute respiratory syndrome coronavirus 2 (SARS-CoV-2) infection (HCC) [U07.1, J96.00] 11/12/2021    Current chronic use of systemic steroids [Z79.52] 11/12/2021    Giant cell arteritis (HCC) [M31.6] 10/19/2020    PMR (polymyalgia rheumatica) ([address] [M35.3] 09/29/2020    HTN (hypertension) [I10] 09/04/2014         Active Problems:    HTN (hypertension)    PMR (polymyalgia rheumatica) (HCC)    Giant cell arteritis (HCC)    Acute respiratory failure due to severe acute respiratory syndrome coronavirus 2 (SARS-CoV-2) infection (HCC)    Current chronic use of systemic steroids    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 *          Medications:  Reviewed  Infusion Medications    sodium chloride 25 mL (11/25/21 1445)     Scheduled Medications    bisacodyl  10 mg Rectal Daily    levofloxacin  500 mg IntraVENous Q24H    predniSONE  7.5 mg Oral Daily    sodium bicarbonate  650 mg Oral Daily    hydrALAZINE  100 mg Oral 3 times per day    metoprolol succinate  100 mg Oral Daily    Vitamin D  2,000 Units Oral Daily    enoxaparin  30 mg SubCUTAneous BID     PRN Meds: sodium chloride flush, sodium chloride, potassium chloride **OR** potassium alternative oral replacement **OR** potassium chloride, magnesium sulfate, ondansetron **OR** ondansetron, acetaminophen **OR** acetaminophen, guaiFENesin-dextromethorphan, benzonatate, albuterol sulfate HFA, sodium chloride      DVT Prophylaxis:   Diet: ADULT DIET;  Regular  ADULT ORAL NUTRITION SUPPLEMENT; Lunch; Standard High Calorie/High Protein Oral Supplement  Code Status: Full Code    Dispo: Anticipate discharge in the next 3-4 days    ____________________________________________________________________________    Subjective:   Overnight Events:         Physical Exam:  BP 96/61   Pulse 91   Temp 97.9 °F (36.6 °C) (Oral)   Resp 18   Ht 5' 5\" (1.651 m)   Wt 146 lb 9.7 oz (66.5 kg)   SpO2 94%   BMI 24.40 kg/m²   General appearance: No apparent distress, appears stated age a little confused. HEENT: Normocephalic, atraumatic, MMM, No sclera icterus/conjuctival palor  Neck: Supple, no thyromegally. No jugular venous distention. Respiratory:  Normal respiratory effort. Clear to auscultation, no Rales/Wheezes/Rhonchi. Cardiovascular: S1/S2 without murmurs, rubs or gallops. RRR  Abdomen: Soft, non-tender, non-distended, bowel sounds present. Musculoskeletal: No clubbing, cyanosis or edema bilaterally. Skin: Skin color, texture, turgor normal.  No rashes or lesions. Neurologic:  Cranial nerves: II-XII intact, KIERA, No focal sensory/motor deficits  Psychiatric: Alert and oriented, thought content appropriate      Intake/Output Summary (Last 24 hours) at 11/25/2021 1904  Last data filed at 11/25/2021 1445  Gross per 24 hour   Intake 120 ml   Output 400 ml   Net -280 ml       Labs:   Recent Labs     11/23/21  0712 11/24/21  0456 11/25/21  0559   WBC 5.5 7.8 5.7   HGB 11.3* 10.8* 10.8*   HCT 33.5* 31.6* 31.9*    155 157      Recent Labs     11/23/21  0712 11/24/21  0456 11/25/21  0559   * 133* 135*   K 4.8 4.3 4.2    100 102   CO2 26 28 26   BUN 39* 36* 23*   CREATININE 0.7 0.7 0.7   CALCIUM 9.4 9.6 9.5     No results for input(s): [place] in the last 72 hours.     Urinalysis:    Lab Results   Component Value Date    NITRU POSITIVE 11/12/2021    WBCUA 10-20 11/12/2021    BACTERIA 4+ 11/12/2021    RBCUA 3-4 11/12/2021    BLOODU Negative 11/12/2021    SPECGRAV 1.020 11/12/2021    GLUCOSEU Negative 11/12/2021       Radiology:  XR CHEST PORTABLE   Final Result   1. No significant change in appearance of multifocal pneumonia, to include   atypical viral causes. 2. Stable cardiomegaly. CT CHEST PULMONARY EMBOLISM W CONTRAST   Final Result   No evidence of a pulmonary embolus. Mildly dilated and atherosclerotic thoracic aorta with no aneurysm or   dissection. Moderate subpleural and interstitial ground-glass opacities scattered   throughout both lungs which probably represents multisegmental   bronchopneumonia secondary to the patient's known COVID-19 disease      Single moderate compression fracture of T11 which may be chronic. Recommend   clinical follow-up of the area. XR CHEST PORTABLE   Final Result   Bilateral airspace disease suggestive of atypical viral pneumonia. Superimposed edema not excluded. ASSESSMENT:  1.COVID                               2. UTI  (E.COLI)                               3. DEPRESSION    PLAN:    PT ON LEVAQUIN 500MG DAILY FOR UTI AND REMDESIVIR WAS STARTED. MIKALA STEWART, DO      Please excuse brevity and/or typos. This report was transcribed using voice recognition software. Every effort was made to ensure accuracy, however, inadvertent computerized transcription errors may be present.

## 2021-11-27 LAB
ANION GAP SERPL CALCULATED.3IONS-SCNC: 6 MMOL/L (ref 3–16)
BANDED NEUTROPHILS RELATIVE PERCENT: 2 % (ref 0–7)
BASOPHILS ABSOLUTE: 0.1 K/UL (ref 0–0.2)
BASOPHILS RELATIVE PERCENT: 1 %
BUN BLDV-MCNC: 25 MG/DL (ref 7–20)
CALCIUM SERPL-MCNC: 9.8 MG/DL (ref 8.3–10.6)
CHLORIDE BLD-SCNC: 103 MMOL/L (ref 99–110)
CO2: 25 MMOL/L (ref 21–32)
CREAT SERPL-MCNC: 0.8 MG/DL (ref 0.6–1.2)
EOSINOPHILS ABSOLUTE: 0.1 K/UL (ref 0–0.6)
EOSINOPHILS RELATIVE PERCENT: 2 %
GFR AFRICAN AMERICAN: >60
GFR NON-AFRICAN AMERICAN: >60
GLUCOSE BLD-MCNC: 100 MG/DL (ref 70–99)
HCT VFR BLD CALC: 31.9 % (ref 36–48)
HEMOGLOBIN: 10.8 G/DL (ref 12–16)
LYMPHOCYTES ABSOLUTE: 0.9 K/UL (ref 1–5.1)
LYMPHOCYTES RELATIVE PERCENT: 15 %
MCH RBC QN AUTO: 33.4 PG (ref 26–34)
MCHC RBC AUTO-ENTMCNC: 33.8 G/DL (ref 31–36)
MCV RBC AUTO: 98.7 FL (ref 80–100)
MONOCYTES ABSOLUTE: 0.6 K/UL (ref 0–1.3)
MONOCYTES RELATIVE PERCENT: 9 %
NEUTROPHILS ABSOLUTE: 4.6 K/UL (ref 1.7–7.7)
NEUTROPHILS RELATIVE PERCENT: 71 %
PDW BLD-RTO: 13.7 % (ref 12.4–15.4)
PLATELET # BLD: 192 K/UL (ref 135–450)
PLATELET SLIDE REVIEW: ADEQUATE
PMV BLD AUTO: 8.3 FL (ref 5–10.5)
POTASSIUM SERPL-SCNC: 4.3 MMOL/L (ref 3.5–5.1)
RBC # BLD: 3.24 M/UL (ref 4–5.2)
RBC # BLD: NORMAL 10*6/UL
SLIDE REVIEW: ABNORMAL
SODIUM BLD-SCNC: 134 MMOL/L (ref 136–145)
WBC # BLD: 6.3 K/UL (ref 4–11)

## 2021-11-27 PROCEDURE — 80048 BASIC METABOLIC PNL TOTAL CA: CPT

## 2021-11-27 PROCEDURE — 2580000003 HC RX 258: Performed by: INTERNAL MEDICINE

## 2021-11-27 PROCEDURE — 85025 COMPLETE CBC W/AUTO DIFF WBC: CPT

## 2021-11-27 PROCEDURE — 6370000000 HC RX 637 (ALT 250 FOR IP): Performed by: HOSPITALIST

## 2021-11-27 PROCEDURE — 36415 COLL VENOUS BLD VENIPUNCTURE: CPT

## 2021-11-27 PROCEDURE — 99232 SBSQ HOSP IP/OBS MODERATE 35: CPT | Performed by: HOSPITALIST

## 2021-11-27 PROCEDURE — 1200000000 HC SEMI PRIVATE

## 2021-11-27 PROCEDURE — 6360000002 HC RX W HCPCS: Performed by: EMERGENCY MEDICINE

## 2021-11-27 PROCEDURE — 2580000003 HC RX 258: Performed by: EMERGENCY MEDICINE

## 2021-11-27 PROCEDURE — 6370000000 HC RX 637 (ALT 250 FOR IP): Performed by: INTERNAL MEDICINE

## 2021-11-27 PROCEDURE — 6370000000 HC RX 637 (ALT 250 FOR IP): Performed by: STUDENT IN AN ORGANIZED HEALTH CARE EDUCATION/TRAINING PROGRAM

## 2021-11-27 PROCEDURE — 51798 US URINE CAPACITY MEASURE: CPT

## 2021-11-27 PROCEDURE — 6360000002 HC RX W HCPCS: Performed by: INTERNAL MEDICINE

## 2021-11-27 RX ADMIN — ACETAMINOPHEN 650 MG: 325 TABLET ORAL at 09:05

## 2021-11-27 RX ADMIN — PREDNISONE 7.5 MG: 5 TABLET ORAL at 09:06

## 2021-11-27 RX ADMIN — ACETAMINOPHEN 650 MG: 325 TABLET ORAL at 21:16

## 2021-11-27 RX ADMIN — Medication 2000 UNITS: at 09:12

## 2021-11-27 RX ADMIN — MEROPENEM 1000 MG: 1 INJECTION, POWDER, FOR SOLUTION INTRAVENOUS at 21:18

## 2021-11-27 RX ADMIN — Medication 10 ML: at 09:08

## 2021-11-27 RX ADMIN — HYDRALAZINE HYDROCHLORIDE 100 MG: 50 TABLET, FILM COATED ORAL at 21:17

## 2021-11-27 RX ADMIN — SODIUM CHLORIDE 25 ML: 9 INJECTION, SOLUTION INTRAVENOUS at 09:15

## 2021-11-27 RX ADMIN — ENOXAPARIN SODIUM 30 MG: 30 INJECTION SUBCUTANEOUS at 21:16

## 2021-11-27 RX ADMIN — METOPROLOL SUCCINATE 100 MG: 50 TABLET, EXTENDED RELEASE ORAL at 09:07

## 2021-11-27 RX ADMIN — ENOXAPARIN SODIUM 30 MG: 30 INJECTION SUBCUTANEOUS at 09:13

## 2021-11-27 RX ADMIN — MEROPENEM 1000 MG: 1 INJECTION, POWDER, FOR SOLUTION INTRAVENOUS at 02:35

## 2021-11-27 RX ADMIN — SODIUM CHLORIDE 25 ML: 9 INJECTION, SOLUTION INTRAVENOUS at 14:57

## 2021-11-27 RX ADMIN — LEVOFLOXACIN 500 MG: 5 INJECTION, SOLUTION INTRAVENOUS at 14:59

## 2021-11-27 RX ADMIN — HYDRALAZINE HYDROCHLORIDE 100 MG: 50 TABLET, FILM COATED ORAL at 14:55

## 2021-11-27 RX ADMIN — MEROPENEM 1000 MG: 1 INJECTION, POWDER, FOR SOLUTION INTRAVENOUS at 09:16

## 2021-11-27 RX ADMIN — SODIUM BICARBONATE 650 MG TABLET 650 MG: at 09:06

## 2021-11-27 ASSESSMENT — PAIN SCALES - GENERAL
PAINLEVEL_OUTOF10: 5
PAINLEVEL_OUTOF10: 5
PAINLEVEL_OUTOF10: 3

## 2021-11-27 ASSESSMENT — PAIN DESCRIPTION - LOCATION
LOCATION: BUTTOCKS
LOCATION: HEAD

## 2021-11-27 ASSESSMENT — PAIN SCALES - WONG BAKER
WONGBAKER_NUMERICALRESPONSE: 4

## 2021-11-27 ASSESSMENT — PAIN DESCRIPTION - ONSET: ONSET: ON-GOING

## 2021-11-27 ASSESSMENT — PAIN DESCRIPTION - PAIN TYPE
TYPE: ACUTE PAIN
TYPE: ACUTE PAIN

## 2021-11-27 ASSESSMENT — PAIN DESCRIPTION - DESCRIPTORS: DESCRIPTORS: ACHING

## 2021-11-27 ASSESSMENT — PAIN DESCRIPTION - FREQUENCY: FREQUENCY: CONTINUOUS

## 2021-11-27 NOTE — PROGRESS NOTES
Nephrology Progress  Note                                                                                                                                                                                                                                                                                                                                                               Office : [phone]     Fax :[phone]              Patient's Name: Fahad Martinez  3:15 PM  11/27/2021    Reason for Consult:  Metabolic acidosis    Requesting Physician:  Bertram Erazo DO      Chief Complaint:  SOB, PNA      Interval History :    Sitting in chair   On room air  Serum Na 133   -----> 134 ---> 133 ----> 128  ----> 132 --> 133 --> 135 ----> 134  Bicarb 17 ------> 23 ---> 16 ----> 26  ---> 28 ---> 25  SBP stable , soft side  No diarrhea or vomiting    Weakness and deconditioning +    History of Present Ilness: Fahad Martinez is a 80 y.o. female with PMH of RA , HTN     Being treated for Acute respiratory failure 2/2 COVID 19 PNA    Getting decadron   S/p remdesivir    Denies diarrhea    O2 requirement stable    Making urine        Past Medical History:   Diagnosis Date    Cellulitis of left lower leg 4/21/2019    Closed pelvic rim fracture 9/4/2014    Fibromyalgia     Foot drop, right foot     Fractures     History of blood transfusion     1997    History of temporal artery biopsy 10/06/2020    Hx of blood clots     PNA (pneumonia) 12/1997    Prepatellar bursitis of right knee 9/15/2020    Rheumatoid arthritis ([address]     Venous ulcer of left leg ([address] 08/2019    Venous ulcer of right leg ([address] 10/2016       Past Surgical History:   Procedure Laterality Date    TONSILLECTOMY      VEIN SURGERY Right 10/27/2016    saphenous VNUS closure       Family History   Problem Relation Age of Onset    Stroke Mother     Stroke Father         reports that she has never smoked.  She has never used smokeless tobacco. She reports current alcohol use. She reports that she does not use drugs.     Allergies:  Erythromycin, Hydrochlorothiazide, Silver sulfadiazine, and Doxycycline    Current Medications:    meropenem (MERREM) 1,000 mg in sodium chloride 0.9 % 100 mL IVPB (mini-bag), Q12H  levoFLOXacin (LEVAQUIN) 500 MG/100ML infusion 500 mg, Q24H  predniSONE (DELTASONE) tablet 7.5 mg, Daily  sodium bicarbonate tablet 650 mg, Daily  hydrALAZINE (APRESOLINE) tablet 100 mg, 3 times per day  metoprolol succinate (TOPROL XL) extended release tablet 100 mg, Daily  sodium chloride flush 0.9 % injection 10 mL, PRN  0.9 % sodium chloride infusion, PRN  potassium chloride (KLOR-CON M) extended release tablet 40 mEq, PRN   Or  potassium bicarb-citric acid (EFFER-K) effervescent tablet 40 mEq, PRN   Or  potassium chloride 10 mEq/100 mL IVPB (Peripheral Line), PRN  magnesium sulfate 1000 mg in dextrose 5% 100 mL IVPB, PRN  ondansetron (ZOFRAN-ODT) disintegrating tablet 4 mg, Q8H PRN   Or  ondansetron (ZOFRAN) injection 4 mg, Q6H PRN  acetaminophen (TYLENOL) tablet 650 mg, Q6H PRN   Or  acetaminophen (TYLENOL) suppository 650 mg, Q6H PRN  guaiFENesin-dextromethorphan (ROBITUSSIN DM) 100-10 MG/5ML syrup 5 mL, Q4H PRN  Vitamin D (CHOLECALCIFEROL) tablet 2,000 Units, Daily  enoxaparin (LOVENOX) injection 30 mg, BID  benzonatate (TESSALON) capsule 200 mg, TID PRN  albuterol sulfate  (90 Base) MCG/ACT inhaler 2 puff, Q4H PRN  0.9 % sodium chloride bolus, PRN        Review of Systems:   14 point ROS obtained but were negative except mentioned in HPI      Physical exam:     Vitals:  /79   Pulse 81   Temp 97.2 °F (36.2 °C) (Oral)   Resp 16   Ht 5' 5\" (1.651 m)   Wt 146 lb 9.7 oz (66.5 kg)   SpO2 97%   BMI 24.40 kg/m²   Constitutional:  OAA X3 NAD  Skin: no rash, turgor wnl  Heent:  eomi, mmm  Neck: no bruits or jvd noted  Cardiovascular:  S1, S2 without m/r/g  Respiratory: CTA B without w/r/r  Abdomen:  +bs, soft, nt, nd  Ext: no  lower extremity edema  Psychiatric: mood and affect appropriate  Musculoskeletal:  Rom, muscular strength intact    Data:   Labs:  CBC:   Recent Labs     11/25/21  0559 11/26/21  0439 11/27/21  0605   WBC 5.7 6.4 6.3   HGB 10.8* 10.0* 10.8*    175 192     BMP:    Recent Labs     11/25/21  0559 11/26/21  0439 11/27/21  0605   * 135* 134*   K 4.2 4.5 4.3    103 103   CO2 26 25 25   BUN 23* 27* 25*   CREATININE 0.7 0.7 0.8   GLUCOSE 108* 93 100*     Ca/Mg/Phos:   Recent Labs     11/25/21  0559 11/26/21  0439 11/27/21  0605   CALCIUM 9.5 9.7 9.8     Hepatic:   No results for input(s): AST, ALT, ALB, BILITOT, ALKPHOS in the last 72 hours. Troponin:   No results for input(s): TROPONINI in the last 72 hours. BNP: No results for input(s): BNP in the last 72 hours. Lipids: No results for input(s): CHOL, TRIG, HDL, LDLCALC, LABVLDL in the last 72 hours. ABGs:   No results for input(s): PHART, PO2ART, PSN0JRU in the last 72 hours. INR:   No results for input(s): INR in the last 72 hours. UA:No results for input(s): Ene Hasting, GLUCOSEU, BILIRUBINUR, [place], BLOODU, PHUR, PROTEINU, UROBILINOGEN, NITRU, LEUKOCYTESUR, LABMICR, URINETYPE in the last 72 hours. Urine Microscopic: No results for input(s): LABCAST, BACTERIA, COMU, HYALCAST, WBCUA, RBCUA, EPIU in the last 72 hours. Urine Culture:   No results for input(s): LABURIN in the last 72 hours. Urine Chemistry:   No results for input(s): Melissa Chalet, PROTEINUR, NAUR in the last 72 hours. IMAGING:  XR CHEST PORTABLE   Final Result   1. No significant change in appearance of multifocal pneumonia, to include   atypical viral causes. 2. Stable cardiomegaly. CT CHEST PULMONARY EMBOLISM W CONTRAST   Final Result   No evidence of a pulmonary embolus. Mildly dilated and atherosclerotic thoracic aorta with no aneurysm or   dissection.       Moderate subpleural and interstitial ground-glass opacities scattered   throughout both lungs which probably represents multisegmental   bronchopneumonia secondary to the patient's known COVID-19 disease      Single moderate compression fracture of T11 which may be chronic. Recommend   clinical follow-up of the area. XR CHEST PORTABLE   Final Result   Bilateral airspace disease suggestive of atypical viral pneumonia. Superimposed edema not excluded. Assessment/Plan     1. NAGMA     Bicarb 19 since 11/14  Bicarb 26 on admission  Denies diarrhea    Renal fn wnl, GFR > 60 ml/hr    Plan    Overall clinically improving     Stable renal standpoint    Encourage PO intake of water to thirst  Bicarb improved  moniter bicarb and electrolytes        2.  COVID PNA    Acute hypoxic respiratory failure     On decadron  remdesvir  O2      3 HTN : controlled      On Metoprolol  Increase Hydralazine to 100 mg  TID       4 Dementia    5 hypotension    Give gentle IV hydration  Keep MAP > 65 mmhg    6 Hyponatremia    Improving    moniter    Encourage high protein/solute diet      Thank you for allowing us to participate in care of Leslie Saldana MD  Feel free to contact me   [facility] [address]  Office : [phone]  Fax :[phone]

## 2021-11-27 NOTE — PROGRESS NOTES
Hospitalist Progress Note      PCP: Zhou Frank DO    Date of Admission: 11/12/2021    LOS: 15    Chief Complaint: Pt reports slept well last night and ate breakfast.She denies cough or sob. Pt wanted to know how long she had to be in isolation since she feels better and is vaccinated and   It has been 16 days since she became.ill. She says she wanted her daughter to visit her if she can. She  reports her daughter is vaccinated and is not ill. 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] 11/23/2021    Acute respiratory failure due to severe acute respiratory syndrome coronavirus 2 (SARS-CoV-2) infection (HCC) [U07.1, J96.00] 11/12/2021    Current chronic use of systemic steroids [Z79.52] 11/12/2021    Giant cell arteritis (HCC) [M31.6] 10/19/2020    PMR (polymyalgia rheumatica) ([facility] [address] [M35.3] 09/29/2020    HTN (hypertension) [I10] 09/04/2014         Active Problems:    HTN (hypertension)    PMR (polymyalgia rheumatica) (HCC)    Giant cell arteritis (HCC)    Acute respiratory failure due to severe acute respiratory syndrome coronavirus 2 (SARS-CoV-2) infection (HCC)    Current chronic use of systemic steroids    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  *          Medications:  Reviewed  Infusion Medications    sodium chloride 25 mL (11/27/21 0915)     Scheduled Medications    meropenem  1,000 mg IntraVENous Q8H    levofloxacin  500 mg IntraVENous Q24H    predniSONE  7.5 mg Oral Daily    sodium bicarbonate  650 mg Oral Daily    hydrALAZINE  100 mg Oral 3 times per day    metoprolol succinate  100 mg Oral Daily    Vitamin D  2,000 Units Oral Daily    enoxaparin  30 mg SubCUTAneous BID     PRN Meds: sodium chloride flush, sodium chloride, potassium chloride **OR** potassium alternative oral replacement **OR** potassium chloride, magnesium sulfate, ondansetron **OR** ondansetron, acetaminophen **OR** acetaminophen, guaiFENesin-dextromethorphan, benzonatate, albuterol sulfate HFA, sodium chloride      DVT Prophylaxis:   Diet: ADULT DIET; Regular  ADULT ORAL NUTRITION SUPPLEMENT; Lunch; Standard High Calorie/High Protein Oral Supplement  Code Status: Full Code    Dispo: Anticipate discharge in the next 72 hours. ____________________________________________________________________________    Subjective:       Physical Exam:  /74   Pulse 84   Temp 97.4 °F (36.3 °C) (Axillary)   Resp 18   Ht 5' 5\" (1.651 m)   Wt 146 lb 9.7 oz (66.5 kg)   SpO2 94%   BMI 24.40 kg/m²   General appearance: No apparent distress, cooperative. HEENT:  No sclera icterus/conjuctival palor  Neck: Supple, no thyromegally. Respiratory:  Normal respiratory effort. Clear to auscultation, no Rales/Wheezes/Rhonchi. Cardiovascular: S1/S2 without murmurs, rubs or gallops. RRR  Musculoskeletal: No clubbing, cyanosis or edema bilaterally. Skin: Skin color, texture, turgor normal.  No rashes or lesions. Neurologic:  Cranial nerves: II-XII intact, KIERA, No focal sensory/motor deficits  Psychiatric: Alert and oriented, thought content appropriate    Intake/Output Summary (Last 24 hours) at 11/27/2021 1029  Last data filed at 11/27/2021 1000  Gross per 24 hour   Intake 120 ml   Output 1150 ml   Net -1030 ml       Labs:   Recent Labs     11/25/21  0559 11/26/21  0439 11/27/21  0605   WBC 5.7 6.4 6.3   HGB 10.8* 10.0* 10.8*   HCT 31.9* 29.6* 31.9*    175 192      Recent Labs     11/25/21  0559 11/26/21  0439 11/27/21  0605   * 135* 134*   K 4.2 4.5 4.3    103 103   CO2 26 25 25   BUN 23* 27* 25*   CREATININE 0.7 0.7 0.8   CALCIUM 9.5 9.7 9.8     No results for input(s): Sara Rosarioarch in the last 72 hours.     Urinalysis:    Lab Results   Component Value Date    NITRU POSITIVE 11/12/2021    WBCUA 10-20 11/12/2021    BACTERIA 4+ 11/12/2021    RBCUA 3-4 11/12/2021    BLOODU Negative 11/12/2021    SPECGRAV 1.020 11/12/2021    GLUCOSEU Negative 11/12/2021       Radiology:  XR CHEST PORTABLE   Final Result   1. No significant change in appearance of multifocal pneumonia, to include   atypical viral causes. 2. Stable cardiomegaly. CT CHEST PULMONARY EMBOLISM W CONTRAST   Final Result   No evidence of a pulmonary embolus. Mildly dilated and atherosclerotic thoracic aorta with no aneurysm or   dissection. Moderate subpleural and interstitial ground-glass opacities scattered   throughout both lungs which probably represents multisegmental   bronchopneumonia secondary to the patient's known COVID-19 disease      Single moderate compression fracture of T11 which may be chronic. Recommend   clinical follow-up of the area. XR CHEST PORTABLE   Final Result   Bilateral airspace disease suggestive of atypical viral pneumonia. Superimposed edema not excluded. ASSESSMENT: 1.COVID                              2.UTI (EBSL)                              3.DEPRESSION    PLAN:   I agree with pt she his 16 days out from her COVID infection and is vaccinate. I do not think she still  Isolation requirement. I will inquire about removing it. Meropenem started for EBSL UTI. Physical therapy ordered. Discharge planning for home or transition care on Monday. MIKALA STEWART, DO      Please excuse brevity and/or typos. This report was transcribed using voice recognition software. Every effort was made to ensure accuracy, however, inadvertent computerized transcription errors may be present.

## 2021-11-28 LAB
ANION GAP SERPL CALCULATED.3IONS-SCNC: 7 MMOL/L (ref 3–16)
BANDED NEUTROPHILS RELATIVE PERCENT: 6 % (ref 0–7)
BASOPHILS ABSOLUTE: 0 K/UL (ref 0–0.2)
BASOPHILS RELATIVE PERCENT: 0 %
BUN BLDV-MCNC: 25 MG/DL (ref 7–20)
C-REACTIVE PROTEIN: <3 MG/L (ref 0–5.1)
CALCIUM SERPL-MCNC: 9.4 MG/DL (ref 8.3–10.6)
CHLORIDE BLD-SCNC: 106 MMOL/L (ref 99–110)
CO2: 24 MMOL/L (ref 21–32)
CREAT SERPL-MCNC: 0.7 MG/DL (ref 0.6–1.2)
D DIMER: 215 NG/ML DDU (ref 0–229)
EOSINOPHILS ABSOLUTE: 0.3 K/UL (ref 0–0.6)
EOSINOPHILS RELATIVE PERCENT: 5 %
GFR AFRICAN AMERICAN: >60
GFR NON-AFRICAN AMERICAN: >60
GLUCOSE BLD-MCNC: 85 MG/DL (ref 70–99)
HCT VFR BLD CALC: 30.5 % (ref 36–48)
HEMOGLOBIN: 10.3 G/DL (ref 12–16)
LYMPHOCYTES ABSOLUTE: 0.9 K/UL (ref 1–5.1)
LYMPHOCYTES RELATIVE PERCENT: 15 %
MACROCYTES: ABNORMAL
MCH RBC QN AUTO: 32.9 PG (ref 26–34)
MCHC RBC AUTO-ENTMCNC: 33.8 G/DL (ref 31–36)
MCV RBC AUTO: 97.3 FL (ref 80–100)
MONOCYTES ABSOLUTE: 0.4 K/UL (ref 0–1.3)
MONOCYTES RELATIVE PERCENT: 6 %
MYELOCYTE PERCENT: 1 %
NEUTROPHILS ABSOLUTE: 4.4 K/UL (ref 1.7–7.7)
NEUTROPHILS RELATIVE PERCENT: 67 %
PDW BLD-RTO: 13.8 % (ref 12.4–15.4)
PLATELET # BLD: 195 K/UL (ref 135–450)
PMV BLD AUTO: 8.2 FL (ref 5–10.5)
POTASSIUM SERPL-SCNC: 4 MMOL/L (ref 3.5–5.1)
RBC # BLD: 3.14 M/UL (ref 4–5.2)
SODIUM BLD-SCNC: 137 MMOL/L (ref 136–145)
WBC # BLD: 6 K/UL (ref 4–11)

## 2021-11-28 PROCEDURE — 99232 SBSQ HOSP IP/OBS MODERATE 35: CPT | Performed by: HOSPITALIST

## 2021-11-28 PROCEDURE — 2580000003 HC RX 258: Performed by: EMERGENCY MEDICINE

## 2021-11-28 PROCEDURE — 97530 THERAPEUTIC ACTIVITIES: CPT

## 2021-11-28 PROCEDURE — 85379 FIBRIN DEGRADATION QUANT: CPT

## 2021-11-28 PROCEDURE — 6360000002 HC RX W HCPCS: Performed by: INTERNAL MEDICINE

## 2021-11-28 PROCEDURE — 6370000000 HC RX 637 (ALT 250 FOR IP): Performed by: HOSPITALIST

## 2021-11-28 PROCEDURE — 85025 COMPLETE CBC W/AUTO DIFF WBC: CPT

## 2021-11-28 PROCEDURE — 6360000002 HC RX W HCPCS: Performed by: EMERGENCY MEDICINE

## 2021-11-28 PROCEDURE — 97535 SELF CARE MNGMENT TRAINING: CPT

## 2021-11-28 PROCEDURE — 1200000000 HC SEMI PRIVATE

## 2021-11-28 PROCEDURE — 2580000003 HC RX 258: Performed by: INTERNAL MEDICINE

## 2021-11-28 PROCEDURE — 86140 C-REACTIVE PROTEIN: CPT

## 2021-11-28 PROCEDURE — 6370000000 HC RX 637 (ALT 250 FOR IP): Performed by: STUDENT IN AN ORGANIZED HEALTH CARE EDUCATION/TRAINING PROGRAM

## 2021-11-28 PROCEDURE — 80048 BASIC METABOLIC PNL TOTAL CA: CPT

## 2021-11-28 PROCEDURE — 36415 COLL VENOUS BLD VENIPUNCTURE: CPT

## 2021-11-28 PROCEDURE — 6370000000 HC RX 637 (ALT 250 FOR IP): Performed by: NURSE PRACTITIONER

## 2021-11-28 PROCEDURE — 97110 THERAPEUTIC EXERCISES: CPT

## 2021-11-28 PROCEDURE — 6370000000 HC RX 637 (ALT 250 FOR IP): Performed by: INTERNAL MEDICINE

## 2021-11-28 RX ORDER — POLYETHYLENE GLYCOL 3350 17 G/17G
17 POWDER, FOR SOLUTION ORAL DAILY
Status: DISCONTINUED | OUTPATIENT
Start: 2021-11-28 | End: 2021-11-30 | Stop reason: HOSPADM

## 2021-11-28 RX ADMIN — SODIUM BICARBONATE 650 MG TABLET 650 MG: at 08:34

## 2021-11-28 RX ADMIN — ENOXAPARIN SODIUM 30 MG: 30 INJECTION SUBCUTANEOUS at 08:35

## 2021-11-28 RX ADMIN — HYDRALAZINE HYDROCHLORIDE 100 MG: 50 TABLET, FILM COATED ORAL at 06:05

## 2021-11-28 RX ADMIN — ENOXAPARIN SODIUM 30 MG: 30 INJECTION SUBCUTANEOUS at 21:25

## 2021-11-28 RX ADMIN — ACETAMINOPHEN 650 MG: 325 TABLET ORAL at 21:25

## 2021-11-28 RX ADMIN — POLYETHYLENE GLYCOL 3350 17 G: 17 POWDER, FOR SOLUTION ORAL at 16:06

## 2021-11-28 RX ADMIN — MEROPENEM 1000 MG: 1 INJECTION, POWDER, FOR SOLUTION INTRAVENOUS at 08:37

## 2021-11-28 RX ADMIN — ACETAMINOPHEN 650 MG: 325 TABLET ORAL at 08:33

## 2021-11-28 RX ADMIN — PREDNISONE 7.5 MG: 5 TABLET ORAL at 08:32

## 2021-11-28 RX ADMIN — Medication 10 ML: at 08:29

## 2021-11-28 RX ADMIN — Medication 2000 UNITS: at 08:34

## 2021-11-28 RX ADMIN — SODIUM CHLORIDE 25 ML: 9 INJECTION, SOLUTION INTRAVENOUS at 08:35

## 2021-11-28 ASSESSMENT — PAIN DESCRIPTION - PAIN TYPE
TYPE: ACUTE PAIN
TYPE: ACUTE PAIN

## 2021-11-28 ASSESSMENT — PAIN DESCRIPTION - LOCATION
LOCATION: BUTTOCKS
LOCATION: BUTTOCKS

## 2021-11-28 ASSESSMENT — PAIN DESCRIPTION - ORIENTATION: ORIENTATION: RIGHT

## 2021-11-28 ASSESSMENT — PAIN SCALES - GENERAL
PAINLEVEL_OUTOF10: 2
PAINLEVEL_OUTOF10: 3
PAINLEVEL_OUTOF10: 3

## 2021-11-28 ASSESSMENT — PAIN DESCRIPTION - FREQUENCY: FREQUENCY: CONTINUOUS

## 2021-11-28 ASSESSMENT — PAIN DESCRIPTION - DESCRIPTORS: DESCRIPTORS: ACHING

## 2021-11-28 ASSESSMENT — PAIN DESCRIPTION - ONSET: ONSET: ON-GOING

## 2021-11-28 NOTE — DISCHARGE INSTR - COC
Continuity of Care Form    Patient Name: Crawford Kehr   :  2/3/1933  MRN:  [id number]    [address] [place] [address] date:  2021  Discharge date:      Code Status Order: Full Code   Advance Directives:      Admitting Physician:  Teodoro Brown MD  PCP: Daylin Cosme DO    Discharging Nurse: [facility] Unit/Room#: 0077/3023-81  Discharging Unit Phone Number: ***    Emergency Contact:   Extended Emergency Contact Information  Primary Emergency Contact: [address] Phone: [phone]  Relation: Child  Secondary Emergency Contact: Yue Wong  Address: [address] [place] [address] Phone: [phone]  Work Phone: [phone]  Relation: Child    Past Surgical History:  Past Surgical History:   Procedure Laterality Date    TONSILLECTOMY      VEIN SURGERY Right 10/27/2016    saphenous VNUS closure       Immunization History: There is no immunization history for the selected administration types on file for this patient.     Active Problems:  Patient Active Problem List   Diagnosis Code    HTN (hypertension) I10    Localized osteoarthrosis, lower leg M17.10    Eczema L30.9    Foot drop, right M21.371    Lumbosacral spondylosis without myelopathy M47.817    Normocytic anemia D64.9    Peripheral neuropathy G62.9    Senile osteoporosis M81.0    Bilateral lower extremity edema R60.0    CKD (chronic kidney disease) stage 3, GFR 30-59 ml/min ([facility]) N18.30    Peripheral venous insufficiency I87.2    PMR (polymyalgia rheumatica) ([facility]) M35.3    Giant cell arteritis ([facility]) M31.6    Acute respiratory failure due to severe acute respiratory syndrome coronavirus 2 (SARS-CoV-2) infection ([facility]) U07.1, J96.00    Current chronic use of systemic steroids Z79.52    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       Isolation/Infection:   Isolation            No Isolation          Patient Infection Status       Infection Onset Added Last Indicated Last Indicated By Review Planned Expiration Resolved Resolved By    ESBL (Extended Spectrum Beta Lactamase) 11/24/21 11/26/21 11/24/21 Culture, Urine        COVID-19 11/12/21 11/12/21 11/12/21 COVID-19, Rapid 11/19/21 12/10/21      Resolved    COVID-19 (Rule Out) 11/12/21 11/12/21 11/12/21 COVID-19, Rapid (Ordered)   11/12/21 Rule-Out Test Resulted            Nurse Assessment:  Last Vital Signs: BP (!) 97/59   Pulse 88   Temp 97.6 °F (36.4 °C) (Oral)   Resp 14   Ht 5' 5\" (1.651 m)   Wt 147 lb 11.3 oz (67 kg)   SpO2 93%   BMI 24.58 kg/m²     Last documented pain score (0-10 scale): Pain Level: 3  Last Weight:   Wt Readings from Last 1 Encounters:   11/28/21 147 lb 11.3 oz (67 kg)     Mental Status:  oriented and alert    IV Access:  - PICC - site  R Basilic, insertion date: 11/30/21    Nursing Mobility/ADLs:  Walking   Assisted  Transfer  Assisted  Bathing  Assisted  Dressing  Assisted  Toileting  Assisted  Feeding  Independent  Med Admin  Independent  Med Delivery   whole    Wound Care Documentation and Therapy:        Elimination:  Continence: Bowel: Yes  Bladder: Yes  Urinary Catheter: None   Colostomy/Ileostomy/Ileal Conduit: No       Date of Last BM: 11/30    Intake/Output Summary (Last 24 hours) at 11/28/2021 0923  Last data filed at 11/28/2021 0600  Gross per 24 hour   Intake 580 ml   Output 1500 ml   Net -920 ml     I/O last 3 completed shifts: In: 0 [P.O.:600; I.V.:100]  Out: 1500 [Urine:1500]    Safety Concerns: At Risk for Falls    Impairments/Disabilities:      None    Nutrition Therapy:  Current Nutrition Therapy:   - Oral Diet:  General    Routes of Feeding: Oral  Liquids: No Restrictions  Daily Fluid Restriction: no  Last Modified Barium Swallow with Video (Video Swallowing Test): not done    Treatments at the Time of Hospital Discharge:   Respiratory Treatments: ***  Oxygen Therapy:  is not on home oxygen therapy.   Ventilator:    - No ventilator support    Rehab Therapies: Physical Therapy, occupational  Weight Bearing Status/Restrictions: No weight bearing restirctions  Other Medical Equipment (for information only, NOT a DME order):  walker  Other Treatments: ***    Patient's personal belongings (please select all that are sent with patient):      RN SIGNATURE:  Electronically signed by Elias Lee RN on 11/30/21 at 2:13 PM EST    CASE MANAGEMENT/SOCIAL WORK SECTION    Inpatient Status Date: 11/12/21    Readmission Risk Assessment Score:  Readmission Risk              Risk of Unplanned Readmission:  18           Discharging to Facility/ Agency   Name: [facility]   Address:  Phone: [phone]  Fax: [phone]    Dialysis Facility (if applicable) n/a   Name:  Address:  Dialysis Schedule:  Phone:  Fax:    / signature: Electronically signed by Shakeel Jordan RN on 11/30/21 at 10:35 AM EST    PHYSICIAN SECTION    Prognosis: Good    Condition at Discharge: Stable    Rehab Potential (if transferring to Rehab): Good    Recommended Labs or Other Treatments After Discharge: Follow up with PCP within 1-2 weeks. Ertapenem 1 gram IV daily through 12/5, then remove the midline. Physician Certification: I certify the above information and transfer of Alma Counts  is necessary for the continuing treatment of the diagnosis listed and that she requires [facility] for less 30 days.      Update Admission H&P: No change in H&P    PHYSICIAN SIGNATURE:  Electronically signed by Pilar Haas MD on 11/30/21 at 10:29 AM EST

## 2021-11-28 NOTE — PROGRESS NOTES
Occupational Therapy  Facility/Department: [facility] C5 - MED SURG/ORTHO  Daily Treatment Note  NAME: Antonio Machado  : 2/3/1933  MRN: [id number]    Date of Service: 2021    Discharge Recommendations:  Subacute/Skilled Nursing Facility       Assessment   Performance deficits / Impairments: Decreased functional mobility ; Decreased ADL status; Decreased strength; Decreased endurance; Decreased high-level IADLs; Decreased balance; Decreased safe awareness    Assessment: Pt with fair tolerance of OT treatment, demos fatigue requiring increased time and rest breaks with all activities. Pt mod A for sit to stand from chair, once standing CGA with RW. Pt performing seated ADLs with min A and progressing to set up with supervision. Pt experiencing increased fatigue with seated activity. At baseline pt PLOF I with BADLs and IADLs, pt functioning well below her baseline and would benefit from continued skilled OT to address the above noted occupational performance deficits. Prognosis: Good  OT Education: OT Role; Plan of Care; Energy Conservation; Transfer Training; ADL Adaptive Strategies; Home Exercise Program  Patient Education: Safe t/fs, purpose of ther ex, importance of movement. Pt verbalizes understanding. Barriers to Learning: fatigue  REQUIRES OT FOLLOW UP: Yes  Activity Tolerance  Activity Tolerance: Patient limited by fatigue  Activity Tolerance: Vitals: /64, O2 95% on RA, HR 96. Pt requiring extended rest breaks 2/2 fatigue. Pt dizzy while in standing, returned to seated: /69,  and decreasing to 96 with rest. O2 96%. Safety Devices  Safety Devices in place: Yes  Type of devices: Left in chair; Chair alarm in place; Call light within reach; Nurse notified; Gait belt         Patient Diagnosis(es): The primary encounter diagnosis was Acute respiratory failure with hypoxia ([facility] [address] Diagnoses of COVID-19 and Acute cystitis without hematuria were also pertinent to this visit.       has a past Vaccine Information Statement(s) or the Emergency Use Authorization was given today. This has been reviewed, questions answered, and verbal consent given by Patient for injection(s) and administration of Influenza (Inactivated) and Shingles.      Patient tolerated without incident. See immunization grid for documentation.         medical history of Cellulitis of left lower leg, Closed pelvic rim fracture, Fibromyalgia, Foot drop, right foot, Fractures, History of blood transfusion, History of temporal artery biopsy, Hx of blood clots, PNA (pneumonia), Prepatellar bursitis of right knee, Rheumatoid arthritis ([address] Venous ulcer of left leg ([address] and Venous ulcer of right leg ([address] has a past surgical history that includes Tonsillectomy and Vein Surgery (Right, 10/27/2016). Restrictions  Restrictions/Precautions  Restrictions/Precautions: General Precautions, Up as Tolerated, Contact Precautions (ESBL)  Position Activity Restriction  Other position/activity restrictions: High fall risk per nursing assessment, up with assistance, telemetry with continuous pulse oximetry  Subjective   General  Chart Reviewed: Yes  Patient assessed for rehabilitation services?: Yes  Additional Pertinent Hx: Patient is a 63-year-old female with past medical history of fibromyalgia, rheumatoid arthritis who presented to hospital for shortness of breath. Response to previous treatment: Patient with no complaints from previous session  Family / Caregiver Present: No  Referring Practitioner: Geoffrey Coronado MD  Diagnosis: acute cystitis, COVID+  Subjective  Subjective: Pt up in chair, reporting fatigue but agreeable to OT treatment. General Comment  Comments: RN approved therapy  Vital Signs  Patient Currently in Pain: Denies   Orientation  Orientation  Overall Orientation Status: Within Functional Limits  Objective    ADL  Grooming: Minimal assistance (to brush teeth seated in chair. Set up and supervision to wash face seated in chair.)        Balance  Sitting Balance: Stand by assistance  Standing Balance: Contact guard assistance (RW)  Standing Balance  Time: ~45s  Activity: functional mobility in room to prepare for household distances. Comment: RW used. Pt limited by fatigue and dizziness in distance this date.   Functional Mobility  Functional - Mobility Device: Rolling Walker  Activity: Other  Assist Level: Contact guard assistance  Bed mobility  Supine to Sit: Unable to assess  Sit to Supine: Unable to assess  Scooting: Unable to assess  Comment: Pt started and ended session seated up in chair. Transfers  Sit to stand: Moderate assistance  Stand to sit: Moderate assistance  Transfer Comments: VCs for safe hand placement, RW used. Cognition  Overall Cognitive Status: Exceptions  Arousal/Alertness: Delayed responses to stimuli  Following Commands: Follows one step commands with repetition; Follows one step commands with increased time  Attention Span: Attends with cues to redirect; Difficulty attending to directions  Memory: Decreased recall of recent events  Safety Judgement: Decreased awareness of need for safety; Decreased awareness of need for assistance  Problem Solving: Assistance required to correct errors made; Assistance required to identify errors made; Decreased awareness of errors  Insights: Decreased awareness of deficits  Initiation: Requires cues for some  Sequencing: Requires cues for some  Cognition Comment: Pt with increased fatigue this date.                     Type of ROM/Therapeutic Exercise  Type of ROM/Therapeutic Exercise: AROM  Comment: Performed while seated, max cues for full ROM, BUE  Exercises  Elbow Flexion: x15  Elbow Extension: x15  Wrist Flexion: x20  Wrist Extension: x20  Grasp/Release: x15                    Plan   Plan  Times per week: 3-5x per week  Times per day: Daily  Current Treatment Recommendations: Strengthening, Endurance Training, ROM, Self-Care / ADL, Home Management Training, Patient/Caregiver Education & Training, Functional Mobility Training    AM-PAC Score        [facility] Inpatient Daily Activity Raw Score: 14 (11/28/21 1637)  AM-PAC Inpatient ADL T-Scale Score : 33.39 (11/28/21 1637)  ADL Inpatient CMS 0-100% Score: 59.67 (11/28/21 1637)  ADL Inpatient CMS G-Code Modifier : CK (11/28/21 0667)    Goals  Short term goals  Time Frame for Short term goals: 1 week (11/21/21--extend to 12/3 d/t LOS)  Short term goal 1: Pt will complete toilet transfer with CGA-- ongoing, max A 11/26/21  Short term goal 2: Pt will complete LBD with min A by 11/24 (extend to 11/30-- GOAL NOT MET, max A 11/26/21  Short term goal 3: Pt will complete grooming tasks in standing for 5 minutes for improvd endurance with O2 stats >90%-- ongoing 11/27/21 Pt with increased faitgue with performing daily activities in seated. O2 stats remaining above 90% during activity. Short term goal 4: Pt will complete toileting with SBA. -- ongoing 11/26/21  Patient Goals   Patient goals : \"change myself\"       Therapy Time   Individual Concurrent Group Co-treatment   Time In 1702         Time Out 8246         Minutes 42         Timed Code Treatment Minutes: [address], OTR/L  If pt discharges prior to next session, this note will serve as discharge summary. See case management note for discharge disposition.

## 2021-11-28 NOTE — PROGRESS NOTES
Hospitalist Progress Note      PCP: Pedro Henry DO    Date of Admission: 11/12/2021    Chief Complaint: COVID-19    Subjective: Feels well. On room air. Medications:  Reviewed    Infusion Medications    sodium chloride 25 mL (11/28/21 0835)     Scheduled Medications    meropenem  1,000 mg IntraVENous Q12H    predniSONE  7.5 mg Oral Daily    sodium bicarbonate  650 mg Oral Daily    hydrALAZINE  100 mg Oral 3 times per day    metoprolol succinate  100 mg Oral Daily    Vitamin D  2,000 Units Oral Daily    enoxaparin  30 mg SubCUTAneous BID     PRN Meds: sodium chloride flush, sodium chloride, potassium chloride **OR** potassium alternative oral replacement **OR** potassium chloride, magnesium sulfate, ondansetron **OR** ondansetron, acetaminophen **OR** acetaminophen, guaiFENesin-dextromethorphan, benzonatate, albuterol sulfate HFA, sodium chloride      Intake/Output Summary (Last 24 hours) at 11/28/2021 1306  Last data filed at 11/28/2021 1012  Gross per 24 hour   Intake 820 ml   Output 750 ml   Net 70 ml       Physical Exam Performed:    /71   Pulse 85   Temp 97.3 °F (36.3 °C) (Oral)   Resp 16   Ht 5' 5\" (1.651 m)   Wt 147 lb 11.3 oz (67 kg)   SpO2 94%   BMI 24.58 kg/m²     General appearance: Pleasant, elderly female in no apparent distress, appears stated age and cooperative. HEENT: Pupils equal, round, and reactive to light. Conjunctivae/corneas clear. Neck: Supple, with full range of motion. No jugular venous distention. Trachea midline. Respiratory:  Normal respiratory effort. Clear to auscultation, bilaterally without Rales/Wheezes/Rhonchi. Cardiovascular: Regular rate and rhythm with normal S1/S2 without murmurs, rubs or gallops. Abdomen: Soft, non-tender, non-distended with normal bowel sounds. Musculoskeletal: No clubbing, cyanosis or edema bilaterally. Full range of motion without deformity.   Skin: Skin color, texture, turgor normal.  No rashes or lesions. Neurologic:  Neurovascularly intact without any focal sensory/motor deficits. Cranial nerves: II-XII intact, grossly non-focal.  Psychiatric: Alert and oriented, thought content appropriate, normal insight  Capillary Refill: Brisk,3 seconds, normal   Peripheral Pulses: +2 palpable, equal bilaterally       Labs:   Recent Labs     11/26/21 0439 11/27/21  0605 11/28/21  0605   WBC 6.4 6.3 6.0   HGB 10.0* 10.8* 10.3*   HCT 29.6* 31.9* 30.5*    192 195     Recent Labs     11/26/21 0439 11/27/21  0605 11/28/21  0605   * 134* 137   K 4.5 4.3 4.0    103 106   CO2 25 25 24   BUN 27* 25* 25*   CREATININE 0.7 0.8 0.7   CALCIUM 9.7 9.8 9.4     No results for input(s): AST, ALT, BILIDIR, BILITOT, ALKPHOS in the last 72 hours. No results for input(s): INR in the last 72 hours. No results for input(s): Nancy Horn in the last 72 hours. Urinalysis:      Lab Results   Component Value Date    NITRU POSITIVE 11/12/2021    WBCUA 10-20 11/12/2021    BACTERIA 4+ 11/12/2021    RBCUA 3-4 11/12/2021    BLOODU Negative 11/12/2021    SPECGRAV 1.020 11/12/2021    GLUCOSEU Negative 11/12/2021       Radiology:  XR CHEST PORTABLE   Final Result   1. No significant change in appearance of multifocal pneumonia, to include   atypical viral causes. 2. Stable cardiomegaly. CT CHEST PULMONARY EMBOLISM W CONTRAST   Final Result   No evidence of a pulmonary embolus. Mildly dilated and atherosclerotic thoracic aorta with no aneurysm or   dissection. Moderate subpleural and interstitial ground-glass opacities scattered   throughout both lungs which probably represents multisegmental   bronchopneumonia secondary to the patient's known COVID-19 disease      Single moderate compression fracture of T11 which may be chronic. Recommend   clinical follow-up of the area. XR CHEST PORTABLE   Final Result   Bilateral airspace disease suggestive of atypical viral pneumonia.    Superimposed edema not excluded. 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    - Anticoagulation:enoxparin 30mg BID  - Isolation end date: 20 days from symptom onset depending on severity of illness and underlying co-morbidities/immune status with evidence of improvement in symptoms and with 24 hours of no fever without the use of fever reducing medications. End date 11/29.     Syncopal episode - presumed due to worsening exertional hypoxia. Now improving. Remaining work up reassuring.     lactic acidosis- suspect related to cellular hypoxia rather than from hypoperfusion or increased metabolic demand. Given IVF with improvement in lactate.     Uncomplicated E. Coli UTI vs asymptomatic bacteriurea- present on arrival. Treated with ceftriaxone x 6 days. The previous provider ordered a repeat Ucx to see if the UTI was cleared, but no UA. The patient denies any urinary symptoms. Afebrile without leukocytosis. Repeat Ucx revealed ESBL??   Suspect colonization as she has no symptoms and does not have a history of frequent UTIs.       Non anion gap metabolic acidosis- nephrology following  - s/p bicarb     Essential hypertension -controlled--continue metoprolol and hydralazine-     History of PMR/GCA--- on chronic prednisone (7.5mg daily), it appears she has also been treated with Tocilizumab previously--currently stable  - though risk of HPA suppression is unlikely in a pt taking high dose steroids for <3 weeks she should be monitored for this. She had high dose steroids (doses >20mg for only 10 days). Will plan for brief taper: 20mg prednisone x 1 day, 10mg x 1 day then start home prednisone 7.5mg daily.      Hyponatremia- following urine lytes though suspect volume component and poor nutrition. Resolved.          DVT Prophylaxis: Lovenox 30 mg BID  Diet: ADULT DIET; Regular  ADULT ORAL NUTRITION SUPPLEMENT; Lunch; Standard High Calorie/High Protein Oral Supplement  Code Status: Full Code    PT/OT Eval Status: recommend SNF    Dispo - medically stable for DC to [facility] [place] - precert is pending.     MARK Bentley - CNP

## 2021-11-28 NOTE — PROGRESS NOTES
Nephrology Progress  Note                                                                                                                                                                                                                                                                                                                                                               Office : [phone]     Fax :[phone]              Patient's Name: Bon Ruiz  3:41 PM  11/28/2021    Reason for Consult:  Metabolic acidosis    Requesting Physician:  Marcello Negrete DO      Chief Complaint:  SOB, PNA      Interval History :    Overall improving  On room air  Serum Na 133   -----> 134 ---> 133 ----> 128  ----> 132 --> 133 --> 135 ----> 134  Bicarb 17 ------> 23 ---> 16 ----> 26  ---> 28 ---> 25  SBP stable , soft side  No diarrhea or vomiting    Weakness and deconditioning +    History of Present Ilness: Bon Ruiz is a 80 y.o. female with PMH of RA , HTN     Being treated for Acute respiratory failure 2/2 COVID 19 PNA    Getting decadron   S/p remdesivir    Denies diarrhea    O2 requirement stable    Making urine        Past Medical History:   Diagnosis Date    Cellulitis of left lower leg 4/21/2019    Closed pelvic rim fracture 9/4/2014    Fibromyalgia     Foot drop, right foot     Fractures     History of blood transfusion     1997    History of temporal artery biopsy 10/06/2020    Hx of blood clots     PNA (pneumonia) 12/1997    Prepatellar bursitis of right knee 9/15/2020    Rheumatoid arthritis ([address]     Venous ulcer of left leg ([address] 08/2019    Venous ulcer of right leg ([address] 10/2016       Past Surgical History:   Procedure Laterality Date    TONSILLECTOMY      VEIN SURGERY Right 10/27/2016    saphenous VNUS closure       Family History   Problem Relation Age of Onset    Stroke Mother     Stroke Father         reports that she has never smoked.  She has never used smokeless tobacco. She reports current alcohol use. She reports that she does not use drugs.     Allergies:  Erythromycin, Hydrochlorothiazide, Silver sulfadiazine, and Doxycycline    Current Medications:    polyethylene glycol (GLYCOLAX) packet 17 g, Daily  predniSONE (DELTASONE) tablet 7.5 mg, Daily  sodium bicarbonate tablet 650 mg, Daily  hydrALAZINE (APRESOLINE) tablet 100 mg, 3 times per day  metoprolol succinate (TOPROL XL) extended release tablet 100 mg, Daily  sodium chloride flush 0.9 % injection 10 mL, PRN  0.9 % sodium chloride infusion, PRN  potassium chloride (KLOR-CON M) extended release tablet 40 mEq, PRN   Or  potassium bicarb-citric acid (EFFER-K) effervescent tablet 40 mEq, PRN   Or  potassium chloride 10 mEq/100 mL IVPB (Peripheral Line), PRN  magnesium sulfate 1000 mg in dextrose 5% 100 mL IVPB, PRN  ondansetron (ZOFRAN-ODT) disintegrating tablet 4 mg, Q8H PRN   Or  ondansetron (ZOFRAN) injection 4 mg, Q6H PRN  acetaminophen (TYLENOL) tablet 650 mg, Q6H PRN   Or  acetaminophen (TYLENOL) suppository 650 mg, Q6H PRN  guaiFENesin-dextromethorphan (ROBITUSSIN DM) 100-10 MG/5ML syrup 5 mL, Q4H PRN  Vitamin D (CHOLECALCIFEROL) tablet 2,000 Units, Daily  enoxaparin (LOVENOX) injection 30 mg, BID  benzonatate (TESSALON) capsule 200 mg, TID PRN  albuterol sulfate  (90 Base) MCG/ACT inhaler 2 puff, Q4H PRN  0.9 % sodium chloride bolus, PRN        Review of Systems:   14 point ROS obtained but were negative except mentioned in HPI      Physical exam:     Vitals:  /71   Pulse 85   Temp 97.3 °F (36.3 °C) (Oral)   Resp 16   Ht 5' 5\" (1.651 m)   Wt 147 lb 11.3 oz (67 kg)   SpO2 94%   BMI 24.58 kg/m²   Constitutional:  OAA X3 NAD  Skin: no rash, turgor wnl  Heent:  eomi, mmm  Neck: no bruits or jvd noted  Cardiovascular:  S1, S2 without m/r/g  Respiratory: CTA B without w/r/r  Abdomen:  +bs, soft, nt, nd  Ext: no  lower extremity edema  Psychiatric: mood and affect appropriate  Musculoskeletal:  Rom, muscular strength intact    Data:   Labs:  CBC:   Recent Labs     11/26/21 0439 11/27/21  0605 11/28/21  0605   WBC 6.4 6.3 6.0   HGB 10.0* 10.8* 10.3*    192 195     BMP:    Recent Labs     11/26/21 0439 11/27/21  0605 11/28/21  0605   * 134* 137   K 4.5 4.3 4.0    103 106   CO2 25 25 24   BUN 27* 25* 25*   CREATININE 0.7 0.8 0.7   GLUCOSE 93 100* 85     Ca/Mg/Phos:   Recent Labs     11/26/21 0439 11/27/21  0605 11/28/21  0605   CALCIUM 9.7 9.8 9.4     Hepatic:   No results for input(s): AST, ALT, ALB, BILITOT, ALKPHOS in the last 72 hours. Troponin:   No results for input(s): TROPONINI in the last 72 hours. BNP: No results for input(s): BNP in the last 72 hours. Lipids: No results for input(s): CHOL, TRIG, HDL, LDLCALC, LABVLDL in the last 72 hours. ABGs:   No results for input(s): PHART, PO2ART, EYT5BXT in the last 72 hours. INR:   No results for input(s): INR in the last 72 hours. UA:No results for input(s): Maynor Cocker, GLUCOSEU, BILIRUBINUR, Kajal Terry, BLOODU, PHUR, PROTEINU, UROBILINOGEN, NITRU, LEUKOCYTESUR, LABMICR, URINETYPE in the last 72 hours. Urine Microscopic: No results for input(s): LABCAST, BACTERIA, COMU, HYALCAST, WBCUA, RBCUA, EPIU in the last 72 hours. Urine Culture:   No results for input(s): LABURIN in the last 72 hours. Urine Chemistry:   No results for input(s): Lacretia All, PROTEINUR, NAUR in the last 72 hours. IMAGING:  XR CHEST PORTABLE   Final Result   1. No significant change in appearance of multifocal pneumonia, to include   atypical viral causes. 2. Stable cardiomegaly. CT CHEST PULMONARY EMBOLISM W CONTRAST   Final Result   No evidence of a pulmonary embolus. Mildly dilated and atherosclerotic thoracic aorta with no aneurysm or   dissection.       Moderate subpleural and interstitial ground-glass opacities scattered   throughout both lungs which probably represents multisegmental   bronchopneumonia secondary to the

## 2021-11-29 ENCOUNTER — APPOINTMENT (OUTPATIENT)
Dept: GENERAL RADIOLOGY | Age: 86
DRG: 177 | End: 2021-11-29
Payer: MEDICARE

## 2021-11-29 LAB
ACANTHOCYTES: ABNORMAL
ANION GAP SERPL CALCULATED.3IONS-SCNC: 6 MMOL/L (ref 3–16)
BANDED NEUTROPHILS RELATIVE PERCENT: 5 % (ref 0–7)
BASOPHILS ABSOLUTE: 0.1 K/UL (ref 0–0.2)
BASOPHILS RELATIVE PERCENT: 2 %
BUN BLDV-MCNC: 26 MG/DL (ref 7–20)
BURR CELLS: ABNORMAL
CALCIUM SERPL-MCNC: 9.5 MG/DL (ref 8.3–10.6)
CHLORIDE BLD-SCNC: 109 MMOL/L (ref 99–110)
CO2: 25 MMOL/L (ref 21–32)
CREAT SERPL-MCNC: 0.6 MG/DL (ref 0.6–1.2)
EOSINOPHILS ABSOLUTE: 0.1 K/UL (ref 0–0.6)
EOSINOPHILS RELATIVE PERCENT: 2 %
GFR AFRICAN AMERICAN: >60
GFR NON-AFRICAN AMERICAN: >60
GLUCOSE BLD-MCNC: 91 MG/DL (ref 70–99)
HCT VFR BLD CALC: 30.7 % (ref 36–48)
HEMOGLOBIN: 10.1 G/DL (ref 12–16)
INR BLD: 0.98 (ref 0.88–1.12)
LYMPHOCYTES ABSOLUTE: 0.8 K/UL (ref 1–5.1)
LYMPHOCYTES RELATIVE PERCENT: 13 %
MCH RBC QN AUTO: 33.1 PG (ref 26–34)
MCHC RBC AUTO-ENTMCNC: 32.9 G/DL (ref 31–36)
MCV RBC AUTO: 100.5 FL (ref 80–100)
MONOCYTES ABSOLUTE: 0.8 K/UL (ref 0–1.3)
MONOCYTES RELATIVE PERCENT: 13 %
NEUTROPHILS ABSOLUTE: 4.3 K/UL (ref 1.7–7.7)
NEUTROPHILS RELATIVE PERCENT: 65 %
OVALOCYTES: ABNORMAL
PDW BLD-RTO: 14.1 % (ref 12.4–15.4)
PLATELET # BLD: 185 K/UL (ref 135–450)
PMV BLD AUTO: 8.2 FL (ref 5–10.5)
POIKILOCYTES: ABNORMAL
POLYCHROMASIA: ABNORMAL
POTASSIUM SERPL-SCNC: 4.3 MMOL/L (ref 3.5–5.1)
PROTHROMBIN TIME: 11.1 SEC (ref 9.9–12.7)
RBC # BLD: 3.05 M/UL (ref 4–5.2)
SCHISTOCYTES: ABNORMAL
SODIUM BLD-SCNC: 140 MMOL/L (ref 136–145)
TARGET CELLS: ABNORMAL
WBC # BLD: 6.1 K/UL (ref 4–11)

## 2021-11-29 PROCEDURE — 80048 BASIC METABOLIC PNL TOTAL CA: CPT

## 2021-11-29 PROCEDURE — 6370000000 HC RX 637 (ALT 250 FOR IP): Performed by: STUDENT IN AN ORGANIZED HEALTH CARE EDUCATION/TRAINING PROGRAM

## 2021-11-29 PROCEDURE — 6360000002 HC RX W HCPCS: Performed by: INTERNAL MEDICINE

## 2021-11-29 PROCEDURE — C1751 CATH, INF, PER/CENT/MIDLINE: HCPCS

## 2021-11-29 PROCEDURE — 99232 SBSQ HOSP IP/OBS MODERATE 35: CPT | Performed by: HOSPITALIST

## 2021-11-29 PROCEDURE — 6370000000 HC RX 637 (ALT 250 FOR IP): Performed by: HOSPITALIST

## 2021-11-29 PROCEDURE — 97116 GAIT TRAINING THERAPY: CPT

## 2021-11-29 PROCEDURE — 2580000003 HC RX 258: Performed by: EMERGENCY MEDICINE

## 2021-11-29 PROCEDURE — 36415 COLL VENOUS BLD VENIPUNCTURE: CPT

## 2021-11-29 PROCEDURE — 85025 COMPLETE CBC W/AUTO DIFF WBC: CPT

## 2021-11-29 PROCEDURE — 02HV33Z INSERTION OF INFUSION DEVICE INTO SUPERIOR VENA CAVA, PERCUTANEOUS APPROACH: ICD-10-PCS | Performed by: EMERGENCY MEDICINE

## 2021-11-29 PROCEDURE — 97530 THERAPEUTIC ACTIVITIES: CPT

## 2021-11-29 PROCEDURE — 85610 PROTHROMBIN TIME: CPT

## 2021-11-29 PROCEDURE — 71045 X-RAY EXAM CHEST 1 VIEW: CPT

## 2021-11-29 PROCEDURE — 1200000000 HC SEMI PRIVATE

## 2021-11-29 PROCEDURE — 6370000000 HC RX 637 (ALT 250 FOR IP): Performed by: NURSE PRACTITIONER

## 2021-11-29 PROCEDURE — 6370000000 HC RX 637 (ALT 250 FOR IP): Performed by: INTERNAL MEDICINE

## 2021-11-29 PROCEDURE — 76937 US GUIDE VASCULAR ACCESS: CPT

## 2021-11-29 PROCEDURE — 97110 THERAPEUTIC EXERCISES: CPT

## 2021-11-29 RX ORDER — SODIUM CHLORIDE 0.9 % (FLUSH) 0.9 %
5-40 SYRINGE (ML) INJECTION EVERY 12 HOURS SCHEDULED
Status: DISCONTINUED | OUTPATIENT
Start: 2021-11-29 | End: 2021-11-30 | Stop reason: HOSPADM

## 2021-11-29 RX ORDER — SODIUM CHLORIDE 0.9 % (FLUSH) 0.9 %
5-40 SYRINGE (ML) INJECTION PRN
Status: DISCONTINUED | OUTPATIENT
Start: 2021-11-29 | End: 2021-11-30 | Stop reason: HOSPADM

## 2021-11-29 RX ORDER — SODIUM CHLORIDE 9 MG/ML
25 INJECTION, SOLUTION INTRAVENOUS PRN
Status: DISCONTINUED | OUTPATIENT
Start: 2021-11-29 | End: 2021-11-30 | Stop reason: HOSPADM

## 2021-11-29 RX ORDER — LIDOCAINE HYDROCHLORIDE 10 MG/ML
5 INJECTION, SOLUTION INFILTRATION; PERINEURAL ONCE
Status: DISCONTINUED | OUTPATIENT
Start: 2021-11-29 | End: 2021-11-30 | Stop reason: HOSPADM

## 2021-11-29 RX ADMIN — Medication 10 ML: at 21:51

## 2021-11-29 RX ADMIN — Medication 2000 UNITS: at 07:39

## 2021-11-29 RX ADMIN — ENOXAPARIN SODIUM 30 MG: 30 INJECTION SUBCUTANEOUS at 21:50

## 2021-11-29 RX ADMIN — ACETAMINOPHEN 650 MG: 325 TABLET ORAL at 16:24

## 2021-11-29 RX ADMIN — SODIUM BICARBONATE 650 MG TABLET 650 MG: at 07:39

## 2021-11-29 RX ADMIN — HYDRALAZINE HYDROCHLORIDE 100 MG: 50 TABLET, FILM COATED ORAL at 21:51

## 2021-11-29 RX ADMIN — PREDNISONE 7.5 MG: 5 TABLET ORAL at 07:39

## 2021-11-29 RX ADMIN — POLYETHYLENE GLYCOL 3350 17 G: 17 POWDER, FOR SOLUTION ORAL at 07:39

## 2021-11-29 RX ADMIN — ENOXAPARIN SODIUM 30 MG: 30 INJECTION SUBCUTANEOUS at 07:39

## 2021-11-29 ASSESSMENT — PAIN SCALES - GENERAL
PAINLEVEL_OUTOF10: 1
PAINLEVEL_OUTOF10: 0

## 2021-11-29 ASSESSMENT — PAIN - FUNCTIONAL ASSESSMENT: PAIN_FUNCTIONAL_ASSESSMENT: ACTIVITIES ARE NOT PREVENTED

## 2021-11-29 ASSESSMENT — PAIN DESCRIPTION - ONSET: ONSET: GRADUAL

## 2021-11-29 ASSESSMENT — PAIN DESCRIPTION - FREQUENCY: FREQUENCY: INTERMITTENT

## 2021-11-29 ASSESSMENT — PAIN DESCRIPTION - ORIENTATION: ORIENTATION: RIGHT

## 2021-11-29 ASSESSMENT — PAIN DESCRIPTION - PROGRESSION: CLINICAL_PROGRESSION: NOT CHANGED

## 2021-11-29 ASSESSMENT — PAIN DESCRIPTION - DESCRIPTORS: DESCRIPTORS: HEADACHE

## 2021-11-29 ASSESSMENT — PAIN DESCRIPTION - LOCATION: LOCATION: HEAD

## 2021-11-29 ASSESSMENT — PAIN DESCRIPTION - PAIN TYPE: TYPE: ACUTE PAIN

## 2021-11-29 NOTE — PROGRESS NOTES
Hospitalist Progress Note      PCP: Zhou Frank DO    Date of Admission: 11/12/2021    LOS: 16    Chief Complaint:  Pt is being treated for COVID and UTI/Depression. Pt denies any new problems since seen yesterday. Reports good appetite and has been voiding. Denies sob but still has occasional  cough. Pt says she no   onger feels like she wants to die. She said she has been feeling much better emotionally since droplet  Isolation    has been dropped and   her daughter has been allowed to visit  her here on the unit. 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] 11/23/2021    Acute respiratory failure due to severe acute respiratory syndrome coronavirus 2 (SARS-CoV-2) infection (HCC) [U07.1, J96.00] 11/12/2021    Current chronic use of systemic steroids [Z79.52] 11/12/2021    Giant cell arteritis (HCC) [M31.6] 10/19/2020    PMR (polymyalgia rheumatica) ([facility] [M35.3] 09/29/2020    HTN (hypertension) [I10] 09/04/2014         Active Problems:    HTN (hypertension)    PMR (polymyalgia rheumatica) (HCC)    Giant cell arteritis (HCC)    Acute respiratory failure due to severe acute respiratory syndrome coronavirus 2 (SARS-CoV-2) infection (HCC)    Current chronic use of systemic steroids    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  *          Medications:  Reviewed  Infusion Medications    sodium chloride      sodium chloride 25 mL (11/28/21 0835)     Scheduled Medications    lidocaine 1 % injection  5 mL IntraDERmal Once    sodium chloride flush  5-40 mL IntraVENous 2 times per day    polyethylene glycol  17 g Oral Daily    predniSONE  7.5 mg Oral Daily    sodium bicarbonate  650 mg Oral Daily    hydrALAZINE  100 mg Oral 3 times per day    metoprolol succinate  100 mg Oral Daily    Vitamin D  2,000 Units Oral Daily    enoxaparin  30 mg SubCUTAneous BID PRN Meds: sodium chloride flush, sodium chloride, sodium chloride flush, sodium chloride, potassium chloride **OR** potassium alternative oral replacement **OR** potassium chloride, magnesium sulfate, ondansetron **OR** ondansetron, acetaminophen **OR** acetaminophen, guaiFENesin-dextromethorphan, benzonatate, albuterol sulfate HFA, sodium chloride      DVT Prophylaxis:  Diet: ADULT DIET; Regular  ADULT ORAL NUTRITION SUPPLEMENT; Lunch; Standard High Calorie/High Protein Oral Supplement  Code Status: Full Code    Dispo: Anticipate discharge in the next    ____________________________________________________________________________    Subjective:   Overnight Events:        Physical Exam:  BP (!) 94/58   Pulse 91   Temp 97.9 °F (36.6 °C) (Oral)   Resp 17   Ht 5' 5\" (1.651 m)   Wt 147 lb 14.9 oz (67.1 kg)   SpO2 90%   BMI 24.62 kg/m²   General appearance: No apparent distress, appears stated age and cooperative. HEENT: Normocephalic, atraumatic, MMM, No sclera icterus/conjuctival palor  Neck: Supple, no thyromegally. No jugular venous distention. Respiratory:  Normal respiratory effort. Clear to auscultation, no Rales/Wheezes/Rhonchi. Cardiovascular: S1/S2 without murmurs, rubs or gallops. RRR  Abdomen: Soft, non-tender, non-distended, bowel sounds present. Musculoskeletal: No clubbing, cyanosis or edema bilaterally. Skin: Skin color, texture, turgor normal.  No rashes or lesions.   Neurologic:  Cranial nerves: II-XII intact, KIERA, No focal sensory/motor deficits  Psychiatric: Alert and oriented, thought content appropriate  No intake or output data in the 24 hours ending 11/29/21 1559    Labs:   Recent Labs     11/27/21  0605 11/28/21  0605 11/29/21  0607   WBC 6.3 6.0 6.1   HGB 10.8* 10.3* 10.1*   HCT 31.9* 30.5* 30.7*    195 185      Recent Labs     11/27/21  0605 11/28/21  0605 11/29/21  0607   * 137 140   K 4.3 4.0 4.3    106 109   CO2 25 24 25   BUN 25* 25* 26*   CREATININE 0.8 0.7 0.6   CALCIUM 9.8 9.4 9.5     No results for input(s): Ángel Newberry in the last 72 hours. Urinalysis:    Lab Results   Component Value Date    NITRU POSITIVE 11/12/2021    WBCUA 10-20 11/12/2021    BACTERIA 4+ 11/12/2021    RBCUA 3-4 11/12/2021    BLOODU Negative 11/12/2021    SPECGRAV 1.020 11/12/2021    GLUCOSEU Negative 11/12/2021       Radiology:  XR CHEST PORTABLE   Final Result   1. No significant change in appearance of multifocal pneumonia, to include   atypical viral causes. 2. Stable cardiomegaly. CT CHEST PULMONARY EMBOLISM W CONTRAST   Final Result   No evidence of a pulmonary embolus. Mildly dilated and atherosclerotic thoracic aorta with no aneurysm or   dissection. Moderate subpleural and interstitial ground-glass opacities scattered   throughout both lungs which probably represents multisegmental   bronchopneumonia secondary to the patient's known COVID-19 disease      Single moderate compression fracture of T11 which may be chronic. Recommend   clinical follow-up of the area. XR CHEST PORTABLE   Final Result   Bilateral airspace disease suggestive of atypical viral pneumonia. Superimposed edema not excluded. ASSESSMENT: 1. COVD (no further systems)                              2.ESBL                               3.DEPRESSION (improving)    PLAN:    Pt getting merepenm for her ESBL-day 5. UTI. Pt will be discharged tomorrow to assisted living   facility for rehab. Pt had picc line ordered. She has to have this IV line to receive 5 more days of meropenem   after discharge for a complete 10 day course. MIKALA STEWART, DO            Please excuse brevity and/or typos. This report was transcribed using voice recognition software. Every effort was made to ensure accuracy, however, inadvertent computerized transcription errors may be present.

## 2021-11-29 NOTE — CARE COORDINATION
24/44/35 We have precert for pt to d/c today to [facility] [place]. Transport set up for 4:45 pending d/c order. Update: Pt's family would like to transport to [facility] [place]. Facility and hospital staff okay with family transporting. Ambulance  canceled.

## 2021-11-29 NOTE — PROGRESS NOTES
Physical Therapy  Facility/Department: [facility] C[facility] - MED SURG/ORTHO  Daily Treatment Note  NAME: Perry Forrest  : 2/3/1933  MRN: [id number]    Date of Service: 2021    Discharge Recommendations:  Subacute/Skilled Nursing Facility   PT Equipment Recommendations  Equipment Needed: No (defer)    Assessment   Body structures, Functions, Activity limitations: Decreased functional mobility ; Decreased strength; Decreased endurance; Decreased balance  Assessment: Pt rpts still feeling tired today but willing to get OOB. Pt able to come supine to sit Vero & with STS CGA/min A 1. She ambulated with RW 50' cga. Pt is recommended for con't skilled PT and SNF at D/c. Treatment Diagnosis: Decreased endurance  Prognosis: Good  Decision Making: Medium Complexity  PT Education: Goals; General Safety; Gait Training; PT Role; Plan of Care; Disease Specific Education; Functional Mobility Training; Equipment; Precautions; Transfer Training; Energy Conservation; Home Exercise Program  Patient Education: Disease-specific education: Pt educated on role of PT in hospital, current D/C recs, and benefits of regular OOB activity while in hospital in order to decrease risks of prolonged bedrest.  Pt verbalizes understanding. REQUIRES PT FOLLOW UP: Yes  Activity Tolerance  Activity Tolerance: Patient Tolerated treatment well  Activity Tolerance: 106/65   O2 98% RA     Patient Diagnosis(es): The primary encounter diagnosis was Acute respiratory failure with hypoxia ([address] Diagnoses of COVID-19 and Acute cystitis without hematuria were also pertinent to this visit.      has a past medical history of Cellulitis of left lower leg, Closed pelvic rim fracture, Fibromyalgia, Foot drop, right foot, Fractures, History of blood transfusion, History of temporal artery biopsy, Hx of blood clots, PNA (pneumonia), Prepatellar bursitis of right knee, Rheumatoid arthritis ([address] Venous ulcer of left leg ([address] and Ven[facility] ulcer of right leg ([facility] has a past surgical history that includes Tonsillectomy and Vein Surgery (Right, 10/27/2016). Restrictions  Restrictions/Precautions  Restrictions/Precautions: General Precautions, Up as Tolerated, Contact Precautions  Position Activity Restriction  Other position/activity restrictions: High fall risk per nursing assessment, up with assistance, telemetry with continuous pulse oximetry  Subjective   General  Chart Reviewed: Yes  Additional Pertinent Hx: R foot drop  Response To Previous Treatment: Patient with no complaints from previous session. Family / Caregiver Present: No  Referring Practitioner: Venkatesh Ferrera MD  Subjective  Subjective: Pt agreeable to PT session  General Comment  Comments: supine in bed on arrival and cleared by RN  Pain Screening  Patient Currently in Pain: No  Vital Signs  Patient Currently in Pain: No       Orientation  Orientation  Overall Orientation Status: Within Functional Limits  Cognition      Objective   Bed mobility  Supine to Sit: Contact guard assistance  Transfers  Sit to Stand: Contact guard assistance; Minimal Assistance  Stand to sit: Contact guard assistance  Ambulation  Ambulation?: Yes  Ambulation 1  Surface: level tile  Device: Rolling Walker  Assistance: Contact guard assistance  Quality of Gait: Pt amb with slow cindy and decreased stride length B. Steppage gait noted on R secondary to baseline foot drop. Gait Deviations: Slow Cindy; Decreased step length; Decreased step height  Distance: 48' x2         AM-PAC Score  AM-PAC Inpatient Mobility Raw Score : 15 (11/26/21 1121)  AM-PAC Inpatient T-Scale Score : 39.45 (11/26/21 1121)  Mobility Inpatient CMS 0-100% Score: 57.7 (11/26/21 1121)  Mobility Inpatient CMS G-Code Modifier : CK (11/26/21 1121)          Goals  Short term goals  Time Frame for Short term goals: 1 week, 11/23/21 (unless otherwise specified) -- GOALs extended to 12/6 d/t prolonged hospital stay.   Short term goal 1: Pt will transfer supine <-> sit with (I)  -11/29 CGA  Short term goal 2: Pt will transfer sit <-> stand and bed>chair using RW/4WW with modified(I)   -11/29 min/CGA  Short term goal 3: Pt will ambulate x 125 feet using RW/4WW with SBA   -11/29 50' with RW CGA  Short term goal 4: By 11/19/21: Pt will tolerate 15-20 reps BLE exercise for strengthening, balance, and endurance   -11/29 on-going  Patient Goals   Patient goals : \"To be able to keep house and do all of my cooking and laundry like I used to. \"    Plan    Plan  Times per week: 2-3x/week in acute care  Times per day: Daily  Specific instructions for Next Treatment: Progress ther ex and mobility as tolerated  Current Treatment Recommendations: Strengthening, Balance Training, Endurance Training, Functional Mobility Training, Transfer Training, Gait Training, Home Exercise Program, Safety Education & Training, Equipment Evaluation, Education, & procurement  Safety Devices  Type of devices:  All fall risk precautions in place, Call light within reach, Chair alarm in place, Gait belt, Left in chair, Nurse notified     Therapy Time   Individual Concurrent Group Co-treatment   Time In [id number]         Time Out 0912         Minutes 38         Timed Code Treatment Minutes: [address]   Goals updated by: Iraida Lanier, PT, DPT

## 2021-11-29 NOTE — PROGRESS NOTES
4 Eyes Skin Assessment     The patient is being assess for   Shift Handoff    I agree that 2 RN's have performed a thorough Head to Toe Skin Assessment on the patient. ALL assessment sites listed below have been assessed. Areas assessed for pressure by both nurses:   [x]   Head, Face, and Ears   [x]   Shoulders, Back, and Chest, Abdomen  [x]   Arms, Elbows, and Hands   [x]   Coccyx, Sacrum, and Ischium  [x]   Legs, Feet, and Heels        Skin Assessed Under all Medical Devices by both nurses:  Mesfin Le       All Mepilex Borders were peeled back and area peeked at by both nurses:  No: none in place  Please list where Mepilex Borders are located:  N/A             **SHARE this note so that the co-signing nurse is able to place an eSignature**    Co-signer eSignature: Electronically signed by Denise Noble RN on 11/29/21 at 7:48 AM EST    Does the Patient have Skin Breakdown related to pressure?   {Blank single:42944::\"No\",\"Yes LDA WOUND CARE was Initiated documentation include the Gay-wound, Wound Assessment, Measurements, Dressing Treatment, Drainage, and Color\",\"}       Wilber Prevention initiated:  Yes   Wound Care Orders initiated:  {YES/NO:19732}      [facility] nurse consulted for Pressure Injury (Stage 3,4, Unstageable, DTI, NWPT, Complex wounds)and New or Established Ostomies:  {YES/NO:19732}      Primary Nurse eSignature: {Esignature:[id number]}

## 2021-11-29 NOTE — CARE COORDINATION
23/52/42 Precert still pending for [facility] [place]. Will need updated PT/OT notes for today.

## 2021-11-29 NOTE — CARE COORDINATION
11/29/21 Call placed to [facility] [place] to inquire about precert, left VM, waiting for a return phone call. Will follow.

## 2021-11-29 NOTE — PROGRESS NOTES
Nephrology Progress  Note                                                                                                                                                                                                                                                                                                                                                               Office : [phone]     Fax :[phone]              Patient's Name: Katie Andrea  4:28 PM  11/29/2021    Reason for Consult:  Metabolic acidosis    Requesting Physician:  Eduar Ro DO      Chief Complaint:  SOB, PNA      Interval History :    Overall improving  On room air  Serum Na 133   -----> 134 ---> 133 ----> 128  ----> 132 --> 133 --> 135 ----> 134  Bicarb 17 ------> 23 ---> 16 ----> 26  ---> 28 ---> 25  SBP stable , soft side  No diarrhea or vomiting    Weakness and deconditioning +    History of Present Ilness: Katie Andrea is a 80 y.o. female with PMH of RA , HTN     Being treated for Acute respiratory failure 2/2 COVID 19 PNA    Getting decadron   S/p remdesivir    Denies diarrhea    O2 requirement stable    Making urine        Past Medical History:   Diagnosis Date    Cellulitis of left lower leg 4/21/2019    Closed pelvic rim fracture 9/4/2014    Fibromyalgia     Foot drop, right foot     Fractures     History of blood transfusion     1997    History of temporal artery biopsy 10/06/2020    Hx of blood clots     PNA (pneumonia) 12/1997    Prepatellar bursitis of right knee 9/15/2020    Rheumatoid arthritis ([address]     Venous ulcer of left leg ([address] 08/2019    Venous ulcer of right leg ([address] 10/2016       Past Surgical History:   Procedure Laterality Date    TONSILLECTOMY      VEIN SURGERY Right 10/27/2016    saphenous VNUS closure       Family History   Problem Relation Age of Onset    Stroke Mother     Stroke Father         reports that she has never smoked.  She has never used smokeless tobacco. She reports current alcohol use. She reports that she does not use drugs.     Allergies:  Erythromycin, Hydrochlorothiazide, Silver sulfadiazine, and Doxycycline    Current Medications:    lidocaine 1 % injection 5 mL, Once  sodium chloride flush 0.9 % injection 5-40 mL, 2 times per day  sodium chloride flush 0.9 % injection 5-40 mL, PRN  0.9 % sodium chloride infusion, PRN  polyethylene glycol (GLYCOLAX) packet 17 g, Daily  predniSONE (DELTASONE) tablet 7.5 mg, Daily  sodium bicarbonate tablet 650 mg, Daily  hydrALAZINE (APRESOLINE) tablet 100 mg, 3 times per day  metoprolol succinate (TOPROL XL) extended release tablet 100 mg, Daily  sodium chloride flush 0.9 % injection 10 mL, PRN  0.9 % sodium chloride infusion, PRN  potassium chloride (KLOR-CON M) extended release tablet 40 mEq, PRN   Or  potassium bicarb-citric acid (EFFER-K) effervescent tablet 40 mEq, PRN   Or  potassium chloride 10 mEq/100 mL IVPB (Peripheral Line), PRN  magnesium sulfate 1000 mg in dextrose 5% 100 mL IVPB, PRN  ondansetron (ZOFRAN-ODT) disintegrating tablet 4 mg, Q8H PRN   Or  ondansetron (ZOFRAN) injection 4 mg, Q6H PRN  acetaminophen (TYLENOL) tablet 650 mg, Q6H PRN   Or  acetaminophen (TYLENOL) suppository 650 mg, Q6H PRN  guaiFENesin-dextromethorphan (ROBITUSSIN DM) 100-10 MG/5ML syrup 5 mL, Q4H PRN  Vitamin D (CHOLECALCIFEROL) tablet 2,000 Units, Daily  enoxaparin (LOVENOX) injection 30 mg, BID  benzonatate (TESSALON) capsule 200 mg, TID PRN  albuterol sulfate  (90 Base) MCG/ACT inhaler 2 puff, Q4H PRN  0.9 % sodium chloride bolus, PRN        Review of Systems:   14 point ROS obtained but were negative except mentioned in HPI      Physical exam:     Vitals:  BP (!) 94/58   Pulse 91   Temp 97.9 °F (36.6 °C) (Oral)   Resp 17   Ht 5' 5\" (1.651 m)   Wt 147 lb 14.9 oz (67.1 kg)   SpO2 90%   BMI 24.62 kg/m²   Constitutional:  OAA X3 NAD  Skin: no rash, turgor wnl  Heent:  eomi, mmm  Neck: no bruits or jvd noted  Cardiovascular: dissection. Moderate subpleural and interstitial ground-glass opacities scattered   throughout both lungs which probably represents multisegmental   bronchopneumonia secondary to the patient's known COVID-19 disease      Single moderate compression fracture of T11 which may be chronic. Recommend   clinical follow-up of the area. XR CHEST PORTABLE   Final Result   Bilateral airspace disease suggestive of atypical viral pneumonia. Superimposed edema not excluded. Assessment/Plan     1. NAGMA     Bicarb 19 since 11/14  Bicarb 26 on admission  Denies diarrhea    Renal fn wnl, GFR > 60 ml/hr    Plan    Overall clinically improving   Stable renal standpoint    Encourage PO intake of water to thirst  Bicarb improved  moniter bicarb and electrolytes        2.  COVID PNA    Acute hypoxic respiratory failure     On decadron  remdesvir  O2      3 HTN : controlled      On Metoprolol  Increase Hydralazine to 100 mg  TID       4 Dementia    5 hypotension    Give gentle IV hydration  Keep MAP > 65 mmhg    6 Hyponatremia    Improving    moniter    Encourage high protein/solute diet      Thank you for allowing us to participate in care of Fartun Lee MD  Feel free to contact me   [facility] [address]  Office : [phone]  Fax :[phone]

## 2021-11-30 ENCOUNTER — APPOINTMENT (OUTPATIENT)
Dept: INTERVENTIONAL RADIOLOGY/VASCULAR | Age: 86
DRG: 177 | End: 2021-11-30
Payer: MEDICARE

## 2021-11-30 VITALS
TEMPERATURE: 97.3 F | RESPIRATION RATE: 18 BRPM | HEIGHT: 65 IN | OXYGEN SATURATION: 91 % | DIASTOLIC BLOOD PRESSURE: 67 MMHG | SYSTOLIC BLOOD PRESSURE: 116 MMHG | BODY MASS INDEX: 26.52 KG/M2 | HEART RATE: 67 BPM | WEIGHT: 159.17 LBS

## 2021-11-30 LAB
ANION GAP SERPL CALCULATED.3IONS-SCNC: 6 MMOL/L (ref 3–16)
BASOPHILS ABSOLUTE: 0 K/UL (ref 0–0.2)
BASOPHILS RELATIVE PERCENT: 1 %
BUN BLDV-MCNC: 29 MG/DL (ref 7–20)
C-REACTIVE PROTEIN: <3 MG/L (ref 0–5.1)
CALCIUM SERPL-MCNC: 9.1 MG/DL (ref 8.3–10.6)
CHLORIDE BLD-SCNC: 111 MMOL/L (ref 99–110)
CO2: 22 MMOL/L (ref 21–32)
CREAT SERPL-MCNC: 0.6 MG/DL (ref 0.6–1.2)
D DIMER: 260 NG/ML DDU (ref 0–229)
EOSINOPHILS ABSOLUTE: 0.2 K/UL (ref 0–0.6)
EOSINOPHILS RELATIVE PERCENT: 4.1 %
GFR AFRICAN AMERICAN: >60
GFR NON-AFRICAN AMERICAN: >60
GLUCOSE BLD-MCNC: 88 MG/DL (ref 70–99)
HCT VFR BLD CALC: 27.8 % (ref 36–48)
HEMOGLOBIN: 9.5 G/DL (ref 12–16)
LYMPHOCYTES ABSOLUTE: 1.2 K/UL (ref 1–5.1)
LYMPHOCYTES RELATIVE PERCENT: 25.5 %
MCH RBC QN AUTO: 33.1 PG (ref 26–34)
MCHC RBC AUTO-ENTMCNC: 34.1 G/DL (ref 31–36)
MCV RBC AUTO: 97.1 FL (ref 80–100)
MONOCYTES ABSOLUTE: 0.5 K/UL (ref 0–1.3)
MONOCYTES RELATIVE PERCENT: 10.6 %
NEUTROPHILS ABSOLUTE: 2.7 K/UL (ref 1.7–7.7)
NEUTROPHILS RELATIVE PERCENT: 58.8 %
PDW BLD-RTO: 14 % (ref 12.4–15.4)
PLATELET # BLD: 178 K/UL (ref 135–450)
PMV BLD AUTO: 7.9 FL (ref 5–10.5)
POTASSIUM SERPL-SCNC: 3.8 MMOL/L (ref 3.5–5.1)
RBC # BLD: 2.86 M/UL (ref 4–5.2)
SODIUM BLD-SCNC: 139 MMOL/L (ref 136–145)
WBC # BLD: 4.6 K/UL (ref 4–11)

## 2021-11-30 PROCEDURE — 6360000002 HC RX W HCPCS: Performed by: INTERNAL MEDICINE

## 2021-11-30 PROCEDURE — 97530 THERAPEUTIC ACTIVITIES: CPT

## 2021-11-30 PROCEDURE — 2580000003 HC RX 258: Performed by: INTERNAL MEDICINE

## 2021-11-30 PROCEDURE — 6370000000 HC RX 637 (ALT 250 FOR IP): Performed by: INTERNAL MEDICINE

## 2021-11-30 PROCEDURE — 86140 C-REACTIVE PROTEIN: CPT

## 2021-11-30 PROCEDURE — 76937 US GUIDE VASCULAR ACCESS: CPT

## 2021-11-30 PROCEDURE — 36410 VNPNXR 3YR/> PHY/QHP DX/THER: CPT

## 2021-11-30 PROCEDURE — C1751 CATH, INF, PER/CENT/MIDLINE: HCPCS

## 2021-11-30 PROCEDURE — 99232 SBSQ HOSP IP/OBS MODERATE 35: CPT | Performed by: HOSPITALIST

## 2021-11-30 PROCEDURE — 2580000003 HC RX 258: Performed by: EMERGENCY MEDICINE

## 2021-11-30 PROCEDURE — 80048 BASIC METABOLIC PNL TOTAL CA: CPT

## 2021-11-30 PROCEDURE — 97110 THERAPEUTIC EXERCISES: CPT

## 2021-11-30 PROCEDURE — 6370000000 HC RX 637 (ALT 250 FOR IP): Performed by: HOSPITALIST

## 2021-11-30 PROCEDURE — 85025 COMPLETE CBC W/AUTO DIFF WBC: CPT

## 2021-11-30 PROCEDURE — 36415 COLL VENOUS BLD VENIPUNCTURE: CPT

## 2021-11-30 PROCEDURE — 6370000000 HC RX 637 (ALT 250 FOR IP): Performed by: STUDENT IN AN ORGANIZED HEALTH CARE EDUCATION/TRAINING PROGRAM

## 2021-11-30 PROCEDURE — 97116 GAIT TRAINING THERAPY: CPT

## 2021-11-30 PROCEDURE — 6370000000 HC RX 637 (ALT 250 FOR IP): Performed by: NURSE PRACTITIONER

## 2021-11-30 PROCEDURE — 85379 FIBRIN DEGRADATION QUANT: CPT

## 2021-11-30 RX ORDER — LIDOCAINE HYDROCHLORIDE 10 MG/ML
5 INJECTION, SOLUTION INFILTRATION; PERINEURAL ONCE
Status: DISCONTINUED | OUTPATIENT
Start: 2021-11-30 | End: 2021-11-30 | Stop reason: HOSPADM

## 2021-11-30 RX ORDER — SODIUM CHLORIDE 0.9 % (FLUSH) 0.9 %
5-40 SYRINGE (ML) INJECTION EVERY 12 HOURS SCHEDULED
Status: DISCONTINUED | OUTPATIENT
Start: 2021-11-30 | End: 2021-11-30 | Stop reason: HOSPADM

## 2021-11-30 RX ORDER — SODIUM CHLORIDE 0.9 % (FLUSH) 0.9 %
5-40 SYRINGE (ML) INJECTION PRN
Status: DISCONTINUED | OUTPATIENT
Start: 2021-11-30 | End: 2021-11-30 | Stop reason: HOSPADM

## 2021-11-30 RX ORDER — SODIUM CHLORIDE 9 MG/ML
25 INJECTION, SOLUTION INTRAVENOUS PRN
Status: DISCONTINUED | OUTPATIENT
Start: 2021-11-30 | End: 2021-11-30 | Stop reason: HOSPADM

## 2021-11-30 RX ORDER — SENNA PLUS 8.6 MG/1
1 TABLET ORAL DAILY
Status: DISCONTINUED | OUTPATIENT
Start: 2021-11-30 | End: 2021-11-30 | Stop reason: HOSPADM

## 2021-11-30 RX ADMIN — POLYETHYLENE GLYCOL 3350 17 G: 17 POWDER, FOR SOLUTION ORAL at 10:03

## 2021-11-30 RX ADMIN — HYDRALAZINE HYDROCHLORIDE 100 MG: 50 TABLET, FILM COATED ORAL at 06:22

## 2021-11-30 RX ADMIN — ACETAMINOPHEN 650 MG: 325 TABLET ORAL at 04:04

## 2021-11-30 RX ADMIN — SODIUM BICARBONATE 650 MG TABLET 650 MG: at 10:02

## 2021-11-30 RX ADMIN — Medication 10 ML: at 10:03

## 2021-11-30 RX ADMIN — METOPROLOL SUCCINATE 100 MG: 50 TABLET, EXTENDED RELEASE ORAL at 10:02

## 2021-11-30 RX ADMIN — PREDNISONE 7.5 MG: 5 TABLET ORAL at 10:02

## 2021-11-30 RX ADMIN — Medication 2000 UNITS: at 10:02

## 2021-11-30 RX ADMIN — ACETAMINOPHEN 650 MG: 325 TABLET ORAL at 10:03

## 2021-11-30 RX ADMIN — ERTAPENEM SODIUM 1000 MG: 1 INJECTION, POWDER, LYOPHILIZED, FOR SOLUTION INTRAMUSCULAR; INTRAVENOUS at 12:01

## 2021-11-30 RX ADMIN — ENOXAPARIN SODIUM 30 MG: 30 INJECTION SUBCUTANEOUS at 10:02

## 2021-11-30 RX ADMIN — SENNOSIDES 8.6 MG: 8.6 TABLET, FILM COATED ORAL at 12:03

## 2021-11-30 ASSESSMENT — PAIN SCALES - GENERAL
PAINLEVEL_OUTOF10: 7
PAINLEVEL_OUTOF10: 2

## 2021-11-30 NOTE — PROCEDURES
PICC PLACEMENT:  1900: First arrival to facility; +Nephrology clearance needed (see my previous note)  2003:  +SUHAS Jameson obtained Nephrology clearance to place PICC from Dr. Salas Wilkinson  2130:  Second arrival to facility; preprocedure & timeout done w primary RN Gisell; no difficulty accessing L Basilic vein; picc tip is verified using Tele[employer] ECG Technology; +VPS tracing visualized; however, pwave changes visualized are not definitive of tip verification; had some difficulty advancing picc catheter completely; no blood return when catheter advanced completely; +brisk blood return w external catheter 6 cm out; catheter advanced completely is likely meeting an obstruction and curling up on itself; stat cxr ordered and results are pending at this time; reported same to primary RN; Nursing Order for PICC OK to use will be placed in EMR once cxr is resulted; pt tolerated procedure very well; she will remain in bed at rest post procedure to promote hemostasis to the insertion site; pt is left in a position of comfort w siderails up x2, call light in reach and bed is locked in lowest position. MATHEW Lima RN, BSN, [facility]  Dynamic Daniel 49:  Left arm PICC is identified with the catheter tip in the left brachiocephalic vein. Had discussion w SUHAS Capma re further plan for second attempt vs placing a Midline instead; d/t the line not being urgent at this time, we are both in agreement that more precise answers may be made during the day when MD is available to discuss pts options and ECF line requirements    OPTIONS:  1. If Midine is sufficient for ECF, then Midline to L arm   2.  Attempt PICC to the R arm if no contraindications to use the R arm    L arm PICC is pulled at this time; insertion site is covered w stat seal and sterile gauze and secured w sterile dsg; pt tolerated well; she is also updated on potential plan for PICC or Midline tomorrow; she verbalizes understanding of same

## 2021-11-30 NOTE — PROGRESS NOTES
Comprehensive Nutrition Assessment    Type and Reason for Visit:  Reassess    Nutrition Recommendations/Plan:   Continue regular diet  Encourage po intakes  Continue Ensure ONS  Monitor po intakes, nutrition adequacy, weights, pertinent labs, BMs    Nutrition Assessment:  Follow up: Pt remains nutritionally compromsied d/t variable po intakes. Pt reports that her appetite is okay, but not large. Currently on a regular diet with po intakes 1-100% per EMR. Pt reports that she has been drinking about 1/2 of an Ensure Plus daily. Encouraged po intakes. Pt denied having any questions. Will provide pt with Ensure coupons prior to d/c. Malnutrition Assessment:  Malnutrition Status: At risk for malnutrition (Comment)      Estimated Daily Nutrient Needs:  Energy (kcal):  8706-5522; Weight Used for Energy Requirements:  Current (67 kg)     Protein (g):  67-80; Weight Used for Protein Requirements:  Current (1.0-1.2 g/kg)        Fluid (ml/day):  1 ml/kcal      Nutrition Related Findings:  Small BM on 11/28      Wounds:   (no redness and bruising)       Current Nutrition Therapies:    ADULT DIET; Regular  ADULT ORAL NUTRITION SUPPLEMENT; Lunch; Standard High Calorie/High Protein Oral Supplement    Anthropometric Measures:  · Height: 5' 5\" (165.1 cm)  · Current Body Weight: 159 lb 2.8 oz (72.2 kg)   · Ideal Body Weight: 125 lbs  · BMI: 26.5  · BMI Categories: Normal Weight (BMI 18.5-24. 9)       Nutrition Diagnosis:   · Inadequate oral intake related to impaired respiratory function (COVID) as evidenced by intake 26-50%, intake 51-75%      Nutrition Interventions:   Food and/or Nutrient Delivery:  Continue Current Diet, Start Oral Nutrition Supplement  Nutrition Education/Counseling:  No recommendation at this time   Coordination of Nutrition Care:  Continue to monitor while inpatient    Goals:  Pt will have po intakes 50% or greater this admission       Nutrition Monitoring and Evaluation:   Behavioral-Environmental Outcomes:  None Identified   Food/Nutrient Intake Outcomes:  Food and Nutrient Intake, Supplement Intake  Physical Signs/Symptoms Outcomes:  Nutrition Focused Physical Findings, Biochemical Data     Discharge Planning:    Continue current diet, Continue Oral Nutrition Supplement     Electronically signed by Justin Fernandez RD, LD on 11/30/21 at 11:52 AM EST    Contact: [phone]

## 2021-11-30 NOTE — PROGRESS NOTES
Occupational Therapy  OT follow up attempted, pt off floor for line placement. Will continue to follow per POC.   ZOIE Whitney/AYDEE

## 2021-11-30 NOTE — PROGRESS NOTES
Nephrology Progress  Note                                                                                                                                                                                                                                                                                                                                                               Office : [phone]     Fax :[phone]              Patient's Name: Syed Klein  12:34 PM  11/30/2021    Reason for Consult:  Metabolic acidosis    Requesting Physician:  Master Smart DO      Chief Complaint:  SOB, PNA      Interval History :    Overall improving  No acute issue overnight  On room air  Serum Na 133   -----> 134 ---> 133 ----> 128  ----> 132 --> 133 --> 135 ----> 134  Bicarb 17 ------> 23 ---> 16 ----> 26  ---> 28 ---> 25  SBP stable , soft side  No diarrhea or vomiting    Weakness and deconditioning +    History of Present Ilness:     Syed Klein is a 80 y.o. female with PMH of RA , HTN     Being treated for Acute respiratory failure 2/2 COVID 19 PNA    Getting decadron   S/p remdesivir    Denies diarrhea    O2 requirement stable    Making urine        Past Medical History:   Diagnosis Date    Cellulitis of left lower leg 4/21/2019    Closed pelvic rim fracture 9/4/2014    Fibromyalgia     Foot drop, right foot     Fractures     History of blood transfusion     1997    History of temporal artery biopsy 10/06/2020    Hx of blood clots     PNA (pneumonia) 12/1997    Prepatellar bursitis of right knee 9/15/2020    Rheumatoid arthritis ([address]     Venous ulcer of left leg ([address] 08/2019    Venous ulcer of right leg ([address] 10/2016       Past Surgical History:   Procedure Laterality Date    IR INS PICC VAD W SQ PORT GREATER THAN 5 Left 11/29/2021    unable to advance PICC catheter completely into SVC    TONSILLECTOMY      VEIN SURGERY Right 10/27/2016    saphenous VNUS closure       Family History   Problem Relation Age of Onset    Stroke Mother     Stroke Father         reports that she has never smoked. She has never used smokeless tobacco. She reports current alcohol use. She reports that she does not use drugs.     Allergies:  Erythromycin, Hydrochlorothiazide, Silver sulfadiazine, and Doxycycline    Current Medications:    [START ON 12/1/2021] meropenem (MERREM) 1,000 mg in sodium chloride 0.9 % 100 mL IVPB (mini-bag), Q8H  senna (SENOKOT) tablet 8.6 mg, Daily  lidocaine 1 % injection 5 mL, Once  sodium chloride flush 0.9 % injection 5-40 mL, 2 times per day  sodium chloride flush 0.9 % injection 5-40 mL, PRN  0.9 % sodium chloride infusion, PRN  lidocaine 1 % injection 5 mL, Once  sodium chloride flush 0.9 % injection 5-40 mL, 2 times per day  sodium chloride flush 0.9 % injection 5-40 mL, PRN  0.9 % sodium chloride infusion, PRN  polyethylene glycol (GLYCOLAX) packet 17 g, Daily  predniSONE (DELTASONE) tablet 7.5 mg, Daily  sodium bicarbonate tablet 650 mg, Daily  hydrALAZINE (APRESOLINE) tablet 100 mg, 3 times per day  metoprolol succinate (TOPROL XL) extended release tablet 100 mg, Daily  sodium chloride flush 0.9 % injection 10 mL, PRN  0.9 % sodium chloride infusion, PRN  potassium chloride (KLOR-CON M) extended release tablet 40 mEq, PRN   Or  potassium bicarb-citric acid (EFFER-K) effervescent tablet 40 mEq, PRN   Or  potassium chloride 10 mEq/100 mL IVPB (Peripheral Line), PRN  magnesium sulfate 1000 mg in dextrose 5% 100 mL IVPB, PRN  ondansetron (ZOFRAN-ODT) disintegrating tablet 4 mg, Q8H PRN   Or  ondansetron (ZOFRAN) injection 4 mg, Q6H PRN  acetaminophen (TYLENOL) tablet 650 mg, Q6H PRN   Or  acetaminophen (TYLENOL) suppository 650 mg, Q6H PRN  guaiFENesin-dextromethorphan (ROBITUSSIN DM) 100-10 MG/5ML syrup 5 mL, Q4H PRN  Vitamin D (CHOLECALCIFEROL) tablet 2,000 Units, Daily  enoxaparin (LOVENOX) injection 30 mg, BID  benzonatate (TESSALON) capsule 200 mg, TID PRN  albuterol sulfate  (90 Base) MCG/ACT inhaler 2 puff, Q4H PRN  0.9 % sodium chloride bolus, PRN        Review of Systems:   14 point ROS obtained but were negative except mentioned in HPI      Physical exam:     Vitals:  /67   Pulse 67   Temp 97.5 °F (36.4 °C) (Oral)   Resp 19   Ht 5' 5\" (1.651 m)   Wt 159 lb 2.8 oz (72.2 kg)   SpO2 91%   BMI 26.49 kg/m²   Constitutional:  OAA X3 NAD  Skin: no rash, turgor wnl  Heent:  eomi, mmm  Neck: no bruits or jvd noted  Cardiovascular:  S1, S2 without m/r/g  Respiratory: CTA B without w/r/r  Abdomen:  +bs, soft, nt, nd  Ext: no  lower extremity edema  Psychiatric: mood and affect appropriate  Musculoskeletal:  Rom, muscular strength intact    Data:   Labs:  CBC:   Recent Labs     11/28/21  0605 11/29/21  0607 11/30/21  0559   WBC 6.0 6.1 4.6   HGB 10.3* 10.1* 9.5*    185 178     BMP:    Recent Labs     11/28/21  0605 11/29/21  0607 11/30/21  0600    140 139   K 4.0 4.3 3.8    109 111*   CO2 24 25 22   BUN 25* 26* 29*   CREATININE 0.7 0.6 0.6   GLUCOSE 85 91 88     Ca/Mg/Phos:   Recent Labs     11/28/21  0605 11/29/21  0607 11/30/21  0600   CALCIUM 9.4 9.5 9.1     Hepatic:   No results for input(s): AST, ALT, ALB, BILITOT, ALKPHOS in the last 72 hours. Troponin:   No results for input(s): TROPONINI in the last 72 hours. BNP: No results for input(s): BNP in the last 72 hours. Lipids: No results for input(s): CHOL, TRIG, HDL, LDLCALC, LABVLDL in the last 72 hours. ABGs:   No results for input(s): PHART, PO2ART, LEM7ROL in the last 72 hours. INR:   Recent Labs     11/29/21  1610   INR 0.98     UA:No results for input(s): Leafy Alstrom, GLUCOSEU, BILIRUBINUR, Gloria Frank, BLOODU, PHUR, PROTEINU, UROBILINOGEN, NITRU, LEUKOCYTESUR, Randine Friar in the last 72 hours. Urine Microscopic: No results for input(s): LABCAST, BACTERIA, COMU, HYALCAST, WBCUA, RBCUA, EPIU in the last 72 hours.   Urine Culture:   No results for input(s): LABURIN in the last 72 hours.  Urine Chemistry:   No results for input(s): CLUR, LABCREA, PROTEINUR, NAUR in the last 72 hours. IMAGING:  XR CHEST PORTABLE   Final Result   1. Left arm PICC tip is in the left brachiocephalic vein. 2. The lucency in the left base could represent a basilar pneumothorax or may   be artifactual.   3. Worsening bilateral pneumonia. XR CHEST PORTABLE   Final Result   1. No significant change in appearance of multifocal pneumonia, to include   atypical viral causes. 2. Stable cardiomegaly. CT CHEST PULMONARY EMBOLISM W CONTRAST   Final Result   No evidence of a pulmonary embolus. Mildly dilated and atherosclerotic thoracic aorta with no aneurysm or   dissection. Moderate subpleural and interstitial ground-glass opacities scattered   throughout both lungs which probably represents multisegmental   bronchopneumonia secondary to the patient's known COVID-19 disease      Single moderate compression fracture of T11 which may be chronic. Recommend   clinical follow-up of the area. XR CHEST PORTABLE   Final Result   Bilateral airspace disease suggestive of atypical viral pneumonia. Superimposed edema not excluded. Assessment/Plan     1. NAGMA     Bicarb 19 since 11/14  Bicarb 26 on admission  Denies diarrhea    Renal fn wnl, GFR > 60 ml/hr    Plan    Overall clinically improving   Stable renal standpoint  Encourage PO intake of water to thirst  Bicarb improved  moniter bicarb and electrolytes        2.  COVID PNA    Acute hypoxic respiratory failure     On decadron  remdesvir  O2      3 HTN : controlled      On Metoprolol  Increase Hydralazine to 100 mg  TID       4 Dementia    5 hypotension    Give gentle IV hydration  Keep MAP > 65 mmhg    6 Hyponatremia    Improving    moniter    Encourage high protein/solute diet      Thank you for allowing us to participate in care of Henry Bryant MD  Feel free to contact me   [facility] [address]  Office : [phone]  Fax :[phone]

## 2021-11-30 NOTE — PROGRESS NOTES
Physical Therapy  Facility/Department: [place] [address] - MED SURG/ORTHO  Daily Treatment Note  NAME: Carlee Sun  : 2/3/1933  MRN: [id number]    Date of Service: 2021    Discharge Recommendations:  Subacute/Skilled Nursing Facility   PT Equipment Recommendations  Equipment Needed: No    Assessment   Body structures, Functions, Activity limitations: Decreased functional mobility ; Decreased strength; Decreased endurance; Decreased balance  Assessment: Pt rpts still feeling tired today but willing to get OOB. Pt able to come supine to sit Vero & with STS CGA A 1. She ambulated with RW 76' cga. Pt is recommended for con't skilled PT and SNF at D/c. Treatment Diagnosis: Decreased endurance  Prognosis: Good  Decision Making: Medium Complexity  PT Education: Goals; General Safety; Gait Training; PT Role; Plan of Care; Disease Specific Education; Functional Mobility Training; Equipment; Precautions; Transfer Training; Energy Conservation; Home Exercise Program  Patient Education: Disease-specific education: Pt educated on role of PT in hospital, current D/C recs, and benefits of regular OOB activity while in hospital in order to decrease risks of prolonged bedrest.  Pt verbalizes understanding. REQUIRES PT FOLLOW UP: Yes  Activity Tolerance  Activity Tolerance: Patient Tolerated treatment well  Activity Tolerance: 116/67  HR 67  O2 97% RA     Patient Diagnosis(es): The primary encounter diagnosis was Acute respiratory failure with hypoxia ([address] Diagnoses of COVID-19 and Acute cystitis without hematuria were also pertinent to this visit. has a past medical history of Cellulitis of left lower leg, Closed pelvic rim fracture, Fibromyalgia, Foot drop, right foot, Fractures, History of blood transfusion, History of temporal artery biopsy, Hx of blood clots, PNA (pneumonia), Prepatellar bursitis of right knee, Rheumatoid arthritis ([address] Venous ulcer of left leg ([address] and Venous ulcer of right leg ([address]    has a past surgical history that includes Tonsillectomy; Vein Surgery (Right, 10/27/2016); and IR INSERT PICC VAD W SQ PORT >5 YEARS (Left, 11/29/2021). Restrictions  Restrictions/Precautions  Restrictions/Precautions: General Precautions, Up as Tolerated, Contact Precautions  Position Activity Restriction  Other position/activity restrictions: High fall risk per nursing assessment, up with assistance, telemetry with continuous pulse oximetry  Subjective   General  Chart Reviewed: Yes  Additional Pertinent Hx: R foot drop  Response To Previous Treatment: Patient with no complaints from previous session. Family / Caregiver Present: No  Referring Practitioner: Rodrigue London MD  Subjective  Subjective: Pt agreeable to PT session  General Comment  Comments: supine in bed on arrival and cleared by RN  Pain Screening  Patient Currently in Pain: Denies  Vital Signs  Patient Currently in Pain: Denies       Orientation  Orientation  Overall Orientation Status: Within Functional Limits  Cognition      Objective   Bed mobility  Supine to Sit: Contact guard assistance  Transfers  Sit to Stand: Contact guard assistance  Stand to sit: Contact guard assistance  Ambulation  Ambulation?: Yes  Ambulation 1  Surface: level tile  Device: Rolling Walker  Assistance: Stand by assistance  Quality of Gait: Pt amb with slow cindy and decreased stride length B. Steppage gait noted on R secondary to baseline foot drop. Gait Deviations: Slow Cindy; Decreased step length; Decreased step height  Distance: 75', 25'     Balance  Posture: Good  Sitting - Static: Good  Sitting - Dynamic: Good; -  Standing - Static: Fair; +  Standing - Dynamic: Fair (rw)  Exercises  Quad Sets: x 15 BLE  Heelslides: X 15 BLE  Gluteal Sets: x 15 bilat  Hip Flexion: X 15 BLE  Hip Abduction: X 15 BLE  Knee Long Arc Quad: X 15 BLE  Ankle Pumps: X 15 LLE         Comment: toilet transfers cga.   standing balance sw 30\"cga         AM-PAC Score  AM-PAC Inpatient Mobility Raw Score : 15 (11/26/21 1121)  AM-PAC Inpatient T-Scale Score : 39.45 (11/26/21 1121)  Mobility Inpatient CMS 0-100% Score: 57.7 (11/26/21 1121)  Mobility Inpatient CMS G-Code Modifier : CK (11/26/21 1121)          Goals  Short term goals  Time Frame for Short term goals: 1 week, 11/23/21 (unless otherwise specified) -- GOALs extended to 12/6 d/t prolonged hospital stay. Short term goal 1: Pt will transfer supine <-> sit with (I)  -11/30 CGA  Short term goal 2: Pt will transfer sit <-> stand and bed>chair using RW/4WW with modified(I)   -11/30 CGA  Short term goal 3: Pt will ambulate x 125 feet using RW/4WW with SBA   -11/30 75' with RW CGA  Short term goal 4: By 11/19/21: Pt will tolerate 15-20 reps BLE exercise for strengthening, balance, and endurance   -11/30 on-going  Patient Goals   Patient goals : \"To be able to keep house and do all of my cooking and laundry like I used to. \"    Plan    Plan  Times per week: 2-3x/week in acute care  Times per day: Daily  Specific instructions for Next Treatment: Progress ther ex and mobility as tolerated  Current Treatment Recommendations: Strengthening, Balance Training, Endurance Training, Functional Mobility Training, Transfer Training, Gait Training, Home Exercise Program, Safety Education & Training, Equipment Evaluation, Education, & procurement  Safety Devices  Type of devices:  All fall risk precautions in place, Call light within reach, Chair alarm in place, Gait belt, Left in chair, Nurse notified     Therapy Time   Individual Concurrent Group Co-treatment   Time In 0845         Time Out 0925         Minutes 40         Timed Code Treatment Minutes: Gary Schilling

## 2021-11-30 NOTE — PROGRESS NOTES
VASCULAR ACCESS SERVICES:  Arrived to facility to place PICC as ordered; reviewed EMR and had discussion w primary RN Basil [place] re need for Nephrology clearance to have PICC placed; pt was seen today by Dr. Wendy Newman; PICC may be done tomorrow; however, I will follow up in a couple hrs to see they were able to obtain Nephro clearance and I will place tonight; I will return call Fabiola DAI later to follow up    MATHEW Leach RN, BSN, [place].

## 2021-11-30 NOTE — PROGRESS NOTES
Called placed to Dr. Alysha Alcantara regarding PICC placement. Waiting on return call. Please schedule follow up in October.

## 2021-11-30 NOTE — CARE COORDINATION
CASE MANAGEMENT DISCHARGE SUMMARY    DISCHARGE TO: [facility] SKILLED    TRANSPORTATION: Family will transport      NOTIFIED: PATIENT, facility, daughter Pieter Harris and RN    HENS: YES    PRECERTIFICATION OBTAINED: YES/ [facility] [place]     REPORT: NURSE TO CALL REPORT TO: # [phone]    EMILY/AVS Faxed to facility     Spoke to [address] [place] [address] and [place] [facility] and they are aware of pt's IV antibiotic needs.  Orders faxed

## 2021-11-30 NOTE — PROGRESS NOTES
Midline insertion Procedure Note  Malgorzata Clay   Admitted- 11/12/2021  1:58 PM  Admission diagnosis- Acute cystitis without hematuria [N30.00]  Acute respiratory failure with hypoxia ([facility] [J96.01]  COVID-19 [U07.1]  Acute respiratory failure due to severe acute respiratory syndrome coronavirus 2 (SARS-CoV-2) infection ([facility] [U07.1, J96.00]      Attending Physician- Misty Cam MD  Ordering Physician- Dr. Logan Mathur  Indication for Insertion: Home Antibiotics    Lab Results   Component Value Date    INR 0.98 11/29/2021    PROTIME 11.1 11/29/2021     Lab Results   Component Value Date    WBC 4.6 11/30/2021    HGB 9.5 (L) 11/30/2021    HCT 27.8 (L) 11/30/2021     11/30/2021     Lab Results   Component Value Date    CREATININE 0.6 11/30/2021    BUN 29 (H) 11/30/2021       Catheter Insertion Date- 11/30/2021   Catheter Brand- Arrowg+chato Blue Advance Midline  Lot Number- [id number]  Lumen-Single  Expiration date- 9/30/2022    Insertion Site- Right Basilic  Vein Diameter- .43 cm  Hebrew Size- 4.5  Catheter Length- 15 cm  Exposed Catheter Length- 0cm   Easy insertion- Yes  Able to Aspirate Blood- Yes  Easy Flush- Yes    Midline insertion successful- Yes  Ultrasound- yes    Okay To Use Midline- \"Yes  Report called to-  RN    Electronically signed by Angelica Erwin, RN, RN on 11/30/2021 at 3:22 PM

## 2021-11-30 NOTE — PLAN OF CARE
Problem: Nutrition  Goal: Optimal nutrition therapy  11/30/2021 1154 by Varsha Gonzalez RD, MARYSE  Outcome: Ongoing  Note: Nutrition Problem #1: Inadequate oral intake  Intervention: Food and/or Nutrient Delivery: Continue Current Diet, Start Oral Nutrition Supplement  Nutritional Goals: Pt will have po intakes 50% or greater this admission

## 2021-11-30 NOTE — PROGRESS NOTES
Called IR for ETA on midline placement, I was told \"it will be a little while\" informed them that this pt is d/c'ing pending this placement. Still waiting.

## 2021-11-30 NOTE — DISCHARGE SUMMARY
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 Hospital Course:  \"Anne Barrera is a 80 y.o. female. She began to feel generally ill 11/10/21. She noticed severe fatigue which started after participating in PT. She thought she might have over exerted herself, but fatigue persisted. She sought care at an urgent care center. When evaluated there she has noted to be mildly hypoxemic so was sent to the ER. While in the ER O2 flow rate was increased to 4L/min. \"      COVID-19 pneumonia, with acute hypoxic respiratory failure. Onset of symptoms 11/10, she completed a 20-day period of isolation. She was vaccinated. She completed a course of remdesivir and dexamethasone. Syncope due to exertional hypoxia. No further events. ESBL e.coli acute cystitis. PICC placed. Meropenem here, then a dose of ertapenem here, and will discharge with ertapenem 1 gram IV qd for another 5 days. Then remove PICC. Patient did have dysuria. HTN. Hydralazine, metoprolol. H/o PMR and GCA. She is on prednisone 7.5 mg po qd.            Physical Exam Performed:     /67   Pulse 67   Temp 97.5 °F (36.4 °C) (Oral) Resp 19   Ht 5' 5\" (1.651 m)   Wt 159 lb 2.8 oz (72.2 kg)   SpO2 91%   BMI 26.49 kg/m²       General appearance:  No apparent distress, appears stated age and cooperative. HEENT:  Normal cephalic, atraumatic without obvious deformity. Pupils equal, round, and reactive to light. Extra ocular muscles intact. Conjunctivae/corneas clear. Neck: Supple, with full range of motion. No jugular venous distention. Trachea midline. Respiratory:  Normal respiratory effort. Clear to auscultation, bilaterally without Rales/Wheezes/Rhonchi. Cardiovascular:  Regular rate and rhythm with normal S1/S2 without murmurs, rubs or gallops. Abdomen: Soft, non-tender, non-distended with normal bowel sounds. Musculoskeletal:  No clubbing, cyanosis or edema bilaterally. Full range of motion without deformity. Skin: Skin color, texture, turgor normal.  No rashes or lesions. Neurologic:  Neurovascularly intact without any focal sensory/motor deficits. Cranial nerves: II-XII intact, grossly non-focal.  Psychiatric:  Alert and oriented, thought content appropriate, normal insight  Capillary Refill: Brisk,< 3 seconds   Peripheral Pulses: +2 palpable, equal bilaterally       Labs: For convenience and continuity at follow-up the following most recent labs are provided:      CBC:    Lab Results   Component Value Date    WBC 4.6 11/30/2021    HGB 9.5 11/30/2021    HCT 27.8 11/30/2021     11/30/2021       Renal:    Lab Results   Component Value Date     11/30/2021    K 3.8 11/30/2021    K 4.3 11/17/2021     11/30/2021    CO2 22 11/30/2021    BUN 29 11/30/2021    CREATININE 0.6 11/30/2021    CALCIUM 9.1 11/30/2021         Significant Diagnostic Studies    Radiology:   XR CHEST PORTABLE   Final Result   1. Left arm PICC tip is in the left brachiocephalic vein. 2. The lucency in the left base could represent a basilar pneumothorax or may   be artifactual.   3. Worsening bilateral pneumonia.          XR CHEST PORTABLE Final Result   1. No significant change in appearance of multifocal pneumonia, to include   atypical viral causes. 2. Stable cardiomegaly. CT CHEST PULMONARY EMBOLISM W CONTRAST   Final Result   No evidence of a pulmonary embolus. Mildly dilated and atherosclerotic thoracic aorta with no aneurysm or   dissection. Moderate subpleural and interstitial ground-glass opacities scattered   throughout both lungs which probably represents multisegmental   bronchopneumonia secondary to the patient's known COVID-19 disease      Single moderate compression fracture of T11 which may be chronic. Recommend   clinical follow-up of the area. XR CHEST PORTABLE   Final Result   Bilateral airspace disease suggestive of atypical viral pneumonia. Superimposed edema not excluded. Consults:     IP CONSULT TO HOSPITALIST  IP CONSULT TO PHARMACY  IP CONSULT TO SPIRITUAL SERVICES  IP CONSULT TO SPIRITUAL SERVICES  IP CONSULT TO PHYSICAL MEDICINE REHAB  IP CONSULT TO NEPHROLOGY  IP CONSULT TO PSYCHIATRY    Disposition:  SNF     Condition at Discharge: Stable    Discharge Instructions/Follow-up:  Follow up with PCP within 1-2 weeks. Code Status:  Full Code     Activity: activity as tolerated    Diet: regular diet      Discharge Medications:     Current Discharge Medication List           Details   Denosumab (PROLIA SC) Inject into the skin Injection Q 6 months      acetaminophen (TYLENOL) 500 MG tablet Take 500 mg by mouth every 6 hours as needed for Pain      Tocilizumab (ACTEMRA SC) Inject into the skin every 14 days      predniSONE (DELTASONE) 5 MG tablet Take 7.5 mg by mouth daily       LISINOPRIL PO Take 10 mg by mouth daily       furosemide (LASIX) 20 MG tablet Take 20 mg by mouth daily. metoprolol (TOPROL-XL) 100 MG XL tablet Take 100 mg by mouth daily.                Time Spent on discharge is more than 30 minutes in the examination, evaluation, counseling and review of medications and discharge plan. Signed:    Marisa Slater MD   11/30/2021      Thank you Huma Tovar DO for the opportunity to be involved in this patient's care. If you have any questions or concerns please feel free to contact me at [phone].

## 2022-07-11 NOTE — DISCHARGE INSTRUCTIONS
[address] [facility] Physician Orders and Discharge [facility]  [address], [address]  [place], [facility]  Telephone: [phone]      Fax: [phone] home care company:  N/A . Your wound-care supplies will be provided by:  [employer] . NAME:  Jeanne Ely   YOB: 1933  PRIMARY DIAGNOSIS FOR WOUND CARE CENTER:  Venous Leg Ulcers    Wound cleansing:   Do not scrub or use excessive force. Wash hands with soap and water before and after dressing changes. Prior to applying a clean dressing, cleanse wound with normal saline, wound cleanser, or mild soap and water. Ask your physician or nurse before getting the wound(s) wet in the shower. Wound care for home:    Left Lower Leg/ankle: (please complete AVS before dresssing today)  Vashe  Triad mixed with Polysporin to wound  4x4  Compri 2 Lite    Right Lower Extremity:  Vashe   Triamcinolone to rash  Triad, Silver Alginate, ABD  Compri 2 Lite    KEEP THESE WRAPS CLEAN, DRY AND IN PLACE FOR THE WEEK        Please note, all wounds (unless stated otherwise here) were mechanically debrided at the time of cleansing here in the wound-care center today, so a small amount of pain, drainage or bleeding from that process might be expected, and is normal.     All products for home use, including multiple products for a single wound if applicable, are medically necessary in order to achieve the best chance at timely wound healing. See provider documentation for details if needed. Substituted dressings applied in the [facility] ER today, if applicable:        New orders for this week (labs, imaging, medications, etc.):    PLEASE BRING IN ANY COMPRESSION GARMENTS/HOSE YOU HAVE AT HOME TO YOUR NEXT VISIT SO DR. LAKHANI CAN SEE    DR. LAKHANI OBTAINED A WOUND CULTURE FROM RIGHT LOWER LEG AND HE WILL CALL YOU ON Monday TO LET YOU KNOW WHAT FINAL RESULTS ARE AND IF ANY ANTIBIOTICS NEED TO BE ORDERED    Additional instructions for specific diagnoses:        F/U Appointment is with Dr. Theodore Hui in 1 week, on                                   at                       .     Your nurse  is Les Jimenes RN     If we applied slip-resistant hospital socks today, be sure to remove them at least once a day to inspect your toes or feet, even if you're not changing the wraps or dressings underneath. If you see anything concerning (redness, excess moisture, etc), please call and let us know right away. Should you experience any significant changes in your wound(s) (including redness, increased warmth, increased pain, increased drainage, odor, or fever) or have questions about your wound care, please contact the [address] [place] [address] at [phone] Monday-Thursday from 8:00 am - 4:30 pm, or Friday from 8:00 am - 2:30 pm.  If you need help with your wound outside these hours and cannot wait until we are again available, contact your home-care company (if applicable), your PCP, or go to the nearest emergency room.

## 2022-07-15 ENCOUNTER — HOSPITAL ENCOUNTER (OUTPATIENT)
Dept: WOUND CARE | Age: 87
Discharge: HOME | End: 2022-07-15
Payer: MEDICARE

## 2022-07-15 VITALS
HEIGHT: 64 IN | TEMPERATURE: 98.8 F | DIASTOLIC BLOOD PRESSURE: 87 MMHG | HEART RATE: 61 BPM | WEIGHT: 150 LBS | BODY MASS INDEX: 25.61 KG/M2 | RESPIRATION RATE: 18 BRPM | SYSTOLIC BLOOD PRESSURE: 162 MMHG

## 2022-07-15 DIAGNOSIS — L03.115 CELLULITIS OF RIGHT ANKLE: ICD-10-CM

## 2022-07-15 DIAGNOSIS — L97.321 ULCER OF LEFT ANKLE, LIMITED TO BREAKDOWN OF SKIN (HCC): Primary | ICD-10-CM

## 2022-07-15 DIAGNOSIS — I87.2 PERIPHERAL VENOUS INSUFFICIENCY: ICD-10-CM

## 2022-07-15 DIAGNOSIS — L97.312 ULCER OF RIGHT ANKLE, WITH FAT LAYER EXPOSED (HCC): ICD-10-CM

## 2022-07-15 PROCEDURE — 99212 OFFICE O/P EST SF 10 MIN: CPT

## 2022-07-15 PROCEDURE — 87077 CULTURE AEROBIC IDENTIFY: CPT

## 2022-07-15 PROCEDURE — 99213 OFFICE O/P EST LOW 20 MIN: CPT | Performed by: INTERNAL MEDICINE

## 2022-07-15 PROCEDURE — 87186 SC STD MICRODIL/AGAR DIL: CPT

## 2022-07-15 PROCEDURE — 11042 DBRDMT SUBQ TIS 1ST 20SQCM/<: CPT | Performed by: INTERNAL MEDICINE

## 2022-07-15 PROCEDURE — 87205 SMEAR GRAM STAIN: CPT

## 2022-07-15 PROCEDURE — 11042 DBRDMT SUBQ TIS 1ST 20SQCM/<: CPT

## 2022-07-15 PROCEDURE — 97597 DBRDMT OPN WND 1ST 20 CM/<: CPT

## 2022-07-15 PROCEDURE — 87070 CULTURE OTHR SPECIMN AEROBIC: CPT

## 2022-07-15 PROCEDURE — 97597 DBRDMT OPN WND 1ST 20 CM/<: CPT | Performed by: INTERNAL MEDICINE

## 2022-07-15 RX ORDER — LIDOCAINE HYDROCHLORIDE 40 MG/ML
SOLUTION TOPICAL ONCE
Status: CANCELLED | OUTPATIENT
Start: 2022-07-15 | End: 2022-07-15

## 2022-07-15 RX ORDER — LIDOCAINE 40 MG/G
CREAM TOPICAL ONCE
Status: CANCELLED | OUTPATIENT
Start: 2022-07-15 | End: 2022-07-15

## 2022-07-15 RX ORDER — LIDOCAINE 40 MG/G
CREAM TOPICAL ONCE
Status: DISCONTINUED | OUTPATIENT
Start: 2022-07-15 | End: 2022-07-16 | Stop reason: HOSPADM

## 2022-07-15 RX ORDER — BACITRACIN ZINC AND POLYMYXIN B SULFATE 500; 1000 [USP'U]/G; [USP'U]/G
OINTMENT TOPICAL ONCE
Status: CANCELLED | OUTPATIENT
Start: 2022-07-15 | End: 2022-07-15

## 2022-07-15 RX ORDER — LIDOCAINE 50 MG/G
OINTMENT TOPICAL ONCE
Status: CANCELLED | OUTPATIENT
Start: 2022-07-15 | End: 2022-07-15

## 2022-07-15 RX ORDER — LOSARTAN POTASSIUM 50 MG/1
50 TABLET ORAL DAILY
COMMUNITY

## 2022-07-15 ASSESSMENT — PAIN DESCRIPTION - FREQUENCY: FREQUENCY: INTERMITTENT

## 2022-07-15 ASSESSMENT — PAIN - FUNCTIONAL ASSESSMENT: PAIN_FUNCTIONAL_ASSESSMENT: ACTIVITIES ARE NOT PREVENTED

## 2022-07-15 ASSESSMENT — PAIN DESCRIPTION - ONSET: ONSET: ON-GOING

## 2022-07-15 ASSESSMENT — PAIN DESCRIPTION - DESCRIPTORS: DESCRIPTORS: ACHING;THROBBING;SHARP

## 2022-07-15 ASSESSMENT — PAIN DESCRIPTION - LOCATION: LOCATION: LEG

## 2022-07-15 ASSESSMENT — PAIN DESCRIPTION - ORIENTATION: ORIENTATION: RIGHT;LEFT

## 2022-07-15 ASSESSMENT — PAIN SCALES - GENERAL: PAINLEVEL_OUTOF10: 3

## 2022-07-15 ASSESSMENT — PAIN DESCRIPTION - PAIN TYPE: TYPE: CHRONIC PAIN

## 2022-07-17 LAB
GRAM STAIN RESULT: ABNORMAL
ORGANISM: ABNORMAL
WOUND/ABSCESS: ABNORMAL

## 2022-07-18 RX ORDER — TRAMADOL HYDROCHLORIDE 50 MG/1
50 TABLET ORAL NIGHTLY PRN
COMMUNITY
End: 2022-07-29 | Stop reason: SDUPTHER

## 2022-07-18 RX ORDER — LINEZOLID 600 MG/1
600 TABLET, FILM COATED ORAL 2 TIMES DAILY
Qty: 14 TABLET | Refills: 0 | Status: SHIPPED | OUTPATIENT
Start: 2022-07-18 | End: 2022-07-29 | Stop reason: ALTCHOICE

## 2022-07-18 RX ORDER — SULFAMETHOXAZOLE AND TRIMETHOPRIM 400; 80 MG/1; MG/1
1 TABLET ORAL 2 TIMES DAILY
Qty: 14 TABLET | Refills: 0 | Status: SHIPPED | OUTPATIENT
Start: 2022-07-18 | End: 2022-07-29 | Stop reason: ALTCHOICE

## 2022-07-18 NOTE — DISCHARGE INSTRUCTIONS
215 [facility] Physician Orders and Discharge 800 [facility], [address]  [place], [facility]  Telephone: [phone]      Fax: [phone] home care company:   N/A . Your wound-care supplies will be provided by:  weekly wraps . NAME:  Tammy Mcgill   YOB: 1933  PRIMARY DIAGNOSIS FOR WOUND CARE CENTER:  Venous Leg Ulcers     Wound cleansing:  Do not scrub or use excessive force. Wash hands with soap and water before and after dressing changes. Prior to applying a clean dressing, cleanse wound with normal saline, wound cleanser, or mild soap and water. Ask your physician or nurse before getting the wound(s) wet in the shower. Wound care for home:     Left Lower Leg/ankle:   Vas he  Triad mixed with Polysporin to wound  44  Compri 2 Lite     Right Lower Leg:  Vas he  Triamcinolone to rash  Triad, Silver Alginate, ABD  Compri 2 Lite     KEEP THESE WRAPS CLEAN, DRY AND IN PLACE FOR THE WEEK           Please note, all wounds (unless stated otherwise here) were mechanically debrided at the time of cleansing here in the wound-care center today, so a small amount of pain, drainage or bleeding from that process might be expected, and is normal.     All products for home use, including multiple products for a single wound if applicable, are medically necessary in order to achieve the best chance at timely wound healing. See provider documentation for details if needed.      Substituted dressings applied in the [address] [place] [address],[address] today, if applicable:           New orders for this week (labs, imaging, medications, etc.):               Additional instructions for specific diagnoses:           F/U Appointment is with Dr. Tati Hollis in 1 week, on                                   at                       .     Your nurse  is Gwendolyn Phelps RN     If we applied slip-resistant hospital socks today, be sure to remove them at least once a day to inspect your toes or feet, even if you're not changing the wraps or dressings underneath. If you see anything concerning (redness, excess moisture, etc), please call and let us know right away. Should you experience any significant changes in your wound(s) (including redness, increased warmth, increased pain, increased drainage, odor, or fever) or have questions about your wound care, please contact the [employer] at [phone] Monday-Thursday from 8:00 am - 4:30 pm, or Friday from 8:00 am - 2:30 pm.  If you need help with your wound outside these hours and cannot wait until we are again available, contact your home-care company (if applicable), your PCP, or go to the nearest emergency room.

## 2022-07-22 ENCOUNTER — HOSPITAL ENCOUNTER (OUTPATIENT)
Dept: WOUND CARE | Age: 87
Discharge: HOME | End: 2022-07-22
Payer: MEDICARE

## 2022-07-22 VITALS
TEMPERATURE: 98.1 F | RESPIRATION RATE: 20 BRPM | WEIGHT: 149.5 LBS | DIASTOLIC BLOOD PRESSURE: 60 MMHG | BODY MASS INDEX: 25.52 KG/M2 | HEIGHT: 64 IN | SYSTOLIC BLOOD PRESSURE: 105 MMHG | HEART RATE: 61 BPM

## 2022-07-22 DIAGNOSIS — L97.312 ULCER OF RIGHT ANKLE, WITH FAT LAYER EXPOSED (HCC): ICD-10-CM

## 2022-07-22 DIAGNOSIS — L97.321 ULCER OF LEFT ANKLE, LIMITED TO BREAKDOWN OF SKIN (HCC): Primary | ICD-10-CM

## 2022-07-22 DIAGNOSIS — I87.2 PERIPHERAL VENOUS INSUFFICIENCY: ICD-10-CM

## 2022-07-22 DIAGNOSIS — L97.322 ULCER OF LEFT ANKLE, WITH FAT LAYER EXPOSED (HCC): ICD-10-CM

## 2022-07-22 PROCEDURE — 11042 DBRDMT SUBQ TIS 1ST 20SQCM/<: CPT

## 2022-07-22 PROCEDURE — 97597 DBRDMT OPN WND 1ST 20 CM/<: CPT

## 2022-07-22 PROCEDURE — 29581 APPL MULTLAYER CMPRN SYS LEG: CPT

## 2022-07-22 PROCEDURE — 11042 DBRDMT SUBQ TIS 1ST 20SQCM/<: CPT | Performed by: INTERNAL MEDICINE

## 2022-07-22 RX ORDER — LIDOCAINE 40 MG/G
CREAM TOPICAL ONCE
Status: CANCELLED | OUTPATIENT
Start: 2022-07-22 | End: 2022-07-22

## 2022-07-22 RX ORDER — BACITRACIN ZINC AND POLYMYXIN B SULFATE 500; 1000 [USP'U]/G; [USP'U]/G
OINTMENT TOPICAL ONCE
Status: CANCELLED | OUTPATIENT
Start: 2022-07-22 | End: 2022-07-22

## 2022-07-22 RX ORDER — LIDOCAINE HYDROCHLORIDE 40 MG/ML
SOLUTION TOPICAL ONCE
Status: CANCELLED | OUTPATIENT
Start: 2022-07-22 | End: 2022-07-22

## 2022-07-22 RX ORDER — LIDOCAINE 40 MG/G
CREAM TOPICAL ONCE
Status: DISCONTINUED | OUTPATIENT
Start: 2022-07-22 | End: 2022-07-23 | Stop reason: HOSPADM

## 2022-07-22 RX ORDER — LIDOCAINE 50 MG/G
OINTMENT TOPICAL ONCE
Status: CANCELLED | OUTPATIENT
Start: 2022-07-22 | End: 2022-07-22

## 2022-07-22 ASSESSMENT — PAIN DESCRIPTION - ORIENTATION: ORIENTATION: LEFT;RIGHT

## 2022-07-22 ASSESSMENT — PAIN DESCRIPTION - ONSET: ONSET: ON-GOING

## 2022-07-22 ASSESSMENT — PAIN DESCRIPTION - DESCRIPTORS: DESCRIPTORS: ACHING;BURNING;THROBBING

## 2022-07-22 ASSESSMENT — PAIN DESCRIPTION - LOCATION: LOCATION: LEG

## 2022-07-22 ASSESSMENT — PAIN DESCRIPTION - PAIN TYPE: TYPE: CHRONIC PAIN

## 2022-07-22 ASSESSMENT — PAIN DESCRIPTION - FREQUENCY: FREQUENCY: INTERMITTENT

## 2022-07-22 NOTE — PLAN OF CARE
Wound debrided and no changes this week in primary wound care regime. Patient is to continue with oral antibiotic regime as ordered, follow up in wound clinic x 1 week. Discharge instructions reviewed with patient, all questions answered, copy given to patient. Dressings were applied to all wounds per M.D. Instructions at this visit.

## 2022-07-22 NOTE — PLAN OF CARE
Patient is to continue and finished her oral antibiotics. MD debrided wounds and patient to have same wound care regime this week. Discharge instructions reviewed with patient, all questions answered, copy given to patient. Dressings were applied to all wounds per M.D. Instructions at this visit.

## 2022-07-25 NOTE — DISCHARGE INSTRUCTIONS
215 [facility] Physician Orders and Discharge 800 [facility]  [address], [address]  [place], [facility]  Telephone: [phone]      Fax: [phone]        Your home care company:   N/A      Your wound-care supplies will be provided by:  weekly wraps      NAME:  Jerry Forrest of BIRTH:  2/3/1933  PRIMARY DIAGNOSIS FOR WOUND CARE CENTER:  Venous Leg Ulcers     Wound cleansing:  Do not scrub or use excessive force. Wash hands with soap and water before and after dressing changes. Prior to applying a clean dressing, cleanse wound with normal saline, wound cleanser, or mild soap and water. Ask your physician or nurse before getting the wound(s) wet in the shower. Wound care for home:     Left Lower Leg/ankle:   Vashe  Triad mixed with Polysporin to wound  4x4  Compri 2 Lite     Right Lower Leg:  Vashe  Triamcinolone to rash  Triad, Silver Alginate, ABD  Compri 2 Lite     KEEP THESE WRAPS CLEAN, DRY AND IN PLACE FOR THE WEEK           Please note, all wounds (unless stated otherwise here) were mechanically debrided at the time of cleansing here in the wound-care center today, so a small amount of pain, drainage or bleeding from that process might be expected, and is normal.     All products for home use, including multiple products for a single wound if applicable, are medically necessary in order to achieve the best chance at timely wound healing. See provider documentation for details if needed. Substituted dressings applied in the [address] [place] [address] Avenue[address] today, if applicable:           New orders for this week (labs, imaging, medications, etc.):       DR. LAKHANI WILL WORK ON GETTING YOUR VELCRO COMPRESSION GARMENTS FROM [facility] AS DISCCUSED TODAY. IF THEY ARE APPROVED THEY WILL BE SHIPPED TO YOUR HOME.   [facility] MAY CALL YOU TO VERIFY [employer] WHICH WOULD BE AN OUT OF STATE CALL            Additional instructions for specific diagnoses:           F/U Appointment is

## 2022-07-28 PROBLEM — L03.115 CELLULITIS OF RIGHT ANKLE: Status: ACTIVE | Noted: 2022-07-28

## 2022-07-28 NOTE — PROGRESS NOTES
[facility]), Venous ulcer of left leg ([facility] [address] and Venous ulcer of right leg ([facility] [address] She has a past surgical history that includes Tonsillectomy; Vein Surgery (Right, 10/27/2016); and IR INSERT PICC VAD W SQ PORT >5 YEARS (Left, 11/29/2021). Her family history includes Stroke in her father and mother. Ms. Nikolai Blake reports that she has never smoked. She has never used smokeless tobacco. She reports current alcohol use. She reports that she does not use drugs. Her current medication list consists of Denosumab, Tocilizumab, acetaminophen, apixaban, furosemide, losartan, metoprolol succinate, and traMADol. Allergies: Erythromycin, Hydrochlorothiazide, Silver sulfadiazine, and Doxycycline    Pertinent items from the review of systems are discussed in the HPI; the remainder of the ROS was reviewed and is negative. Objective:     Vitals:    07/15/22 1003   BP: (!) 162/87   Pulse: 61   Resp: 18   Temp: 98.8 °F (37.1 °C)   TempSrc: Oral   Weight: 150 lb (68 kg)   Height: 5' 4\" (1.626 m)     TODD from two years ago here was 0.89 on the right, 0.99 on the left.     Constitutional:  well-developed, well-nourished, fatigued, NAD, but in some discomfort  Psychiatric:  oriented to person, place and time; mood and affect appropriate for the situation  Eyes:  pupils equal, round and reactive to light; sclerae anicteric, conjunctivae not pale  Cardiovascular:  bilateral pedal pulses palpable, feet warm, good cap refill; trace left lower extremity edema, more mild-moderate right sided, usual hemosiderin and atrophe miroslava, and some patchy areas of indurated skin thickening  Lymphatic:  no inguinal or popliteal adenopathy, no angitis, but I think perhaps a cellulitis around the right ankle-leg ulcer, vs just reactive erythema  Neuro: definite allodynia in the wound beds  Musculoskeletal:  no clubbing, cyanosis or petechiae; RLE and LLE with no gross effusions, joint misalignment or acute arthritis  Gay-ulcer skin: indurated, pink, warm and dry on the left, but some redness on the right  Ulcer(s): left side very small, pale, white, fibrotic, a bit of fibrin and biofilm; right side a larger cluster, a bit of pink and red tissue, but mostly fibronecrotic, into fat tissue, some biofilm, serous exudate, no pus. Photos also saved in electronic chart. Today's Wound Measurements, per RN documentation:  Wound 07/15/22 # 3 Rt Medial lower Leg, Venous, Partial Thickness, Onset 06/22-Wound Length (cm): 6.5 cm    Wound 07/15/22 # 3 Rt Medial lower Leg, Venous, Partial Thickness, Onset 06/22-Wound Width (cm): 3.5 cm    Wound 07/15/22 # 3 Rt Medial lower Leg, Venous, Partial Thickness, Onset 06/22-Wound Depth (cm): 0.2 cm  _____________________________    Lab Results   Component Value Date    LABALBU 2.9 (L) 11/17/2021     Lab Results   Component Value Date    CREATININE 0.6 11/30/2021     Lab Results   Component Value Date    HGB 9.5 (L) 11/30/2021 March arterial studies -- Right Lower Extremity Arterial Findings: There is no significant arterial disease present in the right lower extremity. No aneurysm noted in the right lower extremity arterial system. Left Lower Extremity Arterial Findings: Minor plaque noted left deep femoral artery. No aneurysm noted in the left lower extremity arterial system. Left TODD demonstrates probable false elevation. March venous studies -- There is evidence of chronic thrombus in the right gastrocnemius vein(s). There is evidence of chronic thrombus in the left gastrocnemius vein(s). No superficial venous thrombus detected in the veins of the right lower extremity. No superficial venous thrombus detected in the veins of the left lower extremity. There is no valvular incompetence demonstrated in the deep veins of the right lower extremity. There is reflux identified in the right great saphenous vein from the knee and ankle. There is reflux identified in the right small saphenous vein from the mid calf.  There are incompetent  veins identified at zone(s) 6 in the right lower extremity. There is no valvular incompetence demonstrated in the deep or superficial veins of the left lower extremity. No evidence of incompetent  veins in the left lower extremity. Assessment:     Patient Active Problem List   Diagnosis Code    HTN (hypertension) I10    Localized osteoarthrosis, lower leg M17.10    Eczema L30.9    Foot drop, right M21.371    Lumbosacral spondylosis without myelopathy M47.817    Normocytic anemia D64.9    Peripheral neuropathy G62.9    Senile osteoporosis M81.0    Bilateral lower extremity edema R60.0    CKD (chronic kidney disease) stage 3, GFR 30-59 ml/min ([facility]) N18.30    Peripheral venous insufficiency I87.2    Ulcer of right ankle, with fat layer exposed ([facility] [address] L97.312    PMR (polymyalgia rheumatica) ([facility]) M35.3    Giant cell arteritis ([facility]) M31.6    Acute respiratory failure due to severe acute respiratory syndrome coronavirus 2 (SARS-CoV-2) infection ([facility]) U07.1, J96.00    Current chronic use of systemic steroids Z79.52    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 No signs of ischemia. Main focus will be on debridement, exudate / bioburden mgmt, probably brief oral Abx, compression. Factors contributing to occurrence and/or persistence of the chronic ulcer include edema, venous stasis, decreased mobility, and immunosuppression. Medical necessity of today's visit is shown by the above documentation.  Woody Nash debridement is indicated today, based upon the exam findings in the ulcer(s) above. Procedure note:     Consent obtained. Time out performed per [address] Anesthetic  Anesthetic: 4% Lidocaine Cream     Using a curette, I sharply debrided the left ankle ulcer(s) down through and including the removal of dermis. The type(s) of tissue debrided included fibrin and biofilm. Total Surface Area Debrided: 1 sq cm. Using a curette, I sharply debrided the right ankle ulcer(s) down through and including the removal of subcutaneous tissue. The type(s) of tissue debrided included fibrin, biofilm, and necrotic/eschar. Total Surface Area Debrided: about 15 sq cm. The ulcers were then irrigated with normal saline solution. The procedure was completed with a small amount of bleeding, and hemostasis was with pressure. The patient tolerated the procedure well, with no significant complications. The patient's level of pain during and after the procedure was monitored. Post-debridement measurements, if different from pre-debridement, are in the flowsheet as well. I then took a sample from the right sided ulcer for culture. Discharge plan:     Treatment in the wound care center today, per RN documentation: Wound 07/15/22 # 3 Rt Medial lower Leg, Venous, Partial Thickness, Onset 06/22-Dressing/Treatment: Other (comment) (triad,OpAg,ABD,[id number])  Wound 07/15/22 Wound #4 Left Inner Ankle, Venous, Partial (onset 6/2022)-Dressing/Treatment: Other (comment) (triad,pso,4x4s,[id number]). Per physician order, bilateral application of multilayer compression wrap was performed in the wound care center today, to help manage edema, stasis dermatitis, and/or venous ulcers. Leave primary dressing and multi-layer wrap(s) in place until the next appointment. Also discussed ways to keep the wrap dry for a shower, including a plastic cast-guard, available in retail pharmacies.  She should call before her next visit if there is any significant pain, significant strike-through of drainage to the surface of the wrap, or any significant sense of the wrap sliding down more than an inch or so, bunching-up and abrading her skin. I reminded the patient of the importance of weight management and smoking cessation, if applicable; also encouraged ambulation as tolerated, additional lower extremity exercises as instructed in our education sheet, leg elevation when at rest, and compliance with any recommended dietary, diuretic and compression therapies. Will hold Abx for the moment. I'll call her on Monday with culture results and Abx plans. For the longer-term, will refer her back to vascular for consideration of saphenous and/or  ablation on the right side, once she's healed, given the occurrence of this new ulcer. Will also show her some options for non-stocking (Velcro garment) compression for the longer term. Written discharge instructions given to patient. Follow up in 1 week.     Electronically signed by Elisha Amos MD on 7/28/2022 at 11:40 AM.

## 2022-07-29 ENCOUNTER — HOSPITAL ENCOUNTER (OUTPATIENT)
Dept: WOUND CARE | Age: 87
Discharge: HOME | End: 2022-07-29
Payer: MEDICARE

## 2022-07-29 VITALS
HEIGHT: 64 IN | TEMPERATURE: 97.3 F | WEIGHT: 146 LBS | RESPIRATION RATE: 20 BRPM | SYSTOLIC BLOOD PRESSURE: 121 MMHG | BODY MASS INDEX: 24.92 KG/M2 | HEART RATE: 62 BPM | DIASTOLIC BLOOD PRESSURE: 65 MMHG

## 2022-07-29 DIAGNOSIS — I87.2 PERIPHERAL VENOUS INSUFFICIENCY: ICD-10-CM

## 2022-07-29 DIAGNOSIS — M25.571 ACUTE RIGHT ANKLE PAIN: ICD-10-CM

## 2022-07-29 DIAGNOSIS — L97.321 ULCER OF LEFT ANKLE, LIMITED TO BREAKDOWN OF SKIN (HCC): Primary | ICD-10-CM

## 2022-07-29 DIAGNOSIS — L97.312 ULCER OF RIGHT ANKLE, WITH FAT LAYER EXPOSED (HCC): ICD-10-CM

## 2022-07-29 DIAGNOSIS — R60.0 BILATERAL LOWER EXTREMITY EDEMA: ICD-10-CM

## 2022-07-29 PROCEDURE — 11042 DBRDMT SUBQ TIS 1ST 20SQCM/<: CPT | Performed by: INTERNAL MEDICINE

## 2022-07-29 PROCEDURE — 29581 APPL MULTLAYER CMPRN SYS LEG: CPT

## 2022-07-29 PROCEDURE — 11042 DBRDMT SUBQ TIS 1ST 20SQCM/<: CPT

## 2022-07-29 RX ORDER — TRAMADOL HYDROCHLORIDE 50 MG/1
50 TABLET ORAL EVERY 6 HOURS PRN
Qty: 28 TABLET | Refills: 0 | Status: SHIPPED | OUTPATIENT
Start: 2022-07-29 | End: 2022-08-05

## 2022-07-29 RX ORDER — LIDOCAINE HYDROCHLORIDE 40 MG/ML
SOLUTION TOPICAL ONCE
Status: CANCELLED | OUTPATIENT
Start: 2022-07-29 | End: 2022-07-29

## 2022-07-29 RX ORDER — LIDOCAINE 50 MG/G
OINTMENT TOPICAL ONCE
Status: CANCELLED | OUTPATIENT
Start: 2022-07-29 | End: 2022-07-29

## 2022-07-29 RX ORDER — LIDOCAINE 40 MG/G
CREAM TOPICAL ONCE
Status: CANCELLED | OUTPATIENT
Start: 2022-07-29 | End: 2022-07-29

## 2022-07-29 RX ORDER — LIDOCAINE 40 MG/G
CREAM TOPICAL ONCE
Status: DISCONTINUED | OUTPATIENT
Start: 2022-07-29 | End: 2022-07-30 | Stop reason: HOSPADM

## 2022-07-29 RX ORDER — BACITRACIN ZINC AND POLYMYXIN B SULFATE 500; 1000 [USP'U]/G; [USP'U]/G
OINTMENT TOPICAL ONCE
Status: CANCELLED | OUTPATIENT
Start: 2022-07-29 | End: 2022-07-29

## 2022-07-29 NOTE — PLAN OF CARE
No  changes in wound care regime. Wounds debrided and patient tolerated well. MD called in new prescription for Ultram today. MD also gave demonstration on Velcro compression garments for long term compression options in the near future. Discharge instructions reviewed with patient, all questions answered, copy given to patient. Dressings were applied to all wounds per M.D. Instructions at this visit.

## 2022-08-01 NOTE — DISCHARGE INSTRUCTIONS
[address] [facility] Physician Orders and Discharge [address], [address]  [place], [facility]  Telephone: [phone]      Fax: [phone]        Your home care company:   N/A     Your wound-care supplies will be provided by:  weekly wraps     NAME:  Martina Cr of BIRTH:  2/3/1933  PRIMARY DIAGNOSIS FOR WOUND CARE CENTER:  Venous Leg Ulcers     Wound cleansing:  Do not scrub or use excessive force. Wash hands with soap and water before and after dressing changes. Prior to applying a clean dressing, cleanse wound with normal saline, wound cleanser, or mild soap and water. Ask your physician or nurse before getting the wound(s) wet in the shower. Wound care for home:     Left Lower Leg/ankle:   Vashe  Triad mixed with Polysporin to wound  4x4  Compri 2 Lite     Right Lower Leg:  Vashe  Lotrisone to rash  Triad, Silver Alginate, ABD  Compri 2 Lite     KEEP THESE WRAPS CLEAN, DRY AND IN PLACE FOR THE WEEK           Please note, all wounds (unless stated otherwise here) were mechanically debrided at the time of cleansing here in the wound-care center today, so a small amount of pain, drainage or bleeding from that process might be expected, and is normal.     All products for home use, including multiple products for a single wound if applicable, are medically necessary in order to achieve the best chance at timely wound healing. See provider documentation for details if needed.      Substituted dressings applied in the [facility] today, if applicable:           New orders for this week (labs, imaging, medications, etc.):                     Additional instructions for specific diagnoses:           F/U Appointment is with Dr. Xiang Dudley in 1 week, on                                   at                       .     Your nurse  is Sid Horton RN     If we applied slip-resistant hospital socks today, be sure to remove them at least once a day to inspect your toes or feet, even if you're not changing the wraps or dressings underneath. If you see anything concerning (redness, excess moisture, etc), please call and let us know right away. Should you experience any significant changes in your wound(s) (including redness, increased warmth, increased pain, increased drainage, odor, or fever) or have questions about your wound care, please contact the [facility] [address] at [phone] Monday-Thursday from 8:00 am - 4:30 pm, or Friday from 8:00 am - 2:30 pm.  If you need help with your wound outside these hours and cannot wait until we are again available, contact your home-care company (if applicable), your PCP, or go to the nearest emergency room.

## 2022-08-05 ENCOUNTER — HOSPITAL ENCOUNTER (OUTPATIENT)
Dept: WOUND CARE | Age: 87
Discharge: HOME | End: 2022-08-05
Payer: MEDICARE

## 2022-08-05 VITALS
SYSTOLIC BLOOD PRESSURE: 125 MMHG | HEIGHT: 64 IN | DIASTOLIC BLOOD PRESSURE: 63 MMHG | TEMPERATURE: 98.3 F | WEIGHT: 145.5 LBS | BODY MASS INDEX: 24.84 KG/M2 | RESPIRATION RATE: 20 BRPM | HEART RATE: 48 BPM

## 2022-08-05 DIAGNOSIS — I87.2 PERIPHERAL VENOUS INSUFFICIENCY: ICD-10-CM

## 2022-08-05 DIAGNOSIS — L97.321 ULCER OF LEFT ANKLE, LIMITED TO BREAKDOWN OF SKIN (HCC): Primary | ICD-10-CM

## 2022-08-05 DIAGNOSIS — L97.312 ULCER OF RIGHT ANKLE, WITH FAT LAYER EXPOSED (HCC): ICD-10-CM

## 2022-08-05 DIAGNOSIS — R60.0 BILATERAL LOWER EXTREMITY EDEMA: ICD-10-CM

## 2022-08-05 PROCEDURE — 97597 DBRDMT OPN WND 1ST 20 CM/<: CPT

## 2022-08-05 PROCEDURE — 29581 APPL MULTLAYER CMPRN SYS LEG: CPT

## 2022-08-05 PROCEDURE — 11042 DBRDMT SUBQ TIS 1ST 20SQCM/<: CPT | Performed by: INTERNAL MEDICINE

## 2022-08-05 PROCEDURE — 97597 DBRDMT OPN WND 1ST 20 CM/<: CPT | Performed by: INTERNAL MEDICINE

## 2022-08-05 PROCEDURE — 11042 DBRDMT SUBQ TIS 1ST 20SQCM/<: CPT

## 2022-08-05 RX ORDER — LIDOCAINE 40 MG/G
CREAM TOPICAL ONCE
Status: CANCELLED | OUTPATIENT
Start: 2022-08-05 | End: 2022-08-05

## 2022-08-05 RX ORDER — LIDOCAINE 50 MG/G
OINTMENT TOPICAL ONCE
Status: CANCELLED | OUTPATIENT
Start: 2022-08-05 | End: 2022-08-05

## 2022-08-05 RX ORDER — LIDOCAINE HYDROCHLORIDE 40 MG/ML
SOLUTION TOPICAL ONCE
Status: CANCELLED | OUTPATIENT
Start: 2022-08-05 | End: 2022-08-05

## 2022-08-05 RX ORDER — LIDOCAINE 40 MG/G
CREAM TOPICAL ONCE
Status: DISCONTINUED | OUTPATIENT
Start: 2022-08-05 | End: 2022-08-06 | Stop reason: HOSPADM

## 2022-08-05 RX ORDER — BACITRACIN ZINC AND POLYMYXIN B SULFATE 500; 1000 [USP'U]/G; [USP'U]/G
OINTMENT TOPICAL ONCE
Status: CANCELLED | OUTPATIENT
Start: 2022-08-05 | End: 2022-08-05

## 2022-08-05 ASSESSMENT — PAIN DESCRIPTION - ONSET: ONSET: ON-GOING

## 2022-08-05 ASSESSMENT — PAIN DESCRIPTION - DESCRIPTORS: DESCRIPTORS: ACHING;SHOOTING;THROBBING

## 2022-08-05 ASSESSMENT — PAIN DESCRIPTION - FREQUENCY: FREQUENCY: INTERMITTENT

## 2022-08-05 ASSESSMENT — PAIN DESCRIPTION - LOCATION: LOCATION: LEG

## 2022-08-05 ASSESSMENT — PAIN SCALES - GENERAL: PAINLEVEL_OUTOF10: 9

## 2022-08-05 ASSESSMENT — PAIN DESCRIPTION - PAIN TYPE: TYPE: CHRONIC PAIN

## 2022-08-05 ASSESSMENT — PAIN DESCRIPTION - ORIENTATION: ORIENTATION: LEFT;RIGHT

## 2022-08-05 ASSESSMENT — PAIN - FUNCTIONAL ASSESSMENT: PAIN_FUNCTIONAL_ASSESSMENT: PREVENTS OR INTERFERES SOME ACTIVE ACTIVITIES AND ADLS

## 2022-08-05 NOTE — PLAN OF CARE
MD debrided wounds and patient tolerated well. MD discussed patient increase in pain. Patient had failed trying a Neurontin regime about 2 years ago per her medical history so MD explained this really isn't an option. Patient continues to use Tramadol and MD discussed using as prescribed maybe more than nightly. Will also back off wrap just a bit. Discharge instructions reviewed with patient, all questions answered, copy given to patient. Dressings were applied to all wounds per M.D. Instructions at this visit.

## 2022-08-08 NOTE — DISCHARGE INSTRUCTIONS
[address] [facility] Physician Orders and Discharge [address] [facility]  [address], [address]  [place], [facility]  Telephone: [phone]      Fax: [phone]        Your home care company:   N/A     Your wound-care supplies will be provided by:  weekly wraps     NAME:  Mauro Cashing of BIRTH:  2/3/1933  PRIMARY DIAGNOSIS FOR WOUND CARE CENTER:  Venous Leg Ulcers     Wound cleansing:  Do not scrub or use excessive force. Wash hands with soap and water before and after dressing changes. Prior to applying a clean dressing, cleanse wound with normal saline, wound cleanser, or mild soap and water. Ask your physician or nurse before getting the wound(s) wet in the shower. Wound care for home:     Left Lower Leg/ankle:   Clobetsol to rash  Vashe  Triad mixed with Polysporin to wound  4x4  Compri 2 Lite (wrap with a little less compression than usual)     Right Lower Leg:  Vashe  Clobetsol and Triad to rash, wound edges and mauro wound   Silver Alginate, ABD  Compri 2 Lite (wrap with a little less compression than usual)     KEEP THESE WRAPS CLEAN, DRY AND IN PLACE FOR THE WEEK           Please note, all wounds (unless stated otherwise here) were mechanically debrided at the time of cleansing here in the wound-care center today, so a small amount of pain, drainage or bleeding from that process might be expected, and is normal.     All products for home use, including multiple products for a single wound if applicable, are medically necessary in order to achieve the best chance at timely wound healing. See provider documentation for details if needed. Substituted dressings applied in the [address] today, if applicable:           New orders for this week (labs, imaging, medications, etc.):       DR. LAKHANI IS ORDERING D[facility] AND IS CALLING IT IN  [address]   PICK THIS UP AND BEGIN TAKING AS ORDERED              Additional instructions for specific diagnoses: F/U Appointment is with Dr. Eric Mallory in 1 week, on                                   at                       .     Your nurse  is Apollo Meehan RN     If we applied slip-resistant hospital socks today, be sure to remove them at least once a day to inspect your toes or feet, even if you're not changing the wraps or dressings underneath. If you see anything concerning (redness, excess moisture, etc), please call and let us know right away. Should you experience any significant changes in your wound(s) (including redness, increased warmth, increased pain, increased drainage, odor, or fever) or have questions about your wound care, please contact the [employer] at [phone] Monday-Thursday from 8:00 am - 4:30 pm, or Friday from 8:00 am - 2:30 pm.  If you need help with your wound outside these hours and cannot wait until we are again available, contact your home-care company (if applicable), your PCP, or go to the nearest emergency room.

## 2022-08-09 PROBLEM — L03.115 CELLULITIS OF RIGHT ANKLE: Status: RESOLVED | Noted: 2022-07-28 | Resolved: 2022-08-09

## 2022-08-09 PROBLEM — L97.322 ULCER OF LEFT ANKLE, WITH FAT LAYER EXPOSED (HCC): Status: ACTIVE | Noted: 2022-07-15

## 2022-08-09 NOTE — PROGRESS NOTES
[address] [address] Progress Note    Woo Ryan     : 2/3/1933    DATE OF VISIT:  2022    Subjective: Woo Ryan is a 80 y.o. female who has a venous ulcer located on the bilateral, medial ankle. Significant symptoms or pertinent wound history since last visit: if anything, she thinks her pain might be more constant this week than the last two, still BL, maybe worse on the right. Stable drainage, minimal swelling now; pain sounds partly neuropathic but partly aching and throbbing; perhaps we've overdone compression a bit? Discussed possible neuropathic analgesics, but she had just one or two doses of 100 mg gabapentin in the past, felt uneasy walking, and fell. Does not want to try that again, or pregabalin. Additional ulcer(s) noted? no.      Her current medication list consists of Denosumab, Tocilizumab, acetaminophen, apixaban, furosemide, losartan, and metoprolol succinate.     Allergies: Erythromycin, Hydrochlorothiazide, Silver sulfadiazine, and Doxycycline    Objective:     Vitals:    22 1012   BP: 125/63   Pulse: (!) 48   Resp: 20   Temp: 98.3 °F (36.8 °C)   TempSrc: Oral   Weight: 145 lb 8 oz (66 kg)   Height: 5' 4\" (1.626 m)       Constitutional:  well-developed, well-nourished, fatigued, NAD, in some discomfort  Psychiatric:  oriented to person, place and time; mood and affect appropriate for the situation  Cardiovascular:  bilateral pedal pulses palpable, feet warm, good cap refill; trace BL lower extremity edema, usual hemosiderin and atrophe miroslava, and some patchy areas of indurated skin thickening  Lymphatic:  no inguinal or popliteal adenopathy, no angitis or cellulitis  Neuro: still definite allodynia in the wound beds  Musculoskeletal:  no clubbing, cyanosis or petechiae; RLE and LLE with no gross effusions, joint misalignment or acute arthritis  Gay-ulcer skin: indurated, pink, warm and dry on the left, some fading redness and mild maceration on the right  Ulcer(s): left side very small, pale, starting to granulate even if it's getting 1-2 mm larger, a bit of fibrin and biofilm and still a bit of SQ fibronecrosis; right side a larger cluster, better development of granulation tissue there, some fibrin and biofilm, shrinking areas of fibrosis, but it's not really getting smaller. Photos also saved in electronic chart.     Today's wound measurements, per RN documentation:  Wound 07/15/22 # 3 Rt Medial lower Leg, Venous, Partial Thickness, Onset 06/22-Wound Length (cm): 6.9 cm  Wound 07/15/22 Wound #4 Left Inner Ankle, Venous, Partial (onset 6/2022)-Wound Length (cm): 0.4 cm    Wound 07/15/22 # 3 Rt Medial lower Leg, Venous, Partial Thickness, Onset 06/22-Wound Width (cm): 3.5 cm  Wound 07/15/22 Wound #4 Left Inner Ankle, Venous, Partial (onset 6/2022)-Wound Width (cm): 0.5 cm    Wound 07/15/22 # 3 Rt Medial lower Leg, Venous, Partial Thickness, Onset 06/22-Wound Depth (cm): 0.2 cm  Wound 07/15/22 Wound #4 Left Inner Ankle, Venous, Partial (onset 6/2022)-Wound Depth (cm): 0.2 cm    Assessment:     Patient Active Problem List   Diagnosis Code    HTN (hypertension) I10    Localized osteoarthrosis, lower leg M17.10    Eczema L30.9    Foot drop, right M21.371    Lumbosacral spondylosis without myelopathy M47.817    Normocytic anemia D64.9    Peripheral neuropathy G62.9    Senile osteoporosis M81.0    Bilateral lower extremity edema R60.0    CKD (chronic kidney disease) stage 3, GFR 30-59 ml/min ([facility]) N18.30    Peripheral venous insufficiency I87.2    Ulcer of right ankle, with fat layer exposed ([facility] [address] L97.312    PMR (polymyalgia rheumatica) ([facility]) M35.3    Giant cell arteritis ([facility]) M31.6    Acute respiratory failure due to severe acute respiratory syndrome coronavirus 2 (SARS-CoV-2) infection ([facility]) U07.1, J96.00    Current chronic use of systemic steroids Z79.52    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 Factors contributing to occurrence and/or persistence of the chronic ulcer include edema, venous stasis, and immunosuppression. Medical necessity of today's visit is shown by the above documentation. Sharp debridement is indicated today, based upon the exam findings in the wound(s) above. Procedure note:     Consent obtained. Time out performed per [facility]. Anesthetic  Anesthetic: 4% Lidocaine Cream     Using a #15 blade scalpel, I sharply debrided the left ankle ulcer(s) down through and including the removal of subcutaneous tissue. The type(s) of tissue debrided included fibrin, biofilm, and necrotic/eschar. Total Surface Area Debrided: 1 sq cm. Using a curette, I sharply debrided the right ankle ulcer(s) down through and including the removal of dermis. The type(s) of tissue debrided included fibrin, biofilm, and exudate. Total Surface Area Debrided: 15 sq cm. The ulcers were then irrigated with normal saline solution. The procedure was completed with a small amount of bleeding, and hemostasis was with pressure. The patient tolerated the procedure well, with no significant complications. The patient's level of pain during and after the procedure was monitored. Post-debridement measurements, if different from pre-debridement, are in the flowsheet as well.     Discharge plan:     Treatment in the wound care center today, per RN documentation: Wound 07/15/22 # 3 Rt Medial lower Leg, Venous, Partial Thickness, Onset 06/22-Dressing/Treatment: Other (comment) (Lotrisone to rash,Triad,SilverAlginate,ABD,[id number])  Wound 07/15/22 Wound #4 Left Inner Ankle, Venous, Partial (onset 6/2022)-Dressing/Treatment: Other (comment) (Triad mixed with PSO,4x4,[id number]). Per physician order, bilateral application of multilayer compression wrap was performed in the wound care center today, to help manage edema, stasis dermatitis, and/or venous ulcers. Leave primary dressing and multi-layer wrap(s) in place until the next appointment. Also discussed ways to keep the wrap dry for a shower, including a plastic cast-guard, available in retail pharmacies. She should call before her next visit if there is any significant pain, significant strike-through of drainage to the surface of the wrap, or any significant sense of the wrap sliding down more than an inch or so, bunching-up and abrading her skin. I reminded the patient of the importance of weight management and smoking cessation, if applicable; also encouraged ambulation as tolerated, additional lower extremity exercises as instructed in our education sheet, leg elevation when at rest, and compliance with any recommended dietary, diuretic and compression therapies. I hesitate to increase her analgesia this week, in terms of risk of falls or other adverse events. Backed off on compression just a bit. Unless the right ankle wound makes a big jump in size next week, I'll be recommending that we start a series of cellular-tissue products there; in my experience, Apligraf can sometimes calm down some of that hypersensitive pain with larger venous ulcers, so I hope that's the case for her as well. She received her CircAids this week -- I gave her some papers to help her order replacements when needed, but also extra sock liners for when she starts to wear these. Written discharge instructions given to patient. Follow up in 1 week.     Electronically signed by Nilson Whitten MD on 8/9/2022 at 10:16 AM.

## 2022-08-09 NOTE — PROGRESS NOTES
88 [facility] Progress Note    Kirti Royal     : 2/3/1933    DATE OF VISIT:  7/15/2022    Subjective: Kirti Royal is a 80 y.o. female who has a venous ulcer located on the bilateral, medial ankle. Significant symptoms or pertinent wound history since last visit: feeling about the same overall -- I get a sense that some of her edema- and infection-related pain was replaced with debridement- and compression-related pain. Doing fairly well with daytime Tylenol and nighttime tramadol. Some GI upset with the Abx that I called in on Monday, when her culture came back, though nothing severe. No F/C/D, diarrhea, rash, pruritus. Additional ulcer(s) noted? no.      Her current medication list consists of Denosumab, Tocilizumab, acetaminophen, apixaban, furosemide, losartan, and metoprolol succinate. Also has some tramadol for PRN nighttime use. Has had about 4 days of Bactrim and linezolid so far. She thinks the latter is the one that's upsetting her stomach.      Allergies: Erythromycin, Hydrochlorothiazide, Silver sulfadiazine, and Doxycycline    Objective:     Vitals:    07/15/22 1003   BP: (!) 162/87   Pulse: 61   Resp: 18   Temp: 98.8 °F (37.1 °C)   TempSrc: Oral   Weight: 150 lb (68 kg)   Height: 5' 4\" (1.626 m)     Constitutional:  well-developed, well-nourished, fatigued, NAD, in some discomfort  Psychiatric:  oriented to person, place and time; mood and affect appropriate for the situation  Eyes:  pupils equal, round and reactive to light; sclerae anicteric, conjunctivae not pale  Cardiovascular:  bilateral pedal pulses palpable, feet warm, good cap refill; trace left lower extremity edema, more mild-moderate right sided, usual hemosiderin and atrophe miroslava, and some patchy areas of indurated skin thickening - edema a bit better than last week  Lymphatic:  no inguinal or popliteal adenopathy, no angitis, and I think a resolving RLE cellulitis  Neuro: still definite allodynia in the wound beds  Musculoskeletal:  no clubbing, cyanosis or petechiae; RLE and LLE with no gross effusions, joint misalignment or acute arthritis  Gay-ulcer skin: indurated, pink, warm and dry on the left, but some fading redness and mild maceration on the right  Ulcer(s): left side very small, pale, white, fibrotic, a bit of fibrin and biofilm; right side a larger cluster, a bit of pink and red tissue, but mostly fibronecrotic, into fat tissue, some biofilm, serous exudate, no pus, perhaps a bit less inflamed than last week. Photos also saved in electronic chart. Today's wound measurements, per RN documentation:  Wound 07/15/22 # 3 Rt Medial lower Leg, Venous, Partial Thickness, Onset 06/22-Wound Length (cm): 6.5 cm    Wound 07/15/22 # 3 Rt Medial lower Leg, Venous, Partial Thickness, Onset 06/22-Wound Width (cm): 3.5 cm    Wound 07/15/22 # 3 Rt Medial lower Leg, Venous, Partial Thickness, Onset 06/22-Wound Depth (cm): 0.2 cm  _________________    WCx from last week -- light growth of Stenotrophomonas, VRE, and two CONS. Not sure which one of those was really pathogenic, but she did look cellulitic last week, looks better in that regard this week.     Assessment:     Patient Active Problem List   Diagnosis Code    HTN (hypertension) I10    Localized osteoarthrosis, lower leg M17.10    Eczema L30.9    Foot drop, right M21.371    Lumbosacral spondylosis without myelopathy M47.817    Normocytic anemia D64.9    Peripheral neuropathy G62.9    Senile osteoporosis M81.0    Bilateral lower extremity edema R60.0    CKD (chronic kidney disease) stage 3, GFR 30-59 ml/min ([facility]) N18.30    Peripheral venous insufficiency I87.2    Ulcer of right ankle, with fat layer exposed ([facility] [address] L97.312    PMR (polymyalgia rheumatica) ([facility]) M35.3    Giant cell arteritis ([facility]) M31.6    Acute respiratory failure due to severe acute respiratory syndrome coronavirus 2 (SARS-CoV-2) infection ([facility]) U07.1, J96.00    Current chronic use of systemic steroids Z79.52    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 Factors contributing to occurrence and/or persistence of the chronic ulcer include edema and venous stasis. Medical necessity of today's visit is shown by the above documentation. Sharp debridement is indicated today, based upon the exam findings in the wound(s) above. Procedure note:     Consent obtained. Time out performed per [facility] policy. Anesthetic  Anesthetic: 4% Lidocaine Cream     Using a curette and #15 blade scalpel, I sharply debrided the bilateral, medial ankle ulcer(s) down through and including the removal of subcutaneous tissue. The type(s) of tissue debrided included fibrin, biofilm, and necrotic/eschar. Total Surface Area Debrided: about 15 sq cm. The ulcers were then irrigated with normal saline solution. The procedure was completed with a small amount of bleeding, and hemostasis was with pressure. The patient tolerated the procedure well, with no significant complications. The patient's level of pain during and after the procedure was monitored. Post-debridement measurements, if different from pre-debridement, are in the flowsheet as well. Discharge plan:     Treatment in the wound care center today, per RN documentation: Wound 07/15/22 # 3 Rt Medial lower Leg, Venous, Partial Thickness, Onset 06/22-Dressing/Treatment: Other (comment) (triad,OpAg,ABD,[id number])  Wound 07/15/22 Wound #4 Left Inner Ankle, Venous, Partial (onset 6/2022)-Dressing/Treatment: Other (comment) (triad,pso,4x4s,[id number]).     Per physician order, bilateral application of multilayer compression wrap was performed in the wound care center today, to help manage edema, stasis dermatitis, and/or venous ulcers. Leave primary dressing and multi-layer wrap(s) in place until the next appointment. Also discussed ways to keep the wrap dry for a shower, including a plastic cast-guard, available in retail pharmacies. She should call before her next visit if there is any significant pain, significant strike-through of drainage to the surface of the wrap, or any significant sense of the wrap sliding down more than an inch or so, bunching-up and abrading her skin. I reminded the patient of the importance of weight management and smoking cessation, if applicable; also encouraged ambulation as tolerated, additional lower extremity exercises as instructed in our education sheet, leg elevation when at rest, and compliance with any recommended dietary, diuretic and compression therapies. Finish the last few days of ABx unless her GI upset gets worse. No change in pain mgmt for now. She brought in her old stockings, but they are far too large for her, and if she has trouble getting THOSE on (which she does), there's no way she will get proper sized stockings in place; we'll discuss Velcro garment options next week. Written discharge instructions given to patient. Follow up in 1 week.     Electronically signed by Sreekanth Garcia MD on 8/9/2022 at 10:01 AM.

## 2022-08-09 NOTE — PROGRESS NOTES
[facility] Progress Note    Jeanne Ely     : 2/3/1933    DATE OF VISIT:  2022    Subjective: Jeanne Ely is a 80 y.o. female who has a venous ulcer located on the bilateral ankle. Significant symptoms or pertinent wound history since last visit: feeling about the same, maybe a bit less pain, stomach feels better now that Abx are done. Additional ulcer(s) noted? no.      Her current medication list consists of Denosumab, Tocilizumab, acetaminophen, apixaban, furosemide, losartan, and metoprolol succinate.     Allergies: Erythromycin, Hydrochlorothiazide, Silver sulfadiazine, and Doxycycline    Objective:     Vitals:    22 1028   BP: 121/65   Pulse: 62   Resp: 20   Temp: 97.3 °F (36.3 °C)   TempSrc: Oral   Weight: 146 lb (66.2 kg)   Height: 5' 4\" (1.626 m)     Constitutional:  well-developed, well-nourished, fatigued, NAD, in some discomfort  Psychiatric:  oriented to person, place and time; mood and affect appropriate for the situation  Cardiovascular:  bilateral pedal pulses palpable, feet warm, good cap refill; trace left lower extremity edema, more mild right sided, usual hemosiderin and atrophe miroslava, and some patchy areas of indurated skin thickening - edema a bit better than last week  Lymphatic:  no inguinal or popliteal adenopathy, no angitis, no cellulitis  Neuro: still definite allodynia in the wound beds (even the very tiny left sided ulcer)  Musculoskeletal:  no clubbing, cyanosis or petechiae; RLE and LLE with no gross effusions, joint misalignment or acute arthritis  Gay-ulcer skin: indurated, pink, warm and dry on the left, but some fading redness and mild maceration on the right  Ulcer(s): left side very small, pale, white, fibrotic, a bit of fibrin and biofilm; right side a larger cluster, a bit of pink and red tissue developing better, but still largely fibronecrotic, into fat tissue, some biofilm, serous exudate, no pus, a bit less inflamed than last week. Photos also saved in electronic chart. Today's wound measurements, per RN documentation:  Wound 07/15/22 # 3 Rt Medial lower Leg, Venous, Partial Thickness, Onset 06/22-Wound Length (cm): 6.9 cm  Wound 07/15/22 Wound #4 Left Inner Ankle, Venous, Partial (onset 6/2022)-Wound Length (cm): 0.7 cm    Wound 07/15/22 # 3 Rt Medial lower Leg, Venous, Partial Thickness, Onset 06/22-Wound Width (cm): 3 cm  Wound 07/15/22 Wound #4 Left Inner Ankle, Venous, Partial (onset 6/2022)-Wound Width (cm): 0.8 cm    Wound 07/15/22 # 3 Rt Medial lower Leg, Venous, Partial Thickness, Onset 06/22-Wound Depth (cm): 0.2 cm  Wound 07/15/22 Wound #4 Left Inner Ankle, Venous, Partial (onset 6/2022)-Wound Depth (cm): 0.1 cm    Assessment:     Patient Active Problem List   Diagnosis Code    HTN (hypertension) I10    Localized osteoarthrosis, lower leg M17.10    Eczema L30.9    Foot drop, right M21.371    Lumbosacral spondylosis without myelopathy M47.817    Normocytic anemia D64.9    Peripheral neuropathy G62.9    Senile osteoporosis M81.0    Bilateral lower extremity edema R60.0    CKD (chronic kidney disease) stage 3, GFR 30-59 ml/min ([facility]) N18.30    Peripheral venous insufficiency I87.2    Ulcer of right ankle, with fat layer exposed ([address] L97.312    PMR (polymyalgia rheumatica) ([facility]) M35.3    Giant cell arteritis ([facility]) M31.6    Acute respiratory failure due to severe acute respiratory syndrome coronavirus 2 (SARS-CoV-2) infection ([facility]) U07.1, J96.00    Current chronic use of systemic steroids Z79.52    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  Factors contributing to occurrence and/or persistence of the chronic ulcer include the wrap, or any significant sense of the wrap sliding down more than an inch or so, bunching-up and abrading her skin. I reminded the patient of the importance of weight management and smoking cessation, if applicable; also encouraged ambulation as tolerated, additional lower extremity exercises as instructed in our education sheet, leg elevation when at rest, and compliance with any recommended dietary, diuretic and compression therapies. We discussed options for long-term compression today, and she thinks the standard CircAid might work well for her -- will order a pair from a local DME company, to start using when she's healed, or nearly there. No change in pain mgmt plan; I'm hopeful her pain will improve after ongoing compression, more debridement, development of healthier wound bed tissue. Written discharge instructions given to patient. Follow up in 1 week.     Electronically signed by Silvana Serrano MD on 8/9/2022 at 10:11 AM.

## 2022-08-12 ENCOUNTER — HOSPITAL ENCOUNTER (OUTPATIENT)
Dept: WOUND CARE | Age: 87
Discharge: HOME | End: 2022-08-12
Payer: MEDICARE

## 2022-08-12 VITALS
BODY MASS INDEX: 25.35 KG/M2 | RESPIRATION RATE: 20 BRPM | WEIGHT: 148.5 LBS | HEART RATE: 59 BPM | TEMPERATURE: 98.3 F | HEIGHT: 64 IN

## 2022-08-12 DIAGNOSIS — I87.2 PERIPHERAL VENOUS INSUFFICIENCY: ICD-10-CM

## 2022-08-12 DIAGNOSIS — L97.322 ULCER OF LEFT ANKLE, WITH FAT LAYER EXPOSED (HCC): ICD-10-CM

## 2022-08-12 DIAGNOSIS — R60.0 BILATERAL LOWER EXTREMITY EDEMA: ICD-10-CM

## 2022-08-12 DIAGNOSIS — L97.312 ULCER OF RIGHT ANKLE, WITH FAT LAYER EXPOSED (HCC): ICD-10-CM

## 2022-08-12 DIAGNOSIS — L97.321 ULCER OF LEFT ANKLE, LIMITED TO BREAKDOWN OF SKIN (HCC): Primary | ICD-10-CM

## 2022-08-12 PROCEDURE — 97597 DBRDMT OPN WND 1ST 20 CM/<: CPT | Performed by: INTERNAL MEDICINE

## 2022-08-12 PROCEDURE — 29581 APPL MULTLAYER CMPRN SYS LEG: CPT

## 2022-08-12 PROCEDURE — 29581 APPL MULTLAYER CMPRN SYS LEG: CPT | Performed by: INTERNAL MEDICINE

## 2022-08-12 PROCEDURE — 97597 DBRDMT OPN WND 1ST 20 CM/<: CPT

## 2022-08-12 RX ORDER — LIDOCAINE 40 MG/G
CREAM TOPICAL ONCE
Status: CANCELLED | OUTPATIENT
Start: 2022-08-12 | End: 2022-08-12

## 2022-08-12 RX ORDER — LIDOCAINE HYDROCHLORIDE 40 MG/ML
SOLUTION TOPICAL ONCE
Status: CANCELLED | OUTPATIENT
Start: 2022-08-12 | End: 2022-08-12

## 2022-08-12 RX ORDER — DAPSONE 100 MG/1
100 TABLET ORAL DAILY
Qty: 30 TABLET | Refills: 3 | Status: SHIPPED | OUTPATIENT
Start: 2022-08-12 | End: 2022-08-19 | Stop reason: SINTOL

## 2022-08-12 RX ORDER — LIDOCAINE 50 MG/G
OINTMENT TOPICAL ONCE
Status: CANCELLED | OUTPATIENT
Start: 2022-08-12 | End: 2022-08-12

## 2022-08-12 RX ORDER — BACITRACIN ZINC AND POLYMYXIN B SULFATE 500; 1000 [USP'U]/G; [USP'U]/G
OINTMENT TOPICAL ONCE
Status: CANCELLED | OUTPATIENT
Start: 2022-08-12 | End: 2022-08-12

## 2022-08-12 RX ORDER — LIDOCAINE 40 MG/G
CREAM TOPICAL ONCE
Status: DISCONTINUED | OUTPATIENT
Start: 2022-08-12 | End: 2022-08-13 | Stop reason: HOSPADM

## 2022-08-12 ASSESSMENT — PAIN DESCRIPTION - DIRECTION: RADIATING_TOWARDS: DENIES

## 2022-08-12 ASSESSMENT — PAIN DESCRIPTION - PROGRESSION: CLINICAL_PROGRESSION: NOT CHANGED

## 2022-08-12 ASSESSMENT — PAIN - FUNCTIONAL ASSESSMENT: PAIN_FUNCTIONAL_ASSESSMENT: PREVENTS OR INTERFERES SOME ACTIVE ACTIVITIES AND ADLS

## 2022-08-12 ASSESSMENT — PAIN SCALES - GENERAL: PAINLEVEL_OUTOF10: 6

## 2022-08-12 ASSESSMENT — PAIN DESCRIPTION - LOCATION: LOCATION: LEG

## 2022-08-12 ASSESSMENT — PAIN DESCRIPTION - PAIN TYPE: TYPE: CHRONIC PAIN

## 2022-08-12 ASSESSMENT — PAIN DESCRIPTION - DESCRIPTORS: DESCRIPTORS: THROBBING

## 2022-08-12 ASSESSMENT — PAIN DESCRIPTION - FREQUENCY: FREQUENCY: INTERMITTENT

## 2022-08-12 ASSESSMENT — PAIN DESCRIPTION - ORIENTATION: ORIENTATION: RIGHT

## 2022-08-12 ASSESSMENT — PAIN DESCRIPTION - ONSET: ONSET: ON-GOING

## 2022-08-12 NOTE — PLAN OF CARE
MD noted RLE wound edges are slightly purple and raised in appearance. Discussed probability of Pyoderma and will begin Dapsone oral for this. No debridement today on RLE. LLE debrided and patient tolerated well. Patient states he pain is improved this week as well. Discharge instructions reviewed with patient, all questions answered, copy given to patient. Dressings were applied to all wounds per M.D. Instructions at this visit.

## 2022-08-19 ENCOUNTER — HOSPITAL ENCOUNTER (OUTPATIENT)
Dept: WOUND CARE | Age: 87
Discharge: HOME | End: 2022-08-19
Payer: MEDICARE

## 2022-08-19 VITALS
HEART RATE: 65 BPM | HEIGHT: 64 IN | WEIGHT: 147 LBS | RESPIRATION RATE: 18 BRPM | TEMPERATURE: 98.1 F | SYSTOLIC BLOOD PRESSURE: 153 MMHG | BODY MASS INDEX: 25.1 KG/M2 | DIASTOLIC BLOOD PRESSURE: 86 MMHG

## 2022-08-19 DIAGNOSIS — L97.312 ULCER OF RIGHT ANKLE, WITH FAT LAYER EXPOSED (HCC): ICD-10-CM

## 2022-08-19 DIAGNOSIS — L97.322 ULCER OF LEFT ANKLE, WITH FAT LAYER EXPOSED (HCC): Primary | ICD-10-CM

## 2022-08-19 DIAGNOSIS — R60.0 BILATERAL LOWER EXTREMITY EDEMA: ICD-10-CM

## 2022-08-19 DIAGNOSIS — L88 PYODERMA GANGRENOSUM: ICD-10-CM

## 2022-08-19 DIAGNOSIS — I87.2 PERIPHERAL VENOUS INSUFFICIENCY: ICD-10-CM

## 2022-08-19 PROCEDURE — 29581 APPL MULTLAYER CMPRN SYS LEG: CPT

## 2022-08-19 PROCEDURE — 99213 OFFICE O/P EST LOW 20 MIN: CPT | Performed by: INTERNAL MEDICINE

## 2022-08-19 PROCEDURE — 29581 APPL MULTLAYER CMPRN SYS LEG: CPT | Performed by: INTERNAL MEDICINE

## 2022-08-19 RX ORDER — LIDOCAINE 40 MG/G
CREAM TOPICAL ONCE
Status: CANCELLED | OUTPATIENT
Start: 2022-08-19 | End: 2022-08-19

## 2022-08-19 RX ORDER — LIDOCAINE 50 MG/G
OINTMENT TOPICAL ONCE
Status: CANCELLED | OUTPATIENT
Start: 2022-08-19 | End: 2022-08-19

## 2022-08-19 RX ORDER — LIDOCAINE HYDROCHLORIDE 40 MG/ML
SOLUTION TOPICAL ONCE
Status: DISCONTINUED | OUTPATIENT
Start: 2022-08-19 | End: 2022-08-20 | Stop reason: HOSPADM

## 2022-08-19 RX ORDER — TACROLIMUS 1 MG/G
OINTMENT TOPICAL
Qty: 60 G | Refills: 1 | Status: SHIPPED | OUTPATIENT
Start: 2022-08-19

## 2022-08-19 RX ORDER — MINOCYCLINE HYDROCHLORIDE 100 MG/1
100 TABLET ORAL DAILY
Qty: 30 TABLET | Refills: 5 | Status: SHIPPED | OUTPATIENT
Start: 2022-08-19 | End: 2022-08-24 | Stop reason: SINTOL

## 2022-08-19 RX ORDER — BACITRACIN ZINC AND POLYMYXIN B SULFATE 500; 1000 [USP'U]/G; [USP'U]/G
OINTMENT TOPICAL ONCE
Status: CANCELLED | OUTPATIENT
Start: 2022-08-19 | End: 2022-08-19

## 2022-08-19 RX ORDER — LIDOCAINE HYDROCHLORIDE 40 MG/ML
SOLUTION TOPICAL ONCE
Status: CANCELLED | OUTPATIENT
Start: 2022-08-19 | End: 2022-08-19

## 2022-08-19 ASSESSMENT — PAIN DESCRIPTION - PAIN TYPE: TYPE: CHRONIC PAIN

## 2022-08-19 ASSESSMENT — PAIN DESCRIPTION - ORIENTATION: ORIENTATION: RIGHT;LEFT

## 2022-08-19 ASSESSMENT — PAIN SCALES - GENERAL: PAINLEVEL_OUTOF10: 6

## 2022-08-19 ASSESSMENT — PAIN DESCRIPTION - DESCRIPTORS: DESCRIPTORS: THROBBING

## 2022-08-19 ASSESSMENT — PAIN DESCRIPTION - ONSET: ONSET: ON-GOING

## 2022-08-19 ASSESSMENT — PAIN DESCRIPTION - LOCATION: LOCATION: LEG

## 2022-08-19 ASSESSMENT — PAIN - FUNCTIONAL ASSESSMENT: PAIN_FUNCTIONAL_ASSESSMENT: PREVENTS OR INTERFERES WITH ALL ACTIVE AND SOME PASSIVE ACTIVITIES

## 2022-08-19 ASSESSMENT — PAIN DESCRIPTION - FREQUENCY: FREQUENCY: INTERMITTENT

## 2022-08-19 ASSESSMENT — PAIN DESCRIPTION - PROGRESSION: CLINICAL_PROGRESSION: NOT CHANGED

## 2022-08-19 NOTE — PLAN OF CARE
MD is ordering Metacycline oral and Tacrolimus-topical this week. Davian did not tolerate Dapsone and this is d/c'd. Continue with same primary wound care regime. Follow up in 1 week. Davonte nreminided to brin gher new topical with her to next visit. Discharge instructions reviewed with patient, all questions answered, copy given to patient. Dressings were applied to all wounds per M.D. Instructions at this visit.

## 2022-08-22 PROBLEM — U07.1 ACUTE RESPIRATORY FAILURE DUE TO SEVERE ACUTE RESPIRATORY SYNDROME CORONAVIRUS 2 (SARS-COV-2) INFECTION (HCC): Status: RESOLVED | Noted: 2021-11-12 | Resolved: 2022-08-22

## 2022-08-22 PROBLEM — J96.00 ACUTE RESPIRATORY FAILURE DUE TO SEVERE ACUTE RESPIRATORY SYNDROME CORONAVIRUS 2 (SARS-COV-2) INFECTION (HCC): Status: RESOLVED | Noted: 2021-11-12 | Resolved: 2022-08-22

## 2022-08-22 PROBLEM — Z79.52 CURRENT CHRONIC USE OF SYSTEMIC STEROIDS: Status: RESOLVED | Noted: 2021-11-12 | Resolved: 2022-08-22

## 2022-08-22 NOTE — DISCHARGE INSTRUCTIONS
[address] [facility] Physician Orders and Discharge [address] [facility]  [address], [place]  [place], [facility]  Telephone: [phone]      Fax: [phone]        Your home care company:   N/A     Your wound-care supplies will be provided by:  weekly wraps     NAME:  Nick Diaz   YOB: 1933  PRIMARY DIAGNOSIS FOR WOUND CARE CENTER:  Pyoderma gangrenosum     Wound cleansing:  Do not scrub or use excessive force. Wash hands with soap and water before and after dressing changes. Prior to applying a clean dressing, cleanse wound with normal saline, wound cleanser, or mild soap and water. Ask your physician or nurse before getting the wound(s) wet in the shower. Wound care for home:     Left Lower Leg/ankle:   Clobetosol and Tacrolimus to mauro wound  Vashe  Triad mixed with Polysporin to wound  Plain Foam  Compri 2 Lite (wrap with a little less compression than usual)     Right Lower Leg:  Vashe  Clobetesol and Tacrolimus to wound edges and mauro wound   Silver Alginate, Mepilex Transfer, ABD  Compri 2 Lite (wrap with a little less compression than usual)     KEEP THESE WRAPS CLEAN, DRY AND IN PLACE FOR THE WEEK           Please note, all wounds (unless stated otherwise here) were mechanically debrided at the time of cleansing here in the wound-care center today, so a small amount of pain, drainage or bleeding from that process might be expected, and is normal.     All products for home use, including multiple products for a single wound if applicable, are medically necessary in order to achieve the best chance at timely wound healing. See provider documentation for details if needed. Substituted dressings applied in the [facility] today, if applicable:           New orders for this week (labs, imaging, medications, etc.):       DR. LAKHANI IS ORDERING PREDNISONE HE IS CALLING IT IN  [address]   PICK THIS UP AND BEGIN TAKING AS ORDERED Additional instructions for specific diagnoses:           F/U Appointment is with Dr. Stephanie Royal in 1 week, on                                   at                       .     Your nurse  is Priya Sprague RN     If we applied slip-resistant hospital socks today, be sure to remove them at least once a day to inspect your toes or feet, even if you're not changing the wraps or dressings underneath. If you see anything concerning (redness, excess moisture, etc), please call and let us know right away. Should you experience any significant changes in your wound(s) (including redness, increased warmth, increased pain, increased drainage, odor, or fever) or have questions about your wound care, please contact the [address] at [phone] Monday-Thursday from 8:00 am - 4:30 pm, or Friday from 8:00 am - 2:30 pm.  If you need help with your wound outside these hours and cannot wait until we are again available, contact your home-care company (if applicable), your PCP, or go to the nearest emergency room.

## 2022-08-22 NOTE — PROGRESS NOTES
88 [facility] Progress Note    Pepito Gooden     : 2/3/1933    DATE OF VISIT:  2022    Subjective: Pepito Gooden is a 80 y.o. female who has a venous ulcer located on the bilateral, medial ankle. Significant symptoms or pertinent wound history since last visit: not quite as much pain this week, since we eased up on compression just a bit, at the ankles themselves; still taking Tylenol a couple of times per day, sometimes a tramadol at night, but she feels better than last week. No F/C/D, a bit more drainage from the right side, maybe mild LE pruritus. She got her Velcro compression wraps shipped to her, for the longer-term, once closer to being healed. Additional ulcer(s) noted? no.      Her current medication list consists of Denosumab, Tocilizumab, acetaminophen, apixaban, furosemide, losartan, metoprolol succinate. Allergies: Dapsone, Erythromycin, Hydrochlorothiazide, Silver sulfadiazine, and doxycycline.     Objective:     Vitals:    22 1001   Pulse: 59   Resp: 20   Temp: 98.3 °F (36.8 °C)   TempSrc: Oral   Weight: 148 lb 8 oz (67.4 kg)   Height: 5' 4\" (1.626 m)     Constitutional:  well-developed, well-nourished, fatigued, NAD, in less discomfort this week  Psychiatric:  oriented to person, place and time; mood and affect appropriate for the situation  Cardiovascular:  bilateral pedal pulses palpable, feet warm, good cap refill; mild BL lower extremity edema, usual hemosiderin and atrophe miroslava, and some patchy areas of indurated skin thickening  Lymphatic:  no inguinal or popliteal adenopathy, no angitis or cellulitis  Neuro: still some allodynia in the wound beds, maybe less than last week  Musculoskeletal:  no clubbing, cyanosis or petechiae; RLE and LLE with no gross effusions, joint misalignment or acute arthritis  Gay-ulcer skin: indurated, pink to slightly trent, warm and dry on the left, some fading redness and mild maceration on the right; a bit of a distal contact dermatitis rash also. Ulcer(s): left side very small, pale, starting to granulate, a bit of SQ fibrosis, serous exudate, maybe a bit of fibrin; right side a larger cluster, better development of granulation tissue there, some fibrin and biofilm, shrinking areas of fibrosis, but it's not really getting smaller. No signs of infection. Photos also saved in electronic chart.     Today's wound measurements, per RN documentation:  Wound 07/15/22 # 3 Rt Medial lower Leg, Venous, Partial Thickness, Onset 06/22-Wound Length (cm): 7 cm  Wound 07/15/22 Wound #4 Left Inner Ankle, Venous, Partial (onset 6/2022)-Wound Length (cm): 0.4 cm    Wound 07/15/22 # 3 Rt Medial lower Leg, Venous, Partial Thickness, Onset 06/22-Wound Width (cm): 3.8 cm  Wound 07/15/22 Wound #4 Left Inner Ankle, Venous, Partial (onset 6/2022)-Wound Width (cm): 0.7 cm    Wound 07/15/22 # 3 Rt Medial lower Leg, Venous, Partial Thickness, Onset 06/22-Wound Depth (cm): 0.5 cm  Wound 07/15/22 Wound #4 Left Inner Ankle, Venous, Partial (onset 6/2022)-Wound Depth (cm): 0.3 cm  __________________    Wound measurements on the right ankle ulcer, in the last 4 weeks --         Assessment:     Patient Active Problem List   Diagnosis Code    HTN (hypertension) I10    Localized osteoarthrosis, lower leg M17.10    Eczema L30.9    Foot drop, right M21.371    Lumbosacral spondylosis without myelopathy M47.817    Normocytic anemia D64.9    Peripheral neuropathy G62.9    Senile osteoporosis M81.0    Bilateral lower extremity edema R60.0    CKD (chronic kidney disease) stage 3, GFR 30-59 ml/min ([facility]) N18.30    Peripheral venous insufficiency I87.2    Ulcer of right ankle, with fat layer exposed ([address] L97.312    PMR (polymyalgia rheumatica) ([facility]) M35.3    Giant cell arteritis ([facility]) M31.6    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 Would like to consider a series of adjunctive cellular-tissue products, I think either Apligraf or Oasis (I like the former for some pain relief and better epidermal movement, but might start with a couple of the latter, to build some better granulation and not risk making the wound bed too moist). Factors contributing to occurrence and/or persistence of the chronic ulcer include edema, venous stasis, and immunosuppression (from her therapy for the giant cell arteritis). Medical necessity of today's visit is shown by the above documentation. Sharp debridement is indicated today, based upon the exam findings in the wound(s) above. Procedure note:     Consent obtained. Time out performed per [facility]. Anesthetic  Anesthetic: 4% Lidocaine Cream     Using a curette, I sharply debrided the right ankle ulcer(s) down through and including the removal of dermis. The type(s) of tissue debrided included fibrin, biofilm, and exudate. Total Surface Area Debrided: about 20 sq cm. The ulcers were then irrigated with normal saline solution. The procedure was completed with a small amount of bleeding, and hemostasis was with pressure. The patient tolerated the procedure well, with no significant complications. The patient's level of pain during and after the procedure was monitored. Post-debridement measurements, if different from pre-debridement, are in the flowsheet as well.     Discharge plan:     Treatment in the wound care center today, per RN documentation: Wound 07/15/22 # 3 Rt Medial lower Leg, Venous, Partial Thickness, Onset 06/22-Dressing/Treatment: Other (comment) (Clobetsol to rash, ZnO to periwound, Vashe, Silver Alginate, ABD, Compri 2 Lite)  Wound 07/15/22 Wound #4 Left Inner Ankle, Venous, Partial (onset 6/2022)-Dressing/Treatment: Other (comment) (Vashe, Triad mixed with Polysporin to wound, 4x4, Compri 2 Lite). Per physician order, bilateral application of multilayer compression wrap was performed in the wound care center today, to help manage edema, stasis dermatitis, and/or venous ulcers. Leave primary dressing and multi-layer wrap(s) in place until the next appointment. Also discussed ways to keep the wrap dry for a shower, including a plastic cast-guard, available in retail pharmacies. She should call before her next visit if there is any significant pain, significant strike-through of drainage to the surface of the wrap, or any significant sense of the wrap sliding down more than an inch or so, bunching-up and abrading her skin. I reminded the patient of the importance of weight management and smoking cessation, if applicable; also encouraged ambulation as tolerated, additional lower extremity exercises as instructed in our education sheet, leg elevation when at rest, and compliance with any recommended dietary, diuretic and compression therapies. No change in pain mgmt this week. Will send papers to her insurance to check on coverage of Oasis or Apligraf for that right side, hopefully to start in the next couple of weeks. Written discharge instructions given to patient. Follow up in 1 week.     Electronically signed by Severo Pinna, MD on 8/22/2022 at 4:32 PM.

## 2022-08-25 PROBLEM — L88 PYODERMA GANGRENOSUM: Status: ACTIVE | Noted: 2022-08-25

## 2022-08-25 NOTE — PROGRESS NOTES
[facility] Progress Note    Harl Moritz     : 2/3/1933    DATE OF VISIT:  2022    Subjective: Harl Moritz is a 80 y.o. female who has a venous ulcer located on the bilateral, medial ankle. Significant symptoms or pertinent wound history since last visit: not quite as much pain as a couple of weeks ago, but stil moderate; using Tylenol and occasional tramadol. No F/C/D. Left ankle seems to be doing ok, but the right side has become more inflamed in the last week, with a distinct red-purple edge, could well be evolving into a pyoderma process related to her underlying GCA/PMR. Additional ulcer(s) noted? no.      Her current medication list consists of Denosumab, Tocilizumab, acetaminophen, apixaban, furosemide, losartan, metoprolol succinate. Allergies: Erythromycin, Hydrochlorothiazide, Silver sulfadiazine, and Doxycycline    Objective:     Vitals:    22 1008   BP: (!) 153/86   Pulse: 65   Resp: 18   Temp: 98.1 °F (36.7 °C)   TempSrc: Oral   Weight: 147 lb (66.7 kg)   Height: 5' 4\" (1.626 m)     Constitutional:  well-developed, well-nourished, fatigued, NAD, in less discomfort this week   Psychiatric:  oriented to person, place and time; mood and affect appropriate for the situation  Cardiovascular:  bilateral pedal pulses palpable, feet warm, good cap refill; mild BL lower extremity edema, usual hemosiderin and atrophe miroslava, and some patchy areas of indurated skin thickening  Lymphatic:  no inguinal or popliteal adenopathy, no angitis or cellulitis  Neuro: still some allodynia in the wound beds, maybe more on the right than before  Musculoskeletal:  no clubbing, cyanosis or petechiae; RLE and LLE with no gross effusions, joint misalignment or acute arthritis  Gay-ulcer skin: indurated, pink to slightly trent, dry, warm, minor adjacent dermatitis on the right.    Ulcer(s): left side might be 1-2 mm larger, still part SQ fibroncerotic, but a bit of pink-red tissue showing through, edge very lightly red and mildly indurated; right side looks larger, it IS  with fat necrosis resolved, a bit of fibrin and biofilm, a slightly inflamed pink-red base, not much mature granulation, but probably at least 80% of the wound edge is distinctly indurated, red to purple, I think pretty classic for pyoderma. Photos also saved in electronic chart.     Today's Wound Measurements, per RN documentation:  Wound 07/15/22 # 3 Rt Medial lower Leg, Venous, Partial Thickness, Onset 06/22-Wound Length (cm): 7.7 cm  Wound 07/15/22 Wound #4 Left Inner Ankle, Venous, Partial (onset 6/2022)-Wound Length (cm): 0.5 cm    Wound 07/15/22 # 3 Rt Medial lower Leg, Venous, Partial Thickness, Onset 06/22-Wound Width (cm): 4 cm  Wound 07/15/22 Wound #4 Left Inner Ankle, Venous, Partial (onset 6/2022)-Wound Width (cm): 0.6 cm    Wound 07/15/22 # 3 Rt Medial lower Leg, Venous, Partial Thickness, Onset 06/22-Wound Depth (cm): 0.2 cm  Wound 07/15/22 Wound #4 Left Inner Ankle, Venous, Partial (onset 6/2022)-Wound Depth (cm): 0.3 cm    Assessment:     Patient Active Problem List   Diagnosis Code    HTN (hypertension) I10    Localized osteoarthrosis, lower leg M17.10    Eczema L30.9    Foot drop, right M21.371    Lumbosacral spondylosis without myelopathy M47.817    Normocytic anemia D64.9    Peripheral neuropathy G62.9    Senile osteoporosis M81.0    Bilateral lower extremity edema R60.0    CKD (chronic kidney disease) stage 3, GFR 30-59 ml/min ([facility]) N18.30    Peripheral venous insufficiency I87.2    Ulcer of right ankle, with fat layer exposed ([address] L97.312    PMR (polymyalgia rheumatica) ([facility]) M35.3    Giant cell arteritis ([facility]) M31.6    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 Factors contributing to occurrence and/or persistence of the chronic ulcer include edema and venous stasis. Medical necessity of today's visit is shown by the above documentation. Sharp debridement is not indicated today, based upon the exam findings in the wound(s) above. She does have an indication for a weekly compression wrap. Procedure note:     Consent obtained. Time out performed per [facility]  policy. Anesthetic  Anesthetic: 4% Lidocaine Liquid Topical     After cleansing of the wounds with saline and applying skin-care and/or dressing materials as listed below, Bilateral application of a multi-layer compression wrap was performed per physician order, to help manage edema, stasis dermatitis, and/or venous ulcers. The patient's level of pain during and after the procedure was monitored, and is noted in the wound documentation flowsheet. Discharge plan:     Treatment in the wound care center today, per RN documentation: Wound 07/15/22 # 3 Rt Medial lower Leg, Venous, Partial Thickness, Onset 06/22-Dressing/Treatment: Other (comment) (Vashe,Clobetesol and Triad to wound edges and mauro wound,Silver Alginate, ABD  Compri 2 Lite)  Wound 07/15/22 Wound #4 Left Inner Ankle, Venous, Partial (onset 6/2022)-Dressing/Treatment: Other (comment) (Clobetosol-mauro,Vashe,Triad/Polysporin, 4x4,Compri 2 Lite). Will add some clobetasol to the wound edges (which we have here). Will call in some topical tacrolimus for her to bring with her next week, to use in addition. As an immunomodulator, will try some dapsone.  (I sometimes use minocycline, but she has a Hx of GI

## 2022-08-26 ENCOUNTER — HOSPITAL ENCOUNTER (OUTPATIENT)
Dept: WOUND CARE | Age: 87
Discharge: HOME | End: 2022-08-26
Payer: MEDICARE

## 2022-08-26 VITALS
RESPIRATION RATE: 20 BRPM | TEMPERATURE: 97.5 F | SYSTOLIC BLOOD PRESSURE: 141 MMHG | DIASTOLIC BLOOD PRESSURE: 78 MMHG | WEIGHT: 145.25 LBS | HEIGHT: 64 IN | BODY MASS INDEX: 24.8 KG/M2 | HEART RATE: 57 BPM

## 2022-08-26 DIAGNOSIS — L97.312 ULCER OF RIGHT ANKLE, WITH FAT LAYER EXPOSED (HCC): ICD-10-CM

## 2022-08-26 DIAGNOSIS — R60.0 BILATERAL LOWER EXTREMITY EDEMA: ICD-10-CM

## 2022-08-26 DIAGNOSIS — L97.322 ULCER OF LEFT ANKLE, WITH FAT LAYER EXPOSED (HCC): Primary | ICD-10-CM

## 2022-08-26 DIAGNOSIS — I87.2 PERIPHERAL VENOUS INSUFFICIENCY: ICD-10-CM

## 2022-08-26 PROCEDURE — 99213 OFFICE O/P EST LOW 20 MIN: CPT | Performed by: INTERNAL MEDICINE

## 2022-08-26 PROCEDURE — 29581 APPL MULTLAYER CMPRN SYS LEG: CPT

## 2022-08-26 PROCEDURE — 29581 APPL MULTLAYER CMPRN SYS LEG: CPT | Performed by: INTERNAL MEDICINE

## 2022-08-26 RX ORDER — BACITRACIN ZINC AND POLYMYXIN B SULFATE 500; 1000 [USP'U]/G; [USP'U]/G
OINTMENT TOPICAL ONCE
Status: CANCELLED | OUTPATIENT
Start: 2022-08-26 | End: 2022-08-26

## 2022-08-26 RX ORDER — LIDOCAINE HYDROCHLORIDE 40 MG/ML
SOLUTION TOPICAL ONCE
Status: CANCELLED | OUTPATIENT
Start: 2022-08-26 | End: 2022-08-26

## 2022-08-26 RX ORDER — LIDOCAINE 50 MG/G
OINTMENT TOPICAL ONCE
Status: CANCELLED | OUTPATIENT
Start: 2022-08-26 | End: 2022-08-26

## 2022-08-26 RX ORDER — LIDOCAINE 40 MG/G
CREAM TOPICAL ONCE
Status: DISCONTINUED | OUTPATIENT
Start: 2022-08-26 | End: 2022-08-27 | Stop reason: HOSPADM

## 2022-08-26 RX ORDER — LIDOCAINE 40 MG/G
CREAM TOPICAL ONCE
Status: CANCELLED | OUTPATIENT
Start: 2022-08-26 | End: 2022-08-26

## 2022-08-26 RX ORDER — PREDNISONE 20 MG/1
TABLET ORAL
Qty: 28 TABLET | Refills: 0 | Status: SHIPPED
Start: 2022-08-26 | End: 2022-08-29 | Stop reason: CLARIF

## 2022-08-26 ASSESSMENT — PAIN SCALES - GENERAL: PAINLEVEL_OUTOF10: 4

## 2022-08-26 ASSESSMENT — PAIN DESCRIPTION - FREQUENCY: FREQUENCY: INTERMITTENT

## 2022-08-26 ASSESSMENT — PAIN DESCRIPTION - LOCATION: LOCATION: LEG

## 2022-08-26 ASSESSMENT — PAIN DESCRIPTION - ORIENTATION: ORIENTATION: LEFT;RIGHT

## 2022-08-26 ASSESSMENT — PAIN DESCRIPTION - DESCRIPTORS: DESCRIPTORS: THROBBING;BURNING;STABBING

## 2022-08-26 ASSESSMENT — PAIN DESCRIPTION - ONSET: ONSET: ON-GOING

## 2022-08-26 ASSESSMENT — PAIN DESCRIPTION - PAIN TYPE: TYPE: CHRONIC PAIN

## 2022-08-26 ASSESSMENT — PAIN - FUNCTIONAL ASSESSMENT: PAIN_FUNCTIONAL_ASSESSMENT: PREVENTS OR INTERFERES SOME ACTIVE ACTIVITIES AND ADLS

## 2022-08-26 NOTE — PLAN OF CARE
MD discussed options for oral medications for pydoderma due to patient has failed the last 2 due to S/E's.  MD Has consulted with patients Rheumatologist and will begin a couple weeks course of oral prednisone at this time. No change since in overall wound care regime. Discharge instructions reviewed with patient, all questions answered, copy given to patient. Dressings were applied to all wounds per M.D. Instructions at this visit.

## 2022-08-29 RX ORDER — PREDNISONE 20 MG/1
TABLET ORAL
Qty: 28 TABLET | Refills: 0 | Status: SHIPPED | OUTPATIENT
Start: 2022-08-29 | End: 2022-09-09 | Stop reason: DRUGHIGH

## 2022-08-29 NOTE — DISCHARGE INSTRUCTIONS
215 [facility] Physician Orders and Discharge [address], [address]  [place], [facility]  Telephone: [phone]      Fax: [phone]        Your home care company:   N/A     Your wound-care supplies will be provided by:  weekly wraps     NAME:  Aydee Teran   YOB: 1933  PRIMARY DIAGNOSIS FOR WOUND CARE CENTER:  Pyoderma gangrenosum     Wound cleansing:  Do not scrub or use excessive force. Wash hands with soap and water before and after dressing changes. Prior to applying a clean dressing, cleanse wound with normal saline, wound cleanser, or mild soap and water. Ask your physician or nurse before getting the wound(s) wet in the shower. Wound care for home:     Left Lower Leg/ankle:   Clobetosol and Tacrolimus to wound edges and mauro wound  Vashe  Triad mixed with Polysporin to wound  Plain Foam  Compri 2 Lite (wrap with a little less compression than usual)     Right Lower Leg:  Vashe  Clobetesol and Tacrolimus to wound edges and mauro wound   Small amount of Triad to the yellow tissue then Silver Alginate, Mepilex Transfer, ABD  Compri 2 Lite (wrap with a little less compression than usual)     KEEP THESE WRAPS CLEAN, DRY AND IN PLACE FOR THE WEEK           Please note, all wounds (unless stated otherwise here) were mechanically debrided at the time of cleansing here in the wound-care center today, so a small amount of pain, drainage or bleeding from that process might be expected, and is normal.     All products for home use, including multiple products for a single wound if applicable, are medically necessary in order to achieve the best chance at timely wound healing. See provider documentation for details if needed.      Substituted dressings applied in the [address] [place] [address] Avenue,[address] today, if applicable:           New orders for this week (labs, imaging, medications, etc.):      CONTINUE THE PREDNISONE AS ORDERED        Additional instructions for specific diagnoses:           F/U Appointment is with Dr. Jacy Tripp in 1 week, on                                   at                       .     Your nurse  is Alexandrea Sánchez RN     If we applied slip-resistant hospital socks today, be sure to remove them at least once a day to inspect your toes or feet, even if you're not changing the wraps or dressings underneath. If you see anything concerning (redness, excess moisture, etc), please call and let us know right away. Should you experience any significant changes in your wound(s) (including redness, increased warmth, increased pain, increased drainage, odor, or fever) or have questions about your wound care, please contact the [facility] at [phone] Monday-Thursday from 8:00 am - 4:30 pm, or Friday from 8:00 am - 2:30 pm.  If you need help with your wound outside these hours and cannot wait until we are again available, contact your home-care company (if applicable), your PCP, or go to the nearest emergency room.

## 2022-09-01 NOTE — PROGRESS NOTES
[facility] Progress Note    Yash Carson     : 2/3/1933    DATE OF VISIT:  2022    Subjective: Yash Carson is a 80 y.o. 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 Current complaint of pain in this ulcer? yes. Quality of pain: aching, burning, and sharp  Timing: intermittent and stable  Severity: moderate (better than 2-3 weeks ago)  Associated Signs/Symptoms: redness and drainage (moderate, clear)  Other significant symptoms or pertinent ulcer history: systemically feeling ok (no F/C/D), but didn't tolerate minocycline this past week (became dizzy, lost her appetite, tried to take it for a few days, but stopped, and symptoms only started to improve today). Pain stable the last 1-2 weeks, better than several weeks ago. Eating ok today, getting around more as of yesterday. Additional ulcer(s) noted? no.      Ms. Artem Mcneill has a past medical history of Acute respiratory failure due to severe acute respiratory syndrome coronavirus 2 (SARS-CoV-2) infection ([address] Cellulitis of left lower leg, Cellulitis of right ankle, CHF (congestive heart failure) ([address] Closed pelvic rim fracture, COVID, Fibromyalgia, Foot drop, right foot, Fractures, History of blood transfusion, History of temporal artery biopsy, Hx of blood clots, PNA (pneumonia), Prepatellar bursitis of right knee, Rheumatoid arthritis ([address] Venous ulcer of left leg ([address] and Venous ulcer of right leg ([address] She has a past surgical history that includes Tonsillectomy; Vein Surgery (Right, 10/27/2016); and IR INSERT PICC VAD W SQ PORT >5 YEARS (Left, 2021). Her family history includes Stroke in her father and mother. Ms. Artem Mcneill reports that she has never smoked. She has never used smokeless tobacco. She reports current alcohol use. She reports that she does not use drugs.     Her current medication list consists of Denosumab, Tocilizumab, acetaminophen, apixaban, Leg, Venous, Partial Thickness, Onset 06/22-Wound Length (cm): 7.2 cm  Wound 07/15/22 Wound #4 Left Inner Ankle, Venous, Partial (onset 6/2022)-Wound Length (cm): 0.6 cm    Wound 07/15/22 # 3 Rt Medial lower Leg, Venous, Partial Thickness, Onset 06/22-Wound Width (cm): 4.1 cm  Wound 07/15/22 Wound #4 Left Inner Ankle, Venous, Partial (onset 6/2022)-Wound Width (cm): 0.7 cm    Wound 07/15/22 # 3 Rt Medial lower Leg, Venous, Partial Thickness, Onset 06/22-Wound Depth (cm): 0.2 cm  Wound 07/15/22 Wound #4 Left Inner Ankle, Venous, Partial (onset 6/2022)-Wound Depth (cm): 0.2 cm  ______________________________    Lab Results   Component Value Date    LABALBU 2.9 (L) 11/17/2021     Lab Results   Component Value Date    CREATININE 0.6 11/30/2021     Lab Results   Component Value Date    HGB 9.5 (L) 11/30/2021       Assessment:     Patient Active Problem List   Diagnosis Code    HTN (hypertension) I10    Localized osteoarthrosis, lower leg M17.10    Eczema L30.9    Foot drop, right M21.371    Lumbosacral spondylosis without myelopathy M47.817    Normocytic anemia D64.9    Peripheral neuropathy G62.9    Senile osteoporosis M81.0    Bilateral lower extremity edema R60.0    CKD (chronic kidney disease) stage 3, GFR 30-59 ml/min ([facility]) N18.30    Peripheral venous insufficiency I87.2    Ulcer of right ankle, with fat layer exposed ([address] L97.312    PMR (polymyalgia rheumatica) ([facility]) M35.3    Giant cell arteritis ([facility]) M31.6    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 She should call before her next visit if there is any significant pain, significant strike-through of drainage to the surface of the wrap, or any significant sense of the wrap sliding down more than an inch or so, bunching-up and abrading her skin. I reminded the patient of the importance of weight management and smoking cessation, if applicable; also encouraged ambulation as tolerated, additional lower extremity exercises as instructed in our education sheet, leg elevation when at rest, and compliance with any recommended dietary, diuretic and compression therapies. Written discharge instructions given to patient. Follow up in 1 week.     Electronically signed by Grace Mccoy MD on 9/1/2022 at 9:42 AM.

## 2022-09-02 ENCOUNTER — HOSPITAL ENCOUNTER (OUTPATIENT)
Dept: WOUND CARE | Age: 87
Discharge: HOME | End: 2022-09-02
Payer: MEDICARE

## 2022-09-02 VITALS
DIASTOLIC BLOOD PRESSURE: 81 MMHG | RESPIRATION RATE: 18 BRPM | HEART RATE: 60 BPM | SYSTOLIC BLOOD PRESSURE: 133 MMHG | TEMPERATURE: 97.7 F

## 2022-09-02 DIAGNOSIS — R60.0 BILATERAL LOWER EXTREMITY EDEMA: ICD-10-CM

## 2022-09-02 DIAGNOSIS — L97.312 ULCER OF RIGHT ANKLE, WITH FAT LAYER EXPOSED (HCC): ICD-10-CM

## 2022-09-02 DIAGNOSIS — M79.604 ACUTE PAIN OF RIGHT LOWER EXTREMITY: ICD-10-CM

## 2022-09-02 DIAGNOSIS — L97.322 ULCER OF LEFT ANKLE, WITH FAT LAYER EXPOSED (HCC): Primary | ICD-10-CM

## 2022-09-02 DIAGNOSIS — I87.2 PERIPHERAL VENOUS INSUFFICIENCY: ICD-10-CM

## 2022-09-02 PROCEDURE — 29581 APPL MULTLAYER CMPRN SYS LEG: CPT

## 2022-09-02 PROCEDURE — 29581 APPL MULTLAYER CMPRN SYS LEG: CPT | Performed by: INTERNAL MEDICINE

## 2022-09-02 RX ORDER — LIDOCAINE 40 MG/G
CREAM TOPICAL ONCE
Status: CANCELLED | OUTPATIENT
Start: 2022-09-02 | End: 2022-09-02

## 2022-09-02 RX ORDER — LIDOCAINE HYDROCHLORIDE 40 MG/ML
SOLUTION TOPICAL ONCE
Status: CANCELLED | OUTPATIENT
Start: 2022-09-02 | End: 2022-09-02

## 2022-09-02 RX ORDER — LIDOCAINE 40 MG/G
CREAM TOPICAL ONCE
Status: DISCONTINUED | OUTPATIENT
Start: 2022-09-02 | End: 2022-09-03 | Stop reason: HOSPADM

## 2022-09-02 RX ORDER — LIDOCAINE 50 MG/G
OINTMENT TOPICAL ONCE
Status: CANCELLED | OUTPATIENT
Start: 2022-09-02 | End: 2022-09-02

## 2022-09-02 RX ORDER — BACITRACIN ZINC AND POLYMYXIN B SULFATE 500; 1000 [USP'U]/G; [USP'U]/G
OINTMENT TOPICAL ONCE
Status: CANCELLED | OUTPATIENT
Start: 2022-09-02 | End: 2022-09-02

## 2022-09-02 RX ORDER — HYDROCODONE BITARTRATE AND ACETAMINOPHEN 7.5; 325 MG/1; MG/1
1 TABLET ORAL EVERY 8 HOURS PRN
Qty: 21 TABLET | Refills: 0 | Status: SHIPPED | OUTPATIENT
Start: 2022-09-02 | End: 2022-09-16

## 2022-09-02 ASSESSMENT — PAIN DESCRIPTION - LOCATION: LOCATION: LEG

## 2022-09-02 ASSESSMENT — PAIN DESCRIPTION - ONSET: ONSET: ON-GOING

## 2022-09-02 ASSESSMENT — PAIN DESCRIPTION - DESCRIPTORS: DESCRIPTORS: BURNING;THROBBING;ACHING

## 2022-09-02 ASSESSMENT — PAIN DESCRIPTION - ORIENTATION: ORIENTATION: LEFT

## 2022-09-02 ASSESSMENT — PAIN DESCRIPTION - PAIN TYPE: TYPE: CHRONIC PAIN

## 2022-09-02 ASSESSMENT — PAIN SCALES - GENERAL: PAINLEVEL_OUTOF10: 3

## 2022-09-02 ASSESSMENT — PAIN DESCRIPTION - PROGRESSION: CLINICAL_PROGRESSION: NOT CHANGED

## 2022-09-02 ASSESSMENT — PAIN DESCRIPTION - FREQUENCY: FREQUENCY: INTERMITTENT

## 2022-09-02 NOTE — PLAN OF CARE
Small improvements noted in wounds such as appearance and size. Pain also reported to be slightly better this week as well. No changes in overall wound care regime except will add some Triad to the fibrotic areas of RLE leg. Patient to continue her Prednisone regime as ordered. Discharge instructions reviewed with patient, all questions answered, copy given to patient. Dressings were applied to all wounds per M.D. Instructions at this visit.

## 2022-09-07 NOTE — DISCHARGE INSTRUCTIONS
215 [facility] Physician Orders and Discharge [facility]  [address], [address]  [place], [facility]  Telephone: [phone]      Fax: [phone]        Your home care company:   N/A     Your wound-care supplies will be provided by:  weekly wraps     NAME:  Jeanne Ely   YOB: 1933  PRIMARY DIAGNOSIS FOR WOUND CARE CENTER:  Pyoderma gangrenosum     Wound cleansing:  Do not scrub or use excessive force. Wash hands with soap and water before and after dressing changes. Prior to applying a clean dressing, cleanse wound with normal saline, wound cleanser, or mild soap and water. Ask your physician or nurse before getting the wound(s) wet in the shower. Wound care for home:     Left Lower Leg/ankle:   Clobetosol and Tacrolimus to wound edges and mauro wound  Vashe  Triad mixed with Polysporin to wound  Plain Foam  Compri 2 Lite (wrap with a little less compression than usual)     Right Lower Leg:  Vashe  Clobetesol and Tacrolimus to wound edges and mauro wound   Small amount of Triad to the yellow tissue then Silver Alginate, Mepilex Transfer, ABD  Compri 2 Lite (wrap with a little less compression than usual)     KEEP THESE WRAPS CLEAN, DRY AND IN PLACE FOR THE WEEK           Please note, all wounds (unless stated otherwise here) were mechanically debrided at the time of cleansing here in the wound-care center today, so a small amount of pain, drainage or bleeding from that process might be expected, and is normal.     All products for home use, including multiple products for a single wound if applicable, are medically necessary in order to achieve the best chance at timely wound healing. See provider documentation for details if needed.      Substituted dressings applied in the [facility] today, if applicable:           New orders for this week (labs, imaging, medications, etc.):     Continue prednisone - new script sent to pharmacy - will taper prednisone dosage over the next 3 weeks - continue current dose for the next 3 days then start taper as stated on new script bottle  Start Sulfasalazine -  500 mg once daily for now - we will increase this dosage over the next few weeks     We will order blood work to monitor any changes related to new medication    You may take prescription pain medication as needed    Please increase oral intake     Additional instructions for specific diagnoses:           F/U Appointment is with Dr. Denzel Bradley in 1 week, on                                   at                       .     Your nurse  is Dona Plata RN     If we applied slip-resistant hospital socks today, be sure to remove them at least once a day to inspect your toes or feet, even if you're not changing the wraps or dressings underneath. If you see anything concerning (redness, excess moisture, etc), please call and let us know right away. Should you experience any significant changes in your wound(s) (including redness, increased warmth, increased pain, increased drainage, odor, or fever) or have questions about your wound care, please contact the [employer] at [phone] Monday-Thursday from 8:00 am - 4:30 pm, or Friday from 8:00 am - 2:30 pm.  If you need help with your wound outside these hours and cannot wait until we are again available, contact your home-care company (if applicable), your PCP, or go to the nearest emergency room.

## 2022-09-09 ENCOUNTER — HOSPITAL ENCOUNTER (OUTPATIENT)
Dept: WOUND CARE | Age: 87
Discharge: HOME | End: 2022-09-09
Payer: MEDICARE

## 2022-09-09 VITALS
SYSTOLIC BLOOD PRESSURE: 139 MMHG | HEART RATE: 62 BPM | RESPIRATION RATE: 18 BRPM | TEMPERATURE: 98 F | DIASTOLIC BLOOD PRESSURE: 81 MMHG

## 2022-09-09 DIAGNOSIS — L97.322 ULCER OF LEFT ANKLE, WITH FAT LAYER EXPOSED (HCC): Primary | ICD-10-CM

## 2022-09-09 DIAGNOSIS — I87.2 PERIPHERAL VENOUS INSUFFICIENCY: ICD-10-CM

## 2022-09-09 DIAGNOSIS — L97.312 ULCER OF RIGHT ANKLE, WITH FAT LAYER EXPOSED (HCC): ICD-10-CM

## 2022-09-09 DIAGNOSIS — R60.0 BILATERAL LOWER EXTREMITY EDEMA: ICD-10-CM

## 2022-09-09 PROCEDURE — 29581 APPL MULTLAYER CMPRN SYS LEG: CPT

## 2022-09-09 PROCEDURE — 99213 OFFICE O/P EST LOW 20 MIN: CPT | Performed by: INTERNAL MEDICINE

## 2022-09-09 PROCEDURE — 29581 APPL MULTLAYER CMPRN SYS LEG: CPT | Performed by: INTERNAL MEDICINE

## 2022-09-09 RX ORDER — PREDNISONE 10 MG/1
TABLET ORAL
Qty: 28 TABLET | Refills: 0 | Status: SHIPPED | OUTPATIENT
Start: 2022-09-09 | End: 2022-10-07

## 2022-09-09 RX ORDER — LIDOCAINE HYDROCHLORIDE 40 MG/ML
SOLUTION TOPICAL ONCE
Status: CANCELLED | OUTPATIENT
Start: 2022-09-09 | End: 2022-09-09

## 2022-09-09 RX ORDER — LIDOCAINE 40 MG/G
CREAM TOPICAL ONCE
Status: DISCONTINUED | OUTPATIENT
Start: 2022-09-09 | End: 2022-09-10 | Stop reason: HOSPADM

## 2022-09-09 RX ORDER — SULFASALAZINE 500 MG/1
500 TABLET ORAL DAILY
Qty: 90 TABLET | Refills: 2 | Status: SHIPPED | OUTPATIENT
Start: 2022-09-09 | End: 2022-10-19 | Stop reason: DRUGHIGH

## 2022-09-09 RX ORDER — BACITRACIN ZINC AND POLYMYXIN B SULFATE 500; 1000 [USP'U]/G; [USP'U]/G
OINTMENT TOPICAL ONCE
Status: CANCELLED | OUTPATIENT
Start: 2022-09-09 | End: 2022-09-09

## 2022-09-09 RX ORDER — LIDOCAINE 50 MG/G
OINTMENT TOPICAL ONCE
Status: CANCELLED | OUTPATIENT
Start: 2022-09-09 | End: 2022-09-09

## 2022-09-09 RX ORDER — LIDOCAINE 40 MG/G
CREAM TOPICAL ONCE
Status: CANCELLED | OUTPATIENT
Start: 2022-09-09 | End: 2022-09-09

## 2022-09-09 ASSESSMENT — PAIN DESCRIPTION - ORIENTATION: ORIENTATION: RIGHT

## 2022-09-09 ASSESSMENT — PAIN DESCRIPTION - PAIN TYPE: TYPE: CHRONIC PAIN

## 2022-09-09 ASSESSMENT — PAIN DESCRIPTION - DIRECTION: RADIATING_TOWARDS: UP THE LEG

## 2022-09-09 ASSESSMENT — PAIN DESCRIPTION - ONSET: ONSET: ON-GOING

## 2022-09-09 ASSESSMENT — PAIN DESCRIPTION - FREQUENCY: FREQUENCY: INTERMITTENT

## 2022-09-09 ASSESSMENT — PAIN DESCRIPTION - LOCATION: LOCATION: LEG

## 2022-09-09 ASSESSMENT — PAIN - FUNCTIONAL ASSESSMENT: PAIN_FUNCTIONAL_ASSESSMENT: PREVENTS OR INTERFERES SOME ACTIVE ACTIVITIES AND ADLS

## 2022-09-09 ASSESSMENT — PAIN SCALES - GENERAL: PAINLEVEL_OUTOF10: 4

## 2022-09-09 ASSESSMENT — PAIN DESCRIPTION - DESCRIPTORS: DESCRIPTORS: BURNING;ACHING;STABBING;THROBBING

## 2022-09-09 NOTE — PLAN OF CARE
Patient seen in [address] [place] [address],[address] today for follow up visit. Tolerating Prednisone with minimal side effects. Denies any yeast infection symptoms. Wound showing signes of improvement. No s/s of infection. Patient continues to have pain in wounds and in right leg, shooting into thigh area. Pain has not increased however. She will Continue prednisone - new script sent to pharmacy - will taper prednisone dosage over the next 3 weeks - continue current dose for the next 3 days then start taper as stated on new script bottle. Start Sulfasalazine -  500 mg once daily for now - dosage will incarese over the next few weeks. [facility] blood work will be done to monitor any changes related to new medication    F/u in [address] [place] [address],[address] in 1 week as ordered, pt. Aware to call sooner with any changes or questions/concerns. Discharge instructions reviewed with patient, all questions answered, copy given to patient. Dressings were applied to all wounds per M.D. Instructions at this visit.

## 2022-09-12 NOTE — DISCHARGE INSTRUCTIONS
215 [facility] Physician Orders and Discharge [address], [address]  [place], [facility]  Telephone: [phone]      Fax: [phone]        Your home care company:   N/A     Your wound-care supplies will be provided by:  weekly wraps     NAME:  Nick Diaz   YOB: 1933  PRIMARY DIAGNOSIS FOR WOUND CARE CENTER:  Pyoderma gangrenosum     Wound cleansing:  Do not scrub or use excessive force. Wash hands with soap and water before and after dressing changes. Prior to applying a clean dressing, cleanse wound with normal saline, wound cleanser, or mild soap and water. Ask your physician or nurse before getting the wound(s) wet in the shower. Wound care for home:     Left Lower Leg/ankle:   Clobetosol and Tacrolimus to wound edges and mauro wound  Vashe  Triad mixed with Polysporin to wound  Plain Foam  Cast padding, kerlix, ace wrap  Spandagrip to keep in place     Right Lower Leg:  Vashe  Clobetesol and Tacrolimus to wound edges  Small amount of Triad to the yellow tissue then Silver Alginate, Mepilex Transfer, Super absorbant  Compri 2 Lite (wrap with a little less compression than usual)     KEEP THESE WRAPS CLEAN, DRY AND IN PLACE FOR THE WEEK           Please note, all wounds (unless stated otherwise here) were mechanically debrided at the time of cleansing here in the wound-care center today, so a small amount of pain, drainage or bleeding from that process might be expected, and is normal.     All products for home use, including multiple products for a single wound if applicable, are medically necessary in order to achieve the best chance at timely wound healing. See provider documentation for details if needed.      Substituted dressings applied in the [address] [place] [address],[address] today, if applicable:           New orders for this week (labs, imaging, medications, etc.):     Prednisone- decrease to 10mg daily beginning Monday   Increase Sulfasalazine to 500 mg 2 x daybeginning Monday    Wound culture obtained today and Dr. Byron Rivera will call you once the wound culture is complete in 2-3 days       Additional instructions for specific diagnoses:           F/U Appointment is with Dr. Byron Rivera in 1 week, on                                   at                       .     Your nurse  is Marcella Rodriguez RN     If we applied slip-resistant hospital socks today, be sure to remove them at least once a day to inspect your toes or feet, even if you're not changing the wraps or dressings underneath. If you see anything concerning (redness, excess moisture, etc), please call and let us know right away. Should you experience any significant changes in your wound(s) (including redness, increased warmth, increased pain, increased drainage, odor, or fever) or have questions about your wound care, please contact the [facility] at [phone] Monday-Thursday from 8:00 am - 4:30 pm, or Friday from 8:00 am - 2:30 pm.  If you need help with your wound outside these hours and cannot wait until we are again available, contact your home-care company (if applicable), your PCP, or go to the nearest emergency room.

## 2022-09-13 RX ORDER — CLOBETASOL PROPIONATE 0.5 MG/G
CREAM TOPICAL WEEKLY
COMMUNITY

## 2022-09-13 NOTE — PROGRESS NOTES
88 [facility] Progress Note    Fahad Martinez     : 2/3/1933    DATE OF VISIT:  2022    Subjective: Fahad Martinez is a 80 y.o. female who has a venous and  pyoderma ulcer located on the right, medial lower leg and left, medial ankle. Current complaint of pain in this ulcer? yes. Quality of pain: aching, sharp, and stabbing  Timing: intermittent and stable  Severity: moderate, a bit better than 1-2 weeks ago  Associated Signs/Symptoms:  minimal swelling, mild-mod drainage  Other significant symptoms or pertinent ulcer history: no F/C/D, tolerating prednisone well. Didn't take a Norco this week, but has them at home if needed. I'm a little concerned and discouraged that she said her appetite isn't good, and she feels really fatigued (despite the prednisone) -- I was hoping for a little incidental energy and appetite from the steroids. Additional ulcer(s) noted? no.      Ms. Vida Fang has a past medical history of Acute respiratory failure due to severe acute respiratory syndrome coronavirus 2 (SARS-CoV-2) infection ([address] Cellulitis of left lower leg, Cellulitis of right ankle, CHF (congestive heart failure) ([address] Closed pelvic rim fracture, COVID, Fibromyalgia, Foot drop, right foot, Fractures, History of blood transfusion, History of temporal artery biopsy, Hx of blood clots, PNA (pneumonia), Prepatellar bursitis of right knee, Rheumatoid arthritis ([address] Venous ulcer of left leg ([address] and Venous ulcer of right leg ([address] She has a past surgical history that includes Tonsillectomy; Vein Surgery (Right, 10/27/2016); and IR INSERT PICC VAD W SQ PORT >5 YEARS (Left, 2021). Her family history includes Stroke in her father and mother. Ms. Vida Fang reports that she has never smoked. She has never used smokeless tobacco. She reports current alcohol use. She reports that she does not use drugs.     Her current medication list consists of Denosumab, Tocilizumab, acetaminophen, apixaban, furosemide, losartan, metoprolol succinate, predniSONE, and tacrolimus. Also clobetasol. Allergies: Dapsone, Erythromycin, Hydrochlorothiazide, Minocycline, Silver sulfadiazine, and Doxycycline    Pertinent items from the review of systems are discussed in the HPI; the remainder of the ROS was reviewed and is negative. Objective:     Vitals:    09/09/22 1100   BP: 139/81   Pulse: 62   Resp: 18   Temp: 98 °F (36.7 °C)   TempSrc: Oral       Constitutional:  well-developed, well-nourished, fatigued, NAD, in less discomfort I think  Psychiatric:  oriented to person, place and time; mood and affect appropriate for the situation  ENT: no thrush or oral ulcers, mucous membranes moist  Abd: soft, NT, ND, good BS  Cardiovascular:  bilateral pedal pulses palpable, feet warm, good cap refill; very mild BL lower extremity edema, usual hemosiderin and atrophe miroslava, and some patchy areas of indurated skin thickening  Lymphatic:  no inguinal or popliteal adenopathy, no angitis or cellulitis  Neuro: still some allodynia in the wound beds, more on the right   Musculoskeletal:  no clubbing, cyanosis or petechiae; RLE and LLE with no gross effusions, joint misalignment or acute arthritis  Gay-ulcer skin: indurated, pink to slightly trent, dry, warm  Ulcer(s): left side probably just stable, maybe a less inflamed edge, partly granular and partly fibronecrotic wound bed; right side is probably the same size, not as much purple induration of the edges as 1-2 weeks ago, wound bed slowly getting a bit less fibrotic and sloughy, a bit more granular, no signs of infection. Photos also saved in electronic chart.     Today's Wound Measurements, per RN documentation:  Wound 07/15/22 # 3 Rt Medial lower Leg, Venous, Partial Thickness, Onset 06/22-Wound Length (cm): 7.4 cm  Wound 07/15/22 Wound #4 Left Inner Ankle, Venous, Partial (onset 6/2022)-Wound Length (cm): 0.3 cm    Wound 07/15/22 # 3 Rt Medial lower Leg, Venous, Partial Thickness, Onset 06/22-Wound Width (cm): 3 cm  Wound 07/15/22 Wound #4 Left Inner Ankle, Venous, Partial (onset 6/2022)-Wound Width (cm): 0.4 cm    Wound 07/15/22 # 3 Rt Medial lower Leg, Venous, Partial Thickness, Onset 06/22-Wound Depth (cm): 0.2 cm  Wound 07/15/22 Wound #4 Left Inner Ankle, Venous, Partial (onset 6/2022)-Wound Depth (cm): 0.2 cm  ______________________________    Lab Results   Component Value Date    LABALBU 2.9 (L) 11/17/2021     Lab Results   Component Value Date    CREATININE 0.6 11/30/2021     Lab Results   Component Value Date    HGB 9.5 (L) 11/30/2021       Assessment:     Patient Active Problem List   Diagnosis Code    HTN (hypertension) I10    Localized osteoarthrosis, lower leg M17.10    Eczema L30.9    Foot drop, right M21.371    Lumbosacral spondylosis without myelopathy M47.817    Normocytic anemia D64.9    Peripheral neuropathy G62.9    Senile osteoporosis M81.0    Bilateral lower extremity edema R60.0    CKD (chronic kidney disease) stage 3, GFR 30-59 ml/min ([facility]) N18.30    Peripheral venous insufficiency I87.2    Ulcer of right ankle, with fat layer exposed ([address] L97.312    PMR (polymyalgia rheumatica) ([facility]) M35.3    Giant cell arteritis ([facility]) M31.6    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 Factors contributing to occurrence and/or persistence of the chronic ulcer include edema and venous stasis.  Medical necessity of today's visit is shown by the above documentation. Sharp debridement is not indicated today, based upon the exam findings in the wound(s) above. Discharge plan:     Treatment in the wound care center today, per RN documentation: Wound 07/15/22 # 3 Rt Medial lower Leg, Venous, Partial Thickness, Onset 06/22-Dressing/Treatment: Other (comment) (Clobetesol / Tacrolimus to wound edges and mauro wound    Triad to the yellow tissue  Silver Alginate, Mepilex Transfer, ABD  Compri 2 Lite)  Wound 07/15/22 Wound #4 Left Inner Ankle, Venous, Partial (onset 6/2022)-Dressing/Treatment: Other (comment) (Clobetosol / Tacrolimus( mauro wound), Triad +Polysporin to wound,  Plain Foam  Compri 2 Lite). Per physician order, bilateral application of multilayer compression wrap was performed in the wound care center today, to help manage edema, stasis dermatitis, and/or venous ulcers. Leave primary dressing and multi-layer wrap(s) in place until the next appointment. Also discussed ways to keep the wrap dry for a shower, including a plastic cast-guard, available in retail pharmacies. She should call before her next visit if there is any significant pain, significant strike-through of drainage to the surface of the wrap, or any significant sense of the wrap sliding down more than an inch or so, bunching-up and abrading her skin. I reminded the patient of the importance of weight management and smoking cessation, if applicable; also encouraged ambulation as tolerated, additional lower extremity exercises as instructed in our education sheet, leg elevation when at rest, and compliance with any recommended dietary, diuretic and compression therapies. No change in current pain mgmt plan.     In terms of systemic Rx for the pyoderma, will start to taper down prednisone (20 mg daily x a week, then 10 mg, then 5 mg for a couple of weeks, then off), and will replace it with sulfasalazine; not a ton of data on that for PG, but there definitely are some case series, and I'm hopeful that it will be safer and better tolerated than some other options. Will start low at 500 mg daily, and plan to increase by 500 mg daily every week for a couple of weeks, while we see how she does. Will start some periodic lab monitoring next week (CBC and CMP, every 1 - 2 weeks for 6 weeks, then monthly for a few months, then quarterly). Reviewed possible side effects, gave her some written patient ed materials today. If this doesn't work, next options could be colchicine, methotrexate, mycophenolate. I'd get Dr. Lambert Goddard input again before considering either of those latter options. Not really ready to consider skin subs, until her inflammation is resolved    D/W her son, who brought her in today. Written discharge instructions given to patient. Follow up in 1 week.     Electronically signed by Sima Mchugh MD on 9/13/2022 at 10:19 AM.

## 2022-09-13 NOTE — PROGRESS NOTES
88 [facility] Progress Note    Carey Zheng     : 2/3/1933    DATE OF VISIT:  2022    Subjective: Carey Zheng is a 80 y.o. 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 Significant symptoms or pertinent wound history since last visit: feeling about the same, still a good deal of pain in both sides, but less than a month ago. Systemically ok, just fatigued, recent nausea and dizziness resolved. Did fine with starting prednisone this past week. Additional ulcer(s) noted? no.      Her current medication list consists of Denosumab, Tocilizumab, acetaminophen, apixaban, furosemide, losartan, metoprolol succinate, predniSONE, and tacrolimus. Prednisone at 40 mg for right now.      Allergies: Dapsone, Erythromycin, Hydrochlorothiazide, Minocycline, Silver sulfadiazine, and Doxycycline    Objective:     Vitals:    22 1008   BP: 133/81   Pulse: 60   Resp: 18   Temp: 97.7 °F (36.5 °C)   TempSrc: Oral     Constitutional:  well-developed, well-nourished, fatigued, NAD, in some discomfort  Psychiatric:  oriented to person, place and time; mood and affect appropriate for the situation  Cardiovascular:  bilateral pedal pulses palpable, feet warm, good cap refill; very mild BL lower extremity edema, usual hemosiderin and atrophe miroslava, and some patchy areas of indurated skin thickening  Lymphatic:  no inguinal or popliteal adenopathy, no angitis or cellulitis  Neuro: still some allodynia in the wound beds, more on the right   Musculoskeletal:  no clubbing, cyanosis or petechiae; RLE and LLE with no gross effusions, joint misalignment or acute arthritis  Gay-ulcer skin: indurated, pink to slightly trent, dry, warm  Ulcer(s): left side probably just stable, inflamed edge, partly granular and partly fibronecrotic wound bed; right side is probably the same size, not as much purple induration of the edges as 1-2 weeks ago, wound bed still mostly inflamed fibrotic SQ tissue, but starting to granulate a bit more, no signs of infection. Photos also saved in electronic chart. Today's Wound Measurements, per RN documentation:  Wound 07/15/22 # 3 Rt Medial lower Leg, Venous, Partial Thickness, Onset 06/22-Wound Length (cm): 7.2 cm  Wound 07/15/22 Wound #4 Left Inner Ankle, Venous, Partial (onset 6/2022)-Wound Length (cm): 0.5 cm    Wound 07/15/22 # 3 Rt Medial lower Leg, Venous, Partial Thickness, Onset 06/22-Wound Width (cm): 4 cm  Wound 07/15/22 Wound #4 Left Inner Ankle, Venous, Partial (onset 6/2022)-Wound Width (cm): 0.6 cm    Wound 07/15/22 # 3 Rt Medial lower Leg, Venous, Partial Thickness, Onset 06/22-Wound Depth (cm): 0.2 cm  Wound 07/15/22 Wound #4 Left Inner Ankle, Venous, Partial (onset 6/2022)-Wound Depth (cm): 0.2 cm    Assessment:     Patient Active Problem List   Diagnosis Code    HTN (hypertension) I10    Localized osteoarthrosis, lower leg M17.10    Eczema L30.9    Foot drop, right M21.371    Lumbosacral spondylosis without myelopathy M47.817    Normocytic anemia D64.9    Peripheral neuropathy G62.9    Senile osteoporosis M81.0    Bilateral lower extremity edema R60.0    CKD (chronic kidney disease) stage 3, GFR 30-59 ml/min ([facility]) N18.30    Peripheral venous insufficiency I87.2    Ulcer of right ankle, with fat layer exposed ([address] L97.312    PMR (polymyalgia rheumatica) ([facility]) M35.3    Giant cell arteritis ([facility]) M31.6    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 Recurrent venous leg/ankle ulcers, but unlike previous episodes, more inflamed and painful, not responsive to Abx, worse with debridement, with distinct purple inflammatory edges c/w pyoderma, related to her underlying GCA/PMR.  Things at least seem to be stabilized with current Rx, but improvement is slow. Factors contributing to occurrence and/or persistence of the chronic ulcer include edema and venous stasis. Medical necessity of today's visit is shown by the above documentation. Sharp debridement is not indicated today, based upon the exam findings in the wound(s) above. She does have an indication for a weekly compression wrap. Procedure note:     Consent obtained. Time out performed per [facility]. Anesthetic  Anesthetic: 4% Lidocaine Cream     After cleansing of the wounds with saline and applying skin-care and/or dressing materials as listed below, Bilateral application of a multi-layer compression wrap was performed per physician order, to help manage edema, stasis dermatitis, and/or venous ulcers. The patient's level of pain during and after the procedure was monitored, and is noted in the wound documentation flowsheet. Discharge plan:     Treatment in the wound care center today, per RN documentation: Wound 07/15/22 # 3 Rt Medial lower Leg, Venous, Partial Thickness, Onset 06/22-Dressing/Treatment: Other (comment) (Vashe  Clobetesol and Tacrolimus to wound edges and mauro wound   Small amount of Triad to the yellow tissue then Silver Alginate, Mepilex Transfer, ABD  Compri 2 Lite)  Wound 07/15/22 Wound #4 Left Inner Ankle, Venous, Partial (onset 6/2022)-Dressing/Treatment: Other (comment) (Clobetosol and Tacrolimus to mauro wound, Triad mixed with Polysporin to wound,  Plain Foam  Compri 2 Lite). Leave primary dressing and multi-layer wrap(s) in place until the next appointment. She should call before her next visit if there is any significant pain, significant strike-through of drainage to the surface of the wrap, or any significant sense of the wrap sliding down more than an inch or so, bunching-up and abrading her skin.      I reminded the patient of the importance of weight management and smoking cessation, if applicable; also encouraged ambulation as tolerated, additional lower extremity exercises as instructed in our education sheet, leg elevation when at rest, and compliance with any recommended dietary, diuretic and compression therapies. For this week stay on 40mg prednisone daily, and then next week plan to start a transition to a safer long-term agent. Continue Tylenol for mild pain, tramadol for moderate; she asked about something stronger if needed, since the tramadol don't seem to do a lot for her, and I can call in some Norco if needed instead (she seems to especially have trouble a day or two after she's here, even with no debridement, just with stirring things up from cleansing, dressing changes, wrapping). Written discharge instructions given to patient. Follow up in 1 week.     Electronically signed by Margaux Hart MD on 9/13/2022 at 10:09 AM.

## 2022-09-16 ENCOUNTER — HOSPITAL ENCOUNTER (OUTPATIENT)
Dept: WOUND CARE | Age: 87
Discharge: HOME | End: 2022-09-16
Payer: MEDICARE

## 2022-09-16 VITALS
BODY MASS INDEX: 24.93 KG/M2 | SYSTOLIC BLOOD PRESSURE: 113 MMHG | HEART RATE: 60 BPM | RESPIRATION RATE: 18 BRPM | HEIGHT: 64 IN | TEMPERATURE: 97.8 F | DIASTOLIC BLOOD PRESSURE: 67 MMHG

## 2022-09-16 DIAGNOSIS — R60.0 BILATERAL LOWER EXTREMITY EDEMA: ICD-10-CM

## 2022-09-16 DIAGNOSIS — L97.322 ULCER OF LEFT ANKLE, WITH FAT LAYER EXPOSED (HCC): Primary | ICD-10-CM

## 2022-09-16 DIAGNOSIS — I87.2 PERIPHERAL VENOUS INSUFFICIENCY: ICD-10-CM

## 2022-09-16 DIAGNOSIS — L97.312 ULCER OF RIGHT ANKLE, WITH FAT LAYER EXPOSED (HCC): ICD-10-CM

## 2022-09-16 LAB
A/G RATIO: 1.7 (ref 1.1–2.2)
ALBUMIN SERPL-MCNC: 3.3 G/DL (ref 3.4–5)
ALP BLD-CCNC: 65 U/L (ref 40–129)
ALT SERPL-CCNC: 25 U/L (ref 10–40)
ANION GAP SERPL CALCULATED.3IONS-SCNC: 10 MMOL/L (ref 3–16)
AST SERPL-CCNC: 18 U/L (ref 15–37)
BASOPHILS ABSOLUTE: 0 K/UL (ref 0–0.2)
BASOPHILS RELATIVE PERCENT: 0.2 %
BILIRUB SERPL-MCNC: 0.3 MG/DL (ref 0–1)
BUN BLDV-MCNC: 41 MG/DL (ref 7–20)
CALCIUM SERPL-MCNC: 10.1 MG/DL (ref 8.3–10.6)
CHLORIDE BLD-SCNC: 106 MMOL/L (ref 99–110)
CO2: 23 MMOL/L (ref 21–32)
CREAT SERPL-MCNC: 0.8 MG/DL (ref 0.6–1.2)
EOSINOPHILS ABSOLUTE: 0 K/UL (ref 0–0.6)
EOSINOPHILS RELATIVE PERCENT: 0 %
GFR AFRICAN AMERICAN: >60
GFR NON-AFRICAN AMERICAN: >60
GLUCOSE BLD-MCNC: 104 MG/DL (ref 70–99)
HCT VFR BLD CALC: 36 % (ref 36–48)
HEMOGLOBIN: 12 G/DL (ref 12–16)
LYMPHOCYTES ABSOLUTE: 0.5 K/UL (ref 1–5.1)
LYMPHOCYTES RELATIVE PERCENT: 3.7 %
MCH RBC QN AUTO: 33.6 PG (ref 26–34)
MCHC RBC AUTO-ENTMCNC: 33.4 G/DL (ref 31–36)
MCV RBC AUTO: 100.4 FL (ref 80–100)
MONOCYTES ABSOLUTE: 1.1 K/UL (ref 0–1.3)
MONOCYTES RELATIVE PERCENT: 7.8 %
NEUTROPHILS ABSOLUTE: 12.9 K/UL (ref 1.7–7.7)
NEUTROPHILS RELATIVE PERCENT: 88.3 %
PDW BLD-RTO: 16.2 % (ref 12.4–15.4)
PLATELET # BLD: 164 K/UL (ref 135–450)
PMV BLD AUTO: 8 FL (ref 5–10.5)
POTASSIUM SERPL-SCNC: 4.3 MMOL/L (ref 3.5–5.1)
RBC # BLD: 3.58 M/UL (ref 4–5.2)
SODIUM BLD-SCNC: 139 MMOL/L (ref 136–145)
TOTAL PROTEIN: 5.2 G/DL (ref 6.4–8.2)
WBC # BLD: 14.7 K/UL (ref 4–11)

## 2022-09-16 PROCEDURE — 29581 APPL MULTLAYER CMPRN SYS LEG: CPT

## 2022-09-16 PROCEDURE — 11042 DBRDMT SUBQ TIS 1ST 20SQCM/<: CPT

## 2022-09-16 PROCEDURE — 87181 SC STD AGAR DILUTION PER AGT: CPT

## 2022-09-16 PROCEDURE — 80053 COMPREHEN METABOLIC PANEL: CPT

## 2022-09-16 PROCEDURE — 11042 DBRDMT SUBQ TIS 1ST 20SQCM/<: CPT | Performed by: INTERNAL MEDICINE

## 2022-09-16 PROCEDURE — 87077 CULTURE AEROBIC IDENTIFY: CPT

## 2022-09-16 PROCEDURE — 87070 CULTURE OTHR SPECIMN AEROBIC: CPT

## 2022-09-16 PROCEDURE — 29581 APPL MULTLAYER CMPRN SYS LEG: CPT | Performed by: INTERNAL MEDICINE

## 2022-09-16 PROCEDURE — 85025 COMPLETE CBC W/AUTO DIFF WBC: CPT

## 2022-09-16 PROCEDURE — 87205 SMEAR GRAM STAIN: CPT

## 2022-09-16 PROCEDURE — 36415 COLL VENOUS BLD VENIPUNCTURE: CPT

## 2022-09-16 PROCEDURE — 87186 SC STD MICRODIL/AGAR DIL: CPT

## 2022-09-16 RX ORDER — LIDOCAINE HYDROCHLORIDE 40 MG/ML
SOLUTION TOPICAL ONCE
Status: CANCELLED | OUTPATIENT
Start: 2022-09-16 | End: 2022-09-16

## 2022-09-16 RX ORDER — BACITRACIN ZINC AND POLYMYXIN B SULFATE 500; 1000 [USP'U]/G; [USP'U]/G
OINTMENT TOPICAL ONCE
Status: CANCELLED | OUTPATIENT
Start: 2022-09-16 | End: 2022-09-16

## 2022-09-16 RX ORDER — LIDOCAINE 50 MG/G
OINTMENT TOPICAL ONCE
Status: CANCELLED | OUTPATIENT
Start: 2022-09-16 | End: 2022-09-16

## 2022-09-16 RX ORDER — LIDOCAINE 40 MG/G
CREAM TOPICAL ONCE
Status: DISCONTINUED | OUTPATIENT
Start: 2022-09-16 | End: 2022-09-17 | Stop reason: HOSPADM

## 2022-09-16 RX ORDER — TRAMADOL HYDROCHLORIDE 50 MG/1
50 TABLET ORAL EVERY 6 HOURS PRN
COMMUNITY
End: 2022-10-07 | Stop reason: ALTCHOICE

## 2022-09-16 RX ORDER — LIDOCAINE 40 MG/G
CREAM TOPICAL ONCE
Status: CANCELLED | OUTPATIENT
Start: 2022-09-16 | End: 2022-09-16

## 2022-09-16 ASSESSMENT — PAIN DESCRIPTION - ORIENTATION: ORIENTATION: RIGHT

## 2022-09-16 ASSESSMENT — PAIN DESCRIPTION - PAIN TYPE: TYPE: CHRONIC PAIN

## 2022-09-16 ASSESSMENT — PAIN DESCRIPTION - FREQUENCY: FREQUENCY: INTERMITTENT

## 2022-09-16 ASSESSMENT — PAIN DESCRIPTION - ONSET: ONSET: ON-GOING

## 2022-09-16 ASSESSMENT — PAIN - FUNCTIONAL ASSESSMENT: PAIN_FUNCTIONAL_ASSESSMENT: PREVENTS OR INTERFERES SOME ACTIVE ACTIVITIES AND ADLS

## 2022-09-16 ASSESSMENT — PAIN DESCRIPTION - LOCATION: LOCATION: LEG

## 2022-09-16 ASSESSMENT — PAIN SCALES - GENERAL: PAINLEVEL_OUTOF10: 2

## 2022-09-16 ASSESSMENT — PAIN DESCRIPTION - DESCRIPTORS: DESCRIPTORS: ACHING;BURNING;STABBING;THROBBING

## 2022-09-16 NOTE — PLAN OF CARE
LLE wound debrided today per MD.  RLE  noted to be red and increased edema,  MD collected wound culture today and will call patient once shukri is coplete. Prednisone- decrease to 10mg daily beginning Monday   Increase Sulfasalazine to 500 mg 2 x daybeginning Monday  Discharge instructions reviewed with patient, all questions answered, copy given to patient. Dressings were applied to all wounds per M.D. Instructions at this visit.

## 2022-09-18 LAB
GRAM STAIN RESULT: ABNORMAL
ORGANISM: ABNORMAL
ORGANISM: ABNORMAL
WOUND/ABSCESS: ABNORMAL
WOUND/ABSCESS: ABNORMAL

## 2022-09-18 RX ORDER — CIPROFLOXACIN 500 MG/1
500 TABLET, FILM COATED ORAL EVERY 12 HOURS
Qty: 20 TABLET | Refills: 0 | Status: SHIPPED | OUTPATIENT
Start: 2022-09-18 | End: 2022-09-28

## 2022-09-18 RX ORDER — AMOXICILLIN AND CLAVULANATE POTASSIUM 875; 125 MG/1; MG/1
1 TABLET, FILM COATED ORAL EVERY 12 HOURS
Qty: 20 TABLET | Refills: 0 | Status: SHIPPED | OUTPATIENT
Start: 2022-09-18 | End: 2022-09-28

## 2022-09-19 NOTE — DISCHARGE INSTRUCTIONS
215 [facility] Physician Orders and Discharge [facility]  [address], [address]  [place], [facility]  Telephone: [phone]      Fax: [phone]        Your home care company:   N/A     Your wound-care supplies will be provided by:  weekly wraps     NAME:  Alma Gilmore   YOB: 1933  PRIMARY DIAGNOSIS FOR WOUND CARE CENTER:  Pyoderma gangrenosum     Wound cleansing:  Do not scrub or use excessive force. Wash hands with soap and water before and after dressing changes. Prior to applying a clean dressing, cleanse wound with normal saline, wound cleanser, or mild soap and water. Ask your physician or nurse before getting the wound(s) wet in the shower. Wound care for home:     Left Lower Leg/ankle:   Clobetosol and Tacrolimus to wound edges and mauro wound  Vashe  Triad mixed with Polysporin to wound  Plain Foam  Cast padding, kerlix, ace wrap  Spandagrip to keep in place     Right Lower Leg:  Vashe  Clobetesol and Tacrolimus to wound edges  Small amount of Triad to the yellow tissue then Silver Alginate, Mepilex Transfer, Super absorbant  Compri 2 Lite (wrap with a little less compression than usual)     KEEP THESE WRAPS CLEAN, DRY AND IN PLACE FOR THE WEEK           Please note, all wounds (unless stated otherwise here) were mechanically debrided at the time of cleansing here in the wound-care center today, so a small amount of pain, drainage or bleeding from that process might be expected, and is normal.     All products for home use, including multiple products for a single wound if applicable, are medically necessary in order to achieve the best chance at timely wound healing. See provider documentation for details if needed.      Substituted dressings applied in the [address] [place] [address],[address] today, if applicable:           New orders for this week (labs, imaging, medications, etc.):     Prednisone- decrease to 10mg daily beginning Monday   Increase Sulfasalazine to 500 mg 2 x daybeginning Monday     Wound culture obtained today and Dr. Haresh Ramirez will call you once the wound culture is complete in 2-3 days        Additional instructions for specific diagnoses:           F/U Appointment is with Dr. Haresh Ramirez in 1 week, on                                   at                       .     Your nurse  is Ej Orantes, RN     If we applied slip-resistant hospital socks today, be sure to remove them at least once a day to inspect your toes or feet, even if you're not changing the wraps or dressings underneath. If you see anything concerning (redness, excess moisture, etc), please call and let us know right away. Should you experience any significant changes in your wound(s) (including redness, increased warmth, increased pain, increased drainage, odor, or fever) or have questions about your wound care, please contact the [facility] at [phone] Monday-Thursday from 8:00 am - 4:30 pm, or Friday from 8:00 am - 2:30 pm.  If you need help with your wound outside these hours and cannot wait until we are again available, contact your home-care company (if applicable), your PCP, or go to the nearest emergency room.

## 2022-09-23 ENCOUNTER — HOSPITAL ENCOUNTER (OUTPATIENT)
Dept: WOUND CARE | Age: 87
Discharge: HOME | End: 2022-09-23
Payer: MEDICARE

## 2022-09-23 VITALS
RESPIRATION RATE: 18 BRPM | WEIGHT: 145 LBS | HEART RATE: 70 BPM | TEMPERATURE: 97.9 F | DIASTOLIC BLOOD PRESSURE: 83 MMHG | BODY MASS INDEX: 24.75 KG/M2 | SYSTOLIC BLOOD PRESSURE: 124 MMHG | HEIGHT: 64 IN

## 2022-09-23 DIAGNOSIS — L97.312 ULCER OF RIGHT ANKLE, WITH FAT LAYER EXPOSED (HCC): ICD-10-CM

## 2022-09-23 DIAGNOSIS — I87.2 PERIPHERAL VENOUS INSUFFICIENCY: ICD-10-CM

## 2022-09-23 DIAGNOSIS — R60.0 BILATERAL LOWER EXTREMITY EDEMA: ICD-10-CM

## 2022-09-23 DIAGNOSIS — L97.322 ULCER OF LEFT ANKLE, WITH FAT LAYER EXPOSED (HCC): Primary | ICD-10-CM

## 2022-09-23 PROCEDURE — 29581 APPL MULTLAYER CMPRN SYS LEG: CPT | Performed by: INTERNAL MEDICINE

## 2022-09-23 PROCEDURE — 29581 APPL MULTLAYER CMPRN SYS LEG: CPT

## 2022-09-23 PROCEDURE — 99213 OFFICE O/P EST LOW 20 MIN: CPT | Performed by: INTERNAL MEDICINE

## 2022-09-23 RX ORDER — LIDOCAINE 50 MG/G
OINTMENT TOPICAL ONCE
Status: CANCELLED | OUTPATIENT
Start: 2022-09-23 | End: 2022-09-23

## 2022-09-23 RX ORDER — LIDOCAINE 40 MG/G
CREAM TOPICAL ONCE
Status: DISCONTINUED | OUTPATIENT
Start: 2022-09-23 | End: 2022-09-25 | Stop reason: HOSPADM

## 2022-09-23 RX ORDER — LIDOCAINE 40 MG/G
CREAM TOPICAL ONCE
Status: CANCELLED | OUTPATIENT
Start: 2022-09-23 | End: 2022-09-23

## 2022-09-23 RX ORDER — BACITRACIN ZINC AND POLYMYXIN B SULFATE 500; 1000 [USP'U]/G; [USP'U]/G
OINTMENT TOPICAL ONCE
Status: CANCELLED | OUTPATIENT
Start: 2022-09-23 | End: 2022-09-23

## 2022-09-23 RX ORDER — LIDOCAINE HYDROCHLORIDE 40 MG/ML
SOLUTION TOPICAL ONCE
Status: CANCELLED | OUTPATIENT
Start: 2022-09-23 | End: 2022-09-23

## 2022-09-23 NOTE — DISCHARGE INSTRUCTIONS
1909 [facility] Physician Orders and Discharge 800 [facility]  [address], [address]  [place], [facility]  Telephone: [phone]      Fax: [phone]        Your home care company:   N/A     Your wound-care supplies will be provided by:  weekly wraps     NAME:  Kirti Royal   YOB: 1933  PRIMARY DIAGNOSIS FOR WOUND CARE CENTER:  Pyoderma gangrenosum     Wound cleansing:  Do not scrub or use excessive force. Wash hands with soap and water before and after dressing changes. Prior to applying a clean dressing, cleanse wound with normal saline, wound cleanser, or mild soap and water. Ask your physician or nurse before getting the wound(s) wet in the shower. Wound care for home:     Left Lower Leg/ankle:   Aquphor to dry skin  Clobetosol and Tacrolimus to wound edges   Vashe  Polysporin to wound  Plain Foam  Compri 2 Lite      Right Lower Leg:  Aquaphor to dry skin  Clobetasol to right medial heel and dorsal foot   Vashe  Clobetesol and Tacrolimus to wound edges  Small amount of Triad to the yellow tissue then Silver Alginate, Mepilex Transfer, ABD  Compri 2 Lite     KEEP THESE WRAPS CLEAN, DRY AND IN PLACE FOR THE WEEK           Please note, all wounds (unless stated otherwise here) were mechanically debrided at the time of cleansing here in the wound-care center today, so a small amount of pain, drainage or bleeding from that process might be expected, and is normal.     All products for home use, including multiple products for a single wound if applicable, are medically necessary in order to achieve the best chance at timely wound healing. See provider documentation for details if needed. Substituted dressings applied in the [facility] today, if applicable:           New orders for this week (labs, imaging, medications, etc.):     Prednisone- decrease to 1/2 tablet every other day. Labs today in clinic prior to discharge.           Additional instructions for specific diagnoses:           F/U Appointment is with Dr. Soraya Oleary in 1 week, on                                   at                       .     Your nurse  is Kristopher Loza RN     If we applied slip-resistant hospital socks today, be sure to remove them at least once a day to inspect your toes or feet, even if you're not changing the wraps or dressings underneath. If you see anything concerning (redness, excess moisture, etc), please call and let us know right away. Should you experience any significant changes in your wound(s) (including redness, increased warmth, increased pain, increased drainage, odor, or fever) or have questions about your wound care, please contact the [address] [place] [address] at [phone] Monday-Thursday from 8:00 am - 4:30 pm, or Friday from 8:00 am - 2:30 pm.  If you need help with your wound outside these hours and cannot wait until we are again available, contact your home-care company (if applicable), your PCP, or go to the nearest emergency room.

## 2022-09-23 NOTE — PROGRESS NOTES
Patient was encouraged to bring a medication list with her to her next appointment. She does not know her medications and cannot verify the current medication list on file. Son at the bedside.

## 2022-09-23 NOTE — PLAN OF CARE
MD noted some improvement in wound edges this week and there is some small new skin islands in wound of RLE. Patient tolerating her medications as prescribed. Planned taper of 2 of the meds this coming Monday as noted on AVS.  Discharge instructions reviewed with patient, all questions answered, copy given to patient. Dressings were applied to all wounds per M.D. Instructions at this visit.

## 2022-09-23 NOTE — DISCHARGE INSTRUCTIONS
[address] [facility] Physician Orders and Discharge [address], [address]  [place], [facility]  Telephone: [phone]      Fax: [phone]        Your home care company:   N/A     Your wound-care supplies will be provided by:  weekly wraps     NAME:  Rowena Estrada   YOB: 1933  PRIMARY DIAGNOSIS FOR WOUND CARE CENTER:  Pyoderma gangrenosum     Wound cleansing:  Do not scrub or use excessive force. Wash hands with soap and water before and after dressing changes. Prior to applying a clean dressing, cleanse wound with normal saline, wound cleanser, or mild soap and water. Ask your physician or nurse before getting the wound(s) wet in the shower. Wound care for home:     Left Lower Leg/ankle:   Clobetosol and Tacrolimus to wound edges   Vashe  Polysporin to wound  Plain Foam  Compri 2 Lite      Right Lower Leg:  Vashe  Clobetesol and Tacrolimus to wound edges  Small amount of Triad to the yellow tissue then Silver Alginate, Mepilex Transfer, ABD  Compri 2 Lite (wrap with a little less compression than usual)     KEEP THESE WRAPS CLEAN, DRY AND IN PLACE FOR THE WEEK           Please note, all wounds (unless stated otherwise here) were mechanically debrided at the time of cleansing here in the wound-care center today, so a small amount of pain, drainage or bleeding from that process might be expected, and is normal.     All products for home use, including multiple products for a single wound if applicable, are medically necessary in order to achieve the best chance at timely wound healing. See provider documentation for details if needed.      Substituted dressings applied in the [address] [place] [address],[address] today, if applicable:           New orders for this week (labs, imaging, medications, etc.):     Prednisone- decrease to 1/2 tablet daily beginning Monday   Increase Sulfasalazine to 500 mg 3 x day beginning Monday           Additional instructions for

## 2022-09-27 NOTE — PROGRESS NOTES
bed  Musculoskeletal:  no clubbing, cyanosis or petechiae; RLE and LLE with no gross effusions, joint misalignment or acute arthritis  Gay-ulcer skin: indurated and pink on the left, fading cellulitic on the right  Ulcer(s): left side part granular, maybe still part fibrotic, but less inflamed; right side with definite improvement in some purple pyoderma edges this week, less inflamed, peripherally a bit more granular, centrally still a bit of SQ sloughy fibrotic tissue, a bit of fibrin and biofilm. Photos also saved in electronic chart. Today's Wound Measurements, per RN documentation:  Wound 07/15/22 # 3 Rt Medial lower Leg, Venous, Partial Thickness, Onset 06/22-Wound Length (cm): 7.9 cm  Wound 07/15/22 Wound #4 Left Inner Ankle, Venous, Partial (onset 6/2022)-Wound Length (cm): 0.4 cm    Wound 07/15/22 # 3 Rt Medial lower Leg, Venous, Partial Thickness, Onset 06/22-Wound Width (cm): 4.1 cm  Wound 07/15/22 Wound #4 Left Inner Ankle, Venous, Partial (onset 6/2022)-Wound Width (cm): 0.6 cm    Wound 07/15/22 # 3 Rt Medial lower Leg, Venous, Partial Thickness, Onset 06/22-Wound Depth (cm): 0.3 cm  Wound 07/15/22 Wound #4 Left Inner Ankle, Venous, Partial (onset 6/2022)-Wound Depth (cm): 0.2 cm  ____________    Labs from last week --  BUN 41, albumin 3.3, but the rest of the CMP normal.      WBC 14.7 (with infection and on steroids), Hgb 12.0, plts 164k, lymphopenic. WCx from last week --  An ESBL E coli, in vitro (S) to Augmentin, which CAN sometimes be true, and a pan-(S) Pseudomonas.      Assessment:     Patient Active Problem List   Diagnosis Code    HTN (hypertension) I10    Localized osteoarthrosis, lower leg M17.10    Eczema L30.9    Foot drop, right M21.371    Lumbosacral spondylosis without myelopathy M47.817    Normocytic anemia D64.9    Peripheral neuropathy G62.9    Senile osteoporosis M81.0    Bilateral lower extremity edema R60.0    CKD (chronic kidney disease) stage 3, GFR 30-59 ml/min ([facility]) N18.30    Peripheral venous insufficiency I87.2    Ulcer of right ankle, with fat layer exposed ([address] L97.312    PMR (polymyalgia rheumatica) ([facility]) M35.3    Giant cell arteritis ([facility]) M31.6    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 Left side doing well, right side with a setback last week while infected, and transitioning from just topicals + brief prednisone to sulfa, but this week is the best that right side has looked since the week we suspected pyoderma. Infection also looking better, tolerating Abx well. Factors contributing to occurrence and/or persistence of the chronic ulcer include edema and venous stasis. Medical necessity of today's visit is shown by the above documentation. Sharp debridement is not indicated today, based upon the exam findings in the wound(s) above. She does have an indication for a weekly compression wrap. Procedure note:     Consent obtained. Time out performed per [facility]. Anesthetic  Anesthetic: 4% Lidocaine Cream     After cleansing of the wounds with saline and applying skin-care and/or dressing materials as listed below, Bilateral application of a multi-layer compression wrap was performed per physician order, to help manage edema, stasis dermatitis, and/or venous ulcers. The patient's level of pain during and after the procedure was monitored, and is noted in the wound documentation flowsheet.     Discharge plan:     Treatment in the wound care center today, per RN documentation: Wound 07/15/22 # 3 Rt Medial lower Leg, Venous, Partial Thickness, Onset 06/22-Dressing/Treatment: Other (comment) (Vashe,Clobetesol and Tacrolimus to wound edges,Triad to the yellow tissue then Silver Alginate, Mepilex Transfer, ABD  Compri 2 Lite)  Wound 07/15/22 Wound #4 Left Inner Ankle, Venous, Partial (onset 6/2022)-Dressing/Treatment: Other (comment) (Vashe,Clobetesol and Tacrolimus to wound edges,PSO to wound, Mepilex Transfer, Compri 2 Lite). Leave primary dressing and multi-layer wrap(s) in place until the next appointment. She should call before her next visit if there is any significant pain, significant strike-through of drainage to the surface of the wrap, or any significant sense of the wrap sliding down more than an inch or so, bunching-up and abrading her skin. I reminded the patient of the importance of weight management and smoking cessation, if applicable; also encouraged ambulation as tolerated, additional lower extremity exercises as instructed in our education sheet, leg elevation when at rest, and compliance with any recommended dietary, diuretic and compression therapies. Complete the 10 days of oral Abx as ordered. This Monday, drop the prednisone to 5 mg daily, and increase the sulfa to 500 TID after meals, if tolerated. No change in pain mgmt plan. When the left side is healed, will get her into her Velcro compression garment. When the right side has infection resolved and inflammation controlled, will try to start a series of cellular-tissue grafts. Eventual referral back to vascular surgery for saphenous /  ablations as needed. Repeat labs next week, as we increase the sulfa dose. Written discharge instructions given to patient. Follow up in 1 week.     Electronically signed by Jacquie Mccarthy MD on 9/27/2022 at 10:22 AM.

## 2022-09-27 NOTE — PROGRESS NOTES
[facility] Progress Note    Carlee Sun     : 2/3/1933    DATE OF VISIT:  2022    Subjective: Carlee Sun is a 80 y.o. 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 Significant symptoms or pertinent wound history since last visit: left side feeling pretty well the last week or so, less pain, also looks less inflamed. Right side still bothering her, and it looks worse this week too. No F/C/D. Not much of an appetite. Started her sulfasalazine, and tolerating that ok so far. No sore throat or mouth, N/V/D, rash. Additional ulcer(s) noted? no.      Her current medication list consists of Denosumab, Tocilizumab, acetaminophen, apixaban, clobetasol, furosemide, losartan, metoprolol succinate, predniSONE, sulfaSALAzine, tacrolimus (topical), and traMADol. Prednisone is down to 20 mg daily since Monday, and sulfasalazine started at 500 mg daily, also on Monday.     Allergies: Dapsone, Erythromycin, Hydrochlorothiazide, Minocycline, Silver sulfadiazine, and Doxycycline    Objective:     Vitals:    22 1025   BP: 113/67   Pulse: 60   Resp: 18   Temp: 97.8 °F (36.6 °C)   TempSrc: Oral   Weight: Comment: Pt states no change in wt   Height: 5' 4\" (1.626 m)     Constitutional:  well-developed, well-nourished, fatigued, NAD, in some discomfort  Psychiatric:  oriented to person, place and time; mood and affect appropriate for the situation  ENT: no thrush or oral ulcers, mucous membranes moist  Abd: soft, NT, ND, good BS  Cardiovascular:  bilateral pedal pulses palpable, feet warm, good cap refill; mild BL lower extremity edema, usual hemosiderin and atrophe miroslava, and some patchy areas of indurated skin thickening  Lymphatic:  no inguinal or popliteal adenopathy, no angitis, but I think a right ankle and foot cellulitis this week -- more edema, more redness and inflammation, but not in a usual venous or pyoderma pattern alone  Neuro: still some allodynia in the right wound bed  Musculoskeletal:  no clubbing, cyanosis or petechiae; RLE and LLE with no gross effusions, joint misalignment or acute arthritis  Gay-ulcer skin: indurated and pink on the left, I think cellulitic on the right  Ulcer(s): left side part granular, part SQ fibronecrotic but less inflamed; right side probably with stable purple edge changes of pyoderma this week, some granulation, some central SQ sloughy fibrosis, a bit of fibrin and biofilm, no pus, no malodor. Photos also saved in electronic chart.     Today's wound measurements, per RN documentation:  Wound 07/15/22 # 3 Rt Medial lower Leg, Venous, Partial Thickness, Onset 06/22-Wound Length (cm): 7.8 cm  Wound 07/15/22 Wound #4 Left Inner Ankle, Venous, Partial (onset 6/2022)-Wound Length (cm): 0.4 cm    Wound 07/15/22 # 3 Rt Medial lower Leg, Venous, Partial Thickness, Onset 06/22-Wound Width (cm): 4.1 cm  Wound 07/15/22 Wound #4 Left Inner Ankle, Venous, Partial (onset 6/2022)-Wound Width (cm): 0.5 cm    Wound 07/15/22 # 3 Rt Medial lower Leg, Venous, Partial Thickness, Onset 06/22-Wound Depth (cm): 0.2 cm  Wound 07/15/22 Wound #4 Left Inner Ankle, Venous, Partial (onset 6/2022)-Wound Depth (cm): 0.2 cm    Assessment:     Patient Active Problem List   Diagnosis Code    HTN (hypertension) I10    Localized osteoarthrosis, lower leg M17.10    Eczema L30.9    Foot drop, right M21.371    Lumbosacral spondylosis without myelopathy M47.817    Normocytic anemia D64.9    Peripheral neuropathy G62.9    Senile osteoporosis M81.0    Bilateral lower extremity edema R60.0    CKD (chronic kidney disease) stage 3, GFR 30-59 ml/min ([facility]) N18.30    Peripheral venous insufficiency I87.2    Ulcer of right ankle, with fat layer exposed ([facility] L97.312    PMR (polymyalgia rheumatica) ([facility]) M35.3    Giant cell arteritis ([facility]) M31.6    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 Factors contributing to occurrence and/or persistence of the chronic ulcer include edema and venous stasis. Medical necessity of today's visit is shown by the above documentation. Sharp debridement is indicated today, based upon the exam findings in the wound(s) above. (Very cautiously, only on the left). Procedure note:     Consent obtained. Time out performed per [facility]. Anesthetic  Anesthetic: 4% Lidocaine Cream     Using a #15 blade scalpel and forceps, I sharply debrided the left ankle ulcer(s) down through and including the removal of subcutaneous tissue. The type(s) of tissue debrided included slough and necrotic/eschar. Total Surface Area Debrided: 1 sq cm. The ulcers were then irrigated with normal saline solution. The procedure was completed with a small amount of bleeding, and hemostasis was with pressure. The patient tolerated the procedure well, with no significant complications. The patient's level of pain during and after the procedure was monitored. Post-debridement measurements, if different from pre-debridement, are in the flowsheet as well.     Discharge plan:     Treatment in the wound care center today, per RN documentation: Wound 07/15/22 # 3 Rt Medial lower Leg, Venous, Partial Thickness, Onset 06/22-Dressing/Treatment: Other (comment) (Clobetesol / Tacrolimus to wound edges and mauro wound    Triad to the yellow tissue  Silver Alginate, Mepilex Transfer, ABD  Compri 2 Lite)  Wound 07/15/22 Wound #4 Left Inner Ankle, Venous, Partial (onset 6/2022)-Dressing/Treatment: Other (comment) (Clobetosol / Tacrolimus( mauro wound), Triad +Polysporin to wound,  Plain Foam  ,cast padding, ace wrap, spandigrip). We only had one light compression wrap here today, so used that on the right, and constructed a substitute on the left. Per physician order, right application of multilayer compression wrap was performed in the wound care center today, to help manage edema, stasis dermatitis, and/or venous ulcers. Leave primary dressing and multi-layer wrap(s) in place until the next appointment. Also discussed ways to keep the wrap dry for a shower, including a plastic cast-guard, available in retail pharmacies. She should call before her next visit if there is any significant pain, significant strike-through of drainage to the surface of the wrap, or any significant sense of the wrap sliding down more than an inch or so, bunching-up and abrading her skin. I reminded the patient of the importance of weight management and smoking cessation, if applicable; also encouraged ambulation as tolerated, additional lower extremity exercises as instructed in our education sheet, leg elevation when at rest, and compliance with any recommended dietary, diuretic and compression therapies. I took a wound culture from the right side. Holding Abx for today, will call her in 2-3 days when results are back, to start Abx. This Monday, decrease prednisone to 10 mg daily, and increase sulfasalazine to BID. No change in pain mgmt (Tylenol if mild, tramadol for moderate and prior to visits here, and Norco for severe, which she's only taken once or twice). Holding off on plans for cellular-tissue products until her inflammation is controlled, and infection is gone. Written discharge instructions given to patient. Follow up in 1 week.     Electronically signed by Jo Neri MD on 9/27/2022 at 9:58 AM.

## 2022-09-29 ENCOUNTER — HOSPITAL ENCOUNTER (OUTPATIENT)
Dept: WOUND CARE | Age: 87
Discharge: HOME | End: 2022-09-29
Payer: MEDICARE

## 2022-09-29 VITALS
SYSTOLIC BLOOD PRESSURE: 117 MMHG | BODY MASS INDEX: 24.89 KG/M2 | HEART RATE: 75 BPM | TEMPERATURE: 97.7 F | RESPIRATION RATE: 18 BRPM | HEIGHT: 64 IN | DIASTOLIC BLOOD PRESSURE: 74 MMHG

## 2022-09-29 DIAGNOSIS — L97.312 ULCER OF RIGHT ANKLE, WITH FAT LAYER EXPOSED (HCC): ICD-10-CM

## 2022-09-29 DIAGNOSIS — L97.322 ULCER OF LEFT ANKLE, WITH FAT LAYER EXPOSED (HCC): Primary | ICD-10-CM

## 2022-09-29 DIAGNOSIS — R60.0 BILATERAL LOWER EXTREMITY EDEMA: ICD-10-CM

## 2022-09-29 DIAGNOSIS — I87.332 CHRONIC VENOUS HYPERTENSION (IDIOPATHIC) WITH ULCER AND INFLAMMATION OF LEFT LOWER EXTREMITY (CODE) (HCC): ICD-10-CM

## 2022-09-29 DIAGNOSIS — I87.2 PERIPHERAL VENOUS INSUFFICIENCY: ICD-10-CM

## 2022-09-29 DIAGNOSIS — I87.331 CHRONIC VENOUS HYPERTENSION (IDIOPATHIC) WITH ULCER AND INFLAMMATION OF RIGHT LOWER EXTREMITY (CODE) (HCC): ICD-10-CM

## 2022-09-29 PROCEDURE — 85025 COMPLETE CBC W/AUTO DIFF WBC: CPT

## 2022-09-29 PROCEDURE — 29581 APPL MULTLAYER CMPRN SYS LEG: CPT

## 2022-09-29 PROCEDURE — 97597 DBRDMT OPN WND 1ST 20 CM/<: CPT | Performed by: INTERNAL MEDICINE

## 2022-09-29 PROCEDURE — 36415 COLL VENOUS BLD VENIPUNCTURE: CPT

## 2022-09-29 PROCEDURE — 97597 DBRDMT OPN WND 1ST 20 CM/<: CPT

## 2022-09-29 PROCEDURE — 29581 APPL MULTLAYER CMPRN SYS LEG: CPT | Performed by: INTERNAL MEDICINE

## 2022-09-29 PROCEDURE — 80053 COMPREHEN METABOLIC PANEL: CPT

## 2022-09-29 RX ORDER — BACITRACIN ZINC AND POLYMYXIN B SULFATE 500; 1000 [USP'U]/G; [USP'U]/G
OINTMENT TOPICAL ONCE
Status: CANCELLED | OUTPATIENT
Start: 2022-09-29 | End: 2022-09-29

## 2022-09-29 RX ORDER — LIDOCAINE HYDROCHLORIDE 40 MG/ML
SOLUTION TOPICAL ONCE
Status: CANCELLED | OUTPATIENT
Start: 2022-09-29 | End: 2022-09-29

## 2022-09-29 RX ORDER — LIDOCAINE 50 MG/G
OINTMENT TOPICAL ONCE
Status: CANCELLED | OUTPATIENT
Start: 2022-09-29 | End: 2022-09-29

## 2022-09-29 RX ORDER — LIDOCAINE 40 MG/G
CREAM TOPICAL ONCE
Status: CANCELLED | OUTPATIENT
Start: 2022-09-29 | End: 2022-09-29

## 2022-09-29 RX ORDER — LIDOCAINE 40 MG/G
CREAM TOPICAL ONCE
Status: DISCONTINUED | OUTPATIENT
Start: 2022-09-29 | End: 2022-09-30 | Stop reason: HOSPADM

## 2022-09-29 ASSESSMENT — PAIN DESCRIPTION - DESCRIPTORS: DESCRIPTORS: SORE;ACHING

## 2022-09-29 ASSESSMENT — PAIN DESCRIPTION - PAIN TYPE: TYPE: CHRONIC PAIN

## 2022-09-29 ASSESSMENT — PAIN SCALES - GENERAL: PAINLEVEL_OUTOF10: 2

## 2022-09-29 ASSESSMENT — PAIN - FUNCTIONAL ASSESSMENT: PAIN_FUNCTIONAL_ASSESSMENT: PREVENTS OR INTERFERES SOME ACTIVE ACTIVITIES AND ADLS

## 2022-09-29 ASSESSMENT — PAIN DESCRIPTION - ORIENTATION: ORIENTATION: RIGHT;LEFT

## 2022-09-29 ASSESSMENT — PAIN DESCRIPTION - FREQUENCY: FREQUENCY: CONTINUOUS

## 2022-09-29 ASSESSMENT — PAIN DESCRIPTION - LOCATION: LOCATION: LEG

## 2022-09-29 NOTE — PLAN OF CARE
Wounds showing signs of improvement. Pt completed antibiotics Monday. Pt to decrease Prednisone to a half of pill every other day. Pt's Daughter at bedside. Follow up in [address] [place] [address] in 1 week as ordered. Pt. Aware to call sooner with any problems or questions/concerns. MD orders/D/C instructions reviewed with patient, all questions answered; copy of instructions given to patient.

## 2022-09-30 LAB
A/G RATIO: 1.5 (ref 1.1–2.2)
ALBUMIN SERPL-MCNC: 3.2 G/DL (ref 3.4–5)
ALP BLD-CCNC: 43 U/L (ref 40–129)
ALT SERPL-CCNC: 20 U/L (ref 10–40)
ANION GAP SERPL CALCULATED.3IONS-SCNC: 13 MMOL/L (ref 3–16)
AST SERPL-CCNC: 19 U/L (ref 15–37)
BASOPHILS ABSOLUTE: 0.1 K/UL (ref 0–0.2)
BASOPHILS RELATIVE PERCENT: 1.3 %
BILIRUB SERPL-MCNC: <0.2 MG/DL (ref 0–1)
BUN BLDV-MCNC: 22 MG/DL (ref 7–20)
CALCIUM SERPL-MCNC: 9.5 MG/DL (ref 8.3–10.6)
CHLORIDE BLD-SCNC: 109 MMOL/L (ref 99–110)
CO2: 21 MMOL/L (ref 21–32)
CREAT SERPL-MCNC: 0.7 MG/DL (ref 0.6–1.2)
EOSINOPHILS ABSOLUTE: 0.1 K/UL (ref 0–0.6)
EOSINOPHILS RELATIVE PERCENT: 2.2 %
GFR AFRICAN AMERICAN: >60
GFR NON-AFRICAN AMERICAN: >60
GLUCOSE BLD-MCNC: 87 MG/DL (ref 70–99)
HCT VFR BLD CALC: 27.4 % (ref 36–48)
HEMOGLOBIN: 9.4 G/DL (ref 12–16)
LYMPHOCYTES ABSOLUTE: 1.8 K/UL (ref 1–5.1)
LYMPHOCYTES RELATIVE PERCENT: 43.1 %
MCH RBC QN AUTO: 34.7 PG (ref 26–34)
MCHC RBC AUTO-ENTMCNC: 34.3 G/DL (ref 31–36)
MCV RBC AUTO: 101.1 FL (ref 80–100)
MONOCYTES ABSOLUTE: 0.6 K/UL (ref 0–1.3)
MONOCYTES RELATIVE PERCENT: 14.8 %
NEUTROPHILS ABSOLUTE: 1.6 K/UL (ref 1.7–7.7)
NEUTROPHILS RELATIVE PERCENT: 38.6 %
PDW BLD-RTO: 17.8 % (ref 12.4–15.4)
PLATELET # BLD: 231 K/UL (ref 135–450)
PMV BLD AUTO: 7.9 FL (ref 5–10.5)
POTASSIUM SERPL-SCNC: 4.4 MMOL/L (ref 3.5–5.1)
RBC # BLD: 2.71 M/UL (ref 4–5.2)
SODIUM BLD-SCNC: 143 MMOL/L (ref 136–145)
TOTAL PROTEIN: 5.3 G/DL (ref 6.4–8.2)
WBC # BLD: 4.2 K/UL (ref 4–11)

## 2022-10-03 PROBLEM — I87.2 PERIPHERAL VENOUS INSUFFICIENCY: Status: RESOLVED | Noted: 2019-05-09 | Resolved: 2022-10-03

## 2022-10-03 PROBLEM — I87.331 CHRONIC VENOUS HYPERTENSION (IDIOPATHIC) WITH ULCER AND INFLAMMATION OF RIGHT LOWER EXTREMITY (CODE): Status: ACTIVE | Noted: 2022-10-03

## 2022-10-03 PROBLEM — I87.332 CHRONIC VENOUS HYPERTENSION (IDIOPATHIC) WITH ULCER AND INFLAMMATION OF LEFT LOWER EXTREMITY (CODE): Status: ACTIVE | Noted: 2022-10-03

## 2022-10-03 NOTE — DISCHARGE INSTRUCTIONS
215 [facility] Physician Orders and Discharge [address] [facility]  [address], [address]  [place], [facility]  Telephone: [phone]      Fax: [phone]        Your home care company:   N/A     Your wound-care supplies will be provided by:  weekly wraps     NAME:  Becky Platt   YOB: 1933  PRIMARY DIAGNOSIS FOR WOUND CARE CENTER:  Pyoderma gangrenosum     Wound cleansing:  Do not scrub or use excessive force. Wash hands with soap and water before and after dressing changes. Prior to applying a clean dressing, cleanse wound with normal saline, wound cleanser, or mild soap and water. Ask your physician or nurse before getting the wound(s) wet in the shower. Wound care for home:     Left Lower Leg/ankle:   Vashe, Calmoseptine, Clobetasol, Tacrolimus and Mepilex Transfer AG -all applied today per MD  Aquaphor to dry skin  Stacked Gauze bolster  Compri 2 Lite      Right Lower Leg:  Vashe, Calmoseptine, Clobetasol, Tacrolimus, OASIS (#1) and Mepilex Transfer AG -all applied today per MD  ABD  Aquaphor to dry skin  Compri 2 Lite     KEEP THESE WRAPS CLEAN, DRY AND IN PLACE FOR THE WEEK           Please note, all wounds (unless stated otherwise here) were mechanically debrided at the time of cleansing here in the wound-care center today, so a small amount of pain, drainage or bleeding from that process might be expected, and is normal.     All products for home use, including multiple products for a single wound if applicable, are medically necessary in order to achieve the best chance at timely wound healing. See provider documentation for details if needed. Substituted dressings applied in the [facility] today, if applicable:           New orders for this week (labs, imaging, medications, etc.):     DR. LAKHANI WILL ONLY CALL YOU IF ANY CONCERNS WITH ANY OF THE LABS DRAWN TODAY    NEW PRESCRIPTION CALLED IN TO YOUR PHARMACY FOR YOUR PAIN MEDICATION Additional instructions for specific diagnoses:    10/5/2022 Prednisone decreased to 1/2 tablet every other day              F/U Appointment is with Dr. Yamile Young in 1 week, on                                   at                       .     Your nurse  is Carie Simons RN     If we applied slip-resistant hospital socks today, be sure to remove them at least once a day to inspect your toes or feet, even if you're not changing the wraps or dressings underneath. If you see anything concerning (redness, excess moisture, etc), please call and let us know right away. Should you experience any significant changes in your wound(s) (including redness, increased warmth, increased pain, increased drainage, odor, or fever) or have questions about your wound care, please contact the [email or web address] at [phone] Monday-Thursday from 8:00 am - 4:30 pm, or Friday from 8:00 am - 2:30 pm.  If you need help with your wound outside these hours and cannot wait until we are again available, contact your home-care company (if applicable), your PCP, or go to the nearest emergency room.

## 2022-10-03 NOTE — PROGRESS NOTES
[facility] Progress Note    William Funes     : 2/3/1933    DATE OF VISIT:  2022    Subjective: William Funes is a 80 y.o. female who has a venous and  pyoderma ulcer located on the right, medial, distal lower leg and left, medial ankle. Significant symptoms or pertinent wound history since last visit: feeling ok overall, really no pain on the left side now, still mild to moderate pain on the right, controlled most days with Tylenol, occasional tramadol (like when she comes here), but has only needed Norco maybe twice in the last month. No F/C/D. Seems to be tolerating medications well. Appetite isn't great, but she's pushing herself a bit to eat. No abd pain, N/V, sore throat or mouth, swallowing troubles. No diarrhea, rash, dizziness. Swelling generally mild, but her left leg is a bit irregular this week, right foot is greater (I think with a change in wraps that we have here, probably not applied exactly like the old ones typically were). Completed oral Abx last week for right sided cellulitis, no problems there. She had been denied for cellular-tissue products in the recent past, I think primarily from a coding standpoint, so I updated her codes to something more specific now, and now that her pyoderma inflammation is under much better control, I'd like to try to proceed with either Apligraf or Oasis starting in the next couple of weeks. Additional ulcer(s) noted? no.      Her current medication list consists of Denosumab, Tocilizumab, acetaminophen, apixaban, clobetasol (topical weekly), furosemide, losartan, metoprolol succinate, predniSONE (down to 5 mg daily this week), sulfaSALAzine (up to 500 mg TID this week), tacrolimus (topical weekly), and traMADol (PRN, if Tylenol is not sufficient).     Allergies: Dapsone, Erythromycin, Hydrochlorothiazide, Minocycline, Silver sulfadiazine, and Doxycycline    Objective:     Vitals:    22 1451   BP: 117/74   Pulse: 75   Resp: 18   Temp: 97.7 °F (36.5 °C)   TempSrc: Oral   Weight: Comment: no weight obtained today   Height: 5' 4\" (1.626 m)     Constitutional:  well-developed, well-nourished, fatigued, NAD, in less discomfort than weeks ago  Psychiatric:  oriented to person, place and time; mood and affect appropriate for the situation  ENT: no thrush or oral ulcers, mucous membranes moist  Abd: soft, NT, ND, good BS  Cardiovascular:  bilateral pedal pulses palpable, feet warm, good cap refill; mild BL lower extremity edema overall (a bit irregular in left leg, moderate in right foot), usual hemosiderin and atrophe miroslava, and some patchy areas of indurated skin thickening  Lymphatic:  no inguinal or popliteal adenopathy, no angitis, and that RLE cellulitis looks resolved. Neuro: still some allodynia in the right wound bed, definitely less than before  Musculoskeletal:  no clubbing, cyanosis or petechiae; RLE and LLE with no gross effusions, joint misalignment or acute arthritis  Gay-ulcer skin: indurated and pink on the left, faded cellulitic on the right  Ulcer(s): left side part granular, still part SQ fibronecrotic, but no longer inflamed; right side with definite improvement in purple pyoderma edges this week (very nearly resolved), peripherally more granular, centrally still a bit of SQ fibrotic tissue, a bit of loose fibrin and biofilm, serous exudate, no pus; a couple of skin islands seen this week, which is good news for ongoing healing potential. Photos also saved in electronic chart.     Today's wound measurements, per RN documentation:  Wound 07/15/22 # 3 Rt Medial lower Leg, Venous, Partial Thickness, Onset 06/22-Wound Length (cm): 7.9 cm  Wound 07/15/22 Wound #4 Left Inner Ankle, Venous, Partial (onset 6/2022)-Wound Length (cm): 0.3 cm    Wound 07/15/22 # 3 Rt Medial lower Leg, Venous, Partial Thickness, Onset 06/22-Wound Width (cm): 4.2 cm  Wound 07/15/22 Wound #4 Left Inner Ankle, Venous, Partial (onset 6/2022)-Wound Width (cm): 0.3 cm    Wound 07/15/22 # 3 Rt Medial lower Leg, Venous, Partial Thickness, Onset 06/22-Wound Depth (cm): 0.3 cm  Wound 07/15/22 Wound #4 Left Inner Ankle, Venous, Partial (onset 6/2022)-Wound Depth (cm): 0.2 cm  _____________    RLE wound measurement trends since coming here --        Assessment:     Patient Active Problem List   Diagnosis Code    HTN (hypertension) I10    Localized osteoarthrosis, lower leg M17.10    Eczema L30.9    Foot drop, right M21.371    Lumbosacral spondylosis without myelopathy M47.817    Normocytic anemia D64.9    Peripheral neuropathy G62.9    Senile osteoporosis M81.0    Bilateral lower extremity edema R60.0    CKD (chronic kidney disease) stage 3, GFR 30-59 ml/min ([facility]) N18.30    Ulcer of right ankle, with fat layer exposed ([address] L97.312    PMR (polymyalgia rheumatica) ([facility]) M35.3    Giant cell arteritis ([facility]) M31.6    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    Ulcer of left ankle, with fat layer exposed ([address] L97.322    Probable pyoderma gangrenosum L88    Chronic venous hypertension (idiopathic) with ulcer and inflammation of right lower extremity ([facility]) I87.331    Chronic venous hypertension (idiopathic) with ulcer and inflammation of left lower extremity ([facility]) I87.332       Assessment of today's active condition(s): underlying venous insufficiency and HTN, no varicosities, some chronic skin changes, improved LE edema; BL medial ankle-leg ulcers, left one almost healed, but the right one complicated by pyoderma starting a month or two ago, I think related to her underlying GCA/PMR.  Treated for soft tissue infection twice; sharp debridement stopped when pyoderma diagnosed, compression continued, and with topical agents + brief prednisone + a transition to sulfasalazine now, she's made some nice progress in terms of wound pain, inflammation, granulation, and a couple of skin islands, but the overall wound size is still quite large, so I'd shower, including a plastic cast-guard, available in retail pharmacies. She should call before her next visit if there is any significant pain, significant strike-through of drainage to the surface of the wrap, or any significant sense of the wrap sliding down more than an inch or so, bunching-up and abrading her skin. I reminded the patient of the importance of weight management and smoking cessation, if applicable; also encouraged ambulation as tolerated, additional lower extremity exercises as instructed in our education sheet, leg elevation when at rest, and compliance with any recommended dietary, diuretic and compression therapies. No additional ABx needed. This week, drop prednisone to 5 mg every other day. Continue sulfasalazine at 500 mg TID for now. Will reach out to [facility] and [facility] about benefits verification for skin subs, now that I have her ulcer coding updated and more specific. Written discharge instructions given to patient. Follow up in 1 week.     Electronically signed by Jacquie Mccarthy MD on 10/3/2022 at 2:35 PM.

## 2022-10-07 ENCOUNTER — HOSPITAL ENCOUNTER (OUTPATIENT)
Dept: WOUND CARE | Age: 87
Discharge: HOME | End: 2022-10-07
Payer: MEDICARE

## 2022-10-07 VITALS — RESPIRATION RATE: 18 BRPM | SYSTOLIC BLOOD PRESSURE: 122 MMHG | TEMPERATURE: 97.1 F | DIASTOLIC BLOOD PRESSURE: 71 MMHG

## 2022-10-07 DIAGNOSIS — G89.29 CHRONIC PAIN OF RIGHT ANKLE: ICD-10-CM

## 2022-10-07 DIAGNOSIS — M25.571 CHRONIC PAIN OF RIGHT ANKLE: ICD-10-CM

## 2022-10-07 DIAGNOSIS — I87.2 PERIPHERAL VENOUS INSUFFICIENCY: ICD-10-CM

## 2022-10-07 DIAGNOSIS — L97.322 ULCER OF LEFT ANKLE, WITH FAT LAYER EXPOSED (HCC): Primary | ICD-10-CM

## 2022-10-07 DIAGNOSIS — L97.312 ULCER OF RIGHT ANKLE, WITH FAT LAYER EXPOSED (HCC): ICD-10-CM

## 2022-10-07 DIAGNOSIS — R60.0 BILATERAL LOWER EXTREMITY EDEMA: ICD-10-CM

## 2022-10-07 LAB
BASOPHILS ABSOLUTE: 0 K/UL (ref 0–0.2)
BASOPHILS RELATIVE PERCENT: 1 %
EOSINOPHILS ABSOLUTE: 0.1 K/UL (ref 0–0.6)
EOSINOPHILS RELATIVE PERCENT: 1.7 %
HCT VFR BLD CALC: 30.2 % (ref 36–48)
HEMOGLOBIN: 10.1 G/DL (ref 12–16)
LYMPHOCYTES ABSOLUTE: 0.9 K/UL (ref 1–5.1)
LYMPHOCYTES RELATIVE PERCENT: 26 %
MCH RBC QN AUTO: 34.7 PG (ref 26–34)
MCHC RBC AUTO-ENTMCNC: 33.5 G/DL (ref 31–36)
MCV RBC AUTO: 103.7 FL (ref 80–100)
MONOCYTES ABSOLUTE: 0.5 K/UL (ref 0–1.3)
MONOCYTES RELATIVE PERCENT: 15.3 %
NEUTROPHILS ABSOLUTE: 1.9 K/UL (ref 1.7–7.7)
NEUTROPHILS RELATIVE PERCENT: 56 %
PDW BLD-RTO: 19.3 % (ref 12.4–15.4)
PLATELET # BLD: 237 K/UL (ref 135–450)
PMV BLD AUTO: 7.5 FL (ref 5–10.5)
RBC # BLD: 2.91 M/UL (ref 4–5.2)
WBC # BLD: 3.4 K/UL (ref 4–11)

## 2022-10-07 PROCEDURE — 15271 SKIN SUB GRAFT TRNK/ARM/LEG: CPT | Performed by: INTERNAL MEDICINE

## 2022-10-07 PROCEDURE — 97597 DBRDMT OPN WND 1ST 20 CM/<: CPT | Performed by: INTERNAL MEDICINE

## 2022-10-07 PROCEDURE — 99213 OFFICE O/P EST LOW 20 MIN: CPT | Performed by: INTERNAL MEDICINE

## 2022-10-07 PROCEDURE — 36415 COLL VENOUS BLD VENIPUNCTURE: CPT

## 2022-10-07 PROCEDURE — 97597 DBRDMT OPN WND 1ST 20 CM/<: CPT

## 2022-10-07 PROCEDURE — 85025 COMPLETE CBC W/AUTO DIFF WBC: CPT

## 2022-10-07 PROCEDURE — C5271 LOW COST SKIN SUBSTITUTE APP: HCPCS

## 2022-10-07 PROCEDURE — 29581 APPL MULTLAYER CMPRN SYS LEG: CPT

## 2022-10-07 PROCEDURE — 29581 APPL MULTLAYER CMPRN SYS LEG: CPT | Performed by: INTERNAL MEDICINE

## 2022-10-07 RX ORDER — LIDOCAINE 40 MG/G
CREAM TOPICAL ONCE
Status: DISCONTINUED | OUTPATIENT
Start: 2022-10-07 | End: 2022-10-08 | Stop reason: HOSPADM

## 2022-10-07 RX ORDER — TRAMADOL HYDROCHLORIDE 50 MG/1
100 TABLET ORAL 2 TIMES DAILY PRN
Qty: 40 TABLET | Refills: 0 | Status: SHIPPED | OUTPATIENT
Start: 2022-10-07 | End: 2022-10-17

## 2022-10-07 RX ORDER — LIDOCAINE 40 MG/G
CREAM TOPICAL ONCE
Status: CANCELLED | OUTPATIENT
Start: 2022-10-07 | End: 2022-10-07

## 2022-10-07 RX ORDER — BACITRACIN ZINC AND POLYMYXIN B SULFATE 500; 1000 [USP'U]/G; [USP'U]/G
OINTMENT TOPICAL ONCE
Status: CANCELLED | OUTPATIENT
Start: 2022-10-07 | End: 2022-10-07

## 2022-10-07 RX ORDER — LIDOCAINE 50 MG/G
OINTMENT TOPICAL ONCE
Status: CANCELLED | OUTPATIENT
Start: 2022-10-07 | End: 2022-10-07

## 2022-10-07 RX ORDER — LIDOCAINE HYDROCHLORIDE 40 MG/ML
SOLUTION TOPICAL ONCE
Status: CANCELLED | OUTPATIENT
Start: 2022-10-07 | End: 2022-10-07

## 2022-10-07 ASSESSMENT — PAIN DESCRIPTION - LOCATION: LOCATION: LEG

## 2022-10-07 ASSESSMENT — PAIN DESCRIPTION - FREQUENCY: FREQUENCY: INTERMITTENT

## 2022-10-07 ASSESSMENT — PAIN DESCRIPTION - PAIN TYPE: TYPE: CHRONIC PAIN

## 2022-10-07 ASSESSMENT — PAIN SCALES - GENERAL: PAINLEVEL_OUTOF10: 3

## 2022-10-07 ASSESSMENT — PAIN DESCRIPTION - ORIENTATION: ORIENTATION: RIGHT

## 2022-10-07 ASSESSMENT — PAIN DESCRIPTION - DESCRIPTORS: DESCRIPTORS: ACHING

## 2022-10-07 ASSESSMENT — PAIN DESCRIPTION - ONSET: ONSET: ON-GOING

## 2022-10-07 ASSESSMENT — PAIN DESCRIPTION - PROGRESSION: CLINICAL_PROGRESSION: NOT CHANGED

## 2022-10-07 ASSESSMENT — PAIN - FUNCTIONAL ASSESSMENT: PAIN_FUNCTIONAL_ASSESSMENT: PREVENTS OR INTERFERES SOME ACTIVE ACTIVITIES AND ADLS

## 2022-10-07 NOTE — PLAN OF CARE
Additional specific goal for this week: Patient will receive safe and proper application of Oasis Wound Matrix. Patient will comply with caring for wound and reporting any concerns.  guidelines for application followed. Expiration date of Oasis checked immediately prior to use. Package intact prior to use, with no damage noted. Product storage at ambient temperature, per  guideline. Product lot #: [id number]  Expiration date: 4/19/2023   Saline lot #: [id number]  Expiration date: 5/2024  Patient/family/caregiver instructed to keep dressing clean, dry and intact. Patient/family/caregiver instructed on signs and symptoms to report, such as draining through dressing, dressing slipping or falling down, becoming wet, or patient having severe pain, severe itching, severe malodor, fever, etc.     Date of first application of a cellular / tissue product for this current wound is October 7, 2022. Today was application # 1 for this wound.     Electronically signed by Lay Virk RN on 10/7/2022 at 5:37 PM.

## 2022-10-10 ENCOUNTER — TELEPHONE (OUTPATIENT)
Dept: WOUND CARE | Age: 87
End: 2022-10-10

## 2022-10-10 DIAGNOSIS — R60.0 BILATERAL LOWER EXTREMITY EDEMA: ICD-10-CM

## 2022-10-10 DIAGNOSIS — L97.312 ULCER OF RIGHT ANKLE, WITH FAT LAYER EXPOSED (HCC): ICD-10-CM

## 2022-10-10 DIAGNOSIS — L97.322 ULCER OF LEFT ANKLE, WITH FAT LAYER EXPOSED (HCC): Primary | ICD-10-CM

## 2022-10-10 NOTE — DISCHARGE INSTRUCTIONS
[address] [facility] Physician Orders and Discharge [address], [address]  [place], [facility]  Telephone: [phone]      Fax: [phone]        Your home care company:   N/A     Your wound-care supplies will be provided by:  weekly wraps     NAME:  Alvin Hernandez   YOB: 1933  PRIMARY DIAGNOSIS FOR WOUND CARE CENTER:  Pyoderma gangrenosum     Wound cleansing:  Do not scrub or use excessive force. Wash hands with soap and water before and after dressing changes. Prior to applying a clean dressing, cleanse wound with normal saline, wound cleanser, or mild soap and water. Ask your physician or nurse before getting the wound(s) wet in the shower. Wound care for home:     Left Lower Leg/ankle:   Vashe, Calmoseptine, Clobetasol, Tacrolimus and Mepilex Transfer AG -all applied today per MD Hsuaphor to dry skin  Triamcinalone to leg rash  Stacked Gauze bolster  Compri 2 Lite BUT REPLACE 1ST LAYER WITH SPECIALIST CAST PADDING     Right Lower Leg:  Vashe, Calmoseptine, Clobetasol, Tacrolimus, OASIS (#2) and Mepilex Transfer AG -all applied today per MD  ABD  Aquaphor to dry skin  Triamcinalone to leg rash  Compri 2 Lite BUT REPLACE 1ST LAYER WITH SPECIALIST CAST PADDING     KEEP THESE WRAPS CLEAN, DRY AND IN PLACE FOR THE WEEK           Please note, all wounds (unless stated otherwise here) were mechanically debrided at the time of cleansing here in the wound-care center today, so a small amount of pain, drainage or bleeding from that process might be expected, and is normal.     All products for home use, including multiple products for a single wound if applicable, are medically necessary in order to achieve the best chance at timely wound healing. See provider documentation for details if needed.      Substituted dressings applied in the [facility] today, if applicable:           New orders for this week (labs, imaging, medications, etc.): Additional instructions for specific diagnoses:     10/5/2022 Prednisone decreased to 1/2 tablet every other day               F/U Appointment is with Dr. Stephanie Vickers in 1 week, on                                   at                       .     Your nurse  is Mame Lawrence RN     If we applied slip-resistant hospital socks today, be sure to remove them at least once a day to inspect your toes or feet, even if you're not changing the wraps or dressings underneath. If you see anything concerning (redness, excess moisture, etc), please call and let us know right away. Should you experience any significant changes in your wound(s) (including redness, increased warmth, increased pain, increased drainage, odor, or fever) or have questions about your wound care, please contact the [address] [place] [address] at [phone] Monday-Thursday from 8:00 am - 4:30 pm, or Friday from 8:00 am - 2:30 pm.  If you need help with your wound outside these hours and cannot wait until we are again available, contact your home-care company (if applicable), your PCP, or go to the nearest emergency room.

## 2022-10-10 NOTE — TELEPHONE ENCOUNTER
Call placed to [facility] Re[facility]urse[facility] support center [phone], option 3. Voicemail left asking for clarification of non coverage for Apligraf for patient. Our MD noted that ICD code I87.331-I87.339 are covered per insurance site. RN asked for a return call for clarification.

## 2022-10-14 ENCOUNTER — HOSPITAL ENCOUNTER (OUTPATIENT)
Dept: WOUND CARE | Age: 87
Discharge: HOME | End: 2022-10-14
Payer: MEDICARE

## 2022-10-14 VITALS
RESPIRATION RATE: 20 BRPM | BODY MASS INDEX: 25.63 KG/M2 | TEMPERATURE: 97 F | HEIGHT: 64 IN | HEART RATE: 72 BPM | SYSTOLIC BLOOD PRESSURE: 116 MMHG | WEIGHT: 150.13 LBS | DIASTOLIC BLOOD PRESSURE: 68 MMHG

## 2022-10-14 DIAGNOSIS — L97.322 ULCER OF LEFT ANKLE, WITH FAT LAYER EXPOSED (HCC): Primary | ICD-10-CM

## 2022-10-14 DIAGNOSIS — L97.312 ULCER OF RIGHT ANKLE, WITH FAT LAYER EXPOSED (HCC): ICD-10-CM

## 2022-10-14 DIAGNOSIS — R60.0 BILATERAL LOWER EXTREMITY EDEMA: ICD-10-CM

## 2022-10-14 PROCEDURE — 97597 DBRDMT OPN WND 1ST 20 CM/<: CPT | Performed by: INTERNAL MEDICINE

## 2022-10-14 PROCEDURE — C5271 LOW COST SKIN SUBSTITUTE APP: HCPCS

## 2022-10-14 PROCEDURE — 97597 DBRDMT OPN WND 1ST 20 CM/<: CPT

## 2022-10-14 PROCEDURE — 15271 SKIN SUB GRAFT TRNK/ARM/LEG: CPT | Performed by: INTERNAL MEDICINE

## 2022-10-14 PROCEDURE — 29581 APPL MULTLAYER CMPRN SYS LEG: CPT

## 2022-10-14 RX ORDER — LIDOCAINE HYDROCHLORIDE 40 MG/ML
SOLUTION TOPICAL ONCE
Status: CANCELLED | OUTPATIENT
Start: 2022-10-14 | End: 2022-10-14

## 2022-10-14 RX ORDER — LIDOCAINE 50 MG/G
OINTMENT TOPICAL ONCE
Status: CANCELLED | OUTPATIENT
Start: 2022-10-14 | End: 2022-10-14

## 2022-10-14 RX ORDER — BACITRACIN ZINC AND POLYMYXIN B SULFATE 500; 1000 [USP'U]/G; [USP'U]/G
OINTMENT TOPICAL ONCE
Status: CANCELLED | OUTPATIENT
Start: 2022-10-14 | End: 2022-10-14

## 2022-10-14 RX ORDER — LIDOCAINE 40 MG/G
CREAM TOPICAL ONCE
Status: DISCONTINUED | OUTPATIENT
Start: 2022-10-14 | End: 2022-10-15 | Stop reason: HOSPADM

## 2022-10-14 RX ORDER — LIDOCAINE 40 MG/G
CREAM TOPICAL ONCE
Status: CANCELLED | OUTPATIENT
Start: 2022-10-14 | End: 2022-10-14

## 2022-10-14 NOTE — PLAN OF CARE
Additional specific goal for this week: Patient will receive safe and proper application of Oasis Wound Matrix. Patient will comply with caring for wound and reporting any concerns.  guidelines for application followed. Expiration date of Oasis checked immediately prior to use. Package intact prior to use, with no damage noted. Product storage at ambient temperature, per  guideline. Product lot #: [id number]  Expiration date:  4/19/2023   Saline lot #: [id number]  Expiration date: 6-2024  Patient/family/caregiver instructed to keep dressing clean, dry and intact. Patient/family/caregiver instructed on signs and symptoms to report, such as draining through dressing, dressing slipping or falling down, becoming wet, or patient having severe pain, severe itching, severe malodor, fever, etc.     Date of first application of a cellular / tissue product for this current wound is October 7, 2022. Today was application # 2 for this wound.     Electronically signed by Ping Jamison RN on 10/14/2022 at 12:21 PM.

## 2022-10-19 RX ORDER — SULFASALAZINE 500 MG/1
500 TABLET ORAL 3 TIMES DAILY
Qty: 90 TABLET | Refills: 3 | Status: SHIPPED | OUTPATIENT
Start: 2022-10-19

## 2022-10-19 NOTE — DISCHARGE INSTRUCTIONS
[address] [facility] Physician Orders and Discharge [address], [address]  [place], [facility]  Telephone: [phone]      Fax: [phone]        Your home care company:   N/A     Your wound-care supplies will be provided by:  weekly wraps     NAME:  Dufm Gosselin   YOB: 1933  PRIMARY DIAGNOSIS FOR WOUND CARE CENTER:  Pyoderma gangrenosum     Wound cleansing:  Do not scrub or use excessive force. Wash hands with soap and water before and after dressing changes. Prior to applying a clean dressing, cleanse wound with normal saline, wound cleanser, or mild soap and water. Ask your physician or nurse before getting the wound(s) wet in the shower. Wound care for home:     Left Lower Leg/ankle:   Vashe,Triad,Gauze Bolster  Aquaphor to dry skin  Compri 2 Lite BUT REPLACE 1ST LAYER WITH SPECIALIST CAST PADDING     Right Lower Leg:  Vashe, Calmoseptine, Clobetasol, Tacrolimus, OASIS (#3) and Mepilex Transfer AG -all applied today per MD  ABD  Aquaphor to dry skin  Compri 2 Lite BUT REPLACE 1ST LAYER WITH SPECIALIST CAST PADDING    Lotrisone to right medial foot and left anterior ankle      KEEP THESE WRAPS CLEAN, DRY AND IN PLACE FOR THE WEEK           Please note, all wounds (unless stated otherwise here) were mechanically debrided at the time of cleansing here in the wound-care center today, so a small amount of pain, drainage or bleeding from that process might be expected, and is normal.     All products for home use, including multiple products for a single wound if applicable, are medically necessary in order to achieve the best chance at timely wound healing. See provider documentation for details if needed.      Substituted dressings applied in the [facility] today, if applicable:           New orders for this week (labs, imaging, medications, etc.):   Labs done today - 1/21/22        Additional instructions for specific diagnoses: 10/5/2022 Prednisone decreased to 1/2 tablet every other day   Oasis #3 - 10/21/22           F/U Appointment is with Dr. Jennifer Villagran in 1 week, on                                   at                       .     Your nurse  is Annabel Camara RN     If we applied slip-resistant hospital socks today, be sure to remove them at least once a day to inspect your toes or feet, even if you're not changing the wraps or dressings underneath. If you see anything concerning (redness, excess moisture, etc), please call and let us know right away. Should you experience any significant changes in your wound(s) (including redness, increased warmth, increased pain, increased drainage, odor, or fever) or have questions about your wound care, please contact the [address] at [phone] Monday-Thursday from 8:00 am - 4:30 pm, or Friday from 8:00 am - 2:30 pm.  If you need help with your wound outside these hours and cannot wait until we are again available, contact your home-care company (if applicable), your PCP, or go to the nearest emergency room.

## 2022-10-20 NOTE — PROGRESS NOTES
[facility] Progress Note    Liana Tompkins     : 2/3/1933    DATE OF VISIT:  10/7/2022    Subjective: Liana Tompkins is a 80 y.o. 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 Current complaint of pain in this ulcer? yes, really just on the right side now  Quality of pain: aching, sharp, and throbbing  Timing: intermittent and stable  Severity: mild-moderate  Associated Signs/Symptoms:   less swelling and redness, moderate drainage on the right and, surprisingly, moderate drainage on the left now also. Other significant symptoms or pertinent ulcer history: no F/C/D, appetite still only fair. Seems to be tolerating sulfasalazine well, weaning off prednisone. Doing ok with PRN pain meds (tylenol if mild, tramadol if moderate, rarely a Norco if severe, or prior to visits here). Obtained authorization for [place] for the right side this week. Additional ulcer(s) noted? no.      Ms. Brenda Beal has a past medical history of Acute respiratory failure due to severe acute respiratory syndrome coronavirus 2 (SARS-CoV-2) infection ([address] Cellulitis of left lower leg, Cellulitis of right ankle, CHF (congestive heart failure) ([address] Closed pelvic rim fracture, COVID, Fibromyalgia, Foot drop, right foot, Fractures, History of blood transfusion, History of temporal artery biopsy, Hx of blood clots, PNA (pneumonia), Prepatellar bursitis of right knee, Rheumatoid arthritis ([address] Venous ulcer of left leg ([address] and Venous ulcer of right leg ([address] She has a past surgical history that includes Tonsillectomy; Vein Surgery (Right, 10/27/2016); and IR INSERT PICC VAD W SQ PORT >5 YEARS (Left, 2021). Her family history includes Stroke in her father and mother. Ms. Brenda Beal reports that she has never smoked. She has never used smokeless tobacco. She reports current alcohol use. She reports that she does not use drugs.     Her current medication list consists of Denosumab, Tocilizumab, acetaminophen, apixaban, clobetasol, furosemide, losartan, metoprolol succinate, sulfaSALAzine, and tacrolimus. Prednisone nearly tapered off (5 mg daily now), sulfasalazine up to 500 mg TID. Allergies: Dapsone, Erythromycin, Hydrochlorothiazide, Minocycline, Silver sulfadiazine, and Doxycycline    Pertinent items from the review of systems are discussed in the HPI; the remainder of the ROS was reviewed and is negative. Objective:     Vitals:    10/07/22 1036   BP: 122/71   Resp: 18   Temp: 97.1 °F (36.2 °C)   TempSrc: Oral       Constitutional:  well-developed, well-nourished, fatigued, NAD, in less discomfort than weeks ago; slight Cushingoid facial appearance  Psychiatric:  oriented to person, place and time; mood and affect appropriate for the situation  ENT: no thrush or oral ulcers, mucous membranes moist  Cardiovascular:  bilateral pedal pulses palpable, feet warm, good cap refill; mild BL lower extremity edema overall (less irregular in left leg, still moderate in right foot), usual hemosiderin and atrophe miroslava, and some patchy areas of indurated skin thickening  Lymphatic:  no inguinal or popliteal adenopathy, no angitis, and that RLE cellulitis is resolved.    Neuro: still some allodynia in the right wound bed, less than before  Musculoskeletal:  no clubbing, cyanosis or petechiae; RLE and LLE with no gross effusions, joint misalignment or acute arthritis  Gay-ulcer skin: indurated and pink to very light red, BL  Ulcer(s): left side part granular, still part SQ fibronecrotic, but no longer inflamed, quite small, a bit of fibrin and biofilm, and what I imagine is a lymphatic leak of steady drainage this week; right side with definite improvement in purple pyoderma edges again, peripherally more granular, centrally still a bit of SQ fibrotic tissue, a bit of loose fibrin and biofilm, serous exudate, no pus; a couple of skin islands persisting this week, which is good news for ongoing healing potential. Photos also saved in electronic chart. Today's Wound Measurements, per RN documentation:  Wound 07/15/22 Wound #4 Left Inner Ankle, Venous, Partial (onset 6/2022)-Wound Length (cm): 0.2 cm  Wound 07/15/22 # 3 Rt Medial lower Leg, Venous, Partial Thickness, Onset 06/22-Wound Length (cm): 7.6 cm    Wound 07/15/22 Wound #4 Left Inner Ankle, Venous, Partial (onset 6/2022)-Wound Width (cm): 0.3 cm  Wound 07/15/22 # 3 Rt Medial lower Leg, Venous, Partial Thickness, Onset 06/22-Wound Width (cm): 4.2 cm    Wound 07/15/22 Wound #4 Left Inner Ankle, Venous, Partial (onset 6/2022)-Wound Depth (cm): 0.2 cm  Wound 07/15/22 # 3 Rt Medial lower Leg, Venous, Partial Thickness, Onset 06/22-Wound Depth (cm): 0.3 cm  _____________________________    Lab Results   Component Value Date    LABALBU 3.2 (L) 09/29/2022     Lab Results   Component Value Date    CREATININE 0.7 09/29/2022     Also from last week -- BUN 22, TP 5.3, rest of CMP normal.     WBC down rather sharply from 14.7 to 4.2, Hgb down to 9.4, plts 231k, ANC 1600.      Assessment:     Patient Active Problem List   Diagnosis Code    HTN (hypertension) I10    Localized osteoarthrosis, lower leg M17.10    Eczema L30.9    Foot drop, right M21.371    Lumbosacral spondylosis without myelopathy M47.817    Normocytic anemia D64.9    Peripheral neuropathy G62.9    Senile osteoporosis M81.0    Bilateral lower extremity edema R60.0    CKD (chronic kidney disease) stage 3, GFR 30-59 ml/min ([facility]) N18.30    Ulcer of right ankle, with fat layer exposed ([facility] L97.312    PMR (polymyalgia rheumatica) ([facility]) M35.3    Giant cell arteritis ([facility]) M31.6    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    Ulcer of left ankle, with fat layer exposed ([address] L97.322    Probable pyoderma gangrenosum L88    Chronic venous hypertension (idiopathic) with ulcer and inflammation of right lower extremity (HCC) I87.331    Chronic venous hypertension (idiopathic) with ulcer and inflammation of left lower extremity (HCC) I87.332       Assessment of today's active condition(s): venous insufficiency, skin changes, lipodermatosclerosis and edema, recurrent BL ankle ulcers, but this time with a definite character of pyoderma as well, actually quite well controlled now with topicals + sulfasalazine, and tapering doses of prednisone. No signs of infection or ischemia, and the inflammation is well-controlled enough to start skin subs on that right side now. Tolerating medications well, but need to watch for neutropenia with the sulfa; increased drainage from the left side likely due to a small lymphatic leak. Factors contributing to occurrence and/or persistence of the chronic ulcer include edema, venous stasis, and decreased mobility. Medical necessity of today's visit is shown by the above documentation. Sharp debridement is indicated today, based upon the exam findings in the ulcer(s) above. Procedure note:     Consent obtained. Time out performed per [facility]. Anesthetic  Anesthetic: 4% Lidocaine Cream     Using a curette and #15 blade scalpel, I sharply debrided the bilateral, medial ankle ulcer(s) down through and including the removal of dermis, being careful to avoid the edges on that right side. The type(s) of tissue debrided included fibrin and biofilm. Total Surface Area Debrided: about 15 sq cm. The ulcers were then irrigated with normal saline solution. The procedure was completed with a small amount of bleeding, and hemostasis was with pressure. The patient tolerated the procedure well, with no significant complications. The patient's level of pain during and after the procedure was monitored.  Post-debridement measurements, if different from pre-debridement, are in the flowsheet as well.   ____________    Because this patient's right venous ankle ulcer has failed to respond to standard wound-care measures (including treatment of infection, debridement of necrosis, treatment of edema, provision of a moist wound environment, good compliance with compression) for more than 4 weeks, I recommended [place] Wound Matrix as adjunctive therapy to help speed healing of this ulcer. As per previous documentation, peripheral pulse exam is acceptable, and/or TODD is at least 0.65. There is no evidence of untreated infection today. Ms. Jodee Kurtz does not currently smoke. After cleansing the right ankle ulcer with saline, spraying the ulcer with HClO and applying Calmoseptine, tacrolimus and clobetasol to the mauro-wound skin, a 21 sqcm piece of Oasis Wound Matrix was cut to fit the ulcer, placed into the ulcer bed, affixed to the surrounding skin with Steri-Strips, moistened with saline and covered with Mepilex Transfer Ag. Twenty-one sqcm of the product was used in the ulcer, and 0 sqcm of the product was discarded. If applicable, any wastage is due to the fact the smallest available product was larger than what was needed for her ulcer(s). The patient tolerated the procedure well, without complications. Discharge plan:     Treatment in the wound care center today, per RN documentation: Wound 07/15/22 Wound #4 Left Inner Ankle, Venous, Partial (onset 6/2022)-Dressing/Treatment: Other (comment) (Aquaphor to dry skin not covered by dressing, Stacked Gauze Bolster over wound area, AccuWrap Lite, Nylon)  Wound 07/15/22 # 3 Rt Medial lower Leg, Venous, Partial Thickness, Onset 06/22-Dressing/Treatment: Other (comment) (ABD,Aquaphor to dry skin not covered by dressing,AccuWrap Lite, Nylon). Per physician order, bilateral application of multilayer compression wrap was performed in the wound care center today, to help manage edema, stasis dermatitis, and/or venous ulcers. Leave primary dressing and multi-layer wrap(s) in place until the next appointment.  Also discussed ways to keep the wrap dry for a shower, including a plastic cast-guard, available in retail pharmacies. She should call before her next visit if there is any significant pain, significant strike-through of drainage to the surface of the wrap, or any significant sense of the wrap sliding down more than an inch or so, bunching-up and abrading her skin. I reminded the patient of the importance of weight management and smoking cessation, if applicable; also encouraged ambulation as tolerated, additional lower extremity exercises as instructed in our education sheet, leg elevation when at rest, and compliance with any recommended dietary, diuretic and compression therapies. One final week of prednisone coming up, down to every-other-day doses. Continue sulfasalazine at 500 TID. No change in pain mgmt. Definitely ok that she walk some more, as tolerated, just elevate when at rest.   Keep pushing protein intake, stay hydrated. Will repeat a CBC today, make sure that [address] isn't falling any further. Written discharge instructions given to patient. Follow up in 1 week.     Electronically signed by Lianna Duffy MD on 10/20/2022 at 8:54 AM.

## 2022-10-21 ENCOUNTER — HOSPITAL ENCOUNTER (OUTPATIENT)
Dept: WOUND CARE | Age: 87
Discharge: HOME | End: 2022-10-21
Payer: MEDICARE

## 2022-10-21 VITALS
HEIGHT: 64 IN | DIASTOLIC BLOOD PRESSURE: 89 MMHG | BODY MASS INDEX: 25.63 KG/M2 | WEIGHT: 150.13 LBS | RESPIRATION RATE: 20 BRPM | HEART RATE: 57 BPM | SYSTOLIC BLOOD PRESSURE: 146 MMHG | TEMPERATURE: 97.7 F

## 2022-10-21 DIAGNOSIS — R60.0 BILATERAL LOWER EXTREMITY EDEMA: ICD-10-CM

## 2022-10-21 DIAGNOSIS — L97.322 ULCER OF LEFT ANKLE, WITH FAT LAYER EXPOSED (HCC): Primary | ICD-10-CM

## 2022-10-21 DIAGNOSIS — L97.312 ULCER OF RIGHT ANKLE, WITH FAT LAYER EXPOSED (HCC): ICD-10-CM

## 2022-10-21 LAB
A/G RATIO: 2.3 (ref 1.1–2.2)
ALBUMIN SERPL-MCNC: 4.1 G/DL (ref 3.4–5)
ALP BLD-CCNC: 46 U/L (ref 40–129)
ALT SERPL-CCNC: 14 U/L (ref 10–40)
ANION GAP SERPL CALCULATED.3IONS-SCNC: 14 MMOL/L (ref 3–16)
AST SERPL-CCNC: 19 U/L (ref 15–37)
BASOPHILS ABSOLUTE: 0.1 K/UL (ref 0–0.2)
BASOPHILS RELATIVE PERCENT: 1.2 %
BILIRUB SERPL-MCNC: <0.2 MG/DL (ref 0–1)
BUN BLDV-MCNC: 20 MG/DL (ref 7–20)
CALCIUM SERPL-MCNC: 10.1 MG/DL (ref 8.3–10.6)
CHLORIDE BLD-SCNC: 108 MMOL/L (ref 99–110)
CO2: 20 MMOL/L (ref 21–32)
CREAT SERPL-MCNC: 0.6 MG/DL (ref 0.6–1.2)
EOSINOPHILS ABSOLUTE: 0.1 K/UL (ref 0–0.6)
EOSINOPHILS RELATIVE PERCENT: 2.3 %
GFR SERPL CREATININE-BSD FRML MDRD: >60 ML/MIN/{1.73_M2}
GLUCOSE BLD-MCNC: 88 MG/DL (ref 70–99)
HCT VFR BLD CALC: 32.7 % (ref 36–48)
HEMOGLOBIN: 11.1 G/DL (ref 12–16)
LYMPHOCYTES ABSOLUTE: 1.4 K/UL (ref 1–5.1)
LYMPHOCYTES RELATIVE PERCENT: 31.4 %
MCH RBC QN AUTO: 34.5 PG (ref 26–34)
MCHC RBC AUTO-ENTMCNC: 33.9 G/DL (ref 31–36)
MCV RBC AUTO: 101.9 FL (ref 80–100)
MONOCYTES ABSOLUTE: 0.7 K/UL (ref 0–1.3)
MONOCYTES RELATIVE PERCENT: 14.5 %
NEUTROPHILS ABSOLUTE: 2.3 K/UL (ref 1.7–7.7)
NEUTROPHILS RELATIVE PERCENT: 50.6 %
PDW BLD-RTO: 16.4 % (ref 12.4–15.4)
PLATELET # BLD: 195 K/UL (ref 135–450)
PMV BLD AUTO: 8 FL (ref 5–10.5)
POTASSIUM SERPL-SCNC: 4.5 MMOL/L (ref 3.5–5.1)
RBC # BLD: 3.21 M/UL (ref 4–5.2)
SODIUM BLD-SCNC: 142 MMOL/L (ref 136–145)
TOTAL PROTEIN: 5.9 G/DL (ref 6.4–8.2)
WBC # BLD: 4.5 K/UL (ref 4–11)

## 2022-10-21 PROCEDURE — 29581 APPL MULTLAYER CMPRN SYS LEG: CPT

## 2022-10-21 PROCEDURE — C5271 LOW COST SKIN SUBSTITUTE APP: HCPCS

## 2022-10-21 PROCEDURE — 80053 COMPREHEN METABOLIC PANEL: CPT

## 2022-10-21 PROCEDURE — 36415 COLL VENOUS BLD VENIPUNCTURE: CPT

## 2022-10-21 PROCEDURE — 97597 DBRDMT OPN WND 1ST 20 CM/<: CPT

## 2022-10-21 PROCEDURE — 85025 COMPLETE CBC W/AUTO DIFF WBC: CPT

## 2022-10-21 RX ORDER — LIDOCAINE 50 MG/G
OINTMENT TOPICAL ONCE
Status: CANCELLED | OUTPATIENT
Start: 2022-10-21 | End: 2022-10-21

## 2022-10-21 RX ORDER — LIDOCAINE 40 MG/G
CREAM TOPICAL ONCE
Status: DISCONTINUED | OUTPATIENT
Start: 2022-10-21 | End: 2022-10-22 | Stop reason: HOSPADM

## 2022-10-21 RX ORDER — LIDOCAINE HYDROCHLORIDE 40 MG/ML
SOLUTION TOPICAL ONCE
Status: CANCELLED | OUTPATIENT
Start: 2022-10-21 | End: 2022-10-21

## 2022-10-21 RX ORDER — LIDOCAINE 40 MG/G
CREAM TOPICAL ONCE
Status: CANCELLED | OUTPATIENT
Start: 2022-10-21 | End: 2022-10-21

## 2022-10-21 RX ORDER — BACITRACIN ZINC AND POLYMYXIN B SULFATE 500; 1000 [USP'U]/G; [USP'U]/G
OINTMENT TOPICAL ONCE
Status: CANCELLED | OUTPATIENT
Start: 2022-10-21 | End: 2022-10-21

## 2022-10-21 NOTE — PLAN OF CARE
Additional specific goal for this week: Patient will receive safe and proper application of Oasis Wound Matrix. Patient will comply with caring for wound and reporting any concerns.  guidelines for application followed. Expiration date of Oasis checked immediately prior to use. Package intact prior to use, with no damage noted. Product storage at ambient temperature, per  guideline. Product lot #: [id number]  Expiration date: 12/10/23   Saline lot #: [id number]  Expiration date: 5/1/24    Patient/family/caregiver instructed to keep dressing clean, dry and intact.    Patient/family/caregiver instructed on signs and symptoms to report, such as draining through dressing, dressing slipping or falling down, becoming wet, or patient having severe pain, severe itching, severe malodor, fever, etc.         Electronically signed by Wilfredo Alba RN on 10/21/2022 at 1:01 PM.

## 2022-10-21 NOTE — PLAN OF CARE
Patient seen in [address] [place] [address] today. Reports pain has improved this week. Wound showing signs of improvement. Jet Ox completed per Dr. Latrice Elliott. Patient tolerated well. [place] #3 placed at today's visit. Labs completed today. F/u in [address] [place] [address],[address] in 1 week as ordered, pt. Aware to call sooner with any changes or questions/concerns. Discharge instructions reviewed with patient, all questions answered, copy given to patient. Dressings were applied to all wounds per M.D. Instructions at this visit.

## 2022-10-24 ENCOUNTER — TELEPHONE (OUTPATIENT)
Dept: WOUND CARE | Age: 87
End: 2022-10-24

## 2022-10-24 DIAGNOSIS — L97.312 ULCER OF RIGHT ANKLE, WITH FAT LAYER EXPOSED (HCC): ICD-10-CM

## 2022-10-24 DIAGNOSIS — R60.0 BILATERAL LOWER EXTREMITY EDEMA: ICD-10-CM

## 2022-10-24 DIAGNOSIS — L97.322 ULCER OF LEFT ANKLE, WITH FAT LAYER EXPOSED (HCC): Primary | ICD-10-CM

## 2022-10-24 NOTE — TELEPHONE ENCOUNTER
After speaking with MD call returned to patients and instructed her to use moisturizer such as Vaseline for Aquaphor to top of wraps, and MD also said she could take Benadryl 25mg 1-2 times day and watch for any dizziness or side effects with this oral med. If no relief then call us back to come in for us to changes dressings.   Patient states the itching is some better and she will try the topical.

## 2022-10-24 NOTE — DISCHARGE INSTRUCTIONS
[address] [facility] Physician Orders and Discharge [address], [address]  [place], [facility]  Telephone: [phone]      Fax: [phone]        Your home care company:   N/A     Your wound-care supplies will be provided by:  weekly wraps     NAME:  Sim Gaytan   YOB: 1933  PRIMARY DIAGNOSIS FOR WOUND CARE CENTER:  Pyoderma gangrenosum     Wound cleansing:  Do not scrub or use excessive force. Wash hands with soap and water before and after dressing changes. Prior to applying a clean dressing, cleanse wound with normal saline, wound cleanser, or mild soap and water. Ask your physician or nurse before getting the wound(s) wet in the shower. Wound care for home:     Left Lower Leg/ankle:   Vashe,Triad,Gauze Bolster  Aquaphor to dry skin  CoFlex Calamine - please do not apply too much pressure to second layer     Right Lower Leg:  Vashe, Clobetasol, Tacrolimus, Lotrisone, OASIS (#4) and Mepilex Transfer AG -all applied today per MD  ABD  Aquaphor to dry skin  CoFlex Calamine - please do not apply too much pressure to second layer     Lotrisone to right medial foot and left anterior ankle      KEEP THESE WRAPS CLEAN, DRY AND IN PLACE FOR THE WEEK           Please note, all wounds (unless stated otherwise here) were mechanically debrided at the time of cleansing here in the wound-care center today, so a small amount of pain, drainage or bleeding from that process might be expected, and is normal.     All products for home use, including multiple products for a single wound if applicable, are medically necessary in order to achieve the best chance at timely wound healing. See provider documentation for details if needed.      Substituted dressings applied in the [address] today, if applicable:      N/a     New orders for this week (labs, imaging, medications, etc.):    You may take Benadryl at night only for itching - please be aware this may cause dry mouth and some sleepiness/dizziness     Please bring velcro wrap to your next visit     Additional instructions for specific diagnoses:     10/5/2022 Prednisone decreased to 1/2 tablet every other day   [place] #3 - 10/21/22  [place] #4 - 10/28/22        F/U Appointment is with Dr. Tawanda Ness in 1 week, on                                   at                       .     Your nurse  is Kimo Limon RN     If we applied slip-resistant hospital socks today, be sure to remove them at least once a day to inspect your toes or feet, even if you're not changing the wraps or dressings underneath. If you see anything concerning (redness, excess moisture, etc), please call and let us know right away. Should you experience any significant changes in your wound(s) (including redness, increased warmth, increased pain, increased drainage, odor, or fever) or have questions about your wound care, please contact the [address] [place] [address] at [phone] Monday-Thursday from 8:00 am - 4:30 pm, or Friday from 8:00 am - 2:30 pm.  If you need help with your wound outside these hours and cannot wait until we are again available, contact your home-care company (if applicable), your PCP, or go to the nearest emergency room.

## 2022-10-26 NOTE — PROGRESS NOTES
[facility] Progress Note    Steve Dominique     : 2/3/1933    DATE OF VISIT:  10/14/2022    Subjective: Steve Dominique is a 80 y.o. 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 Significant symptoms or pertinent wound history since last visit: feeling well overall, though she had some BL leg pruritus this week. No fever, no falls, tolerating sulfasalazine ok, appetite maybe a bit better, no sore throat or mouth, N/V/D. Very little wound pain at all, and less drainage on that left side this week. Additional ulcer(s) noted? no.      Her current medication list consists of Denosumab, Tocilizumab, acetaminophen, apixaban, clobetasol, furosemide, losartan, metoprolol succinate, sulfaSALAzine, and tacrolimus. Clobetasol and tacro are topical once weekly, the sulfasalazine is 500 mg TID, and her prednisone is down to 5 mg QOD, for one more week, as we just taper that off. Allergies: Dapsone, Erythromycin, Hydrochlorothiazide, Minocycline, Silver sulfadiazine, and Doxycycline    Objective:     Vitals:    10/14/22 0911   BP: 116/68   Pulse: 72   Resp: 20   Temp: 97 °F (36.1 °C)   TempSrc: Oral   Weight: 150 lb 2 oz (68.1 kg)   Height: 5' 4\" (1.626 m)     Constitutional:  well-developed, well-nourished, fatigued, NAD, in less discomfort than weeks ago; slight Cushingoid facial appearance  Psychiatric:  oriented to person, place and time; mood and affect appropriate for the situation  ENT: no thrush or oral ulcers, mucous membranes moist  Cardiovascular:  bilateral pedal pulses palpable, feet warm, good cap refill; mild BL lower extremity edema overall (maybe still moderate in right foot), usual hemosiderin and atrophe miroslava  Lymphatic:  no inguinal or popliteal adenopathy, no angitis, and that RLE cellulitis is resolved.    Neuro: less allodynia in the right wound bed than before  Musculoskeletal:  no clubbing, cyanosis or petechiae; RLE and LLE with no gross effusions, joint misalignment or acute arthritis  Gay-ulcer skin: indurated and pink to very light red, BL; she does have a new macular - papular rash of both proximal lower legs, which I think must be a reaction to the first layer of our compression wraps  Ulcer(s): left side part granular, still part SQ fibronecrotic, but no longer inflamed, quite small, a bit of fibrin and biofilm, and less serous exudate this week; right side with resolution of purple pyoderma edges, peripherally more granular and epithelializing, centrally still a bit of SQ fibrotic tissue, some fibrin and biofilm, serous exudate, no pus or other signs of infection. Photos also saved in electronic chart. Today's wound measurements, per RN documentation:  Wound 07/15/22 # 3 Rt Medial lower Leg, Venous, Partial Thickness, Onset 06/22-Wound Length (cm): 7.5 cm  Wound 07/15/22 Wound #4 Left Inner Ankle, Venous, Partial (onset 6/2022)-Wound Length (cm): 0.3 cm    Wound 07/15/22 # 3 Rt Medial lower Leg, Venous, Partial Thickness, Onset 06/22-Wound Width (cm): 2.2 cm  Wound 07/15/22 Wound #4 Left Inner Ankle, Venous, Partial (onset 6/2022)-Wound Width (cm): 0.3 cm    Wound 07/15/22 # 3 Rt Medial lower Leg, Venous, Partial Thickness, Onset 06/22-Wound Depth (cm): 0.4 cm  Wound 07/15/22 Wound #4 Left Inner Ankle, Venous, Partial (onset 6/2022)-Wound Depth (cm): 0.2 cm  ________________    I repeated just her CBC last week, to make sure no neutropenia --    WBC down to 3.4, Hgb 10.1, plts 237k, but ANC up to 1900.     Assessment:     Patient Active Problem List   Diagnosis Code    HTN (hypertension) I10    Localized osteoarthrosis, lower leg M17.10    Eczema L30.9    Foot drop, right M21.371    Lumbosacral spondylosis without myelopathy M47.817    Normocytic anemia D64.9    Peripheral neuropathy G62.9    Senile osteoporosis M81.0    Bilateral lower extremity edema R60.0    CKD (chronic kidney disease) stage 3, GFR 30-59 ml/min (HCC) N18.30 Ulcer of right ankle, with fat layer exposed ([address] L97.312    PMR (polymyalgia rheumatica) ([facility]) M35.3    Giant cell arteritis ([facility]) M31.6    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    Ulcer of left ankle, with fat layer exposed ([address] L97.322    Probable pyoderma gangrenosum L88    Chronic venous hypertension (idiopathic) with ulcer and inflammation of right lower extremity (HCC) I87.331    Chronic venous hypertension (idiopathic) with ulcer and inflammation of left lower extremity ([facility]) I87.332       Assessment of today's active condition(s): venous insufficiency, skin changes, more lipodermatosclerosis than edema, recurrent BL medial ankle ulcers, but this time also with a clinical picture of pyoderma, especially on the right side, related to her underlying GCA / PMR. The left side is a bit stagnant but quite small, and while the right side is still large, it's making some noticeable progress each week; inflammation completely controlled now, tolerating sulfa, and I think will continue to get some benefit from [place] on that right side. Factors contributing to occurrence and/or persistence of the chronic ulcer include edema, venous stasis, and decreased mobility. Medical necessity of today's visit is shown by the above documentation. Sharp debridement (very gentle) is indicated today, based upon the exam findings in the wound(s) above. On the right side, we elected to use the JetOx device, to help minimize pain and pathergy. Procedure note:     Consent obtained. Time out performed per [facility] policy. Anesthetic  Anesthetic: 4% Lidocaine Cream     Using a #15 blade scalpel, I sharply debrided the left, medial ankle ulcer(s) down through and including the removal of dermis. The type(s) of tissue debrided included fibrin, biofilm, and necrotic/eschar. Total Surface Area Debrided: 1 sq cm.     Using a JetOx device, I debrided the right, medial ankle ulcer(s) down through and including the removal of dermis. The type(s) of tissue debrided included fibrin and biofilm. Total Surface Area Debrided: 12 sq cm. The ulcers were then irrigated with normal saline solution. The procedure was completed with a small amount of bleeding, and hemostasis was with pressure. The patient tolerated the procedure well, with no significant complications. The patient's level of pain during and after the procedure was monitored. Post-debridement measurements, if different from pre-debridement, are in the flowsheet as well.  ______________    Because this patient's right ankle venous ulcer has failed to respond to standard wound-care measures (including treatment of infection, debridement of necrosis, treatment of edema, provision of a moist wound environment, good compliance with compression) for more than 4 weeks, I recommended [place] Wound Matrix as adjunctive therapy to help speed healing of this ulcer. As per previous documentation, peripheral pulse exam is acceptable, and/or TODD is at least 0.65. There is no evidence of untreated infection today. Ms. Danelle Spear does not currently smoke. After cleansing the right ankle ulcer with saline, spraying the ulcer with HClO and applying clobetasol, tacrolimus and Calmoseptine to the mauro-wound skin, a 21 sqcm piece of [place] Wound Matrix was cut to fit the ulcer, placed into the ulcer bed (doubled up in a couple of spots), affixed to the surrounding skin with Steri-Strips, moistened with saline and covered with Mepilex Transfer Ag. Twenty-one sqcm of the product was used in the ulcer, and 0 sqcm of the product was discarded. If applicable, any wastage is due to the fact the smallest available product was larger than what was needed for her ulcer(s). The patient tolerated the procedure well, without complications.      Discharge plan:     Treatment in the wound care center today, per RN documentation: Wound 07/15/22 # 3 Rt Medial lower Leg, Venous, Partial Thickness, Onset 06/22-Dressing/Treatment:  (triamcinolone to rash, Compri2 Lite)  Wound 07/15/22 Wound #4 Left Inner Ankle, Venous, Partial (onset 6/2022)-Dressing/Treatment:  (triamcinolone to rash, gauze bolster over transfer, Compri2 Lite). Per physician order, bilateral application of multilayer compression wrap was performed in the wound care center today, to help manage edema, stasis dermatitis, and/or venous ulcers. Leave primary dressing and multi-layer wrap(s) in place until the next appointment. Also discussed ways to keep the wrap dry for a shower, including a plastic cast-guard, available in retail pharmacies. She should call before her next visit if there is any significant pain, significant strike-through of drainage to the surface of the wrap, or any significant sense of the wrap sliding down more than an inch or so, bunching-up and abrading her skin. (First layer of wraps replaced with 100% cotton today, because of that rash). I reminded the patient of the importance of weight management and smoking cessation, if applicable; also encouraged ambulation as tolerated, additional lower extremity exercises as instructed in our education sheet, leg elevation when at rest, and compliance with any recommended dietary, diuretic and compression therapies. Taper off prednisone this week. Continue sulfasalazine 500 TID. Keep pushing protein intake. Full set of labs next week. Continue same pain mgmt plan (Tylenol if mild, tramadol if moderate, Norco if severe or just before coming in for visits here). Written discharge instructions given to patient. Follow up in 1 week.     Electronically signed by Virgene Holstein, MD on 10/26/2022 at 10:01 AM.

## 2022-10-28 ENCOUNTER — HOSPITAL ENCOUNTER (OUTPATIENT)
Dept: WOUND CARE | Age: 87
Discharge: HOME | End: 2022-10-28
Payer: MEDICARE

## 2022-10-28 VITALS
HEART RATE: 62 BPM | HEIGHT: 64 IN | RESPIRATION RATE: 18 BRPM | SYSTOLIC BLOOD PRESSURE: 128 MMHG | TEMPERATURE: 97.7 F | BODY MASS INDEX: 25.44 KG/M2 | WEIGHT: 149 LBS | DIASTOLIC BLOOD PRESSURE: 77 MMHG

## 2022-10-28 DIAGNOSIS — R60.0 BILATERAL LOWER EXTREMITY EDEMA: ICD-10-CM

## 2022-10-28 DIAGNOSIS — L97.312 ULCER OF RIGHT ANKLE, WITH FAT LAYER EXPOSED (HCC): ICD-10-CM

## 2022-10-28 DIAGNOSIS — L97.322 ULCER OF LEFT ANKLE, WITH FAT LAYER EXPOSED (HCC): Primary | ICD-10-CM

## 2022-10-28 PROCEDURE — C5271 LOW COST SKIN SUBSTITUTE APP: HCPCS

## 2022-10-28 PROCEDURE — 97597 DBRDMT OPN WND 1ST 20 CM/<: CPT | Performed by: INTERNAL MEDICINE

## 2022-10-28 PROCEDURE — 97597 DBRDMT OPN WND 1ST 20 CM/<: CPT

## 2022-10-28 PROCEDURE — 29581 APPL MULTLAYER CMPRN SYS LEG: CPT

## 2022-10-28 PROCEDURE — 15271 SKIN SUB GRAFT TRNK/ARM/LEG: CPT | Performed by: INTERNAL MEDICINE

## 2022-10-28 RX ORDER — BACITRACIN ZINC AND POLYMYXIN B SULFATE 500; 1000 [USP'U]/G; [USP'U]/G
OINTMENT TOPICAL ONCE
Status: CANCELLED | OUTPATIENT
Start: 2022-10-28 | End: 2022-10-28

## 2022-10-28 RX ORDER — LIDOCAINE 40 MG/G
CREAM TOPICAL ONCE
Status: CANCELLED | OUTPATIENT
Start: 2022-10-28 | End: 2022-10-28

## 2022-10-28 RX ORDER — LIDOCAINE 50 MG/G
OINTMENT TOPICAL ONCE
Status: CANCELLED | OUTPATIENT
Start: 2022-10-28 | End: 2022-10-28

## 2022-10-28 RX ORDER — LIDOCAINE 40 MG/G
CREAM TOPICAL ONCE
Status: DISCONTINUED | OUTPATIENT
Start: 2022-10-28 | End: 2022-10-29 | Stop reason: HOSPADM

## 2022-10-28 RX ORDER — LIDOCAINE HYDROCHLORIDE 40 MG/ML
SOLUTION TOPICAL ONCE
Status: CANCELLED | OUTPATIENT
Start: 2022-10-28 | End: 2022-10-28

## 2022-10-28 NOTE — PLAN OF CARE
Patient seen in [address] [place] [address] today for follow up. Daughter at bedside. Patient complains of significant pain following [facility] visits. She is taking Tylenol and Tramadol for pain. She has Norco but states she does not feel it works any better than the Tramadol. She also complains of itching. Lab results discussed with patient and daughter. Voiced understanding. Will plan to obtain labs again in 1-2 weeks. Jet Ox debridement completed per Dr. Ignacio Gabriel. [place] #4 applied per Dr. Ignacio Gabriel. Will try coflex calamine wraps this week to see if it helps with itching. F/u in [address] [place] [address],[address] in 1 week as ordered, pt. Aware to call sooner with any changes or questions/concerns. Discharge instructions reviewed with patient, all questions answered, copy given to patient. Dressings were applied to all wounds per M.D. Instructions at this visit.

## 2022-10-31 NOTE — DISCHARGE INSTRUCTIONS
[facility] Physician Orders and Discharge [address], [address]  [place], [facility]  Telephone: [phone]      Fax: [phone]        Your home care company:   N/A     Your wound-care supplies will be provided by:  weekly wraps     NAME:  Felicity Moore   YOB: 1933  PRIMARY DIAGNOSIS FOR WOUND CARE CENTER:  Pyoderma gangrenosum     Wound cleansing:  Do not scrub or use excessive force. Wash hands with soap and water before and after dressing changes. Prior to applying a clean dressing, cleanse wound with normal saline, wound cleanser, or mild soap and water. Ask your physician or nurse before getting the wound(s) wet in the shower. Wound care for home:     Left Lower Leg/ankle:   Vashe  Triad/PSO  Small Mepilex Border  Lotrisone to rashy areas  Apply sock and compression garment - you may leave this on all week if desired. Right Lower Leg:  Vashe, Triad, Tacrolimus, OASIS (#5) and Mepilex Transfer AG -all applied today per MD  Lotrisone to rashy areas  ABD  Aquaphor to dry skin  CoFlex Calamine - please do not apply too much pressure to second layer        KEEP THESE WRAPS CLEAN, DRY AND IN PLACE FOR THE WEEK           Please note, all wounds (unless stated otherwise here) were mechanically debrided at the time of cleansing here in the wound-care center today, so a small amount of pain, drainage or bleeding from that process might be expected, and is normal.     All products for home use, including multiple products for a single wound if applicable, are medically necessary in order to achieve the best chance at timely wound healing. See provider documentation for details if needed.      Substituted dressings applied in the [facility] today, if applicable:      N/a     New orders for this week (labs, imaging, medications, etc.):     Lortrisone prescription sent to your pharamcy today - Velma Osorio     You may take Benadryl at night only for itching - please be aware this may cause dry mouth and some sleepiness/dizziness         Additional instructions for specific diagnoses:     10/5/2022 Prednisone decreased to 1/2 tablet every other day   [place] #3 - 10/21/22  [place] #4 - 10/28/22  [place] #5 - 11/4/22        F/U Appointment is with Dr. Jaylene Aceves in 1 week, on                                   at                       .     Your nurse  is Pepito Courtney RN     If we applied slip-resistant hospital socks today, be sure to remove them at least once a day to inspect your toes or feet, even if you're not changing the wraps or dressings underneath. If you see anything concerning (redness, excess moisture, etc), please call and let us know right away. Should you experience any significant changes in your wound(s) (including redness, increased warmth, increased pain, increased drainage, odor, or fever) or have questions about your wound care, please contact the [facility] [address] at [phone] Monday-Thursday from 8:00 am - 4:30 pm, or Friday from 8:00 am - 2:30 pm.  If you need help with your wound outside these hours and cannot wait until we are again available, contact your home-care company (if applicable), your PCP, or go to the nearest emergency room.

## 2022-11-02 NOTE — PROGRESS NOTES
[facility] Progress Note    Felicity Moore     : 2/3/1933    DATE OF VISIT:  10/28/2022    Subjective: Felicity Moore is a 80 y.o. 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 Significant symptoms or pertinent wound history since last visit: states that she's been having more pain this past week, mostly on the right side; I think in part that's due to a bit of sharp debridement that I did in the center of that ulcer last week, but from what her daughter is telling me, perhaps she's had more pain that she's been letting on, for a number of weeks. No F/C/D. Taking tramadol with some regularity, very rarely her Norco. Also with more pruritus this week, L>R, which caused her to push her left sided wrap down a bit, to scratch her leg and apply a bit of moisturizer. Additional ulcer(s) noted? no.      Her current medication list consists of Denosumab, Tocilizumab, acetaminophen, apixaban, clobetasol, furosemide, losartan, metoprolol succinate, sulfaSALAzine, and tacrolimus. Allergies: Dapsone, Erythromycin, Hydrochlorothiazide, Minocycline, Silver sulfadiazine, and Doxycycline    Objective:     Vitals:    10/28/22 1015   BP: 128/77   Pulse: 62   Resp: 18   Temp: 97.7 °F (36.5 °C)   TempSrc: Oral   Weight: 149 lb (67.6 kg)   Height: 5' 4\" (1.626 m)     Constitutional:  well-developed, well-nourished, fatigued, NAD, in mild discomfort today  Psychiatric:  oriented to person, place and time; mood and affect appropriate for the situation  ENT: no thrush or oral ulcers, mucous membranes moist  Cardiovascular:  bilateral pedal pulses palpable, feet warm, good cap refill; mild right and more moderate left lower extremity swelling this week, usual hemosiderin and atrophe miroslava  Lymphatic:  no inguinal or popliteal adenopathy, no angitis, no cellulitis.    Neuro: less allodynia in the right wound bed than before, but not resolved  Musculoskeletal:  no clubbing, cyanosis or petechiae; RLE and LLE with no gross effusions, joint misalignment or acute arthritis  Gay-ulcer skin: indurated and pink to very light red, BL; that new proximal rash from 2 weeks ago is better, but she has several foci of more prominent tinea this week, though mostly on the right side, where the pruritus is mostly on the left  Ulcer(s): left side part granular, still part SQ fibronecrotic, but no longer inflamed, quite small, a bit of fibrin and biofilm, and modest serous exudate; right side with resolution of purple pyoderma edges, peripherally more granular and epithelializing overall, centrally still a bit of SQ fibrotic tissue and slough, some fibrin and biofilm, serous exudate, no pus or other signs of infection. Photos also saved in electronic chart. Today's wound measurements, per RN documentation:  Wound 07/15/22 Wound #4 Left Inner Ankle, Venous, Partial (onset 6/2022)-Wound Length (cm): 0.4 cm  Wound 07/15/22 # 3 Rt Medial lower Leg, Venous, Partial Thickness, Onset 06/22-Wound Length (cm): 7.5 cm    Wound 07/15/22 Wound #4 Left Inner Ankle, Venous, Partial (onset 6/2022)-Wound Width (cm): 0.5 cm  Wound 07/15/22 # 3 Rt Medial lower Leg, Venous, Partial Thickness, Onset 06/22-Wound Width (cm): 2.8 cm    Wound 07/15/22 Wound #4 Left Inner Ankle, Venous, Partial (onset 6/2022)-Wound Depth (cm): 0.2 cm  Wound 07/15/22 # 3 Rt Medial lower Leg, Venous, Partial Thickness, Onset 06/22-Wound Depth (cm): 0.5 cm  ________________    Labs from last week --  CMP normal except a CO2 of 20, TP of 5.9. WBC up to 4.5, Hgb up to 11.1, plts 195k, WBC diff normal now.      Assessment:     Patient Active Problem List   Diagnosis Code    HTN (hypertension) I10    Localized osteoarthrosis, lower leg M17.10    Eczema L30.9    Foot drop, right M21.371    Lumbosacral spondylosis without myelopathy M47.817    Normocytic anemia D64.9    Peripheral neuropathy G62.9    Senile osteoporosis M81.0 solution. The procedure was completed with a small amount of bleeding, and hemostasis was with pressure. The patient tolerated the procedure well, with no significant complications. The patient's level of pain during and after the procedure was monitored. Post-debridement measurements, if different from pre-debridement, are in the flowsheet as well.  _______________    Because this patient's right ankle venous ulcer has failed to respond to standard wound-care measures (including treatment of infection, debridement of necrosis, treatment of edema, provision of a moist wound environment, good compliance with compression) for more than 4 weeks, I recommended [place] Wound Matrix as adjunctive therapy to help speed healing of this ulcer. As per previous documentation, peripheral pulse exam is acceptable, and/or TODD is at least 0.65. There is no evidence of untreated infection today. Ms. Tianna Avila does not currently smoke. After cleansing the right ankle ulcer with saline, spraying the ulcer with HClO and applying Skin-Prep to the mauro-wound skin, plus a bit of tacrolimus and clobetasol to a couple of wound edges, an 11 sqcm piece of [place] Wound Matrix was cut to fit the ulcer, placed into the ulcer bed, affixed to the surrounding skin with Steri-Strips, moistened with saline and covered with Mepilex Transfer Ag. Eleven sqcm of the product was used in the ulcer, and 0 sqcm of the product was discarded. If applicable, any wastage is due to the fact the smallest available product was larger than what was needed for her ulcer(s). The patient tolerated the procedure well, without complications.      Discharge plan:     Treatment in the wound care center today, per RN documentation: Wound 07/15/22 Wound #4 Left Inner Ankle, Venous, Partial (onset 6/2022)-Dressing/Treatment: Other (comment) (Aquaphor to dry, Nexodyn, Triad, gauze bolster, foam to anterior ankle, CoFlex TLC Calamine)  Wound 07/15/22 # 3 Rt Medial lower Leg, Venous, Partial Thickness, Onset 06/22-Dressing/Treatment: Other (comment) (Aquaphor to dry, foam to anterior ankle, ABD, CoFlex TLC Calamine). Also some Lotrisone to fungal rash this week. Per physician order, bilateral application of multilayer compression wrap was performed in the wound care center today, to help manage edema, stasis dermatitis, and/or venous ulcers. Leave primary dressing and multi-layer wrap(s) in place until the next appointment. Also discussed ways to keep the wrap dry for a shower, including a plastic cast-guard, available in retail pharmacies. She should call before her next visit if there is any significant pain, significant strike-through of drainage to the surface of the wrap, or any significant sense of the wrap sliding down more than an inch or so, bunching-up and abrading her skin. I reminded the patient of the importance of weight management and smoking cessation, if applicable; also encouraged ambulation as tolerated, additional lower extremity exercises as instructed in our education sheet, leg elevation when at rest, and compliance with any recommended dietary, diuretic and compression therapies. Next week we can transition her to a Velcro garment on that left side, so that she can better manage pruritus if it continues during the week. No change in pain mgmt. Continue sulfasalazine TIW. Written discharge instructions given to patient. Follow up in 1 week.     Electronically signed by Aramis Mukherjee MD on 11/2/2022 at 3:07 PM.

## 2022-11-02 NOTE — PROGRESS NOTES
Emily [facility] Progress Note    Adrianna Green     : 2/3/1933    DATE OF VISIT:  10/21/2022    Subjective: Adrianna Green is a 80 y.o. 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 Significant symptoms or pertinent wound history since last visit: feeling ok overall, little pain on the left, some pain on the right after treatment last week, but she says not severe. Upper leg pruritus and rash better this week. No F/C/D, appetite fair, seems to be tolerating the sulfasalazine ok. Additional ulcer(s) noted? no.      Her current medication list consists of Denosumab, Tocilizumab, acetaminophen, apixaban, clobetasol, furosemide, losartan, metoprolol succinate, sulfaSALAzine, and tacrolimus. Sulfa at 500 TID; off prednisone now. Allergies: Dapsone, Erythromycin, Hydrochlorothiazide, Minocycline, Silver sulfadiazine, and Doxycycline    Objective:     Vitals:    10/21/22 1015   BP: (!) 146/89   Pulse: 57   Resp: 20   Temp: 97.7 °F (36.5 °C)   TempSrc: Oral   Weight: 150 lb 2 oz (68.1 kg)   Height: 5' 4\" (1.626 m)     Constitutional:  well-developed, well-nourished, fatigued, NAD, in less discomfort than weeks ago; slight Cushingoid facial appearance  Psychiatric:  oriented to person, place and time; mood and affect appropriate for the situation  ENT: no thrush or oral ulcers, mucous membranes moist  Cardiovascular:  bilateral pedal pulses palpable, feet warm, good cap refill; mild BL lower extremity edema overall (maybe still mild-moderate in right foot), usual hemosiderin and atrophe miroslava  Lymphatic:  no inguinal or popliteal adenopathy, no angitis, no cellulitis.    Neuro: less allodynia in the right wound bed than before, but not resolved  Musculoskeletal:  no clubbing, cyanosis or petechiae; RLE and LLE with no gross effusions, joint misalignment or acute arthritis  Gay-ulcer skin: indurated and pink to very light red, BL; that new proximal rash from last week is better; she does have a couple of irregularly-shaped foci of dull pink dermatitis this week, I think c/w tinea  Ulcer(s): left side part granular, still part SQ fibronecrotic, but no longer inflamed, quite small, a bit of fibrin and biofilm, and modest serous exudate; right side with resolution of purple pyoderma edges, peripherally more granular and epithelializing overall, centrally still a bit of SQ fibrotic tissue and slough, some fibrin and biofilm, serous exudate, no pus or other signs of infection. Photos also saved in electronic chart.     Today's wound measurements, per RN documentation:  Wound 07/15/22 Wound #4 Left Inner Ankle, Venous, Partial (onset 6/2022)-Wound Length (cm): 0.3 cm  Wound 07/15/22 # 3 Rt Medial lower Leg, Venous, Partial Thickness, Onset 06/22-Wound Length (cm): 7.5 cm    Wound 07/15/22 Wound #4 Left Inner Ankle, Venous, Partial (onset 6/2022)-Wound Width (cm): 0.3 cm  Wound 07/15/22 # 3 Rt Medial lower Leg, Venous, Partial Thickness, Onset 06/22-Wound Width (cm): 1.6 cm    Wound 07/15/22 Wound #4 Left Inner Ankle, Venous, Partial (onset 6/2022)-Wound Depth (cm): 0.2 cm  Wound 07/15/22 # 3 Rt Medial lower Leg, Venous, Partial Thickness, Onset 06/22-Wound Depth (cm): 0.4 cm    Assessment:     Patient Active Problem List   Diagnosis Code    HTN (hypertension) I10    Localized osteoarthrosis, lower leg M17.10    Eczema L30.9    Foot drop, right M21.371    Lumbosacral spondylosis without myelopathy M47.817    Normocytic anemia D64.9    Peripheral neuropathy G62.9    Senile osteoporosis M81.0    Bilateral lower extremity edema R60.0    CKD (chronic kidney disease) stage 3, GFR 30-59 ml/min ([facility]) N18.30    Ulcer of right ankle, with [facility] L97.312    PMR (polymyalgia rheumatica) ([facility]) M35.3    Giant cell arteritis (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    Ulcer of left ankle, with fat layer exposed ([facility] Z32.603 Probable pyoderma gangrenosum L88    Chronic venous hypertension (idiopathic) with ulcer and inflammation of right lower extremity ([facility]) I87.331    Chronic venous hypertension (idiopathic) with ulcer and inflammation of left lower extremity (HCC) I87.332       Assessment of today's active condition(s): venous insufficiency, chronic skin changes, mild LE edema and some lipodermatosclerosis; recurrent BL ankle ulcers a few months ago, but the right side (perhaps the left?) developed a significant area of pyoderma gangrenosum as well; no signs of infection or ischemia; inflammation seems well controlled with topicals + sulfa now, and she's off a brief course of prednisone. Factors contributing to occurrence and/or persistence of the chronic ulcer include edema, venous stasis, and decreased mobility. Medical necessity of today's visit is shown by the above documentation. Sharp debridement is indicated today, based upon the exam findings in the wound(s) above. Because of the expectation of pathergy and pain with sharp debridement on the right side, we decided to use the JetOx system today (with NSS and HClO), in lieu of sharp debridement. Procedure note:     Consent obtained. Time out performed per [email or web address] Anesthetic  Anesthetic: 4% Lidocaine Cream     Using a #15 blade scalpel and JetOx device, I debrided the bilateral, medial ankle ulcer(s) down through and including the removal of dermis. The type(s) of tissue debrided included fibrin, biofilm, and necrotic/eschar. Total Surface Area Debrided: approx 10 sq cm. The ulcers were then irrigated with normal saline solution. The procedure was completed with a small amount of bleeding, and hemostasis was with pressure. The patient tolerated the procedure well, with no significant complications. The patient's level of pain during and after the procedure was monitored.  Post-debridement measurements, if different from pre-debridement, are in the flowsheet as well.  _____________    Because this patient's right ankle venous ulcer has failed to respond to standard wound-care measures (including treatment of infection, debridement of necrosis, treatment of edema, provision of a moist wound environment, good compliance with compression) for more than 4 weeks, I recommended [place] Wound Matrix as adjunctive therapy to help speed healing of this ulcer. As per previous documentation, peripheral pulse exam is acceptable, and/or TODD is at least 0.65. There is no evidence of untreated infection today. Ms. Jacquie Dc does not currently smoke. After cleansing the right ankle ulcer with saline, spraying the ulcer with HClO and applying Skin-Prep to the mauro-wound skin (a bit of clobetasol and tacrolimus to a couple of small wound edges), a 21 sqcm piece of Oasis Wound Matrix was cut to fit the ulcer, doubled up for increased exposure, placed into the ulcer bed, affixed to the surrounding skin with Steri-Strips, moistened with saline and covered with Mepilex Transfer Ag. Twenty-one sqcm of the product was used in the ulcer, and 0 sqcm of the product was discarded. If applicable, any wastage is due to the fact the smallest available product was larger than what was needed for her ulcer(s). The patient tolerated the procedure well, without complications. Discharge plan:     Treatment in the wound care center today, per RN documentation: Wound 07/15/22 Wound #4 Left Inner Ankle, Venous, Partial (onset 6/2022)-Dressing/Treatment: Other (comment) (Triad,Gauze Bolster  Aquaphor to dry skin  Compri 2 Lite BUT REPLACE 1ST LAYER WITH SPECIALIST CAST PADDING)  Wound 07/15/22 # 3 Rt Medial lower Leg, Venous, Partial Thickness, Onset 06/22-Dressing/Treatment: Other (comment) (ABD  Aquaphor to dry skin  Compri 2 Lite BUT REPLACE 1ST LAYER WITH SPECIALIST CAST PADDING    Lotrisone to right medial foot).     Per physician order, bilateral application of multilayer compression wrap was performed in the wound care center today, to help manage edema, stasis dermatitis, and/or venous ulcers. Leave primary dressing and multi-layer wrap(s) in place until the next appointment. Also discussed ways to keep the wrap dry for a shower, including a plastic cast-guard, available in retail pharmacies. She should call before her next visit if there is any significant pain, significant strike-through of drainage to the surface of the wrap, or any significant sense of the wrap sliding down more than an inch or so, bunching-up and abrading her skin. I reminded the patient of the importance of weight management and smoking cessation, if applicable; also encouraged ambulation as tolerated, additional lower extremity exercises as instructed in our education sheet, leg elevation when at rest, and compliance with any recommended dietary, diuretic and compression therapies. No change in sulfa dose. No additional prednisone. Topical Rx of tinea as mentioned above. Labs today (CBC, CMP). Continue current pain mgmt (Tylenol PRN for mild, tramadol PRN for moderate, and before coming here for treatment, and she has Norco PRN for more severe pain, but has rarely used it). Written discharge instructions given to patient. Follow up in 1 week.     Electronically signed by Shelli Figueroa MD on 11/2/2022 at 2:57 PM.

## 2022-11-04 ENCOUNTER — HOSPITAL ENCOUNTER (OUTPATIENT)
Dept: WOUND CARE | Age: 87
Discharge: HOME | End: 2022-11-04
Payer: MEDICARE

## 2022-11-04 VITALS
DIASTOLIC BLOOD PRESSURE: 75 MMHG | RESPIRATION RATE: 18 BRPM | TEMPERATURE: 96.9 F | BODY MASS INDEX: 25.78 KG/M2 | HEART RATE: 60 BPM | WEIGHT: 151 LBS | SYSTOLIC BLOOD PRESSURE: 121 MMHG | HEIGHT: 64 IN

## 2022-11-04 DIAGNOSIS — L97.312 ULCER OF RIGHT ANKLE, WITH FAT LAYER EXPOSED (HCC): ICD-10-CM

## 2022-11-04 DIAGNOSIS — L97.322 ULCER OF LEFT ANKLE, WITH FAT LAYER EXPOSED (HCC): Primary | ICD-10-CM

## 2022-11-04 DIAGNOSIS — R60.0 BILATERAL LOWER EXTREMITY EDEMA: ICD-10-CM

## 2022-11-04 PROCEDURE — 97597 DBRDMT OPN WND 1ST 20 CM/<: CPT

## 2022-11-04 PROCEDURE — 29581 APPL MULTLAYER CMPRN SYS LEG: CPT

## 2022-11-04 PROCEDURE — C5271 LOW COST SKIN SUBSTITUTE APP: HCPCS

## 2022-11-04 RX ORDER — TRAMADOL HYDROCHLORIDE 50 MG/1
50 TABLET ORAL EVERY 6 HOURS PRN
COMMUNITY

## 2022-11-04 RX ORDER — LIDOCAINE 40 MG/G
CREAM TOPICAL ONCE
Status: CANCELLED | OUTPATIENT
Start: 2022-11-04 | End: 2022-11-04

## 2022-11-04 RX ORDER — LIDOCAINE 40 MG/G
CREAM TOPICAL ONCE
Status: DISCONTINUED | OUTPATIENT
Start: 2022-11-04 | End: 2022-11-05 | Stop reason: HOSPADM

## 2022-11-04 RX ORDER — LIDOCAINE HYDROCHLORIDE 40 MG/ML
SOLUTION TOPICAL ONCE
Status: CANCELLED | OUTPATIENT
Start: 2022-11-04 | End: 2022-11-04

## 2022-11-04 RX ORDER — LIDOCAINE 50 MG/G
OINTMENT TOPICAL ONCE
Status: CANCELLED | OUTPATIENT
Start: 2022-11-04 | End: 2022-11-04

## 2022-11-04 RX ORDER — BACITRACIN ZINC AND POLYMYXIN B SULFATE 500; 1000 [USP'U]/G; [USP'U]/G
OINTMENT TOPICAL ONCE
Status: CANCELLED | OUTPATIENT
Start: 2022-11-04 | End: 2022-11-04

## 2022-11-04 RX ORDER — CLOTRIMAZOLE AND BETAMETHASONE DIPROPIONATE 10; .64 MG/G; MG/G
CREAM TOPICAL
Qty: 45 G | Refills: 0 | Status: SHIPPED | OUTPATIENT
Start: 2022-11-04

## 2022-11-04 NOTE — PLAN OF CARE
Patient seen in [address] [facility] today for follow up. Daughter at bedside. Patient states pain is improved from last week. She continues to take Tramadol as needed for pain control. Itching has also improved. Lotrisone sent to patient pharmacy. Jet Ox today per Dr. Robyn Sanchez. Patient tolerated well. [place] #5 placed. Compression garment on left leg today. F/u in [address] in 1 week as ordered, pt. Aware to call sooner with any changes or questions/concerns. Discharge instructions reviewed with patient, all questions answered, copy given to patient. Dressings were applied to all wounds per M.D. Instructions at this visit.

## 2022-11-07 NOTE — DISCHARGE INSTRUCTIONS
[address] [facility] Physician Orders and Discharge [address] [facility]  [address], [address]  [place], [facility]  Telephone: [phone]      Fax: [phone]        Your home care company:   N/A     Your wound-care supplies will be provided by:  weekly wraps     NAME:  Lennox Booth   YOB: 1933  PRIMARY DIAGNOSIS FOR WOUND CARE CENTER:  Pyoderma gangrenosum     Wound cleansing:  Do not scrub or use excessive force. Wash hands with soap and water before and after dressing changes. Prior to applying a clean dressing, cleanse wound with normal saline, wound cleanser, or mild soap and water. Ask your physician or nurse before getting the wound(s) wet in the shower. Wound care for home:     Left Lower Leg/ankle:   Vashe  Triad/PSO  Small Mepilex Border  Lotrisone to rashy areas  Apply sock and compression garment - you may leave this on all week if desired. Right Lower Leg:  Vashe, Triad, Tacrolimus, OASIS (#6) and Mepilex Transfer AG -all applied today per MD  Lotrisone to rashy areas  ABD  Aquaphor to dry skin  CoFlex Calamine - please do not apply too much pressure to second layer        KEEP THESE WRAPS CLEAN, DRY AND IN PLACE FOR THE WEEK           Please note, all wounds (unless stated otherwise here) were mechanically debrided at the time of cleansing here in the wound-care center today, so a small amount of pain, drainage or bleeding from that process might be expected, and is normal.     All products for home use, including multiple products for a single wound if applicable, are medically necessary in order to achieve the best chance at timely wound healing. See provider documentation for details if needed. Substituted dressings applied in the [facility] today, if applicable:      N/a     New orders for this week (labs, imaging, medications, etc.):   You may walk as much as you can tolerate.   Continue elevating legs   You may take Benadryl at night only for itching - please be aware this may cause dry mouth and some sleepiness/dizziness         Additional instructions for specific diagnoses:     10/5/2022 Prednisone decreased to 1/2 tablet every other day   [place] #3 - 10/21/22  [place] #4 - 10/28/22  [place] #5 - 11/4/22  [place] #6 - 11/11/22      F/U Appointment WR 1 week, on                                   at                       .     Your nurse  is Yousuf Louis RN     If we applied slip-resistant hospital socks today, be sure to remove them at least once a day to inspect your toes or feet, even if you're not changing the wraps or dressings underneath. If you see anything concerning (redness, excess moisture, etc), please call and let us know right away. Should you experience any significant changes in your wound(s) (including redness, increased warmth, increased pain, increased drainage, odor, or fever) or have questions about your wound care, please contact the [employer] at [phone] Monday-Thursday from 8:00 am - 4:30 pm, or Friday from 8:00 am - 2:30 pm.  If you need help with your wound outside these hours and cannot wait until we are again available, contact your home-care company (if applicable), your PCP, or go to the nearest emergency room.

## 2022-11-11 ENCOUNTER — HOSPITAL ENCOUNTER (OUTPATIENT)
Dept: WOUND CARE | Age: 87
Discharge: HOME | End: 2022-11-11
Payer: MEDICARE

## 2022-11-11 ENCOUNTER — HOSPITAL ENCOUNTER (OUTPATIENT)
Age: 87
Discharge: HOME | End: 2022-11-11
Payer: MEDICARE

## 2022-11-11 VITALS
HEIGHT: 64 IN | HEART RATE: 99 BPM | RESPIRATION RATE: 18 BRPM | TEMPERATURE: 98 F | SYSTOLIC BLOOD PRESSURE: 139 MMHG | WEIGHT: 151.2 LBS | BODY MASS INDEX: 25.81 KG/M2 | DIASTOLIC BLOOD PRESSURE: 92 MMHG

## 2022-11-11 DIAGNOSIS — L97.322 ULCER OF LEFT ANKLE, WITH FAT LAYER EXPOSED (HCC): Primary | ICD-10-CM

## 2022-11-11 DIAGNOSIS — R60.0 BILATERAL LOWER EXTREMITY EDEMA: ICD-10-CM

## 2022-11-11 DIAGNOSIS — L97.312 ULCER OF RIGHT ANKLE, WITH FAT LAYER EXPOSED (HCC): ICD-10-CM

## 2022-11-11 LAB
A/G RATIO: 2.6 (ref 1.1–2.2)
ALBUMIN SERPL-MCNC: 4.4 G/DL (ref 3.4–5)
ALP BLD-CCNC: 49 U/L (ref 40–129)
ALT SERPL-CCNC: 13 U/L (ref 10–40)
ANION GAP SERPL CALCULATED.3IONS-SCNC: 13 MMOL/L (ref 3–16)
AST SERPL-CCNC: 21 U/L (ref 15–37)
BASOPHILS ABSOLUTE: 0.1 K/UL (ref 0–0.2)
BASOPHILS RELATIVE PERCENT: 1.3 %
BILIRUB SERPL-MCNC: 0.3 MG/DL (ref 0–1)
BUN BLDV-MCNC: 24 MG/DL (ref 7–20)
CALCIUM SERPL-MCNC: 10.3 MG/DL (ref 8.3–10.6)
CHLORIDE BLD-SCNC: 104 MMOL/L (ref 99–110)
CO2: 23 MMOL/L (ref 21–32)
CREAT SERPL-MCNC: 0.7 MG/DL (ref 0.6–1.2)
EOSINOPHILS ABSOLUTE: 0.1 K/UL (ref 0–0.6)
EOSINOPHILS RELATIVE PERCENT: 2.7 %
GFR SERPL CREATININE-BSD FRML MDRD: >60 ML/MIN/{1.73_M2}
GLUCOSE BLD-MCNC: 96 MG/DL (ref 70–99)
HCT VFR BLD CALC: 35.9 % (ref 36–48)
HEMOGLOBIN: 11.8 G/DL (ref 12–16)
LYMPHOCYTES ABSOLUTE: 1.5 K/UL (ref 1–5.1)
LYMPHOCYTES RELATIVE PERCENT: 29.6 %
MCH RBC QN AUTO: 33.6 PG (ref 26–34)
MCHC RBC AUTO-ENTMCNC: 32.9 G/DL (ref 31–36)
MCV RBC AUTO: 102 FL (ref 80–100)
MONOCYTES ABSOLUTE: 0.7 K/UL (ref 0–1.3)
MONOCYTES RELATIVE PERCENT: 14.4 %
NEUTROPHILS ABSOLUTE: 2.7 K/UL (ref 1.7–7.7)
NEUTROPHILS RELATIVE PERCENT: 52 %
PDW BLD-RTO: 14.6 % (ref 12.4–15.4)
PLATELET # BLD: 193 K/UL (ref 135–450)
PMV BLD AUTO: 7.3 FL (ref 5–10.5)
POTASSIUM SERPL-SCNC: 4 MMOL/L (ref 3.5–5.1)
PREALBUMIN: 26.2 MG/DL (ref 20–40)
RBC # BLD: 3.52 M/UL (ref 4–5.2)
SODIUM BLD-SCNC: 140 MMOL/L (ref 136–145)
TOTAL PROTEIN: 6.1 G/DL (ref 6.4–8.2)
WBC # BLD: 5.2 K/UL (ref 4–11)

## 2022-11-11 PROCEDURE — 29581 APPL MULTLAYER CMPRN SYS LEG: CPT

## 2022-11-11 PROCEDURE — 80053 COMPREHEN METABOLIC PANEL: CPT

## 2022-11-11 PROCEDURE — 97597 DBRDMT OPN WND 1ST 20 CM/<: CPT

## 2022-11-11 PROCEDURE — C5271 LOW COST SKIN SUBSTITUTE APP: HCPCS

## 2022-11-11 PROCEDURE — 84134 ASSAY OF PREALBUMIN: CPT

## 2022-11-11 PROCEDURE — 85025 COMPLETE CBC W/AUTO DIFF WBC: CPT

## 2022-11-11 PROCEDURE — 36415 COLL VENOUS BLD VENIPUNCTURE: CPT

## 2022-11-11 RX ORDER — BACITRACIN ZINC AND POLYMYXIN B SULFATE 500; 1000 [USP'U]/G; [USP'U]/G
OINTMENT TOPICAL ONCE
Status: CANCELLED | OUTPATIENT
Start: 2022-11-11 | End: 2022-11-11

## 2022-11-11 RX ORDER — LIDOCAINE 50 MG/G
OINTMENT TOPICAL ONCE
Status: CANCELLED | OUTPATIENT
Start: 2022-11-11 | End: 2022-11-11

## 2022-11-11 RX ORDER — LIDOCAINE 40 MG/G
CREAM TOPICAL ONCE
Status: CANCELLED | OUTPATIENT
Start: 2022-11-11 | End: 2022-11-11

## 2022-11-11 RX ORDER — LIDOCAINE HYDROCHLORIDE 40 MG/ML
SOLUTION TOPICAL ONCE
Status: CANCELLED | OUTPATIENT
Start: 2022-11-11 | End: 2022-11-11

## 2022-11-11 RX ORDER — LIDOCAINE 40 MG/G
CREAM TOPICAL ONCE
Status: DISCONTINUED | OUTPATIENT
Start: 2022-11-11 | End: 2022-11-12 | Stop reason: HOSPADM

## 2022-11-11 NOTE — PLAN OF CARE
Patient seen in [address] [place] [address],[address]. Patient states pain after [address] [place] [address] Floor visit last week was much improved. Rash on her leg is also improved, despite the fact patient did not  Lotrisone from pharmacy. She did  place her compression garment this week. Some increased edema noted but did well overall. Jet Ox completed in office today. Patient tolerated well. Labs obtained today. [place] #6 placed. Patient will return to [address] [place] [address] Floor in one week for wound reassessment. Discharge instructions reviewed with patient, all questions answered, copy given to patient. Dressings were applied to all wounds per M.D. Instructions at this visit.

## 2022-11-16 ENCOUNTER — HOSPITAL ENCOUNTER (OUTPATIENT)
Dept: WOUND CARE | Age: 87
Discharge: HOME | End: 2022-11-16
Payer: MEDICARE

## 2022-11-16 VITALS
TEMPERATURE: 97.6 F | DIASTOLIC BLOOD PRESSURE: 81 MMHG | HEIGHT: 64 IN | RESPIRATION RATE: 18 BRPM | WEIGHT: 152.2 LBS | HEART RATE: 72 BPM | BODY MASS INDEX: 25.99 KG/M2 | SYSTOLIC BLOOD PRESSURE: 154 MMHG

## 2022-11-16 PROCEDURE — 29581 APPL MULTLAYER CMPRN SYS LEG: CPT

## 2022-11-16 NOTE — DISCHARGE INSTRUCTIONS
[address] [facility] Physician Orders and Discharge [address]  [facility], [address]  [place], [facility]  Telephone: [phone]      Fax: [phone]        Your home care company:   N/A     Your wound-care supplies will be provided by:  weekly wraps     NAME:  Lexa Sawyer   YOB: 1933  PRIMARY DIAGNOSIS FOR WOUND CARE CENTER:  Pyoderma gangrenosum     Wound cleansing:  Do not scrub or use excessive force. Wash hands with soap and water before and after dressing changes. Prior to applying a clean dressing, cleanse wound with normal saline, wound cleanser, or mild soap and water. Ask your physician or nurse before getting the wound(s) wet in the shower. Wound care for home:     Left Lower Leg/ankle:   Vashe  Triad/PSO  Small Mepilex Border  Lotrisone to rashy areas  Apply sock and compression garment - you may leave this on all week if desired. Right Lower Leg:  Vashe, Triad, Tacrolimus, OASIS (#6) and Mepilex Transfer AG -all applied today per MD  Lotrisone to rashy areas  ABD  Aquaphor to dry skin  CoFlex Calamine - please do not apply too much pressure to second layer        KEEP THESE WRAPS CLEAN, DRY AND IN PLACE FOR THE WEEK           Please note, all wounds (unless stated otherwise here) were mechanically debrided at the time of cleansing here in the wound-care center today, so a small amount of pain, drainage or bleeding from that process might be expected, and is normal.     All products for home use, including multiple products for a single wound if applicable, are medically necessary in order to achieve the best chance at timely wound healing. See provider documentation for details if needed. Substituted dressings applied in the [facility] today, if applicable:      N/a     New orders for this week (labs, imaging, medications, etc.):   You may walk as much as you can tolerate.   Continue elevating legs   You may take Benadryl at night only for itching - please be aware this may cause dry mouth and some sleepiness/dizziness         Additional instructions for specific diagnoses:     10/5/2022 Prednisone decreased to 1/2 tablet every other day   [place] #3 - 10/21/22  [place] #4 - 10/28/22  [place] #5 - 11/4/22  [place] #6 - 11/11/22      F/U Appointment WR 1 week, on                                   at                       .     Your nurse  is Erica Koehler RN     If we applied slip-resistant hospital socks today, be sure to remove them at least once a day to inspect your toes or feet, even if you're not changing the wraps or dressings underneath. If you see anything concerning (redness, excess moisture, etc), please call and let us know right away. Should you experience any significant changes in your wound(s) (including redness, increased warmth, increased pain, increased drainage, odor, or fever) or have questions about your wound care, please contact the [employer] at [phone] Monday-Thursday from 8:00 am - 4:30 pm, or Friday from 8:00 am - 2:30 pm.  If you need help with your wound outside these hours and cannot wait until we are again available, contact your home-care company (if applicable), your PCP, or go to the nearest emergency room. no

## 2022-11-16 NOTE — PLAN OF CARE
Right medial wound continues to improve in size. Compression in place on RLE, patient does not tolerate much tight compression well.

## 2022-11-16 NOTE — DISCHARGE INSTRUCTIONS
[address] [facility] Physician Orders and Discharge [address], [address]  [place], [facility]  Telephone: [phone]      Fax: [phone]        Your home care company:   N/A     Your wound-care supplies will be provided by:  weekly wraps     NAME:  Sim Gaytan   YOB: 1933  PRIMARY DIAGNOSIS FOR WOUND CARE CENTER:  Pyoderma gangrenosum     Wound cleansing:  Do not scrub or use excessive force. Wash hands with soap and water before and after dressing changes. Prior to applying a clean dressing, cleanse wound with normal saline, wound cleanser, or mild soap and water. Ask your physician or nurse before getting the wound(s) wet in the shower. Wound care for home:     Left Lower Leg/ankle:   Vashe  Hydrogel, collagen  Benzion (mauro)  Small Mepilex Border- applied per Dr. Franklyn Villatoro to rashy areas  Apply sock and compression garment - you may leave this on all week if desired. Right Lower Leg:  Vashe, Tacrolimus, OASIS (#7) and Mepilex Transfer AG -all applied today per MD Marks (mauro)  ABD  Aquaphor to dry skin  CoFlex Calamine - please do not apply too much pressure to second layer        KEEP THESE WRAPS CLEAN, DRY AND IN PLACE FOR THE WEEK           Please note, all wounds (unless stated otherwise here) were mechanically debrided at the time of cleansing here in the wound-care center today, so a small amount of pain, drainage or bleeding from that process might be expected, and is normal.     All products for home use, including multiple products for a single wound if applicable, are medically necessary in order to achieve the best chance at timely wound healing. See provider documentation for details if needed. Substituted dressings applied in the [address] today, if applicable:      N/a     New orders for this week (labs, imaging, medications, etc.):     You may walk as much as you can tolerate.   Continue elevating legs   You may take Benadryl at night only for itching - please be aware this may cause dry mouth and some sleepiness/dizziness      Additional instructions for specific diagnoses:     10/5/2022 Prednisone decreased to 1/2 tablet every other day   [place] #3 - 10/21/22  [place] #4 - 10/28/22  [place] #5 - 11/4/22  [place] #6 - 11/11/22   [place] #7 -11-22-22      F/U Appointment  with Dr. Ruth Howard in 1 week                                               at                                      .     Your nurse  is Taty Cornelius RN     If we applied slip-resistant hospital socks today, be sure to remove them at least once a day to inspect your toes or feet, even if you're not changing the wraps or dressings underneath. If you see anything concerning (redness, excess moisture, etc), please call and let us know right away. Should you experience any significant changes in your wound(s) (including redness, increased warmth, increased pain, increased drainage, odor, or fever) or have questions about your wound care, please contact the [facility] at [phone] Monday-Thursday from 8:00 am - 4:30 pm, or Friday from 8:00 am - 2:30 pm.  If you need help with your wound outside these hours and cannot wait until we are again available, contact your home-care company (if applicable), your PCP, or go to the nearest emergency room.

## 2022-11-22 ENCOUNTER — HOSPITAL ENCOUNTER (OUTPATIENT)
Dept: WOUND CARE | Age: 87
Discharge: HOME | End: 2022-11-22
Payer: MEDICARE

## 2022-11-22 VITALS
HEIGHT: 64 IN | BODY MASS INDEX: 25.78 KG/M2 | TEMPERATURE: 97.6 F | DIASTOLIC BLOOD PRESSURE: 61 MMHG | SYSTOLIC BLOOD PRESSURE: 103 MMHG | WEIGHT: 151 LBS | RESPIRATION RATE: 18 BRPM | HEART RATE: 59 BPM

## 2022-11-22 DIAGNOSIS — L97.322 ULCER OF LEFT ANKLE, WITH FAT LAYER EXPOSED (HCC): Primary | ICD-10-CM

## 2022-11-22 DIAGNOSIS — R60.0 BILATERAL LOWER EXTREMITY EDEMA: ICD-10-CM

## 2022-11-22 DIAGNOSIS — L97.312 ULCER OF RIGHT ANKLE, WITH FAT LAYER EXPOSED (HCC): ICD-10-CM

## 2022-11-22 PROCEDURE — C5271 LOW COST SKIN SUBSTITUTE APP: HCPCS

## 2022-11-22 PROCEDURE — 97597 DBRDMT OPN WND 1ST 20 CM/<: CPT

## 2022-11-22 PROCEDURE — 29581 APPL MULTLAYER CMPRN SYS LEG: CPT

## 2022-11-22 RX ORDER — BACITRACIN ZINC AND POLYMYXIN B SULFATE 500; 1000 [USP'U]/G; [USP'U]/G
OINTMENT TOPICAL ONCE
OUTPATIENT
Start: 2022-11-22 | End: 2022-11-22

## 2022-11-22 RX ORDER — LIDOCAINE 40 MG/G
CREAM TOPICAL ONCE
Status: CANCELLED | OUTPATIENT
Start: 2022-11-22 | End: 2022-11-22

## 2022-11-22 RX ORDER — LIDOCAINE 50 MG/G
OINTMENT TOPICAL ONCE
OUTPATIENT
Start: 2022-11-22 | End: 2022-11-22

## 2022-11-22 RX ORDER — LIDOCAINE 40 MG/G
CREAM TOPICAL ONCE
Status: DISCONTINUED | OUTPATIENT
Start: 2022-11-22 | End: 2022-11-23 | Stop reason: HOSPADM

## 2022-11-22 RX ORDER — LIDOCAINE HYDROCHLORIDE 40 MG/ML
SOLUTION TOPICAL ONCE
OUTPATIENT
Start: 2022-11-22 | End: 2022-11-22

## 2022-11-22 NOTE — PLAN OF CARE
Pt seen in [address] [place] [address],[address] - Left ankle wound close to healed - and improvement to Right medial ankle  wound - debride  with jet ox  and treatment as follows   Left Lower Leg/ankle:   Vashe  Hydrogel, collagen  Benzion (mauro)  Small Mepilex Border- applied per Dr. Frank Maurer to rashy areas  Apply sock and compression garment - you may leave this on all week if desired. Right Lower Leg:  Vashe, Tacrolimus, OASIS (#7) and Mepilex Transfer AG -all applied today per MD Marks (mauro)  ABD  Aquaphor to dry skin  CoFlex Calamine - please do not apply too much pressure to second layer        KEEP THESE WRAPS CLEAN, DRY AND IN PLACE FOR THE WEEK      Additional specific goal for this week: Patient will receive safe and proper application of [place] Wound Matrix. Patient will comply with caring for wound and reporting any concerns.  guidelines for application followed. Expiration date of Oasis checked immediately prior to use. Package intact prior to use, with no damage noted. Product storage at ambient temperature, per  guideline. Product lot #: [id number]  Expiration date: 12-   Saline lot #: [id number]  Expiration date:   Patient/family/caregiver instructed to keep dressing clean, dry and intact. Patient/family/caregiver instructed on signs and symptoms to report, such as draining through dressing, dressing slipping or falling down, becoming wet, or patient having severe pain, severe itching, severe malodor, fever, etc.     Date of first application of a cellular / tissue product for this current wound is October 7, 2022. Today was application # 7 for this wound.     Electronically signed by Oswaldo Gracia RN on 11/22/2022 at 6:27 PM.

## 2022-11-29 NOTE — DISCHARGE INSTRUCTIONS
[address] [facility] Physician Orders and Discharge [address] [facility]  [address], [address]  [place], [facility]  Telephone: [phone]      Fax: [phone]        Your home care company:   N/A     Your wound-care supplies will be provided by:  weekly wraps     NAME:  Khurram Benito   YOB: 1933  PRIMARY DIAGNOSIS FOR WOUND CARE CENTER:  Pyoderma gangrenosum     Wound cleansing:  Do not scrub or use excessive force. Wash hands with soap and water before and after dressing changes. Prior to applying a clean dressing, cleanse wound with normal saline, wound cleanser, or mild soap and water. Ask your physician or nurse before getting the wound(s) wet in the shower. Wound care for home:     Left Lower Leg/ankle:   Vashe  Calmoseptne  collagen  Benzion (mauro)  Small Mepilex Border  Lotrisone to rashy areas  Apply sock and compression garment - you may leave this on all week if desired. Right Lower Leg:  Vashe, Tacrolimus, Calmoseptine,OASIS (#8) and Mepilex Transfer AG -all applied today per MD  ABD  Aquaphor to dry skin  CoFlex Calamine - please do not apply too much pressure to second layer        KEEP THESE WRAPS CLEAN, DRY AND IN PLACE FOR THE WEEK           Please note, all wounds (unless stated otherwise here) were mechanically debrided at the time of cleansing here in the wound-care center today, so a small amount of pain, drainage or bleeding from that process might be expected, and is normal.     All products for home use, including multiple products for a single wound if applicable, are medically necessary in order to achieve the best chance at timely wound healing. See provider documentation for details if needed. Substituted dressings applied in the [facility] today, if applicable:      N/a     New orders for this week (labs, imaging, medications, etc.):     You may walk as much as you can tolerate.   Continue elevating legs   You may take Benadryl at night only for itching - please be aware this may cause dry mouth and some sleepiness/dizziness      Additional instructions for specific diagnoses:     10/5/2022 Prednisone decreased to 1/2 tablet every other day   [place] #3 - 10/21/22  [place] #4 - 10/28/22  [place] #5 - 11/4/22  [place] #6 - 11/11/22   [place] #7 -11-22-22  [place] #8 12/1/22         F/U Appointment  with Dr. Miladis Calles in 1 week                                               at                                      .     Your nurse  is Cuong Bass RN     If we applied slip-resistant hospital socks today, be sure to remove them at least once a day to inspect your toes or feet, even if you're not changing the wraps or dressings underneath. If you see anything concerning (redness, excess moisture, etc), please call and let us know right away. Should you experience any significant changes in your wound(s) (including redness, increased warmth, increased pain, increased drainage, odor, or fever) or have questions about your wound care, please contact the [employer] [address] at [phone] Monday-Thursday from 8:00 am - 4:30 pm, or Friday from 8:00 am - 2:30 pm.  If you need help with your wound outside these hours and cannot wait until we are again available, contact your home-care company (if applicable), your PCP, or go to the nearest emergency room.

## 2022-12-01 ENCOUNTER — HOSPITAL ENCOUNTER (OUTPATIENT)
Dept: WOUND CARE | Age: 87
Discharge: HOME | End: 2022-12-01
Payer: MEDICARE

## 2022-12-01 VITALS
HEART RATE: 67 BPM | HEIGHT: 64 IN | WEIGHT: 152.2 LBS | SYSTOLIC BLOOD PRESSURE: 147 MMHG | TEMPERATURE: 97.7 F | RESPIRATION RATE: 20 BRPM | DIASTOLIC BLOOD PRESSURE: 85 MMHG | BODY MASS INDEX: 25.99 KG/M2

## 2022-12-01 DIAGNOSIS — R60.0 BILATERAL LOWER EXTREMITY EDEMA: ICD-10-CM

## 2022-12-01 DIAGNOSIS — L97.312 ULCER OF RIGHT ANKLE, WITH FAT LAYER EXPOSED (HCC): ICD-10-CM

## 2022-12-01 DIAGNOSIS — L97.322 ULCER OF LEFT ANKLE, WITH FAT LAYER EXPOSED (HCC): Primary | ICD-10-CM

## 2022-12-01 PROCEDURE — C5271 LOW COST SKIN SUBSTITUTE APP: HCPCS

## 2022-12-01 PROCEDURE — 97597 DBRDMT OPN WND 1ST 20 CM/<: CPT

## 2022-12-01 PROCEDURE — 29581 APPL MULTLAYER CMPRN SYS LEG: CPT

## 2022-12-01 RX ORDER — LIDOCAINE 40 MG/G
CREAM TOPICAL ONCE
Status: DISCONTINUED | OUTPATIENT
Start: 2022-12-01 | End: 2022-12-02 | Stop reason: HOSPADM

## 2022-12-01 RX ORDER — LIDOCAINE 40 MG/G
CREAM TOPICAL ONCE
OUTPATIENT
Start: 2022-12-01 | End: 2022-12-01

## 2022-12-01 RX ORDER — LIDOCAINE 50 MG/G
OINTMENT TOPICAL ONCE
OUTPATIENT
Start: 2022-12-01 | End: 2022-12-01

## 2022-12-01 RX ORDER — LIDOCAINE HYDROCHLORIDE 40 MG/ML
SOLUTION TOPICAL ONCE
OUTPATIENT
Start: 2022-12-01 | End: 2022-12-01

## 2022-12-01 RX ORDER — BACITRACIN ZINC AND POLYMYXIN B SULFATE 500; 1000 [USP'U]/G; [USP'U]/G
OINTMENT TOPICAL ONCE
OUTPATIENT
Start: 2022-12-01 | End: 2022-12-01

## 2022-12-01 NOTE — PROGRESS NOTES
[address] [facility] Progress Note    Dufm Gosselin     : 2/3/1933    DATE OF VISIT:  2022    Subjective: Dufm Gosselin is a 80 y.o. female who has a venous and  pyoderma ulcer located on the bilateral ankle. Significant symptoms or pertinent wound history since last visit: feeling pretty well, not much pain, mild pruritus. No fever, no falls, eating better than weeks ago (I think with her pain being less). She has one of her Velcro compression garments now, trying that on the left side. Additional ulcer(s) noted? no.      Her current medication list consists of Denosumab, Tocilizumab, acetaminophen, apixaban, clobetasol, clotrimazole-betamethasone, furosemide, losartan, metoprolol succinate, sulfaSALAzine, tacrolimus, and traMADol. Allergies: Dapsone, Erythromycin, Hydrochlorothiazide, Minocycline, Silver sulfadiazine, and Doxycycline    Objective:     Vitals:    22 1004   BP: (!) 139/92   Pulse: 99   Resp: 18   Temp: 98 °F (36.7 °C)   TempSrc: Oral   Weight: 151 lb 3.2 oz (68.6 kg)   Height: 5' 4\" (1.626 m)     Constitutional:  well-developed, well-nourished, fatigued, NAD  Psychiatric:  oriented to person, place and time; mood and affect appropriate for the situation  Cardiovascular:  bilateral pedal pulses palpable, feet warm, good cap refill; mild BL lower extremity swelling this week, usual hemosiderin and atrophe miroslava  Lymphatic:  no inguinal or popliteal adenopathy, no angitis, no cellulitis.    Neuro: less allodynia in the right wound bed than before  Musculoskeletal:  no clubbing, cyanosis or petechiae; RLE and LLE with no gross effusions, joint misalignment or acute arthritis  Gay-ulcer skin: indurated and pink, BL; minimal contact and presumed fungal dermatitis this week, almost gone  Ulcer(s): left side more granular, less SQ fibrotic, no longer inflamed, quite small, a bit of fibrin and biofilm, and modest serous exudate; right side with resolution of purple pyoderma edges, peripherally more granular and epithelializing, centrally still a bit of SQ fibrotic tissue and slough, but that's loosening, some fibrin and biofilm, serous exudate, no pus or other signs of infection. Photos also saved in electronic chart.     Today's wound measurements, per RN documentation:  Wound 07/15/22 # 3 Rt Medial lower Leg, Venous, Partial Thickness, Onset 06/22-Wound Length (cm): 7.5 cm  Wound 07/15/22 Wound #4 Left Inner Ankle, Venous, Partial (onset 6/2022)-Wound Length (cm): 0.3 cm    Wound 07/15/22 # 3 Rt Medial lower Leg, Venous, Partial Thickness, Onset 06/22-Wound Width (cm): 1.8 cm  Wound 07/15/22 Wound #4 Left Inner Ankle, Venous, Partial (onset 6/2022)-Wound Width (cm): 0.4 cm    Wound 07/15/22 # 3 Rt Medial lower Leg, Venous, Partial Thickness, Onset 06/22-Wound Depth (cm): 0.4 cm  Wound 07/15/22 Wound #4 Left Inner Ankle, Venous, Partial (onset 6/2022)-Wound Depth (cm): 0.2 cm    Assessment:     Patient Active Problem List   Diagnosis Code    HTN (hypertension) I10    Localized osteoarthrosis, lower leg M17.10    Eczema L30.9    Foot drop, right M21.371    Lumbosacral spondylosis without myelopathy M47.817    Normocytic anemia D64.9    Peripheral neuropathy G62.9    Senile osteoporosis M81.0    Bilateral lower extremity edema R60.0    CKD (chronic kidney disease) stage 3, GFR 30-59 ml/min ([facility]) N18.30    Ulcer of right ankle, with fat layer exposed ([address] L97.312    PMR (polymyalgia rheumatica) ([facility]) M35.3    Giant cell arteritis ([facility]) M31.6    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 Left side nearly healed, right side making some good progress -- center remains a bit deeper and fibrotic, but I don't want to debride sharply, for fear of more pathergy. Factors contributing to occurrence and/or persistence of the chronic ulcer include edema and venous stasis. Medical necessity of today's visit is shown by the above documentation. Sharp debridement is indicated today, based upon the exam findings in the wound(s) above. Because of the expectation of pathergy with sharp debridement, we decided to use the JetOx system today (with NSS and HClO), in lieu of sharp debridement. Procedure note:     Consent obtained. Time out performed per [facility]. Anesthetic  Anesthetic: 4% Lidocaine Cream     Using a JetOx device, I excisionally debrided the bilateral ankle ulcer(s) down through and including the removal of dermis. The type(s) of tissue debrided included fibrin, biofilm, and exudate. Total Surface Area Debrided: 12 sq cm. The ulcers were then irrigated with normal saline solution. The procedure was completed with a small amount of bleeding, and hemostasis was with pressure. The patient tolerated the procedure well, with no significant complications. The patient's level of pain during and after the procedure was monitored. Post-debridement measurements, if different from pre-debridement, are in the flowsheet as well.  ____________    Because this patient's right ankle venous ulcer has failed to respond to standard wound-care measures (including treatment of infection, debridement of necrosis, treatment of edema, provision of a moist wound environment, good compliance with compression) for more than 4 weeks, I recommended [place] Wound Matrix as adjunctive therapy to help speed healing of this ulcer.  As per previous documentation, peripheral pulse exam is acceptable, and/or TODD is at least 0.65. There is no evidence of untreated infection today. Ms. Shannon Carter does not currently smoke. After cleansing the right ankle ulcer with saline, spraying the ulcer with HClO and applying Skin-Prep to the mauro-wound skin (plus a bit of Selina, wound-edge tacrolimus), a 21 sqcm piece of [place] Wound Matrix was cut to fit the ulcer, placed into the ulcer bed, affixed to the surrounding skin with Steri-Strips, moistened with saline and covered with Mepilex Transfer Ag. Fifteen sqcm of the product was used in the ulcer, and 6 sqcm of the product was discarded. If applicable, any wastage is due to the fact the smallest available product was larger than what was needed for her ulcer(s). The patient tolerated the procedure well, without complications. Discharge plan:     Treatment in the wound care center today, per RN documentation: Wound 07/15/22 # 3 Rt Medial lower Leg, Venous, Partial Thickness, Onset 06/22-Dressing/Treatment: Other (comment) (Dressing pre Dr. Ignacio Gabriel)  Wound 07/15/22 Wound #4 Left Inner Ankle, Venous, Partial (onset 6/2022)-Dressing/Treatment: Other (comment) (PSO, triad, mepliex border; lortisone to rash). Per physician order, right application of multilayer compression wrap was performed in the wound care center today, to help manage edema, stasis dermatitis, and/or venous ulcers. Leave primary dressing and multi-layer wrap(s) in place until the next appointment. Also discussed ways to keep the wrap dry for a shower, including a plastic cast-guard, available in retail pharmacies. She should call before her next visit if there is any significant pain, significant strike-through of drainage to the surface of the wrap, or any significant sense of the wrap sliding down more than an inch or so, bunching-up and abrading her skin.      I reminded the patient of the importance of weight management and smoking cessation, if applicable; also encouraged ambulation as tolerated, additional lower extremity exercises as instructed in our education sheet, leg elevation when at rest, and compliance with any recommended dietary, diuretic and compression therapies. Continue sulfasalazine TID. Routine F/U labs today. CircAid to the left side now, in lieu of a weekly wrap. No change in pain mgmt. Keep pushing protein intake. Written discharge instructions given to patient. Follow up in 1 week for a nurse visit / Nnamdi Gonzalez, and in 2 weeks to see me (I'm out of town next week).      Electronically signed by Zhou Glez MD on 12/1/2022 at 9:15 AM.

## 2022-12-01 NOTE — PROGRESS NOTES
[facility] Progress Note    Demetrius Akhtar     : 2/3/1933    DATE OF VISIT:  2022    Subjective: Demetrius Akhtar is a 80 y.o. 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 Significant symptoms or pertinent wound history since last visit: feeling pretty well overall, some pain at times, not severe, less pruritus, no fever, moderate right sided drainage. Tolerating sulfa well. Additional ulcer(s) noted? no.      Her current medication list consists of Denosumab, Tocilizumab, acetaminophen, apixaban, clobetasol, clotrimazole-betamethasone, furosemide, losartan, metoprolol succinate, sulfaSALAzine, tacrolimus, and traMADol. Allergies: Dapsone, Erythromycin, Hydrochlorothiazide, Minocycline, Silver sulfadiazine, and Doxycycline    Objective:     Vitals:    22 0954   BP: 121/75   Pulse: 60   Resp: 18   Temp: 96.9 °F (36.1 °C)   TempSrc: Oral   Weight: 151 lb (68.5 kg)   Height: 5' 4\" (1.626 m)     Constitutional:  well-developed, well-nourished, fatigued, NAD  Psychiatric:  oriented to person, place and time; mood and affect appropriate for the situation  Cardiovascular:  bilateral pedal pulses palpable, feet warm, good cap refill; mild BL lower extremity swelling this week, usual hemosiderin and atrophe miroslava  Lymphatic:  no inguinal or popliteal adenopathy, no angitis, no cellulitis.    Neuro: less allodynia in the right wound bed than before, but not resolved  Musculoskeletal:  no clubbing, cyanosis or petechiae; RLE and LLE with no gross effusions, joint misalignment or acute arthritis  Gay-ulcer skin: indurated and pink to very light red, BL; minimal contact and presumed fungal dermatitis this week   Ulcer(s): left side part granular, still part SQ fibrotic, but no longer inflamed, quite small, a bit of fibrin and biofilm, and modest serous exudate; right side with resolution of purple pyoderma edges, peripherally more granular and epithelializing overall, centrally still a bit of SQ fibrotic tissue and slough, but that's loosening, some fibrin and biofilm, serous exudate, no pus or other signs of infection. Photos also saved in electronic chart.     Today's wound measurements, per RN documentation:  Wound 07/15/22 # 3 Rt Medial lower Leg, Venous, Partial Thickness, Onset 06/22-Wound Length (cm): 7.9 cm  Wound 07/15/22 Wound #4 Left Inner Ankle, Venous, Partial (onset 6/2022)-Wound Length (cm): 0.4 cm    Wound 07/15/22 # 3 Rt Medial lower Leg, Venous, Partial Thickness, Onset 06/22-Wound Width (cm): 1.7 cm  Wound 07/15/22 Wound #4 Left Inner Ankle, Venous, Partial (onset 6/2022)-Wound Width (cm): 0.4 cm    Wound 07/15/22 # 3 Rt Medial lower Leg, Venous, Partial Thickness, Onset 06/22-Wound Depth (cm): 0.4 cm  Wound 07/15/22 Wound #4 Left Inner Ankle, Venous, Partial (onset 6/2022)-Wound Depth (cm): 0.2 cm    Assessment:     Patient Active Problem List   Diagnosis Code    HTN (hypertension) I10    Localized osteoarthrosis, lower leg M17.10    Eczema L30.9    Foot drop, right M21.371    Lumbosacral spondylosis without myelopathy M47.817    Normocytic anemia D64.9    Peripheral neuropathy G62.9    Senile osteoporosis M81.0    Bilateral lower extremity edema R60.0    CKD (chronic kidney disease) stage 3, GFR 30-59 ml/min ([facility]) N18.30    Ulcer of right ankle, with fat layer exposed ([address] L97.312    PMR (polymyalgia rheumatica) ([facility]) M35.3    Giant cell arteritis ([facility]) M31.6    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    Ulcer of left ankle, with fat layer exposed ([address] L97.322    Probable pyoderma gangrenosum L88    Chronic venous hypertension (idiopathic) with ulcer and inflammation of right lower extremity ([facility]) I87.331    Chronic venous hypertension (idiopathic) with ulcer and inflammation of left lower extremity ([facility]) I87.332       Assessment of today's active condition(s): venous insufficiency, skin changes, leg edema, recurrent BL venous ankle ulcers, but this time evolved to pyoderma (due to her underlying GCA) especially on that right side; doing well overall with backing off on debridement, maintaining compression, adding sulfa (after brief steroids), and adding Tranquillity. Factors contributing to occurrence and/or persistence of the chronic ulcer include edema and venous stasis. Medical necessity of today's visit is shown by the above documentation. Sharp debridement is indicated today, based upon the exam findings in the wound(s) above. Because of the expectation of pathergy with sharp debridement, we decided to use the JetOx system today (with NSS and HClO), in lieu of sharp debridement. Procedure note:     Consent obtained. Time out performed per [facility]. Anesthetic  Anesthetic: 4% Lidocaine Cream     Using a JetOx device, I excisionally debrided the bilateral ankle ulcer(s) down through and including the removal of dermis. The type(s) of tissue debrided included fibrin, biofilm, and exudate. Total Surface Area Debrided: 12 sq cm. The ulcers were then irrigated with normal saline solution. The procedure was completed with a small amount of bleeding, and hemostasis was with pressure. The patient tolerated the procedure well, with no significant complications. The patient's level of pain during and after the procedure was monitored. Post-debridement measurements, if different from pre-debridement, are in the flowsheet as well.  _____________    Because this patient's right ankle venous ulcer has failed to respond to standard wound-care measures (including treatment of infection, debridement of necrosis, treatment of edema, provision of a moist wound environment, good compliance with compression) for more than 4 weeks, I recommended [place] Wound Matrix as adjunctive therapy to help speed healing of this ulcer. As per previous documentation, peripheral pulse exam is acceptable, and/or TDOD is at least 0.65.  There is no evidence of untreated infection today. Ms. Janeth Tobin does not currently smoke. After cleansing the right ankle ulcer with saline, spraying the ulcer with HClO and applying Skin-Prep to the mauro-wound skin, (plus a bit of Selina, wound-edge tacrolimus) an 11 sqcm piece of [place] Wound Matrix was cut to fit the ulcer, placed into the ulcer bed, affixed to the surrounding skin with Steri-Strips, moistened with saline and covered with Mepilex Ag Transfer. Eleven sqcm of the product was used in the ulcer, and 0 sqcm of the product was discarded. If applicable, any wastage is due to the fact the smallest available product was larger than what was needed for her ulcer(s). The patient tolerated the procedure well, without complications. Discharge plan:     Treatment in the wound care center today, per RN documentation: Wound 07/15/22 # 3 Rt Medial lower Leg, Venous, Partial Thickness, Onset 06/22-Dressing/Treatment:  (CTP applied per Dr. Jennifer Villagran- lotrisone, aquaphor, ABD, Calamine coflex)  Wound 07/15/22 Wound #4 Left Inner Ankle, Venous, Partial (onset 6/2022)-Dressing/Treatment:  (Triad/ PSO, mepilex border- lotrione). Per physician order, bilateral application of multilayer compression wrap was performed in the wound care center today, to help manage edema, stasis dermatitis, and/or venous ulcers. Leave primary dressing and multi-layer wrap(s) in place until the next appointment. Also discussed ways to keep the wrap dry for a shower, including a plastic cast-guard, available in retail pharmacies. She should call before her next visit if there is any significant pain, significant strike-through of drainage to the surface of the wrap, or any significant sense of the wrap sliding down more than an inch or so, bunching-up and abrading her skin.      I reminded the patient of the importance of weight management and smoking cessation, if applicable; also encouraged ambulation as tolerated, additional lower extremity exercises as instructed in our education sheet, leg elevation when at rest, and compliance with any recommended dietary, diuretic and compression therapies. Continue sulfasalazine 500 TID. Routine F/U labs next week. Keep up with protein intake, try to stay active. Written discharge instructions given to patient. Follow up in 1 week.     Electronically signed by Zhou Glez MD on 12/1/2022 at 9:08 AM.

## 2022-12-01 NOTE — PLAN OF CARE
Wounds stable. Wound debridement via jetox per Dr. Jaylene Aceves. [place] #8 placed to right medial ankle wound. Follow up in [address] [place] [address] in 1 week as ordered. Pt. Aware to call sooner with any problems or questions/concerns. MD orders/D/C instructions reviewed with patient, all questions answered; copy of instructions given to patient.

## 2022-12-05 NOTE — DISCHARGE INSTRUCTIONS
[address] [facility] Physician Orders and Discharge [address], [address]  [place], [facility]  Telephone: [phone]      Fax: [phone]        Your home care company:   N/A     Your wound-care supplies will be provided by:  weekly wraps     NAME:  Kehinde Locke   YOB: 1933  PRIMARY DIAGNOSIS FOR WOUND CARE CENTER:  Pyoderma gangrenosum     Wound cleansing:  Do not scrub or use excessive force. Wash hands with soap and water before and after dressing changes. Prior to applying a clean dressing, cleanse wound with normal saline, wound cleanser, or mild soap and water. Ask your physician or nurse before getting the wound(s) wet in the shower. Wound care for home:     Left Lower Leg/ankle: Placed by MD Grimaldo  [place]ptne  collagen  Benzion (mauro)  Small Mepilex Border  Lotrisone to rashy areas  Apply sock and compression garment - you may leave this on all week if desired. Right Lower Leg:  Vashe, Tacrolimus, Calmoseptine,OASIS (#8) and Mepilex Transfer AG -all applied today per MD  ABD  Aquaphor to dry skin  CoFlex Calamine - please do not apply too much pressure to second layer, especially at ankle         KEEP THESE WRAPS CLEAN, [facility]           Please note, all wounds (unless stated otherwise here) were mechanically debrided at the time of cleansing here in the wound-care center today, so a small amount of pain, drainage or bleeding from that process might be expected, and is normal.     All products for home use, including multiple products for a single wound if applicable, are medically necessary in order to achieve the best chance at timely wound healing. See provider documentation for details if needed.      Substituted dressings applied in the [facility] today, if applicable:      N/a     New orders for this week (labs, imaging, medications, etc.):     You may walk as much as you can tolerate. Continue elevating legs   You may take Benadryl at night only for itching - please be aware this may cause dry mouth and some sleepiness/dizziness      Additional instructions for specific diagnoses:     10/5/2022 Prednisone decreased to 1/2 tablet every other day   Relampago #3 - 10/21/22  Oasis #4 - 10/28/22  Relampago #5 - 11/4/22  Relampago #6 - 11/11/22   Oasis #7 -11-22-22  Relampago #8 12/1/22   Relampago #9  12/9/22        F/U Appointment  with Dr. Denver Tang in 1 week                                               at                                      .     Your nurse  is Harry Boo RN     If we applied slip-resistant hospital socks today, be sure to remove them at least once a day to inspect your toes or feet, even if you're not changing the wraps or dressings underneath. If you see anything concerning (redness, excess moisture, etc), please call and let us know right away. Should you experience any significant changes in your wound(s) (including redness, increased warmth, increased pain, increased drainage, odor, or fever) or have questions about your wound care, please contact the [address] at [phone] Monday-Thursday from 8:00 am - 4:30 pm, or Friday from 8:00 am - 2:30 pm.  If you need help with your wound outside these hours and cannot wait until we are again available, contact your home-care company (if applicable), your PCP, or go to the nearest emergency room. Render Post-Care Instructions In Note?: yes

## 2022-12-06 NOTE — PROGRESS NOTES
[facility] Progress Note    Adrianna Green     : 2/3/1933    DATE OF VISIT:  2022    Subjective: Adrianna Green is a 80 y.o. 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 Significant symptoms or pertinent wound history since last visit: feeling ok, no fever, generally mild pain, a bit more edema this week, no pruritus, tolerating sulfa very well. Additional ulcer(s) noted? no.      Her current medication list consists of Denosumab, Tocilizumab, acetaminophen, apixaban, clobetasol, clotrimazole-betamethasone, furosemide, losartan, metoprolol succinate, sulfaSALAzine, tacrolimus, and traMADol. Allergies: Dapsone, Erythromycin, Hydrochlorothiazide, Minocycline, Silver sulfadiazine, and Doxycycline    Objective:     Vitals:    22 1405   BP: (!) 147/85   Pulse: 67   Resp: 20   Temp: 97.7 °F (36.5 °C)   TempSrc: Oral   Weight: 152 lb 3.2 oz (69 kg)   Height: 5' 4\" (1.626 m)     Constitutional:  well-developed, well-nourished, less fatigued, NAD  Psychiatric:  oriented to person, place and time; mood and affect appropriate for the situation  Cardiovascular:  bilateral pedal pulses palpable, feet warm, good cap refill; mild BL lower extremity swelling this week (maybe moderate right foot, and a bit greater on the left leg than last time), usual hemosiderin and atrophe miroslava  Lymphatic:  no inguinal or popliteal adenopathy, no angitis, no cellulitis.    Neuro: less allodynia in the right wound bed than before  Musculoskeletal:  no clubbing, cyanosis or petechiae; RLE and LLE with no gross effusions, joint misalignment or acute arthritis  Gay-ulcer skin: indurated and pink, BL, just a bit of erythema at the left side, I think from drainage contact with skin  Ulcer(s): left side granular, no longer SQ fibrotic, no longer inflamed, quite small, a bit of fibrin and biofilm, and modest serous exudate, but not quite closing up the rest of the way; right side with resolution of purple pyoderma edges, peripherally more granular and epithelializing, one peripheral focus completely healed now, centrally still a bit of SQ fibrotic tissue and slough, but that's loosening, more granulation coming up within that, some fibrin and biofilm, serous exudate, no pus or other signs of infection. Photos also saved in electronic chart.     Today's wound measurements, per RN documentation:  Wound 07/15/22 # 3 Rt Medial lower Leg, Venous, Partial Thickness, Onset 06/22-Wound Length (cm): 5.7 cm  Wound 07/15/22 Wound #4 Left Inner Ankle, Venous, Partial (onset 6/2022)-Wound Length (cm): 0.2 cm    Wound 07/15/22 # 3 Rt Medial lower Leg, Venous, Partial Thickness, Onset 06/22-Wound Width (cm): 1.7 cm  Wound 07/15/22 Wound #4 Left Inner Ankle, Venous, Partial (onset 6/2022)-Wound Width (cm): 0.2 cm    Wound 07/15/22 # 3 Rt Medial lower Leg, Venous, Partial Thickness, Onset 06/22-Wound Depth (cm): 0.3 cm  Wound 07/15/22 Wound #4 Left Inner Ankle, Venous, Partial (onset 6/2022)-Wound Depth (cm): 0.1 cm    Assessment:     Patient Active Problem List   Diagnosis Code    HTN (hypertension) I10    Localized osteoarthrosis, lower leg M17.10    Eczema L30.9    Foot drop, right M21.371    Lumbosacral spondylosis without myelopathy M47.817    Normocytic anemia D64.9    Peripheral neuropathy G62.9    Senile osteoporosis M81.0    Bilateral lower extremity edema R60.0    CKD (chronic kidney disease) stage 3, GFR 30-59 ml/min ([facility]) N18.30    Ulcer of right ankle, with fat layer exposed ([address] L97.312    PMR (polymyalgia rheumatica) ([facility]) M35.3    Giant cell arteritis ([facility]) M31.6    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    Ulcer of left ankle, with fat layer exposed ([address] L97.322    Probable pyoderma gangrenosum L88    Chronic venous hypertension (idiopathic) with ulcer and inflammation of right lower extremity ([facility]) I87.331    Chronic venous hypertension (idiopathic) with ulcer and inflammation of left lower extremity (HCC) I87.332       Assessment of today's active condition(s): venous stasis, skin changes, edema, recurrent BL ankle ulcers, but this time with evolution (R>>L) to destructive pyoderma lesions; generally making good progress with current care, just need to be careful with even swelling control on each side, and I think the left ankle ulcer got a bit too moist this week. Factors contributing to occurrence and/or persistence of the chronic ulcer include edema and venous stasis. Medical necessity of today's visit is shown by the above documentation. Sharp debridement is indicated today, based upon the exam findings in the wound(s) above. Because of the expectation of pain and pathergy with sharp debridement, we decided to use the JetOx system today (with NSS and HClO), in lieu of sharp debridement. Procedure note:     Consent obtained. Time out performed per [facility]. Anesthetic  Anesthetic: 4% Lidocaine Cream     Using a JetOx device, I excisionally debrided the bilateral, medial ankle ulcer(s) down through and including the removal of dermis. The type(s) of tissue debrided included fibrin, biofilm, and exudate. Total Surface Area Debrided: 10 sq cm. The ulcers were then irrigated with normal saline solution. The procedure was completed with a small amount of bleeding, and hemostasis was with pressure. The patient tolerated the procedure well, with no significant complications. The patient's level of pain during and after the procedure was monitored.  Post-debridement measurements, if different from pre-debridement, are in the flowsheet as well.  ___________    Because this patient's right ankle venous ulcer has failed to respond to standard wound-care measures (including treatment of infection, debridement of necrosis, treatment of edema, provision of a moist wound environment, good compliance with compression) for more than 4 weeks, I recommended Oasis Wound Matrix as adjunctive therapy to help speed healing of this ulcer. As per previous documentation, peripheral pulse exam is acceptable, and/or TODD is at least 0.65. There is no evidence of untreated infection today. Ms. Kevon Bryan does not currently smoke. After cleansing the right ankle ulcer with saline, spraying the ulcer with HClO and applying Skin-Prep to the mauro-wound skin, tacrolimus to the edges, an 11 sqcm piece of Oasis Wound Matrix was cut to fit the ulcer, placed into the ulcer bed, affixed to the surrounding skin with Steri-Strips, moistened with saline and covered with Mepilex Transfer Ag. Eleven sqcm of the product was used in the ulcer, and 0 sqcm of the product was discarded. If applicable, any wastage is due to the fact the smallest available product was larger than what was needed for her ulcer(s). The patient tolerated the procedure well, without complications. Discharge plan:     Treatment in the wound care center today, per RN documentation: Wound 07/15/22 # 3 Rt Medial lower Leg, Venous, Partial Thickness, Onset 06/22-Dressing/Treatment: Other (comment) (ABD, coflex calamine)  Wound 07/15/22 Wound #4 Left Inner Ankle, Venous, Partial (onset 6/2022)-Dressing/Treatment: Other (comment) (Calmoseptine, collagen, Benzion (mauro), small Mepilex Border, Lotrisone to [place] areas). Per physician order, right application of multilayer compression wrap was performed in the wound care center today, to help manage edema, stasis dermatitis, and/or venous ulcers. Leave primary dressing and multi-layer wrap(s) in place until the next appointment. Also discussed ways to keep the wrap dry for a shower, including a plastic cast-guard, available in retail pharmacies.  She should call before her next visit if there is any significant pain, significant strike-through of drainage to the surface of the wrap, or any significant sense of the wrap sliding down more than an inch or so, bunching-up and abrading her skin. I reminded the patient of the importance of weight management and smoking cessation, if applicable; also encouraged ambulation as tolerated, additional lower extremity exercises as instructed in our education sheet, leg elevation when at rest, and compliance with any recommended dietary, diuretic and compression therapies. CircAid on the left; be sure to get it snugged back up fully, after removing it for a time. Continue sulfa, continue same pain mgmt plan. Will repeat labs in maybe 6 weeks or so. Written discharge instructions given to patient. Follow up in 1 week.     Electronically signed by Rose Galindo MD on 12/5/2022 at 7:26 PM.

## 2022-12-06 NOTE — PROGRESS NOTES
[facility] [address] Progress Note    Isabell Esparza     : 2/3/1933    DATE OF VISIT:  2022    Subjective: Isabell Esparza is a 80 y.o. 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 Significant symptoms or pertinent wound history since last visit: not a lot of pain these days, no fever, mild pruritus, mild edema overall (L>R), doing pretty well in her left CircAid. Additional ulcer(s) noted? no.      Her current medication list consists of Denosumab, Tocilizumab, acetaminophen, apixaban, clobetasol, clotrimazole-betamethasone, furosemide, losartan, metoprolol succinate, sulfaSALAzine, tacrolimus, and traMADol. Sulfa at 500 TID, tolerating well. Allergies: Dapsone, Erythromycin, Hydrochlorothiazide, Minocycline, Silver sulfadiazine, and Doxycycline    Objective:     Vitals:    22 1323   BP: 103/61   Pulse: 59   Resp: 18   Temp: 97.6 °F (36.4 °C)   TempSrc: Oral   Weight: 151 lb (68.5 kg)   Height: 5' 4\" (1.626 m)     Constitutional:  well-developed, well-nourished, maybe less fatigued, NAD  Psychiatric:  oriented to person, place and time; mood and affect appropriate for the situation  Cardiovascular:  bilateral pedal pulses palpable, feet warm, good cap refill; mild BL lower extremity swelling this week (maybe moderate right foot), usual hemosiderin and atrophe miroslava  Lymphatic:  no inguinal or popliteal adenopathy, no angitis, no cellulitis.    Neuro: less allodynia in the right wound bed than before  Musculoskeletal:  no clubbing, cyanosis or petechiae; RLE and LLE with no gross effusions, joint misalignment or acute arthritis  Gay-ulcer skin: indurated and pink, BL; resolved contact and presumed fungal dermatitis this week  Ulcer(s): left side granular, no longer SQ fibrotic, no longer inflamed, quite small, a bit of fibrin and biofilm, and modest serous exudate; right side with resolution of purple pyoderma edges, peripherally more granular and epithelializing, centrally still a bit of SQ fibrotic tissue and slough, but that's loosening, some fibrin and biofilm, serous exudate, no pus or other signs of infection. Photos also saved in electronic chart. Today's wound measurements, per RN documentation:  Wound 07/15/22 # 3 Rt Medial lower Leg, Venous, Partial Thickness, Onset 06/22-Wound Length (cm): 4.9 cm  Wound 07/15/22 Wound #4 Left Inner Ankle, Venous, Partial (onset 6/2022)-Wound Length (cm): 0.2 cm    Wound 07/15/22 # 3 Rt Medial lower Leg, Venous, Partial Thickness, Onset 06/22-Wound Width (cm): 1.5 cm  Wound 07/15/22 Wound #4 Left Inner Ankle, Venous, Partial (onset 6/2022)-Wound Width (cm): 0.3 cm    Wound 07/15/22 # 3 Rt Medial lower Leg, Venous, Partial Thickness, Onset 06/22-Wound Depth (cm): 0.5 cm  Wound 07/15/22 Wound #4 Left Inner Ankle, Venous, Partial (onset 6/2022)-Wound Depth (cm): 0.2 cm  ________________    Labs from last time -- BUN 24, rest of BMP normal.     Albumin up to 4.4, LFTs normal, PAB 26.2. Hgb 11.8.  , rest of CBC-diff normal.     Assessment:     Patient Active Problem List   Diagnosis Code    HTN (hypertension) I10    Localized osteoarthrosis, lower leg M17.10    Eczema L30.9    Foot drop, right M21.371    Lumbosacral spondylosis without myelopathy M47.817    Normocytic anemia D64.9    Peripheral neuropathy G62.9    Senile osteoporosis M81.0    Bilateral lower extremity edema R60.0    CKD (chronic kidney disease) stage 3, GFR 30-59 ml/min ([facility]) N18.30    Ulcer of right ankle, with fat layer exposed ([address] L97.312    PMR (polymyalgia rheumatica) ([facility]) M35.3    Giant cell arteritis ([facility]) M31.6    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 Left side almost healed, right side making good progress with minimal debridement, topical agents and sulfa, compression, adjunctive [place]. Factors contributing to occurrence and/or persistence of the chronic ulcer include edema and venous stasis. Medical necessity of today's visit is shown by the above documentation. Sharp debridement is indicated today, based upon the exam findings in the wound(s) above. Because of the expectation of pathergy and severe pain with sharp debridement, we decided to use the JetOx system today (with NSS and HClO), in lieu of sharp debridement. Procedure note:     Consent obtained. Time out performed per [facility]. Anesthetic  Anesthetic: 4% Lidocaine Cream     Using a JetOx device, I excisionally debrided the bilateral, medial ankle ulcer(s) down through and including the removal of dermis. The type(s) of tissue debrided included fibrin, biofilm, and exudate. Total Surface Area Debrided: 8 sq cm. The ulcers were then irrigated with normal saline solution. The procedure was completed with a small amount of bleeding, and hemostasis was with pressure. The patient tolerated the procedure well, with no significant complications. The patient's level of pain during and after the procedure was monitored.  Post-debridement measurements, if different from pre-debridement, are in the flowsheet as well.  ______________    Because this patient's right ankle venous ulcer has failed to respond to standard wound-care measures (including treatment of infection, debridement of necrosis, treatment of edema, provision of a moist wound environment, good compliance with compression) for more than 4 weeks, I recommended Oasis Wound Matrix as adjunctive therapy to help speed healing of this ulcer. As per previous documentation, peripheral pulse exam is acceptable, and/or TODD is at least 0.65. There is no evidence of untreated infection today. Ms. Randy Jones does not currently smoke. After cleansing the right ankle ulcer with saline, spraying the ulcer with HClO and applying Skin-Prep to the mauro-wound skin (tacrolimus to the edges), a 21 sqcm piece of Oasis Wound Matrix was cut to fit the ulcer, folded over to increase exposure, placed into the ulcer bed, affixed to the surrounding skin with Steri-Strips, moistened with saline and covered with Mepilex Ag Transfer. Twenty-one sqcm of the product was used in the ulcer, and 0 sqcm of the product was discarded. If applicable, any wastage is due to the fact the smallest available product was larger than what was needed for her ulcer(s). The patient tolerated the procedure well, without complications. Discharge plan:     Treatment in the wound care center today, per RN documentation: Wound 07/15/22 # 3 Rt Medial lower Leg, Venous, Partial Thickness, Onset 06/22-Dressing/Treatment: Other (comment) (ABD, Aquaphor to dry skin, CoFlex TLC Calamine)  Wound 07/15/22 Wound #4 Left Inner Ankle, Venous, Partial (onset 6/2022)-Dressing/Treatment: Other (comment) (Lotrisone to rashy areas, Apply sock and velcro compression garment). Per physician order, right application of multilayer compression wrap was performed in the wound care center today, to help manage edema, stasis dermatitis, and/or venous ulcers. Leave primary dressing and multi-layer wrap(s) in place until the next appointment. Also discussed ways to keep the wrap dry for a shower, including a plastic cast-guard, available in retail pharmacies.  She should call before her next visit if there is any significant pain, significant strike-through of drainage to the surface of the wrap, or any significant sense of the wrap sliding down more than an inch or so, bunching-up and abrading her skin. I reminded the patient of the importance of weight management and smoking cessation, if applicable; also encouraged ambulation as tolerated, additional lower extremity exercises as instructed in our education sheet, leg elevation when at rest, and compliance with any recommended dietary, diuretic and compression therapies. CircAid for that LLE. Continue sulfa TID. Continue current pain mgmt (Tylenol PRN mild, tramadol PRN mod, Norco (rarely now) PRN severe). Written discharge instructions given to patient. Follow up in 1 week.     Electronically signed by Yue Villagran MD on 12/5/2022 at 7:19 PM.

## 2022-12-09 ENCOUNTER — HOSPITAL ENCOUNTER (OUTPATIENT)
Dept: WOUND CARE | Age: 87
Discharge: HOME | End: 2022-12-09
Payer: MEDICARE

## 2022-12-09 VITALS
SYSTOLIC BLOOD PRESSURE: 117 MMHG | DIASTOLIC BLOOD PRESSURE: 68 MMHG | WEIGHT: 151.8 LBS | RESPIRATION RATE: 20 BRPM | HEIGHT: 64 IN | BODY MASS INDEX: 25.92 KG/M2 | TEMPERATURE: 98.3 F | HEART RATE: 71 BPM

## 2022-12-09 DIAGNOSIS — R60.0 BILATERAL LOWER EXTREMITY EDEMA: ICD-10-CM

## 2022-12-09 DIAGNOSIS — L97.312 ULCER OF RIGHT ANKLE, WITH FAT LAYER EXPOSED (HCC): ICD-10-CM

## 2022-12-09 DIAGNOSIS — L97.322 ULCER OF LEFT ANKLE, WITH FAT LAYER EXPOSED (HCC): Primary | ICD-10-CM

## 2022-12-09 PROCEDURE — 29581 APPL MULTLAYER CMPRN SYS LEG: CPT

## 2022-12-09 PROCEDURE — 97597 DBRDMT OPN WND 1ST 20 CM/<: CPT

## 2022-12-09 PROCEDURE — C5271 LOW COST SKIN SUBSTITUTE APP: HCPCS

## 2022-12-09 RX ORDER — LIDOCAINE 40 MG/G
CREAM TOPICAL ONCE
OUTPATIENT
Start: 2022-12-09 | End: 2022-12-09

## 2022-12-09 RX ORDER — LIDOCAINE 50 MG/G
OINTMENT TOPICAL ONCE
OUTPATIENT
Start: 2022-12-09 | End: 2022-12-09

## 2022-12-09 RX ORDER — LIDOCAINE 40 MG/G
CREAM TOPICAL ONCE
Status: DISCONTINUED | OUTPATIENT
Start: 2022-12-09 | End: 2022-12-10 | Stop reason: HOSPADM

## 2022-12-09 RX ORDER — BACITRACIN ZINC AND POLYMYXIN B SULFATE 500; 1000 [USP'U]/G; [USP'U]/G
OINTMENT TOPICAL ONCE
OUTPATIENT
Start: 2022-12-09 | End: 2022-12-09

## 2022-12-09 RX ORDER — LIDOCAINE HYDROCHLORIDE 40 MG/ML
SOLUTION TOPICAL ONCE
OUTPATIENT
Start: 2022-12-09 | End: 2022-12-09

## 2022-12-09 RX ORDER — ENALAPRIL MALEATE 2.5 MG/1
2.5 TABLET ORAL 2 TIMES DAILY
COMMUNITY

## 2022-12-09 ASSESSMENT — PAIN SCALES - GENERAL: PAINLEVEL_OUTOF10: 0

## 2022-12-09 NOTE — PLAN OF CARE
Patient seen in [facility] today for follow up. She states her pain has improved. Wounds showing some signs of improvement. Jet ox today. Patient tolerated well. [place] #9 placed on right lower leg. Rashy area mauro wound has improved. Patient has not needed to use any Benadryl for itching. Educated to continue to elevate her legs. Voiced understanding. She will continue taking Sulfasalazine tid. She reports no issues with this medication. F/u in [facility] in 1 week as ordered, pt. Aware to call sooner with any changes or questions/concerns. Discharge instructions reviewed with patient, all questions answered, copy given to patient. Dressings were applied to all wounds per M.D. Instructions at this visit.

## 2022-12-12 NOTE — DISCHARGE INSTRUCTIONS
215 [facility] Physician Orders and Discharge 800 [facility]  [address], [address]  [place], [facility]  Telephone: [phone]      Fax: [phone]        Your home care company:   N/A     Your wound-care supplies will be provided by:  weekly wraps     NAME:  Salo Ireland   YOB: 1933  PRIMARY DIAGNOSIS FOR WOUND CARE CENTER:  Pyoderma gangrenosum     Wound cleansing:  Do not scrub or use excessive force. Wash hands with soap and water before and after dressing changes. Prior to applying a clean dressing, cleanse wound with normal saline, wound cleanser, or mild soap and water. Ask your physician or nurse before getting the wound(s) wet in the shower. Wound care for home:     Left Lower Leg/ankle  Vashe  Hydrogel  Benzion (mauro)  Small Mepilex Border  Aquaphor to any dry areas  Apply sock and compression garment - you may leave this on all week if desired. Right Lower Leg:  Vashe  Tacrolimus and Clobetasol to wound edges and new proximal wound  Triad to wound bed  Mepilex Transfer AG  Lotrisone to proximal and lateral rashy areas  CoFlex Calamine - please do not apply too much pressure to second layer, especially at ankle         KEEP THESE WRAPS CLEAN, [address] [place] [address],[facility] Floor           Please note, all wounds (unless stated otherwise here) were mechanically debrided at the time of cleansing here in the wound-care center today, so a small amount of pain, drainage or bleeding from that process might be expected, and is normal.     All products for home use, including multiple products for a single wound if applicable, are medically necessary in order to achieve the best chance at timely wound healing. See provider documentation for details if needed.      Substituted dressings applied in the [facility] today, if applicable:      N/a     New orders for this week (labs, imaging, medications, etc.):     You may walk as much as you can tolerate. Continue elevating legs   You may take Benadryl at night only for itching - please be aware this may cause dry mouth and some sleepiness/dizziness      Additional instructions for specific diagnoses:    Continue Sulfasalazine three times daily     [place] #3 - 10/21/22  [place] #4 - 10/28/22  [place] #5 - 11/4/22  [place] #6 - 11/11/22   [place] #7 -11-22-22  [place] #8 12/1/22   [place] #9  12/9/22        F/U Appointment  with Dr. Soila Dsouza in 1 week                                               at                                      .     Your nurse  is Pepito Slaughter RN     If we applied slip-resistant hospital socks today, be sure to remove them at least once a day to inspect your toes or feet, even if you're not changing the wraps or dressings underneath. If you see anything concerning (redness, excess moisture, etc), please call and let us know right away. Should you experience any significant changes in your wound(s) (including redness, increased warmth, increased pain, increased drainage, odor, or fever) or have questions about your wound care, please contact the [address] at [phone] Monday-Thursday from 8:00 am - 4:30 pm, or Friday from 8:00 am - 2:30 pm.  If you need help with your wound outside these hours and cannot wait until we are again available, contact your home-care company (if applicable), your PCP, or go to the nearest emergency room.

## 2022-12-16 ENCOUNTER — HOSPITAL ENCOUNTER (OUTPATIENT)
Dept: WOUND CARE | Age: 87
Discharge: HOME | End: 2022-12-16
Payer: MEDICARE

## 2022-12-16 VITALS
BODY MASS INDEX: 25.71 KG/M2 | WEIGHT: 150.6 LBS | RESPIRATION RATE: 20 BRPM | HEIGHT: 64 IN | HEART RATE: 68 BPM | DIASTOLIC BLOOD PRESSURE: 68 MMHG | TEMPERATURE: 98.6 F | SYSTOLIC BLOOD PRESSURE: 130 MMHG

## 2022-12-16 DIAGNOSIS — L97.322 ULCER OF LEFT ANKLE, WITH FAT LAYER EXPOSED (HCC): Primary | ICD-10-CM

## 2022-12-16 DIAGNOSIS — L97.312 ULCER OF RIGHT ANKLE, WITH FAT LAYER EXPOSED (HCC): ICD-10-CM

## 2022-12-16 DIAGNOSIS — R60.0 BILATERAL LOWER EXTREMITY EDEMA: ICD-10-CM

## 2022-12-16 PROCEDURE — 29581 APPL MULTLAYER CMPRN SYS LEG: CPT

## 2022-12-16 PROCEDURE — 97597 DBRDMT OPN WND 1ST 20 CM/<: CPT

## 2022-12-16 RX ORDER — BACITRACIN ZINC AND POLYMYXIN B SULFATE 500; 1000 [USP'U]/G; [USP'U]/G
OINTMENT TOPICAL ONCE
OUTPATIENT
Start: 2022-12-16 | End: 2022-12-16

## 2022-12-16 RX ORDER — LIDOCAINE 40 MG/G
CREAM TOPICAL ONCE
Status: DISCONTINUED | OUTPATIENT
Start: 2022-12-16 | End: 2022-12-17 | Stop reason: HOSPADM

## 2022-12-16 RX ORDER — LIDOCAINE HYDROCHLORIDE 40 MG/ML
SOLUTION TOPICAL ONCE
OUTPATIENT
Start: 2022-12-16 | End: 2022-12-16

## 2022-12-16 RX ORDER — LIDOCAINE 40 MG/G
CREAM TOPICAL ONCE
OUTPATIENT
Start: 2022-12-16 | End: 2022-12-16

## 2022-12-16 RX ORDER — LIDOCAINE 50 MG/G
OINTMENT TOPICAL ONCE
OUTPATIENT
Start: 2022-12-16 | End: 2022-12-16

## 2022-12-16 NOTE — PROGRESS NOTES
Emily [facility] Progress Note    Cindi Adan     : 2/3/1933    DATE OF VISIT:  2022    Subjective: Cindi Adan is a 80 y.o. 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 Significant symptoms or pertinent wound history since last visit: feeling well overall, less pain most days, no fever, eating very well, tolerating sulfa well; a bit more swelling on the left, but more even mild edema on the right. Additional ulcer(s) noted? no.      Her current medication list consists of Denosumab, Tocilizumab, acetaminophen, apixaban, clobetasol, clotrimazole-betamethasone, enalapril, furosemide, losartan, metoprolol succinate, sulfaSALAzine, tacrolimus, and traMADol. Allergies: Dapsone, Erythromycin, Hydrochlorothiazide, Minocycline, Silver sulfadiazine, and Doxycycline    Objective:     Vitals:    22 1313   BP: 117/68   Pulse: 71   Resp: 20   Temp: 98.3 °F (36.8 °C)   TempSrc: Oral   Weight: 151 lb 12.8 oz (68.9 kg)   Height: 5' 4\" (1.626 m)     Constitutional:  well-developed, well-nourished, less fatigued, NAD  Psychiatric:  oriented to person, place and time; mood and affect appropriate for the situation  Cardiovascular:  bilateral pedal pulses palpable, feet warm, good cap refill; mild BL lower extremity swelling this week (maybe moderate right foot, and a bit greater on the left leg than last time), usual hemosiderin and atrophe miroslava  Lymphatic:  no inguinal or popliteal adenopathy, no angitis, no cellulitis.    Neuro: less allodynia in the right wound bed than before  Musculoskeletal:  no clubbing, cyanosis or petechiae; RLE and LLE with no gross effusions, joint misalignment or acute arthritis  Gay-ulcer skin: indurated and pink, BL, resolved erythema at the left side, I think from drainage contact with skin last week  Ulcer(s): left side granular, no longer SQ fibrotic, no longer inflamed, quite small, a bit of fibrin and biofilm, and modest serous exudate, but not yet closing up the rest of the way; right side with resolution of purple pyoderma edges, peripherally more granular and epithelializing, one peripheral focus completely healed now, centrally still a bit of SQ fibrotic tissue and slough, but that's loosening, more granulation coming up within that, some fibrin and biofilm, serous exudate, no pus or other signs of infection; one proximal area of erythema, but it doesn't look particularly inflamed or pre-ulcerative, perhaps just skin irritation from drainage trapped beneath one corner of the secondary dressing. Photos also saved in electronic chart.     Today's wound measurements, per RN documentation:  Wound 07/15/22 # 3 Rt Medial lower Leg, Venous, Partial Thickness, Onset 06/22-Wound Length (cm): 5.2 cm  Wound 07/15/22 Wound #4 Left Inner Ankle, Venous, Partial (onset 6/2022)-Wound Length (cm): 0.2 cm    Wound 07/15/22 # 3 Rt Medial lower Leg, Venous, Partial Thickness, Onset 06/22-Wound Width (cm): 1.5 cm  Wound 07/15/22 Wound #4 Left Inner Ankle, Venous, Partial (onset 6/2022)-Wound Width (cm): 0.2 cm    Wound 07/15/22 # 3 Rt Medial lower Leg, Venous, Partial Thickness, Onset 06/22-Wound Depth (cm): 0.3 cm  Wound 07/15/22 Wound #4 Left Inner Ankle, Venous, Partial (onset 6/2022)-Wound Depth (cm): 0.1 cm    Assessment:     Patient Active Problem List   Diagnosis Code    HTN (hypertension) I10    Localized osteoarthrosis, lower leg M17.10    Eczema L30.9    Foot drop, right M21.371    Lumbosacral spondylosis without myelopathy M47.817    Normocytic anemia D64.9    Peripheral neuropathy G62.9    Senile osteoporosis M81.0    Bilateral lower extremity edema R60.0    CKD (chronic kidney disease) stage 3, GFR 30-59 ml/min ([facility]) N18.30    Ulcer of right ankle, with fat layer exposed ([facility] [address] L97.312    PMR (polymyalgia rheumatica) ([facility]) M35.3    Giant cell arteritis ([facility]) M31.6    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 Ulcer of left ankle, with fat layer exposed ([facility] [address] L97.322    Probable pyoderma gangrenosum L88    Chronic venous hypertension (idiopathic) with ulcer and inflammation of right lower extremity (HCC) I87.331    Chronic venous hypertension (idiopathic) with ulcer and inflammation of left lower extremity (HCC) I87.332       Assessment of today's active condition(s): venous insufficiency, skin changes, LE edema, recurrent venous ankle ulcers, but this time evolved to aggressive pyoderma gangrenosum lesions, especially on the right side, I think related to her underlying giant cell arteritis. Doing much better with less debridement, careful compression, sulfasalazine and topical agents, and recently addition of [place]; left side nearly healed, a bit stuck, but I think it will get there soon. Factors contributing to occurrence and/or persistence of the chronic ulcer include edema and venous stasis. Medical necessity of today's visit is shown by the above documentation. Sharp debridement is indicated today, based upon the exam findings in the wound(s) above. Because of the expectation of pain and pathergy with sharp debridement, we decided to use the JetOx system today (with NSS and HClO), in lieu of sharp debridement. Procedure note:     Consent obtained. Time out performed per [facility]. Anesthetic  Anesthetic: 4% Lidocaine Cream     Using a JetOx device, I excisionally debrided the bilateral, medial ankle ulcer(s) down through and including the removal of dermis. The type(s) of tissue debrided included fibrin, biofilm, and exudate. Total Surface Area Debrided: 8 sq cm. The ulcers were then irrigated with normal saline solution. The procedure was completed with a small amount of bleeding, and hemostasis was with pressure. The patient tolerated the procedure well, with no significant complications. The patient's level of pain during and after the procedure was monitored.  Post-debridement measurements, if different from pre-debridement, are in the flowsheet as well.  _______________    Because this patient's right medial ankle venous ulcer has failed to respond to standard wound-care measures (including treatment of infection, debridement of necrosis, treatment of edema, provision of a moist wound environment, good compliance with offloading compression) for more than 4 weeks, I recommended [place] Wound Matrix as adjunctive therapy to help speed healing of this ulcer. As per previous documentation, peripheral pulse exam is acceptable, and/or TODD is at least 0.65. There is no evidence of untreated infection today. Ms. Florinda Newton does not currently smoke. After cleansing the right ankle ulcer with saline, spraying the ulcer with HClO and applying Skin-Prep to the mauro-wound skin, a 21 sqcm piece of [place] Wound Matrix was cut to fit the ulcer, folded over for increased exposure to the wound bed, placed into the ulcer bed, affixed to the surrounding skin with Steri-Strips, moistened with saline and covered with Mepilex Transfer Ag. Fifteen sqcm of the product was used in the ulcer, and 6 sqcm of the product was discarded. If applicable, any wastage is due to the fact the smallest available product was larger than what was needed for her ulcer(s). The patient tolerated the procedure well, without complications. Discharge plan:     Treatment in the wound care center today, per RN documentation: Wound 07/15/22 # 3 Rt Medial lower Leg, Venous, Partial Thickness, Onset 06/22-Dressing/Treatment: Other (comment) (ABD CoFlex calamine)  Wound 07/15/22 Wound #4 Left Inner Ankle, Venous, Partial (onset 6/2022)-Dressing/Treatment:  (Vashe, Selina, collagen, small foam border, per Dr Stephanie Vickers). Per physician order, right application of multilayer compression wrap was performed in the wound care center today, to help manage edema, stasis dermatitis, and/or venous ulcers.  Leave primary dressing and multi-layer wrap(s) in place until the next appointment. Also discussed ways to keep the wrap dry for a shower, including a plastic cast-guard, available in retail pharmacies. She should call before her next visit if there is any significant pain, significant strike-through of drainage to the surface of the wrap, or any significant sense of the wrap sliding down more than an inch or so, bunching-up and abrading her skin. I reminded the patient of the importance of weight management and smoking cessation, if applicable; also encouraged ambulation as tolerated, additional lower extremity exercises as instructed in our education sheet, leg elevation when at rest, and compliance with any recommended dietary, diuretic and compression therapies. Continue CircAid on that left side. Continue sulfasalazine TID. Keep up the great work with protein intake. No change in pain mgmt plans. Written discharge instructions given to patient. Follow up in 1 week.     Electronically signed by Yue Villagran MD on 12/15/2022 at 7:20 PM.

## 2022-12-16 NOTE — PLAN OF CARE
Patient seen in [facility] for follow up. Qasim ox per Dr. Jun Ruiz. Gay wound with some redness and irritation. Needling done to remaining [place]. Will hold on applying new [place] this week. Patient reports increased pain earlier in the week. States pain has improved the last two days. She continues taking Tylenol as needed. Minor changes in care plan this week. F/u in [facility] in 1 week as ordered, pt. Aware to call sooner with any changes or questions/concerns. Discharge instructions reviewed with patient, all questions answered, copy given to patient. Dressings were applied to all wounds per M.D. Instructions at this visit.

## 2022-12-21 ENCOUNTER — HOSPITAL ENCOUNTER (OUTPATIENT)
Dept: WOUND CARE | Age: 87
Discharge: HOME | End: 2022-12-21
Payer: MEDICARE

## 2022-12-21 VITALS
DIASTOLIC BLOOD PRESSURE: 80 MMHG | SYSTOLIC BLOOD PRESSURE: 143 MMHG | HEART RATE: 62 BPM | HEIGHT: 64 IN | TEMPERATURE: 97.9 F | WEIGHT: 150 LBS | BODY MASS INDEX: 25.61 KG/M2 | RESPIRATION RATE: 20 BRPM

## 2022-12-21 DIAGNOSIS — R60.0 BILATERAL LOWER EXTREMITY EDEMA: ICD-10-CM

## 2022-12-21 DIAGNOSIS — L97.312 ULCER OF RIGHT ANKLE, WITH FAT LAYER EXPOSED (HCC): ICD-10-CM

## 2022-12-21 DIAGNOSIS — L97.322 ULCER OF LEFT ANKLE, WITH FAT LAYER EXPOSED (HCC): Primary | ICD-10-CM

## 2022-12-21 PROCEDURE — 97597 DBRDMT OPN WND 1ST 20 CM/<: CPT

## 2022-12-21 PROCEDURE — 29581 APPL MULTLAYER CMPRN SYS LEG: CPT

## 2022-12-21 PROCEDURE — C5271 LOW COST SKIN SUBSTITUTE APP: HCPCS

## 2022-12-21 RX ORDER — LIDOCAINE 40 MG/G
CREAM TOPICAL ONCE
Status: DISCONTINUED | OUTPATIENT
Start: 2022-12-21 | End: 2022-12-22 | Stop reason: HOSPADM

## 2022-12-21 RX ORDER — LIDOCAINE 50 MG/G
OINTMENT TOPICAL ONCE
OUTPATIENT
Start: 2022-12-21 | End: 2022-12-21

## 2022-12-21 RX ORDER — LIDOCAINE 50 MG/G
OINTMENT TOPICAL ONCE
Status: DISCONTINUED | OUTPATIENT
Start: 2022-12-21 | End: 2022-12-22 | Stop reason: HOSPADM

## 2022-12-21 RX ORDER — BACITRACIN ZINC AND POLYMYXIN B SULFATE 500; 1000 [USP'U]/G; [USP'U]/G
OINTMENT TOPICAL ONCE
Status: DISCONTINUED | OUTPATIENT
Start: 2022-12-21 | End: 2022-12-22 | Stop reason: HOSPADM

## 2022-12-21 RX ORDER — LIDOCAINE HYDROCHLORIDE 40 MG/ML
SOLUTION TOPICAL ONCE
Status: DISCONTINUED | OUTPATIENT
Start: 2022-12-21 | End: 2022-12-22 | Stop reason: HOSPADM

## 2022-12-21 RX ORDER — LIDOCAINE 40 MG/G
CREAM TOPICAL ONCE
OUTPATIENT
Start: 2022-12-21 | End: 2022-12-21

## 2022-12-21 RX ORDER — BACITRACIN ZINC AND POLYMYXIN B SULFATE 500; 1000 [USP'U]/G; [USP'U]/G
OINTMENT TOPICAL ONCE
OUTPATIENT
Start: 2022-12-21 | End: 2022-12-21

## 2022-12-21 RX ORDER — LIDOCAINE HYDROCHLORIDE 40 MG/ML
SOLUTION TOPICAL ONCE
OUTPATIENT
Start: 2022-12-21 | End: 2022-12-21

## 2022-12-21 ASSESSMENT — PAIN - FUNCTIONAL ASSESSMENT: PAIN_FUNCTIONAL_ASSESSMENT: PREVENTS OR INTERFERES SOME ACTIVE ACTIVITIES AND ADLS

## 2022-12-21 ASSESSMENT — PAIN SCALES - GENERAL: PAINLEVEL_OUTOF10: 2

## 2022-12-21 ASSESSMENT — PAIN DESCRIPTION - FREQUENCY: FREQUENCY: INTERMITTENT

## 2022-12-21 ASSESSMENT — PAIN DESCRIPTION - DESCRIPTORS: DESCRIPTORS: ACHING

## 2022-12-21 ASSESSMENT — PAIN DESCRIPTION - ONSET: ONSET: ON-GOING

## 2022-12-21 ASSESSMENT — PAIN DESCRIPTION - ORIENTATION: ORIENTATION: RIGHT;LEFT

## 2022-12-21 ASSESSMENT — PAIN DESCRIPTION - PAIN TYPE: TYPE: CHRONIC PAIN

## 2022-12-21 ASSESSMENT — PAIN DESCRIPTION - LOCATION: LOCATION: LEG

## 2022-12-21 NOTE — DISCHARGE INSTRUCTIONS
215 [facility] Physician Orders and Discharge 800 [facility]  [address], [address]  [place], [facility]  Telephone: [phone]      Fax: [phone]        Your home care company:   N/A     Your wound-care supplies will be provided by:  weekly wraps     NAME:  Mazin Calabrese   YOB: 1933  PRIMARY DIAGNOSIS FOR WOUND CARE CENTER:  Pyoderma gangrenosum     Wound cleansing:  Do not scrub or use excessive force. Wash hands with soap and water before and after dressing changes. Prior to applying a clean dressing, cleanse wound with normal saline, wound cleanser, or mild soap and water. Ask your physician or nurse before getting the wound(s) wet in the shower. Wound care for home:     Left Lower Leg/ankle  Nexodyn, Collagen then Hydrogel, Benzoin (mauro), Small Mepilex Border  All above applied per MD & to remain in place for the week. Small amount of aquaphor to any dry areas  Apply sock and compression garment - you may leave this on all week if desired. Right Lower Leg:  Nexodyn, Tacrolimus/Calmoseptine to wound edges & mauro-wound irritation, Collagen then Hydrogel to proximal wound, [place] to distal,   Lotrisone to proximal and lateral rashy areas, Mepilex Transfer Ag  All above applied per MD    RN to apply: CoFlex Calamine - please do not apply too much pressure to second layer, especially at ankle         KEEP THESE WRAPS CLEAN, [address] [place] [address],Ground Floor           Please note, all wounds (unless stated otherwise here) were mechanically debrided at the time of cleansing here in the wound-care center today, so a small amount of pain, drainage or bleeding from that process might be expected, and is normal.     All products for home use, including multiple products for a single wound if applicable, are medically necessary in order to achieve the best chance at timely wound healing.  See provider documentation for details if needed. Substituted dressings applied in the [address] [place] [address],[address] today, if applicable:      N/a     New orders for this week (labs, imaging, medications, etc.):     You may walk as much as you can tolerate. Continue elevating legs   You may take Benadryl at night only for itching - please be aware this may cause dry mouth and some sleepiness/dizziness      Additional instructions for specific diagnoses:     Continue Sulfasalazine three times daily     [place] #3 - 10/21/22  [place] #4 - 10/28/22  [place] #5 - 11/4/22  [place] #6 - 11/11/22   [place] #7 -11-22-22  [place] #8 12/1/22   [place] #9  12/9/22  [place] #10 12/21/22      F/U Appointment  with Dr. Tanvir Tavares in 1 week                                               at                                      .     Your nurse  is Rob Leonard RN     If we applied slip-resistant hospital socks today, be sure to remove them at least once a day to inspect your toes or feet, even if you're not changing the wraps or dressings underneath. If you see anything concerning (redness, excess moisture, etc), please call and let us know right away. Should you experience any significant changes in your wound(s) (including redness, increased warmth, increased pain, increased drainage, odor, or fever) or have questions about your wound care, please contact the [facility] [address] at [phone] Monday-Thursday from 8:00 am - 4:30 pm, or Friday from 8:00 am - 2:30 pm.  If you need help with your wound outside these hours and cannot wait until we are again available, contact your home-care company (if applicable), your PCP, or go to the nearest emergency room.

## 2022-12-21 NOTE — PLAN OF CARE
Wounds stable, debridement with JetOx per MD & pt. Tolerated well. [place] #29 application to right medial-distal ankle wound per MD. Dressings applied per MD & to remain in place for the week. Pt. To cont. With velcro compression garment on left leg & RN to apply CoFlex TLC Calamine compression wrap on right leg for edema control. Reinforced importance of exercise, elevation & compression to help with circulation, edema control & wound healing, pt. Verbalizes understanding. F/u in [facility] in 1 week as ordered, pt. Aware to call sooner with any changes or questions/concerns. Discharge instructions reviewed with patient, all questions answered, copy given to patient. Dressings were applied to all wounds per M.D. Instructions at this visit.

## 2022-12-22 NOTE — DISCHARGE INSTRUCTIONS
215 [facility] Physician Orders and Discharge 800 [facility]  [address], [address]  [place], [facility]  Telephone: [phone]      Fax: [phone]        Your home care company:   N/A     Your wound-care supplies will be provided by:  weekly wraps     NAME:  Rachel Ahmadi   YOB: 1933  PRIMARY DIAGNOSIS FOR WOUND CARE CENTER:  Pyoderma gangrenosum     Wound cleansing:  Do not scrub or use excessive force. Wash hands with soap and water before and after dressing changes. Prior to applying a clean dressing, cleanse wound with normal saline, wound cleanser, or mild soap and water. Ask your physician or nurse before getting the wound(s) wet in the shower. Wound care for home:     Left Lower Leg/ankle  Vashe  Collagen then Hydrogel  Benzoin (mauro)  Small Mepilex Border  Apply sock and compression garment - you may leave this on all week if desired. Right Lower Leg:  Vashe  Tacrolimus/Calmoseptine to wound edges,   Collagen to all wounds   Mepilex Transfer Ag  CoFlex Calamine - please do not apply too much pressure to second layer, especially at ankle         KEEP THESE WRAPS CLEAN, DRY AND IN PLACE FOR THE WEEK           Please note, all wounds (unless stated otherwise here) were mechanically debrided at the time of cleansing here in the wound-care center today, so a small amount of pain, drainage or bleeding from that process might be expected, and is normal.     All products for home use, including multiple products for a single wound if applicable, are medically necessary in order to achieve the best chance at timely wound healing. See provider documentation for details if needed. Substituted dressings applied in the [facility] today, if applicable:      N/a     New orders for this week (labs, imaging, medications, etc.):     You may walk as much as you can tolerate.   Continue elevating legs   You may take Benadryl at night only for itching - please be aware this may cause dry mouth and some sleepiness/dizziness      Additional instructions for specific diagnoses:     Continue Sulfasalazine three times daily     [facility] [place] #3 - 10/21/22  [place] #4 - 10/28/22  [facility] #5 - 11/4/22  [facility] #6 - 11/11/22   [place] #7 -11-22-22  [facility] #8 12/1/22   [facility] #9  12/9/22  [facility] #10 12/21/22      F/U Appointment  with Dr. Turner Story in 1 week                                               at                                      .     Your nurse  is David Allen RN     If we applied slip-resistant hospital socks today, be sure to remove them at least once a day to inspect your toes or feet, even if you're not changing the wraps or dressings underneath. If you see anything concerning (redness, excess moisture, etc), please call and let us know right away. Should you experience any significant changes in your wound(s) (including redness, increased warmth, increased pain, increased drainage, odor, or fever) or have questions about your wound care, please contact the [address] [place] [address] at [phone] Monday-Thursday from 8:00 am - 4:30 pm, or Friday from 8:00 am - 2:30 pm.  If you need help with your wound outside these hours and cannot wait until we are again available, contact your home-care company (if applicable), your PCP, or go to the nearest emergency room.

## 2022-12-30 ENCOUNTER — HOSPITAL ENCOUNTER (OUTPATIENT)
Dept: WOUND CARE | Age: 87
Discharge: HOME | End: 2022-12-30
Payer: MEDICARE

## 2022-12-30 VITALS
SYSTOLIC BLOOD PRESSURE: 124 MMHG | DIASTOLIC BLOOD PRESSURE: 67 MMHG | WEIGHT: 152.4 LBS | TEMPERATURE: 97.8 F | RESPIRATION RATE: 18 BRPM | HEIGHT: 64 IN | HEART RATE: 64 BPM | BODY MASS INDEX: 26.02 KG/M2

## 2022-12-30 DIAGNOSIS — L97.322 ULCER OF LEFT ANKLE, WITH FAT LAYER EXPOSED (HCC): Primary | ICD-10-CM

## 2022-12-30 DIAGNOSIS — R60.0 BILATERAL LOWER EXTREMITY EDEMA: ICD-10-CM

## 2022-12-30 DIAGNOSIS — L97.312 ULCER OF RIGHT ANKLE, WITH FAT LAYER EXPOSED (HCC): ICD-10-CM

## 2022-12-30 PROCEDURE — 29581 APPL MULTLAYER CMPRN SYS LEG: CPT

## 2022-12-30 RX ORDER — LIDOCAINE 50 MG/G
OINTMENT TOPICAL ONCE
OUTPATIENT
Start: 2022-12-30 | End: 2022-12-30

## 2022-12-30 RX ORDER — LIDOCAINE 40 MG/G
CREAM TOPICAL ONCE
OUTPATIENT
Start: 2022-12-30 | End: 2022-12-30

## 2022-12-30 RX ORDER — LIDOCAINE 40 MG/G
CREAM TOPICAL ONCE
Status: DISCONTINUED | OUTPATIENT
Start: 2022-12-30 | End: 2022-12-31 | Stop reason: HOSPADM

## 2022-12-30 RX ORDER — LIDOCAINE HYDROCHLORIDE 40 MG/ML
SOLUTION TOPICAL ONCE
OUTPATIENT
Start: 2022-12-30 | End: 2022-12-30

## 2022-12-30 RX ORDER — BACITRACIN ZINC AND POLYMYXIN B SULFATE 500; 1000 [USP'U]/G; [USP'U]/G
OINTMENT TOPICAL ONCE
OUTPATIENT
Start: 2022-12-30 | End: 2022-12-30

## 2022-12-30 NOTE — PLAN OF CARE
Patient seen in [facility] today for follow up. Left leg wound showing significant signs of improvement. 27g needling done to stimulate wound bed. Right leg wound showing signs of improvement as well. 27g needling done to this wound also. We will plan to jet ox right leg wound next week. Plan of care will continue this week. F/u in [facility] in 1 week as ordered, pt. Aware to call sooner with any changes or questions/concerns. Discharge instructions reviewed with patient, all questions answered, copy given to patient. Dressings were applied to all wounds per M.D. Instructions at this visit.

## 2023-01-02 NOTE — PROGRESS NOTES
[address] [facility] Progress Note    Tere Green     : 2/3/1933    DATE OF VISIT:  2022    Subjective: Tere Green is a 80 y.o. female who has a venous and  pyoderma ulcer located on the right, medial ankle. Significant symptoms or pertinent wound history since last visit: feeling pretty well, some mild pain at times, needs an occasional tramadol, no Norco. No fever, no pruritus, mild swelling. Additional ulcer(s) noted? Yes - the left medial ankle, very nearly healed. Her current medication list consists of Denosumab, Tocilizumab, acetaminophen, apixaban, clobetasol, clotrimazole-betamethasone, enalapril, furosemide, losartan, metoprolol succinate, sulfaSALAzine, tacrolimus, and traMADol. Allergies: Dapsone, Erythromycin, Hydrochlorothiazide, Minocycline, Silver sulfadiazine, and Doxycycline    Objective:     Vitals:    22 1305   BP: 124/67   Pulse: 64   Resp: 18   Temp: 97.8 °F (36.6 °C)   TempSrc: Oral   Weight: 152 lb 6.4 oz (69.1 kg)   Height: 5' 4\" (1.626 m)     Constitutional:  well-developed, well-nourished, less fatigued, NAD  Psychiatric:  oriented to person, place and time; mood and affect appropriate for the situation  Cardiovascular:  bilateral pedal pulses palpable, feet warm, good cap refill; mild BL lower extremity swelling this week, usual hemosiderin and atrophe miroslava  Lymphatic:  no inguinal or popliteal adenopathy, no angitis, no cellulitis.    Neuro: less allodynia in the right wound bed than before  Musculoskeletal:  no clubbing, cyanosis or petechiae; RLE and LLE with no gross effusions, joint misalignment or acute arthritis  Gay-ulcer skin: indurated and pink, BL, resolved focal redness and moisture on the right side this week  Ulcer(s): left side granular, no longer SQ fibrotic, no longer inflamed, quite small, a bit of fibrin and biofilm, and modest serous exudate; right side with resolution of purple pyoderma edges, peripherally more granular and epithelializing, centrally less fibrotic tissue and slough, more granulation coming up within that, some fibrin and biofilm, serous exudate, no pus or other signs of infection; a bit smaller than last week, and I think with a bit of residual Oasis graft material visible. Photos also saved in electronic chart. Assessment:     Patient Active Problem List   Diagnosis Code    HTN (hypertension) I10    Localized osteoarthrosis, lower leg M17.10    Eczema L30.9    Foot drop, right M21.371    Lumbosacral spondylosis without myelopathy M47.817    Normocytic anemia D64.9    Peripheral neuropathy G62.9    Senile osteoporosis M81.0    Bilateral lower extremity edema R60.0    CKD (chronic kidney disease) stage 3, GFR 30-59 ml/min ([facility]) N18.30    Ulcer of right ankle, with fat layer exposed ([facility] [address] L97.312    PMR (polymyalgia rheumatica) ([facility]) M35.3    Giant cell arteritis ([facility]) M31.6    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    Ulcer of left ankle, with fat layer exposed ([address] L97.322    Probable pyoderma gangrenosum L88    Chronic venous hypertension (idiopathic) with ulcer and inflammation of right lower extremity ([facility]) I87.331    Chronic venous hypertension (idiopathic) with ulcer and inflammation of left lower extremity ([facility]) I87.332       Assessment of today's active condition(s): venous insufficiency, skin changes, leg edema, recurrent venous medial ankle ulcers, but this time with significant pyoderma of the right side as well; steady progress the last couple of months with cautious debridement, topical agents, sulfa, compression, Oasis. Factors contributing to occurrence and/or persistence of the chronic ulcer include edema and venous stasis. Medical necessity of today's visit is shown by the above documentation. Sharp debridement is not indicated today, based upon the exam findings in the wound(s) above. She does have an indication for a weekly compression wrap.      I just used a 27g needle to puncture a few tiny holes through the graft and into the underlying wound bed, to encourage a small amount of bleeding (which stopped spontaneously), and allow the wound bed to continue to be stimulated by the graft material.     Procedure note:     Consent obtained. Time out performed per [facility]. Anesthetic  Anesthetic: 4% Lidocaine Cream     After cleansing of the wounds with saline and applying skin-care and/or dressing materials as listed below, Right unilateral application of a multi-layer compression wrap was performed per physician order, to help manage edema, stasis dermatitis, and/or venous ulcers. The patient's level of pain during and after the procedure was monitored, and is noted in the wound documentation flowsheet. Discharge plan:     Treatment in the wound care center today, per RN documentation: Wound 12/16/22 #5 Right medial lower leg-SUPERIOR, Venous, partial thickness, onset 12/9/22-Dressing/Treatment: Other (comment) (Tacrolimus/Calmoseptine to wound edges,   Collagen   Mepilex Transfer Ag  CoFlex Calamine -)  Wound 07/15/22 #3 Rt Medial lower Leg-DISTAL CLUSTER, Venous, Partial Thickness, Onset 06/22-Dressing/Treatment: Other (comment) (Tacrolimus/Calmoseptine to wound edges,   Collagen   Mepilex Transfer Ag  CoFlex Calamine -)  Wound 07/15/22 #4 Left Medial Ankle, Venous, Partial thickness (onset 6/2022)-Dressing/Treatment: Other (comment) (Collagen then Hydrogel  Benzoin (mauro)  Small Mepilex Border Compression garment). Leave primary dressing and multi-layer wrap(s) in place until the next appointment. She should call before her next visit if there is any significant pain, significant strike-through of drainage to the surface of the wrap, or any significant sense of the wrap sliding down more than an inch or so, bunching-up and abrading her skin.      I reminded the patient of the importance of weight management and smoking cessation, if applicable; also encouraged ambulation as tolerated, additional lower extremity exercises as instructed in our education sheet, leg elevation when at rest, and compliance with any recommended dietary, diuretic and compression therapies. Continue CircAid on the left side (planning BL, once right side is closer to being healed). No change in pain mgmt. Keep up with protein intake. Continue sulfa - repeat labs in a couple of weeks. Written discharge instructions given to patient. Follow up in 1 week.     Electronically signed by Hortensia Navas MD on 1/2/2023 at 4:46 PM.

## 2023-01-02 NOTE — PROGRESS NOTES
88 [facility] Progress Note    Cindi Adan     : 2/3/1933    DATE OF VISIT:  2022    Subjective: Cindi Adan is a 80 y.o. 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 Significant symptoms or pertinent wound history since last visit: feeling pretty well, some pain at times, R>L, no fever, stable swelling moderate drainage, no real pruritus. Tolerating sulfa well. Additional ulcer(s) noted? no.  Apart from a small satellite ulcer proximal to the main RLE ulcer. Her current medication list consists of Denosumab, Tocilizumab, acetaminophen, apixaban, clobetasol, clotrimazole-betamethasone, enalapril, furosemide, losartan, metoprolol succinate, sulfaSALAzine, tacrolimus, and traMADol. Allergies: Dapsone, Erythromycin, Hydrochlorothiazide, Minocycline, Silver sulfadiazine, and Doxycycline    Objective:     Vitals:    22 1325   BP: 130/68   Pulse: 68   Resp: 20   Temp: 98.6 °F (37 °C)   TempSrc: Oral   Weight: 150 lb 9.6 oz (68.3 kg)   Height: 5' 4\" (1.626 m)     Constitutional:  well-developed, well-nourished, less fatigued, NAD  Psychiatric:  oriented to person, place and time; mood and affect appropriate for the situation  Cardiovascular:  bilateral pedal pulses palpable, feet warm, good cap refill; mild BL lower extremity swelling this week, usual hemosiderin and atrophe miroslava  Lymphatic:  no inguinal or popliteal adenopathy, no angitis, no cellulitis.    Neuro: less allodynia in the right wound bed than before  Musculoskeletal:  no clubbing, cyanosis or petechiae; RLE and LLE with no gross effusions, joint misalignment or acute arthritis  Gay-ulcer skin: indurated and pink, BL, but some focal increased redness and moisture on the right side this week  Ulcer(s): left side granular, no longer SQ fibrotic, no longer inflamed, quite small, a bit of fibrin and biofilm, and modest serous exudate; right side with resolution of purple pyoderma edges, peripherally more granular and epithelializing, centrally still a bit of SQ fibrotic tissue and slough, but that's loosening, more granulation coming up within that, some fibrin and biofilm, serous exudate, no pus or other signs of infection; overall still encouraging progress, but a bit of an inflammatory setback from last week. Photos also saved in electronic chart.     Today's wound measurements, per RN documentation:  Wound 07/15/22 #3 Rt Medial lower Leg-DISTAL CLUSTER, Venous, Partial Thickness, Onset 06/22-Wound Length (cm): 5.5 cm  Wound 12/16/22 #5 Right medial lower leg-SUPERIOR, Venous, partial thickness, onset 12/9/22-Wound Length (cm): 0.3 cm    Wound 07/15/22 #3 Rt Medial lower Leg-DISTAL CLUSTER, Venous, Partial Thickness, Onset 06/22-Wound Width (cm): 1.9 cm  Wound 12/16/22 #5 Right medial lower leg-SUPERIOR, Venous, partial thickness, onset 12/9/22-Wound Width (cm): 0.4 cm    Wound 07/15/22 #3 Rt Medial lower Leg-DISTAL CLUSTER, Venous, Partial Thickness, Onset 06/22-Wound Depth (cm): 0.4 cm  Wound 12/16/22 #5 Right medial lower leg-SUPERIOR, Venous, partial thickness, onset 12/9/22-Wound Depth (cm): 0.1 cm    Assessment:     Patient Active Problem List   Diagnosis Code    HTN (hypertension) I10    Localized osteoarthrosis, lower leg M17.10    Eczema L30.9    Foot drop, right M21.371    Lumbosacral spondylosis without myelopathy M47.817    Normocytic anemia D64.9    Peripheral neuropathy G62.9    Senile osteoporosis M81.0    Bilateral lower extremity edema R60.0    CKD (chronic kidney disease) stage 3, GFR 30-59 ml/min ([facility]) N18.30    Ulcer of right ankle, with fat layer exposed ([address] L97.312    PMR (polymyalgia rheumatica) ([facility]) M35.3    Giant cell arteritis ([facility]) M31.6    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    Ulcer of left ankle, with fat layer exposed ([address] L97.322    Probable pyoderma gangrenosum L88    Chronic venous hypertension (idiopathic) with ulcer and inflammation of right lower extremity (HCC) I87.331    Chronic venous hypertension (idiopathic) with ulcer and inflammation of left lower extremity (HCC) I87.332       Assessment of today's active condition(s): venous insufficiency, chronic skin changes, LE edema, recurrent medial ankle ulcers, this time with evolution to pyoderma, maki on that right side; definite progress with [place], but I think we need to a take a break this week to get that right side calmed down. Factors contributing to occurrence and/or persistence of the chronic ulcer include edema and venous stasis. Medical necessity of today's visit is shown by the above documentation. Sharp debridement is indicated today, based upon the exam findings in the wound(s) above. Because of the expectation of pain and pathergy with sharp debridement, we decided to use the JetOx system today (with NSS and HClO), in lieu of sharp debridement. Procedure note:     Consent obtained. Time out performed per [facility]. Anesthetic  Anesthetic: 4% Lidocaine Cream     Using a JetOx device, I debrided the bilateral, medial ankle ulcer(s) down through and including the removal of dermis. The type(s) of tissue debrided included fibrin and biofilm. Total Surface Area Debrided: 10 sq cm. The ulcers were then irrigated with normal saline solution. The procedure was completed with a small amount of bleeding, and hemostasis was with pressure. The patient tolerated the procedure well, with no significant complications. The patient's level of pain during and after the procedure was monitored. Post-debridement measurements, if different from pre-debridement, are in the flowsheet as well.     Discharge plan:     Treatment in the wound care center today, per RN documentation: Wound 07/15/22 #3 Rt Medial lower Leg-DISTAL CLUSTER, Venous, Partial Thickness, Onset 06/22-Dressing/Treatment:  (Tacro/ Clobetasol(edge)Lotrisone(periRash),Triad,mepilexTransfer AG,calamine coflex)  Wound 12/16/22 #5 Right medial lower leg-SUPERIOR, Venous, partial thickness, onset 12/9/22-Dressing/Treatment:  (Tacro/ Clobetasol(edge)Lotrisone(periRash),Triad,mepilexTransfer AG,calamine coflex)  Wound 07/15/22 #4 Left Medial Ankle, Venous, Partial thickness (onset 6/2022)-Dressing/Treatment:  (Hydrogel, Mepilex border). Per physician order, right application of multilayer compression wrap was performed in the wound care center today, to help manage edema, stasis dermatitis, and/or venous ulcers. Leave primary dressing and multi-layer wrap(s) in place until the next appointment. Also discussed ways to keep the wrap dry for a shower, including a plastic cast-guard, available in retail pharmacies. She should call before her next visit if there is any significant pain, significant strike-through of drainage to the surface of the wrap, or any significant sense of the wrap sliding down more than an inch or so, bunching-up and abrading her skin. I reminded the patient of the importance of weight management and smoking cessation, if applicable; also encouraged ambulation as tolerated, additional lower extremity exercises as instructed in our education sheet, leg elevation when at rest, and compliance with any recommended dietary, diuretic and compression therapies. Continue Juzo wrap on the left. Continue Sulfa. Might resume Oasis on the right side next week. Written discharge instructions given to patient. Follow up in 1 week.     Electronically signed by Rose Galindo MD on 1/2/2023 at 3:22 PM.

## 2023-01-02 NOTE — PROGRESS NOTES
[facility] Progress Note    Lexa Sawyer     : 2/3/1933    DATE OF VISIT:  2022    Subjective: Lexa Sawyer is a 80 y.o. female who has a venous ulcer located on the bilateral, medial ankle. Significant symptoms or pertinent wound history since last visit: feeling ok this week, no fever, not much pain, no pruritus, stable swelling, maybe just a bit greater on the left. Additional ulcer(s) noted? no.      Her current medication list consists of Denosumab, Tocilizumab, acetaminophen, apixaban, clobetasol, clotrimazole-betamethasone, enalapril, furosemide, losartan, metoprolol succinate, sulfaSALAzine, tacrolimus, and traMADol. Allergies: Dapsone, Erythromycin, Hydrochlorothiazide, Minocycline, Silver sulfadiazine, and Doxycycline    Objective:     Vitals:    22 1031   BP: (!) 143/80   Pulse: 62   Resp: 20   Temp: 97.9 °F (36.6 °C)   TempSrc: Oral   Weight: 150 lb (68 kg)   Height: 5' 4\" (1.626 m)     Constitutional:  well-developed, well-nourished, less fatigued, NAD  Psychiatric:  oriented to person, place and time; mood and affect appropriate for the situation  Cardiovascular:  bilateral pedal pulses palpable, feet warm, good cap refill; mild BL lower extremity swelling this week, usual hemosiderin and atrophe miroslava  Lymphatic:  no inguinal or popliteal adenopathy, no angitis, no cellulitis.    Neuro: less allodynia in the right wound bed than before  Musculoskeletal:  no clubbing, cyanosis or petechiae; RLE and LLE with no gross effusions, joint misalignment or acute arthritis  Gay-ulcer skin: indurated and pink, BL, improved focal redness and moisture on the right side this week  Ulcer(s): left side granular, no longer SQ fibrotic, no longer inflamed, quite small, a bit of fibrin and biofilm, and modest serous exudate; right side with resolution of purple pyoderma edges, peripherally more granular and epithelializing, centrally still a bit of SQ fibrotic tissue and slough, but that's loosening, more granulation coming up within that, some fibrin and biofilm, serous exudate, no pus or other signs of infection; settled down, less inflamed than last week. Photos also saved in electronic chart.     Today's wound measurements, per RN documentation:  Wound 12/16/22 #5 Right medial lower leg-SUPERIOR, Venous, partial thickness, onset 12/9/22-Wound Length (cm): 0.4 cm  Wound 07/15/22 #3 Rt Medial lower Leg-DISTAL CLUSTER, Venous, Partial Thickness, Onset 06/22-Wound Length (cm): 4.7 cm  Wound 07/15/22 #4 Left Medial Ankle, Venous, Partial thickness (onset 6/2022)-Wound Length (cm): 0.2 cm    Wound 12/16/22 #5 Right medial lower leg-SUPERIOR, Venous, partial thickness, onset 12/9/22-Wound Width (cm): 0.3 cm  Wound 07/15/22 #3 Rt Medial lower Leg-DISTAL CLUSTER, Venous, Partial Thickness, Onset 06/22-Wound Width (cm): 1.5 cm  Wound 07/15/22 #4 Left Medial Ankle, Venous, Partial thickness (onset 6/2022)-Wound Width (cm): 0.2 cm    Wound 12/16/22 #5 Right medial lower leg-SUPERIOR, Venous, partial thickness, onset 12/9/22-Wound Depth (cm): 0.2 cm  Wound 07/15/22 #3 Rt Medial lower Leg-DISTAL CLUSTER, Venous, Partial Thickness, Onset 06/22-Wound Depth (cm): 0.3 cm  Wound 07/15/22 #4 Left Medial Ankle, Venous, Partial thickness (onset 6/2022)-Wound Depth (cm): 0.1 cm    Assessment:     Patient Active Problem List   Diagnosis Code    HTN (hypertension) I10    Localized osteoarthrosis, lower leg M17.10    Eczema L30.9    Foot drop, right M21.371    Lumbosacral spondylosis without myelopathy M47.817    Normocytic anemia D64.9    Peripheral neuropathy G62.9    Senile osteoporosis M81.0    Bilateral lower extremity edema R60.0    CKD (chronic kidney disease) stage 3, GFR 30-59 ml/min (HCC) N18.30    Ulcer of right ankle, with fat layer exposed ([address] L97.312    PMR (polymyalgia rheumatica) ([facility]) M35.3    Giant cell arteritis ([facility]) M31.6    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    Ulcer of left ankle, with fat layer exposed ([address] L97.322    Probable pyoderma gangrenosum L88    Chronic venous hypertension (idiopathic) with ulcer and inflammation of right lower extremity (HCC) I87.331    Chronic venous hypertension (idiopathic) with ulcer and inflammation of left lower extremity (HCC) I87.332       Assessment of today's active condition(s): venous insufficiency, chronic skin changes, LE edema, recurrent BL LE venous ulcers, but this time with the right side especially progressing to an impressive pyoderma lesion, related to her underlying GCA; nice improvement recently with cautious debridement, compression, topicals, sulfa, Oasis. Factors contributing to occurrence and/or persistence of the chronic ulcer include edema and venous stasis. Medical necessity of today's visit is shown by the above documentation. Sharp debridement is indicated today, based upon the exam findings in the wound(s) above. Because of the expectation of pain and pathergy with sharp debridement, we decided to use the JetOx system today (with NSS and HClO), in lieu of sharp debridement. Procedure note:     Consent obtained. Time out performed per [facility]. Anesthetic  Anesthetic: 4% Lidocaine Cream     Using a JetOx device, I excisionally debrided the bilateral, medial ankle ulcer(s) down through and including the removal of dermis. The type(s) of tissue debrided included fibrin, biofilm, and exudate. Total Surface Area Debrided: 8 sq cm. The ulcers were then irrigated with normal saline solution. The procedure was completed with a small amount of bleeding, and hemostasis was with pressure. The patient tolerated the procedure well, with no significant complications. The patient's level of pain during and after the procedure was monitored.  Post-debridement measurements, if different from pre-debridement, are in the flowsheet as well.  _____________    Because this patient's right medial ankle venous ulcer has failed to respond to standard wound-care measures (including treatment of infection, debridement of necrosis, treatment of edema, provision of a moist wound environment, good compliance with compression) for more than 4 weeks, I recommended [place] Wound Matrix as adjunctive therapy to help speed healing of this ulcer. As per previous documentation, peripheral pulse exam is acceptable, and/or TODD is at least 0.65. There is no evidence of untreated infection today. Ms. Elijah Wang does not currently smoke. After cleansing the right ankle ulcer with saline, spraying the ulcer with HClO and applying Skin-Prep (+ tacrolimus + Calmoseptine) to the mauro-wound skin, a 21 sqcm piece of Oasis Wound Matrix was cut to fit the ulcer, placed into the ulcer bed, folded over for increased exposure, affixed to the surrounding skin with Steri-Strips, moistened with saline and covered with Mepilex Transfer Ag. Fifteen sqcm of the product was used in the ulcer, and 6 sqcm of the product was discarded. If applicable, any wastage is due to the fact the smallest available product was larger than what was needed for her ulcer(s). The patient tolerated the procedure well, without complications. Discharge plan:     Treatment in the wound care center today, per RN documentation: Wound 12/16/22 #5 Right medial lower leg-SUPERIOR, Venous, partial thickness, onset 12/9/22-Dressing/Treatment: Other (comment) (CoFlex Calamine)  Wound 07/15/22 #3 Rt Medial lower Leg-DISTAL CLUSTER, Venous, Partial Thickness, Onset 06/22-Dressing/Treatment: Other (comment) (CoFlex Calamine)  Wound 07/15/22 #4 Left Medial Ankle, Venous, Partial thickness (onset 6/2022)-Dressing/Treatment: Other (comment) (velcro garment). Per physician order, right application of multilayer compression wrap was performed in the wound care center today, to help manage edema, stasis dermatitis, and/or venous ulcers.  Leave primary dressing and multi-layer wrap(s) in

## 2023-01-05 ENCOUNTER — HOSPITAL ENCOUNTER (OUTPATIENT)
Dept: WOUND CARE | Age: 88
Discharge: HOME | End: 2023-01-05
Payer: MEDICARE

## 2023-01-05 VITALS
SYSTOLIC BLOOD PRESSURE: 136 MMHG | DIASTOLIC BLOOD PRESSURE: 69 MMHG | BODY MASS INDEX: 26.22 KG/M2 | WEIGHT: 153.6 LBS | RESPIRATION RATE: 18 BRPM | HEIGHT: 64 IN | TEMPERATURE: 97.4 F

## 2023-01-05 DIAGNOSIS — R60.0 BILATERAL LOWER EXTREMITY EDEMA: ICD-10-CM

## 2023-01-05 DIAGNOSIS — L97.322 ULCER OF LEFT ANKLE, WITH FAT LAYER EXPOSED (HCC): Primary | ICD-10-CM

## 2023-01-05 DIAGNOSIS — L97.312 ULCER OF RIGHT ANKLE, WITH FAT LAYER EXPOSED (HCC): ICD-10-CM

## 2023-01-05 PROCEDURE — 97597 DBRDMT OPN WND 1ST 20 CM/<: CPT

## 2023-01-05 PROCEDURE — 29581 APPL MULTLAYER CMPRN SYS LEG: CPT

## 2023-01-05 RX ORDER — LIDOCAINE 50 MG/G
OINTMENT TOPICAL ONCE
OUTPATIENT
Start: 2023-01-05 | End: 2023-01-05

## 2023-01-05 RX ORDER — LIDOCAINE HYDROCHLORIDE 40 MG/ML
SOLUTION TOPICAL ONCE
OUTPATIENT
Start: 2023-01-05 | End: 2023-01-05

## 2023-01-05 RX ORDER — LIDOCAINE 40 MG/G
CREAM TOPICAL ONCE
Status: DISCONTINUED | OUTPATIENT
Start: 2023-01-05 | End: 2023-01-06 | Stop reason: HOSPADM

## 2023-01-05 RX ORDER — LIDOCAINE 40 MG/G
CREAM TOPICAL ONCE
OUTPATIENT
Start: 2023-01-05 | End: 2023-01-05

## 2023-01-05 RX ORDER — BACITRACIN ZINC AND POLYMYXIN B SULFATE 500; 1000 [USP'U]/G; [USP'U]/G
OINTMENT TOPICAL ONCE
OUTPATIENT
Start: 2023-01-05 | End: 2023-01-05

## 2023-01-05 NOTE — DISCHARGE INSTRUCTIONS
215 [facility] Physician Orders and Discharge [address], [address]  [place], [facility]  Telephone: [phone]      Fax: [phone]        Your home care company:   N/A     Your wound-care supplies will be provided by:  weekly wraps     NAME:  Prosper Bernstein   YOB: 1933  PRIMARY DIAGNOSIS FOR WOUND CARE CENTER:  Pyoderma gangrenosum     Wound cleansing:  Do not scrub or use excessive force. Wash hands with soap and water before and after dressing changes. Prior to applying a clean dressing, cleanse wound with normal saline, wound cleanser, or mild soap and water. Ask your physician or nurse before getting the wound(s) wet in the shower. Wound care for home:     Left Lower Leg/ankle  Vashe  Collagen then Multidex Gel   Benzoin (mauro)  Small Mepilex Border  Apply sock and compression garment - you may leave this on all week if desired. Right Lower Leg:  Vashe  Calmoseptine to wound edges and scattered areas of redness   Collagen to all wounds   Mepilex Transfer Ag  ABD  CoFlex Calamine - please do not apply too much pressure to second layer, especially at ankle         KEEP THESE WRAPS CLEAN, DRY AND IN PLACE FOR THE WEEK           Please note, all wounds (unless stated otherwise here) were mechanically debrided at the time of cleansing here in the wound-care center today, so a small amount of pain, drainage or bleeding from that process might be expected, and is normal.     All products for home use, including multiple products for a single wound if applicable, are medically necessary in order to achieve the best chance at timely wound healing. See provider documentation for details if needed. Substituted dressings applied in the [address] [place] [address],[address] today, if applicable:      N/a     New orders for this week (labs, imaging, medications, etc.):     You may walk as much as you can tolerate.   Continue elevating legs   You may take Benadryl at night only for itching - please be aware this may cause dry mouth and some sleepiness/dizziness      Additional instructions for specific diagnoses:     Continue Sulfasalazine three times daily     [place] #3 - 10/21/22  [place] #4 - 10/28/22  [place] #5 - 11/4/22  [place] #6 - 11/11/22   [place] #7 -11-22-22  [place] #8 12/1/22   [place] #9  12/9/22  [place] #10 12/21/22      F/U Appointment  with Dr. Ayaka Foster in 1 week                                               at                                      .     Your nurse  is Gilda Dillard RN     If we applied slip-resistant hospital socks today, be sure to remove them at least once a day to inspect your toes or feet, even if you're not changing the wraps or dressings underneath. If you see anything concerning (redness, excess moisture, etc), please call and let us know right away. Should you experience any significant changes in your wound(s) (including redness, increased warmth, increased pain, increased drainage, odor, or fever) or have questions about your wound care, please contact the [facility] at [phone] Monday-Thursday from 8:00 am - 4:30 pm, or Friday from 8:00 am - 2:30 pm.  If you need help with your wound outside these hours and cannot wait until we are again available, contact your home-care company (if applicable), your PCP, or go to the nearest emergency room.

## 2023-01-05 NOTE — PLAN OF CARE
Wounds showing signs of improvement. Sharp wound debridement to RLE wounds per Dr. Gila Solis. To begin multidex to RLE wounds. Labs next week. Follow up in [address] [place] [address] in 1 week as ordered. Pt. Aware to call sooner with any problems or questions/concerns. MD orders/D/C instructions reviewed with patient, all questions answered; copy of instructions given to patient.

## 2023-01-06 NOTE — DISCHARGE INSTRUCTIONS
215 [facility] Physician Orders and Discharge [address], [address]  [place], [facility]  Telephone: [phone]      Fax: [phone]        Your home care company:   N/A     Your wound-care supplies will be provided by:  weekly wraps     NAME:  Dufm Gosselin   YOB: 1933  PRIMARY DIAGNOSIS FOR WOUND CARE CENTER:  Pyoderma gangrenosum     Wound cleansing:  Do not scrub or use excessive force. Wash hands with soap and water before and after dressing changes. Prior to applying a clean dressing, cleanse wound with normal saline, wound cleanser, or mild soap and water. Ask your physician or nurse before getting the wound(s) wet in the shower. Wound care for home:     Left Lower Leg/ankle  Vashe  Clobetasol to mauro-wound and wound bed   Benzoin (mauro)  Small Mepilex Border  Leave in place all week. Apply sock and compression garment - you may leave this on all week if desired. Right Lower Leg:  Vashe  Calmoseptine and clobetasol to scattered areas of redness   Collagen to all wounds   ABD  CoFlex Calamine - please do not apply too much pressure to second layer, especially at ankle    KEEP THESE WRAPS CLEAN, DRY AND IN PLACE FOR THE WEEK           Please note, all wounds (unless stated otherwise here) were mechanically debrided at the time of cleansing here in the wound-care center today, so a small amount of pain, drainage or bleeding from that process might be expected, and is normal.     All products for home use, including multiple products for a single wound if applicable, are medically necessary in order to achieve the best chance at timely wound healing. See provider documentation for details if needed. Substituted dressings applied in the [facility] today, if applicable:      N/a     New orders for this week (labs, imaging, medications, etc.):     You may walk as much as you can tolerate.   Continue elevating legs   You may take Benadryl at night only for itching - please be aware this may cause dry mouth and some sleepiness/dizziness      Additional instructions for specific diagnoses:     Continue Sulfasalazine three times daily     [place] #3 - 10/21/22  [place] #4 - 10/28/22  [place] #5 - 11/4/22  [place] #6 - 11/11/22   [place] #7 -11-22-22  [place] #8 12/1/22   [place] #9  12/9/22  [place] #10 12/21/22      F/U Appointment  with Dr. Gita Calle in 1 week                                               at                                      .     Your nurse  is Leonel Bansal RN     If we applied slip-resistant hospital socks today, be sure to remove them at least once a day to inspect your toes or feet, even if you're not changing the wraps or dressings underneath. If you see anything concerning (redness, excess moisture, etc), please call and let us know right away. Should you experience any significant changes in your wound(s) (including redness, increased warmth, increased pain, increased drainage, odor, or fever) or have questions about your wound care, please contact the [facility] [address] at [phone] Monday-Thursday from 8:00 am - 4:30 pm, or Friday from 8:00 am - 2:30 pm.  If you need help with your wound outside these hours and cannot wait until we are again available, contact your home-care company (if applicable), your PCP, or go to the nearest emergency room.

## 2023-01-12 ENCOUNTER — HOSPITAL ENCOUNTER (OUTPATIENT)
Dept: WOUND CARE | Age: 88
Discharge: HOME | End: 2023-01-12
Payer: MEDICARE

## 2023-01-12 VITALS
TEMPERATURE: 97.3 F | SYSTOLIC BLOOD PRESSURE: 139 MMHG | BODY MASS INDEX: 25.95 KG/M2 | HEIGHT: 64 IN | DIASTOLIC BLOOD PRESSURE: 76 MMHG | RESPIRATION RATE: 18 BRPM | HEART RATE: 72 BPM | WEIGHT: 152 LBS

## 2023-01-12 DIAGNOSIS — R60.0 BILATERAL LOWER EXTREMITY EDEMA: ICD-10-CM

## 2023-01-12 DIAGNOSIS — L97.312 ULCER OF RIGHT ANKLE, WITH FAT LAYER EXPOSED (HCC): ICD-10-CM

## 2023-01-12 DIAGNOSIS — L97.322 ULCER OF LEFT ANKLE, WITH FAT LAYER EXPOSED (HCC): Primary | ICD-10-CM

## 2023-01-12 LAB
A/G RATIO: 2.4 (ref 1.1–2.2)
ALBUMIN SERPL-MCNC: 4 G/DL (ref 3.4–5)
ALP BLD-CCNC: 45 U/L (ref 40–129)
ALT SERPL-CCNC: 13 U/L (ref 10–40)
ANION GAP SERPL CALCULATED.3IONS-SCNC: 13 MMOL/L (ref 3–16)
AST SERPL-CCNC: 19 U/L (ref 15–37)
BASOPHILS ABSOLUTE: 0 K/UL (ref 0–0.2)
BASOPHILS RELATIVE PERCENT: 0.7 %
BILIRUB SERPL-MCNC: <0.2 MG/DL (ref 0–1)
BUN BLDV-MCNC: 25 MG/DL (ref 7–20)
CALCIUM SERPL-MCNC: 10.8 MG/DL (ref 8.3–10.6)
CHLORIDE BLD-SCNC: 104 MMOL/L (ref 99–110)
CO2: 23 MMOL/L (ref 21–32)
CREAT SERPL-MCNC: 0.8 MG/DL (ref 0.6–1.2)
EOSINOPHILS ABSOLUTE: 0.1 K/UL (ref 0–0.6)
EOSINOPHILS RELATIVE PERCENT: 2.4 %
GFR SERPL CREATININE-BSD FRML MDRD: >60 ML/MIN/{1.73_M2}
GLUCOSE BLD-MCNC: 101 MG/DL (ref 70–99)
HCT VFR BLD CALC: 37.1 % (ref 36–48)
HEMOGLOBIN: 11.9 G/DL (ref 12–16)
LYMPHOCYTES ABSOLUTE: 1.2 K/UL (ref 1–5.1)
LYMPHOCYTES RELATIVE PERCENT: 28.9 %
MCH RBC QN AUTO: 30.6 PG (ref 26–34)
MCHC RBC AUTO-ENTMCNC: 32 G/DL (ref 31–36)
MCV RBC AUTO: 95.5 FL (ref 80–100)
MONOCYTES ABSOLUTE: 0.7 K/UL (ref 0–1.3)
MONOCYTES RELATIVE PERCENT: 16.6 %
NEUTROPHILS ABSOLUTE: 2.1 K/UL (ref 1.7–7.7)
NEUTROPHILS RELATIVE PERCENT: 51.4 %
PDW BLD-RTO: 15.4 % (ref 12.4–15.4)
PLATELET # BLD: 164 K/UL (ref 135–450)
PMV BLD AUTO: 8.8 FL (ref 5–10.5)
POTASSIUM SERPL-SCNC: 4.1 MMOL/L (ref 3.5–5.1)
RBC # BLD: 3.88 M/UL (ref 4–5.2)
SODIUM BLD-SCNC: 140 MMOL/L (ref 136–145)
TOTAL PROTEIN: 5.7 G/DL (ref 6.4–8.2)
WBC # BLD: 4.2 K/UL (ref 4–11)

## 2023-01-12 PROCEDURE — 80053 COMPREHEN METABOLIC PANEL: CPT

## 2023-01-12 PROCEDURE — 97597 DBRDMT OPN WND 1ST 20 CM/<: CPT

## 2023-01-12 PROCEDURE — 85025 COMPLETE CBC W/AUTO DIFF WBC: CPT

## 2023-01-12 PROCEDURE — 29581 APPL MULTLAYER CMPRN SYS LEG: CPT

## 2023-01-12 PROCEDURE — 36415 COLL VENOUS BLD VENIPUNCTURE: CPT

## 2023-01-12 RX ORDER — LIDOCAINE 40 MG/G
CREAM TOPICAL ONCE
OUTPATIENT
Start: 2023-01-12 | End: 2023-01-12

## 2023-01-12 RX ORDER — LIDOCAINE 50 MG/G
OINTMENT TOPICAL ONCE
OUTPATIENT
Start: 2023-01-12 | End: 2023-01-12

## 2023-01-12 RX ORDER — LIDOCAINE 40 MG/G
CREAM TOPICAL ONCE
Status: DISCONTINUED | OUTPATIENT
Start: 2023-01-12 | End: 2023-01-13 | Stop reason: HOSPADM

## 2023-01-12 RX ORDER — LIDOCAINE HYDROCHLORIDE 40 MG/ML
SOLUTION TOPICAL ONCE
OUTPATIENT
Start: 2023-01-12 | End: 2023-01-12

## 2023-01-12 RX ORDER — BACITRACIN ZINC AND POLYMYXIN B SULFATE 500; 1000 [USP'U]/G; [USP'U]/G
OINTMENT TOPICAL ONCE
OUTPATIENT
Start: 2023-01-12 | End: 2023-01-12

## 2023-01-12 ASSESSMENT — PAIN SCALES - GENERAL: PAINLEVEL_OUTOF10: 0

## 2023-01-12 NOTE — PLAN OF CARE
Wounds showing signs of improvement. Jet ox debridement to [facility] wounds per Dr. Nolberto Kat. Labs drawn at bedside. Plan to stop mepilex transfer Ag to [facility]. To begin clobetasol to  left leg wound and mauro-wound. Follow up in [address] [place] [address] in 1 week as ordered. Pt. Aware to call sooner with any problems or questions/concerns. MD orders/D/C instructions reviewed with patient, all questions answered; copy of instructions given to patient.

## 2023-01-13 NOTE — DISCHARGE INSTRUCTIONS
Wound Care Center Physician Orders and Discharge Instructions  [facility]  [address], [address]  [place] [id number]  Telephone: [phone]      Fax: [phone]        Your home care company:   N/A     Your wound-care supplies will be provided by:  weekly wraps     NAME:  Anne Stokes   YOB: 1933  PRIMARY DIAGNOSIS FOR WOUND CARE CENTER:  Pyoderma gangrenosum     Wound cleansing:  Do not scrub or use excessive force.  Wash hands with soap and water before and after dressing changes.  Prior to applying a clean dressing, cleanse wound with normal saline, wound cleanser, or mild soap and water. Ask your physician or nurse before getting the wound(s) wet in the shower.                Wound care for home:     Left Lower Leg/ankle  Vashe  Clobetasol mixed with Triad to all red areas   4x4's   CoFlex Calamine - please do not apply too much pressure to second layer, especially at ankle   Leave in place all week.   Apply sock and compression garment - you may leave this on all week if desired.      Right Lower Leg:  Vashe  Triad and clobetasol to scattered areas of redness   Collagen to all wounds   ABD  CoFlex Calamine - please do not apply too much pressure to second layer, especially at ankle    KEEP THESE WRAPS CLEAN, DRY AND IN PLACE FOR THE WEEK           Please note, all wounds (unless stated otherwise here) were mechanically debrided at the time of cleansing here in the wound-care center today, so a small amount of pain, drainage or bleeding from that process might be expected, and is normal.     All products for home use, including multiple products for a single wound if applicable, are medically necessary in order to achieve the best chance at timely wound healing. See provider documentation for details if needed.     Substituted dressings applied in the [facility] today, if applicable:      N/a     New orders for this week (labs, imaging, medications, etc.):     You may  walk as much as you can tolerate. Continue elevating legs   You may take Benadryl at night only for itching - please be aware this may cause dry mouth and some sleepiness/dizziness      Additional instructions for specific diagnoses:     Continue Sulfasalazine three times daily     [place] #3 - 10/21/22  [place] #4 - 10/28/22  [place] #5 - 11/4/22  [place] #6 - 11/11/22   [place] #7 -11-22-22  [place] #8 12/1/22   [place] #9  12/9/22  [place] #10 12/21/22        F/U Appointment  with Dr. Mary Gipson in 1 week                                               at                                      .     Your nurse  is Arian Marrero RN     If we applied slip-resistant hospital socks today, be sure to remove them at least once a day to inspect your toes or feet, even if you're not changing the wraps or dressings underneath. If you see anything concerning (redness, excess moisture, etc), please call and let us know right away.      Should you experience any significant changes in your wound(s) (including redness, increased warmth, increased pain, increased drainage, odor, or fever) or have questions about your wound care, please contact the [facility] [address] at [phone] Monday-Thursday from 8:00 am - 4:30 pm, or Friday from 8:00 am - 2:30 pm.  If you need help with your wound outside these hours and cannot wait until we are again available, contact your home-care company (if applicable), your PCP, or go to the nearest emergency room

## 2023-01-19 ENCOUNTER — HOSPITAL ENCOUNTER (OUTPATIENT)
Dept: WOUND CARE | Age: 88
Discharge: HOME | End: 2023-01-19
Payer: MEDICARE

## 2023-01-19 VITALS
HEIGHT: 64 IN | RESPIRATION RATE: 18 BRPM | TEMPERATURE: 97.7 F | SYSTOLIC BLOOD PRESSURE: 132 MMHG | WEIGHT: 153.6 LBS | BODY MASS INDEX: 26.22 KG/M2 | DIASTOLIC BLOOD PRESSURE: 79 MMHG | HEART RATE: 72 BPM

## 2023-01-19 DIAGNOSIS — L97.312 ULCER OF RIGHT ANKLE, WITH FAT LAYER EXPOSED (HCC): ICD-10-CM

## 2023-01-19 DIAGNOSIS — R60.0 BILATERAL LOWER EXTREMITY EDEMA: ICD-10-CM

## 2023-01-19 DIAGNOSIS — L97.322 ULCER OF LEFT ANKLE, WITH FAT LAYER EXPOSED (HCC): Primary | ICD-10-CM

## 2023-01-19 PROCEDURE — 97597 DBRDMT OPN WND 1ST 20 CM/<: CPT

## 2023-01-19 PROCEDURE — 29581 APPL MULTLAYER CMPRN SYS LEG: CPT

## 2023-01-19 RX ORDER — LIDOCAINE 40 MG/G
CREAM TOPICAL ONCE
OUTPATIENT
Start: 2023-01-19 | End: 2023-01-19

## 2023-01-19 RX ORDER — BACITRACIN ZINC AND POLYMYXIN B SULFATE 500; 1000 [USP'U]/G; [USP'U]/G
OINTMENT TOPICAL ONCE
OUTPATIENT
Start: 2023-01-19 | End: 2023-01-19

## 2023-01-19 RX ORDER — LIDOCAINE 40 MG/G
CREAM TOPICAL ONCE
Status: DISCONTINUED | OUTPATIENT
Start: 2023-01-19 | End: 2023-01-20 | Stop reason: HOSPADM

## 2023-01-19 RX ORDER — LIDOCAINE HYDROCHLORIDE 40 MG/ML
SOLUTION TOPICAL ONCE
OUTPATIENT
Start: 2023-01-19 | End: 2023-01-19

## 2023-01-19 RX ORDER — LIDOCAINE 50 MG/G
OINTMENT TOPICAL ONCE
OUTPATIENT
Start: 2023-01-19 | End: 2023-01-19

## 2023-01-19 ASSESSMENT — PAIN SCALES - GENERAL: PAINLEVEL_OUTOF10: 0

## 2023-01-19 NOTE — PLAN OF CARE
Wounds stable. Left lower leg wound measuring smaller. Jet ox debridement per Dr. Tigist Zazueta. To begin co-flex calamine dressing to LLE. Follow up in [address] [place] [address] in 1 week as ordered. Pt. Aware to call sooner with any problems or questions/concerns. MD orders/D/C instructions reviewed with patient, all questions answered; copy of instructions given to patient.

## 2023-01-20 NOTE — DISCHARGE INSTRUCTIONS
215 [facility] Physician Orders and Discharge [facility]  [address], [address]  [place], [facility]  Telephone: [phone]      Fax: [phone]        Your home care company:   N/A     Your wound-care supplies will be provided by:  weekly wraps     NAME:  Evelio Noe   YOB: 1933  PRIMARY DIAGNOSIS FOR WOUND CARE CENTER:  Pyoderma gangrenosum     Wound cleansing:  Do not scrub or use excessive force. Wash hands with soap and water before and after dressing changes. Prior to applying a clean dressing, cleanse wound with normal saline, wound cleanser, or mild soap and water. Ask your physician or nurse before getting the wound(s) wet in the shower. Wound care for home:     Left Lower Leg/ankle  Vashe  Clobetasol mixed with Triad to all red areas   4x4's   CoFlex Calamine - please do not apply too much pressure to second layer, especially at ankle   Leave in place all week. Right Lower Leg:  Vashe  Collagen to all wounds and multidex gel  4x4's  CoFlex Calamine - please do not apply too much pressure to second layer, especially at ankle    KEEP THESE WRAPS CLEAN, DRY AND IN PLACE FOR THE WEEK           Please note, all wounds (unless stated otherwise here) were mechanically debrided at the time of cleansing here in the wound-care center today, so a small amount of pain, drainage or bleeding from that process might be expected, and is normal.     All products for home use, including multiple products for a single wound if applicable, are medically necessary in order to achieve the best chance at timely wound healing. See provider documentation for details if needed. Substituted dressings applied in the [address] [place] [address],[address] today, if applicable:      N/a     New orders for this week (labs, imaging, medications, etc.):     You may walk as much as you can tolerate.   Continue elevating legs   You may take Benadryl at night only for itching - please be aware this may cause dry mouth and some sleepiness/dizziness      Additional instructions for specific diagnoses:     Continue Sulfasalazine three times daily     [place] #3 - 10/21/22  [place] #4 - 10/28/22  [place] #5 - 11/4/22  [place] #6 - 11/11/22   [place] #7 -11-22-22  [place] #8 12/1/22   [place] #9  12/9/22  [place] #10 12/21/22         F/U Appointment  with Dr. Vale Henriquez in 1 week                                               at                                      .     Your nurse  is Alirio Nair RN     If we applied slip-resistant hospital socks today, be sure to remove them at least once a day to inspect your toes or feet, even if you're not changing the wraps or dressings underneath. If you see anything concerning (redness, excess moisture, etc), please call and let us know right away.      Should you experience any significant changes in your wound(s) (including redness, increased warmth, increased pain, increased drainage, odor, or fever) or have questions about your wound care, please contact the [address] at [phone] Monday-Thursday from 8:00 am - 4:30 pm, or Friday from 8:00 am - 2:30 pm.  If you need help with your wound outside these hours and cannot wait until we are again available, contact your home-care company (if applicable), your PCP, or go to the nearest emergency room

## 2023-01-26 ENCOUNTER — HOSPITAL ENCOUNTER (OUTPATIENT)
Dept: WOUND CARE | Age: 88
Discharge: HOME | End: 2023-01-26
Payer: MEDICARE

## 2023-01-26 VITALS
DIASTOLIC BLOOD PRESSURE: 82 MMHG | SYSTOLIC BLOOD PRESSURE: 156 MMHG | RESPIRATION RATE: 18 BRPM | HEIGHT: 64 IN | TEMPERATURE: 98.6 F | BODY MASS INDEX: 26.02 KG/M2 | WEIGHT: 152.4 LBS | HEART RATE: 70 BPM

## 2023-01-26 DIAGNOSIS — L97.322 ULCER OF LEFT ANKLE, WITH FAT LAYER EXPOSED (HCC): Primary | ICD-10-CM

## 2023-01-26 DIAGNOSIS — L97.312 ULCER OF RIGHT ANKLE, WITH FAT LAYER EXPOSED (HCC): ICD-10-CM

## 2023-01-26 DIAGNOSIS — R60.0 BILATERAL LOWER EXTREMITY EDEMA: ICD-10-CM

## 2023-01-26 PROCEDURE — 29581 APPL MULTLAYER CMPRN SYS LEG: CPT

## 2023-01-26 PROCEDURE — 97597 DBRDMT OPN WND 1ST 20 CM/<: CPT

## 2023-01-26 RX ORDER — LIDOCAINE 40 MG/G
CREAM TOPICAL ONCE
Status: DISCONTINUED | OUTPATIENT
Start: 2023-01-26 | End: 2023-01-27 | Stop reason: HOSPADM

## 2023-01-26 RX ORDER — LIDOCAINE HYDROCHLORIDE 40 MG/ML
SOLUTION TOPICAL ONCE
OUTPATIENT
Start: 2023-01-26 | End: 2023-01-26

## 2023-01-26 RX ORDER — BACITRACIN ZINC AND POLYMYXIN B SULFATE 500; 1000 [USP'U]/G; [USP'U]/G
OINTMENT TOPICAL ONCE
OUTPATIENT
Start: 2023-01-26 | End: 2023-01-26

## 2023-01-26 RX ORDER — LIDOCAINE 50 MG/G
OINTMENT TOPICAL ONCE
OUTPATIENT
Start: 2023-01-26 | End: 2023-01-26

## 2023-01-26 RX ORDER — LIDOCAINE 40 MG/G
CREAM TOPICAL ONCE
OUTPATIENT
Start: 2023-01-26 | End: 2023-01-26

## 2023-01-26 NOTE — PLAN OF CARE
Wounds stable. Jet ox debridement per Dr. Jain Giovani. To continue co-flex calamine dressing to LLE. Follow up in [address] [place] [address] in 1 week as ordered. Pt. Aware to call sooner with any problems or questions/concerns. MD orders/D/C instructions reviewed with patient, all questions answered; copy of instructions given to patient.

## 2023-01-27 NOTE — DISCHARGE INSTRUCTIONS
215 [facility] Physician Orders and Discharge [address], [address]  [place], [facility]  Telephone: [phone]      Fax: [phone]        Your home care company:   N/A     Your wound-care supplies will be provided by:  weekly wraps     NAME:  Naomi Virgen   YOB: 1933  PRIMARY DIAGNOSIS FOR WOUND CARE CENTER:  Pyoderma gangrenosum     Wound cleansing:  Do not scrub or use excessive force. Wash hands with soap and water before and after dressing changes. Prior to applying a clean dressing, cleanse wound with normal saline, wound cleanser, or mild soap and water. Ask your physician or nurse before getting the wound(s) wet in the shower. Wound care for home:     Left Lower Leg/ankle  Triamcinolone to lateral leg rash  Betadine to malleolus to healed area  Vashe-to open wound   Collagen and Multidex gel   4x4's   CoFlex Calamine - please do not apply too much pressure to second layer, especially at ankle   Leave in place all week. Right Lower Leg:  Vashe  Calmoseptine to mauro-wound  Collagen to all wounds and multidex gel  4x4's  CoFlex Calamine - please do not apply too much pressure to second layer, especially at ankle    KEEP THESE WRAPS CLEAN, DRY AND IN PLACE FOR THE WEEK           Please note, all wounds (unless stated otherwise here) were mechanically debrided at the time of cleansing here in the wound-care center today, so a small amount of pain, drainage or bleeding from that process might be expected, and is normal.     All products for home use, including multiple products for a single wound if applicable, are medically necessary in order to achieve the best chance at timely wound healing. See provider documentation for details if needed.      Substituted dressings applied in the [address] today, if applicable:      N/a     New orders for this week (labs, imaging, medications, etc.):     You may walk as much as you can tolerate. Continue elevating legs   You may take Benadryl at night only for itching - please be aware this may cause dry mouth and some sleepiness/dizziness      Additional instructions for specific diagnoses:     Continue Sulfasalazine three times daily     [place] #3 - 10/21/22  [place] #4 - 10/28/22  [place] #5 - 11/4/22  [place] #6 - 11/11/22   [place] #7 -11-22-22  [place] #8 12/1/22   [place] #9  12/9/22  [place] #10 12/21/22         F/U Appointment  with Dr. Gita Calle in 1 week                                               at                                      .     Your nurse  is Colt Tamez RN     If we applied slip-resistant hospital socks today, be sure to remove them at least once a day to inspect your toes or feet, even if you're not changing the wraps or dressings underneath. If you see anything concerning (redness, excess moisture, etc), please call and let us know right away.      Should you experience any significant changes in your wound(s) (including redness, increased warmth, increased pain, increased drainage, odor, or fever) or have questions about your wound care, please contact the [address] at [phone] Monday-Thursday from 8:00 am - 4:30 pm, or Friday from 8:00 am - 2:30 pm.  If you need help with your wound outside these hours and cannot wait until we are again available, contact your home-care company (if applicable), your PCP, or go to the nearest emergency room

## 2023-02-02 ENCOUNTER — HOSPITAL ENCOUNTER (OUTPATIENT)
Dept: WOUND CARE | Age: 88
Discharge: HOME | End: 2023-02-02
Payer: MEDICARE

## 2023-02-02 VITALS
WEIGHT: 152.8 LBS | HEIGHT: 64 IN | HEART RATE: 64 BPM | DIASTOLIC BLOOD PRESSURE: 90 MMHG | RESPIRATION RATE: 18 BRPM | TEMPERATURE: 97.8 F | SYSTOLIC BLOOD PRESSURE: 149 MMHG | BODY MASS INDEX: 26.09 KG/M2

## 2023-02-02 DIAGNOSIS — R60.0 BILATERAL LOWER EXTREMITY EDEMA: ICD-10-CM

## 2023-02-02 DIAGNOSIS — L97.312 ULCER OF RIGHT ANKLE, WITH FAT LAYER EXPOSED (HCC): ICD-10-CM

## 2023-02-02 DIAGNOSIS — L97.322 ULCER OF LEFT ANKLE, WITH FAT LAYER EXPOSED (HCC): Primary | ICD-10-CM

## 2023-02-02 PROCEDURE — 29581 APPL MULTLAYER CMPRN SYS LEG: CPT

## 2023-02-02 PROCEDURE — 97597 DBRDMT OPN WND 1ST 20 CM/<: CPT

## 2023-02-02 NOTE — PLAN OF CARE
Wounds stable. Original left medial ankle wound healed today.  Jet ox debridement per Dr. Patterson. To continue co-flex calamine dressings to BLE.  Follow up in W[facility] Care Center in 1 week as ordered. Pt. Aware to call sooner with any problems or questions/concerns.  MD orders/D/C instructions reviewed with patient, all questions answered; copy of instructions given to patient.

## 2023-02-03 NOTE — DISCHARGE INSTRUCTIONS
215 [facility] Physician Orders and Discharge 800 [facility]  [address], [address]  [place], [facility]  Telephone: [phone]      Fax: [phone]        Your home care company:   N/A     Your wound-care supplies will be provided by:  weekly wraps     NAME:  Belen Robertson   YOB: 1933  PRIMARY DIAGNOSIS FOR WOUND CARE CENTER:  Pyoderma gangrenosum     Wound cleansing:  Do not scrub or use excessive force. Wash hands with soap and water before and after dressing changes. Prior to applying a clean dressing, cleanse wound with normal saline, wound cleanser, or mild soap and water. Ask your physician or nurse before getting the wound(s) wet in the shower. Wound care for home:     Left Lower Leg  Betadine to malleolus to healed area  Vashe-to open wound   Triad mixed with polysporin  4x4's   CoFlex Calamine - please do not apply too much pressure to second layer, especially at ankle   Leave in place all week. Right Lower Leg:  Vashe  Calmoseptine to mauro-wound  Collagen to all wounds and multidex gel  4x4's  CoFlex Calamine - please do not apply too much pressure to second layer, especially at ankle    KEEP THESE WRAPS CLEAN, DRY AND IN PLACE FOR THE WEEK           Please note, all wounds (unless stated otherwise here) were mechanically debrided at the time of cleansing here in the wound-care center today, so a small amount of pain, drainage or bleeding from that process might be expected, and is normal.     All products for home use, including multiple products for a single wound if applicable, are medically necessary in order to achieve the best chance at timely wound healing. See provider documentation for details if needed. Substituted dressings applied in the [facility] [place] [facility] today, if applicable:      N/a     New orders for this week (labs, imaging, medications, etc.):     You may walk as much as you can tolerate.   Continue elevating legs   You may take Benadryl at night only for itching - please be aware this may cause dry mouth and some sleepiness/dizziness      Additional instructions for specific diagnoses:     Continue Sulfasalazine three times daily     [place] #3 - 10/21/22  [place] #4 - 10/28/22  [place] #5 - 11/4/22  [place] #6 - 11/11/22   [place] #7 -11-22-22  [place] #8 12/1/22   [place] #9  12/9/22  [place] #10 12/21/22         F/U Appointment  with Dr. Adam Valentino in 1 week                                               at                                      .     Your nurse  is Maricarmen Mondragon RN     If we applied slip-resistant hospital socks today, be sure to remove them at least once a day to inspect your toes or feet, even if you're not changing the wraps or dressings underneath. If you see anything concerning (redness, excess moisture, etc), please call and let us know right away.      Should you experience any significant changes in your wound(s) (including redness, increased warmth, increased pain, increased drainage, odor, or fever) or have questions about your wound care, please contact the [address] [place] [address] at [phone] Monday-Thursday from 8:00 am - 4:30 pm, or Friday from 8:00 am - 2:30 pm.  If you need help with your wound outside these hours and cannot wait until we are again available, contact your home-care company (if applicable), your PCP, or go to the nearest emergency room

## 2023-02-09 ENCOUNTER — HOSPITAL ENCOUNTER (OUTPATIENT)
Dept: WOUND CARE | Age: 88
Discharge: HOME | End: 2023-02-09
Payer: MEDICARE

## 2023-02-09 VITALS
HEART RATE: 63 BPM | DIASTOLIC BLOOD PRESSURE: 86 MMHG | WEIGHT: 152.4 LBS | TEMPERATURE: 97.8 F | BODY MASS INDEX: 26.02 KG/M2 | HEIGHT: 64 IN | RESPIRATION RATE: 18 BRPM | SYSTOLIC BLOOD PRESSURE: 135 MMHG

## 2023-02-09 DIAGNOSIS — L97.312 ULCER OF RIGHT ANKLE, WITH FAT LAYER EXPOSED (HCC): ICD-10-CM

## 2023-02-09 DIAGNOSIS — L97.322 ULCER OF LEFT ANKLE, WITH FAT LAYER EXPOSED (HCC): Primary | ICD-10-CM

## 2023-02-09 DIAGNOSIS — R60.0 BILATERAL LOWER EXTREMITY EDEMA: ICD-10-CM

## 2023-02-09 PROCEDURE — 29581 APPL MULTLAYER CMPRN SYS LEG: CPT

## 2023-02-09 PROCEDURE — 97597 DBRDMT OPN WND 1ST 20 CM/<: CPT

## 2023-02-09 RX ORDER — LIDOCAINE 40 MG/G
CREAM TOPICAL ONCE
OUTPATIENT
Start: 2023-02-09 | End: 2023-02-09

## 2023-02-09 RX ORDER — LIDOCAINE 40 MG/G
CREAM TOPICAL ONCE
Status: DISCONTINUED | OUTPATIENT
Start: 2023-02-09 | End: 2023-02-10 | Stop reason: HOSPADM

## 2023-02-09 RX ORDER — BACITRACIN ZINC AND POLYMYXIN B SULFATE 500; 1000 [USP'U]/G; [USP'U]/G
OINTMENT TOPICAL ONCE
OUTPATIENT
Start: 2023-02-09 | End: 2023-02-09

## 2023-02-09 RX ORDER — LIDOCAINE 50 MG/G
OINTMENT TOPICAL ONCE
OUTPATIENT
Start: 2023-02-09 | End: 2023-02-09

## 2023-02-09 RX ORDER — LIDOCAINE HYDROCHLORIDE 40 MG/ML
SOLUTION TOPICAL ONCE
OUTPATIENT
Start: 2023-02-09 | End: 2023-02-09

## 2023-02-09 NOTE — PLAN OF CARE
Wounds stable. Jet ox debridement per Dr. Vicki Galicia. To continue co-flex calamine dressings to BLE. Follow up in [address] [facility] in 1 week as ordered. Pt. Aware to call sooner with any problems or questions/concerns. MD orders/D/C instructions reviewed with patient, all questions answered; copy of instructions given to patient.

## 2023-02-10 NOTE — DISCHARGE INSTRUCTIONS
215 [facility] Physician Orders and Discharge [address]  [address], [address]  [place], [facility]  Telephone: [phone]      Fax: [phone]        Your home care company:   N/A     Your wound-care supplies will be provided by:  weekly wraps     NAME:  Alba Zapata   YOB: 1933  PRIMARY DIAGNOSIS FOR WOUND CARE CENTER:  Pyoderma gangrenosum     Wound cleansing:  Do not scrub or use excessive force. Wash hands with soap and water before and after dressing changes. Prior to applying a clean dressing, cleanse wound with normal saline, wound cleanser, or mild soap and water. Ask your physician or nurse before getting the wound(s) wet in the shower. Wound care for home:     Left Lower Leg  Betadine to malleolus to healed area  Vashe-to open wound   Triad mixed with polysporin  Lotrisone to pretibial rash  4x4's   CoFlex Calamine - please do not apply too much pressure to second layer, especially at ankle   Leave in place all week. Right Lower Leg:  Vashe  Calmoseptine and Lotrisone to mauro-wound  Collagen to all wounds and multidex gel  4x4's  CoFlex Calamine - please do not apply too much pressure to second layer, especially at ankle    KEEP THESE WRAPS CLEAN, DRY AND IN PLACE FOR THE WEEK           Please note, all wounds (unless stated otherwise here) were mechanically debrided at the time of cleansing here in the wound-care center today, so a small amount of pain, drainage or bleeding from that process might be expected, and is normal.     All products for home use, including multiple products for a single wound if applicable, are medically necessary in order to achieve the best chance at timely wound healing. See provider documentation for details if needed.      Substituted dressings applied in the [address] [place] [address],[address] today, if applicable:      N/a     New orders for this week (labs, imaging, medications, etc.):     You may walk as much as you can tolerate. Continue elevating legs   You may take Benadryl at night only for itching - please be aware this may cause dry mouth and some sleepiness/dizziness      Additional instructions for specific diagnoses:     Continue Sulfasalazine three times daily     [place] #3 - 10/21/22  [place] #4 - 10/28/22  [place] #5 - 11/4/22  [place] #6 - 11/11/22   [place] #7 -11-22-22  [place] #8 12/1/22   [place] #9  12/9/22  [place] #10 12/21/22         F/U Appointment  with Dr. Leena Alfred in 1 week                                               at                                      .     Your nurse  is Mahamed Luong RN     If we applied slip-resistant hospital socks today, be sure to remove them at least once a day to inspect your toes or feet, even if you're not changing the wraps or dressings underneath. If you see anything concerning (redness, excess moisture, etc), please call and let us know right away.      Should you experience any significant changes in your wound(s) (including redness, increased warmth, increased pain, increased drainage, odor, or fever) or have questions about your wound care, please contact the [address] at [phone] Monday-Thursday from 8:00 am - 4:30 pm, or Friday from 8:00 am - 2:30 pm.  If you need help with your wound outside these hours and cannot wait until we are again available, contact your home-care company (if applicable), your PCP, or go to the nearest emergency room

## 2023-02-16 ENCOUNTER — HOSPITAL ENCOUNTER (OUTPATIENT)
Dept: WOUND CARE | Age: 88
Discharge: HOME | End: 2023-02-16
Payer: MEDICARE

## 2023-02-16 VITALS
TEMPERATURE: 98.9 F | WEIGHT: 154.2 LBS | SYSTOLIC BLOOD PRESSURE: 133 MMHG | DIASTOLIC BLOOD PRESSURE: 77 MMHG | HEART RATE: 64 BPM | HEIGHT: 64 IN | BODY MASS INDEX: 26.32 KG/M2 | RESPIRATION RATE: 18 BRPM

## 2023-02-16 DIAGNOSIS — L97.322 ULCER OF LEFT ANKLE, WITH FAT LAYER EXPOSED (HCC): Primary | ICD-10-CM

## 2023-02-16 DIAGNOSIS — R60.0 BILATERAL LOWER EXTREMITY EDEMA: ICD-10-CM

## 2023-02-16 DIAGNOSIS — L97.312 ULCER OF RIGHT ANKLE, WITH FAT LAYER EXPOSED (HCC): ICD-10-CM

## 2023-02-16 PROCEDURE — 29581 APPL MULTLAYER CMPRN SYS LEG: CPT

## 2023-02-16 PROCEDURE — 97597 DBRDMT OPN WND 1ST 20 CM/<: CPT

## 2023-02-16 RX ORDER — LIDOCAINE 40 MG/G
CREAM TOPICAL ONCE
OUTPATIENT
Start: 2023-02-16 | End: 2023-02-16

## 2023-02-16 RX ORDER — LIDOCAINE 40 MG/G
CREAM TOPICAL ONCE
Status: DISCONTINUED | OUTPATIENT
Start: 2023-02-16 | End: 2023-02-17 | Stop reason: HOSPADM

## 2023-02-16 RX ORDER — LIDOCAINE HYDROCHLORIDE 40 MG/ML
SOLUTION TOPICAL ONCE
OUTPATIENT
Start: 2023-02-16 | End: 2023-02-16

## 2023-02-16 RX ORDER — BACITRACIN ZINC AND POLYMYXIN B SULFATE 500; 1000 [USP'U]/G; [USP'U]/G
OINTMENT TOPICAL ONCE
OUTPATIENT
Start: 2023-02-16 | End: 2023-02-16

## 2023-02-16 RX ORDER — LIDOCAINE 50 MG/G
OINTMENT TOPICAL ONCE
OUTPATIENT
Start: 2023-02-16 | End: 2023-02-16

## 2023-02-16 NOTE — PLAN OF CARE
Wounds stable and showing signs of improvement. Jet ox debridement per Dr. Rex Swenson. To continue co-flex calamine dressings to BLE. Pt reports that she is tolerating current treatment plan at this time. To begin lotrisone to rashy areas on BLE. Follow up in [address] [place] [address] in 1 week as ordered. Pt. Aware to call sooner with any problems or questions/concerns. MD orders/D/C instructions reviewed with patient, all questions answered; copy of instructions given to patient.

## 2023-02-17 NOTE — DISCHARGE INSTRUCTIONS
215 [facility] Physician Orders and Discharge [place]  [facility], [address]  [place], [facility]  Telephone: [phone]      Fax: [phone]        Your home care company:   N/A     Your wound-care supplies will be provided by:  weekly wraps     NAME:  Lennox Booth   YOB: 1933  PRIMARY DIAGNOSIS FOR WOUND CARE CENTER:  Pyoderma gangrenosum     Wound cleansing:  Do not scrub or use excessive force. Wash hands with soap and water before and after dressing changes. Prior to applying a clean dressing, cleanse wound with normal saline, wound cleanser, or mild soap and water. Ask your physician or nurse before getting the wound(s) wet in the shower. Wound care for home:     Left Lower Leg:   Vashe-to open wound   Calmoseptine to mauro-wound   Santyl   Moist 2x2   Xeroform   Triamcinolone to pretibial rash  4x4's   CoFlex Calamine - please do not apply too much pressure to second layer, especially at ankle   Leave in place all week. Right Lower Leg:  Vashe  Calmoseptine mauro-wound  Collagen to all wounds and multidex gel  4x4's  CoFlex Calamine - please do not apply too much pressure to second layer, especially at ankle    KEEP THESE WRAPS CLEAN, DRY AND IN PLACE FOR THE WEEK           Please note, all wounds (unless stated otherwise here) were mechanically debrided at the time of cleansing here in the wound-care center today, so a small amount of pain, drainage or bleeding from that process might be expected, and is normal.     All products for home use, including multiple products for a single wound if applicable, are medically necessary in order to achieve the best chance at timely wound healing. See provider documentation for details if needed.      Substituted dressings applied in the [facility] today, if applicable:      N/a     New orders for this week (labs, imaging, medications, etc.):     You may walk as much as you can tolerate. Continue elevating legs   You may take Benadryl at night only for itching - please be aware this may cause dry mouth and some sleepiness/dizziness      Additional instructions for specific diagnoses:     Continue Sulfasalazine three times daily     [place] #3 - 10/21/22  [place] #4 - 10/28/22  [place] #5 - 11/4/22  [place] #6 - 11/11/22   [place] #7 -11-22-22  [place] #8 12/1/22   [place] #9  12/9/22  [place] #10 12/21/22         F/U Appointment  with Dr. Jaylene Aceves in 1 week                                               at                                      .     Your nurse  is Dallin Dee RN     If we applied slip-resistant hospital socks today, be sure to remove them at least once a day to inspect your toes or feet, even if you're not changing the wraps or dressings underneath. If you see anything concerning (redness, excess moisture, etc), please call and let us know right away.      Should you experience any significant changes in your wound(s) (including redness, increased warmth, increased pain, increased drainage, odor, or fever) or have questions about your wound care, please contact the [address] at [phone] Monday-Thursday from 8:00 am - 4:30 pm, or Friday from 8:00 am - 2:30 pm.  If you need help with your wound outside these hours and cannot wait until we are again available, contact your home-care company (if applicable), your PCP, or go to the nearest emergency room

## 2023-02-21 PROBLEM — I87.332 CHRONIC VENOUS HYPERTENSION (IDIOPATHIC) WITH ULCER AND INFLAMMATION OF LEFT LOWER EXTREMITY (CODE): Status: RESOLVED | Noted: 2022-10-03 | Resolved: 2023-02-21

## 2023-02-21 PROBLEM — I87.331 CHRONIC VENOUS HYPERTENSION (IDIOPATHIC) WITH ULCER AND INFLAMMATION OF RIGHT LOWER EXTREMITY (CODE): Status: RESOLVED | Noted: 2022-10-03 | Resolved: 2023-02-21

## 2023-02-23 ENCOUNTER — HOSPITAL ENCOUNTER (OUTPATIENT)
Dept: WOUND CARE | Age: 88
Discharge: HOME | End: 2023-02-23
Payer: MEDICARE

## 2023-02-23 VITALS
SYSTOLIC BLOOD PRESSURE: 125 MMHG | HEIGHT: 64 IN | TEMPERATURE: 97.5 F | BODY MASS INDEX: 25.95 KG/M2 | DIASTOLIC BLOOD PRESSURE: 72 MMHG | HEART RATE: 59 BPM | RESPIRATION RATE: 18 BRPM | WEIGHT: 152 LBS

## 2023-02-23 DIAGNOSIS — L97.312 ULCER OF RIGHT ANKLE, WITH FAT LAYER EXPOSED (HCC): ICD-10-CM

## 2023-02-23 DIAGNOSIS — L97.322 ULCER OF LEFT ANKLE, WITH FAT LAYER EXPOSED (HCC): Primary | ICD-10-CM

## 2023-02-23 DIAGNOSIS — R60.0 BILATERAL LOWER EXTREMITY EDEMA: ICD-10-CM

## 2023-02-23 PROCEDURE — 29581 APPL MULTLAYER CMPRN SYS LEG: CPT

## 2023-02-23 PROCEDURE — 97598 DBRDMT OPN WND ADDL 20CM/<: CPT

## 2023-02-23 RX ORDER — LIDOCAINE 40 MG/G
CREAM TOPICAL ONCE
OUTPATIENT
Start: 2023-02-23 | End: 2023-02-23

## 2023-02-23 RX ORDER — BACITRACIN ZINC AND POLYMYXIN B SULFATE 500; 1000 [USP'U]/G; [USP'U]/G
OINTMENT TOPICAL ONCE
OUTPATIENT
Start: 2023-02-23 | End: 2023-02-23

## 2023-02-23 RX ORDER — LIDOCAINE 50 MG/G
OINTMENT TOPICAL ONCE
OUTPATIENT
Start: 2023-02-23 | End: 2023-02-23

## 2023-02-23 RX ORDER — LIDOCAINE 40 MG/G
CREAM TOPICAL ONCE
Status: DISCONTINUED | OUTPATIENT
Start: 2023-02-23 | End: 2023-02-24 | Stop reason: HOSPADM

## 2023-02-23 RX ORDER — LIDOCAINE HYDROCHLORIDE 40 MG/ML
SOLUTION TOPICAL ONCE
OUTPATIENT
Start: 2023-02-23 | End: 2023-02-23

## 2023-02-23 ASSESSMENT — PAIN SCALES - GENERAL: PAINLEVEL_OUTOF10: 0

## 2023-02-23 NOTE — PLAN OF CARE
Wounds stable and showing signs of improvement. Jet ox debridement per Dr. Solia Dsouza. To continue co-flex calamine dressings to BLE. To begin triamcinolone to rash on left pretib. Follow up in [address] [place] [address] in 1 week as ordered. Pt. Aware to call sooner with any problems or questions/concerns.   MD orders/D/C instructions reviewed with patient, all questions answered; copy of instructions given to patient

## 2023-02-24 RX ORDER — SULFASALAZINE 500 MG/1
TABLET ORAL
Qty: 270 TABLET | Refills: 3 | Status: SHIPPED | OUTPATIENT
Start: 2023-02-24

## 2023-02-24 NOTE — DISCHARGE INSTRUCTIONS
215 [facility] Physician Orders and Discharge [address], [address]  [place], [facility]  Telephone: [phone]      Fax: [phone]        Your home care company:   N/A     Your wound-care supplies will be provided by:  weekly wraps     NAME:  Brittni Buckner   YOB: 1933  PRIMARY DIAGNOSIS FOR WOUND CARE CENTER:  Pyoderma gangrenosum     Wound cleansing:  Do not scrub or use excessive force. Wash hands with soap and water before and after dressing changes. Prior to applying a clean dressing, cleanse wound with normal saline, wound cleanser, or mild soap and water. Ask your physician or nurse before getting the wound(s) wet in the shower. Wound care for home:     Left Lower Leg:   Vashe-to open wound   Calmoseptine to mauro-wound   Santyl   Moist 2x2   Xeroform   Triamcinolone to pretibial rash  4x4's   CoFlex Calamine - please do not apply too much pressure to second layer, especially at ankle   Leave in place all week. Right Lower Leg:  Lotrisone to small rash on right pretib  Vashe  Calmoseptine mauro-wound  Collagen to all wounds and multidex gel  4x4's  CoFlex Calamine - please do not apply too much pressure to second layer, especially at ankle    KEEP THESE WRAPS CLEAN, DRY AND IN PLACE FOR THE WEEK           Please note, all wounds (unless stated otherwise here) were mechanically debrided at the time of cleansing here in the wound-care center today, so a small amount of pain, drainage or bleeding from that process might be expected, and is normal.     All products for home use, including multiple products for a single wound if applicable, are medically necessary in order to achieve the best chance at timely wound healing. See provider documentation for details if needed.      Substituted dressings applied in the [facility] today, if applicable:      N/a     New orders for this week (labs, imaging, medications, etc.): You may walk as much as you can tolerate. Continue elevating legs   You may take Benadryl at night only for itching - please be aware this may cause dry mouth and some sleepiness/dizziness      Additional instructions for specific diagnoses:     Continue Sulfasalazine three times daily     [place] #3 - 10/21/22  [place] #4 - 10/28/22  [place] #5 - 11/4/22  [place] #6 - 11/11/22   [place] #7 -11-22-22  [place] #8 12/1/22   [place] #9  12/9/22  [place] #10 12/21/22         F/U Appointment  with Dr. Anahy Bass in 1 week                                               at                                      .     Your nurse  is Yvan Yo RN     If we applied slip-resistant hospital socks today, be sure to remove them at least once a day to inspect your toes or feet, even if you're not changing the wraps or dressings underneath. If you see anything concerning (redness, excess moisture, etc), please call and let us know right away.      Should you experience any significant changes in your wound(s) (including redness, increased warmth, increased pain, increased drainage, odor, or fever) or have questions about your wound care, please contact the [place] [address] at [phone] Monday-Thursday from 8:00 am - 4:30 pm, or Friday from 8:00 am - 2:30 pm.  If you need help with your wound outside these hours and cannot wait until we are again available, contact your home-care company (if applicable), your PCP, or go to the nearest emergency room

## 2023-03-02 ENCOUNTER — HOSPITAL ENCOUNTER (OUTPATIENT)
Dept: WOUND CARE | Age: 88
Discharge: HOME | End: 2023-03-02
Payer: MEDICARE

## 2023-03-02 VITALS
TEMPERATURE: 98.2 F | WEIGHT: 150.4 LBS | HEIGHT: 64 IN | DIASTOLIC BLOOD PRESSURE: 76 MMHG | BODY MASS INDEX: 25.68 KG/M2 | RESPIRATION RATE: 16 BRPM | HEART RATE: 65 BPM | SYSTOLIC BLOOD PRESSURE: 152 MMHG

## 2023-03-02 DIAGNOSIS — R60.0 BILATERAL LOWER EXTREMITY EDEMA: ICD-10-CM

## 2023-03-02 DIAGNOSIS — L97.312 ULCER OF RIGHT ANKLE, WITH FAT LAYER EXPOSED (HCC): ICD-10-CM

## 2023-03-02 DIAGNOSIS — L97.322 ULCER OF LEFT ANKLE, WITH FAT LAYER EXPOSED (HCC): Primary | ICD-10-CM

## 2023-03-02 PROCEDURE — 97597 DBRDMT OPN WND 1ST 20 CM/<: CPT

## 2023-03-02 PROCEDURE — 29581 APPL MULTLAYER CMPRN SYS LEG: CPT

## 2023-03-02 RX ORDER — LIDOCAINE 50 MG/G
OINTMENT TOPICAL ONCE
OUTPATIENT
Start: 2023-03-02 | End: 2023-03-02

## 2023-03-02 RX ORDER — BACITRACIN ZINC AND POLYMYXIN B SULFATE 500; 1000 [USP'U]/G; [USP'U]/G
OINTMENT TOPICAL ONCE
OUTPATIENT
Start: 2023-03-02 | End: 2023-03-02

## 2023-03-02 RX ORDER — LIDOCAINE 40 MG/G
CREAM TOPICAL ONCE
Status: DISCONTINUED | OUTPATIENT
Start: 2023-03-02 | End: 2023-03-03 | Stop reason: HOSPADM

## 2023-03-02 RX ORDER — LIDOCAINE 40 MG/G
CREAM TOPICAL ONCE
OUTPATIENT
Start: 2023-03-02 | End: 2023-03-02

## 2023-03-02 RX ORDER — LIDOCAINE HYDROCHLORIDE 40 MG/ML
SOLUTION TOPICAL ONCE
OUTPATIENT
Start: 2023-03-02 | End: 2023-03-02

## 2023-03-02 NOTE — PLAN OF CARE
Wounds stable and showing signs of improvement. Right distal wound healed. Jet ox debridement per Dr. Abelardo Smith. To continue co-flex calamine dressings to BLE. To begin Lotrisone to rash on right pretib. Follow up in [address] [place] [address] in 1 week as ordered. Pt. Aware to call sooner with any problems or questions/concerns.   MD orders/D/C instructions reviewed with patient, all questions answered; copy of instructions given to patient

## 2023-03-03 NOTE — DISCHARGE INSTRUCTIONS
215 [facility] Physician Orders and Discharge [address], [address]  [place], [facility]  Telephone: [phone]      Fax: [phone]        Your home care company:   N/A     Your wound-care supplies will be provided by:  weekly wraps     NAME:  Eustaquio Closs   YOB: 1933  PRIMARY DIAGNOSIS FOR WOUND CARE CENTER:  Pyoderma gangrenosum     Wound cleansing:  Do not scrub or use excessive force. Wash hands with soap and water before and after dressing changes. Prior to applying a clean dressing, cleanse wound with normal saline, wound cleanser, or mild soap and water. Ask your physician or nurse before getting the wound(s) wet in the shower. Wound care for home:     Left Lower Leg:   Vashe-to open wound   Calmoseptine to mauro-wound   Santyl   Moist 2x2   Xeroform   Triamcinolone to pretibial rash  4x4's   CoFlex Calamine - please do not apply too much pressure to second layer, especially at ankle   Leave in place all week. Right Lower Leg:  Vashe  Calmoseptine and triamcinolone mauro-wound of main wound on medial ankle  Collagen and multidex gel to medial ankle wound   Polysporin and 2x2 to three lesions to medial leg and pretib  4x4's  CoFlex Calamine - please do not apply too much pressure to second layer, especially at ankle    KEEP THESE WRAPS CLEAN, DRY AND IN PLACE FOR THE WEEK           Please note, all wounds (unless stated otherwise here) were mechanically debrided at the time of cleansing here in the wound-care center today, so a small amount of pain, drainage or bleeding from that process might be expected, and is normal.     All products for home use, including multiple products for a single wound if applicable, are medically necessary in order to achieve the best chance at timely wound healing. See provider documentation for details if needed.      Substituted dressings applied in the [facility] today, if applicable:      N/a     New orders for this week (labs, imaging, medications, etc.):     You may walk as much as you can tolerate. Continue elevating legs   You may take Benadryl at night only for itching - please be aware this may cause dry mouth and some sleepiness/dizziness      Additional instructions for specific diagnoses:     Continue Sulfasalazine three times daily     [place] #3 - 10/21/22  [place] #4 - 10/28/22  [place] #5 - 11/4/22  [place] #6 - 11/11/22   [place] #7 -11-22-22  [place] #8 12/1/22   [place] #9  12/9/22  [place] #10 12/21/22         F/U Appointment  with Dr. America Dempsey in 1 week                                               at                                      .     Your nurse  is Nando Umana RN     If we applied slip-resistant hospital socks today, be sure to remove them at least once a day to inspect your toes or feet, even if you're not changing the wraps or dressings underneath. If you see anything concerning (redness, excess moisture, etc), please call and let us know right away.      Should you experience any significant changes in your wound(s) (including redness, increased warmth, increased pain, increased drainage, odor, or fever) or have questions about your wound care, please contact the [address] at [phone] Monday-Thursday from 8:00 am - 4:30 pm, or Friday from 8:00 am - 2:30 pm.  If you need help with your wound outside these hours and cannot wait until we are again available, contact your home-care company (if applicable), your PCP, or go to the nearest emergency room

## 2023-03-09 ENCOUNTER — HOSPITAL ENCOUNTER (OUTPATIENT)
Dept: WOUND CARE | Age: 88
Discharge: HOME | End: 2023-03-09
Payer: MEDICARE

## 2023-03-09 VITALS
WEIGHT: 151.4 LBS | SYSTOLIC BLOOD PRESSURE: 130 MMHG | DIASTOLIC BLOOD PRESSURE: 70 MMHG | BODY MASS INDEX: 25.85 KG/M2 | HEART RATE: 66 BPM | TEMPERATURE: 96.9 F | HEIGHT: 64 IN | RESPIRATION RATE: 16 BRPM

## 2023-03-09 DIAGNOSIS — R60.0 BILATERAL LOWER EXTREMITY EDEMA: ICD-10-CM

## 2023-03-09 DIAGNOSIS — L97.312 ULCER OF RIGHT ANKLE, WITH FAT LAYER EXPOSED (HCC): ICD-10-CM

## 2023-03-09 DIAGNOSIS — L97.322 ULCER OF LEFT ANKLE, WITH FAT LAYER EXPOSED (HCC): Primary | ICD-10-CM

## 2023-03-09 PROCEDURE — 97597 DBRDMT OPN WND 1ST 20 CM/<: CPT

## 2023-03-09 PROCEDURE — 29581 APPL MULTLAYER CMPRN SYS LEG: CPT

## 2023-03-09 RX ORDER — LIDOCAINE 40 MG/G
CREAM TOPICAL ONCE
Status: DISCONTINUED | OUTPATIENT
Start: 2023-03-09 | End: 2023-03-10 | Stop reason: HOSPADM

## 2023-03-09 RX ORDER — LIDOCAINE HYDROCHLORIDE 40 MG/ML
SOLUTION TOPICAL ONCE
OUTPATIENT
Start: 2023-03-09 | End: 2023-03-09

## 2023-03-09 RX ORDER — BACITRACIN ZINC AND POLYMYXIN B SULFATE 500; 1000 [USP'U]/G; [USP'U]/G
OINTMENT TOPICAL ONCE
OUTPATIENT
Start: 2023-03-09 | End: 2023-03-09

## 2023-03-09 RX ORDER — LIDOCAINE 50 MG/G
OINTMENT TOPICAL ONCE
OUTPATIENT
Start: 2023-03-09 | End: 2023-03-09

## 2023-03-09 RX ORDER — LIDOCAINE 40 MG/G
CREAM TOPICAL ONCE
OUTPATIENT
Start: 2023-03-09 | End: 2023-03-09

## 2023-03-09 NOTE — PLAN OF CARE
Historic wounds stable and showing signs of improvement. New wound to right medial lower leg. Jet ox debridement per Dr. Phyllis Casarez. Dr. 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 To continue co-flex calamine dressings to BLE. Follow up in [facility] in 1 week as ordered. Pt. Aware to call sooner with any problems or questions/concerns.   MD orders/D/C instructions reviewed with patient, all questions answered; copy of instructions given to patient

## 2023-03-10 NOTE — DISCHARGE INSTRUCTIONS
215 [facility] Physician Orders and Discharge [address], [address]  [place], [facility]  Telephone: [phone]      Fax: [phone]        Your home care company:   N/A     Your wound-care supplies will be provided by:  weekly wraps     NAME:  Melinda Atkins   YOB: 1933  PRIMARY DIAGNOSIS FOR WOUND CARE CENTER:  Pyoderma gangrenosum     Wound cleansing:  Do not scrub or use excessive force. Wash hands with soap and water before and after dressing changes. Prior to applying a clean dressing, cleanse wound with normal saline, wound cleanser, or mild soap and water. Ask your physician or nurse before getting the wound(s) wet in the shower. Wound care for home:     Left Lower Leg:   Vashe-to open wound   Calmoseptine to mauro-wound   Santyl   Moist 2x2   Xeroform   4x4's   CoFlex Calamine - please do not apply too much pressure to second layer, especially at ankle   Leave in place all week. Right Lower Leg:  Vashe  Lotrisone to a few rashy spots per Dr. Baltazar Jordan   Collagen and multidex gel to medial ankle wound   Xeroform to upper shin wound   4x4's  CoFlex Calamine - please do not apply too much pressure to second layer, especially at ankle    KEEP THESE WRAPS CLEAN, [address] [place] [address],[facility] Floor           Please note, all wounds (unless stated otherwise here) were mechanically debrided at the time of cleansing here in the wound-care center today, so a small amount of pain, drainage or bleeding from that process might be expected, and is normal.     All products for home use, including multiple products for a single wound if applicable, are medically necessary in order to achieve the best chance at timely wound healing. See provider documentation for details if needed.      Substituted dressings applied in the [facility] today, if applicable:      N/a     New orders for this week (labs, imaging, medications, etc.):     You may walk as much as you can tolerate.  Continue elevating legs   You may take Benadryl at night only for itching - please be aware this may cause dry mouth and some sleepiness/dizziness      Additional instructions for specific diagnoses:     Continue Sulfasalazine three times daily     [place] #3 - 10/21/22  [place] #4 - 10/28/22  [place] #5 - 11/4/22  [place] #6 - 11/11/22   [place] #7 -11-22-22  [place] #8 12/1/22   [place] #9  12/9/22  [place] #10 12/21/22         F/U Appointment  with Dr. Patterson in 1 week                                               at                                      .     Your nurse  is [employer]/SUHAS Elkins     If we applied slip-resistant hospital socks today, be sure to remove them at least once a day to inspect your toes or feet, even if you're not changing the wraps or dressings underneath. If you see anything concerning (redness, excess moisture, etc), please call and let us know right away.     Should you experience any significant changes in your wound(s) (including redness, increased warmth, increased pain, increased drainage, odor, or fever) or have questions about your wound care, please contact the [facility] at [phone] Monday-Thursday from 8:00 am - 4:30 pm, or Friday from 8:00 am - 2:30 pm.  If you need help with your wound outside these hours and cannot wait until we are again available, contact your home-care company (if applicable), your PCP, or go to the nearest emergency room

## 2023-03-16 ENCOUNTER — HOSPITAL ENCOUNTER (OUTPATIENT)
Dept: WOUND CARE | Age: 88
Discharge: HOME | End: 2023-03-16
Payer: MEDICARE

## 2023-03-16 VITALS
TEMPERATURE: 97.9 F | SYSTOLIC BLOOD PRESSURE: 143 MMHG | WEIGHT: 153.4 LBS | HEART RATE: 66 BPM | DIASTOLIC BLOOD PRESSURE: 71 MMHG | HEIGHT: 64 IN | BODY MASS INDEX: 26.19 KG/M2 | RESPIRATION RATE: 18 BRPM

## 2023-03-16 DIAGNOSIS — L97.322 ULCER OF LEFT ANKLE, WITH FAT LAYER EXPOSED (HCC): Primary | ICD-10-CM

## 2023-03-16 DIAGNOSIS — R60.0 BILATERAL LOWER EXTREMITY EDEMA: ICD-10-CM

## 2023-03-16 DIAGNOSIS — L97.312 ULCER OF RIGHT ANKLE, WITH FAT LAYER EXPOSED (HCC): ICD-10-CM

## 2023-03-16 PROCEDURE — 29581 APPL MULTLAYER CMPRN SYS LEG: CPT

## 2023-03-16 PROCEDURE — 97597 DBRDMT OPN WND 1ST 20 CM/<: CPT

## 2023-03-16 RX ORDER — BACITRACIN ZINC AND POLYMYXIN B SULFATE 500; 1000 [USP'U]/G; [USP'U]/G
OINTMENT TOPICAL ONCE
OUTPATIENT
Start: 2023-03-16 | End: 2023-03-16

## 2023-03-16 RX ORDER — LIDOCAINE 50 MG/G
OINTMENT TOPICAL ONCE
OUTPATIENT
Start: 2023-03-16 | End: 2023-03-16

## 2023-03-16 RX ORDER — BACITRACIN ZINC AND POLYMYXIN B SULFATE 500; 1000 [USP'U]/G; [USP'U]/G
OINTMENT TOPICAL ONCE
Status: DISCONTINUED | OUTPATIENT
Start: 2023-03-16 | End: 2023-03-17 | Stop reason: HOSPADM

## 2023-03-16 RX ORDER — LIDOCAINE HYDROCHLORIDE 40 MG/ML
SOLUTION TOPICAL ONCE
OUTPATIENT
Start: 2023-03-16 | End: 2023-03-16

## 2023-03-16 RX ORDER — LIDOCAINE 40 MG/G
CREAM TOPICAL ONCE
Status: DISCONTINUED | OUTPATIENT
Start: 2023-03-16 | End: 2023-03-17 | Stop reason: HOSPADM

## 2023-03-16 RX ORDER — LIDOCAINE 40 MG/G
CREAM TOPICAL ONCE
OUTPATIENT
Start: 2023-03-16 | End: 2023-03-16

## 2023-03-16 ASSESSMENT — PAIN SCALES - GENERAL: PAINLEVEL_OUTOF10: 0

## 2023-03-16 NOTE — PLAN OF CARE
Wounds stable. Jet ox debridement per Dr. Mariee Press. To continue co-flex calamine dressings to BLE, Lotrisone to rash per Dr. Mariee Press. Follow up in [address] [place] [address] in 1 week as ordered. Pt. Aware to call sooner with any problems or questions/concerns.   MD orders/D/C instructions reviewed with patient, all questions answered; copy of instructions given to patient

## 2023-03-17 NOTE — DISCHARGE INSTRUCTIONS
1909 [facility] Physician Orders and Discharge [address], [address]  [place], [facility]  Telephone: [phone]      Fax: [phone]        Your home care company:   N/A     Your wound-care supplies will be provided by:  weekly wraps     NAME:  Martell Sheppard   YOB: 1933  PRIMARY DIAGNOSIS FOR WOUND CARE CENTER:  Pyoderma gangrenosum     Wound cleansing:  Do not scrub or use excessive force. Wash hands with soap and water before and after dressing changes. Prior to applying a clean dressing, cleanse wound with normal saline, wound cleanser, or mild soap and water. Ask your physician or nurse before getting the wound(s) wet in the shower. Wound care for home:     Left Lower Leg:   Vashe-to open wound   Clobetasol mixed with triad to wounds and mauro-wounds  Xeroform   4x4's   CoFlex Calamine - please do not apply too much pressure to second layer, especially at ankle   Leave in place all week. Right Lower Leg:  Vashe  Collagen and multidex gel or Hydrogel if multidex not available to medial ankle wound   Xeroform to upper shin wound   4x4's  CoFlex Calamine - please do not apply too much pressure to second layer, especially at ankle    KEEP THESE WRAPS CLEAN, DRY AND IN PLACE FOR THE WEEK           Please note, all wounds (unless stated otherwise here) were mechanically debrided at the time of cleansing here in the wound-care center today, so a small amount of pain, drainage or bleeding from that process might be expected, and is normal.     All products for home use, including multiple products for a single wound if applicable, are medically necessary in order to achieve the best chance at timely wound healing. See provider documentation for details if needed.      Substituted dressings applied in the [facility] today, if applicable:      N/a     New orders for this week (labs, imaging, medications, etc.):     You may walk

## 2023-03-23 ENCOUNTER — HOSPITAL ENCOUNTER (OUTPATIENT)
Dept: WOUND CARE | Age: 88
Discharge: HOME | End: 2023-03-23
Payer: MEDICARE

## 2023-03-23 VITALS
DIASTOLIC BLOOD PRESSURE: 70 MMHG | HEART RATE: 68 BPM | BODY MASS INDEX: 26.05 KG/M2 | TEMPERATURE: 97.5 F | SYSTOLIC BLOOD PRESSURE: 119 MMHG | WEIGHT: 152.6 LBS | HEIGHT: 64 IN | RESPIRATION RATE: 18 BRPM

## 2023-03-23 DIAGNOSIS — R60.0 BILATERAL LOWER EXTREMITY EDEMA: ICD-10-CM

## 2023-03-23 DIAGNOSIS — L97.322 ULCER OF LEFT ANKLE, WITH FAT LAYER EXPOSED (HCC): Primary | ICD-10-CM

## 2023-03-23 DIAGNOSIS — L97.312 ULCER OF RIGHT ANKLE, WITH FAT LAYER EXPOSED (HCC): ICD-10-CM

## 2023-03-23 PROCEDURE — 97597 DBRDMT OPN WND 1ST 20 CM/<: CPT

## 2023-03-23 PROCEDURE — 29581 APPL MULTLAYER CMPRN SYS LEG: CPT

## 2023-03-23 RX ORDER — LIDOCAINE HYDROCHLORIDE 40 MG/ML
SOLUTION TOPICAL ONCE
OUTPATIENT
Start: 2023-03-23 | End: 2023-03-23

## 2023-03-23 RX ORDER — LIDOCAINE 50 MG/G
OINTMENT TOPICAL ONCE
OUTPATIENT
Start: 2023-03-23 | End: 2023-03-23

## 2023-03-23 RX ORDER — LIDOCAINE 40 MG/G
CREAM TOPICAL ONCE
Status: DISCONTINUED | OUTPATIENT
Start: 2023-03-23 | End: 2023-03-24 | Stop reason: HOSPADM

## 2023-03-23 RX ORDER — LIDOCAINE 40 MG/G
CREAM TOPICAL ONCE
OUTPATIENT
Start: 2023-03-23 | End: 2023-03-23

## 2023-03-23 RX ORDER — BACITRACIN ZINC AND POLYMYXIN B SULFATE 500; 1000 [USP'U]/G; [USP'U]/G
OINTMENT TOPICAL ONCE
OUTPATIENT
Start: 2023-03-23 | End: 2023-03-23

## 2023-03-23 NOTE — PLAN OF CARE
Wounds stable. Jet ox debridement per Dr. Carol Haywood. To begin clobetasol mixed with triad to LLE. To continue co-flex calamine dressings to BLE. Follow up in [address] [place] [address] in 1 week as ordered. Pt. Aware to call sooner with any problems or questions/concerns. MD orders/D/C instructions reviewed with patient, all questions answered; copy of instructions given to patient.

## 2023-03-24 NOTE — DISCHARGE INSTRUCTIONS
215 [facility] Physician Orders and Discharge [address], [address]  [place], [facility]  Telephone: [phone]      Fax: [phone]        Your home care company:   N/A     Your wound-care supplies will be provided by:  weekly wraps     NAME:  Alba Zapata   YOB: 1933  PRIMARY DIAGNOSIS FOR WOUND CARE CENTER:  Pyoderma gangrenosum     Wound cleansing:  Do not scrub or use excessive force. Wash hands with soap and water before and after dressing changes. Prior to applying a clean dressing, cleanse wound with normal saline, wound cleanser, or mild soap and water. Ask your physician or nurse before getting the wound(s) wet in the shower. Wound care for home:     Left Lower Leg:   Vashe-to open wound   Clobetasol mixed with triad to wounds and mauro-wounds  Xeroform   4x4's   Clobetasol ,aquaphor to proximal calf for itching  CoFlex Calamine - please do not apply too much pressure to second layer, especially at ankle   Leave in place all week. Right Lower Leg:  Clobetasol ,aquaphor to proximal calf for itching  Vashe  Collagen and multidex gel  medial ankle wound   Xeroform to upper shin wound   4x4's  CoFlex Calamine - please do not apply too much pressure to second layer, especially at ankle    KEEP THESE WRAPS CLEAN, [address] [place] [address],[facility] Floor           Please note, all wounds (unless stated otherwise here) were mechanically debrided at the time of cleansing here in the wound-care center today, so a small amount of pain, drainage or bleeding from that process might be expected, and is normal.     All products for home use, including multiple products for a single wound if applicable, are medically necessary in order to achieve the best chance at timely wound healing. See provider documentation for details if needed.      Substituted dressings applied in the [facility] today, if applicable:      N/a     New orders for

## 2023-03-30 ENCOUNTER — HOSPITAL ENCOUNTER (OUTPATIENT)
Dept: WOUND CARE | Age: 88
Discharge: HOME | End: 2023-03-30
Payer: MEDICARE

## 2023-03-30 VITALS
WEIGHT: 151.6 LBS | HEART RATE: 68 BPM | DIASTOLIC BLOOD PRESSURE: 69 MMHG | HEIGHT: 64 IN | SYSTOLIC BLOOD PRESSURE: 98 MMHG | RESPIRATION RATE: 18 BRPM | TEMPERATURE: 97.9 F | BODY MASS INDEX: 25.88 KG/M2

## 2023-03-30 DIAGNOSIS — L97.322 ULCER OF LEFT ANKLE, WITH FAT LAYER EXPOSED (HCC): Primary | ICD-10-CM

## 2023-03-30 DIAGNOSIS — R60.0 BILATERAL LOWER EXTREMITY EDEMA: ICD-10-CM

## 2023-03-30 DIAGNOSIS — L97.312 ULCER OF RIGHT ANKLE, WITH FAT LAYER EXPOSED (HCC): ICD-10-CM

## 2023-03-30 PROCEDURE — 29581 APPL MULTLAYER CMPRN SYS LEG: CPT

## 2023-03-30 PROCEDURE — 97597 DBRDMT OPN WND 1ST 20 CM/<: CPT

## 2023-03-30 RX ORDER — LIDOCAINE 50 MG/G
OINTMENT TOPICAL ONCE
OUTPATIENT
Start: 2023-03-30 | End: 2023-03-30

## 2023-03-30 RX ORDER — LIDOCAINE 40 MG/G
CREAM TOPICAL ONCE
Status: DISCONTINUED | OUTPATIENT
Start: 2023-03-30 | End: 2023-03-31 | Stop reason: HOSPADM

## 2023-03-30 RX ORDER — LIDOCAINE HYDROCHLORIDE 40 MG/ML
SOLUTION TOPICAL ONCE
OUTPATIENT
Start: 2023-03-30 | End: 2023-03-30

## 2023-03-30 RX ORDER — LIDOCAINE 40 MG/G
CREAM TOPICAL ONCE
OUTPATIENT
Start: 2023-03-30 | End: 2023-03-30

## 2023-03-30 RX ORDER — BACITRACIN ZINC AND POLYMYXIN B SULFATE 500; 1000 [USP'U]/G; [USP'U]/G
OINTMENT TOPICAL ONCE
OUTPATIENT
Start: 2023-03-30 | End: 2023-03-30

## 2023-03-30 NOTE — PLAN OF CARE
Pt seen in [address] [place] [address],[address] -wounds slow to improve to RLE - debride with Jet ox per Dr. Marien Hodgkins - treatment as follows   Left Lower Leg:   Vashe-to open wound   Clobetasol mixed with triad to wounds and mauro-wounds  Xeroform   4x4's   Clobetasol ,aquaphor to proximal calf for itching  CoFlex Calamine - please do not apply too much pressure to second layer, especially at ankle   Leave in place all week.          Right Lower Leg:  Clobetasol ,aquaphor to proximal calf for itching  Vashe  Collagen and multidex gel  medial ankle wound   Xeroform to upper shin wound   4x4's  CoFlex Calamine - please do not apply too much pressure to second layer, especially at ankle    KEEP THESE WRAPS CLEAN, DRY AND IN PLACE FOR THE WEEK    Reviewed AVS - f/u in 1 week

## 2023-04-06 ENCOUNTER — HOSPITAL ENCOUNTER (OUTPATIENT)
Dept: WOUND CARE | Age: 88
Discharge: HOME | End: 2023-04-06
Payer: MEDICARE

## 2023-04-06 VITALS
WEIGHT: 152.2 LBS | BODY MASS INDEX: 25.99 KG/M2 | RESPIRATION RATE: 18 BRPM | DIASTOLIC BLOOD PRESSURE: 71 MMHG | TEMPERATURE: 98.2 F | HEIGHT: 64 IN | HEART RATE: 94 BPM | SYSTOLIC BLOOD PRESSURE: 127 MMHG

## 2023-04-06 DIAGNOSIS — L97.312 ULCER OF RIGHT ANKLE, WITH FAT LAYER EXPOSED (HCC): ICD-10-CM

## 2023-04-06 DIAGNOSIS — L97.322 ULCER OF LEFT ANKLE, WITH FAT LAYER EXPOSED (HCC): Primary | ICD-10-CM

## 2023-04-06 DIAGNOSIS — R60.0 BILATERAL LOWER EXTREMITY EDEMA: ICD-10-CM

## 2023-04-06 PROCEDURE — 29581 APPL MULTLAYER CMPRN SYS LEG: CPT

## 2023-04-06 PROCEDURE — 97597 DBRDMT OPN WND 1ST 20 CM/<: CPT

## 2023-04-06 RX ORDER — LIDOCAINE 40 MG/G
CREAM TOPICAL ONCE
OUTPATIENT
Start: 2023-04-06 | End: 2023-04-06

## 2023-04-06 RX ORDER — LIDOCAINE HYDROCHLORIDE 40 MG/ML
SOLUTION TOPICAL ONCE
OUTPATIENT
Start: 2023-04-06 | End: 2023-04-06

## 2023-04-06 RX ORDER — BACITRACIN ZINC AND POLYMYXIN B SULFATE 500; 1000 [USP'U]/G; [USP'U]/G
OINTMENT TOPICAL ONCE
OUTPATIENT
Start: 2023-04-06 | End: 2023-04-06

## 2023-04-06 RX ORDER — LIDOCAINE 40 MG/G
CREAM TOPICAL ONCE
Status: DISCONTINUED | OUTPATIENT
Start: 2023-04-06 | End: 2023-04-07 | Stop reason: HOSPADM

## 2023-04-06 RX ORDER — LIDOCAINE 50 MG/G
OINTMENT TOPICAL ONCE
OUTPATIENT
Start: 2023-04-06 | End: 2023-04-06

## 2023-04-06 NOTE — PLAN OF CARE
Wounds stable and showing signs of improvement. Jetox debridement per . Pt's female family member present at bedside. To begin toe compression wrap per . Stop Triad to right lower leg and begin Tacrolimus. Follow up in [address] [place] [address] in 1 week as ordered. Pt. Aware to call sooner with any problems or questions/concerns. MD orders/D/C instructions reviewed with patient, all questions answered; copy of instructions given to patient.

## 2023-04-20 ENCOUNTER — HOSPITAL ENCOUNTER (OUTPATIENT)
Dept: WOUND CARE | Age: 88
Discharge: HOME | End: 2023-04-20
Payer: MEDICARE

## 2023-04-20 VITALS
TEMPERATURE: 97 F | BODY MASS INDEX: 26.26 KG/M2 | HEART RATE: 94 BPM | DIASTOLIC BLOOD PRESSURE: 75 MMHG | WEIGHT: 153.8 LBS | RESPIRATION RATE: 18 BRPM | HEIGHT: 64 IN | SYSTOLIC BLOOD PRESSURE: 113 MMHG

## 2023-04-20 DIAGNOSIS — R60.0 BILATERAL LOWER EXTREMITY EDEMA: ICD-10-CM

## 2023-04-20 DIAGNOSIS — L97.312 ULCER OF RIGHT ANKLE, WITH FAT LAYER EXPOSED (HCC): ICD-10-CM

## 2023-04-20 DIAGNOSIS — L97.322 ULCER OF LEFT ANKLE, WITH FAT LAYER EXPOSED (HCC): Primary | ICD-10-CM

## 2023-04-20 PROCEDURE — 29581 APPL MULTLAYER CMPRN SYS LEG: CPT

## 2023-04-20 RX ORDER — LIDOCAINE HYDROCHLORIDE 40 MG/ML
SOLUTION TOPICAL ONCE
OUTPATIENT
Start: 2023-04-20 | End: 2023-04-20

## 2023-04-20 RX ORDER — METHYLPREDNISOLONE 4 MG/1
TABLET ORAL
Qty: 1 KIT | Refills: 1 | Status: SHIPPED | OUTPATIENT
Start: 2023-04-20 | End: 2023-04-26

## 2023-04-20 RX ORDER — BACITRACIN ZINC AND POLYMYXIN B SULFATE 500; 1000 [USP'U]/G; [USP'U]/G
OINTMENT TOPICAL ONCE
OUTPATIENT
Start: 2023-04-20 | End: 2023-04-20

## 2023-04-20 RX ORDER — LIDOCAINE 50 MG/G
OINTMENT TOPICAL ONCE
OUTPATIENT
Start: 2023-04-20 | End: 2023-04-20

## 2023-04-20 RX ORDER — LIDOCAINE 40 MG/G
CREAM TOPICAL ONCE
Status: DISCONTINUED | OUTPATIENT
Start: 2023-04-20 | End: 2023-04-21 | Stop reason: HOSPADM

## 2023-04-20 RX ORDER — LIDOCAINE 40 MG/G
CREAM TOPICAL ONCE
OUTPATIENT
Start: 2023-04-20 | End: 2023-04-20

## 2023-04-20 ASSESSMENT — PAIN DESCRIPTION - ORIENTATION: ORIENTATION: LEFT;RIGHT

## 2023-04-20 ASSESSMENT — PAIN DESCRIPTION - ONSET: ONSET: ON-GOING

## 2023-04-20 ASSESSMENT — PAIN DESCRIPTION - LOCATION: LOCATION: LEG

## 2023-04-20 ASSESSMENT — PAIN DESCRIPTION - DESCRIPTORS: DESCRIPTORS: ACHING

## 2023-04-20 ASSESSMENT — PAIN SCALES - GENERAL: PAINLEVEL_OUTOF10: 2

## 2023-04-20 ASSESSMENT — PAIN DESCRIPTION - FREQUENCY: FREQUENCY: INTERMITTENT

## 2023-04-20 ASSESSMENT — PAIN DESCRIPTION - PAIN TYPE: TYPE: CHRONIC PAIN

## 2023-04-20 ASSESSMENT — PAIN - FUNCTIONAL ASSESSMENT: PAIN_FUNCTIONAL_ASSESSMENT: ACTIVITIES ARE NOT PREVENTED

## 2023-04-20 NOTE — PLAN OF CARE
Wounds stable. 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 No debridement. To continue current plan of care. Follow up in [address] [place] [address] in 1 week as ordered. Pt. Aware to call sooner with any problems or questions/concerns. MD orders/D/C instructions reviewed with patient, all questions answered; copy of instructions given to patient.

## 2023-04-27 ENCOUNTER — HOSPITAL ENCOUNTER (OUTPATIENT)
Dept: WOUND CARE | Age: 88
Discharge: HOME | End: 2023-04-27
Payer: MEDICARE

## 2023-04-27 VITALS
RESPIRATION RATE: 16 BRPM | HEIGHT: 64 IN | BODY MASS INDEX: 26.29 KG/M2 | SYSTOLIC BLOOD PRESSURE: 122 MMHG | HEART RATE: 75 BPM | WEIGHT: 154 LBS | DIASTOLIC BLOOD PRESSURE: 80 MMHG | TEMPERATURE: 97.8 F

## 2023-04-27 DIAGNOSIS — R60.0 BILATERAL LOWER EXTREMITY EDEMA: ICD-10-CM

## 2023-04-27 DIAGNOSIS — L97.312 ULCER OF RIGHT ANKLE, WITH FAT LAYER EXPOSED (HCC): ICD-10-CM

## 2023-04-27 DIAGNOSIS — L97.322 ULCER OF LEFT ANKLE, WITH FAT LAYER EXPOSED (HCC): Primary | ICD-10-CM

## 2023-04-27 LAB
ALBUMIN SERPL-MCNC: 4.3 G/DL (ref 3.4–5)
ALBUMIN/GLOB SERPL: 2.9 {RATIO} (ref 1.1–2.2)
ALP SERPL-CCNC: 67 U/L (ref 40–129)
ALT SERPL-CCNC: 18 U/L (ref 10–40)
ANION GAP SERPL CALCULATED.3IONS-SCNC: 8 MMOL/L (ref 3–16)
AST SERPL-CCNC: 19 U/L (ref 15–37)
BASOPHILS # BLD: 0 K/UL (ref 0–0.2)
BASOPHILS NFR BLD: 0.9 %
BILIRUB SERPL-MCNC: 0.4 MG/DL (ref 0–1)
BUN SERPL-MCNC: 31 MG/DL (ref 7–20)
CALCIUM SERPL-MCNC: 10.8 MG/DL (ref 8.3–10.6)
CHLORIDE SERPL-SCNC: 108 MMOL/L (ref 99–110)
CO2 SERPL-SCNC: 26 MMOL/L (ref 21–32)
CREAT SERPL-MCNC: 1.1 MG/DL (ref 0.6–1.2)
CRP SERPL-MCNC: <3 MG/L (ref 0–5.1)
DEPRECATED RDW RBC AUTO: 15.9 % (ref 12.4–15.4)
EOSINOPHIL # BLD: 0.1 K/UL (ref 0–0.6)
EOSINOPHIL NFR BLD: 2.6 %
GFR SERPLBLD CREATININE-BSD FMLA CKD-EPI: 48 ML/MIN/{1.73_M2}
GLUCOSE SERPL-MCNC: 100 MG/DL (ref 70–99)
HCT VFR BLD AUTO: 40.4 % (ref 36–48)
HGB BLD-MCNC: 13.1 G/DL (ref 12–16)
LYMPHOCYTES # BLD: 1.4 K/UL (ref 1–5.1)
LYMPHOCYTES NFR BLD: 31.9 %
MCH RBC QN AUTO: 32 PG (ref 26–34)
MCHC RBC AUTO-ENTMCNC: 32.4 G/DL (ref 31–36)
MCV RBC AUTO: 98.9 FL (ref 80–100)
MONOCYTES # BLD: 0.6 K/UL (ref 0–1.3)
MONOCYTES NFR BLD: 14.3 %
NEUTROPHILS # BLD: 2.2 K/UL (ref 1.7–7.7)
NEUTROPHILS NFR BLD: 50.3 %
PLATELET # BLD AUTO: 146 K/UL (ref 135–450)
PMV BLD AUTO: 7.9 FL (ref 5–10.5)
POTASSIUM SERPL-SCNC: 4.3 MMOL/L (ref 3.5–5.1)
PROT SERPL-MCNC: 5.8 G/DL (ref 6.4–8.2)
RBC # BLD AUTO: 4.08 M/UL (ref 4–5.2)
SODIUM SERPL-SCNC: 142 MMOL/L (ref 136–145)
WBC # BLD AUTO: 4.3 K/UL (ref 4–11)

## 2023-04-27 PROCEDURE — 85652 RBC SED RATE AUTOMATED: CPT

## 2023-04-27 PROCEDURE — 36415 COLL VENOUS BLD VENIPUNCTURE: CPT

## 2023-04-27 PROCEDURE — 29581 APPL MULTLAYER CMPRN SYS LEG: CPT

## 2023-04-27 PROCEDURE — 86140 C-REACTIVE PROTEIN: CPT

## 2023-04-27 PROCEDURE — 80053 COMPREHEN METABOLIC PANEL: CPT

## 2023-04-27 PROCEDURE — 85025 COMPLETE CBC W/AUTO DIFF WBC: CPT

## 2023-04-27 RX ORDER — LIDOCAINE HYDROCHLORIDE 40 MG/ML
SOLUTION TOPICAL ONCE
OUTPATIENT
Start: 2023-04-27 | End: 2023-04-27

## 2023-04-27 RX ORDER — LIDOCAINE 40 MG/G
CREAM TOPICAL ONCE
Status: DISCONTINUED | OUTPATIENT
Start: 2023-04-27 | End: 2023-04-28 | Stop reason: HOSPADM

## 2023-04-27 RX ORDER — LIDOCAINE 50 MG/G
OINTMENT TOPICAL ONCE
OUTPATIENT
Start: 2023-04-27 | End: 2023-04-27

## 2023-04-27 RX ORDER — LIDOCAINE 40 MG/G
CREAM TOPICAL ONCE
OUTPATIENT
Start: 2023-04-27 | End: 2023-04-27

## 2023-04-27 RX ORDER — BACITRACIN ZINC AND POLYMYXIN B SULFATE 500; 1000 [USP'U]/G; [USP'U]/G
OINTMENT TOPICAL ONCE
OUTPATIENT
Start: 2023-04-27 | End: 2023-04-27

## 2023-04-27 ASSESSMENT — PAIN DESCRIPTION - ONSET: ONSET: ON-GOING

## 2023-04-27 ASSESSMENT — PAIN DESCRIPTION - FREQUENCY: FREQUENCY: INTERMITTENT

## 2023-04-27 ASSESSMENT — PAIN DESCRIPTION - PAIN TYPE: TYPE: CHRONIC PAIN

## 2023-04-27 ASSESSMENT — PAIN DESCRIPTION - DESCRIPTORS: DESCRIPTORS: SHARP;SHOOTING

## 2023-04-27 ASSESSMENT — PAIN DESCRIPTION - LOCATION: LOCATION: LEG

## 2023-04-27 ASSESSMENT — PAIN DESCRIPTION - ORIENTATION: ORIENTATION: RIGHT;LEFT

## 2023-04-27 ASSESSMENT — PAIN SCALES - GENERAL: PAINLEVEL_OUTOF10: 3

## 2023-04-27 ASSESSMENT — PAIN - FUNCTIONAL ASSESSMENT: PAIN_FUNCTIONAL_ASSESSMENT: PREVENTS OR INTERFERES SOME ACTIVE ACTIVITIES AND ADLS

## 2023-04-27 NOTE — PLAN OF CARE
Wounds stable, looking worse this week from last. Fungal infection showing signs of improvement, to continue Lotrisone. No debridement today. Labs ordered today per Dr. Turner Story. Pt to call and schedule appointment with Dr. Jamila Mondragon for vascular, Dr. Turner Story will call and update. To begin compri-2 lites and discontinue co-flex calamine dressings. Follow up in [address] [place] [address] in 1 week as ordered. Pt. Aware to call sooner with any problems or questions/concerns. MD orders/D/C instructions reviewed with patient, all questions answered; copy of instructions given to patient.

## 2023-04-28 LAB — ERYTHROCYTE [SEDIMENTATION RATE] IN BLOOD BY WESTERGREN METHOD: 2 MM/HR (ref 0–30)

## 2023-05-04 ENCOUNTER — HOSPITAL ENCOUNTER (OUTPATIENT)
Dept: WOUND CARE | Age: 88
Discharge: HOME | End: 2023-05-04
Payer: MEDICARE

## 2023-05-04 VITALS
WEIGHT: 150.2 LBS | BODY MASS INDEX: 25.64 KG/M2 | DIASTOLIC BLOOD PRESSURE: 61 MMHG | SYSTOLIC BLOOD PRESSURE: 95 MMHG | HEIGHT: 64 IN | TEMPERATURE: 97.5 F | RESPIRATION RATE: 16 BRPM | HEART RATE: 53 BPM

## 2023-05-04 DIAGNOSIS — R60.0 BILATERAL LOWER EXTREMITY EDEMA: ICD-10-CM

## 2023-05-04 DIAGNOSIS — L97.312 ULCER OF RIGHT ANKLE, WITH FAT LAYER EXPOSED (HCC): ICD-10-CM

## 2023-05-04 DIAGNOSIS — L97.322 ULCER OF LEFT ANKLE, WITH FAT LAYER EXPOSED (HCC): Primary | ICD-10-CM

## 2023-05-04 PROBLEM — B35.4 TINEA CORPORIS: Status: ACTIVE | Noted: 2023-05-04

## 2023-05-04 PROCEDURE — 29581 APPL MULTLAYER CMPRN SYS LEG: CPT

## 2023-05-04 RX ORDER — LIDOCAINE 50 MG/G
OINTMENT TOPICAL ONCE
OUTPATIENT
Start: 2023-05-04 | End: 2023-05-04

## 2023-05-04 RX ORDER — LIDOCAINE HYDROCHLORIDE 40 MG/ML
SOLUTION TOPICAL ONCE
OUTPATIENT
Start: 2023-05-04 | End: 2023-05-04

## 2023-05-04 RX ORDER — LIDOCAINE 40 MG/G
CREAM TOPICAL ONCE
Status: DISCONTINUED | OUTPATIENT
Start: 2023-05-04 | End: 2023-05-05 | Stop reason: HOSPADM

## 2023-05-04 RX ORDER — LIDOCAINE 40 MG/G
CREAM TOPICAL ONCE
OUTPATIENT
Start: 2023-05-04 | End: 2023-05-04

## 2023-05-04 RX ORDER — COLCHICINE 0.6 MG/1
0.6 TABLET ORAL 2 TIMES DAILY
Qty: 60 TABLET | Refills: 0 | Status: SHIPPED | OUTPATIENT
Start: 2023-05-04 | End: 2023-06-03

## 2023-05-04 RX ORDER — BACITRACIN ZINC AND POLYMYXIN B SULFATE 500; 1000 [USP'U]/G; [USP'U]/G
OINTMENT TOPICAL ONCE
OUTPATIENT
Start: 2023-05-04 | End: 2023-05-04

## 2023-05-04 ASSESSMENT — PAIN DESCRIPTION - LOCATION: LOCATION: LEG

## 2023-05-04 ASSESSMENT — PAIN DESCRIPTION - DIRECTION: RADIATING_TOWARDS: UP THE LEG

## 2023-05-04 ASSESSMENT — PAIN SCALES - GENERAL: PAINLEVEL_OUTOF10: 2

## 2023-05-04 ASSESSMENT — PAIN DESCRIPTION - FREQUENCY: FREQUENCY: INTERMITTENT

## 2023-05-04 ASSESSMENT — PAIN DESCRIPTION - PAIN TYPE: TYPE: CHRONIC PAIN

## 2023-05-04 ASSESSMENT — PAIN - FUNCTIONAL ASSESSMENT: PAIN_FUNCTIONAL_ASSESSMENT: PREVENTS OR INTERFERES SOME ACTIVE ACTIVITIES AND ADLS

## 2023-05-04 ASSESSMENT — PAIN DESCRIPTION - DESCRIPTORS: DESCRIPTORS: SHARP;SHOOTING;OTHER (COMMENT)

## 2023-05-04 ASSESSMENT — PAIN DESCRIPTION - ONSET: ONSET: ON-GOING

## 2023-05-04 ASSESSMENT — PAIN DESCRIPTION - ORIENTATION: ORIENTATION: LEFT

## 2023-05-04 NOTE — PLAN OF CARE
Wounds stable, Pt reports increased pain this week. Fungal infection showing signs of improvement, to continue Lotrisone. No debridement today. Labs from last week reviewed with Pt. Pt scheduled with Dr. Alexandre Almanzar  later in the month. Dr. Jasmeet Rahman will call and update. Dr. Jasmeet Rahman prescribed Colchicine, medication information reviewed with Pt per Dr. Jasmeet Rahman. To continue compri-2 lite dressings. Follow up in [address] [place] [address] in 1 week as ordered. Pt. Aware to call sooner with any problems or questions/concerns. MD orders/D/C instructions reviewed with patient, all questions answered; copy of instructions given to patient.

## 2023-05-11 ENCOUNTER — HOSPITAL ENCOUNTER (OUTPATIENT)
Dept: WOUND CARE | Age: 88
Discharge: HOME | End: 2023-05-11
Payer: MEDICARE

## 2023-05-11 VITALS
HEART RATE: 98 BPM | TEMPERATURE: 97.9 F | HEIGHT: 64 IN | RESPIRATION RATE: 18 BRPM | SYSTOLIC BLOOD PRESSURE: 127 MMHG | DIASTOLIC BLOOD PRESSURE: 83 MMHG | BODY MASS INDEX: 25.88 KG/M2 | WEIGHT: 151.6 LBS

## 2023-05-11 DIAGNOSIS — L97.322 ULCER OF LEFT ANKLE, WITH FAT LAYER EXPOSED (HCC): Primary | ICD-10-CM

## 2023-05-11 DIAGNOSIS — R60.0 BILATERAL LOWER EXTREMITY EDEMA: ICD-10-CM

## 2023-05-11 DIAGNOSIS — L97.312 ULCER OF RIGHT ANKLE, WITH FAT LAYER EXPOSED (HCC): ICD-10-CM

## 2023-05-11 PROCEDURE — 87070 CULTURE OTHR SPECIMN AEROBIC: CPT

## 2023-05-11 PROCEDURE — 29581 APPL MULTLAYER CMPRN SYS LEG: CPT

## 2023-05-11 PROCEDURE — 87205 SMEAR GRAM STAIN: CPT

## 2023-05-11 PROCEDURE — 87077 CULTURE AEROBIC IDENTIFY: CPT

## 2023-05-11 RX ORDER — LIDOCAINE 50 MG/G
OINTMENT TOPICAL ONCE
OUTPATIENT
Start: 2023-05-11 | End: 2023-05-11

## 2023-05-11 RX ORDER — LIDOCAINE 40 MG/G
CREAM TOPICAL ONCE
Status: DISCONTINUED | OUTPATIENT
Start: 2023-05-11 | End: 2023-05-12 | Stop reason: HOSPADM

## 2023-05-11 RX ORDER — LIDOCAINE 40 MG/G
CREAM TOPICAL ONCE
OUTPATIENT
Start: 2023-05-11 | End: 2023-05-11

## 2023-05-11 RX ORDER — LIDOCAINE HYDROCHLORIDE 40 MG/ML
SOLUTION TOPICAL ONCE
OUTPATIENT
Start: 2023-05-11 | End: 2023-05-11

## 2023-05-11 RX ORDER — BACITRACIN ZINC AND POLYMYXIN B SULFATE 500; 1000 [USP'U]/G; [USP'U]/G
OINTMENT TOPICAL ONCE
OUTPATIENT
Start: 2023-05-11 | End: 2023-05-11

## 2023-05-11 ASSESSMENT — PAIN SCALES - GENERAL: PAINLEVEL_OUTOF10: 2

## 2023-05-11 ASSESSMENT — PAIN DESCRIPTION - DESCRIPTORS: DESCRIPTORS: THROBBING

## 2023-05-11 ASSESSMENT — PAIN DESCRIPTION - ORIENTATION: ORIENTATION: RIGHT;LEFT

## 2023-05-11 ASSESSMENT — PAIN DESCRIPTION - FREQUENCY: FREQUENCY: INTERMITTENT

## 2023-05-11 ASSESSMENT — PAIN DESCRIPTION - LOCATION: LOCATION: LEG

## 2023-05-11 ASSESSMENT — PAIN - FUNCTIONAL ASSESSMENT: PAIN_FUNCTIONAL_ASSESSMENT: PREVENTS OR INTERFERES SOME ACTIVE ACTIVITIES AND ADLS

## 2023-05-11 ASSESSMENT — PAIN DESCRIPTION - ONSET: ONSET: ON-GOING

## 2023-05-11 ASSESSMENT — PAIN DESCRIPTION - PAIN TYPE: TYPE: CHRONIC PAIN

## 2023-05-11 NOTE — PLAN OF CARE
Wounds stable, Pt reports left medial leg wound more painful this week. Wound infection suspected, cultrue taken, Dr. Nini Pardo to contact Pt with results. Fungal infection showing signs of improvement, to continue Lotrisone. No debridement today. To continue compri-2 lite dressings. Follow up in [address] [place] [address] in 1 week as ordered. Pt. Aware to call sooner with any problems or questions/concerns. MD orders/D/C instructions reviewed with patient, all questions answered; copy of instructions given to patient.

## 2023-05-14 LAB
BACTERIA SPEC AEROBE CULT: ABNORMAL
BACTERIA SPEC AEROBE CULT: ABNORMAL
GRAM STN SPEC: ABNORMAL
ORGANISM: ABNORMAL
ORGANISM: ABNORMAL

## 2023-05-14 RX ORDER — CLINDAMYCIN HYDROCHLORIDE 300 MG/1
300 CAPSULE ORAL 4 TIMES DAILY
Qty: 40 CAPSULE | Refills: 0 | Status: SHIPPED | OUTPATIENT
Start: 2023-05-14 | End: 2023-05-24

## 2023-05-14 RX ORDER — SULFAMETHOXAZOLE AND TRIMETHOPRIM 800; 160 MG/1; MG/1
1 TABLET ORAL 2 TIMES DAILY
Qty: 20 TABLET | Refills: 0 | Status: SHIPPED | OUTPATIENT
Start: 2023-05-14 | End: 2023-05-24

## 2023-05-18 ENCOUNTER — HOSPITAL ENCOUNTER (OUTPATIENT)
Dept: WOUND CARE | Age: 88
Discharge: HOME | End: 2023-05-18
Payer: MEDICARE

## 2023-05-18 VITALS
HEIGHT: 64 IN | HEART RATE: 99 BPM | DIASTOLIC BLOOD PRESSURE: 80 MMHG | RESPIRATION RATE: 18 BRPM | SYSTOLIC BLOOD PRESSURE: 122 MMHG | BODY MASS INDEX: 25.71 KG/M2 | TEMPERATURE: 97.8 F | WEIGHT: 150.6 LBS

## 2023-05-18 DIAGNOSIS — L97.312 ULCER OF RIGHT ANKLE, WITH FAT LAYER EXPOSED (HCC): ICD-10-CM

## 2023-05-18 DIAGNOSIS — R60.0 BILATERAL LOWER EXTREMITY EDEMA: ICD-10-CM

## 2023-05-18 DIAGNOSIS — L97.322 ULCER OF LEFT ANKLE, WITH FAT LAYER EXPOSED (HCC): Primary | ICD-10-CM

## 2023-05-18 PROCEDURE — 29581 APPL MULTLAYER CMPRN SYS LEG: CPT

## 2023-05-18 RX ORDER — LIDOCAINE 40 MG/G
CREAM TOPICAL ONCE
Status: DISCONTINUED | OUTPATIENT
Start: 2023-05-18 | End: 2023-05-19 | Stop reason: HOSPADM

## 2023-05-18 RX ORDER — LIDOCAINE HYDROCHLORIDE 40 MG/ML
SOLUTION TOPICAL ONCE
OUTPATIENT
Start: 2023-05-18 | End: 2023-05-18

## 2023-05-18 RX ORDER — SULFAMETHOXAZOLE AND TRIMETHOPRIM 800; 160 MG/1; MG/1
1 TABLET ORAL 2 TIMES DAILY
COMMUNITY
Start: 2023-05-14 | End: 2023-05-24

## 2023-05-18 RX ORDER — CLINDAMYCIN HYDROCHLORIDE 300 MG/1
300 CAPSULE ORAL 4 TIMES DAILY
COMMUNITY
Start: 2023-05-14 | End: 2023-05-24

## 2023-05-18 RX ORDER — LIDOCAINE 50 MG/G
OINTMENT TOPICAL ONCE
OUTPATIENT
Start: 2023-05-18 | End: 2023-05-18

## 2023-05-18 RX ORDER — BACITRACIN ZINC AND POLYMYXIN B SULFATE 500; 1000 [USP'U]/G; [USP'U]/G
OINTMENT TOPICAL ONCE
OUTPATIENT
Start: 2023-05-18 | End: 2023-05-18

## 2023-05-18 RX ORDER — LIDOCAINE 40 MG/G
CREAM TOPICAL ONCE
OUTPATIENT
Start: 2023-05-18 | End: 2023-05-18

## 2023-05-18 ASSESSMENT — PAIN SCALES - GENERAL: PAINLEVEL_OUTOF10: 0

## 2023-05-18 NOTE — PLAN OF CARE
Wounds stable and showing signs of improvement. Pt reports that she is tolerating antibiotics as prescribed per Dr. Hiedi Osborn. No debridement today. To continue compri-2 lite dressings. Follow up in [address] [place] [address] in 1 week as ordered. Pt. Aware to call sooner with any problems or questions/concerns. MD orders/D/C instructions reviewed with patient, all questions answered; copy of instructions given to patient.

## 2023-05-25 ENCOUNTER — HOSPITAL ENCOUNTER (OUTPATIENT)
Dept: WOUND CARE | Age: 88
Discharge: HOME | End: 2023-05-25
Payer: MEDICARE

## 2023-05-25 VITALS
SYSTOLIC BLOOD PRESSURE: 83 MMHG | WEIGHT: 144.6 LBS | HEART RATE: 87 BPM | BODY MASS INDEX: 24.69 KG/M2 | HEIGHT: 64 IN | TEMPERATURE: 98.2 F | DIASTOLIC BLOOD PRESSURE: 58 MMHG | RESPIRATION RATE: 18 BRPM

## 2023-05-25 DIAGNOSIS — I70.243 ATHEROSCLEROSIS OF NATIVE ARTERIES OF LEFT LEG WITH ULCERATION OF ANKLE (HCC): ICD-10-CM

## 2023-05-25 DIAGNOSIS — R60.0 BILATERAL LOWER EXTREMITY EDEMA: ICD-10-CM

## 2023-05-25 DIAGNOSIS — I70.233 ATHEROSCLEROSIS OF NATIVE ARTERIES OF RIGHT LEG WITH ULCERATION OF ANKLE (HCC): ICD-10-CM

## 2023-05-25 DIAGNOSIS — L97.312 ULCER OF RIGHT ANKLE, WITH FAT LAYER EXPOSED (HCC): ICD-10-CM

## 2023-05-25 DIAGNOSIS — L97.322 ULCER OF LEFT ANKLE, WITH FAT LAYER EXPOSED (HCC): Primary | ICD-10-CM

## 2023-05-25 PROCEDURE — 29581 APPL MULTLAYER CMPRN SYS LEG: CPT

## 2023-05-25 RX ORDER — COLCHICINE 0.6 MG/1
0.6 TABLET ORAL 2 TIMES DAILY
Qty: 180 TABLET | Refills: 1 | Status: SHIPPED | OUTPATIENT
Start: 2023-05-25

## 2023-05-25 RX ORDER — LIDOCAINE 40 MG/G
CREAM TOPICAL ONCE
Status: DISCONTINUED | OUTPATIENT
Start: 2023-05-25 | End: 2023-05-26 | Stop reason: HOSPADM

## 2023-05-25 RX ORDER — LIDOCAINE 50 MG/G
OINTMENT TOPICAL ONCE
OUTPATIENT
Start: 2023-05-25 | End: 2023-05-25

## 2023-05-25 RX ORDER — BACITRACIN ZINC AND POLYMYXIN B SULFATE 500; 1000 [USP'U]/G; [USP'U]/G
OINTMENT TOPICAL ONCE
OUTPATIENT
Start: 2023-05-25 | End: 2023-05-25

## 2023-05-25 RX ORDER — LIDOCAINE HYDROCHLORIDE 40 MG/ML
SOLUTION TOPICAL ONCE
OUTPATIENT
Start: 2023-05-25 | End: 2023-05-25

## 2023-05-25 RX ORDER — LIDOCAINE 40 MG/G
CREAM TOPICAL ONCE
OUTPATIENT
Start: 2023-05-25 | End: 2023-05-25

## 2023-05-25 ASSESSMENT — PAIN - FUNCTIONAL ASSESSMENT: PAIN_FUNCTIONAL_ASSESSMENT: PREVENTS OR INTERFERES SOME ACTIVE ACTIVITIES AND ADLS

## 2023-05-25 ASSESSMENT — PAIN DESCRIPTION - DESCRIPTORS: DESCRIPTORS: THROBBING

## 2023-05-25 ASSESSMENT — PAIN DESCRIPTION - ONSET: ONSET: ON-GOING

## 2023-05-25 ASSESSMENT — PAIN SCALES - GENERAL: PAINLEVEL_OUTOF10: 3

## 2023-05-25 ASSESSMENT — PAIN DESCRIPTION - LOCATION: LOCATION: LEG

## 2023-05-25 ASSESSMENT — PAIN DESCRIPTION - FREQUENCY: FREQUENCY: INTERMITTENT

## 2023-05-25 ASSESSMENT — PAIN DESCRIPTION - ORIENTATION: ORIENTATION: RIGHT;LEFT

## 2023-05-25 ASSESSMENT — PAIN DESCRIPTION - PAIN TYPE: TYPE: CHRONIC PAIN

## 2023-05-25 NOTE — PLAN OF CARE
Wounds stable and showing signs of improvement. Pt reports that she is tolerating antibiotics as prescribed per Dr. Jasmeet Rahman. No debridement today. To continue compri-2 lite dressings. Pt hypotensive today, Pt admits that she has not been drinking as much as usual. Pt encouraged to increase fluids and hold losartan this evening. Pt denies s/s. Follow up in [address] [place] [address] in 1 week as ordered. Pt. Aware to call sooner with any problems or questions/concerns. MD orders/D/C instructions reviewed with patient, all questions answered; copy of instructions given to patient.

## 2023-06-01 ENCOUNTER — HOSPITAL ENCOUNTER (OUTPATIENT)
Dept: WOUND CARE | Age: 88
Discharge: HOME | End: 2023-06-01
Payer: MEDICARE

## 2023-06-01 VITALS
RESPIRATION RATE: 18 BRPM | SYSTOLIC BLOOD PRESSURE: 115 MMHG | HEIGHT: 64 IN | DIASTOLIC BLOOD PRESSURE: 80 MMHG | WEIGHT: 149.8 LBS | HEART RATE: 103 BPM | TEMPERATURE: 97.1 F | BODY MASS INDEX: 25.57 KG/M2

## 2023-06-01 DIAGNOSIS — R60.0 BILATERAL LOWER EXTREMITY EDEMA: ICD-10-CM

## 2023-06-01 DIAGNOSIS — L97.322 ULCER OF LEFT ANKLE, WITH FAT LAYER EXPOSED (HCC): Primary | ICD-10-CM

## 2023-06-01 DIAGNOSIS — L97.312 ULCER OF RIGHT ANKLE, WITH FAT LAYER EXPOSED (HCC): ICD-10-CM

## 2023-06-01 PROBLEM — L03.116 CELLULITIS OF LEFT ANKLE: Status: RESOLVED | Noted: 2019-04-21 | Resolved: 2023-06-01

## 2023-06-01 PROCEDURE — 29581 APPL MULTLAYER CMPRN SYS LEG: CPT

## 2023-06-01 RX ORDER — LIDOCAINE 40 MG/G
CREAM TOPICAL ONCE
Status: DISCONTINUED | OUTPATIENT
Start: 2023-06-01 | End: 2023-06-02 | Stop reason: HOSPADM

## 2023-06-01 RX ORDER — BACITRACIN ZINC AND POLYMYXIN B SULFATE 500; 1000 [USP'U]/G; [USP'U]/G
OINTMENT TOPICAL ONCE
OUTPATIENT
Start: 2023-06-01 | End: 2023-06-01

## 2023-06-01 RX ORDER — LIDOCAINE 50 MG/G
OINTMENT TOPICAL ONCE
OUTPATIENT
Start: 2023-06-01 | End: 2023-06-01

## 2023-06-01 RX ORDER — LIDOCAINE HYDROCHLORIDE 40 MG/ML
SOLUTION TOPICAL ONCE
OUTPATIENT
Start: 2023-06-01 | End: 2023-06-01

## 2023-06-01 RX ORDER — LIDOCAINE 40 MG/G
CREAM TOPICAL ONCE
OUTPATIENT
Start: 2023-06-01 | End: 2023-06-01

## 2023-06-01 ASSESSMENT — PAIN DESCRIPTION - DESCRIPTORS: DESCRIPTORS: THROBBING

## 2023-06-01 ASSESSMENT — PAIN DESCRIPTION - PAIN TYPE: TYPE: CHRONIC PAIN

## 2023-06-01 ASSESSMENT — PAIN DESCRIPTION - ONSET: ONSET: ON-GOING

## 2023-06-01 ASSESSMENT — PAIN DESCRIPTION - FREQUENCY: FREQUENCY: CONTINUOUS

## 2023-06-01 ASSESSMENT — PAIN - FUNCTIONAL ASSESSMENT: PAIN_FUNCTIONAL_ASSESSMENT: PREVENTS OR INTERFERES SOME ACTIVE ACTIVITIES AND ADLS

## 2023-06-01 ASSESSMENT — PAIN SCALES - GENERAL: PAINLEVEL_OUTOF10: 3

## 2023-06-01 ASSESSMENT — PAIN DESCRIPTION - ORIENTATION: ORIENTATION: RIGHT

## 2023-06-01 ASSESSMENT — PAIN DESCRIPTION - LOCATION: LOCATION: LEG

## 2023-06-01 NOTE — PLAN OF CARE
Wounds stable and showing signs of improvement. No debridement today. Felt for offloading placed to right medial lower leg wound over duoderm. To continue compri-2 lite dressings. Follow up in [address] [place] [address] in 1 week as ordered. Pt. Aware to call sooner with any problems or questions/concerns. MD orders/D/C instructions reviewed with patient, all questions answered; copy of instructions given to patient.

## 2023-06-08 ENCOUNTER — HOSPITAL ENCOUNTER (OUTPATIENT)
Dept: WOUND CARE | Age: 88
Discharge: HOME | End: 2023-06-08
Payer: MEDICARE

## 2023-06-08 VITALS
BODY MASS INDEX: 25.57 KG/M2 | WEIGHT: 149.8 LBS | SYSTOLIC BLOOD PRESSURE: 117 MMHG | HEART RATE: 98 BPM | RESPIRATION RATE: 18 BRPM | HEIGHT: 64 IN | DIASTOLIC BLOOD PRESSURE: 86 MMHG | TEMPERATURE: 97.5 F

## 2023-06-08 DIAGNOSIS — L97.312 ULCER OF RIGHT ANKLE, WITH FAT LAYER EXPOSED (HCC): ICD-10-CM

## 2023-06-08 DIAGNOSIS — L97.322 ULCER OF LEFT ANKLE, WITH FAT LAYER EXPOSED (HCC): Primary | ICD-10-CM

## 2023-06-08 DIAGNOSIS — R60.0 BILATERAL LOWER EXTREMITY EDEMA: ICD-10-CM

## 2023-06-08 PROCEDURE — 29581 APPL MULTLAYER CMPRN SYS LEG: CPT

## 2023-06-08 RX ORDER — LIDOCAINE 40 MG/G
CREAM TOPICAL ONCE
Status: DISCONTINUED | OUTPATIENT
Start: 2023-06-08 | End: 2023-06-09 | Stop reason: HOSPADM

## 2023-06-08 RX ORDER — LIDOCAINE 40 MG/G
CREAM TOPICAL ONCE
OUTPATIENT
Start: 2023-06-08 | End: 2023-06-08

## 2023-06-08 RX ORDER — LIDOCAINE HYDROCHLORIDE 40 MG/ML
SOLUTION TOPICAL ONCE
OUTPATIENT
Start: 2023-06-08 | End: 2023-06-08

## 2023-06-08 RX ORDER — LIDOCAINE 50 MG/G
OINTMENT TOPICAL ONCE
OUTPATIENT
Start: 2023-06-08 | End: 2023-06-08

## 2023-06-08 RX ORDER — BACITRACIN ZINC AND POLYMYXIN B SULFATE 500; 1000 [USP'U]/G; [USP'U]/G
OINTMENT TOPICAL ONCE
OUTPATIENT
Start: 2023-06-08 | End: 2023-06-08

## 2023-06-08 ASSESSMENT — PAIN DESCRIPTION - FREQUENCY: FREQUENCY: CONTINUOUS

## 2023-06-08 ASSESSMENT — PAIN DESCRIPTION - ORIENTATION: ORIENTATION: LEFT

## 2023-06-08 ASSESSMENT — PAIN DESCRIPTION - LOCATION: LOCATION: LEG

## 2023-06-08 ASSESSMENT — PAIN SCALES - GENERAL: PAINLEVEL_OUTOF10: 2

## 2023-06-08 ASSESSMENT — PAIN - FUNCTIONAL ASSESSMENT: PAIN_FUNCTIONAL_ASSESSMENT: PREVENTS OR INTERFERES SOME ACTIVE ACTIVITIES AND ADLS

## 2023-06-08 ASSESSMENT — PAIN DESCRIPTION - ONSET: ONSET: ON-GOING

## 2023-06-08 ASSESSMENT — PAIN DESCRIPTION - PAIN TYPE: TYPE: CHRONIC PAIN

## 2023-06-08 ASSESSMENT — PAIN DESCRIPTION - DESCRIPTORS: DESCRIPTORS: THROBBING

## 2023-06-08 NOTE — PLAN OF CARE
Wounds stable. No debridement today. Right lower leg wound showing signs of improvement. Felt for offloading replaced to right medial lower leg  and to left lower leg wound over duoderm. To continue compri-2 lite dressings. Follow up in [address] [place] [address] in 1 week as ordered. Pt. Aware to call sooner with any problems or questions/concerns. MD orders/D/C instructions reviewed with patient, all questions answered; copy of instructions given to patient.

## 2023-06-10 ENCOUNTER — HOSPITAL ENCOUNTER (EMERGENCY)
Age: 88
Discharge: HOME | End: 2023-06-10
Attending: EMERGENCY MEDICINE

## 2023-06-10 VITALS
HEART RATE: 90 BPM | WEIGHT: 148 LBS | DIASTOLIC BLOOD PRESSURE: 94 MMHG | OXYGEN SATURATION: 99 % | BODY MASS INDEX: 25.27 KG/M2 | RESPIRATION RATE: 16 BRPM | HEIGHT: 64 IN | TEMPERATURE: 97.3 F | SYSTOLIC BLOOD PRESSURE: 127 MMHG

## 2023-06-10 DIAGNOSIS — T14.8XXA BLEEDING FROM WOUND: ICD-10-CM

## 2023-06-10 DIAGNOSIS — L97.922 ULCER OF LEFT LOWER EXTREMITY WITH FAT LAYER EXPOSED (HCC): Primary | ICD-10-CM

## 2023-06-10 LAB
ABO + RH BLD: NORMAL
ANION GAP SERPL CALCULATED.3IONS-SCNC: 11 MMOL/L (ref 3–16)
APTT BLD: 30.9 SEC (ref 22.7–35.9)
BASOPHILS # BLD: 0 K/UL (ref 0–0.2)
BASOPHILS NFR BLD: 0.7 %
BLD GP AB SCN SERPL QL: NORMAL
BUN SERPL-MCNC: 33 MG/DL (ref 7–20)
CALCIUM SERPL-MCNC: 10.2 MG/DL (ref 8.3–10.6)
CHLORIDE SERPL-SCNC: 107 MMOL/L (ref 99–110)
CO2 SERPL-SCNC: 20 MMOL/L (ref 21–32)
CREAT SERPL-MCNC: 0.8 MG/DL (ref 0.6–1.2)
DEPRECATED RDW RBC AUTO: 16.1 % (ref 12.4–15.4)
EOSINOPHIL # BLD: 0.1 K/UL (ref 0–0.6)
EOSINOPHIL NFR BLD: 2.9 %
GFR SERPLBLD CREATININE-BSD FMLA CKD-EPI: >60 ML/MIN/{1.73_M2}
GLUCOSE SERPL-MCNC: 112 MG/DL (ref 70–99)
HCT VFR BLD AUTO: 36.2 % (ref 36–48)
HGB BLD-MCNC: 11.9 G/DL (ref 12–16)
INR PPP: 1.33 (ref 0.84–1.16)
LYMPHOCYTES # BLD: 1.5 K/UL (ref 1–5.1)
LYMPHOCYTES NFR BLD: 34.2 %
MCH RBC QN AUTO: 33.3 PG (ref 26–34)
MCHC RBC AUTO-ENTMCNC: 32.8 G/DL (ref 31–36)
MCV RBC AUTO: 101.5 FL (ref 80–100)
MONOCYTES # BLD: 0.6 K/UL (ref 0–1.3)
MONOCYTES NFR BLD: 13.8 %
NEUTROPHILS # BLD: 2.1 K/UL (ref 1.7–7.7)
NEUTROPHILS NFR BLD: 48.4 %
PLATELET # BLD AUTO: 154 K/UL (ref 135–450)
PMV BLD AUTO: 7.9 FL (ref 5–10.5)
POTASSIUM SERPL-SCNC: 3.9 MMOL/L (ref 3.5–5.1)
PROTHROMBIN TIME: 16.4 SEC (ref 11.5–14.8)
RBC # BLD AUTO: 3.57 M/UL (ref 4–5.2)
SODIUM SERPL-SCNC: 138 MMOL/L (ref 136–145)
WBC # BLD AUTO: 4.3 K/UL (ref 4–11)

## 2023-06-10 PROCEDURE — 85610 PROTHROMBIN TIME: CPT

## 2023-06-10 PROCEDURE — 85730 THROMBOPLASTIN TIME PARTIAL: CPT

## 2023-06-10 PROCEDURE — 86901 BLOOD TYPING SEROLOGIC RH(D): CPT

## 2023-06-10 PROCEDURE — 86850 RBC ANTIBODY SCREEN: CPT

## 2023-06-10 PROCEDURE — 85025 COMPLETE CBC W/AUTO DIFF WBC: CPT

## 2023-06-10 PROCEDURE — 80048 BASIC METABOLIC PNL TOTAL CA: CPT

## 2023-06-10 PROCEDURE — 86900 BLOOD TYPING SEROLOGIC ABO: CPT

## 2023-06-10 ASSESSMENT — PAIN - FUNCTIONAL ASSESSMENT: PAIN_FUNCTIONAL_ASSESSMENT: NONE - DENIES PAIN

## 2023-06-22 ENCOUNTER — HOSPITAL ENCOUNTER (OUTPATIENT)
Dept: WOUND CARE | Age: 88
Discharge: HOME | End: 2023-06-22
Payer: MEDICARE

## 2023-06-22 ENCOUNTER — HOSPITAL ENCOUNTER (OUTPATIENT)
Dept: VASCULAR LAB | Age: 88
Discharge: HOME | End: 2023-06-22
Payer: MEDICARE

## 2023-06-22 VITALS
BODY MASS INDEX: 24.75 KG/M2 | RESPIRATION RATE: 18 BRPM | TEMPERATURE: 97.9 F | HEIGHT: 64 IN | SYSTOLIC BLOOD PRESSURE: 113 MMHG | DIASTOLIC BLOOD PRESSURE: 69 MMHG | HEART RATE: 71 BPM | WEIGHT: 145 LBS

## 2023-06-22 DIAGNOSIS — L97.322 ULCER OF LEFT ANKLE, WITH FAT LAYER EXPOSED (HCC): Primary | ICD-10-CM

## 2023-06-22 DIAGNOSIS — R60.0 BILATERAL LOWER EXTREMITY EDEMA: ICD-10-CM

## 2023-06-22 DIAGNOSIS — I70.243 ATHEROSCLEROSIS OF NATIVE ARTERIES OF LEFT LEG WITH ULCERATION OF ANKLE (HCC): ICD-10-CM

## 2023-06-22 DIAGNOSIS — I70.233 ATHEROSCLEROSIS OF NATIVE ARTERIES OF RIGHT LEG WITH ULCERATION OF ANKLE (HCC): ICD-10-CM

## 2023-06-22 DIAGNOSIS — L97.312 ULCER OF RIGHT ANKLE, WITH FAT LAYER EXPOSED (HCC): ICD-10-CM

## 2023-06-22 PROCEDURE — 93925 LOWER EXTREMITY STUDY: CPT

## 2023-06-22 PROCEDURE — 29581 APPL MULTLAYER CMPRN SYS LEG: CPT

## 2023-06-22 RX ORDER — LIDOCAINE 50 MG/G
OINTMENT TOPICAL ONCE
OUTPATIENT
Start: 2023-06-22 | End: 2023-06-22

## 2023-06-22 RX ORDER — LIDOCAINE HYDROCHLORIDE 40 MG/ML
SOLUTION TOPICAL ONCE
OUTPATIENT
Start: 2023-06-22 | End: 2023-06-22

## 2023-06-22 RX ORDER — LIDOCAINE 40 MG/G
CREAM TOPICAL ONCE
Status: DISCONTINUED | OUTPATIENT
Start: 2023-06-22 | End: 2023-06-23 | Stop reason: HOSPADM

## 2023-06-22 RX ORDER — LIDOCAINE 40 MG/G
CREAM TOPICAL ONCE
OUTPATIENT
Start: 2023-06-22 | End: 2023-06-22

## 2023-06-22 RX ORDER — BACITRACIN ZINC AND POLYMYXIN B SULFATE 500; 1000 [USP'U]/G; [USP'U]/G
OINTMENT TOPICAL ONCE
OUTPATIENT
Start: 2023-06-22 | End: 2023-06-22

## 2023-06-22 RX ORDER — ACETAMINOPHEN AND CODEINE PHOSPHATE 300; 30 MG/1; MG/1
1 TABLET ORAL EVERY 4 HOURS PRN
COMMUNITY

## 2023-06-22 ASSESSMENT — PAIN DESCRIPTION - ORIENTATION: ORIENTATION: LEFT

## 2023-06-22 ASSESSMENT — PAIN SCALES - GENERAL: PAINLEVEL_OUTOF10: 1

## 2023-06-22 ASSESSMENT — PAIN DESCRIPTION - FREQUENCY: FREQUENCY: INTERMITTENT

## 2023-06-22 ASSESSMENT — PAIN - FUNCTIONAL ASSESSMENT: PAIN_FUNCTIONAL_ASSESSMENT: PREVENTS OR INTERFERES SOME ACTIVE ACTIVITIES AND ADLS

## 2023-06-22 ASSESSMENT — PAIN DESCRIPTION - DESCRIPTORS: DESCRIPTORS: ACHING

## 2023-06-22 ASSESSMENT — PAIN DESCRIPTION - PAIN TYPE: TYPE: CHRONIC PAIN

## 2023-06-22 ASSESSMENT — PAIN DESCRIPTION - LOCATION: LOCATION: LEG

## 2023-06-22 ASSESSMENT — PAIN DESCRIPTION - DIRECTION: RADIATING_TOWARDS: UP THE LEG

## 2023-06-22 ASSESSMENT — PAIN DESCRIPTION - ONSET: ONSET: SUDDEN

## 2023-06-22 NOTE — PLAN OF CARE
Pt to the [facility] for follow up appointment. Pt had arterial duplex done today. No bleeding noted this week from wound. Wounds were not debrided today. Pt to continue with weekly compression wraps to bilateral lower legs. Pt going out of town this week. Discussed with patient the importance to continue to elevate legs. Pt to follow up in the [facility] in 1 week. Discharge instructions reviewed with patient, all questions answered, copy given to patient. Dressings were applied to all wounds per M.D. Instructions at this visit.

## 2023-06-29 ENCOUNTER — HOSPITAL ENCOUNTER (OUTPATIENT)
Dept: WOUND CARE | Age: 88
Discharge: HOME | End: 2023-06-29
Payer: MEDICARE

## 2023-06-29 VITALS
BODY MASS INDEX: 24.96 KG/M2 | HEIGHT: 64 IN | HEART RATE: 73 BPM | SYSTOLIC BLOOD PRESSURE: 85 MMHG | TEMPERATURE: 98 F | RESPIRATION RATE: 18 BRPM | DIASTOLIC BLOOD PRESSURE: 52 MMHG | WEIGHT: 146.2 LBS

## 2023-06-29 DIAGNOSIS — L97.322 ULCER OF LEFT ANKLE, WITH FAT LAYER EXPOSED (HCC): Primary | ICD-10-CM

## 2023-06-29 DIAGNOSIS — L97.312 ULCER OF RIGHT ANKLE, WITH FAT LAYER EXPOSED (HCC): ICD-10-CM

## 2023-06-29 DIAGNOSIS — R60.0 BILATERAL LOWER EXTREMITY EDEMA: ICD-10-CM

## 2023-06-29 PROCEDURE — 17250 CHEM CAUT OF GRANLTJ TISSUE: CPT

## 2023-06-29 PROCEDURE — 29581 APPL MULTLAYER CMPRN SYS LEG: CPT

## 2023-06-29 RX ORDER — LIDOCAINE 40 MG/G
CREAM TOPICAL ONCE
Status: DISCONTINUED | OUTPATIENT
Start: 2023-06-29 | End: 2023-06-30 | Stop reason: HOSPADM

## 2023-06-29 RX ORDER — LIDOCAINE HYDROCHLORIDE 40 MG/ML
SOLUTION TOPICAL ONCE
Status: DISCONTINUED | OUTPATIENT
Start: 2023-06-29 | End: 2023-06-30 | Stop reason: HOSPADM

## 2023-06-29 RX ORDER — BACITRACIN ZINC AND POLYMYXIN B SULFATE 500; 1000 [USP'U]/G; [USP'U]/G
OINTMENT TOPICAL ONCE
OUTPATIENT
Start: 2023-06-29 | End: 2023-06-29

## 2023-06-29 RX ORDER — LIDOCAINE 40 MG/G
CREAM TOPICAL ONCE
OUTPATIENT
Start: 2023-06-29 | End: 2023-06-29

## 2023-06-29 RX ORDER — LIDOCAINE 50 MG/G
OINTMENT TOPICAL ONCE
OUTPATIENT
Start: 2023-06-29 | End: 2023-06-29

## 2023-06-29 RX ORDER — LIDOCAINE HYDROCHLORIDE 40 MG/ML
SOLUTION TOPICAL ONCE
OUTPATIENT
Start: 2023-06-29 | End: 2023-06-29

## 2023-06-29 RX ORDER — LIDOCAINE 50 MG/G
OINTMENT TOPICAL ONCE
Status: DISCONTINUED | OUTPATIENT
Start: 2023-06-29 | End: 2023-06-30 | Stop reason: HOSPADM

## 2023-06-29 RX ORDER — BACITRACIN ZINC AND POLYMYXIN B SULFATE 500; 1000 [USP'U]/G; [USP'U]/G
OINTMENT TOPICAL ONCE
Status: DISCONTINUED | OUTPATIENT
Start: 2023-06-29 | End: 2023-06-30 | Stop reason: HOSPADM

## 2023-07-03 NOTE — DISCHARGE INSTRUCTIONS
411 [facility] Physician Orders and Discharge [address]  5[address] [facility], 6 [facility]  [place], [address]  Telephone: [phone]      Fax: [phone]        Your home care company:   N/A     Your wound-care supplies will be provided by:  weekly wraps     NAME:  Dontae Bradford   YOB: 1933  PRIMARY DIAGNOSIS FOR WOUND CARE CENTER:  Pyoderma gangrenosum     Wound cleansing:  Do not scrub or use excessive force. Wash hands with soap and water before and after dressing changes. Prior to applying a clean dressing, cleanse wound with normal saline, wound cleanser, or mild soap and water. Ask your physician or nurse before getting the wound(s) wet in the shower. Wound care for home:     Left Lower Leg:   Hydrocolloid & Felt for offloading per Dr. Jovanni Wu circumferential leg below knee entire for itching (where wrap will end)   Nexodyn to wound  Lotrisone to any areas of rash  Tacrolimus &Clobetasol to wound edges   Silver alginate   Foam cut per Dr. Sulaiman Sheldon placed over wound bed   Build up ankles with specialist cast padding  Compri 2 lite - please do not apply too much pressure to second layer, especially at ankle   Leave in place all week. Right Lower Leg:  Hydrocolloid & Felt for offloading per Dr. Jovanni Wu to circumferential leg below knee entire for itching (where wrap will end)   Lotrisone to any areas of rash  Nexodyn to wound  Tacrolimus   Collagen to wound  2x2  Build up ankles with specialist cast padding  Compri 2 lite- please do not apply too much pressure to second layer, especially at ankle    [address], [address] [facility]        Your physician has ordered Compression for treatment of venous ulcers, venous insufficiency,and/or Lymphedema. Compri-2 lite Is a   2  Layer wrap- do not remove until your next scheduled visit in [facility]- do not get wet.      * If your wrap

## 2023-07-06 ENCOUNTER — HOSPITAL ENCOUNTER (OUTPATIENT)
Dept: WOUND CARE | Age: 88
Discharge: HOME | End: 2023-07-06
Payer: MEDICARE

## 2023-07-06 VITALS
HEART RATE: 91 BPM | DIASTOLIC BLOOD PRESSURE: 72 MMHG | SYSTOLIC BLOOD PRESSURE: 102 MMHG | RESPIRATION RATE: 18 BRPM | WEIGHT: 146.8 LBS | HEIGHT: 64 IN | TEMPERATURE: 97.9 F | BODY MASS INDEX: 25.06 KG/M2

## 2023-07-06 DIAGNOSIS — L97.312 ULCER OF RIGHT ANKLE, WITH FAT LAYER EXPOSED (HCC): ICD-10-CM

## 2023-07-06 DIAGNOSIS — L97.322 ULCER OF LEFT ANKLE, WITH FAT LAYER EXPOSED (HCC): Primary | ICD-10-CM

## 2023-07-06 DIAGNOSIS — R60.0 BILATERAL LOWER EXTREMITY EDEMA: ICD-10-CM

## 2023-07-06 PROCEDURE — 17250 CHEM CAUT OF GRANLTJ TISSUE: CPT

## 2023-07-06 PROCEDURE — 11042 DBRDMT SUBQ TIS 1ST 20SQCM/<: CPT

## 2023-07-06 PROCEDURE — 29581 APPL MULTLAYER CMPRN SYS LEG: CPT

## 2023-07-06 RX ORDER — LIDOCAINE 40 MG/G
CREAM TOPICAL ONCE
OUTPATIENT
Start: 2023-07-06 | End: 2023-07-06

## 2023-07-06 RX ORDER — SULFASALAZINE 500 MG/1
TABLET ORAL
Qty: 360 TABLET | Refills: 3 | Status: SHIPPED | OUTPATIENT
Start: 2023-07-06

## 2023-07-06 RX ORDER — LIDOCAINE HYDROCHLORIDE 40 MG/ML
SOLUTION TOPICAL ONCE
OUTPATIENT
Start: 2023-07-06 | End: 2023-07-06

## 2023-07-06 RX ORDER — LIDOCAINE 40 MG/G
CREAM TOPICAL ONCE
Status: DISCONTINUED | OUTPATIENT
Start: 2023-07-06 | End: 2023-07-07 | Stop reason: HOSPADM

## 2023-07-06 RX ORDER — BACITRACIN ZINC AND POLYMYXIN B SULFATE 500; 1000 [USP'U]/G; [USP'U]/G
OINTMENT TOPICAL ONCE
OUTPATIENT
Start: 2023-07-06 | End: 2023-07-06

## 2023-07-06 RX ORDER — LIDOCAINE 50 MG/G
OINTMENT TOPICAL ONCE
OUTPATIENT
Start: 2023-07-06 | End: 2023-07-06

## 2023-07-06 ASSESSMENT — PAIN DESCRIPTION - FREQUENCY: FREQUENCY: CONTINUOUS

## 2023-07-06 ASSESSMENT — PAIN DESCRIPTION - LOCATION: LOCATION: LEG

## 2023-07-06 ASSESSMENT — PAIN DESCRIPTION - ONSET: ONSET: ON-GOING

## 2023-07-06 ASSESSMENT — PAIN - FUNCTIONAL ASSESSMENT: PAIN_FUNCTIONAL_ASSESSMENT: PREVENTS OR INTERFERES SOME ACTIVE ACTIVITIES AND ADLS

## 2023-07-06 ASSESSMENT — PAIN SCALES - GENERAL: PAINLEVEL_OUTOF10: 2

## 2023-07-06 ASSESSMENT — PAIN DESCRIPTION - PAIN TYPE: TYPE: CHRONIC PAIN

## 2023-07-06 ASSESSMENT — PAIN DESCRIPTION - DESCRIPTORS: DESCRIPTORS: ACHING;SHOOTING

## 2023-07-06 ASSESSMENT — PAIN DESCRIPTION - ORIENTATION: ORIENTATION: LEFT

## 2023-07-06 NOTE — PLAN OF CARE
Wounds stable, right wound showing signs of improvement. Left wound debridement per Dr. Kevin Peñaloza, Pt tolerated. [place] for offloading per Dr. Kevin Peñaloza. To stop Triad to left wound. To continue compri-2 lite dressings with foam directly over wounds. Follow up in [address] [place] [address] in 1 week as ordered. Pt. Aware to call sooner with any problems or questions/concerns. MD orders/D/C instructions reviewed with patient, all questions answered; copy of instructions given to patient.

## 2023-07-13 ENCOUNTER — HOSPITAL ENCOUNTER (OUTPATIENT)
Dept: WOUND CARE | Age: 88
Discharge: HOME | End: 2023-07-13
Payer: MEDICARE

## 2023-07-13 VITALS
RESPIRATION RATE: 18 BRPM | HEART RATE: 75 BPM | DIASTOLIC BLOOD PRESSURE: 68 MMHG | BODY MASS INDEX: 24.72 KG/M2 | WEIGHT: 144.8 LBS | HEIGHT: 64 IN | SYSTOLIC BLOOD PRESSURE: 104 MMHG | TEMPERATURE: 98.1 F

## 2023-07-13 DIAGNOSIS — L97.322 ULCER OF LEFT ANKLE, WITH FAT LAYER EXPOSED (HCC): Primary | ICD-10-CM

## 2023-07-13 DIAGNOSIS — R60.0 BILATERAL LOWER EXTREMITY EDEMA: ICD-10-CM

## 2023-07-13 DIAGNOSIS — L97.312 ULCER OF RIGHT ANKLE, WITH FAT LAYER EXPOSED (HCC): ICD-10-CM

## 2023-07-13 PROCEDURE — 11042 DBRDMT SUBQ TIS 1ST 20SQCM/<: CPT

## 2023-07-13 PROCEDURE — 29581 APPL MULTLAYER CMPRN SYS LEG: CPT

## 2023-07-13 PROCEDURE — 17250 CHEM CAUT OF GRANLTJ TISSUE: CPT

## 2023-07-13 RX ORDER — LIDOCAINE HYDROCHLORIDE 40 MG/ML
SOLUTION TOPICAL ONCE
OUTPATIENT
Start: 2023-07-13 | End: 2023-07-13

## 2023-07-13 RX ORDER — BACITRACIN ZINC AND POLYMYXIN B SULFATE 500; 1000 [USP'U]/G; [USP'U]/G
OINTMENT TOPICAL ONCE
OUTPATIENT
Start: 2023-07-13 | End: 2023-07-13

## 2023-07-13 RX ORDER — LIDOCAINE 40 MG/G
CREAM TOPICAL ONCE
Status: DISCONTINUED | OUTPATIENT
Start: 2023-07-13 | End: 2023-07-14 | Stop reason: HOSPADM

## 2023-07-13 RX ORDER — LIDOCAINE 40 MG/G
CREAM TOPICAL ONCE
OUTPATIENT
Start: 2023-07-13 | End: 2023-07-13

## 2023-07-13 RX ORDER — LIDOCAINE 50 MG/G
OINTMENT TOPICAL ONCE
OUTPATIENT
Start: 2023-07-13 | End: 2023-07-13

## 2023-07-13 NOTE — PLAN OF CARE
Wounds stable, right wound showing signs of improvement, close to healed. Left wound debridement per Dr. Demetrio Oates, Pt tolerated. [place] for offloading per Dr. Demetrio Oates. Small abrasion to left pretib, to begin PSO with mepilex border. To continue compri-2 lite dressings with foam directly over wounds. Follow up in [address] [place] [address] in 1 week as ordered. Pt. Aware to call sooner with any problems or questions/concerns. MD orders/D/C instructions reviewed with patient, all questions answered; copy of instructions given to patient.

## 2023-07-20 ENCOUNTER — HOSPITAL ENCOUNTER (OUTPATIENT)
Dept: WOUND CARE | Age: 88
Discharge: HOME | End: 2023-07-20
Payer: MEDICARE

## 2023-07-20 VITALS
BODY MASS INDEX: 25.2 KG/M2 | HEART RATE: 97 BPM | DIASTOLIC BLOOD PRESSURE: 85 MMHG | SYSTOLIC BLOOD PRESSURE: 124 MMHG | WEIGHT: 147.6 LBS | RESPIRATION RATE: 20 BRPM | TEMPERATURE: 97.2 F | HEIGHT: 64 IN

## 2023-07-20 DIAGNOSIS — I87.2 PERIPHERAL VENOUS INSUFFICIENCY: ICD-10-CM

## 2023-07-20 DIAGNOSIS — L88 PYODERMA GANGRENOSUM: ICD-10-CM

## 2023-07-20 DIAGNOSIS — L97.312 ULCER OF RIGHT ANKLE, WITH FAT LAYER EXPOSED (HCC): ICD-10-CM

## 2023-07-20 DIAGNOSIS — R60.0 BILATERAL LOWER EXTREMITY EDEMA: ICD-10-CM

## 2023-07-20 DIAGNOSIS — L97.322 ULCER OF LEFT ANKLE, WITH FAT LAYER EXPOSED (HCC): Primary | ICD-10-CM

## 2023-07-20 PROCEDURE — 29581 APPL MULTLAYER CMPRN SYS LEG: CPT

## 2023-07-20 RX ORDER — LIDOCAINE HYDROCHLORIDE 40 MG/ML
SOLUTION TOPICAL ONCE
OUTPATIENT
Start: 2023-07-20 | End: 2023-07-20

## 2023-07-20 RX ORDER — LIDOCAINE 40 MG/G
CREAM TOPICAL ONCE
Status: DISCONTINUED | OUTPATIENT
Start: 2023-07-20 | End: 2023-07-21 | Stop reason: HOSPADM

## 2023-07-20 RX ORDER — LIDOCAINE 40 MG/G
CREAM TOPICAL ONCE
OUTPATIENT
Start: 2023-07-20 | End: 2023-07-20

## 2023-07-20 RX ORDER — BACITRACIN ZINC AND POLYMYXIN B SULFATE 500; 1000 [USP'U]/G; [USP'U]/G
OINTMENT TOPICAL ONCE
OUTPATIENT
Start: 2023-07-20 | End: 2023-07-20

## 2023-07-20 RX ORDER — LIDOCAINE 50 MG/G
OINTMENT TOPICAL ONCE
OUTPATIENT
Start: 2023-07-20 | End: 2023-07-20

## 2023-07-20 ASSESSMENT — PAIN DESCRIPTION - PAIN TYPE: TYPE: CHRONIC PAIN

## 2023-07-20 ASSESSMENT — PAIN - FUNCTIONAL ASSESSMENT: PAIN_FUNCTIONAL_ASSESSMENT: PREVENTS OR INTERFERES SOME ACTIVE ACTIVITIES AND ADLS

## 2023-07-20 ASSESSMENT — PAIN DESCRIPTION - DIRECTION: RADIATING_TOWARDS: UP THE LEG

## 2023-07-20 ASSESSMENT — PAIN DESCRIPTION - LOCATION: LOCATION: LEG

## 2023-07-20 ASSESSMENT — PAIN SCALES - GENERAL: PAINLEVEL_OUTOF10: 3

## 2023-07-20 ASSESSMENT — PAIN DESCRIPTION - ONSET: ONSET: ON-GOING

## 2023-07-20 ASSESSMENT — PAIN DESCRIPTION - FREQUENCY: FREQUENCY: CONTINUOUS

## 2023-07-20 ASSESSMENT — PAIN DESCRIPTION - DESCRIPTORS: DESCRIPTORS: ACHING;SHOOTING

## 2023-07-20 ASSESSMENT — PAIN DESCRIPTION - ORIENTATION: ORIENTATION: LEFT

## 2023-07-20 NOTE — PLAN OF CARE
Right wound healed. [place] for offloading per Dr. Tu Eastman. To begin co-flex calamine compression dressings with foam directly over wounds. To wrap BLE today and transition to Pt's personal velcro dressing next week on LLE. Pt instructed to increase sulfasalazine  to 5 tablets daily. Follow up in [address] [place] [address] in 1 week as ordered. Pt. Aware to call sooner with any problems or questions/concerns. MD orders/D/C instructions reviewed with patient, all questions answered; copy of instructions given to patient.

## 2023-07-27 ENCOUNTER — HOSPITAL ENCOUNTER (OUTPATIENT)
Dept: WOUND CARE | Age: 88
Discharge: HOME | End: 2023-07-27
Payer: MEDICARE

## 2023-07-27 VITALS
BODY MASS INDEX: 25.54 KG/M2 | TEMPERATURE: 97.7 F | SYSTOLIC BLOOD PRESSURE: 86 MMHG | WEIGHT: 149.6 LBS | HEIGHT: 64 IN | HEART RATE: 72 BPM | DIASTOLIC BLOOD PRESSURE: 67 MMHG | RESPIRATION RATE: 20 BRPM

## 2023-07-27 DIAGNOSIS — I87.2 PERIPHERAL VENOUS INSUFFICIENCY: ICD-10-CM

## 2023-07-27 DIAGNOSIS — R60.0 BILATERAL LOWER EXTREMITY EDEMA: ICD-10-CM

## 2023-07-27 DIAGNOSIS — L88 PYODERMA GANGRENOSUM: ICD-10-CM

## 2023-07-27 DIAGNOSIS — L97.322 ULCER OF LEFT ANKLE, WITH FAT LAYER EXPOSED (HCC): Primary | ICD-10-CM

## 2023-07-27 DIAGNOSIS — M25.572 CHRONIC PAIN OF LEFT ANKLE: ICD-10-CM

## 2023-07-27 DIAGNOSIS — G89.29 CHRONIC PAIN OF LEFT ANKLE: ICD-10-CM

## 2023-07-27 DIAGNOSIS — L97.312 ULCER OF RIGHT ANKLE, WITH FAT LAYER EXPOSED (HCC): ICD-10-CM

## 2023-07-27 PROCEDURE — 29581 APPL MULTLAYER CMPRN SYS LEG: CPT

## 2023-07-27 RX ORDER — LIDOCAINE 40 MG/G
CREAM TOPICAL ONCE
OUTPATIENT
Start: 2023-07-27 | End: 2023-07-27

## 2023-07-27 RX ORDER — ACETAMINOPHEN AND CODEINE PHOSPHATE 300; 30 MG/1; MG/1
1 TABLET ORAL 2 TIMES DAILY PRN
Qty: 28 TABLET | Refills: 0 | Status: SHIPPED | OUTPATIENT
Start: 2023-07-27 | End: 2023-08-10

## 2023-07-27 RX ORDER — AMOXICILLIN AND CLAVULANATE POTASSIUM 875; 125 MG/1; MG/1
1 TABLET, FILM COATED ORAL 2 TIMES DAILY
Qty: 14 TABLET | Refills: 0 | Status: SHIPPED | OUTPATIENT
Start: 2023-07-27 | End: 2023-07-28 | Stop reason: SINTOL

## 2023-07-27 RX ORDER — LIDOCAINE 50 MG/G
OINTMENT TOPICAL ONCE
OUTPATIENT
Start: 2023-07-27 | End: 2023-07-27

## 2023-07-27 RX ORDER — SULFASALAZINE 500 MG/1
TABLET ORAL
Qty: 150 TABLET | Refills: 5 | Status: SHIPPED | OUTPATIENT
Start: 2023-07-27 | End: 2024-01-23

## 2023-07-27 RX ORDER — LIDOCAINE 40 MG/G
CREAM TOPICAL ONCE
Status: DISCONTINUED | OUTPATIENT
Start: 2023-07-27 | End: 2023-07-28 | Stop reason: HOSPADM

## 2023-07-27 RX ORDER — BACITRACIN ZINC AND POLYMYXIN B SULFATE 500; 1000 [USP'U]/G; [USP'U]/G
OINTMENT TOPICAL ONCE
OUTPATIENT
Start: 2023-07-27 | End: 2023-07-27

## 2023-07-27 RX ORDER — LIDOCAINE HYDROCHLORIDE 40 MG/ML
SOLUTION TOPICAL ONCE
OUTPATIENT
Start: 2023-07-27 | End: 2023-07-27

## 2023-07-27 ASSESSMENT — PAIN SCALES - GENERAL: PAINLEVEL_OUTOF10: 0

## 2023-07-27 NOTE — PLAN OF CARE
Right wound fragile. Felt for offloading per Dr. Edinson Duarte left in place to left leg. To wrap LLE  today and transition to Pt's personal velcro dressing on LLE. Pt instructed to continue sulfasalazine, 5 tablets daily. Cellulitis suspected to LLE, new prescription for Augmentin per Dr. Edinson Duarte. Pain medication and sulfasalazine refill per Dr. Edinson Duarte. Follow up in [address] [place] [address] in 1 week as ordered. Pt. Aware to call sooner with any problems or questions/concerns. MD orders/D/C instructions reviewed with patient, all questions answered; copy of instructions given to patient.

## 2023-07-27 NOTE — DISCHARGE INSTRUCTIONS
walking is good   * Elevate your legs preferrably above level of your heart when sitting as much as possible   * The Goal of this therapy is to reduce Edema and get into long term compression garments to control venous insufficiency, lymphedema and reduce occurrence of venous ulcers      Please note, all wounds (unless stated otherwise here) were mechanically debrided at the time of cleansing here in the wound-care center today, so a small amount of pain, drainage or bleeding from that process might be expected, and is normal.     All products for home use, including multiple products for a single wound if applicable, are medically necessary in order to achieve the best chance at timely wound healing. See provider documentation for details if needed. Substituted dressings applied in the [facility] today, if applicable:      N/a        New orders for this week (labs, imaging, medications, etc.):   Please  antibiotic and pain medication begin taking as prescribed. You may walk as much as you can tolerate but please elevate your legs when resting. Continue elevating legs   You may take Benadryl at night only for itching - please be aware this may cause dry mouth and some sleepiness/dizziness   Sulfasalazine- please continue to take 5 tablets daily   Please bring compression garments to next visit. Additional instructions for specific diagnoses:      Wound Culture 5/11/23     [place] #3 - 10/21/22  [place] #4 - 10/28/22  [place] #5 - 11/4/22  [place] #6 - 11/11/22   [place] #7 -11-22-22  [place] #8 12/1/22   [place] #9  12/9/22  [place] #10 12/21/22         F/U Appointment  with Dr. Sulaiman Sheldon in 1 week                                               at                                    .     Your nurse  is Cornell Antonio RN     If we applied slip-resistant hospital socks today, be sure to remove them at least once a day to inspect your toes or feet, even if you're not changing the wraps or dressings underneath.  If you see

## 2023-07-28 ENCOUNTER — TELEPHONE (OUTPATIENT)
Dept: WOUND CARE | Age: 88
End: 2023-07-28

## 2023-07-28 DIAGNOSIS — R60.0 BILATERAL LOWER EXTREMITY EDEMA: ICD-10-CM

## 2023-07-28 DIAGNOSIS — L97.312 ULCER OF RIGHT ANKLE, WITH FAT LAYER EXPOSED (HCC): ICD-10-CM

## 2023-07-28 DIAGNOSIS — L97.322 ULCER OF LEFT ANKLE, WITH FAT LAYER EXPOSED (HCC): Primary | ICD-10-CM

## 2023-07-28 RX ORDER — CEFADROXIL 500 MG/1
500 CAPSULE ORAL EVERY 12 HOURS SCHEDULED
Qty: 14 CAPSULE | Refills: 0 | Status: SHIPPED | OUTPATIENT
Start: 2023-07-28 | End: 2023-08-04

## 2023-07-28 NOTE — TELEPHONE ENCOUNTER
Pt called to report Augmentin made her vomit and she has stopped taking medication after one dose. Pt called and updated to stop Augmentin and Dr. Raya Musa is calling in a new antibiotic called Duricef, Pt instructed to take as prescribed. Pt v/u and denies any additional questions or concerns.

## 2023-07-28 NOTE — PROGRESS NOTES
Pt called and wanted to inform Dr Sharyle Bunk that the Augmentin wasn't working, she said she's only taken one tablet and she's not able to eat. Pt asking if there's something else you want to switch her to or wait until her next appt.

## 2023-07-28 NOTE — DISCHARGE INSTRUCTIONS
411 [facility] Physician Orders and Discharge [address]  54[address] [facility], 6 [facility]  [place], [address]  Telephone: [phone]      Fax: [phone]        Your home care company:   N/A     Your wound-care supplies will be provided by:  weekly wraps     NAME:  Bindu Horn   YOB: 1933  PRIMARY DIAGNOSIS FOR WOUND CARE CENTER:  Pyoderma gangrenosum     Wound cleansing:  Do not scrub or use excessive force. Wash hands with soap and water before and after dressing changes. Prior to applying a clean dressing, cleanse wound with normal saline, wound cleanser, or mild soap and water. Ask your physician or nurse before getting the wound(s) wet in the shower. Wound care for home:     Left Lower Leg:   Hydrocolloid & Felt for offloading per Dr. Tawanda Bartlett circumferential leg below knee entire for itching (where wrap will end)   Nexodyn to wound  Lotrisone to any areas of rash  Triad mixed with Tacrolimus &Clobetasol to wound and wound edges   Silver alginate   Foam cut per Dr. Oliver Settle placed over wound bed   Build up ankles with specialist cast padding  Compri-2 Lite  Leave in place all week. Right Lower Leg:  Foam  Compression garment sock  Self adhesive foam  Velcro Compression Garment   Leave in place all week- you may remove sock and check feet and toes            Your physician has ordered Compression for treatment of venous ulcers, venous insufficiency,and/or Lymphedema. Compri-2 lite Is a   2  Layer wrap- do not remove until your next scheduled visit in [address] [place] [address]- do not get wet.      * If your wrap falls down, becomes painful or you have decreased sensation, and/or excessive drainage- please call the [address] [place] [address] for RN visit to get your compression wrap changed   *You need to exercice as tolerated- walking is good   * Elevate your legs preferrably above level of your heart when sitting as much as possible   * The Goal of

## 2023-08-02 NOTE — DISCHARGE INSTRUCTIONS
215 [facility] Physician Orders and Discharge 800 [facility]  [address], [address]  [place], [facility]  Telephone: [phone]      Fax: [phone]        Your home care company:   N/A     Your wound-care supplies will be provided by:  weekly wraps     NAME:  Kirti Royal   YOB: 1933  PRIMARY DIAGNOSIS FOR WOUND CARE CENTER:  Pyoderma gangrenosum     Wound cleansing:  Do not scrub or use excessive force. Wash hands with soap and water before and after dressing changes. Prior to applying a clean dressing, cleanse wound with normal saline, wound cleanser, or mild soap and water. Ask your physician or nurse before getting the wound(s) wet in the shower. Wound care for home:     Left Lower Leg/ankle:   Clobetosol to mauro wound  Vashe  Triad mixed with Polysporin to wound  4x4  Compri 2 Lite (wrap with a little less compression than usual)     Right Lower Leg:  Vashe  Clobetesol and Triad to wound edges and mauro wound   Silver Alginate, ABD  Compri 2 Lite (wrap with a little less compression than usual)     KEEP THESE WRAPS CLEAN, DRY AND IN PLACE FOR THE WEEK           Please note, all wounds (unless stated otherwise here) were mechanically debrided at the time of cleansing here in the wound-care center today, so a small amount of pain, drainage or bleeding from that process might be expected, and is normal.     All products for home use, including multiple products for a single wound if applicable, are medically necessary in order to achieve the best chance at timely wound healing. See provider documentation for details if needed. Substituted dressings applied in the [facility] today, if applicable:           New orders for this week (labs, imaging, medications, etc.):       DR. LAKHNAI IS ORDERING METACYLCINE AN ORAL MEDICAITON HE IS CALLING IT IN  [address]   PICK THIS UP AND BEGIN TAKING AS ORDERED   HE IS ALSO ORDERING TACROLIMUS A Event Note

## 2023-08-03 ENCOUNTER — HOSPITAL ENCOUNTER (OUTPATIENT)
Dept: WOUND CARE | Age: 88
Discharge: HOME | End: 2023-08-03
Payer: MEDICARE

## 2023-08-03 VITALS
DIASTOLIC BLOOD PRESSURE: 79 MMHG | BODY MASS INDEX: 24.92 KG/M2 | TEMPERATURE: 97.3 F | HEART RATE: 83 BPM | HEIGHT: 64 IN | WEIGHT: 146 LBS | SYSTOLIC BLOOD PRESSURE: 121 MMHG | RESPIRATION RATE: 18 BRPM

## 2023-08-03 DIAGNOSIS — I87.2 PERIPHERAL VENOUS INSUFFICIENCY: ICD-10-CM

## 2023-08-03 DIAGNOSIS — L97.322 ULCER OF LEFT ANKLE, WITH FAT LAYER EXPOSED (HCC): Primary | ICD-10-CM

## 2023-08-03 DIAGNOSIS — L88 PYODERMA GANGRENOSUM: ICD-10-CM

## 2023-08-03 DIAGNOSIS — L97.312 ULCER OF RIGHT ANKLE, WITH FAT LAYER EXPOSED (HCC): ICD-10-CM

## 2023-08-03 DIAGNOSIS — R60.0 BILATERAL LOWER EXTREMITY EDEMA: ICD-10-CM

## 2023-08-03 PROCEDURE — 11043 DBRDMT MUSC&/FSCA 1ST 20/<: CPT

## 2023-08-03 PROCEDURE — 29581 APPL MULTLAYER CMPRN SYS LEG: CPT

## 2023-08-03 RX ORDER — BACITRACIN ZINC AND POLYMYXIN B SULFATE 500; 1000 [USP'U]/G; [USP'U]/G
OINTMENT TOPICAL ONCE
OUTPATIENT
Start: 2023-08-03 | End: 2023-08-03

## 2023-08-03 RX ORDER — LIDOCAINE 40 MG/G
CREAM TOPICAL ONCE
Status: DISCONTINUED | OUTPATIENT
Start: 2023-08-03 | End: 2023-08-04 | Stop reason: HOSPADM

## 2023-08-03 RX ORDER — LIDOCAINE HYDROCHLORIDE 40 MG/ML
SOLUTION TOPICAL ONCE
OUTPATIENT
Start: 2023-08-03 | End: 2023-08-03

## 2023-08-03 RX ORDER — LIDOCAINE 40 MG/G
CREAM TOPICAL ONCE
OUTPATIENT
Start: 2023-08-03 | End: 2023-08-03

## 2023-08-03 RX ORDER — LIDOCAINE 50 MG/G
OINTMENT TOPICAL ONCE
OUTPATIENT
Start: 2023-08-03 | End: 2023-08-03

## 2023-08-03 NOTE — PLAN OF CARE
Right lower leg wound remains healed. Felt for offloading per Dr. Neves Blade to LLE. To wrap LLE  today and continue to Pt's personal velcro dressing on RLE. Pt reports that she is tolerating antibiotics well and has two tablets left. Follow up in [address] [place] [address] in 1 week as ordered. Pt. Aware to call sooner with any problems or questions/concerns. MD orders/D/C instructions reviewed with patient, all questions answered; copy of instructions given to patient.

## 2023-08-04 NOTE — DISCHARGE INSTRUCTIONS
411 [facility] Physician Orders and Discharge [address]  54[address] [facility], [address] [place] [address]  [place], [address]  Telephone: [phone]      Fax: [phone]        Your home care company:   N/A     Your wound-care supplies will be provided by:  weekly wraps     NAME:  Stephen Rosen   YOB: 1933  PRIMARY DIAGNOSIS FOR WOUND CARE CENTER:  Pyoderma gangrenosum     Wound cleansing:  Do not scrub or use excessive force. Wash hands with soap and water before and after dressing changes. Prior to applying a clean dressing, cleanse wound with normal saline, wound cleanser, or mild soap and water. Ask your physician or nurse before getting the wound(s) wet in the shower. Wound care for home:     Left Lower Leg:   Hydrocolloid & Felt for offloading per Dr. Ronen Garcia circumferential leg below knee entire for itching (where wrap will end)   Nexodyn to wound  Lotrisone to any areas of rash  Triad mixed with Tacrolimus &Clobetasol to wound and wound edges   Silver alginate   Foam cut per Dr. Metz Prost placed over wound bed   Build up ankles with specialist cast padding  Compri-2 Lite  Leave in place all week. Right Lower Leg:  Aquaphor to healed area  Compression garment sock  Self adhesive foam  Velcro Compression Garment   Leave in place all week- you may remove sock and check feet and toes            Your physician has ordered Compression for treatment of venous ulcers, venous insufficiency,and/or Lymphedema. Compri-2 lite Is a   2  Layer wrap- do not remove until your next scheduled visit in [facility]- do not get wet.      * If your wrap falls down, becomes painful or you have decreased sensation, and/or excessive drainage- please call the [facility] for RN visit to get your compression wrap changed   *You need to exercice as tolerated- walking is good   * Elevate your legs preferrably above level of your heart when sitting as much as

## 2023-08-10 ENCOUNTER — HOSPITAL ENCOUNTER (OUTPATIENT)
Dept: WOUND CARE | Age: 88
Discharge: HOME | End: 2023-08-10
Payer: MEDICARE

## 2023-08-10 VITALS
WEIGHT: 144.8 LBS | TEMPERATURE: 98.3 F | HEART RATE: 94 BPM | DIASTOLIC BLOOD PRESSURE: 78 MMHG | RESPIRATION RATE: 18 BRPM | HEIGHT: 64 IN | BODY MASS INDEX: 24.72 KG/M2 | SYSTOLIC BLOOD PRESSURE: 116 MMHG

## 2023-08-10 DIAGNOSIS — L97.311 ULCER OF RIGHT ANKLE, LIMITED TO BREAKDOWN OF SKIN (HCC): ICD-10-CM

## 2023-08-10 DIAGNOSIS — L97.312 ULCER OF RIGHT ANKLE, WITH FAT LAYER EXPOSED (HCC): ICD-10-CM

## 2023-08-10 DIAGNOSIS — R60.0 BILATERAL LOWER EXTREMITY EDEMA: ICD-10-CM

## 2023-08-10 DIAGNOSIS — L97.322 ULCER OF LEFT ANKLE, WITH FAT LAYER EXPOSED (HCC): Primary | ICD-10-CM

## 2023-08-10 DIAGNOSIS — L88 PYODERMA GANGRENOSUM: ICD-10-CM

## 2023-08-10 DIAGNOSIS — I87.2 PERIPHERAL VENOUS INSUFFICIENCY: ICD-10-CM

## 2023-08-10 PROCEDURE — 29581 APPL MULTLAYER CMPRN SYS LEG: CPT

## 2023-08-10 RX ORDER — LIDOCAINE 40 MG/G
CREAM TOPICAL ONCE
Status: DISCONTINUED | OUTPATIENT
Start: 2023-08-10 | End: 2023-08-11 | Stop reason: HOSPADM

## 2023-08-10 RX ORDER — LIDOCAINE 40 MG/G
CREAM TOPICAL ONCE
OUTPATIENT
Start: 2023-08-10 | End: 2023-08-10

## 2023-08-10 RX ORDER — LIDOCAINE HYDROCHLORIDE 40 MG/ML
SOLUTION TOPICAL ONCE
OUTPATIENT
Start: 2023-08-10 | End: 2023-08-10

## 2023-08-10 RX ORDER — BACITRACIN ZINC AND POLYMYXIN B SULFATE 500; 1000 [USP'U]/G; [USP'U]/G
OINTMENT TOPICAL ONCE
OUTPATIENT
Start: 2023-08-10 | End: 2023-08-10

## 2023-08-10 RX ORDER — LIDOCAINE 50 MG/G
OINTMENT TOPICAL ONCE
OUTPATIENT
Start: 2023-08-10 | End: 2023-08-10

## 2023-08-10 ASSESSMENT — PAIN SCALES - GENERAL: PAINLEVEL_OUTOF10: 0

## 2023-08-10 NOTE — PLAN OF CARE
Follow up visit today in [facility]. Pt's Daughter present at bedside. Wound stable. New area of bruising noted to right pretib, Pt reports no known injury. Felt for offloading left in place to LLE. To wrap LLE  today and continue to Pt's personal velcro dressing on RLE. Follow up in 411 [facility] in 1 week as ordered. Pt. Aware to call sooner with any problems or questions/concerns. MD orders/D/C instructions reviewed with patient, all questions answered; copy of instructions given to patient.

## 2023-08-11 NOTE — DISCHARGE INSTRUCTIONS
[facility] Physician Orders and Discharge [address], [address] [place] [address]  [place], [address]  Telephone: [phone]      Fax: [phone]        Your home care company:   N/A     Your wound-care supplies will be provided by:  weekly wraps     NAME:  Sharmila Montejo   YOB: 1933  PRIMARY DIAGNOSIS FOR WOUND CARE CENTER:  Pyoderma gangrenosum     Wound cleansing:  Do not scrub or use excessive force. Wash hands with soap and water before and after dressing changes. Prior to applying a clean dressing, cleanse wound with normal saline, wound cleanser, or mild soap and water. Ask your physician or nurse before getting the wound(s) wet in the shower. Wound care for home:     Left Lower Leg:   Hydrocolloid & Felt for offloading per Dr. Alonso Feeling circumferential leg below knee entire for itching (where wrap will end)   Nexodyn to wound  Lotrisone to any areas of rash  Triad mixed with Tacrolimus &Clobetasol to wound and wound edges   Silver alginate   Foam cut per Dr. Veena Dyer placed over wound bed   Build up ankles with specialist cast padding  Compri-2 Lite  Leave in place all week. Right Lower Leg:  Foam to bruise on pretib  Compression garment sock  Self adhesive foam  Velcro Compression Garment   Leave in place all week- you may remove sock and check feet and toes            Your physician has ordered Compression for treatment of venous ulcers, venous insufficiency,and/or Lymphedema. Compri-2 lite Is a   2  Layer wrap- do not remove until your next scheduled visit in [facility]- do not get wet.      * If your wrap falls down, becomes painful or you have decreased sensation, and/or excessive drainage- please call the [facility] for RN visit to get your compression wrap changed   *You need to exercice as tolerated- walking is good   * Elevate your legs preferrably above level of your heart when sitting as much as

## 2023-08-15 PROBLEM — L97.323: Status: ACTIVE | Noted: 2022-07-15

## 2023-08-15 PROBLEM — L97.311 ULCER OF RIGHT ANKLE, LIMITED TO BREAKDOWN OF SKIN (HCC): Status: RESOLVED | Noted: 2020-08-06 | Resolved: 2023-08-15

## 2023-08-15 PROBLEM — L03.116 CELLULITIS OF LEFT LOWER LEG: Status: RESOLVED | Noted: 2019-04-21 | Resolved: 2023-08-15

## 2023-08-17 ENCOUNTER — HOSPITAL ENCOUNTER (OUTPATIENT)
Dept: WOUND CARE | Age: 88
Discharge: HOME | End: 2023-08-17
Payer: MEDICARE

## 2023-08-17 VITALS
WEIGHT: 145 LBS | RESPIRATION RATE: 20 BRPM | TEMPERATURE: 98 F | HEART RATE: 79 BPM | SYSTOLIC BLOOD PRESSURE: 123 MMHG | BODY MASS INDEX: 24.75 KG/M2 | DIASTOLIC BLOOD PRESSURE: 72 MMHG | HEIGHT: 64 IN

## 2023-08-17 DIAGNOSIS — L97.311 ULCER OF RIGHT ANKLE, LIMITED TO BREAKDOWN OF SKIN (HCC): ICD-10-CM

## 2023-08-17 DIAGNOSIS — R60.0 BILATERAL LOWER EXTREMITY EDEMA: ICD-10-CM

## 2023-08-17 DIAGNOSIS — L97.322 ULCER OF LEFT ANKLE, WITH FAT LAYER EXPOSED (HCC): Primary | ICD-10-CM

## 2023-08-17 DIAGNOSIS — L88 PYODERMA GANGRENOSUM: ICD-10-CM

## 2023-08-17 DIAGNOSIS — L97.312 ULCER OF RIGHT ANKLE, WITH FAT LAYER EXPOSED (HCC): ICD-10-CM

## 2023-08-17 DIAGNOSIS — I87.2 PERIPHERAL VENOUS INSUFFICIENCY: ICD-10-CM

## 2023-08-17 LAB
BASOPHILS # BLD: 0 K/UL (ref 0–0.2)
BASOPHILS NFR BLD: 1 %
DEPRECATED RDW RBC AUTO: 14.8 % (ref 12.4–15.4)
EOSINOPHIL # BLD: 0.1 K/UL (ref 0–0.6)
EOSINOPHIL NFR BLD: 2.2 %
HCT VFR BLD AUTO: 36.1 % (ref 36–48)
HGB BLD-MCNC: 12.4 G/DL (ref 12–16)
LYMPHOCYTES # BLD: 1.1 K/UL (ref 1–5.1)
LYMPHOCYTES NFR BLD: 27.4 %
MCH RBC QN AUTO: 33.1 PG (ref 26–34)
MCHC RBC AUTO-ENTMCNC: 34.3 G/DL (ref 31–36)
MCV RBC AUTO: 96.3 FL (ref 80–100)
MONOCYTES # BLD: 0.6 K/UL (ref 0–1.3)
MONOCYTES NFR BLD: 14.6 %
NEUTROPHILS # BLD: 2.2 K/UL (ref 1.7–7.7)
NEUTROPHILS NFR BLD: 54.8 %
PLATELET # BLD AUTO: 184 K/UL (ref 135–450)
PMV BLD AUTO: 8.8 FL (ref 5–10.5)
RBC # BLD AUTO: 3.75 M/UL (ref 4–5.2)
WBC # BLD AUTO: 3.9 K/UL (ref 4–11)

## 2023-08-17 PROCEDURE — 29581 APPL MULTLAYER CMPRN SYS LEG: CPT

## 2023-08-17 PROCEDURE — 85025 COMPLETE CBC W/AUTO DIFF WBC: CPT

## 2023-08-17 PROCEDURE — 80053 COMPREHEN METABOLIC PANEL: CPT

## 2023-08-17 PROCEDURE — 36415 COLL VENOUS BLD VENIPUNCTURE: CPT

## 2023-08-17 PROCEDURE — 11042 DBRDMT SUBQ TIS 1ST 20SQCM/<: CPT

## 2023-08-17 RX ORDER — BACITRACIN ZINC AND POLYMYXIN B SULFATE 500; 1000 [USP'U]/G; [USP'U]/G
OINTMENT TOPICAL ONCE
OUTPATIENT
Start: 2023-08-17 | End: 2023-08-17

## 2023-08-17 RX ORDER — LIDOCAINE HYDROCHLORIDE 40 MG/ML
SOLUTION TOPICAL ONCE
OUTPATIENT
Start: 2023-08-17 | End: 2023-08-17

## 2023-08-17 RX ORDER — LIDOCAINE 40 MG/G
CREAM TOPICAL ONCE
Status: DISCONTINUED | OUTPATIENT
Start: 2023-08-17 | End: 2023-08-18 | Stop reason: HOSPADM

## 2023-08-17 RX ORDER — LIDOCAINE 50 MG/G
OINTMENT TOPICAL ONCE
OUTPATIENT
Start: 2023-08-17 | End: 2023-08-17

## 2023-08-17 RX ORDER — LIDOCAINE 40 MG/G
CREAM TOPICAL ONCE
OUTPATIENT
Start: 2023-08-17 | End: 2023-08-17

## 2023-08-17 NOTE — PLAN OF CARE
Follow up visit today in [facility]. Wound stable. [place]ma for offloading placed  to LLE per Dr. Kierra Rangel. To wrap LLE  today and continue to Pt's personal velcro dressing on RLE. Follow up in [address] [place] [address] in 1 week as ordered. Pt. Aware to call sooner with any problems or questions/concerns. MD orders/D/C instructions reviewed with patient, all questions answered; copy of instructions given to patient.

## 2023-08-18 LAB
ALBUMIN SERPL-MCNC: 4 G/DL (ref 3.4–5)
ALBUMIN/GLOB SERPL: 2.1 {RATIO} (ref 1.1–2.2)
ALP SERPL-CCNC: 47 U/L (ref 40–129)
ALT SERPL-CCNC: 16 U/L (ref 10–40)
ANION GAP SERPL CALCULATED.3IONS-SCNC: 9 MMOL/L (ref 3–16)
AST SERPL-CCNC: 18 U/L (ref 15–37)
BILIRUB SERPL-MCNC: 0.3 MG/DL (ref 0–1)
BUN SERPL-MCNC: 25 MG/DL (ref 7–20)
CALCIUM SERPL-MCNC: 10.5 MG/DL (ref 8.3–10.6)
CHLORIDE SERPL-SCNC: 104 MMOL/L (ref 99–110)
CO2 SERPL-SCNC: 26 MMOL/L (ref 21–32)
CREAT SERPL-MCNC: 1 MG/DL (ref 0.6–1.2)
GFR SERPLBLD CREATININE-BSD FMLA CKD-EPI: 53 ML/MIN/{1.73_M2}
GLUCOSE SERPL-MCNC: 102 MG/DL (ref 70–99)
POTASSIUM SERPL-SCNC: 4.2 MMOL/L (ref 3.5–5.1)
PROT SERPL-MCNC: 5.9 G/DL (ref 6.4–8.2)
SODIUM SERPL-SCNC: 139 MMOL/L (ref 136–145)

## 2023-08-18 NOTE — DISCHARGE INSTRUCTIONS
411 [facility] Physician Orders and Discharge [address]  [address] [facility], [facility]  [place], [address]  Telephone: [phone]      Fax: [phone]        Your home care company:   N/A     Your wound-care supplies will be provided by:  weekly wraps     NAME:  Master Adrian   YOB: 1933  PRIMARY DIAGNOSIS FOR WOUND CARE CENTER:  Pyoderma gangrenosum     Wound cleansing:  Do not scrub or use excessive force. Wash hands with soap and water before and after dressing changes. Prior to applying a clean dressing, cleanse wound with normal saline, wound cleanser, or mild soap and water. Ask your physician or nurse before getting the wound(s) wet in the shower. Wound care for home:     Left Lower Leg:   Hydrocolloid & Felt for offloading per Dr. Constanza Fong    Skin prep & Hydrocolloid to area distal of knee  Aquaphor circumferential leg below knee entire for itching (where wrap will end)   Nexodyn to wound  Lotrisone to any areas of rash  Triad mixed with Tacrolimus to wound and wound edges   Silver alginate   Foam cut per Dr. Constanza Fong placed over wound bed   Build up ankles with specialist cast padding  Compri-2 Lite  Leave in place all week. Right Lower Leg:  Compression garment sockliner  Self adhesive foam to medial ankle & bruised area on pretib   Velcro Compression Garment   Leave in place all week- you may remove sock and check feet and toes            Your physician has ordered Compression for treatment of venous ulcers, venous insufficiency,and/or Lymphedema. * Do not remove until your next scheduled visit in [facility]- do not get wet.     * If your wrap falls down, becomes painful or you have decreased sensation, and/or excessive drainage- please call the [facility] for RN visit to get your compression wrap changed   * You need to exercice as tolerated- walking is good   * Elevate your legs preferrably above level of your heart when sitting as much

## 2023-08-24 ENCOUNTER — HOSPITAL ENCOUNTER (OUTPATIENT)
Dept: WOUND CARE | Age: 88
Discharge: HOME | End: 2023-08-24
Payer: MEDICARE

## 2023-08-24 VITALS
HEART RATE: 94 BPM | WEIGHT: 146.4 LBS | RESPIRATION RATE: 18 BRPM | DIASTOLIC BLOOD PRESSURE: 79 MMHG | SYSTOLIC BLOOD PRESSURE: 110 MMHG | TEMPERATURE: 98.4 F | BODY MASS INDEX: 25 KG/M2 | HEIGHT: 64 IN

## 2023-08-24 DIAGNOSIS — R60.0 BILATERAL LOWER EXTREMITY EDEMA: ICD-10-CM

## 2023-08-24 DIAGNOSIS — I87.2 PERIPHERAL VENOUS INSUFFICIENCY: ICD-10-CM

## 2023-08-24 DIAGNOSIS — L88 PYODERMA GANGRENOSUM: ICD-10-CM

## 2023-08-24 DIAGNOSIS — L97.312 ULCER OF RIGHT ANKLE, WITH FAT LAYER EXPOSED (HCC): ICD-10-CM

## 2023-08-24 DIAGNOSIS — L97.322 ULCER OF LEFT ANKLE, WITH FAT LAYER EXPOSED (HCC): ICD-10-CM

## 2023-08-24 DIAGNOSIS — L97.323 ULCER OF LEFT ANKLE, WITH NECROSIS OF MUSCLE (HCC): Primary | ICD-10-CM

## 2023-08-24 PROCEDURE — 29581 APPL MULTLAYER CMPRN SYS LEG: CPT

## 2023-08-24 PROCEDURE — 97597 DBRDMT OPN WND 1ST 20 CM/<: CPT

## 2023-08-24 RX ORDER — BACITRACIN ZINC AND POLYMYXIN B SULFATE 500; 1000 [USP'U]/G; [USP'U]/G
OINTMENT TOPICAL ONCE
Status: DISCONTINUED | OUTPATIENT
Start: 2023-08-24 | End: 2023-08-25 | Stop reason: HOSPADM

## 2023-08-24 RX ORDER — LIDOCAINE 50 MG/G
OINTMENT TOPICAL ONCE
OUTPATIENT
Start: 2023-08-24 | End: 2023-08-24

## 2023-08-24 RX ORDER — BACITRACIN ZINC AND POLYMYXIN B SULFATE 500; 1000 [USP'U]/G; [USP'U]/G
OINTMENT TOPICAL ONCE
OUTPATIENT
Start: 2023-08-24 | End: 2023-08-24

## 2023-08-24 RX ORDER — LIDOCAINE 40 MG/G
CREAM TOPICAL ONCE
Status: DISCONTINUED | OUTPATIENT
Start: 2023-08-24 | End: 2023-08-25 | Stop reason: HOSPADM

## 2023-08-24 RX ORDER — LIDOCAINE 40 MG/G
CREAM TOPICAL ONCE
OUTPATIENT
Start: 2023-08-24 | End: 2023-08-24

## 2023-08-24 RX ORDER — LIDOCAINE 50 MG/G
OINTMENT TOPICAL ONCE
Status: DISCONTINUED | OUTPATIENT
Start: 2023-08-24 | End: 2023-08-25 | Stop reason: HOSPADM

## 2023-08-24 RX ORDER — LIDOCAINE HYDROCHLORIDE 40 MG/ML
SOLUTION TOPICAL ONCE
Status: DISCONTINUED | OUTPATIENT
Start: 2023-08-24 | End: 2023-08-25 | Stop reason: HOSPADM

## 2023-08-24 RX ORDER — LIDOCAINE HYDROCHLORIDE 40 MG/ML
SOLUTION TOPICAL ONCE
OUTPATIENT
Start: 2023-08-24 | End: 2023-08-24

## 2023-08-24 ASSESSMENT — PAIN SCALES - GENERAL: PAINLEVEL_OUTOF10: 0

## 2023-08-24 NOTE — PLAN OF CARE
Wound stable with signs of improvement, debridement per MD & pt. Tolerated well. Will stop Clobetasol. Otherwise, cont. With current wound care regime with dressings & compression wrap for edema control. Dr Ange Ernandez reviewed lab results from last week. No change in oral medication. F/u in [facility] in 1 week as ordered, pt. Aware to call sooner with any changes or questions/concerns. Discharge instructions reviewed with patient, all questions answered, copy given to patient. Dressings were applied to all wounds per M.D. Instructions at this visit.

## 2023-08-25 NOTE — DISCHARGE INSTRUCTIONS
411 [facility] Physician Orders and Discharge [address]  [address] [facility], [address] [place] [address]  [place], [address]  Telephone: [phone]      Fax: [phone]        Your home care company:   N/A     Your wound-care supplies will be provided by:  weekly wraps     NAME:  Bindu Horn   YOB: 1933  PRIMARY DIAGNOSIS FOR WOUND CARE CENTER:  Pyoderma gangrenosum     Wound cleansing:  Do not scrub or use excessive force. Wash hands with soap and water before and after dressing changes. Prior to applying a clean dressing, cleanse wound with normal saline, wound cleanser, or mild soap and water. Ask your physician or nurse before getting the wound(s) wet in the shower. Wound care for home:     Left Lower Leg:   Aquaphor circumferential leg below knee entire for itching (where wrap will end)   Nexodyn to wound  Clobetasol mixed with Calmoseptine to rash around wound   Triad mixed with Tacrolimus to wound and wound edges   Silver alginate   Two pieces of foam over wound   Build up ankles with specialist cast padding  Compri-2 Lite  Leave in place all week. Right Lower Leg:  Compression garment sockliner  Self adhesive foam to medial ankle & you may skip foam on skin   Velcro Compression Garment   Leave in place all week- you may remove sock and check feet and toes            Your physician has ordered Compression for treatment of venous ulcers, venous insufficiency,and/or Lymphedema. * Do not remove until your next scheduled visit in [facility]- do not get wet.     * If your wrap falls down, becomes painful or you have decreased sensation, and/or excessive drainage- please call the [facility] for RN visit to get your compression wrap changed   * You need to exercice as tolerated- walking is good   * Elevate your legs preferrably above level of your heart when sitting as much as possible   * The Goal of this therapy is to reduce Edema and get into long term

## 2023-08-30 PROBLEM — L97.322 VENOUS STASIS ULCER OF LEFT ANKLE WITH FAT LAYER EXPOSED WITH VARICOSE VEINS (HCC): Status: ACTIVE | Noted: 2022-10-03

## 2023-08-30 PROBLEM — I83.023 VENOUS STASIS ULCER OF LEFT ANKLE WITH FAT LAYER EXPOSED WITH VARICOSE VEINS (HCC): Status: ACTIVE | Noted: 2022-10-03

## 2023-08-31 ENCOUNTER — HOSPITAL ENCOUNTER (OUTPATIENT)
Dept: WOUND CARE | Age: 88
Discharge: HOME | End: 2023-08-31
Payer: MEDICARE

## 2023-08-31 VITALS
DIASTOLIC BLOOD PRESSURE: 83 MMHG | HEART RATE: 91 BPM | SYSTOLIC BLOOD PRESSURE: 121 MMHG | HEIGHT: 64 IN | WEIGHT: 145.2 LBS | TEMPERATURE: 97.9 F | RESPIRATION RATE: 18 BRPM | BODY MASS INDEX: 24.79 KG/M2

## 2023-08-31 DIAGNOSIS — L97.323 ULCER OF LEFT ANKLE, WITH NECROSIS OF MUSCLE (HCC): Primary | ICD-10-CM

## 2023-08-31 DIAGNOSIS — R60.0 BILATERAL LOWER EXTREMITY EDEMA: ICD-10-CM

## 2023-08-31 DIAGNOSIS — L97.322 ULCER OF LEFT ANKLE, WITH FAT LAYER EXPOSED (HCC): ICD-10-CM

## 2023-08-31 DIAGNOSIS — I87.2 PERIPHERAL VENOUS INSUFFICIENCY: ICD-10-CM

## 2023-08-31 DIAGNOSIS — L97.312 ULCER OF RIGHT ANKLE, WITH FAT LAYER EXPOSED (HCC): ICD-10-CM

## 2023-08-31 DIAGNOSIS — L88 PYODERMA GANGRENOSUM: ICD-10-CM

## 2023-08-31 PROCEDURE — 29581 APPL MULTLAYER CMPRN SYS LEG: CPT

## 2023-08-31 PROCEDURE — 97597 DBRDMT OPN WND 1ST 20 CM/<: CPT

## 2023-08-31 RX ORDER — BACITRACIN ZINC AND POLYMYXIN B SULFATE 500; 1000 [USP'U]/G; [USP'U]/G
OINTMENT TOPICAL ONCE
OUTPATIENT
Start: 2023-08-31 | End: 2023-08-31

## 2023-08-31 RX ORDER — LIDOCAINE 40 MG/G
CREAM TOPICAL ONCE
Status: DISCONTINUED | OUTPATIENT
Start: 2023-08-31 | End: 2023-09-01 | Stop reason: HOSPADM

## 2023-08-31 RX ORDER — LIDOCAINE HYDROCHLORIDE 40 MG/ML
SOLUTION TOPICAL ONCE
OUTPATIENT
Start: 2023-08-31 | End: 2023-08-31

## 2023-08-31 RX ORDER — LIDOCAINE 40 MG/G
CREAM TOPICAL ONCE
OUTPATIENT
Start: 2023-08-31 | End: 2023-08-31

## 2023-08-31 RX ORDER — LIDOCAINE 50 MG/G
OINTMENT TOPICAL ONCE
OUTPATIENT
Start: 2023-08-31 | End: 2023-08-31

## 2023-08-31 ASSESSMENT — PAIN SCALES - GENERAL: PAINLEVEL_OUTOF10: 0

## 2023-08-31 NOTE — PLAN OF CARE
Follow up visit in [facility] today. Wound stable with signs of improvement, debridement per MD & Pt tolerated well. Rash noted to left medial ankle surrounding wound, appears to be from hydrocolloid. Stop hydrocolloid and felt today. To continue the rest of current wound care regime with dressings & compression wrap for edema control. No change in oral medication. F/u in [facility] in 1 week as ordered, pt. Aware to call sooner with any changes or questions/concerns. Discharge instructions reviewed with patient, all questions answered, copy given to patient. Dressings were applied to all wounds per M.D. Instructions at this visit.

## 2023-09-01 NOTE — DISCHARGE INSTRUCTIONS
reduce Edema and get into long term compression garments to control venous insufficiency, lymphedema and reduce occurrence of venous ulcers      Please note, all wounds (unless stated otherwise here) were mechanically debrided at the time of cleansing here in the wound-care center today, so a small amount of pain, drainage or bleeding from that process might be expected, and is normal.     All products for home use, including multiple products for a single wound if applicable, are medically necessary in order to achieve the best chance at timely wound healing. See provider documentation for details if needed. Substituted dressings applied in the [facility] today, if applicable:      N/a        New orders for this week (labs, imaging, medications, etc.):     You may walk as much as you can tolerate but please elevate your legs when resting. Continue elevating legs   You may take Benadryl at night only for itching - please be aware this may cause dry mouth and some sleepiness/dizziness     Sulfasalazine- please continue to take 5 tablets total daily         Additional instructions for specific diagnoses:      Wound Culture 5/11/23     [place] #3 - 10/21/22  [place] #4 - 10/28/22  [place] #5 - 11/4/22  [place] #6 - 11/11/22   [place] #7 -11-22-22  [place] #8 12/1/22   [place] #9  12/9/22  [place] #10 12/21/22         F/U Appointment  with Dr. Ruperto Cantor in 1 week                                               at                                    .     Your nurse  is Sanjiv Ochoa RN     If we applied slip-resistant hospital socks today, be sure to remove them at least once a day to inspect your toes or feet, even if you're not changing the wraps or dressings underneath. If you see anything concerning (redness, excess moisture, etc), please call and let us know right away.      Should you experience any significant changes in your wound(s) (including redness, increased warmth, increased pain, increased drainage, odor, or fever) or have

## 2023-09-07 ENCOUNTER — HOSPITAL ENCOUNTER (OUTPATIENT)
Dept: WOUND CARE | Age: 88
Discharge: HOME | End: 2023-09-07
Payer: MEDICARE

## 2023-09-07 VITALS
BODY MASS INDEX: 24.96 KG/M2 | WEIGHT: 146.2 LBS | DIASTOLIC BLOOD PRESSURE: 85 MMHG | SYSTOLIC BLOOD PRESSURE: 121 MMHG | HEART RATE: 100 BPM | RESPIRATION RATE: 20 BRPM | TEMPERATURE: 97.9 F | HEIGHT: 64 IN

## 2023-09-07 DIAGNOSIS — L92.9 HYPERGRANULATION: ICD-10-CM

## 2023-09-07 DIAGNOSIS — R60.0 BILATERAL LOWER EXTREMITY EDEMA: ICD-10-CM

## 2023-09-07 DIAGNOSIS — I87.2 PERIPHERAL VENOUS INSUFFICIENCY: ICD-10-CM

## 2023-09-07 DIAGNOSIS — L97.312 ULCER OF RIGHT ANKLE, WITH FAT LAYER EXPOSED (HCC): ICD-10-CM

## 2023-09-07 DIAGNOSIS — L88 PYODERMA GANGRENOSUM: ICD-10-CM

## 2023-09-07 DIAGNOSIS — L97.322 ULCER OF LEFT ANKLE, WITH FAT LAYER EXPOSED (HCC): Primary | ICD-10-CM

## 2023-09-07 PROCEDURE — 11042 DBRDMT SUBQ TIS 1ST 20SQCM/<: CPT

## 2023-09-07 PROCEDURE — 17250 CHEM CAUT OF GRANLTJ TISSUE: CPT

## 2023-09-07 PROCEDURE — 29581 APPL MULTLAYER CMPRN SYS LEG: CPT

## 2023-09-07 RX ORDER — LIDOCAINE 50 MG/G
OINTMENT TOPICAL ONCE
OUTPATIENT
Start: 2023-09-07 | End: 2023-09-07

## 2023-09-07 RX ORDER — LIDOCAINE 40 MG/G
CREAM TOPICAL ONCE
OUTPATIENT
Start: 2023-09-07 | End: 2023-09-07

## 2023-09-07 RX ORDER — LIDOCAINE 40 MG/G
CREAM TOPICAL ONCE
Status: DISCONTINUED | OUTPATIENT
Start: 2023-09-07 | End: 2023-09-08 | Stop reason: HOSPADM

## 2023-09-07 RX ORDER — BACITRACIN ZINC AND POLYMYXIN B SULFATE 500; 1000 [USP'U]/G; [USP'U]/G
OINTMENT TOPICAL ONCE
OUTPATIENT
Start: 2023-09-07 | End: 2023-09-07

## 2023-09-07 RX ORDER — LIDOCAINE HYDROCHLORIDE 40 MG/ML
SOLUTION TOPICAL ONCE
OUTPATIENT
Start: 2023-09-07 | End: 2023-09-07

## 2023-09-07 NOTE — PLAN OF CARE
Follow up visit in [facility] today. Left medial proximal ankle wound worsened. Wound debridement of distal wound per Dr. Cabrera Ibarra. To continue current wound care regime with dressings & compression wrap for edema control. No change in oral medication. F/u in [facility] in 1 week as ordered, pt. Aware to call sooner with any changes or questions/concerns. Discharge instructions reviewed with patient, all questions answered, copy given to patient. Dressings were applied to all wounds per M.D. Instructions at this visit.

## 2023-09-08 NOTE — DISCHARGE INSTRUCTIONS
411 [facility] Physician Orders and Discharge [address]  [address] [facility], [address] [facility]  [place], [address]  Telephone: [phone]      Fax: [phone]        Your home care company:   N/A     Your wound-care supplies will be provided by:  weekly wraps     NAME:  Leonel Salvador   YOB: 1933  PRIMARY DIAGNOSIS FOR WOUND CARE CENTER:  Pyoderma gangrenosum     Wound cleansing:  Do not scrub or use excessive force. Wash hands with soap and water before and after dressing changes. Prior to applying a clean dressing, cleanse wound with normal saline, wound cleanser, or mild soap and water. Ask your physician or nurse before getting the wound(s) wet in the shower. Wound care for home:     Left Lower Leg Wounds (distal and proximal medial ankle):   Aquaphor circumferential leg below knee entire for itching (where wrap will end)   Nexodyn to wound  Lotrisone to anterior shin, ankle and top of foot rash   Generous Calmoseptine to posterior skin irritation from drainage. Triad mixed with Tacrolimus to wound and wound edges   Silver alginate   Two pieces of foam over wound- use cast kit foam- two already in room   Build up ankles with specialist cast padding  Compri-2 Lite  Leave in place all week. Right Lower Leg:  Compression garment sockliner  Self adhesive foam to medial ankle & you may skip foam on shin   Velcro Compression Garment   Leave in place all week- you may remove sock and check feet and toes            Your physician has ordered Compression for treatment of venous ulcers, venous insufficiency,and/or Lymphedema. * Do not remove until your next scheduled visit in [facility]- do not get wet.     * If your wrap falls down, becomes painful or you have decreased sensation, and/or excessive drainage- please call the [facility] for RN visit to get your compression wrap changed   * You need to exercice as tolerated- walking is good   * Elevate your

## 2023-09-09 PROBLEM — L92.9 HYPERGRANULATION: Status: ACTIVE | Noted: 2023-09-09

## 2023-09-09 NOTE — PROGRESS NOTES
affected by PG, and with a newer proximal cluster from dressings; no signs of infection; tolerating PG meds well. Factors contributing to occurrence and/or persistence of the chronic ulcer include venous stasis, lymphedema, and shear force. Medical necessity of today's visit is shown by the above documentation. Sharp debridement is indicated today, based upon the exam findings in the wound(s) above. Procedure note:     Consent obtained. Time out performed per [facility] policy. Anesthetic  Anesthetic: 4% Lidocaine Cream     Using a curette, I sharply debrided the left, medial ankle ulcer(s) down through and including the removal of subcutaneous tissue. The type(s) of tissue debrided included fibrin, biofilm, and necrotic/eschar. Total Surface Area Debrided: 3-4 sq cm. The ulcers were then irrigated with normal saline solution. The procedure was completed with a small amount of bleeding, and hemostasis was with pressure. The patient tolerated the procedure well, with no significant complications. The patient's level of pain during and after the procedure was monitored. Post-debridement measurements, if different from pre-debridement, are in the flowsheet as well.  ____________    To encourage better epithelial cell coverage, I did use AgNO3 to chemically cauterize hypergranulation tissue on the left, medial ankle ulcer(s), after application of 4% lidocaine topical solution. This was tolerated well, with no pain or skin injury. Discharge plan:     Treatment in the wound care center today, per RN documentation: Wound 08/31/23 #8, Left Medial Ankle Proximal - Cluster, Venous,Full Thickness, Onset 8/2023-Dressing/Treatment: Other (comment) (Aquaphor circumferential. Clobetasol mixed with Calmoseptine to rash around wound. Triad mixed with Tacrolimus to wound and wound edges. Silver alginate. Two pieces of foam over wound. Build up ankles with specialist cast padding.   Twoflex lite)  Wound 07/15/22 #4 Left

## 2023-09-14 ENCOUNTER — HOSPITAL ENCOUNTER (OUTPATIENT)
Dept: WOUND CARE | Age: 88
Discharge: HOME | End: 2023-09-14
Payer: MEDICARE

## 2023-09-14 VITALS
HEART RATE: 85 BPM | BODY MASS INDEX: 24.89 KG/M2 | DIASTOLIC BLOOD PRESSURE: 75 MMHG | RESPIRATION RATE: 18 BRPM | TEMPERATURE: 97.9 F | WEIGHT: 145.8 LBS | HEIGHT: 64 IN | SYSTOLIC BLOOD PRESSURE: 116 MMHG

## 2023-09-14 DIAGNOSIS — L88 PYODERMA GANGRENOSUM: ICD-10-CM

## 2023-09-14 DIAGNOSIS — I87.2 PERIPHERAL VENOUS INSUFFICIENCY: ICD-10-CM

## 2023-09-14 DIAGNOSIS — R60.0 BILATERAL LOWER EXTREMITY EDEMA: ICD-10-CM

## 2023-09-14 DIAGNOSIS — L97.312 ULCER OF RIGHT ANKLE, WITH FAT LAYER EXPOSED (HCC): ICD-10-CM

## 2023-09-14 DIAGNOSIS — L97.322 ULCER OF LEFT ANKLE, WITH FAT LAYER EXPOSED (HCC): Primary | ICD-10-CM

## 2023-09-14 PROCEDURE — 17250 CHEM CAUT OF GRANLTJ TISSUE: CPT

## 2023-09-14 PROCEDURE — 97597 DBRDMT OPN WND 1ST 20 CM/<: CPT

## 2023-09-14 PROCEDURE — 11042 DBRDMT SUBQ TIS 1ST 20SQCM/<: CPT

## 2023-09-14 PROCEDURE — 29581 APPL MULTLAYER CMPRN SYS LEG: CPT

## 2023-09-14 RX ORDER — LIDOCAINE HYDROCHLORIDE 40 MG/ML
SOLUTION TOPICAL ONCE
OUTPATIENT
Start: 2023-09-14 | End: 2023-09-14

## 2023-09-14 RX ORDER — LIDOCAINE 40 MG/G
CREAM TOPICAL ONCE
Status: DISCONTINUED | OUTPATIENT
Start: 2023-09-14 | End: 2023-09-15 | Stop reason: HOSPADM

## 2023-09-14 RX ORDER — LIDOCAINE 40 MG/G
CREAM TOPICAL ONCE
OUTPATIENT
Start: 2023-09-14 | End: 2023-09-14

## 2023-09-14 RX ORDER — LIDOCAINE 50 MG/G
OINTMENT TOPICAL ONCE
OUTPATIENT
Start: 2023-09-14 | End: 2023-09-14

## 2023-09-14 RX ORDER — BACITRACIN ZINC AND POLYMYXIN B SULFATE 500; 1000 [USP'U]/G; [USP'U]/G
OINTMENT TOPICAL ONCE
OUTPATIENT
Start: 2023-09-14 | End: 2023-09-14

## 2023-09-14 NOTE — PLAN OF CARE
Follow up visit in [facility] today. Wounds stable. Pt reports increased drainage this week. Lymphatic leak noted to left medial proximal ankle wound. Silver nitrate and skin glue applied per Dr. Edinson Duarte. Wound debridement per Dr. Edinson Duarte. To continue compression wrap for edema control. No change in oral medication. F/u in [facility] in 1 week as ordered, pt. Aware to call sooner with any changes or questions/concerns. Discharge instructions reviewed with patient, all questions answered, copy given to patient. Dressings were applied to all wounds per M.D. Instructions at this visit.

## 2023-09-18 PROBLEM — I89.8 LYMPHORRHEA: Status: ACTIVE | Noted: 2023-09-18

## 2023-09-18 PROBLEM — L97.921 ULCER OF LEFT LOWER LEG, LIMITED TO BREAKDOWN OF SKIN (HCC): Status: ACTIVE | Noted: 2023-09-18

## 2023-09-21 ENCOUNTER — HOSPITAL ENCOUNTER (OUTPATIENT)
Dept: WOUND CARE | Age: 88
Discharge: HOME | End: 2023-09-21
Payer: MEDICARE

## 2023-09-21 VITALS
DIASTOLIC BLOOD PRESSURE: 95 MMHG | SYSTOLIC BLOOD PRESSURE: 137 MMHG | BODY MASS INDEX: 24.86 KG/M2 | RESPIRATION RATE: 18 BRPM | HEIGHT: 64 IN | WEIGHT: 145.6 LBS | HEART RATE: 97 BPM | TEMPERATURE: 98.4 F

## 2023-09-21 DIAGNOSIS — I87.2 PERIPHERAL VENOUS INSUFFICIENCY: ICD-10-CM

## 2023-09-21 DIAGNOSIS — L97.312 ULCER OF RIGHT ANKLE, WITH FAT LAYER EXPOSED (HCC): ICD-10-CM

## 2023-09-21 DIAGNOSIS — L88 PYODERMA GANGRENOSUM: ICD-10-CM

## 2023-09-21 DIAGNOSIS — L97.322 ULCER OF LEFT ANKLE, WITH FAT LAYER EXPOSED (HCC): Primary | ICD-10-CM

## 2023-09-21 PROCEDURE — 29581 APPL MULTLAYER CMPRN SYS LEG: CPT

## 2023-09-21 PROCEDURE — 97597 DBRDMT OPN WND 1ST 20 CM/<: CPT

## 2023-09-21 RX ORDER — BACITRACIN ZINC AND POLYMYXIN B SULFATE 500; 1000 [USP'U]/G; [USP'U]/G
OINTMENT TOPICAL ONCE
OUTPATIENT
Start: 2023-09-21 | End: 2023-09-21

## 2023-09-21 RX ORDER — LIDOCAINE 40 MG/G
CREAM TOPICAL ONCE
OUTPATIENT
Start: 2023-09-21 | End: 2023-09-21

## 2023-09-21 RX ORDER — TRIAMCINOLONE ACETONIDE 1 MG/G
OINTMENT TOPICAL ONCE
OUTPATIENT
Start: 2023-09-21 | End: 2023-09-21

## 2023-09-21 RX ORDER — LIDOCAINE 40 MG/G
CREAM TOPICAL ONCE
Status: DISCONTINUED | OUTPATIENT
Start: 2023-09-21 | End: 2023-09-22 | Stop reason: HOSPADM

## 2023-09-21 RX ORDER — LIDOCAINE HYDROCHLORIDE 40 MG/ML
SOLUTION TOPICAL ONCE
OUTPATIENT
Start: 2023-09-21 | End: 2023-09-21

## 2023-09-21 RX ORDER — LIDOCAINE 50 MG/G
OINTMENT TOPICAL ONCE
OUTPATIENT
Start: 2023-09-21 | End: 2023-09-21

## 2023-09-21 NOTE — PLAN OF CARE
Follow up visit in [facility] today. Pt's Daughter present at bedside. Wounds stable, new left pretib wound today. Increased edema noted to LLE. Pt reports she has been out of town this week and has had increased sodium in her diet. Wound debridement per Dr. Demetrio Oates. To continue compression wrap for edema control. No change in oral medication. F/u in [facility] in 1 week as ordered, pt. Aware to call sooner with any changes or questions/concerns. Discharge instructions reviewed with patient, all questions answered, copy given to patient. Dressings were applied to all wounds per M.D. Instructions at this visit.

## 2023-09-22 NOTE — DISCHARGE INSTRUCTIONS
preferrably above level of your heart when sitting as much as possible   * The Goal of this therapy is to reduce Edema and get into long term compression garments to control venous insufficiency, lymphedema and reduce occurrence of venous ulcers      Please note, all wounds (unless stated otherwise here) were mechanically debrided at the time of cleansing here in the wound-care center today, so a small amount of pain, drainage or bleeding from that process might be expected, and is normal.     All products for home use, including multiple products for a single wound if applicable, are medically necessary in order to achieve the best chance at timely wound healing. See provider documentation for details if needed. Substituted dressings applied in the [facility] today, if applicable:      N/a        New orders for this week (labs, imaging, medications, etc.):   Please  prescription for Cefadroxil from your pharmacy and begin taking as prescribed. You may walk as much as you can tolerate but please elevate your legs when resting. Continue elevating legs as much as you can while you are not walking   You may take Benadryl at night only for itching - please be aware this may cause dry mouth and some sleepiness/dizziness   Sulfasalazine- please continue to take 5 tablets total daily         Additional instructions for specific diagnoses:      Wound Culture 5/11/23     [place] #3 - 10/21/22  [place] #4 - 10/28/22  [place] #5 - 11/4/22  [place] #6 - 11/11/22   [place] #7 -11-22-22  [place] #8 12/1/22   [place] #9  12/9/22  [place] #10 12/21/22         F/U Appointment  with Dr. Demetrio Oates in 1 week                                               at                                    .     Your nurse  is Lloyd Wang RN     If we applied slip-resistant hospital socks today, be sure to remove them at least once a day to inspect your toes or feet, even if you're not changing the wraps or dressings underneath.  If you see anything
Impaired

## 2023-09-25 PROBLEM — I89.8 LYMPHORRHEA: Status: RESOLVED | Noted: 2023-09-18 | Resolved: 2023-09-25

## 2023-09-28 ENCOUNTER — HOSPITAL ENCOUNTER (OUTPATIENT)
Dept: WOUND CARE | Age: 88
Discharge: HOME | End: 2023-09-28
Payer: MEDICARE

## 2023-09-28 VITALS
BODY MASS INDEX: 24.65 KG/M2 | SYSTOLIC BLOOD PRESSURE: 100 MMHG | HEART RATE: 97 BPM | TEMPERATURE: 97.8 F | WEIGHT: 144.4 LBS | RESPIRATION RATE: 18 BRPM | DIASTOLIC BLOOD PRESSURE: 80 MMHG | HEIGHT: 64 IN

## 2023-09-28 DIAGNOSIS — L88 PYODERMA GANGRENOSUM: ICD-10-CM

## 2023-09-28 DIAGNOSIS — I87.2 PERIPHERAL VENOUS INSUFFICIENCY: ICD-10-CM

## 2023-09-28 DIAGNOSIS — L97.312 ULCER OF RIGHT ANKLE, WITH FAT LAYER EXPOSED (HCC): ICD-10-CM

## 2023-09-28 DIAGNOSIS — L97.322 ULCER OF LEFT ANKLE, WITH FAT LAYER EXPOSED (HCC): Primary | ICD-10-CM

## 2023-09-28 PROCEDURE — 97597 DBRDMT OPN WND 1ST 20 CM/<: CPT

## 2023-09-28 PROCEDURE — 29581 APPL MULTLAYER CMPRN SYS LEG: CPT

## 2023-09-28 RX ORDER — LIDOCAINE 40 MG/G
CREAM TOPICAL ONCE
OUTPATIENT
Start: 2023-09-28 | End: 2023-09-28

## 2023-09-28 RX ORDER — LIDOCAINE 50 MG/G
OINTMENT TOPICAL ONCE
OUTPATIENT
Start: 2023-09-28 | End: 2023-09-28

## 2023-09-28 RX ORDER — BACITRACIN ZINC AND POLYMYXIN B SULFATE 500; 1000 [USP'U]/G; [USP'U]/G
OINTMENT TOPICAL ONCE
OUTPATIENT
Start: 2023-09-28 | End: 2023-09-28

## 2023-09-28 RX ORDER — CEFADROXIL 500 MG/1
500 CAPSULE ORAL EVERY 12 HOURS SCHEDULED
Qty: 14 CAPSULE | Refills: 0 | Status: SHIPPED | OUTPATIENT
Start: 2023-09-28 | End: 2023-10-05

## 2023-09-28 RX ORDER — TRIAMCINOLONE ACETONIDE 1 MG/G
OINTMENT TOPICAL ONCE
OUTPATIENT
Start: 2023-09-28 | End: 2023-09-28

## 2023-09-28 RX ORDER — LIDOCAINE 40 MG/G
CREAM TOPICAL ONCE
Status: DISCONTINUED | OUTPATIENT
Start: 2023-09-28 | End: 2023-09-29 | Stop reason: HOSPADM

## 2023-09-28 RX ORDER — LIDOCAINE HYDROCHLORIDE 40 MG/ML
SOLUTION TOPICAL ONCE
OUTPATIENT
Start: 2023-09-28 | End: 2023-09-28

## 2023-09-28 ASSESSMENT — PAIN DESCRIPTION - PAIN TYPE: TYPE: CHRONIC PAIN

## 2023-09-28 ASSESSMENT — PAIN DESCRIPTION - DESCRIPTORS: DESCRIPTORS: SHOOTING

## 2023-09-28 ASSESSMENT — PAIN DESCRIPTION - DIRECTION: RADIATING_TOWARDS: UP THE LEG

## 2023-09-28 ASSESSMENT — PAIN SCALES - GENERAL: PAINLEVEL_OUTOF10: 1

## 2023-09-28 ASSESSMENT — PAIN DESCRIPTION - ORIENTATION: ORIENTATION: LEFT

## 2023-09-28 ASSESSMENT — PAIN - FUNCTIONAL ASSESSMENT: PAIN_FUNCTIONAL_ASSESSMENT: PREVENTS OR INTERFERES SOME ACTIVE ACTIVITIES AND ADLS

## 2023-09-28 ASSESSMENT — PAIN DESCRIPTION - ONSET: ONSET: ON-GOING

## 2023-09-28 ASSESSMENT — PAIN DESCRIPTION - FREQUENCY: FREQUENCY: CONTINUOUS

## 2023-09-28 ASSESSMENT — PAIN DESCRIPTION - LOCATION: LOCATION: LEG

## 2023-09-28 NOTE — PLAN OF CARE
Follow up visit in [facility] today. Right leg appears red, warm to tough, suspected cellulitis. New prescription for Cefadroxil. Increased edema to left foot, toe wrap placed per Dr. Mccartney Ours. Wounds stable. Wound debridement per Dr. Mccartney Ours. To continue compression wrap for edema control. F/u in [facility] in 1 week as ordered, pt. Aware to call sooner with any changes or questions/concerns. Discharge instructions reviewed with patient, all questions answered, copy given to patient. Dressings were applied to all wounds per M.D. Instructions at this visit.

## 2023-09-28 NOTE — FLOWSHEET NOTE
Right Lower Leg:  Lotrisone to 3 small fungal rash areas  Compression garment sockliner  Velcro Compression Garment

## 2023-09-29 NOTE — DISCHARGE INSTRUCTIONS
411 [facility] Physician Orders and Discharge [address]  [address] [facility], [address] [place] [address]  [place], [address]  Telephone: [phone]      Fax: [phone]        Your home care company:   N/A     Your wound-care supplies will be provided by:  weekly wraps     NAME:  Stephen Rosen   YOB: 1933  PRIMARY DIAGNOSIS FOR WOUND CARE CENTER:  Pyoderma gangrenosum     Wound cleansing:  Do not scrub or use excessive force. Wash hands with soap and water before and after dressing changes. Prior to applying a clean dressing, cleanse wound with normal saline, wound cleanser, or mild soap and water. Ask your physician or nurse before getting the wound(s) wet in the shower. Wound care for home:     Left Lower Leg Wounds (distal and proximal medial ankle):   Aquaphor circumferential leg below knee entire for itching (where wrap will end)   Nexodyn to wound   Calmoseptine and triamcinolone to mauro-wound skin irritation from drainage. Lotrisone to small areas of rash-more proximal  Triad mixed with Triamcinolone to all other wounds and wound edges   Silver alginate   Two pieces of foam over wound- use cast kit foam  Build up ankles with specialist cast padding  Compri-2 Lite-lightly wrapped   Leave in place all week. Right Lower Leg:  Lotrisone to 3 small fungal rash areas  Compression garment sockliner  Velcro Compression Garment   Leave in place all week- you may remove sock and check feet and toes            Your physician has ordered Compression for treatment of venous ulcers, venous insufficiency,and/or Lymphedema. * Do not remove until your next scheduled visit in [facility]- do not get wet.     * If your wrap falls down, becomes painful or you have decreased sensation, and/or excessive drainage- please call the [facility] for RN visit to get your compression wrap changed   * You need to exercice as tolerated- walking is good   * Elevate your legs preferrably

## 2023-10-05 ENCOUNTER — HOSPITAL ENCOUNTER (OUTPATIENT)
Dept: WOUND CARE | Age: 88
Discharge: HOME | End: 2023-10-05
Payer: MEDICARE

## 2023-10-05 VITALS
HEIGHT: 64 IN | RESPIRATION RATE: 18 BRPM | SYSTOLIC BLOOD PRESSURE: 145 MMHG | DIASTOLIC BLOOD PRESSURE: 93 MMHG | WEIGHT: 142.6 LBS | TEMPERATURE: 97.8 F | BODY MASS INDEX: 24.34 KG/M2 | HEART RATE: 103 BPM

## 2023-10-05 DIAGNOSIS — L97.322 ULCER OF LEFT ANKLE, WITH FAT LAYER EXPOSED (HCC): Primary | ICD-10-CM

## 2023-10-05 DIAGNOSIS — I87.2 PERIPHERAL VENOUS INSUFFICIENCY: ICD-10-CM

## 2023-10-05 DIAGNOSIS — L97.312 ULCER OF RIGHT ANKLE, WITH FAT LAYER EXPOSED (HCC): ICD-10-CM

## 2023-10-05 DIAGNOSIS — L88 PYODERMA GANGRENOSUM: ICD-10-CM

## 2023-10-05 PROCEDURE — 97597 DBRDMT OPN WND 1ST 20 CM/<: CPT

## 2023-10-05 PROCEDURE — 29581 APPL MULTLAYER CMPRN SYS LEG: CPT

## 2023-10-05 PROCEDURE — 17250 CHEM CAUT OF GRANLTJ TISSUE: CPT

## 2023-10-05 RX ORDER — BACITRACIN ZINC AND POLYMYXIN B SULFATE 500; 1000 [USP'U]/G; [USP'U]/G
OINTMENT TOPICAL ONCE
OUTPATIENT
Start: 2023-10-05 | End: 2023-10-05

## 2023-10-05 RX ORDER — LIDOCAINE 50 MG/G
OINTMENT TOPICAL ONCE
OUTPATIENT
Start: 2023-10-05 | End: 2023-10-05

## 2023-10-05 RX ORDER — CEFADROXIL 500 MG/1
500 CAPSULE ORAL EVERY 12 HOURS SCHEDULED
Qty: 14 CAPSULE | Refills: 0 | Status: SHIPPED | OUTPATIENT
Start: 2023-10-05 | End: 2023-10-12

## 2023-10-05 RX ORDER — LIDOCAINE 40 MG/G
CREAM TOPICAL ONCE
OUTPATIENT
Start: 2023-10-05 | End: 2023-10-05

## 2023-10-05 RX ORDER — LIDOCAINE HYDROCHLORIDE 40 MG/ML
SOLUTION TOPICAL ONCE
OUTPATIENT
Start: 2023-10-05 | End: 2023-10-05

## 2023-10-05 RX ORDER — LIDOCAINE 40 MG/G
CREAM TOPICAL ONCE
Status: DISCONTINUED | OUTPATIENT
Start: 2023-10-05 | End: 2023-10-06 | Stop reason: HOSPADM

## 2023-10-05 RX ORDER — TRIAMCINOLONE ACETONIDE 1 MG/G
OINTMENT TOPICAL ONCE
OUTPATIENT
Start: 2023-10-05 | End: 2023-10-05

## 2023-10-05 ASSESSMENT — PAIN DESCRIPTION - ONSET: ONSET: ON-GOING

## 2023-10-05 ASSESSMENT — PAIN - FUNCTIONAL ASSESSMENT: PAIN_FUNCTIONAL_ASSESSMENT: PREVENTS OR INTERFERES SOME ACTIVE ACTIVITIES AND ADLS

## 2023-10-05 ASSESSMENT — PAIN SCALES - GENERAL: PAINLEVEL_OUTOF10: 3

## 2023-10-05 ASSESSMENT — PAIN DESCRIPTION - LOCATION: LOCATION: LEG

## 2023-10-05 ASSESSMENT — PAIN DESCRIPTION - DESCRIPTORS: DESCRIPTORS: THROBBING

## 2023-10-05 ASSESSMENT — PAIN DESCRIPTION - PAIN TYPE: TYPE: CHRONIC PAIN

## 2023-10-05 ASSESSMENT — PAIN DESCRIPTION - ORIENTATION: ORIENTATION: LEFT

## 2023-10-05 ASSESSMENT — PAIN DESCRIPTION - FREQUENCY: FREQUENCY: CONTINUOUS

## 2023-10-05 NOTE — DISCHARGE INSTR - COC
{Prognosis:9010275409}    Condition at Discharge: [address] Patient Condition:[id number]}    Rehab Potential (if transferring to Rehab): {Prognosis:4720899183}    Recommended Labs or Other Treatments After Discharge: ***    Physician Certification: I certify the above information and transfer of Jessica Barakat  is necessary for the continuing treatment of the diagnosis listed and that she requires {Admit to Appropriate Level of Care:65749} for {GREATER/LESS:940990042} 30 days.      Update Admission H&P: {CHP DME Changes in YQMNS:587347316}    PHYSICIAN SIGNATURE:  {Esignature:[id number]}

## 2023-10-05 NOTE — PLAN OF CARE
Follow up visit in [facility] today. Right leg remains red, warm to touch. Dr. Sulaiman Sheldon to send in refill for additional week of Cefadroxil. Wounds stable. Wound debridement per Dr. Sulaiman Sheldon. Plan for skin substitute next week. To continue compression wrap for edema control. F/u in [facility] in 1 week as ordered, pt. Aware to call sooner with any changes or questions/concerns. Discharge instructions reviewed with patient, all questions answered, copy given to patient. Dressings were applied to all wounds per M.D. Instructions at this visit.

## 2023-10-06 NOTE — DISCHARGE INSTRUCTIONS
411 [facility] Physician Orders and Discharge [address]  6859 [facility], 6 [facility]  [place], [address]  Telephone: [phone]      Fax: [phone]        Your home care company:   N/A     Your wound-care supplies will be provided by:  weekly wraps     NAME:  Shannon Zapien   YOB: 1933  PRIMARY DIAGNOSIS FOR WOUND CARE CENTER:  Pyoderma gangrenosum     Wound cleansing:  Do not scrub or use excessive force. Wash hands with soap and water before and after dressing changes. Prior to applying a clean dressing, cleanse wound with normal saline, wound cleanser, or mild soap and water. Ask your physician or nurse before getting the wound(s) wet in the shower. Wound care for home:     Left Lower Leg Wounds (distal and proximal medial ankle):   Aquaphor circumferential leg below knee entire for itching (where wrap will end)   Nexodyn to wound   Therra Skin #1, Setri Strips, Adaptic,Silver Alginate, 4x4's  per Dr. Young Galo to small areas of rash-more proximal  foam over wound  Build up ankles with specialist cast padding  Compri-2 Lite-lightly wrapped   Leave in place all week. Right Lower Leg:  Nexodyn  Clobetasol mixed with Tacrolimus   Silver Alginate- cut as large as the middle of the Mepilex border- leaving only an edge to the Mepilex border to stick   Large Name Brand Mepilex border   Compression garment sockliner  Velcro Compression Garment   Leave in place all week           Your physician has ordered Compression for treatment of venous ulcers, venous insufficiency,and/or Lymphedema. * Do not remove until your next scheduled visit in [address] [place] [address]- do not get wet.     * If your wrap falls down, becomes painful or you have decreased sensation, and/or excessive drainage- please call the [address] [place] [address] for RN visit to get your compression wrap changed   * You need to exercice as tolerated- walking is good   * Elevate your legs preferrably above

## 2023-10-12 ENCOUNTER — HOSPITAL ENCOUNTER (OUTPATIENT)
Dept: WOUND CARE | Age: 88
Discharge: HOME | End: 2023-10-12
Attending: INTERNAL MEDICINE
Payer: MEDICARE

## 2023-10-12 VITALS
TEMPERATURE: 98.2 F | SYSTOLIC BLOOD PRESSURE: 94 MMHG | WEIGHT: 136.8 LBS | HEART RATE: 80 BPM | HEIGHT: 64 IN | RESPIRATION RATE: 18 BRPM | BODY MASS INDEX: 23.35 KG/M2 | DIASTOLIC BLOOD PRESSURE: 72 MMHG

## 2023-10-12 DIAGNOSIS — G89.29 CHRONIC PAIN OF LEFT LOWER EXTREMITY: Primary | ICD-10-CM

## 2023-10-12 DIAGNOSIS — I87.2 PERIPHERAL VENOUS INSUFFICIENCY: ICD-10-CM

## 2023-10-12 DIAGNOSIS — L97.312 ULCER OF RIGHT ANKLE, WITH FAT LAYER EXPOSED (HCC): ICD-10-CM

## 2023-10-12 DIAGNOSIS — M79.605 CHRONIC PAIN OF LEFT LOWER EXTREMITY: Primary | ICD-10-CM

## 2023-10-12 DIAGNOSIS — L97.322 ULCER OF LEFT ANKLE, WITH FAT LAYER EXPOSED (HCC): ICD-10-CM

## 2023-10-12 DIAGNOSIS — L88 PYODERMA GANGRENOSUM: ICD-10-CM

## 2023-10-12 PROCEDURE — 15271 SKIN SUB GRAFT TRNK/ARM/LEG: CPT

## 2023-10-12 PROCEDURE — 29581 APPL MULTLAYER CMPRN SYS LEG: CPT

## 2023-10-12 RX ORDER — LIDOCAINE 40 MG/G
CREAM TOPICAL ONCE
OUTPATIENT
Start: 2023-10-12 | End: 2023-10-12

## 2023-10-12 RX ORDER — TRAMADOL HYDROCHLORIDE 50 MG/1
50 TABLET ORAL 2 TIMES DAILY PRN
Qty: 28 TABLET | Refills: 0 | Status: SHIPPED | OUTPATIENT
Start: 2023-10-12 | End: 2023-10-26

## 2023-10-12 RX ORDER — TRIAMCINOLONE ACETONIDE 1 MG/G
OINTMENT TOPICAL ONCE
OUTPATIENT
Start: 2023-10-12 | End: 2023-10-12

## 2023-10-12 RX ORDER — LIDOCAINE 50 MG/G
OINTMENT TOPICAL ONCE
OUTPATIENT
Start: 2023-10-12 | End: 2023-10-12

## 2023-10-12 RX ORDER — LIDOCAINE 40 MG/G
CREAM TOPICAL ONCE
Status: DISCONTINUED | OUTPATIENT
Start: 2023-10-12 | End: 2023-10-13 | Stop reason: HOSPADM

## 2023-10-12 RX ORDER — BACITRACIN ZINC AND POLYMYXIN B SULFATE 500; 1000 [USP'U]/G; [USP'U]/G
OINTMENT TOPICAL ONCE
OUTPATIENT
Start: 2023-10-12 | End: 2023-10-12

## 2023-10-12 RX ORDER — LIDOCAINE HYDROCHLORIDE 40 MG/ML
SOLUTION TOPICAL ONCE
OUTPATIENT
Start: 2023-10-12 | End: 2023-10-12

## 2023-10-12 ASSESSMENT — PAIN SCALES - GENERAL: PAINLEVEL_OUTOF10: 2

## 2023-10-12 ASSESSMENT — PAIN DESCRIPTION - ONSET: ONSET: ON-GOING

## 2023-10-12 ASSESSMENT — PAIN - FUNCTIONAL ASSESSMENT: PAIN_FUNCTIONAL_ASSESSMENT: PREVENTS OR INTERFERES SOME ACTIVE ACTIVITIES AND ADLS

## 2023-10-12 ASSESSMENT — PAIN DESCRIPTION - ORIENTATION: ORIENTATION: LEFT

## 2023-10-12 ASSESSMENT — PAIN DESCRIPTION - DESCRIPTORS: DESCRIPTORS: THROBBING

## 2023-10-12 ASSESSMENT — PAIN DESCRIPTION - PAIN TYPE: TYPE: CHRONIC PAIN

## 2023-10-12 ASSESSMENT — PAIN DESCRIPTION - LOCATION: LOCATION: LEG

## 2023-10-12 ASSESSMENT — PAIN DESCRIPTION - FREQUENCY: FREQUENCY: INTERMITTENT

## 2023-10-12 NOTE — PLAN OF CARE
Follow up visit in [facility] today. New wound to right lower leg,Dr. Patterson suspects pyoderma. To begin clobetasol and tacrolimus mixed and silver alginate, to leave in place all week. Wound debridement per Dr. Alessandro Frnech and then [facility] to left lower leg wound. Dr. Alessandro French to send in refill for Tramadol. To continue weekly compression wrap for edema control in LLE.  F/u in [facility] in 1 week as ordered, pt. Aware to call sooner with any changes or questions/concerns. Discharge instructions reviewed with patient, all questions answered, copy given to patient. Dressings were applied to all wounds per M.D. Instructions at this visit.

## 2023-10-13 NOTE — DISCHARGE INSTRUCTIONS
experience any significant changes in your wound(s) (including redness, increased warmth, increased pain, increased drainage, odor, or fever) or have questions about your wound care, please contact the [facility] at [phone] Monday-Thursday from 8:00 am - 4:30 pm, or Friday from 8:00 am - 2:30 pm.  If you need help with your wound outside these hours and cannot wait until we are again available, contact your home-care company (if applicable), your PCP, or go to the nearest emergency room

## 2023-10-16 PROBLEM — L97.911 ULCER OF RIGHT LEG, LIMITED TO BREAKDOWN OF SKIN (HCC): Status: ACTIVE | Noted: 2023-10-16

## 2023-10-17 RX ORDER — COLCHICINE 0.6 MG/1
0.6 TABLET ORAL 2 TIMES DAILY
Qty: 180 TABLET | Refills: 3 | Status: SHIPPED | OUTPATIENT
Start: 2023-10-17

## 2023-10-19 ENCOUNTER — HOSPITAL ENCOUNTER (OUTPATIENT)
Dept: WOUND CARE | Age: 88
Discharge: HOME | End: 2023-10-19
Payer: MEDICARE

## 2023-10-19 VITALS
RESPIRATION RATE: 18 BRPM | SYSTOLIC BLOOD PRESSURE: 138 MMHG | HEIGHT: 64 IN | TEMPERATURE: 97.9 F | DIASTOLIC BLOOD PRESSURE: 79 MMHG | HEART RATE: 57 BPM | BODY MASS INDEX: 23.42 KG/M2 | WEIGHT: 137.2 LBS

## 2023-10-19 DIAGNOSIS — I87.2 PERIPHERAL VENOUS INSUFFICIENCY: ICD-10-CM

## 2023-10-19 DIAGNOSIS — L97.322 ULCER OF LEFT ANKLE, WITH FAT LAYER EXPOSED (HCC): Primary | ICD-10-CM

## 2023-10-19 DIAGNOSIS — L97.312 ULCER OF RIGHT ANKLE, WITH FAT LAYER EXPOSED (HCC): ICD-10-CM

## 2023-10-19 DIAGNOSIS — L88 PYODERMA GANGRENOSUM: ICD-10-CM

## 2023-10-19 PROCEDURE — 29581 APPL MULTLAYER CMPRN SYS LEG: CPT

## 2023-10-19 RX ORDER — LIDOCAINE HYDROCHLORIDE 40 MG/ML
SOLUTION TOPICAL ONCE
OUTPATIENT
Start: 2023-10-19 | End: 2023-10-19

## 2023-10-19 RX ORDER — BACITRACIN ZINC AND POLYMYXIN B SULFATE 500; 1000 [USP'U]/G; [USP'U]/G
OINTMENT TOPICAL ONCE
OUTPATIENT
Start: 2023-10-19 | End: 2023-10-19

## 2023-10-19 RX ORDER — LIDOCAINE 40 MG/G
CREAM TOPICAL ONCE
Status: DISCONTINUED | OUTPATIENT
Start: 2023-10-19 | End: 2023-10-20 | Stop reason: HOSPADM

## 2023-10-19 RX ORDER — TRIAMCINOLONE ACETONIDE 1 MG/G
OINTMENT TOPICAL ONCE
OUTPATIENT
Start: 2023-10-19 | End: 2023-10-19

## 2023-10-19 RX ORDER — LIDOCAINE 40 MG/G
CREAM TOPICAL ONCE
OUTPATIENT
Start: 2023-10-19 | End: 2023-10-19

## 2023-10-19 RX ORDER — LIDOCAINE 50 MG/G
OINTMENT TOPICAL ONCE
OUTPATIENT
Start: 2023-10-19 | End: 2023-10-19

## 2023-10-19 NOTE — PLAN OF CARE
Follow up visit in [facility] today. Pt reports that she has had less pain this week. Wounds showing signs of improvement. To continue clobetasol and tacrolimus mixed and silver alginate, to leave in place all week. Therraskin remains in place, stimulated with a #27 gauge needle per Dr. Nadia Villarreal to left lower leg wound. To continue weekly compression wrap for edema control in LLE.  F/u in [facility] in 1 week as ordered, pt. Aware to call sooner with any changes or questions/concerns. Discharge instructions reviewed with patient, all questions answered, copy given to patient. Dressings were applied to all wounds per M.D. Instructions at this visit.

## 2023-10-20 NOTE — DISCHARGE INSTRUCTIONS
One [facility] Physician Orders and Discharge [address]  5483 [facility], 6 [facility]  [place], [address]  Telephone: [phone]      Fax: [phone]        Your home care company:   N/A     Your wound-care supplies will be provided by:  weekly wraps     NAME:  Aye Kay   YOB: 1933  PRIMARY DIAGNOSIS FOR WOUND CARE CENTER:  Pyoderma gangrenosum     Wound cleansing:  Do not scrub or use excessive force. Wash hands with soap and water before and after dressing changes. Prior to applying a clean dressing, cleanse wound with normal saline, wound cleanser, or mild soap and water. Ask your physician or nurse before getting the wound(s) wet in the shower. Wound care for home:     Left Lower Leg Wounds (distal and proximal medial ankle):     Nexodyn, Lotrisone mixed with Calmoseptine per Dr. David Cardona #1 still in place    [facility] Staff please place:  Aquaphor circumferential leg below knee entire for itching (where wrap will end)   Sobact  foam over wound  Build up ankles with specialist cast padding  Compri-2 Lite-lightly wrapped   Leave in place all week. Right Lower Leg:  Nexodyn  Triad mixed with Tacrolimus   Adaptic- cut as large as the middle of the Mepilex border- leaving only an edge to the Mepilex border to stick   Large Name Brand Mepilex border   Compression garment sockliner  Velcro Compression Garment   Leave in place all week           Your physician has ordered Compression for treatment of venous ulcers, venous insufficiency,and/or Lymphedema. * Do not remove until your next scheduled visit in [facility]- do not get wet.     * If your wrap falls down, becomes painful or you have decreased sensation, and/or excessive drainage- please call the [facility] for RN visit to get your compression wrap changed   * You need to exercice as tolerated- walking is good   * Elevate your legs preferrably above level of your heart when

## 2023-10-26 ENCOUNTER — HOSPITAL ENCOUNTER (OUTPATIENT)
Dept: WOUND CARE | Age: 88
Discharge: HOME | End: 2023-10-26
Attending: INTERNAL MEDICINE
Payer: MEDICARE

## 2023-10-26 VITALS
TEMPERATURE: 97.8 F | BODY MASS INDEX: 23.52 KG/M2 | WEIGHT: 137.8 LBS | DIASTOLIC BLOOD PRESSURE: 55 MMHG | RESPIRATION RATE: 18 BRPM | HEART RATE: 96 BPM | HEIGHT: 64 IN | SYSTOLIC BLOOD PRESSURE: 104 MMHG

## 2023-10-26 DIAGNOSIS — L97.312 ULCER OF RIGHT ANKLE, WITH FAT LAYER EXPOSED (HCC): ICD-10-CM

## 2023-10-26 DIAGNOSIS — I87.2 PERIPHERAL VENOUS INSUFFICIENCY: ICD-10-CM

## 2023-10-26 DIAGNOSIS — L97.322 ULCER OF LEFT ANKLE, WITH FAT LAYER EXPOSED (HCC): Primary | ICD-10-CM

## 2023-10-26 DIAGNOSIS — L88 PYODERMA GANGRENOSUM: ICD-10-CM

## 2023-10-26 PROCEDURE — 29581 APPL MULTLAYER CMPRN SYS LEG: CPT

## 2023-10-26 RX ORDER — BACITRACIN ZINC AND POLYMYXIN B SULFATE 500; 1000 [USP'U]/G; [USP'U]/G
OINTMENT TOPICAL ONCE
OUTPATIENT
Start: 2023-10-26 | End: 2023-10-26

## 2023-10-26 RX ORDER — LIDOCAINE 40 MG/G
CREAM TOPICAL ONCE
Status: DISCONTINUED | OUTPATIENT
Start: 2023-10-26 | End: 2023-10-27 | Stop reason: HOSPADM

## 2023-10-26 RX ORDER — LIDOCAINE 40 MG/G
CREAM TOPICAL ONCE
OUTPATIENT
Start: 2023-10-26 | End: 2023-10-26

## 2023-10-26 RX ORDER — TRIAMCINOLONE ACETONIDE 1 MG/G
OINTMENT TOPICAL ONCE
OUTPATIENT
Start: 2023-10-26 | End: 2023-10-26

## 2023-10-26 RX ORDER — LIDOCAINE 50 MG/G
OINTMENT TOPICAL ONCE
OUTPATIENT
Start: 2023-10-26 | End: 2023-10-26

## 2023-10-26 RX ORDER — LIDOCAINE HYDROCHLORIDE 40 MG/ML
SOLUTION TOPICAL ONCE
OUTPATIENT
Start: 2023-10-26 | End: 2023-10-26

## 2023-10-26 NOTE — PLAN OF CARE
Follow up visit in [facility] today. Wounds showing signs of improvement. To begin Triad mixed and tacrolimus mixed and silver alginate, to leave in place all week. Therraskin remains in place, stimulated with a #27 gauge needle per Dr. Demetrio Oates to left lower leg wound. To continue weekly compression wrap for edema control in LLE. Pt to cut back to 4 pills daily on her sulfa medication. F/u in [facility] in 1 week as ordered, pt. Aware to call sooner with any changes or questions/concerns. Discharge instructions reviewed with patient, all questions answered, copy given to patient. Dressings were applied to all wounds per M.D. Instructions at this visit.
No

## 2023-10-27 NOTE — DISCHARGE INSTRUCTIONS
411 [facility] Physician Orders and Discharge [address]  5434 [facility], [address] [place] [address]  [place], [address]  Telephone: [phone]      Fax: [phone]        Your home care company:   N/A     Your wound-care supplies will be provided by:  weekly wraps     NAME:  Grace Ashley   YOB: 1933  PRIMARY DIAGNOSIS FOR WOUND CARE CENTER:  Pyoderma gangrenosum     Wound cleansing:  Do not scrub or use excessive force. Wash hands with soap and water before and after dressing changes. Prior to applying a clean dressing, cleanse wound with normal saline, wound cleanser, or mild soap and water. Ask your physician or nurse before getting the wound(s) wet in the shower. Wound care for home:     Left Lower Leg Wounds (distal and proximal medial ankle):      Lotrisone mixed with Calmoseptine per Dr. Lorri Stuart #1 still in place     [address] [place] [address] Staff please place:  Aquaphor circumferential leg below knee entire for itching (where wrap will end)   Sobact  foam over wound  Build up ankles with specialist cast padding  Compri-2 Lite-lightly wrapped   Leave in place all week. Right Lower Leg:  Nexodyn  Triad mixed with Tacrolimus   Adaptic- cut as large as the middle of the Mepilex border- leaving only an edge to the Mepilex border to stick   Large Name Brand Mepilex border   Compression garment sockliner  Velcro Compression Garment   Leave in place all week           Your physician has ordered Compression for treatment of venous ulcers, venous insufficiency,and/or Lymphedema. * Do not remove until your next scheduled visit in [address] [place] [address]- do not get wet.     * If your wrap falls down, becomes painful or you have decreased sensation, and/or excessive drainage- please call the [address] [place] [address] for RN visit to get your compression wrap changed   * You need to exercice as tolerated- walking is good   * Elevate your legs preferrably above level of your heart when sitting as

## 2023-10-30 PROBLEM — L03.115 CELLULITIS OF RIGHT LEG: Status: RESOLVED | Noted: 2022-07-28 | Resolved: 2023-10-30

## 2023-11-02 ENCOUNTER — HOSPITAL ENCOUNTER (OUTPATIENT)
Dept: WOUND CARE | Age: 88
Discharge: HOME | End: 2023-11-02
Attending: INTERNAL MEDICINE
Payer: MEDICARE

## 2023-11-02 VITALS
BODY MASS INDEX: 23.9 KG/M2 | RESPIRATION RATE: 18 BRPM | HEART RATE: 105 BPM | SYSTOLIC BLOOD PRESSURE: 133 MMHG | TEMPERATURE: 97.8 F | WEIGHT: 140 LBS | DIASTOLIC BLOOD PRESSURE: 98 MMHG | HEIGHT: 64 IN

## 2023-11-02 DIAGNOSIS — L97.312 ULCER OF RIGHT ANKLE, WITH FAT LAYER EXPOSED (HCC): ICD-10-CM

## 2023-11-02 DIAGNOSIS — I87.2 PERIPHERAL VENOUS INSUFFICIENCY: ICD-10-CM

## 2023-11-02 DIAGNOSIS — L88 PYODERMA GANGRENOSUM: ICD-10-CM

## 2023-11-02 DIAGNOSIS — L97.322 ULCER OF LEFT ANKLE, WITH FAT LAYER EXPOSED (HCC): Primary | ICD-10-CM

## 2023-11-02 PROCEDURE — 29581 APPL MULTLAYER CMPRN SYS LEG: CPT

## 2023-11-02 PROCEDURE — 97597 DBRDMT OPN WND 1ST 20 CM/<: CPT

## 2023-11-02 RX ORDER — BACITRACIN ZINC AND POLYMYXIN B SULFATE 500; 1000 [USP'U]/G; [USP'U]/G
OINTMENT TOPICAL ONCE
OUTPATIENT
Start: 2023-11-02 | End: 2023-11-02

## 2023-11-02 RX ORDER — LIDOCAINE 40 MG/G
CREAM TOPICAL ONCE
Status: DISCONTINUED | OUTPATIENT
Start: 2023-11-02 | End: 2023-11-03 | Stop reason: HOSPADM

## 2023-11-02 RX ORDER — LIDOCAINE HYDROCHLORIDE 40 MG/ML
SOLUTION TOPICAL ONCE
OUTPATIENT
Start: 2023-11-02 | End: 2023-11-02

## 2023-11-02 RX ORDER — LIDOCAINE 40 MG/G
CREAM TOPICAL ONCE
OUTPATIENT
Start: 2023-11-02 | End: 2023-11-02

## 2023-11-02 RX ORDER — LIDOCAINE 50 MG/G
OINTMENT TOPICAL ONCE
OUTPATIENT
Start: 2023-11-02 | End: 2023-11-02

## 2023-11-02 RX ORDER — TRIAMCINOLONE ACETONIDE 1 MG/G
OINTMENT TOPICAL ONCE
OUTPATIENT
Start: 2023-11-02 | End: 2023-11-02

## 2023-11-02 NOTE — PLAN OF CARE
Follow up visit in [facility] today. Wounds showing signs of improvement. To continue Triad mixed and tacrolimus mixed and silver alginate, to leave in place all week. Therraskin remains in place, stimulated with a #27 gauge needle per Dr. Sanjana Vazquez to left lower leg wound. To continue weekly compression wrap for edema control in LLE. Pt to continue 4 pills daily on her sulfa medication. F/u in [facility] in 1 week as ordered, pt. Aware to call sooner with any changes or questions/concerns. Discharge instructions reviewed with patient, all questions answered, copy given to patient. Dressings were applied to all wounds per M.D. Instructions at this visit.

## 2023-11-03 NOTE — DISCHARGE INSTRUCTIONS
(including redness, increased warmth, increased pain, increased drainage, odor, or fever) or have questions about your wound care, please contact the [facility] at [phone] Monday-Thursday from 8:00 am - 4:30 pm, or Friday from 8:00 am - 2:30 pm.  If you need help with your wound outside these hours and cannot wait until we are again available, contact your home-care company (if applicable), your PCP, or go to the nearest emergency room

## 2023-11-09 ENCOUNTER — HOSPITAL ENCOUNTER (OUTPATIENT)
Dept: WOUND CARE | Age: 88
Discharge: HOME | End: 2023-11-09
Payer: MEDICARE

## 2023-11-09 VITALS
HEART RATE: 98 BPM | RESPIRATION RATE: 20 BRPM | DIASTOLIC BLOOD PRESSURE: 69 MMHG | BODY MASS INDEX: 22.56 KG/M2 | WEIGHT: 140.4 LBS | HEIGHT: 66 IN | SYSTOLIC BLOOD PRESSURE: 109 MMHG | TEMPERATURE: 98.1 F

## 2023-11-09 DIAGNOSIS — L97.322 ULCER OF LEFT ANKLE, WITH FAT LAYER EXPOSED (HCC): Primary | ICD-10-CM

## 2023-11-09 DIAGNOSIS — L97.312 ULCER OF RIGHT ANKLE, WITH FAT LAYER EXPOSED (HCC): ICD-10-CM

## 2023-11-09 DIAGNOSIS — I87.2 PERIPHERAL VENOUS INSUFFICIENCY: ICD-10-CM

## 2023-11-09 DIAGNOSIS — L88 PYODERMA GANGRENOSUM: ICD-10-CM

## 2023-11-09 PROCEDURE — 29581 APPL MULTLAYER CMPRN SYS LEG: CPT

## 2023-11-09 RX ORDER — LIDOCAINE 40 MG/G
CREAM TOPICAL ONCE
OUTPATIENT
Start: 2023-11-09 | End: 2023-11-09

## 2023-11-09 RX ORDER — LIDOCAINE 40 MG/G
CREAM TOPICAL ONCE
Status: DISCONTINUED | OUTPATIENT
Start: 2023-11-09 | End: 2023-11-10 | Stop reason: HOSPADM

## 2023-11-09 RX ORDER — LIDOCAINE 50 MG/G
OINTMENT TOPICAL ONCE
OUTPATIENT
Start: 2023-11-09 | End: 2023-11-09

## 2023-11-09 RX ORDER — BACITRACIN ZINC AND POLYMYXIN B SULFATE 500; 1000 [USP'U]/G; [USP'U]/G
OINTMENT TOPICAL ONCE
OUTPATIENT
Start: 2023-11-09 | End: 2023-11-09

## 2023-11-09 RX ORDER — LIDOCAINE HYDROCHLORIDE 40 MG/ML
SOLUTION TOPICAL ONCE
OUTPATIENT
Start: 2023-11-09 | End: 2023-11-09

## 2023-11-09 RX ORDER — TRIAMCINOLONE ACETONIDE 1 MG/G
OINTMENT TOPICAL ONCE
OUTPATIENT
Start: 2023-11-09 | End: 2023-11-09

## 2023-11-09 NOTE — PLAN OF CARE
Follow up visit in [facility] today. Wounds showing signs of improvement. To continue Triad and tacrolimus mixed, silver alginate, to leave in place all week. Therraskin remains in place, stimulated with a #27 gauge needle per Dr. Christal Brown to left lower leg wound. To continue weekly compression wrap for edema control in LLE. Pt to continue 4 pills daily on her sulfa medication. F/u in [facility] in 1 week as ordered, pt. Aware to call sooner with any changes or questions/concerns. Discharge instructions reviewed with patient, all questions answered, copy given to patient. Dressings were applied to all wounds per M.D. Instructions at this visit.

## 2023-11-10 NOTE — DISCHARGE INSTRUCTIONS
dressings underneath. If you see anything concerning (redness, excess moisture, etc), please call and let us know right away.      Should you experience any significant changes in your wound(s) (including redness, increased warmth, increased pain, increased drainage, odor, or fever) or have questions about your wound care, please contact the [facility] at [phone] Monday-Thursday from 8:00 am - 4:30 pm, or Friday from 8:00 am - 2:30 pm.  If you need help with your wound outside these hours and cannot wait until we are again available, contact your home-care company (if applicable), your PCP, or go to the nearest emergency room

## 2023-11-15 NOTE — DISCHARGE INSTRUCTIONS
411 [facility] Physician Orders and Discharge [address]  35[address] [facility], [address] [place] [address]  [place], [address]  Telephone: [phone]      Fax: [phone]        Your home care company:   N/A     Your wound-care supplies will be provided by:  weekly wraps     NAME:  Sammie Cash   YOB: 1933  PRIMARY DIAGNOSIS FOR WOUND CARE CENTER:  Pyoderma gangrenosum     Wound cleansing:  Do not scrub or use excessive force. Wash hands with soap and water before and after dressing changes. Prior to applying a clean dressing, cleanse wound with normal saline, wound cleanser, or mild soap and water. Ask your physician or nurse before getting the wound(s) wet in the shower. Wound care for home:     Left Lower Leg Wounds (distal and proximal medial ankle): Thera Skin #1 still in place    Aquaphor circumferential leg below knee entire for itching (where wrap will end)   Nexodyn  Collagen   Hydrogel   Foam over wound   Lotisone to any rash  Build up ankles with specialist cast padding  Compri-2 Lite-lightly wrapped   Leave in place all week. Right Lower Leg:    Clobetasol& Tacrolimus to healed purple spot  Adaptic- cut as large as the middle of the Mepilex border- leaving only an edge to the Mepilex border to stick   Large Name Brand Mepilex border   Compression garment sockliner  Velcro Compression Garment   Leave in place all week           Your physician has ordered Compression for treatment of venous ulcers, venous insufficiency,and/or Lymphedema. * Do not remove until your next scheduled visit in [facility]- do not get wet.     * If your wrap falls down, becomes painful or you have decreased sensation, and/or excessive drainage- please call the [facility] for RN visit to get your compression wrap changed   * You need to exercice as tolerated- walking is good   * Elevate your legs preferrably above level of your heart when sitting as much as possible   * The

## 2023-11-16 ENCOUNTER — HOSPITAL ENCOUNTER (OUTPATIENT)
Dept: WOUND CARE | Age: 88
Discharge: HOME | End: 2023-11-16
Attending: INTERNAL MEDICINE
Payer: MEDICARE

## 2023-11-16 VITALS
BODY MASS INDEX: 22.4 KG/M2 | HEIGHT: 66 IN | WEIGHT: 139.4 LBS | SYSTOLIC BLOOD PRESSURE: 97 MMHG | TEMPERATURE: 97.6 F | RESPIRATION RATE: 18 BRPM | DIASTOLIC BLOOD PRESSURE: 65 MMHG | HEART RATE: 99 BPM

## 2023-11-16 DIAGNOSIS — I87.2 PERIPHERAL VENOUS INSUFFICIENCY: ICD-10-CM

## 2023-11-16 DIAGNOSIS — L88 PYODERMA GANGRENOSUM: ICD-10-CM

## 2023-11-16 DIAGNOSIS — L97.312 ULCER OF RIGHT ANKLE, WITH FAT LAYER EXPOSED (HCC): ICD-10-CM

## 2023-11-16 DIAGNOSIS — L97.322 ULCER OF LEFT ANKLE, WITH FAT LAYER EXPOSED (HCC): Primary | ICD-10-CM

## 2023-11-16 PROCEDURE — 29581 APPL MULTLAYER CMPRN SYS LEG: CPT

## 2023-11-16 RX ORDER — TRIAMCINOLONE ACETONIDE 1 MG/G
OINTMENT TOPICAL ONCE
OUTPATIENT
Start: 2023-11-16 | End: 2023-11-16

## 2023-11-16 RX ORDER — BACITRACIN ZINC AND POLYMYXIN B SULFATE 500; 1000 [USP'U]/G; [USP'U]/G
OINTMENT TOPICAL ONCE
OUTPATIENT
Start: 2023-11-16 | End: 2023-11-16

## 2023-11-16 RX ORDER — LIDOCAINE 50 MG/G
OINTMENT TOPICAL ONCE
OUTPATIENT
Start: 2023-11-16 | End: 2023-11-16

## 2023-11-16 RX ORDER — LIDOCAINE 40 MG/G
CREAM TOPICAL ONCE
OUTPATIENT
Start: 2023-11-16 | End: 2023-11-16

## 2023-11-16 RX ORDER — LIDOCAINE 40 MG/G
CREAM TOPICAL ONCE
Status: DISCONTINUED | OUTPATIENT
Start: 2023-11-16 | End: 2023-11-17 | Stop reason: HOSPADM

## 2023-11-16 RX ORDER — LIDOCAINE HYDROCHLORIDE 40 MG/ML
SOLUTION TOPICAL ONCE
OUTPATIENT
Start: 2023-11-16 | End: 2023-11-16

## 2023-11-16 NOTE — PLAN OF CARE
Follow up visit in [address] [place] [address] today. Wounds stable. Increased edema noted to BLE, feet and toes. To continue Triad and tacrolimus mixed, silver alginate, to leave in place all week. Therraskin remains in place, stimulated with a #27 gauge needle per Dr. Jaron Benedict to left lower leg wound. To continue weekly compression wrap for edema control in LLE. Toe wraps per Dr. Jaron Benedict to BLE. Pt to begin 3 pills daily of sulfasalazine. Pt to increase diuretic by one pill today and tomorrow. F/u in [address] [place] [address] in 1 week as ordered, pt. Aware to call sooner with any changes or questions/concerns. Discharge instructions reviewed with patient, all questions answered, copy given to patient. Dressings were applied to all wounds per M.D. Instructions at this visit.

## 2023-11-17 NOTE — DISCHARGE INSTRUCTIONS
411 [facility] Physician Orders and Discharge [address]  5443 [facility], [address] [place] [address]  Serafin Garza, [address]  Telephone: [phone]      Fax: [phone]        Your home care company:   N/A     Your wound-care supplies will be provided by:  weekly wraps     NAME:  Phoebe Ly   YOB: 1933  PRIMARY DIAGNOSIS FOR WOUND CARE CENTER:  Pyoderma gangrenosum     Wound cleansing:  Do not scrub or use excessive force. Wash hands with soap and water before and after dressing changes. Prior to applying a clean dressing, cleanse wound with normal saline, wound cleanser, or mild soap and water. Ask your physician or nurse before getting the wound(s) wet in the shower. Wound care for home:     Left Lower Leg Wounds (distal and proximal medial ankle):      Aquaphor circumferential leg below knee entire for itching (where wrap will end)   Tacrolimus to pink muaro-wound on medial ankle   Nexodyn  Collagen   Hydrogel   Foam over wound   Lotisone to any rash other than medial ankle  Build up ankles with specialist cast padding  Compri-2 Lite-lightly wrapped   Leave in place all week. Left Toes:   Per Dr. Hickey Seek    Xeroform to 2nd toe  Foam   Coban     Right Lower Leg:     Toe Wraps per Dr. Hickey Seek   Nylon Stocking   Compression garment sockliner  Velcro Compression Garment   Leave in place all week           Your physician has ordered Compression for treatment of venous ulcers, venous insufficiency,and/or Lymphedema. * Do not remove until your next scheduled visit in [address] [place] [address]- do not get wet.     * If your wrap falls down, becomes painful or you have decreased sensation, and/or excessive drainage- please call the [address] [place] [address] for RN visit to get your compression wrap changed   * You need to exercice as tolerated- walking is good   * Elevate your legs preferrably above level of your heart when sitting as much as possible   * The Goal of this therapy is to reduce Edema

## 2023-11-21 ENCOUNTER — HOSPITAL ENCOUNTER (OUTPATIENT)
Dept: WOUND CARE | Age: 88
Discharge: HOME | End: 2023-11-21
Attending: INTERNAL MEDICINE
Payer: MEDICARE

## 2023-11-21 VITALS
RESPIRATION RATE: 20 BRPM | HEIGHT: 66 IN | TEMPERATURE: 97.5 F | WEIGHT: 140.6 LBS | SYSTOLIC BLOOD PRESSURE: 134 MMHG | DIASTOLIC BLOOD PRESSURE: 86 MMHG | BODY MASS INDEX: 22.6 KG/M2 | HEART RATE: 88 BPM

## 2023-11-21 DIAGNOSIS — I87.2 PERIPHERAL VENOUS INSUFFICIENCY: ICD-10-CM

## 2023-11-21 DIAGNOSIS — L97.322 ULCER OF LEFT ANKLE, WITH FAT LAYER EXPOSED (HCC): Primary | ICD-10-CM

## 2023-11-21 DIAGNOSIS — L88 PYODERMA GANGRENOSUM: ICD-10-CM

## 2023-11-21 DIAGNOSIS — L97.312 ULCER OF RIGHT ANKLE, WITH FAT LAYER EXPOSED (HCC): ICD-10-CM

## 2023-11-21 PROCEDURE — 97597 DBRDMT OPN WND 1ST 20 CM/<: CPT

## 2023-11-21 PROCEDURE — 29581 APPL MULTLAYER CMPRN SYS LEG: CPT

## 2023-11-21 RX ORDER — LIDOCAINE HYDROCHLORIDE 40 MG/ML
SOLUTION TOPICAL ONCE
OUTPATIENT
Start: 2023-11-21 | End: 2023-11-21

## 2023-11-21 RX ORDER — LIDOCAINE 40 MG/G
CREAM TOPICAL ONCE
Status: DISCONTINUED | OUTPATIENT
Start: 2023-11-21 | End: 2023-11-22 | Stop reason: HOSPADM

## 2023-11-21 RX ORDER — LIDOCAINE 50 MG/G
OINTMENT TOPICAL ONCE
OUTPATIENT
Start: 2023-11-21 | End: 2023-11-21

## 2023-11-21 RX ORDER — LIDOCAINE 40 MG/G
CREAM TOPICAL ONCE
OUTPATIENT
Start: 2023-11-21 | End: 2023-11-21

## 2023-11-21 RX ORDER — TRIAMCINOLONE ACETONIDE 1 MG/G
OINTMENT TOPICAL ONCE
OUTPATIENT
Start: 2023-11-21 | End: 2023-11-21

## 2023-11-21 RX ORDER — BACITRACIN ZINC AND POLYMYXIN B SULFATE 500; 1000 [USP'U]/G; [USP'U]/G
OINTMENT TOPICAL ONCE
OUTPATIENT
Start: 2023-11-21 | End: 2023-11-21

## 2023-11-21 NOTE — PLAN OF CARE
Pt seen in [facility] - Improvement noted in Lower extremity wounds - RLE healed - [place]bride per Dr. Vishnu Duran  to Left lower leg wounds - treatment as follows   Left Lower Leg Wounds (distal and proximal medial ankle): Thera Skin #1 still in place  Aquaphor circumferential leg below knee entire for itching (where wrap will end)   Nexodyn  Collagen   Hydrogel   Foam over wound   Lotisone to any rash  Build up ankles with specialist cast padding  Compri-2 Lite-lightly wrapped   Leave in place all week.          Right Lower Leg:  Clobetasol& Tacrolimus to healed purple spot  Adaptic- cut as large as the middle of the Mepilex border- leaving only an edge to the Mepilex border to stick   Large Name Brand Mepilex border   Compression garment sockliner  Velcro Compression Garment   Leave in place all week     Reviewed AVS - f/u in 1 week

## 2023-11-22 PROBLEM — I89.0 LYMPHEDEMA DUE TO VENOUS INSUFFICIENCY: Status: ACTIVE | Noted: 2019-05-09

## 2023-11-22 PROBLEM — R60.0 BILATERAL LOWER EXTREMITY EDEMA: Status: RESOLVED | Noted: 2019-04-05 | Resolved: 2023-11-22

## 2023-11-30 ENCOUNTER — HOSPITAL ENCOUNTER (OUTPATIENT)
Dept: WOUND CARE | Age: 88
Discharge: HOME | End: 2023-11-30
Payer: MEDICARE

## 2023-11-30 VITALS
BODY MASS INDEX: 23.21 KG/M2 | RESPIRATION RATE: 18 BRPM | HEART RATE: 93 BPM | WEIGHT: 144.4 LBS | HEIGHT: 66 IN | TEMPERATURE: 97.8 F | DIASTOLIC BLOOD PRESSURE: 90 MMHG | SYSTOLIC BLOOD PRESSURE: 131 MMHG

## 2023-11-30 DIAGNOSIS — L88 PYODERMA GANGRENOSUM: ICD-10-CM

## 2023-11-30 DIAGNOSIS — L97.322 ULCER OF LEFT ANKLE, WITH FAT LAYER EXPOSED (HCC): Primary | ICD-10-CM

## 2023-11-30 DIAGNOSIS — I87.2 PERIPHERAL VENOUS INSUFFICIENCY: ICD-10-CM

## 2023-11-30 DIAGNOSIS — L97.312 ULCER OF RIGHT ANKLE, WITH FAT LAYER EXPOSED (HCC): ICD-10-CM

## 2023-11-30 PROCEDURE — 29581 APPL MULTLAYER CMPRN SYS LEG: CPT

## 2023-11-30 RX ORDER — BACITRACIN ZINC AND POLYMYXIN B SULFATE 500; 1000 [USP'U]/G; [USP'U]/G
OINTMENT TOPICAL ONCE
OUTPATIENT
Start: 2023-11-30 | End: 2023-11-30

## 2023-11-30 RX ORDER — LIDOCAINE HYDROCHLORIDE 40 MG/ML
SOLUTION TOPICAL ONCE
OUTPATIENT
Start: 2023-11-30 | End: 2023-11-30

## 2023-11-30 RX ORDER — TRIAMCINOLONE ACETONIDE 1 MG/G
OINTMENT TOPICAL ONCE
OUTPATIENT
Start: 2023-11-30 | End: 2023-11-30

## 2023-11-30 RX ORDER — LIDOCAINE 40 MG/G
CREAM TOPICAL ONCE
OUTPATIENT
Start: 2023-11-30 | End: 2023-11-30

## 2023-11-30 RX ORDER — LIDOCAINE 40 MG/G
CREAM TOPICAL ONCE
Status: DISCONTINUED | OUTPATIENT
Start: 2023-11-30 | End: 2023-12-01 | Stop reason: HOSPADM

## 2023-11-30 RX ORDER — LIDOCAINE 50 MG/G
OINTMENT TOPICAL ONCE
OUTPATIENT
Start: 2023-11-30 | End: 2023-11-30

## 2023-11-30 ASSESSMENT — PAIN DESCRIPTION - LOCATION: LOCATION: TOE (COMMENT WHICH ONE)

## 2023-11-30 ASSESSMENT — PAIN DESCRIPTION - FREQUENCY: FREQUENCY: CONTINUOUS

## 2023-11-30 ASSESSMENT — PAIN - FUNCTIONAL ASSESSMENT: PAIN_FUNCTIONAL_ASSESSMENT: ACTIVITIES ARE NOT PREVENTED

## 2023-11-30 ASSESSMENT — PAIN DESCRIPTION - ONSET: ONSET: AWAKENED FROM SLEEP

## 2023-11-30 ASSESSMENT — PAIN DESCRIPTION - DESCRIPTORS: DESCRIPTORS: STABBING

## 2023-11-30 ASSESSMENT — PAIN DESCRIPTION - PAIN TYPE: TYPE: ACUTE PAIN

## 2023-11-30 ASSESSMENT — PAIN SCALES - GENERAL: PAINLEVEL_OUTOF10: 3

## 2023-11-30 ASSESSMENT — PAIN DESCRIPTION - ORIENTATION: ORIENTATION: LEFT

## 2023-11-30 NOTE — PLAN OF CARE
Follow up visit in [facility] today. Wounds stable. Increased edema noted to bilateral toes. To continue weekly compression wrap for edema control in LLE. Toe wraps per Dr. Laura Rinne to BLE.  F/u in [facility] in 1 week as ordered, pt. Aware to call sooner with any changes or questions/concerns. Discharge instructions reviewed with patient, all questions answered, copy given to patient. Dressings were applied to all wounds per M.D. Instructions at this visit.

## 2023-12-01 NOTE — DISCHARGE INSTRUCTIONS
411 [facility] Physician Orders and Discharge [address] [place]  5[address] [facility], [address] [place] [address]  [place], [address]  Telephone: [phone]      Fax: [phone]        Your home care company:   N/A     Your wound-care supplies will be provided by:  weekly wraps     NAME:  Nayely Chaudhari   YOB: 1933  PRIMARY DIAGNOSIS FOR WOUND CARE CENTER:  Pyoderma gangrenosum     Wound cleansing:  Do not scrub or use excessive force. Wash hands with soap and water before and after dressing changes. Prior to applying a clean dressing, cleanse wound with normal saline, wound cleanser, or mild soap and water. Ask your physician or nurse before getting the wound(s) wet in the shower. Wound care for home:     Left Lower Leg Wounds (distal and proximal medial ankle):     Aquaphor circumferential leg below knee entire for itching (where wrap will end)   Lotrisone to any rash other than medial ankle  Two small areas marked in blue marker:  Tacrolimus to pink mauro-wound on medial ankle   Nexodyn  Collagen   Hydrogel   Foam over wound    Build up ankles with specialist cast padding  Compri-2 Lite-lightly wrapped   Leave in place all week. Left Toes:   Per Dr. Sherrill Graves    Xeroform to 2nd toe  Gauze  Coban      Right Lower Leg:  Moisturizer   Compression garment sockliner  Velcro Compression Garment   Leave in place all week           Your physician has ordered Compression for treatment of venous ulcers, venous insufficiency,and/or Lymphedema. * Do not remove until your next scheduled visit in [facility]- do not get wet.     * If your wrap falls down, becomes painful or you have decreased sensation, and/or excessive drainage- please call the [facility] for RN visit to get your compression wrap changed   * You need to exercice as tolerated- walking is good   * Elevate your legs preferrably above level of your heart when sitting as much as possible   * The Goal of this therapy is to

## 2023-12-07 ENCOUNTER — HOSPITAL ENCOUNTER (OUTPATIENT)
Dept: WOUND CARE | Age: 88
Discharge: HOME | End: 2023-12-07
Payer: MEDICARE

## 2023-12-07 VITALS
SYSTOLIC BLOOD PRESSURE: 129 MMHG | HEIGHT: 66 IN | TEMPERATURE: 98.1 F | RESPIRATION RATE: 18 BRPM | BODY MASS INDEX: 22.66 KG/M2 | DIASTOLIC BLOOD PRESSURE: 81 MMHG | WEIGHT: 141 LBS | HEART RATE: 95 BPM

## 2023-12-07 DIAGNOSIS — L88 PYODERMA GANGRENOSUM: ICD-10-CM

## 2023-12-07 DIAGNOSIS — L97.322 ULCER OF LEFT ANKLE, WITH FAT LAYER EXPOSED (HCC): Primary | ICD-10-CM

## 2023-12-07 DIAGNOSIS — I87.2 PERIPHERAL VENOUS INSUFFICIENCY: ICD-10-CM

## 2023-12-07 DIAGNOSIS — L97.312 ULCER OF RIGHT ANKLE, WITH FAT LAYER EXPOSED (HCC): ICD-10-CM

## 2023-12-07 PROBLEM — L97.911 ULCER OF RIGHT LEG, LIMITED TO BREAKDOWN OF SKIN (HCC): Status: RESOLVED | Noted: 2023-10-16 | Resolved: 2023-12-07

## 2023-12-07 PROBLEM — L97.521 ULCER OF LEFT SECOND TOE, LIMITED TO BREAKDOWN OF SKIN (HCC): Status: ACTIVE | Noted: 2023-12-07

## 2023-12-07 PROCEDURE — 29581 APPL MULTLAYER CMPRN SYS LEG: CPT

## 2023-12-07 PROCEDURE — 97597 DBRDMT OPN WND 1ST 20 CM/<: CPT

## 2023-12-07 RX ORDER — LIDOCAINE HYDROCHLORIDE 40 MG/ML
SOLUTION TOPICAL ONCE
OUTPATIENT
Start: 2023-12-07 | End: 2023-12-07

## 2023-12-07 RX ORDER — LIDOCAINE 40 MG/G
CREAM TOPICAL ONCE
OUTPATIENT
Start: 2023-12-07 | End: 2023-12-07

## 2023-12-07 RX ORDER — LIDOCAINE 50 MG/G
OINTMENT TOPICAL ONCE
OUTPATIENT
Start: 2023-12-07 | End: 2023-12-07

## 2023-12-07 RX ORDER — LIDOCAINE 40 MG/G
CREAM TOPICAL ONCE
Status: DISCONTINUED | OUTPATIENT
Start: 2023-12-07 | End: 2023-12-08 | Stop reason: HOSPADM

## 2023-12-07 RX ORDER — TRIAMCINOLONE ACETONIDE 1 MG/G
OINTMENT TOPICAL ONCE
OUTPATIENT
Start: 2023-12-07 | End: 2023-12-07

## 2023-12-07 RX ORDER — BACITRACIN ZINC AND POLYMYXIN B SULFATE 500; 1000 [USP'U]/G; [USP'U]/G
OINTMENT TOPICAL ONCE
OUTPATIENT
Start: 2023-12-07 | End: 2023-12-07

## 2023-12-07 ASSESSMENT — PAIN SCALES - GENERAL: PAINLEVEL_OUTOF10: 0

## 2023-12-07 NOTE — PLAN OF CARE
Follow up visit in [address] [place] [address] today. Wounds stable, showing signs of improvement. Edema improved to bilateral toes. To continue weekly compression wrap for edema control in LLE. Pt's personal compression wrap to RLE. Toe wrap placed to left toes per Dr. Blane Wilkins. F/u in [address] [place] [address] in 1 week as ordered, pt. Aware to call sooner with any changes or questions/concerns. Discharge instructions reviewed with patient, all questions answered, copy given to patient. Dressings were applied to all wounds per M.D. Instructions at this visit.

## 2023-12-08 NOTE — DISCHARGE INSTRUCTIONS
411 [facility] Physician Orders and Discharge [address]  9737 [facility], [address] [place] [address]  [place], [address]  Telephone: [phone]      Fax: [phone]        Your home care company:   N/A     Your wound-care supplies will be provided by:  weekly wraps     NAME:  Paige Shaikh   YOB: 1933  PRIMARY DIAGNOSIS FOR WOUND CARE CENTER:  Pyoderma gangrenosum     Wound cleansing:  Do not scrub or use excessive force. Wash hands with soap and water before and after dressing changes. Prior to applying a clean dressing, cleanse wound with normal saline, wound cleanser, or mild soap and water. Ask your physician or nurse before getting the wound(s) wet in the shower. Wound care for home:     Left Lower Leg Wounds (distal and proximal medial ankle):     Aquaphor circumferential leg below knee entire for itching (where wrap will end)   Lotrisone to any rash other than medial ankle (distal pretib)  Two small areas marked in blue marker:  Tacrolimus to pink mauro-wound on medial ankle   Nexodyn  Collagen   Hydrogel   Foam over wound  Foam over 1st layer of wrap to upper pretib irritation   Build up ankles with specialist cast padding  Compri-2 Lite-lightly wrapped   Leave in place all week. Left Toes:   Per Dr. Shimon Fuentes    Nexodyn  Xeroform to 2nd toe  Gauze  Coban      Right Lower Leg:  Moisturizer   Compression garment sockliner  Velcro Compression Garment   Leave in place all week           Your physician has ordered Compression for treatment of venous ulcers, venous insufficiency,and/or Lymphedema. * Do not remove until your next scheduled visit in [facility]- do not get wet.     * If your wrap falls down, becomes painful or you have decreased sensation, and/or excessive drainage- please call the [facility] for RN visit to get your compression wrap changed   * You need to exercice as tolerated- walking is good   * Elevate your legs preferrably above level of your

## 2023-12-14 ENCOUNTER — HOSPITAL ENCOUNTER (OUTPATIENT)
Dept: WOUND CARE | Age: 88
Discharge: HOME | End: 2023-12-14
Attending: INTERNAL MEDICINE
Payer: MEDICARE

## 2023-12-14 VITALS
HEIGHT: 66 IN | SYSTOLIC BLOOD PRESSURE: 109 MMHG | BODY MASS INDEX: 22.63 KG/M2 | TEMPERATURE: 97.8 F | DIASTOLIC BLOOD PRESSURE: 69 MMHG | HEART RATE: 101 BPM | RESPIRATION RATE: 20 BRPM | WEIGHT: 140.8 LBS

## 2023-12-14 DIAGNOSIS — L97.322 ULCER OF LEFT ANKLE, WITH FAT LAYER EXPOSED (HCC): Primary | ICD-10-CM

## 2023-12-14 DIAGNOSIS — L88 PYODERMA GANGRENOSUM: ICD-10-CM

## 2023-12-14 DIAGNOSIS — I87.2 PERIPHERAL VENOUS INSUFFICIENCY: ICD-10-CM

## 2023-12-14 DIAGNOSIS — L97.312 ULCER OF RIGHT ANKLE, WITH FAT LAYER EXPOSED (HCC): ICD-10-CM

## 2023-12-14 PROCEDURE — 97597 DBRDMT OPN WND 1ST 20 CM/<: CPT

## 2023-12-14 PROCEDURE — 29581 APPL MULTLAYER CMPRN SYS LEG: CPT

## 2023-12-14 RX ORDER — LIDOCAINE 40 MG/G
CREAM TOPICAL ONCE
OUTPATIENT
Start: 2023-12-14 | End: 2023-12-14

## 2023-12-14 RX ORDER — LIDOCAINE 50 MG/G
OINTMENT TOPICAL ONCE
OUTPATIENT
Start: 2023-12-14 | End: 2023-12-14

## 2023-12-14 RX ORDER — LIDOCAINE 40 MG/G
CREAM TOPICAL ONCE
Status: DISCONTINUED | OUTPATIENT
Start: 2023-12-14 | End: 2023-12-15 | Stop reason: HOSPADM

## 2023-12-14 RX ORDER — BACITRACIN ZINC AND POLYMYXIN B SULFATE 500; 1000 [USP'U]/G; [USP'U]/G
OINTMENT TOPICAL ONCE
OUTPATIENT
Start: 2023-12-14 | End: 2023-12-14

## 2023-12-14 RX ORDER — LIDOCAINE HYDROCHLORIDE 40 MG/ML
SOLUTION TOPICAL ONCE
OUTPATIENT
Start: 2023-12-14 | End: 2023-12-14

## 2023-12-14 RX ORDER — TRIAMCINOLONE ACETONIDE 1 MG/G
OINTMENT TOPICAL ONCE
OUTPATIENT
Start: 2023-12-14 | End: 2023-12-14

## 2023-12-14 NOTE — PLAN OF CARE
Follow up visit in [address] [place] [address] today. Wounds stable, showing signs of improvement. Wound debridement per Dr. Sulaiman Sheldon. To continue weekly compression wrap for edema control in LLE. Pt's personal compression wrap to RLE. Toe wrap placed to left toes per Dr. Sulaiman Sheldon. F/u in [address] [place] [address] in 1 week as ordered, pt. Aware to call sooner with any changes or questions/concerns. Discharge instructions reviewed with patient, all questions answered, copy given to patient. Dressings were applied to all wounds per M.D.  Instructions at this visit

## 2023-12-22 NOTE — DISCHARGE INSTRUCTIONS
[facility] Physician Orders and Discharge [facility]  [address] [facility], [address] [place] [address]  [place], [address]  Telephone: [phone]      Fax: [phone]        Your home care company:   N/A     Your wound-care supplies will be provided by:  weekly wraps     NAME:  Emily Freeman   YOB: 1933  PRIMARY DIAGNOSIS FOR WOUND CARE CENTER:  Pyoderma gangrenosum     Wound cleansing:  Do not scrub or use excessive force. Wash hands with soap and water before and after dressing changes. Prior to applying a clean dressing, cleanse wound with normal saline, wound cleanser, or mild soap and water. Ask your physician or nurse before getting the wound(s) wet in the shower. Wound care for home:     Left Lower Leg Wounds (distal and proximal medial ankle):     Aquaphor circumferential leg below knee entire for itching (where wrap will end)   Aquaphor to dry  Lotrisone to any rash   Two small areas marked in blue marker:  Tacrolimus and clobetasol to pink mauro-wound on medial ankle   Nexodyn  Collagen   Hydrogel   Foam over wound  Build up ankles with specialist cast padding  Compri-2 Lite-lightly wrapped   Leave in place all week. Left Toes:   Per Dr. Ashish Saldana    Nexodyn  Collagen to 2nd toe  Xeroform to 2nd toe  Gauze  Coban      Right Lower Leg:  Healed- Pt's personal compression garment            Your physician has ordered Compression for treatment of venous ulcers, venous insufficiency,and/or Lymphedema. * Do not remove until your next scheduled visit in [address] [place] [address]- do not get wet.     * If your wrap falls down, becomes painful or you have decreased sensation, and/or excessive drainage- please call the [address] [place] [address] for RN visit to get your compression wrap changed   * You need to exercice as tolerated- walking is good   * Elevate your legs preferrably above level of your heart when sitting as much as possible   * The Goal of this therapy is to reduce Edema and get

## 2023-12-28 ENCOUNTER — HOSPITAL ENCOUNTER (OUTPATIENT)
Dept: WOUND CARE | Age: 88
Discharge: HOME | End: 2023-12-28
Attending: INTERNAL MEDICINE
Payer: MEDICARE

## 2023-12-28 VITALS
BODY MASS INDEX: 23.33 KG/M2 | SYSTOLIC BLOOD PRESSURE: 109 MMHG | WEIGHT: 145.2 LBS | HEIGHT: 66 IN | TEMPERATURE: 97.7 F | RESPIRATION RATE: 18 BRPM | HEART RATE: 86 BPM | DIASTOLIC BLOOD PRESSURE: 86 MMHG

## 2023-12-28 DIAGNOSIS — L88 PYODERMA GANGRENOSUM: ICD-10-CM

## 2023-12-28 DIAGNOSIS — I87.2 PERIPHERAL VENOUS INSUFFICIENCY: ICD-10-CM

## 2023-12-28 DIAGNOSIS — L97.312 ULCER OF RIGHT ANKLE, WITH FAT LAYER EXPOSED (HCC): ICD-10-CM

## 2023-12-28 DIAGNOSIS — L97.322 ULCER OF LEFT ANKLE, WITH FAT LAYER EXPOSED (HCC): Primary | ICD-10-CM

## 2023-12-28 PROCEDURE — 29581 APPL MULTLAYER CMPRN SYS LEG: CPT

## 2023-12-28 RX ORDER — LIDOCAINE 40 MG/G
CREAM TOPICAL ONCE
Status: DISCONTINUED | OUTPATIENT
Start: 2023-12-28 | End: 2023-12-29 | Stop reason: HOSPADM

## 2023-12-28 RX ORDER — LIDOCAINE HYDROCHLORIDE 40 MG/ML
SOLUTION TOPICAL ONCE
OUTPATIENT
Start: 2023-12-28 | End: 2023-12-28

## 2023-12-28 RX ORDER — BACITRACIN ZINC AND POLYMYXIN B SULFATE 500; 1000 [USP'U]/G; [USP'U]/G
OINTMENT TOPICAL ONCE
OUTPATIENT
Start: 2023-12-28 | End: 2023-12-28

## 2023-12-28 RX ORDER — TRIAMCINOLONE ACETONIDE 1 MG/G
OINTMENT TOPICAL ONCE
OUTPATIENT
Start: 2023-12-28 | End: 2023-12-28

## 2023-12-28 RX ORDER — LIDOCAINE 40 MG/G
CREAM TOPICAL ONCE
OUTPATIENT
Start: 2023-12-28 | End: 2023-12-28

## 2023-12-28 RX ORDER — LIDOCAINE 50 MG/G
OINTMENT TOPICAL ONCE
OUTPATIENT
Start: 2023-12-28 | End: 2023-12-28

## 2023-12-28 NOTE — PLAN OF CARE
Follow up visit in [address] [place] [address] today. Wounds stable, showing signs of improvement. No debridement today. To continue weekly compression wrap for edema control in LLE. Pt's personal compression wrap to RLE. Toe wrap with collagen and xeroform to 2nd toe placed to left toes per Dr. Lennox Wrothy. F/u in [address] [place] [address] in 1 week as ordered, pt. Aware to call sooner with any changes or questions/concerns. Discharge instructions reviewed with patient, all questions answered, copy given to patient. Dressings were applied to all wounds per M.D.  Instructions at this visit

## 2023-12-29 NOTE — DISCHARGE INSTRUCTIONS
this therapy is to reduce Edema and get into long term compression garments to control venous insufficiency, lymphedema and reduce occurrence of venous ulcers      Please note, all wounds (unless stated otherwise here) were mechanically debrided at the time of cleansing here in the wound-care center today, so a small amount of pain, drainage or bleeding from that process might be expected, and is normal.     All products for home use, including multiple products for a single wound if applicable, are medically necessary in order to achieve the best chance at timely wound healing. See provider documentation for details if needed.     Substituted dressings applied in the [facility] today, if applicable:      N/a      New orders for this week (labs, imaging, medications, etc.):  Please take reference photo of left proximal lower leg blister   Sulfasalazine- please continue 2 tablets total daily   You may walk as much as you can tolerate but please elevate your legs when resting.   Continue elevating legs as much as you can while you are not walking   You may take Benadryl at night only for itching - please be aware this may cause dry mouth and some sleepiness/dizziness     Additional instructions for specific diagnoses:      Wound Culture 5/11/23     Bala #3 - 10/21/22  Oasis #4 - 10/28/22  Bala #5 - 11/4/22  Bala #6 - 11/11/22   Oasis #7 -11-22-22  Bala #8 12/1/22   Bala #9  12/9/22  Bala #10 12/21/22   Thera Skin #1 10/12/23        F/U Appointment  with Dr. Patterson in 1 week                                               at                                    .     Your nurse  is SUHAS Elkins     If we applied slip-resistant hospital socks today, be sure to remove them at least once a day to inspect your toes or feet, even if you're not changing the wraps or dressings underneath. If you see anything concerning (redness, excess moisture, etc), please call and let us know right away.     Should you experience any

## 2024-01-02 RX ORDER — SULFASALAZINE 500 MG/1
TABLET ORAL
Qty: 450 TABLET | Refills: 3 | OUTPATIENT
Start: 2024-01-02

## 2024-01-04 ENCOUNTER — HOSPITAL ENCOUNTER (OUTPATIENT)
Dept: WOUND CARE | Age: 89
Discharge: HOME | End: 2024-01-04
Attending: INTERNAL MEDICINE
Payer: MEDICARE

## 2024-01-04 VITALS
TEMPERATURE: 97.9 F | BODY MASS INDEX: 22.95 KG/M2 | DIASTOLIC BLOOD PRESSURE: 95 MMHG | RESPIRATION RATE: 18 BRPM | SYSTOLIC BLOOD PRESSURE: 138 MMHG | HEART RATE: 101 BPM | WEIGHT: 142.8 LBS | HEIGHT: 66 IN

## 2024-01-04 DIAGNOSIS — L97.322 ULCER OF LEFT ANKLE, WITH FAT LAYER EXPOSED (HCC): Primary | ICD-10-CM

## 2024-01-04 DIAGNOSIS — L88 PYODERMA GANGRENOSUM: ICD-10-CM

## 2024-01-04 DIAGNOSIS — I87.2 PERIPHERAL VENOUS INSUFFICIENCY: ICD-10-CM

## 2024-01-04 DIAGNOSIS — L97.312 ULCER OF RIGHT ANKLE, WITH FAT LAYER EXPOSED (HCC): ICD-10-CM

## 2024-01-04 PROCEDURE — 29581 APPL MULTLAYER CMPRN SYS LEG: CPT

## 2024-01-04 RX ORDER — LIDOCAINE 40 MG/G
CREAM TOPICAL ONCE
Status: DISCONTINUED | OUTPATIENT
Start: 2024-01-04 | End: 2024-01-05 | Stop reason: HOSPADM

## 2024-01-04 RX ORDER — BACITRACIN ZINC AND POLYMYXIN B SULFATE 500; 1000 [USP'U]/G; [USP'U]/G
OINTMENT TOPICAL ONCE
OUTPATIENT
Start: 2024-01-04 | End: 2024-01-04

## 2024-01-04 RX ORDER — LIDOCAINE 40 MG/G
CREAM TOPICAL ONCE
OUTPATIENT
Start: 2024-01-04 | End: 2024-01-04

## 2024-01-04 RX ORDER — LIDOCAINE HYDROCHLORIDE 40 MG/ML
SOLUTION TOPICAL ONCE
OUTPATIENT
Start: 2024-01-04 | End: 2024-01-04

## 2024-01-04 RX ORDER — TRIAMCINOLONE ACETONIDE 1 MG/G
OINTMENT TOPICAL ONCE
OUTPATIENT
Start: 2024-01-04 | End: 2024-01-04

## 2024-01-04 RX ORDER — LIDOCAINE 50 MG/G
OINTMENT TOPICAL ONCE
OUTPATIENT
Start: 2024-01-04 | End: 2024-01-04

## 2024-01-04 ASSESSMENT — PAIN SCALES - GENERAL: PAINLEVEL_OUTOF10: 0

## 2024-01-04 NOTE — PLAN OF CARE
Follow up visit in [facility] today. Wounds stable, showing signs of improvement. Increased edema noted to proximal lower leg. Pt reports she had pain in that area and slid her weekly compression wrap down last evening. Pt states that she had previously slid wrap down and looked at leg and pulled wrap back up into place. Pt educated to call [facility] in the future if wrap becomes uncomfortable. No debridement today. To continue weekly compression wrap for edema control in LLE. Pt's personal compression wrap to RLE. Toe wrap with collagen and xeroform to 2nd toe placed to left toes per Dr. Patterson.  F/u in [facility] in 1 week as ordered, pt. Aware to call sooner with any changes or questions/concerns. Discharge instructions reviewed with patient, all questions answered, copy given to patient. Dressings were applied to all wounds per M.D. Instructions at this visit

## 2024-01-11 ENCOUNTER — HOSPITAL ENCOUNTER (OUTPATIENT)
Dept: WOUND CARE | Age: 89
Discharge: HOME | End: 2024-01-11
Attending: INTERNAL MEDICINE
Payer: MEDICARE

## 2024-01-11 VITALS
SYSTOLIC BLOOD PRESSURE: 91 MMHG | HEIGHT: 66 IN | WEIGHT: 136.8 LBS | BODY MASS INDEX: 21.98 KG/M2 | RESPIRATION RATE: 18 BRPM | DIASTOLIC BLOOD PRESSURE: 56 MMHG | HEART RATE: 75 BPM | TEMPERATURE: 97.3 F

## 2024-01-11 DIAGNOSIS — L97.312 ULCER OF RIGHT ANKLE, WITH FAT LAYER EXPOSED (HCC): ICD-10-CM

## 2024-01-11 DIAGNOSIS — L97.322 ULCER OF LEFT ANKLE, WITH FAT LAYER EXPOSED (HCC): Primary | ICD-10-CM

## 2024-01-11 DIAGNOSIS — I87.2 PERIPHERAL VENOUS INSUFFICIENCY: ICD-10-CM

## 2024-01-11 DIAGNOSIS — L88 PYODERMA GANGRENOSUM: ICD-10-CM

## 2024-01-11 PROCEDURE — 97597 DBRDMT OPN WND 1ST 20 CM/<: CPT

## 2024-01-11 PROCEDURE — 29581 APPL MULTLAYER CMPRN SYS LEG: CPT

## 2024-01-11 RX ORDER — LIDOCAINE HYDROCHLORIDE 40 MG/ML
SOLUTION TOPICAL ONCE
OUTPATIENT
Start: 2024-01-11 | End: 2024-01-11

## 2024-01-11 RX ORDER — LIDOCAINE 50 MG/G
OINTMENT TOPICAL ONCE
OUTPATIENT
Start: 2024-01-11 | End: 2024-01-11

## 2024-01-11 RX ORDER — BACITRACIN ZINC AND POLYMYXIN B SULFATE 500; 1000 [USP'U]/G; [USP'U]/G
OINTMENT TOPICAL ONCE
OUTPATIENT
Start: 2024-01-11 | End: 2024-01-11

## 2024-01-11 RX ORDER — TRIAMCINOLONE ACETONIDE 1 MG/G
OINTMENT TOPICAL ONCE
OUTPATIENT
Start: 2024-01-11 | End: 2024-01-11

## 2024-01-11 RX ORDER — LIDOCAINE 40 MG/G
CREAM TOPICAL ONCE
OUTPATIENT
Start: 2024-01-11 | End: 2024-01-11

## 2024-01-11 RX ORDER — LIDOCAINE 40 MG/G
CREAM TOPICAL ONCE
Status: DISCONTINUED | OUTPATIENT
Start: 2024-01-11 | End: 2024-01-12 | Stop reason: HOSPADM

## 2024-01-12 NOTE — DISCHARGE INSTRUCTIONS
odor, or fever) or have questions about your wound care, please contact the [facility] Center at [phone] Monday-Thursday from 8:00 am - 4:30 pm, or Friday from 8:00 am - 2:30 pm.  If you need help with your wound outside these hours and cannot wait until we are again available, contact your home-care company (if applicable), your PCP, or go to the nearest emergency room  
negative - no fever

## 2024-01-18 ENCOUNTER — HOSPITAL ENCOUNTER (OUTPATIENT)
Dept: WOUND CARE | Age: 89
Discharge: HOME | End: 2024-01-18
Attending: INTERNAL MEDICINE
Payer: MEDICARE

## 2024-01-18 VITALS
HEIGHT: 66 IN | WEIGHT: 139.4 LBS | BODY MASS INDEX: 22.4 KG/M2 | TEMPERATURE: 97.8 F | SYSTOLIC BLOOD PRESSURE: 139 MMHG | DIASTOLIC BLOOD PRESSURE: 73 MMHG | HEART RATE: 88 BPM | RESPIRATION RATE: 18 BRPM

## 2024-01-18 DIAGNOSIS — L97.312 ULCER OF RIGHT ANKLE, WITH FAT LAYER EXPOSED (HCC): ICD-10-CM

## 2024-01-18 DIAGNOSIS — L88 PYODERMA GANGRENOSUM: ICD-10-CM

## 2024-01-18 DIAGNOSIS — L97.322 ULCER OF LEFT ANKLE, WITH FAT LAYER EXPOSED (HCC): Primary | ICD-10-CM

## 2024-01-18 DIAGNOSIS — I87.2 PERIPHERAL VENOUS INSUFFICIENCY: ICD-10-CM

## 2024-01-18 PROCEDURE — 29581 APPL MULTLAYER CMPRN SYS LEG: CPT

## 2024-01-18 RX ORDER — LIDOCAINE 50 MG/G
OINTMENT TOPICAL ONCE
OUTPATIENT
Start: 2024-01-18 | End: 2024-01-18

## 2024-01-18 RX ORDER — LIDOCAINE 40 MG/G
CREAM TOPICAL ONCE
Status: DISCONTINUED | OUTPATIENT
Start: 2024-01-18 | End: 2024-01-19 | Stop reason: HOSPADM

## 2024-01-18 RX ORDER — TRIAMCINOLONE ACETONIDE 1 MG/G
OINTMENT TOPICAL ONCE
OUTPATIENT
Start: 2024-01-18 | End: 2024-01-18

## 2024-01-18 RX ORDER — LIDOCAINE 40 MG/G
CREAM TOPICAL ONCE
OUTPATIENT
Start: 2024-01-18 | End: 2024-01-18

## 2024-01-18 RX ORDER — LIDOCAINE HYDROCHLORIDE 40 MG/ML
SOLUTION TOPICAL ONCE
OUTPATIENT
Start: 2024-01-18 | End: 2024-01-18

## 2024-01-18 RX ORDER — BACITRACIN ZINC AND POLYMYXIN B SULFATE 500; 1000 [USP'U]/G; [USP'U]/G
OINTMENT TOPICAL ONCE
OUTPATIENT
Start: 2024-01-18 | End: 2024-01-18

## 2024-01-18 NOTE — PLAN OF CARE
Follow up visit in [facility] today. Wounds stable, showing signs of improvement. Left second toe wound nearly healed. No debridement l. To continue weekly compression wrap for edema control in LLE. Pt's personal compression wrap to RLE. Toe wrap with  xeroform to 2nd toe placed to left toes per Dr. Patterson. Pt to drop Sulfasalazine to 1 tablet daily. F/u in [facility] in 1 week as ordered, pt. Aware to call sooner with any changes or questions/concerns. Discharge instructions reviewed with patient, all questions answered, copy given to patient. Dressings were applied to all wounds per M.D. Instructions at this visit

## 2024-01-25 ENCOUNTER — HOSPITAL ENCOUNTER (OUTPATIENT)
Dept: WOUND CARE | Age: 89
Discharge: HOME | End: 2024-01-25
Payer: MEDICARE

## 2024-01-25 VITALS
HEART RATE: 68 BPM | BODY MASS INDEX: 22.24 KG/M2 | WEIGHT: 138.4 LBS | HEIGHT: 66 IN | RESPIRATION RATE: 16 BRPM | TEMPERATURE: 97.3 F | DIASTOLIC BLOOD PRESSURE: 84 MMHG | SYSTOLIC BLOOD PRESSURE: 134 MMHG

## 2024-01-25 DIAGNOSIS — L97.322 ULCER OF LEFT ANKLE, WITH FAT LAYER EXPOSED (HCC): Primary | ICD-10-CM

## 2024-01-25 DIAGNOSIS — L88 PYODERMA GANGRENOSUM: ICD-10-CM

## 2024-01-25 DIAGNOSIS — I87.2 PERIPHERAL VENOUS INSUFFICIENCY: ICD-10-CM

## 2024-01-25 DIAGNOSIS — L97.312 ULCER OF RIGHT ANKLE, WITH FAT LAYER EXPOSED (HCC): ICD-10-CM

## 2024-01-25 PROCEDURE — 29581 APPL MULTLAYER CMPRN SYS LEG: CPT

## 2024-01-25 RX ORDER — LIDOCAINE 50 MG/G
OINTMENT TOPICAL ONCE
OUTPATIENT
Start: 2024-01-25 | End: 2024-01-25

## 2024-01-25 RX ORDER — LIDOCAINE 40 MG/G
CREAM TOPICAL ONCE
Status: DISCONTINUED | OUTPATIENT
Start: 2024-01-25 | End: 2024-01-26 | Stop reason: HOSPADM

## 2024-01-25 RX ORDER — LIDOCAINE HYDROCHLORIDE 40 MG/ML
SOLUTION TOPICAL ONCE
OUTPATIENT
Start: 2024-01-25 | End: 2024-01-25

## 2024-01-25 RX ORDER — BACITRACIN ZINC AND POLYMYXIN B SULFATE 500; 1000 [USP'U]/G; [USP'U]/G
OINTMENT TOPICAL ONCE
OUTPATIENT
Start: 2024-01-25 | End: 2024-01-25

## 2024-01-25 RX ORDER — LIDOCAINE 40 MG/G
CREAM TOPICAL ONCE
OUTPATIENT
Start: 2024-01-25 | End: 2024-01-25

## 2024-01-25 RX ORDER — TRIAMCINOLONE ACETONIDE 1 MG/G
OINTMENT TOPICAL ONCE
OUTPATIENT
Start: 2024-01-25 | End: 2024-01-25

## 2024-01-25 ASSESSMENT — PAIN SCALES - GENERAL: PAINLEVEL_OUTOF10: 0

## 2024-01-25 NOTE — DISCHARGE INSTRUCTIONS
(unless stated otherwise here) were mechanically debrided at the time of cleansing here in the wound-care center today, so a small amount of pain, drainage or bleeding from that process might be expected, and is normal.     All products for home use, including multiple products for a single wound if applicable, are medically necessary in order to achieve the best chance at timely wound healing. See provider documentation for details if needed.     Substituted dressings applied in the [facility] today, if applicable:      N/a      New orders for this week (labs, imaging, medications, etc.):  Sulfasalazine- please continue 1 tablet total daily   You may walk as much as you can tolerate but please elevate your legs when resting.   Continue elevating legs as much as you can while you are not walking   You may take Benadryl at night only for itching - please be aware this may cause dry mouth and some sleepiness/dizziness     Additional instructions for specific diagnoses:      Wound Culture 5/11/23     [place] #3 - 10/21/22  Oasis #4 - 10/28/22  Aztec #5 - 11/4/22  Aztec #6 - 11/11/22   Oasis #7 -11-22-22  Aztec #8 12/1/22   Aztec #9  12/9/22  Aztec #10 12/21/22   Thera Skin #1 10/12/23        F/U Appointment  with Dr. Patterson in 1 week                                               at                                    .     Your nurse  is SUHAS Elkins     If we applied slip-resistant hospital socks today, be sure to remove them at least once a day to inspect your toes or feet, even if you're not changing the wraps or dressings underneath. If you see anything concerning (redness, excess moisture, etc), please call and let us know right away.     Should you experience any significant changes in your wound(s) (including redness, increased warmth, increased pain, increased drainage, odor, or fever) or have questions about your wound care, please contact the [facility] at [phone] Monday-Thursday

## 2024-01-25 NOTE — PLAN OF CARE
Follow up visit in [facility] today. Wound stable, showing signs of improvement. No debridement. To continue weekly compression wrap for edema control in LLE. Pt's personal compression wrap to RLE. Pt to continue Sulfasalazine 1 tablet daily. F/u in [facility] in 1 week as ordered, pt. Aware to call sooner with any changes or questions/concerns. Discharge instructions reviewed with patient, all questions answered, copy given to patient. Dressings were applied to all wounds per M.D. Instructions at this visit

## 2024-01-26 NOTE — DISCHARGE INSTRUCTIONS
Wound Care Center Physician Orders and Discharge Instructions  [facility]  [address], [address]  [place] [id number]  Telephone: [phone]      Fax: [phone]        Your home care company:   N/A     Your wound-care supplies will be provided by:  weekly wraps     NAME:  Anne Stokes   YOB: 1933  PRIMARY DIAGNOSIS FOR WOUND CARE CENTER:  Pyoderma gangrenosum     Wound cleansing:  Do not scrub or use excessive force.  Wash hands with soap and water before and after dressing changes.  Prior to applying a clean dressing, cleanse wound with normal saline, wound cleanser, or mild soap and water. Ask your physician or nurse before getting the wound(s) wet in the shower.                Wound care for home:     Left Lower Leg Wounds (distal and proximal medial ankle):   Lotrisone to any rashy areas on leg and foot   Sorbact that look fragile enough to weep- mid shin and dorsal foot   Nylon stocking then place Pt's personal compression garment stocking and velcro wrap    Leave in place all week. Dr. Patterson does not want you to remove this side throughout the week.      Right Lower Leg:  Healed- Pt's personal compression garment            Your physician has ordered Compression for treatment of venous ulcers, venous insufficiency,and/or Lymphedema.   * Do not remove until your next scheduled visit in [facility]- do not get wet.    * If your wrap falls down, becomes painful or you have decreased sensation, and/or excessive drainage- please call the [facility] for RN visit to get your compression wrap changed   * You need to exercice as tolerated- walking is good   * Elevate your legs preferrably above level of your heart when sitting as much as possible   * The Goal of this therapy is to reduce Edema and get into long term compression garments to control venous insufficiency, lymphedema and reduce occurrence of venous ulcers      Please note, all wounds (unless stated otherwise here) were

## 2024-02-01 ENCOUNTER — HOSPITAL ENCOUNTER (OUTPATIENT)
Dept: WOUND CARE | Age: 89
Discharge: HOME | End: 2024-02-01
Attending: INTERNAL MEDICINE
Payer: MEDICARE

## 2024-02-01 VITALS
HEART RATE: 65 BPM | BODY MASS INDEX: 22.31 KG/M2 | TEMPERATURE: 97.2 F | RESPIRATION RATE: 16 BRPM | WEIGHT: 138.8 LBS | SYSTOLIC BLOOD PRESSURE: 107 MMHG | HEIGHT: 66 IN | DIASTOLIC BLOOD PRESSURE: 67 MMHG

## 2024-02-01 DIAGNOSIS — I87.2 PERIPHERAL VENOUS INSUFFICIENCY: ICD-10-CM

## 2024-02-01 DIAGNOSIS — L88 PYODERMA GANGRENOSUM: ICD-10-CM

## 2024-02-01 DIAGNOSIS — L97.322 ULCER OF LEFT ANKLE, WITH FAT LAYER EXPOSED (HCC): Primary | ICD-10-CM

## 2024-02-01 DIAGNOSIS — L97.312 ULCER OF RIGHT ANKLE, WITH FAT LAYER EXPOSED (HCC): ICD-10-CM

## 2024-02-01 PROCEDURE — 99213 OFFICE O/P EST LOW 20 MIN: CPT

## 2024-02-01 RX ORDER — LIDOCAINE 40 MG/G
CREAM TOPICAL ONCE
OUTPATIENT
Start: 2024-02-01 | End: 2024-02-01

## 2024-02-01 RX ORDER — TRIAMCINOLONE ACETONIDE 1 MG/G
OINTMENT TOPICAL ONCE
OUTPATIENT
Start: 2024-02-01 | End: 2024-02-01

## 2024-02-01 RX ORDER — LIDOCAINE 50 MG/G
OINTMENT TOPICAL ONCE
OUTPATIENT
Start: 2024-02-01 | End: 2024-02-01

## 2024-02-01 RX ORDER — LIDOCAINE 40 MG/G
CREAM TOPICAL ONCE
Status: DISCONTINUED | OUTPATIENT
Start: 2024-02-01 | End: 2024-02-02 | Stop reason: HOSPADM

## 2024-02-01 RX ORDER — LIDOCAINE HYDROCHLORIDE 40 MG/ML
SOLUTION TOPICAL ONCE
OUTPATIENT
Start: 2024-02-01 | End: 2024-02-01

## 2024-02-01 RX ORDER — BACITRACIN ZINC AND POLYMYXIN B SULFATE 500; 1000 [USP'U]/G; [USP'U]/G
OINTMENT TOPICAL ONCE
OUTPATIENT
Start: 2024-02-01 | End: 2024-02-01

## 2024-02-01 NOTE — PLAN OF CARE
Follow up visit in [facility] today. Wounds fragilely healed.  To begin Pt's personal compression wrap to BLE, Lotrisone to rashy areas noted on LLE.  Pt to continue Sulfasalazine 1 tablet daily. F/u in [facility] in 1 week as ordered, pt. Aware to call sooner with any changes or questions/concerns. Discharge instructions reviewed with patient, all questions answered, copy given to patient. Dressings were applied to all wounds per M.D. Instructions at this visit

## 2024-02-02 NOTE — DISCHARGE INSTRUCTIONS
garments to control venous insufficiency, lymphedema and reduce occurrence of venous ulcers      Please note, all wounds (unless stated otherwise here) were mechanically debrided at the time of cleansing here in the wound-care center today, so a small amount of pain, drainage or bleeding from that process might be expected, and is normal.     All products for home use, including multiple products for a single wound if applicable, are medically necessary in order to achieve the best chance at timely wound healing. See provider documentation for details if needed.     Substituted dressings applied in the [facility] today, if applicable:      N/a      New orders for this week (labs, imaging, medications, etc.):  Sulfasalazine- please continue 1 tablet total daily   You may walk as much as you can tolerate but please elevate your legs when resting.   Continue elevating legs as much as you can while you are not walking   You may take Benadryl at night only for itching - please be aware this may cause dry mouth and some sleepiness/dizziness     Additional instructions for specific diagnoses:     2/8-Can leave compression stockings on all week, remove velcro compression garment nightly, place back on in am     Wound Culture 5/11/23     [place] #3 - 10/21/22  Oasis #4 - 10/28/22  Oreminea #5 - 11/4/22  [place]reminea #6 - 11/11/22   Oasis #7 -11-22-22  [place]reminea #8 12/1/22   [place]inea #9  12/9/22  [place]inea #10 12/21/22   Thera Skin #1 10/12/23        F/U Appointment  with Dr. Patterson in 1 week                                               at                                    .     Your nurse  is SUHAS Elkins     If we applied slip-resistant hospital socks today, be sure to remove them at least once a day to inspect your toes or feet, even if you're not changing the wraps or dressings underneath. If you see anything concerning (redness, excess moisture, etc), please call and let us know right away.     Should you experience any significant changes

## 2024-02-05 PROBLEM — L97.521 ULCER OF LEFT SECOND TOE, LIMITED TO BREAKDOWN OF SKIN (HCC): Status: RESOLVED | Noted: 2023-12-07 | Resolved: 2024-02-05

## 2024-02-05 PROBLEM — L92.9 HYPERGRANULATION: Status: RESOLVED | Noted: 2023-09-09 | Resolved: 2024-02-05

## 2024-02-08 ENCOUNTER — HOSPITAL ENCOUNTER (OUTPATIENT)
Dept: WOUND CARE | Age: 89
Discharge: HOME | End: 2024-02-08
Attending: INTERNAL MEDICINE
Payer: MEDICARE

## 2024-02-08 VITALS
WEIGHT: 143 LBS | SYSTOLIC BLOOD PRESSURE: 131 MMHG | HEIGHT: 66 IN | TEMPERATURE: 97.5 F | DIASTOLIC BLOOD PRESSURE: 97 MMHG | BODY MASS INDEX: 22.98 KG/M2 | RESPIRATION RATE: 16 BRPM | HEART RATE: 90 BPM

## 2024-02-08 DIAGNOSIS — L88 PYODERMA GANGRENOSUM: ICD-10-CM

## 2024-02-08 DIAGNOSIS — L97.312 ULCER OF RIGHT ANKLE, WITH FAT LAYER EXPOSED (HCC): ICD-10-CM

## 2024-02-08 DIAGNOSIS — L97.322 ULCER OF LEFT ANKLE, WITH FAT LAYER EXPOSED (HCC): Primary | ICD-10-CM

## 2024-02-08 DIAGNOSIS — I87.2 PERIPHERAL VENOUS INSUFFICIENCY: ICD-10-CM

## 2024-02-08 PROCEDURE — 99213 OFFICE O/P EST LOW 20 MIN: CPT

## 2024-02-08 RX ORDER — LIDOCAINE 40 MG/G
CREAM TOPICAL ONCE
OUTPATIENT
Start: 2024-02-08 | End: 2024-02-08

## 2024-02-08 RX ORDER — TRIAMCINOLONE ACETONIDE 1 MG/G
OINTMENT TOPICAL ONCE
Status: DISCONTINUED | OUTPATIENT
Start: 2024-02-08 | End: 2024-02-09 | Stop reason: HOSPADM

## 2024-02-08 RX ORDER — LIDOCAINE 40 MG/G
CREAM TOPICAL ONCE
Status: DISCONTINUED | OUTPATIENT
Start: 2024-02-08 | End: 2024-02-09 | Stop reason: HOSPADM

## 2024-02-08 RX ORDER — LIDOCAINE 50 MG/G
OINTMENT TOPICAL ONCE
Status: DISCONTINUED | OUTPATIENT
Start: 2024-02-08 | End: 2024-02-09 | Stop reason: HOSPADM

## 2024-02-08 RX ORDER — TRIAMCINOLONE ACETONIDE 1 MG/G
OINTMENT TOPICAL ONCE
OUTPATIENT
Start: 2024-02-08 | End: 2024-02-08

## 2024-02-08 RX ORDER — LIDOCAINE HYDROCHLORIDE 40 MG/ML
SOLUTION TOPICAL ONCE
Status: DISCONTINUED | OUTPATIENT
Start: 2024-02-08 | End: 2024-02-09 | Stop reason: HOSPADM

## 2024-02-08 RX ORDER — LIDOCAINE 50 MG/G
OINTMENT TOPICAL ONCE
OUTPATIENT
Start: 2024-02-08 | End: 2024-02-08

## 2024-02-08 RX ORDER — BACITRACIN ZINC AND POLYMYXIN B SULFATE 500; 1000 [USP'U]/G; [USP'U]/G
OINTMENT TOPICAL ONCE
Status: DISCONTINUED | OUTPATIENT
Start: 2024-02-08 | End: 2024-02-09 | Stop reason: HOSPADM

## 2024-02-08 RX ORDER — LIDOCAINE HYDROCHLORIDE 40 MG/ML
SOLUTION TOPICAL ONCE
OUTPATIENT
Start: 2024-02-08 | End: 2024-02-08

## 2024-02-08 RX ORDER — BACITRACIN ZINC AND POLYMYXIN B SULFATE 500; 1000 [USP'U]/G; [USP'U]/G
OINTMENT TOPICAL ONCE
OUTPATIENT
Start: 2024-02-08 | End: 2024-02-08

## 2024-02-08 NOTE — FLOWSHEET NOTE
Left Lower Leg Wounds (distal and proximal medial ankle):   Aquaphor to dry areas  Lotrisone to any rashy areas on leg and foot   Nylon stocking then place Pt's personal compression garment stocking and velcro wrap    Leave in place all week. Dr. Patterson does not want you to remove this side throughout the week.      Right Lower Leg:  Aquaphor to dry areas  Healed- Pt's personal compression garment (leave compression sock on all week, remove velcro compression garment nightly, place back on in am)    Right 2nd toe:  Polysporin  Xeroform  Double layer tubular gauze  Taped to foot

## 2024-02-08 NOTE — PLAN OF CARE
Pt to the [facility] for wound assessment. Wounds healed. Pt to continue with documented plan of care and to follow up in the [facility] in 1 week. Pt. aware to call sooner with any problems or questions/concerns. MD orders/D/C instructions reviewed

## 2024-02-09 NOTE — DISCHARGE INSTRUCTIONS
in your wound(s) (including redness, increased warmth, increased pain, increased drainage, odor, or fever) or have questions about your wound care, please contact the [facility] at [phone] Monday-Thursday from 8:00 am - 4:30 pm, or Friday from 8:00 am - 2:30 pm.  If you need help with your wound outside these hours and cannot wait until we are again available, contact your home-care company (if applicable), your PCP, or go to the nearest emergency room

## 2024-02-12 PROBLEM — L97.511 ULCER OF RIGHT SECOND TOE, LIMITED TO BREAKDOWN OF SKIN (HCC): Status: ACTIVE | Noted: 2024-02-12

## 2024-02-15 ENCOUNTER — HOSPITAL ENCOUNTER (OUTPATIENT)
Dept: WOUND CARE | Age: 89
Discharge: HOME | End: 2024-02-15
Attending: INTERNAL MEDICINE
Payer: MEDICARE

## 2024-02-15 VITALS
WEIGHT: 142.8 LBS | RESPIRATION RATE: 18 BRPM | BODY MASS INDEX: 22.95 KG/M2 | SYSTOLIC BLOOD PRESSURE: 102 MMHG | HEIGHT: 66 IN | TEMPERATURE: 97.8 F | DIASTOLIC BLOOD PRESSURE: 65 MMHG | HEART RATE: 79 BPM

## 2024-02-15 DIAGNOSIS — L97.322 ULCER OF LEFT ANKLE, WITH FAT LAYER EXPOSED (HCC): Primary | ICD-10-CM

## 2024-02-15 DIAGNOSIS — L97.312 ULCER OF RIGHT ANKLE, WITH FAT LAYER EXPOSED (HCC): ICD-10-CM

## 2024-02-15 DIAGNOSIS — I87.2 PERIPHERAL VENOUS INSUFFICIENCY: ICD-10-CM

## 2024-02-15 DIAGNOSIS — L88 PYODERMA GANGRENOSUM: ICD-10-CM

## 2024-02-15 PROCEDURE — 99213 OFFICE O/P EST LOW 20 MIN: CPT

## 2024-02-15 RX ORDER — LIDOCAINE HYDROCHLORIDE 40 MG/ML
SOLUTION TOPICAL ONCE
Status: DISCONTINUED | OUTPATIENT
Start: 2024-02-15 | End: 2024-02-16 | Stop reason: HOSPADM

## 2024-02-15 RX ORDER — LIDOCAINE 40 MG/G
CREAM TOPICAL ONCE
OUTPATIENT
Start: 2024-02-15 | End: 2024-02-15

## 2024-02-15 RX ORDER — BACITRACIN ZINC AND POLYMYXIN B SULFATE 500; 1000 [USP'U]/G; [USP'U]/G
OINTMENT TOPICAL ONCE
OUTPATIENT
Start: 2024-02-15 | End: 2024-02-15

## 2024-02-15 RX ORDER — TRIAMCINOLONE ACETONIDE 1 MG/G
OINTMENT TOPICAL ONCE
Status: DISCONTINUED | OUTPATIENT
Start: 2024-02-15 | End: 2024-02-16 | Stop reason: HOSPADM

## 2024-02-15 RX ORDER — LIDOCAINE 40 MG/G
CREAM TOPICAL ONCE
Status: DISCONTINUED | OUTPATIENT
Start: 2024-02-15 | End: 2024-02-16 | Stop reason: HOSPADM

## 2024-02-15 RX ORDER — BACITRACIN ZINC AND POLYMYXIN B SULFATE 500; 1000 [USP'U]/G; [USP'U]/G
OINTMENT TOPICAL ONCE
Status: DISCONTINUED | OUTPATIENT
Start: 2024-02-15 | End: 2024-02-16 | Stop reason: HOSPADM

## 2024-02-15 RX ORDER — LIDOCAINE 50 MG/G
OINTMENT TOPICAL ONCE
Status: DISCONTINUED | OUTPATIENT
Start: 2024-02-15 | End: 2024-02-16 | Stop reason: HOSPADM

## 2024-02-15 RX ORDER — TRIAMCINOLONE ACETONIDE 1 MG/G
OINTMENT TOPICAL ONCE
OUTPATIENT
Start: 2024-02-15 | End: 2024-02-15

## 2024-02-15 RX ORDER — LIDOCAINE 50 MG/G
OINTMENT TOPICAL ONCE
OUTPATIENT
Start: 2024-02-15 | End: 2024-02-15

## 2024-02-15 RX ORDER — LIDOCAINE HYDROCHLORIDE 40 MG/ML
SOLUTION TOPICAL ONCE
OUTPATIENT
Start: 2024-02-15 | End: 2024-02-15

## 2024-02-15 NOTE — PLAN OF CARE
Pt to the [facility] for wound assessment. Right 2nd toe wrapped per Dr. Patterson. Increased edema noted. Pt to stop sulfasalazine, and to continue colchicine. Pt to continue with documented plan of care and to follow up in the [facility] in 1 week. Pt. aware to call sooner with any problems or questions/concerns. MD orders and D/C instructions reviewed, pt verbalized understanding.

## 2024-02-16 NOTE — DISCHARGE INSTRUCTIONS
Wound Care Center Physician Orders and Discharge Instructions  [facility]  [address], [address]  [place] [id number]  Telephone: [phone]      Fax: [phone]        Your home care company:   N/A     Your wound-care supplies will be provided by:  weekly wraps     NAME:  Anne Stokes   YOB: 1933  PRIMARY DIAGNOSIS FOR WOUND CARE CENTER:  Pyoderma gangrenosum     Wound cleansing:  Do not scrub or use excessive force.  Wash hands with soap and water before and after dressing changes.  Prior to applying a clean dressing, cleanse wound with normal saline, wound cleanser, or mild soap and water. Ask your physician or nurse before getting the wound(s) wet in the shower.                Wound care for home:     Left Lower Leg Wounds (distal and proximal medial ankle):   Aquaphor to dry areas  Nylon stocking then place Pt's personal compression garment stocking (leave stocking on all week) and velcro wrap (can be removed nightly, back on in am)       Right Lower Leg:  Aquaphor to dry areas  Pt's personal compression garment (leave compression sock on all week, remove velcro compression garment nightly, place back on in am)         Your physician has ordered Compression for treatment of venous ulcers, venous insufficiency,and/or Lymphedema.   * Do not remove until your next scheduled visit in [facility]- do not get wet.    * If your wrap falls down, becomes painful or you have decreased sensation, and/or excessive drainage- please call the [facility] for RN visit to get your compression wrap changed   * You need to exercice as tolerated- walking is good   * Elevate your legs preferrably above level of your heart when sitting as much as possible   * The Goal of this therapy is to reduce Edema and get into long term compression garments to control venous insufficiency, lymphedema and reduce occurrence of venous ulcers      Please note, all wounds (unless stated otherwise here) were

## 2024-02-22 ENCOUNTER — HOSPITAL ENCOUNTER (OUTPATIENT)
Dept: WOUND CARE | Age: 89
Discharge: HOME | End: 2024-02-22
Attending: INTERNAL MEDICINE
Payer: MEDICARE

## 2024-02-22 VITALS
BODY MASS INDEX: 23.36 KG/M2 | HEIGHT: 65 IN | RESPIRATION RATE: 18 BRPM | WEIGHT: 140.2 LBS | SYSTOLIC BLOOD PRESSURE: 113 MMHG | HEART RATE: 57 BPM | DIASTOLIC BLOOD PRESSURE: 78 MMHG | TEMPERATURE: 97.8 F

## 2024-02-22 DIAGNOSIS — I87.2 PERIPHERAL VENOUS INSUFFICIENCY: ICD-10-CM

## 2024-02-22 DIAGNOSIS — L97.312 ULCER OF RIGHT ANKLE, WITH FAT LAYER EXPOSED (HCC): ICD-10-CM

## 2024-02-22 DIAGNOSIS — L97.322 ULCER OF LEFT ANKLE, WITH FAT LAYER EXPOSED (HCC): Primary | ICD-10-CM

## 2024-02-22 DIAGNOSIS — L88 PYODERMA GANGRENOSUM: ICD-10-CM

## 2024-02-22 PROCEDURE — 99213 OFFICE O/P EST LOW 20 MIN: CPT

## 2024-02-22 RX ORDER — LIDOCAINE 50 MG/G
OINTMENT TOPICAL ONCE
Status: CANCELLED | OUTPATIENT
Start: 2024-02-22 | End: 2024-02-22

## 2024-02-22 RX ORDER — BACITRACIN ZINC AND POLYMYXIN B SULFATE 500; 1000 [USP'U]/G; [USP'U]/G
OINTMENT TOPICAL ONCE
Status: DISCONTINUED | OUTPATIENT
Start: 2024-02-22 | End: 2024-02-22

## 2024-02-22 RX ORDER — LIDOCAINE HYDROCHLORIDE 40 MG/ML
SOLUTION TOPICAL ONCE
Status: DISCONTINUED | OUTPATIENT
Start: 2024-02-22 | End: 2024-02-22

## 2024-02-22 RX ORDER — LIDOCAINE 40 MG/G
CREAM TOPICAL ONCE
Status: DISCONTINUED | OUTPATIENT
Start: 2024-02-22 | End: 2024-02-22

## 2024-02-22 RX ORDER — BACITRACIN ZINC AND POLYMYXIN B SULFATE 500; 1000 [USP'U]/G; [USP'U]/G
OINTMENT TOPICAL ONCE
Status: CANCELLED | OUTPATIENT
Start: 2024-02-22 | End: 2024-02-22

## 2024-02-22 RX ORDER — LIDOCAINE HYDROCHLORIDE 40 MG/ML
SOLUTION TOPICAL ONCE
Status: CANCELLED | OUTPATIENT
Start: 2024-02-22 | End: 2024-02-22

## 2024-02-22 RX ORDER — LIDOCAINE 40 MG/G
CREAM TOPICAL ONCE
Status: CANCELLED | OUTPATIENT
Start: 2024-02-22 | End: 2024-02-22

## 2024-02-22 RX ORDER — TRIAMCINOLONE ACETONIDE 1 MG/G
OINTMENT TOPICAL ONCE
Status: DISCONTINUED | OUTPATIENT
Start: 2024-02-22 | End: 2024-02-22

## 2024-02-22 RX ORDER — TRIAMCINOLONE ACETONIDE 1 MG/G
OINTMENT TOPICAL ONCE
Status: CANCELLED | OUTPATIENT
Start: 2024-02-22 | End: 2024-02-22

## 2024-02-22 RX ORDER — LIDOCAINE 50 MG/G
OINTMENT TOPICAL ONCE
Status: DISCONTINUED | OUTPATIENT
Start: 2024-02-22 | End: 2024-02-22

## 2024-02-22 NOTE — PLAN OF CARE
Pt to the [facility] for wound assessment. Wound healed. Pt to continue with documented plan of care and to follow up in the [facility] as needed. Son at bedside. Pt. aware to call sooner with any problems or questions/concerns. MD orders and D/C instructions reviewed, pt verbalized understanding.

## 2024-02-27 ENCOUNTER — TELEPHONE (OUTPATIENT)
Dept: WOUND CARE | Age: 89
End: 2024-02-27

## 2024-02-27 NOTE — TELEPHONE ENCOUNTER
Tried to call Mrs. Stokes this AM, at both numbers we have listed, both with and without a \"1\" before the area code, but neither was a valid number.    If she was doing well, with no active ulcers on the lower extremities, my plan was to have her drop down to once daily on the colchicine in a month, and then stop it altogether in two months, and then I was also going to refer her back to Dr. Marsh for consideration of follow-up venous imaging, and perhaps ablations if indicated. If anything HAD opened up, I can certainly see her later this week.    I had told her that I was going to call today, so hopefully when she hasn't heard from me, she'll call us, I can see how she's doing, and then we can update her phone number in our records.     Electronically signed by Robbie Patterson MD on 2/27/2024 at 10:52 AM

## 2024-03-14 ENCOUNTER — HOSPITAL ENCOUNTER (OUTPATIENT)
Dept: WOUND CARE | Age: 89
Discharge: HOME | End: 2024-03-14
Attending: INTERNAL MEDICINE
Payer: MEDICARE

## 2024-03-14 VITALS
BODY MASS INDEX: 23.89 KG/M2 | WEIGHT: 143.4 LBS | TEMPERATURE: 96.9 F | HEART RATE: 71 BPM | HEIGHT: 65 IN | DIASTOLIC BLOOD PRESSURE: 72 MMHG | RESPIRATION RATE: 18 BRPM | SYSTOLIC BLOOD PRESSURE: 103 MMHG

## 2024-03-14 DIAGNOSIS — L97.921 TRAUMATIC ULCER OF LEFT LOWER LEG, LIMITED TO BREAKDOWN OF SKIN (HCC): ICD-10-CM

## 2024-03-14 DIAGNOSIS — I87.2 LYMPHEDEMA DUE TO VENOUS INSUFFICIENCY: Primary | ICD-10-CM

## 2024-03-14 DIAGNOSIS — I89.0 LYMPHEDEMA DUE TO VENOUS INSUFFICIENCY: Primary | ICD-10-CM

## 2024-03-14 PROCEDURE — 29581 APPL MULTLAYER CMPRN SYS LEG: CPT

## 2024-03-14 RX ORDER — BACITRACIN ZINC AND POLYMYXIN B SULFATE 500; 1000 [USP'U]/G; [USP'U]/G
OINTMENT TOPICAL ONCE
OUTPATIENT
Start: 2024-03-14 | End: 2024-03-14

## 2024-03-14 RX ORDER — TRIAMCINOLONE ACETONIDE 1 MG/G
OINTMENT TOPICAL ONCE
Status: DISCONTINUED | OUTPATIENT
Start: 2024-03-14 | End: 2024-03-15 | Stop reason: HOSPADM

## 2024-03-14 RX ORDER — LIDOCAINE HYDROCHLORIDE 40 MG/ML
SOLUTION TOPICAL ONCE
OUTPATIENT
Start: 2024-03-14 | End: 2024-03-14

## 2024-03-14 RX ORDER — LIDOCAINE 50 MG/G
OINTMENT TOPICAL ONCE
OUTPATIENT
Start: 2024-03-14 | End: 2024-03-14

## 2024-03-14 RX ORDER — LIDOCAINE 40 MG/G
CREAM TOPICAL ONCE
OUTPATIENT
Start: 2024-03-14 | End: 2024-03-14

## 2024-03-14 RX ORDER — TRIAMCINOLONE ACETONIDE 1 MG/G
OINTMENT TOPICAL ONCE
OUTPATIENT
Start: 2024-03-14 | End: 2024-03-14

## 2024-03-14 RX ORDER — LIDOCAINE HYDROCHLORIDE 40 MG/ML
SOLUTION TOPICAL ONCE
Status: DISCONTINUED | OUTPATIENT
Start: 2024-03-14 | End: 2024-03-15 | Stop reason: HOSPADM

## 2024-03-14 RX ORDER — LIDOCAINE 50 MG/G
OINTMENT TOPICAL ONCE
Status: DISCONTINUED | OUTPATIENT
Start: 2024-03-14 | End: 2024-03-15 | Stop reason: HOSPADM

## 2024-03-14 RX ORDER — LIDOCAINE 40 MG/G
CREAM TOPICAL ONCE
Status: DISCONTINUED | OUTPATIENT
Start: 2024-03-14 | End: 2024-03-15 | Stop reason: HOSPADM

## 2024-03-14 RX ORDER — BACITRACIN ZINC AND POLYMYXIN B SULFATE 500; 1000 [USP'U]/G; [USP'U]/G
OINTMENT TOPICAL ONCE
Status: DISCONTINUED | OUTPATIENT
Start: 2024-03-14 | End: 2024-03-15 | Stop reason: HOSPADM

## 2024-03-14 NOTE — PLAN OF CARE
Pt to the [facility] for return wound assessment post fall. LLE pretib wound noted.  Pt to continue with documented plan of care and to follow up in the [facility] in 1 week. Pt. aware to call sooner with any problems or questions/concerns. MD orders and D/C instructions reviewed, pt verbalized understanding.     Left Lower Leg Wounds:   Nexodyn  Calmoseptine mauro-wound  Sorbact  ABD  To marked medial lower leg area-clobetasol, xeroform  Build up ankles with specialist cast padding  Foam to anterior ankle  Accuwrite lite (lightly wrapped)  Aquaphor to dry areas  Nylon stocking then place Pt's personal compression garment stocking (leave stocking on all week) and velcro wrap (can be removed nightly, back on in am)

## 2024-03-14 NOTE — DISCHARGE INSTRUCTIONS
Wound Care Center Physician Orders and Discharge Instructions  [facility]  [address], [address]  [place] [id number]  Telephone: [phone]      Fax: [phone]        Your home care company:   N/A     Your wound-care supplies will be provided by:  weekly wraps     NAME:  Anne Stokes   YOB: 1933  PRIMARY DIAGNOSIS FOR WOUND CARE CENTER:  Pyoderma gangrenosum/trauma     Wound cleansing:  Do not scrub or use excessive force.  Wash hands with soap and water before and after dressing changes.  Prior to applying a clean dressing, cleanse wound with normal saline, wound cleanser, or mild soap and water. Ask your physician or nurse before getting the wound(s) wet in the shower.                Wound care for home:     Left Lower Leg Wounds:   Nexodyn  Calmoseptine mauro-wound  Sorbact  ABD  To marked medial lower leg area-clobetasol, xeroform  Build up ankles with specialist cast padding  Foam to anterior ankle  Accuwrite lite (lightly wrapped)  Aquaphor to dry areas       Right Lower Leg:  Aquaphor to dry areas  Pt's personal compression garment (leave compression sock on all week, remove velcro compression garment nightly, place back on in am)        Your physician has ordered Compression for treatment of venous ulcers, venous insufficiency,and/or Lymphedema.   * Do not remove until your next scheduled visit in [facility]- do not get wet.    * If your wrap falls down, becomes painful or you have decreased sensation, and/or excessive drainage- please call the [facility] for RN visit to get your compression wrap changed   * You need to exercice as tolerated- walking is good   * Elevate your legs preferrably above level of your heart when sitting as much as possible   * The Goal of this therapy is to reduce Edema and get into long term compression garments to control venous insufficiency, lymphedema and reduce occurrence of venous ulcers      Please note, all wounds (unless stated

## 2024-03-18 NOTE — DISCHARGE INSTRUCTIONS
Wound Care Center Physician Orders and Discharge Instructions  [facility]  [address], [address]  [place] [address]  Telephone: [phone]      Fax: [phone]        Your home care company:   N/A     Your wound-care supplies will be provided by:  weekly wraps     NAME:  Anne Stokes   YOB: 1933  PRIMARY DIAGNOSIS FOR WOUND CARE CENTER:  Pyoderma gangrenosum/trauma     Wound cleansing:  Do not scrub or use excessive force.  Wash hands with soap and water before and after dressing changes.  Prior to applying a clean dressing, cleanse wound with normal saline, wound cleanser, or mild soap and water. Ask your physician or nurse before getting the wound(s) wet in the shower.                Wound care for home:     Left Upper pre-tib Leg Wound:   Nexodyn  Jumpstart   Hydrogel 4x4      medial lower leg area-  clobetasol, xeroform  Build up ankles with specialist cast padding  Foam to anterior ankle  Accuwrite lite (lightly wrapped)  Aquaphor to dry areas        Right Lower Leg:  Aquaphor to dry areas  Pt's personal compression garment (leave compression sock on all week, remove velcro compression garment nightly, place back on in am)        Your physician has ordered Compression for treatment of venous ulcers, venous insufficiency,and/or Lymphedema.   * Do not remove until your next scheduled visit in [facility]- do not get wet.    * If your wrap falls down, becomes painful or you have decreased sensation, and/or excessive drainage- please call the [facility] for RN visit to get your compression wrap changed   * You need to exercice as tolerated- walking is good   * Elevate your legs preferrably above level of your heart when sitting as much as possible   * The Goal of this therapy is to reduce Edema and get into long term compression garments to control venous insufficiency, lymphedema and reduce occurrence of venous ulcers      Please note, all wounds (unless stated otherwise here)

## 2024-03-21 ENCOUNTER — APPOINTMENT (OUTPATIENT)
Dept: WOUND CARE | Age: 89
End: 2024-03-21
Payer: MEDICARE

## 2024-03-22 ENCOUNTER — HOSPITAL ENCOUNTER (OUTPATIENT)
Dept: WOUND CARE | Age: 89
Discharge: HOME | End: 2024-03-22
Attending: INTERNAL MEDICINE
Payer: MEDICARE

## 2024-03-22 VITALS
HEIGHT: 65 IN | RESPIRATION RATE: 18 BRPM | TEMPERATURE: 98 F | HEART RATE: 67 BPM | WEIGHT: 142.2 LBS | SYSTOLIC BLOOD PRESSURE: 112 MMHG | DIASTOLIC BLOOD PRESSURE: 75 MMHG | BODY MASS INDEX: 23.69 KG/M2

## 2024-03-22 DIAGNOSIS — L97.921 TRAUMATIC ULCER OF LEFT LOWER LEG, LIMITED TO BREAKDOWN OF SKIN (HCC): ICD-10-CM

## 2024-03-22 DIAGNOSIS — I89.0 LYMPHEDEMA DUE TO VENOUS INSUFFICIENCY: Primary | ICD-10-CM

## 2024-03-22 DIAGNOSIS — I87.2 LYMPHEDEMA DUE TO VENOUS INSUFFICIENCY: Primary | ICD-10-CM

## 2024-03-22 PROCEDURE — 29581 APPL MULTLAYER CMPRN SYS LEG: CPT

## 2024-03-22 PROCEDURE — 17250 CHEM CAUT OF GRANLTJ TISSUE: CPT

## 2024-03-22 RX ORDER — LIDOCAINE 40 MG/G
CREAM TOPICAL ONCE
OUTPATIENT
Start: 2024-03-22 | End: 2024-03-22

## 2024-03-22 RX ORDER — LIDOCAINE 50 MG/G
OINTMENT TOPICAL ONCE
OUTPATIENT
Start: 2024-03-22 | End: 2024-03-22

## 2024-03-22 RX ORDER — LIDOCAINE 50 MG/G
OINTMENT TOPICAL ONCE
Status: DISCONTINUED | OUTPATIENT
Start: 2024-03-22 | End: 2024-03-23 | Stop reason: HOSPADM

## 2024-03-22 RX ORDER — LIDOCAINE HYDROCHLORIDE 40 MG/ML
SOLUTION TOPICAL ONCE
OUTPATIENT
Start: 2024-03-22 | End: 2024-03-22

## 2024-03-22 RX ORDER — TRIAMCINOLONE ACETONIDE 1 MG/G
OINTMENT TOPICAL ONCE
OUTPATIENT
Start: 2024-03-22 | End: 2024-03-22

## 2024-03-22 RX ORDER — BACITRACIN ZINC AND POLYMYXIN B SULFATE 500; 1000 [USP'U]/G; [USP'U]/G
OINTMENT TOPICAL ONCE
OUTPATIENT
Start: 2024-03-22 | End: 2024-03-22

## 2024-03-22 ASSESSMENT — PAIN SCALES - GENERAL: PAINLEVEL_OUTOF10: 0

## 2024-03-22 NOTE — PLAN OF CARE
Pt seen in [facility] -slow improvement  to wounds - cautery per Dr. Patterson - treatment as follows    Left Upper pre-tib Leg Wound:   Nexodyn  Jumpstart   Hydrogel 4x4        medial lower leg area-  clobetasol, xeroform  Build up ankles with specialist cast padding  Foam to anterior ankle  Accuwrite lite (lightly wrapped)  Aquaphor to dry areas        Right Lower Leg:  Aquaphor to dry areas  Pt's personal compression garment (leave compression sock on all week, remove velcro compression garment nightly, place back on in am)    Reviewed AVS - f/u  in 1 week

## 2024-03-22 NOTE — FLOWSHEET NOTE
medial lower leg area-  clobetasol, xeroform  Build up ankles with specialist cast padding  Foam to anterior ankle  Accuwrite lite (lightly wrapped)  Aquaphor to dry areas

## 2024-03-25 NOTE — DISCHARGE INSTRUCTIONS
note, all wounds (unless stated otherwise here) were mechanically debrided at the time of cleansing here in the wound-care center today, so a small amount of pain, drainage or bleeding from that process might be expected, and is normal.     All products for home use, including multiple products for a single wound if applicable, are medically necessary in order to achieve the best chance at timely wound healing. See provider documentation for details if needed.     VENOUS OR LYMPHEDEMA LEG ULCERS:  --          It's important to continue to elevate your legs a few times per day.  --          Exercise is great for venous disease and lymphedema -- walking, cycling, swimming, etc; there are leg exercises that can be done while sitting too.  --          Your current compression therapy is: compression stocking/velcro garment. We normally advise people to apply their compression garments first thing in the morning, and take them off at bedtime. These will normally be need to replaced every 6 months or so -- for replacements, we recommend you contact PCP.  ______________________________________________________________________     We strive for improvement. A survey will arrive in the mail from \"[facility]\". We appreciate your comments.     Substituted dressings applied in the [facility] today, if applicable:      N/a      New orders for this week (labs, imaging, medications, etc.):  You may walk as much as you can tolerate but please elevate your legs when resting.   Continue elevating legs as much as you can while you are not walking   You may take Benadryl at night only for itching - please be aware this may cause dry mouth and some sleepiness/dizziness     Additional instructions for specific diagnoses:        [place] #3 - 10/21/22  [place] #4 - 10/28/22  [place] #5 - 11/4/22  [place] #6 - 11/11/22   [place] #7 -11-22-22  [place] #8 12/1/22   [place] #9  12/9/22  [place] #10 12/21/22   [facility] #1 10/12/23        F/U Appointment

## 2024-03-27 PROBLEM — L97.511 ULCER OF RIGHT SECOND TOE, LIMITED TO BREAKDOWN OF SKIN (HCC): Status: RESOLVED | Noted: 2024-02-12 | Resolved: 2024-03-27

## 2024-03-27 PROBLEM — I83.023 VENOUS STASIS ULCER OF LEFT ANKLE WITH FAT LAYER EXPOSED WITH VARICOSE VEINS (HCC): Status: RESOLVED | Noted: 2022-10-03 | Resolved: 2024-03-27

## 2024-03-27 PROBLEM — L97.322 VENOUS STASIS ULCER OF LEFT ANKLE WITH FAT LAYER EXPOSED WITH VARICOSE VEINS (HCC): Status: RESOLVED | Noted: 2022-10-03 | Resolved: 2024-03-27

## 2024-03-28 ENCOUNTER — APPOINTMENT (OUTPATIENT)
Dept: WOUND CARE | Age: 89
End: 2024-03-28
Payer: MEDICARE

## 2024-03-29 ENCOUNTER — HOSPITAL ENCOUNTER (OUTPATIENT)
Dept: WOUND CARE | Age: 89
Discharge: HOME | End: 2024-03-29
Attending: INTERNAL MEDICINE
Payer: MEDICARE

## 2024-03-29 VITALS
BODY MASS INDEX: 23.93 KG/M2 | HEIGHT: 65 IN | RESPIRATION RATE: 18 BRPM | HEART RATE: 79 BPM | DIASTOLIC BLOOD PRESSURE: 81 MMHG | WEIGHT: 143.6 LBS | TEMPERATURE: 96.8 F | SYSTOLIC BLOOD PRESSURE: 128 MMHG

## 2024-03-29 DIAGNOSIS — I87.2 LYMPHEDEMA DUE TO VENOUS INSUFFICIENCY: Primary | ICD-10-CM

## 2024-03-29 DIAGNOSIS — L97.921 TRAUMATIC ULCER OF LEFT LOWER LEG, LIMITED TO BREAKDOWN OF SKIN (HCC): ICD-10-CM

## 2024-03-29 DIAGNOSIS — I89.0 LYMPHEDEMA DUE TO VENOUS INSUFFICIENCY: Primary | ICD-10-CM

## 2024-03-29 PROCEDURE — 29581 APPL MULTLAYER CMPRN SYS LEG: CPT

## 2024-03-29 RX ORDER — LIDOCAINE 50 MG/G
OINTMENT TOPICAL ONCE
OUTPATIENT
Start: 2024-03-29 | End: 2024-03-29

## 2024-03-29 RX ORDER — LIDOCAINE HYDROCHLORIDE 40 MG/ML
SOLUTION TOPICAL ONCE
OUTPATIENT
Start: 2024-03-29 | End: 2024-03-29

## 2024-03-29 RX ORDER — LIDOCAINE 40 MG/G
CREAM TOPICAL ONCE
Status: DISCONTINUED | OUTPATIENT
Start: 2024-03-29 | End: 2024-03-30 | Stop reason: HOSPADM

## 2024-03-29 RX ORDER — LIDOCAINE 40 MG/G
CREAM TOPICAL ONCE
OUTPATIENT
Start: 2024-03-29 | End: 2024-03-29

## 2024-03-29 RX ORDER — BACITRACIN ZINC AND POLYMYXIN B SULFATE 500; 1000 [USP'U]/G; [USP'U]/G
OINTMENT TOPICAL ONCE
OUTPATIENT
Start: 2024-03-29 | End: 2024-03-29

## 2024-03-29 RX ORDER — TRIAMCINOLONE ACETONIDE 1 MG/G
OINTMENT TOPICAL ONCE
OUTPATIENT
Start: 2024-03-29 | End: 2024-03-29

## 2024-03-29 ASSESSMENT — PAIN DESCRIPTION - FREQUENCY: FREQUENCY: CONTINUOUS

## 2024-03-29 ASSESSMENT — PAIN - FUNCTIONAL ASSESSMENT: PAIN_FUNCTIONAL_ASSESSMENT: ACTIVITIES ARE NOT PREVENTED

## 2024-03-29 ASSESSMENT — PAIN SCALES - GENERAL: PAINLEVEL_OUTOF10: 0

## 2024-03-29 NOTE — PLAN OF CARE
Patient seen in [facility] today for follow up. Patient reports feeling well overall. Left medial leg wound inflamed this week. Pateitncontinues on colchicine qd. Dr. Patterson increased to twice daily. Possible sulfasalizine next week.

## 2024-04-01 NOTE — DISCHARGE INSTRUCTIONS
12/1/22   [place] #9  12/9/22  [place] #10 12/21/22   [facility] #1 10/12/23        F/U Appointment  with Dr. Patterson  in 1 week on                                   @                                         .     Your nurse  is SUHAS Carrera     If we applied slip-resistant hospital socks today, be sure to remove them at least once a day to inspect your toes or feet, even if you're not changing the wraps or dressings underneath. If you see anything concerning (redness, excess moisture, etc), please call and let us know right away.     Should you experience any significant changes in your wound(s) (including redness, increased warmth, increased pain, increased drainage, odor, or fever) or have questions about your wound care, please contact the [facility] at [phone] Monday-Thursday from 8:00 am - 4:30 pm, or Friday from 8:00 am - 2:30 pm.  If you need help with your wound outside these hours and cannot wait until we are again available, contact your home-care company (if applicable), your PCP, or go to the nearest emergency room

## 2024-04-05 ENCOUNTER — HOSPITAL ENCOUNTER (OUTPATIENT)
Dept: WOUND CARE | Age: 89
Discharge: HOME | End: 2024-04-05
Attending: INTERNAL MEDICINE
Payer: MEDICARE

## 2024-04-05 VITALS
DIASTOLIC BLOOD PRESSURE: 85 MMHG | WEIGHT: 143.8 LBS | HEIGHT: 65 IN | SYSTOLIC BLOOD PRESSURE: 131 MMHG | HEART RATE: 102 BPM | RESPIRATION RATE: 18 BRPM | TEMPERATURE: 97.5 F | BODY MASS INDEX: 23.96 KG/M2

## 2024-04-05 DIAGNOSIS — I87.2 LYMPHEDEMA DUE TO VENOUS INSUFFICIENCY: Primary | ICD-10-CM

## 2024-04-05 DIAGNOSIS — I89.0 LYMPHEDEMA DUE TO VENOUS INSUFFICIENCY: Primary | ICD-10-CM

## 2024-04-05 DIAGNOSIS — L97.921 TRAUMATIC ULCER OF LEFT LOWER LEG, LIMITED TO BREAKDOWN OF SKIN (HCC): ICD-10-CM

## 2024-04-05 PROCEDURE — 29581 APPL MULTLAYER CMPRN SYS LEG: CPT

## 2024-04-05 RX ORDER — LIDOCAINE 50 MG/G
OINTMENT TOPICAL ONCE
OUTPATIENT
Start: 2024-04-05 | End: 2024-04-05

## 2024-04-05 RX ORDER — LIDOCAINE HYDROCHLORIDE 40 MG/ML
SOLUTION TOPICAL ONCE
OUTPATIENT
Start: 2024-04-05 | End: 2024-04-05

## 2024-04-05 RX ORDER — LIDOCAINE 40 MG/G
CREAM TOPICAL ONCE
OUTPATIENT
Start: 2024-04-05 | End: 2024-04-05

## 2024-04-05 RX ORDER — LIDOCAINE 40 MG/G
CREAM TOPICAL ONCE
Status: DISCONTINUED | OUTPATIENT
Start: 2024-04-05 | End: 2024-04-06 | Stop reason: HOSPADM

## 2024-04-05 RX ORDER — BACITRACIN ZINC AND POLYMYXIN B SULFATE 500; 1000 [USP'U]/G; [USP'U]/G
OINTMENT TOPICAL ONCE
OUTPATIENT
Start: 2024-04-05 | End: 2024-04-05

## 2024-04-05 RX ORDER — TRIAMCINOLONE ACETONIDE 1 MG/G
OINTMENT TOPICAL ONCE
OUTPATIENT
Start: 2024-04-05 | End: 2024-04-05

## 2024-04-05 ASSESSMENT — PAIN SCALES - GENERAL: PAINLEVEL_OUTOF10: 0

## 2024-04-05 NOTE — PLAN OF CARE
Patient seen in [facility] today for follow up. Patient reports feeling well overall. Patient reports pushing her wrap down a little yesterday due to it being tight around a bruised area at the top of her leg. This caused some friction on left upper pretib wound making wound a bit worse this week.  Left medial lower leg wound showing some signs of improvement. No changes to care plan this week. F/u in [facility] in 1 week as ordered, pt. Aware to call sooner with any changes or questions/concerns. Discharge instructions reviewed with patient, all questions answered, copy given to patient. Dressings were applied to all wounds per M.D. Instructions at this visit.

## 2024-04-08 NOTE — DISCHARGE INSTRUCTIONS
Wound Care Center Physician Orders and Discharge Instructions  [facility]  [address], [address]  [place] [address]  Telephone: [phone]      Fax: [phone]        Your home care company:   N/A     Your wound-care supplies will be provided by:  weekly wraps     NAME:  Anne Stokes   YOB: 1933  PRIMARY DIAGNOSIS FOR WOUND CARE CENTER:  Pyoderma gangrenosum/trauma     Wound cleansing:  Do not scrub or use excessive force.  Wash hands with soap and water before and after dressing changes.  Prior to applying a clean dressing, cleanse wound with normal saline, wound cleanser, or mild soap and water. Ask your physician or nurse before getting the wound(s) wet in the shower.                Wound care for home:     Left Upper pre-tib Leg Wound:   Nexodyn  Collagen  1/2 sheet Mepilex Transfer Ag     Left medial lower leg   Nexodyn  Small pieces of Collagen into wounds  Tacrolimus mauro wound  Xeroform  Build up ankles with specialist cast padding  Foam to anterior ankle  Accuwrite lite (lightly wrapped)  Aquaphor to dry areas     Right Lower Leg:  Aquaphor to dry areas  Pt's personal compression garment (leave compression sock on all week, remove velcro compression garment nightly, place back on in am)          Your physician has ordered Compression for treatment of venous ulcers, venous insufficiency,and/or Lymphedema.   * Do not remove until your next scheduled visit in [facility]- do not get wet.    * If your wrap falls down, becomes painful or you have decreased sensation, and/or excessive drainage- please call the [facility] for RN visit to get your compression wrap changed   * You need to exercice as tolerated- walking is good   * Elevate your legs preferrably above level of your heart when sitting as much as possible   * The Goal of this therapy is to reduce Edema and get into long term compression garments to control venous insufficiency, lymphedema and reduce occurrence of

## 2024-04-12 ENCOUNTER — HOSPITAL ENCOUNTER (OUTPATIENT)
Dept: WOUND CARE | Age: 89
Discharge: HOME | End: 2024-04-12
Attending: INTERNAL MEDICINE
Payer: MEDICARE

## 2024-04-12 VITALS
WEIGHT: 145 LBS | SYSTOLIC BLOOD PRESSURE: 132 MMHG | HEART RATE: 92 BPM | RESPIRATION RATE: 18 BRPM | DIASTOLIC BLOOD PRESSURE: 88 MMHG | TEMPERATURE: 97.8 F | BODY MASS INDEX: 24.16 KG/M2 | HEIGHT: 65 IN

## 2024-04-12 DIAGNOSIS — L97.921 TRAUMATIC ULCER OF LEFT LOWER LEG, LIMITED TO BREAKDOWN OF SKIN (HCC): ICD-10-CM

## 2024-04-12 DIAGNOSIS — I87.2 LYMPHEDEMA DUE TO VENOUS INSUFFICIENCY: Primary | ICD-10-CM

## 2024-04-12 DIAGNOSIS — I89.0 LYMPHEDEMA DUE TO VENOUS INSUFFICIENCY: Primary | ICD-10-CM

## 2024-04-12 PROCEDURE — 29581 APPL MULTLAYER CMPRN SYS LEG: CPT

## 2024-04-12 RX ORDER — TRIAMCINOLONE ACETONIDE 1 MG/G
OINTMENT TOPICAL ONCE
OUTPATIENT
Start: 2024-04-12 | End: 2024-04-12

## 2024-04-12 RX ORDER — BACITRACIN ZINC AND POLYMYXIN B SULFATE 500; 1000 [USP'U]/G; [USP'U]/G
OINTMENT TOPICAL ONCE
OUTPATIENT
Start: 2024-04-12 | End: 2024-04-12

## 2024-04-12 RX ORDER — SULFAMETHOXAZOLE AND TRIMETHOPRIM 800; 160 MG/1; MG/1
1 TABLET ORAL 2 TIMES DAILY
COMMUNITY
Start: 2024-03-16 | End: 2024-04-12 | Stop reason: SINTOL

## 2024-04-12 RX ORDER — LIDOCAINE 40 MG/G
CREAM TOPICAL ONCE
OUTPATIENT
Start: 2024-04-12 | End: 2024-04-12

## 2024-04-12 RX ORDER — LIDOCAINE HYDROCHLORIDE 40 MG/ML
SOLUTION TOPICAL ONCE
OUTPATIENT
Start: 2024-04-12 | End: 2024-04-12

## 2024-04-12 RX ORDER — LIDOCAINE 50 MG/G
OINTMENT TOPICAL ONCE
OUTPATIENT
Start: 2024-04-12 | End: 2024-04-12

## 2024-04-12 RX ORDER — LIDOCAINE 40 MG/G
CREAM TOPICAL ONCE
Status: DISCONTINUED | OUTPATIENT
Start: 2024-04-12 | End: 2024-04-13 | Stop reason: HOSPADM

## 2024-04-12 NOTE — PLAN OF CARE
Patient seen in [facility] today for follow up. Patient reports feeling well overall. Shin wound improving. Some tenderness noted this week. Ankle wound improving as well. No debridement needed on either area. Patient taking her Sulfasalazine and Colchicine twice daily without issue. F/u in [facility] in 1 week as ordered, pt. Aware to call sooner with any changes or questions/concerns. Discharge instructions reviewed with patient, all questions answered, copy given to patient. Dressings were applied to all wounds per M.D. Instructions at this visit.

## 2024-04-15 NOTE — DISCHARGE INSTRUCTIONS
Wound Care Center Physician Orders and Discharge Instructions  [facility]  [address], [address]  [place] [address]  Telephone: [phone]      Fax: [phone]        Your home care company:   N/A     Your wound-care supplies will be provided by:  weekly wraps     NAME:  Anne Stokes   YOB: 1933  PRIMARY DIAGNOSIS FOR WOUND CARE CENTER:  Pyoderma gangrenosum/trauma     Wound cleansing:  Do not scrub or use excessive force.  Wash hands with soap and water before and after dressing changes.  Prior to applying a clean dressing, cleanse wound with normal saline, wound cleanser, or mild soap and water. Ask your physician or nurse before getting the wound(s) wet in the shower.                Wound care for home:     Left Upper pre-tib Leg Wound:   Nexodyn  Calmoseptine to wound and mauro wound  Silver Alginate to wound     Left medial lower leg   Nexodyn  Aquaphor to dry areas  Small pieces of Collagen into wounds  Tacrolimus mauro wound  Xeroform  Build up ankles with specialist cast padding  Foam to anterior ankle  Accuwrap lite (lightly wrapped)    Right Lower Leg:  Aquaphor to dry areas  Pt's personal compression garment (leave compression sock on all week, remove velcro compression garment nightly, place back on in am)           Your physician has ordered Compression for treatment of venous ulcers, venous insufficiency,and/or Lymphedema.   * Do not remove until your next scheduled visit in [facility]- do not get wet.    * If your wrap falls down, becomes painful or you have decreased sensation, and/or excessive drainage- please call the [facility] for RN visit to get your compression wrap changed   * You need to exercice as tolerated- walking is good   * Elevate your legs preferrably above level of your heart when sitting as much as possible   * The Goal of this therapy is to reduce Edema and get into long term compression garments to control venous insufficiency, lymphedema and

## 2024-04-19 ENCOUNTER — HOSPITAL ENCOUNTER (OUTPATIENT)
Dept: WOUND CARE | Age: 89
Discharge: HOME | End: 2024-04-19
Attending: INTERNAL MEDICINE
Payer: MEDICARE

## 2024-04-19 VITALS
HEIGHT: 65 IN | RESPIRATION RATE: 18 BRPM | HEART RATE: 87 BPM | TEMPERATURE: 96.9 F | WEIGHT: 144.2 LBS | DIASTOLIC BLOOD PRESSURE: 73 MMHG | BODY MASS INDEX: 24.03 KG/M2 | SYSTOLIC BLOOD PRESSURE: 114 MMHG

## 2024-04-19 DIAGNOSIS — L97.921 TRAUMATIC ULCER OF LEFT LOWER LEG, LIMITED TO BREAKDOWN OF SKIN (HCC): ICD-10-CM

## 2024-04-19 DIAGNOSIS — I87.2 LYMPHEDEMA DUE TO VENOUS INSUFFICIENCY: Primary | ICD-10-CM

## 2024-04-19 DIAGNOSIS — I89.0 LYMPHEDEMA DUE TO VENOUS INSUFFICIENCY: Primary | ICD-10-CM

## 2024-04-19 PROCEDURE — 29581 APPL MULTLAYER CMPRN SYS LEG: CPT

## 2024-04-19 RX ORDER — TRIAMCINOLONE ACETONIDE 1 MG/G
OINTMENT TOPICAL ONCE
OUTPATIENT
Start: 2024-04-19 | End: 2024-04-19

## 2024-04-19 RX ORDER — LIDOCAINE 40 MG/G
CREAM TOPICAL ONCE
Status: DISCONTINUED | OUTPATIENT
Start: 2024-04-19 | End: 2024-04-20 | Stop reason: HOSPADM

## 2024-04-19 RX ORDER — LIDOCAINE 50 MG/G
OINTMENT TOPICAL ONCE
OUTPATIENT
Start: 2024-04-19 | End: 2024-04-19

## 2024-04-19 RX ORDER — BACITRACIN ZINC AND POLYMYXIN B SULFATE 500; 1000 [USP'U]/G; [USP'U]/G
OINTMENT TOPICAL ONCE
OUTPATIENT
Start: 2024-04-19 | End: 2024-04-19

## 2024-04-19 RX ORDER — LIDOCAINE 40 MG/G
CREAM TOPICAL ONCE
OUTPATIENT
Start: 2024-04-19 | End: 2024-04-19

## 2024-04-19 RX ORDER — LIDOCAINE HYDROCHLORIDE 40 MG/ML
SOLUTION TOPICAL ONCE
OUTPATIENT
Start: 2024-04-19 | End: 2024-04-19

## 2024-04-19 ASSESSMENT — PAIN DESCRIPTION - ORIENTATION: ORIENTATION: RIGHT

## 2024-04-19 ASSESSMENT — PAIN DESCRIPTION - LOCATION: LOCATION: LEG

## 2024-04-19 ASSESSMENT — PAIN DESCRIPTION - DESCRIPTORS: DESCRIPTORS: DISCOMFORT

## 2024-04-19 ASSESSMENT — PAIN DESCRIPTION - ONSET: ONSET: PROGRESSIVE

## 2024-04-19 ASSESSMENT — PAIN SCALES - GENERAL: PAINLEVEL_OUTOF10: 2

## 2024-04-19 ASSESSMENT — PAIN - FUNCTIONAL ASSESSMENT: PAIN_FUNCTIONAL_ASSESSMENT: ACTIVITIES ARE NOT PREVENTED

## 2024-04-19 ASSESSMENT — PAIN DESCRIPTION - FREQUENCY: FREQUENCY: INTERMITTENT

## 2024-04-19 ASSESSMENT — PAIN DESCRIPTION - PAIN TYPE: TYPE: ACUTE PAIN

## 2024-04-19 NOTE — PLAN OF CARE
Patient seen in [facility] today for follow up. Patient reports feeling well overall. Upper pretib wound with irritation and redness. Possibly from drainage laying on skin. Will apply Calmoseptine to wound and mauro wound, as well as silver alginate. No mepilex transfer this week.  Lower wound much improved, nearly healed. F/u in [facility] in 1 week as ordered, pt. Aware to call sooner with any changes or questions/concerns. Discharge instructions reviewed with patient, all questions answered, copy given to patient. Dressings were applied to all wounds per M.D. Instructions at this visit.

## 2024-04-22 NOTE — DISCHARGE INSTRUCTIONS
Wound Care Center Physician Orders and Discharge Instructions  [facility]  [address], [address]  [place] [id number]  Telephone: [phone]      Fax: [phone]        Your home care company:   N/A     Your wound-care supplies will be provided by:  weekly wraps     NAME:  Anne Stokes   YOB: 1933  PRIMARY DIAGNOSIS FOR WOUND CARE CENTER:  Pyoderma gangrenosum/trauma     Wound cleansing:  Do not scrub or use excessive force.  Wash hands with soap and water before and after dressing changes.  Prior to applying a clean dressing, cleanse wound with normal saline, wound cleanser, or mild soap and water. Ask your physician or nurse before getting the wound(s) wet in the shower.                Wound care for home:     Left Upper pre-tib Leg Wound:   Nexodyn  Jumpstart  Hydrogel     Left Distal Leg Cluster  Nexodyn  Thin layer of Triad  Xeroform  Build up ankles with specialist cast padding  Foam to anterior ankle  Accuwrap lite (lightly wrapped)     Left Foot and Ankle Rash  Lotrisone    Right Lower Leg:  Aquaphor to dry areas  Pt's personal compression garment (leave compression sock on all week, remove velcro compression garment nightly, place back on in am)           Your physician has ordered Compression for treatment of venous ulcers, venous insufficiency,and/or Lymphedema.   * Do not remove until your next scheduled visit in [facility]- do not get wet.    * If your wrap falls down, becomes painful or you have decreased sensation, and/or excessive drainage- please call the [facility] for RN visit to get your compression wrap changed   * You need to exercice as tolerated- walking is good   * Elevate your legs preferrably above level of your heart when sitting as much as possible   * The Goal of this therapy is to reduce Edema and get into long term compression garments to control venous insufficiency, lymphedema and reduce occurrence of venous ulcers      Please note, all wounds

## 2024-04-26 ENCOUNTER — HOSPITAL ENCOUNTER (OUTPATIENT)
Dept: WOUND CARE | Age: 89
Discharge: HOME | End: 2024-04-26
Attending: INTERNAL MEDICINE
Payer: MEDICARE

## 2024-04-26 VITALS
BODY MASS INDEX: 24.03 KG/M2 | SYSTOLIC BLOOD PRESSURE: 129 MMHG | HEART RATE: 84 BPM | DIASTOLIC BLOOD PRESSURE: 74 MMHG | TEMPERATURE: 98.5 F | WEIGHT: 144.2 LBS | RESPIRATION RATE: 16 BRPM | HEIGHT: 65 IN

## 2024-04-26 DIAGNOSIS — I89.0 LYMPHEDEMA DUE TO VENOUS INSUFFICIENCY: Primary | ICD-10-CM

## 2024-04-26 DIAGNOSIS — L97.921 TRAUMATIC ULCER OF LEFT LOWER LEG, LIMITED TO BREAKDOWN OF SKIN (HCC): ICD-10-CM

## 2024-04-26 DIAGNOSIS — I87.2 LYMPHEDEMA DUE TO VENOUS INSUFFICIENCY: Primary | ICD-10-CM

## 2024-04-26 PROCEDURE — 29581 APPL MULTLAYER CMPRN SYS LEG: CPT

## 2024-04-26 RX ORDER — LIDOCAINE HYDROCHLORIDE 40 MG/ML
SOLUTION TOPICAL ONCE
OUTPATIENT
Start: 2024-04-26 | End: 2024-04-26

## 2024-04-26 RX ORDER — LIDOCAINE 40 MG/G
CREAM TOPICAL ONCE
OUTPATIENT
Start: 2024-04-26 | End: 2024-04-26

## 2024-04-26 RX ORDER — BACITRACIN ZINC AND POLYMYXIN B SULFATE 500; 1000 [USP'U]/G; [USP'U]/G
OINTMENT TOPICAL ONCE
OUTPATIENT
Start: 2024-04-26 | End: 2024-04-26

## 2024-04-26 RX ORDER — LIDOCAINE 40 MG/G
CREAM TOPICAL ONCE
Status: DISCONTINUED | OUTPATIENT
Start: 2024-04-26 | End: 2024-04-27 | Stop reason: HOSPADM

## 2024-04-26 RX ORDER — LIDOCAINE 50 MG/G
OINTMENT TOPICAL ONCE
OUTPATIENT
Start: 2024-04-26 | End: 2024-04-26

## 2024-04-26 RX ORDER — TRIAMCINOLONE ACETONIDE 1 MG/G
OINTMENT TOPICAL ONCE
OUTPATIENT
Start: 2024-04-26 | End: 2024-04-26

## 2024-04-26 NOTE — PLAN OF CARE
Patient seen in [facility] today for follow up. Patient reports feeling well overall. Patient states she had increased pain this week. Patient states wrap felt a bit tighter towards the end of the week. Patient presents with several new areas of redness on lower leg. Rash noted to posterior leg.  Small ankle wounds are healed. Will continue to wrap patient with Accuwrap Lite. F/u in [facility] in 1 week as ordered, pt. Aware to call sooner with any changes or questions/concerns. Discharge instructions reviewed with patient, all questions answered, copy given to patient. Dressings were applied to all wounds per M.D. Instructions at this visit.

## 2024-04-29 NOTE — DISCHARGE INSTRUCTIONS
Wound Care Center Physician Orders and Discharge Instructions  [facility]  [address], [address]  [place] [id number]  Telephone: [phone]      Fax: [phone]        Your home care company:   N/A     Your wound-care supplies will be provided by:  weekly wraps     NAME:  Anne Stoeks   YOB: 1933  PRIMARY DIAGNOSIS FOR WOUND CARE CENTER:  Pyoderma gangrenosum/trauma     Wound cleansing:  Do not scrub or use excessive force.  Wash hands with soap and water before and after dressing changes.  Prior to applying a clean dressing, cleanse wound with normal saline, wound cleanser, or mild soap and water. Ask your physician or nurse before getting the wound(s) wet in the shower.                Wound care for home:     Inside dotted area - Tacrolimus   Any other marked areas - Lotrisone    Left Distal Leg Cluster  Nexodyn  Thin layer of Triad  Xeroform  Build up ankles with specialist cast padding  Foam to anterior ankle  Accuwrap lite (lightly wrapped)     Right Lower Leg:  Aquaphor to dry areas  Pt's personal compression garment (leave compression sock on all week, remove velcro compression garment nightly, place back on in am)           Your physician has ordered Compression for treatment of venous ulcers, venous insufficiency,and/or Lymphedema.   * Do not remove until your next scheduled visit in [facility]- do not get wet.    * If your wrap falls down, becomes painful or you have decreased sensation, and/or excessive drainage- please call the [facility] for RN visit to get your compression wrap changed   * You need to exercice as tolerated- walking is good   * Elevate your legs preferrably above level of your heart when sitting as much as possible   * The Goal of this therapy is to reduce Edema and get into long term compression garments to control venous insufficiency, lymphedema and reduce occurrence of venous ulcers      Please note, all wounds (unless stated otherwise here) were

## 2024-05-03 ENCOUNTER — HOSPITAL ENCOUNTER (OUTPATIENT)
Dept: WOUND CARE | Age: 89
Discharge: HOME | End: 2024-05-03
Attending: INTERNAL MEDICINE
Payer: MEDICARE

## 2024-05-03 VITALS
BODY MASS INDEX: 24.03 KG/M2 | RESPIRATION RATE: 18 BRPM | DIASTOLIC BLOOD PRESSURE: 67 MMHG | TEMPERATURE: 98 F | HEART RATE: 86 BPM | SYSTOLIC BLOOD PRESSURE: 101 MMHG | WEIGHT: 144.2 LBS | HEIGHT: 65 IN

## 2024-05-03 DIAGNOSIS — L97.921 TRAUMATIC ULCER OF LEFT LOWER LEG, LIMITED TO BREAKDOWN OF SKIN (HCC): ICD-10-CM

## 2024-05-03 DIAGNOSIS — I87.2 LYMPHEDEMA DUE TO VENOUS INSUFFICIENCY: Primary | ICD-10-CM

## 2024-05-03 DIAGNOSIS — I89.0 LYMPHEDEMA DUE TO VENOUS INSUFFICIENCY: Primary | ICD-10-CM

## 2024-05-03 PROCEDURE — 29581 APPL MULTLAYER CMPRN SYS LEG: CPT

## 2024-05-03 RX ORDER — TRIAMCINOLONE ACETONIDE 1 MG/G
OINTMENT TOPICAL ONCE
OUTPATIENT
Start: 2024-05-03 | End: 2024-05-03

## 2024-05-03 RX ORDER — LIDOCAINE 50 MG/G
OINTMENT TOPICAL ONCE
OUTPATIENT
Start: 2024-05-03 | End: 2024-05-03

## 2024-05-03 RX ORDER — LIDOCAINE HYDROCHLORIDE 40 MG/ML
SOLUTION TOPICAL ONCE
OUTPATIENT
Start: 2024-05-03 | End: 2024-05-03

## 2024-05-03 RX ORDER — CLINDAMYCIN HYDROCHLORIDE 300 MG/1
300 CAPSULE ORAL 4 TIMES DAILY
COMMUNITY
Start: 2024-05-01

## 2024-05-03 RX ORDER — LIDOCAINE 40 MG/G
CREAM TOPICAL ONCE
Status: DISCONTINUED | OUTPATIENT
Start: 2024-05-03 | End: 2024-05-04 | Stop reason: HOSPADM

## 2024-05-03 RX ORDER — LIDOCAINE 40 MG/G
CREAM TOPICAL ONCE
OUTPATIENT
Start: 2024-05-03 | End: 2024-05-03

## 2024-05-03 RX ORDER — BACITRACIN ZINC AND POLYMYXIN B SULFATE 500; 1000 [USP'U]/G; [USP'U]/G
OINTMENT TOPICAL ONCE
OUTPATIENT
Start: 2024-05-03 | End: 2024-05-03

## 2024-05-03 ASSESSMENT — PAIN SCALES - GENERAL: PAINLEVEL_OUTOF10: 0

## 2024-05-03 NOTE — DISCHARGE INSTRUCTIONS
Skin #1 10/12/23        F/U Appointment  with Dr. Patterson  in 1 week on                                   @                                         .     Your nurse  is SUHAS Arenas     If we applied slip-resistant hospital socks today, be sure to remove them at least once a day to inspect your toes or feet, even if you're not changing the wraps or dressings underneath. If you see anything concerning (redness, excess moisture, etc), please call and let us know right away.     Should you experience any significant changes in your wound(s) (including redness, increased warmth, increased pain, increased drainage, odor, or fever) or have questions about your wound care, please contact the [facility] at [phone] Monday-Thursday from 8:00 am - 4:30 pm, or Friday from 8:00 am - 2:30 pm.  If you need help with your wound outside these hours and cannot wait until we are again available, contact your home-care company (if applicable), your PCP, or go to the nearest emergency room

## 2024-05-03 NOTE — PLAN OF CARE
Patient seen in [facility] today for follow up. Patient had a dental emergency last week necesstating in I&D of abscess. Currently taking antibiotics for this. Wounds showing signs of improvement. Original left pretib wound is healed. Medial lower leg cluster is improving. Some red and rashy areas noted on leg. Will place Lotrisone on areas marked per Dr. Patterson. Tacrolimus applied to small area of redness. F/u in [facility] in 1 week as ordered, pt. Aware to call sooner with any changes or questions/concerns. Discharge instructions reviewed with patient, all questions answered, copy given to patient. Dressings were applied to all wounds per M.D. Instructions at this visit.

## 2024-05-09 ENCOUNTER — HOSPITAL ENCOUNTER (OUTPATIENT)
Dept: WOUND CARE | Age: 89
Discharge: HOME | End: 2024-05-09
Attending: INTERNAL MEDICINE
Payer: MEDICARE

## 2024-05-09 VITALS
DIASTOLIC BLOOD PRESSURE: 85 MMHG | HEIGHT: 65 IN | HEART RATE: 85 BPM | SYSTOLIC BLOOD PRESSURE: 105 MMHG | BODY MASS INDEX: 23.46 KG/M2 | TEMPERATURE: 98.6 F | RESPIRATION RATE: 18 BRPM | WEIGHT: 140.8 LBS

## 2024-05-09 DIAGNOSIS — L97.921 TRAUMATIC ULCER OF LEFT LOWER LEG, LIMITED TO BREAKDOWN OF SKIN (HCC): ICD-10-CM

## 2024-05-09 DIAGNOSIS — I89.0 LYMPHEDEMA DUE TO VENOUS INSUFFICIENCY: Primary | ICD-10-CM

## 2024-05-09 DIAGNOSIS — I87.2 LYMPHEDEMA DUE TO VENOUS INSUFFICIENCY: Primary | ICD-10-CM

## 2024-05-09 PROCEDURE — 29581 APPL MULTLAYER CMPRN SYS LEG: CPT

## 2024-05-09 RX ORDER — TRIAMCINOLONE ACETONIDE 1 MG/G
OINTMENT TOPICAL ONCE
Status: DISCONTINUED | OUTPATIENT
Start: 2024-05-09 | End: 2024-05-10 | Stop reason: HOSPADM

## 2024-05-09 RX ORDER — LIDOCAINE HYDROCHLORIDE 40 MG/ML
SOLUTION TOPICAL ONCE
OUTPATIENT
Start: 2024-05-09 | End: 2024-05-09

## 2024-05-09 RX ORDER — LIDOCAINE 40 MG/G
CREAM TOPICAL ONCE
Status: DISCONTINUED | OUTPATIENT
Start: 2024-05-09 | End: 2024-05-10 | Stop reason: HOSPADM

## 2024-05-09 RX ORDER — TRIAMCINOLONE ACETONIDE 1 MG/G
OINTMENT TOPICAL ONCE
OUTPATIENT
Start: 2024-05-09 | End: 2024-05-09

## 2024-05-09 RX ORDER — LIDOCAINE 50 MG/G
OINTMENT TOPICAL ONCE
OUTPATIENT
Start: 2024-05-09 | End: 2024-05-09

## 2024-05-09 RX ORDER — BACITRACIN ZINC AND POLYMYXIN B SULFATE 500; 1000 [USP'U]/G; [USP'U]/G
OINTMENT TOPICAL ONCE
OUTPATIENT
Start: 2024-05-09 | End: 2024-05-09

## 2024-05-09 RX ORDER — LIDOCAINE 40 MG/G
CREAM TOPICAL ONCE
OUTPATIENT
Start: 2024-05-09 | End: 2024-05-09

## 2024-05-09 RX ORDER — LIDOCAINE HYDROCHLORIDE 40 MG/ML
SOLUTION TOPICAL ONCE
Status: DISCONTINUED | OUTPATIENT
Start: 2024-05-09 | End: 2024-05-10 | Stop reason: HOSPADM

## 2024-05-09 RX ORDER — BACITRACIN ZINC AND POLYMYXIN B SULFATE 500; 1000 [USP'U]/G; [USP'U]/G
OINTMENT TOPICAL ONCE
Status: DISCONTINUED | OUTPATIENT
Start: 2024-05-09 | End: 2024-05-10 | Stop reason: HOSPADM

## 2024-05-09 RX ORDER — LIDOCAINE 50 MG/G
OINTMENT TOPICAL ONCE
Status: DISCONTINUED | OUTPATIENT
Start: 2024-05-09 | End: 2024-05-10 | Stop reason: HOSPADM

## 2024-05-09 ASSESSMENT — PAIN SCALES - GENERAL: PAINLEVEL_OUTOF10: 0

## 2024-05-09 NOTE — PLAN OF CARE
Pt to the [facility] for wound assessment. Wounds stable. Rash areas where lotrisone was used, improved. Pt tolerated sulfasalazine and colchicine. Pt to continue with documented plan of care and to follow up in the [facility] in 1 week. Pt. aware to call sooner with any problems or questions/concerns. MD orders and D/C instructions reviewed, pt verbalized understanding.

## 2024-05-10 NOTE — DISCHARGE INSTRUCTIONS
10/12/23        F/U Appointment  with Dr. Patterson  in 1 week on                                   @                                         .     Your nurse  is Debi/SUHAS Willard     If we applied slip-resistant hospital socks today, be sure to remove them at least once a day to inspect your toes or feet, even if you're not changing the wraps or dressings underneath. If you see anything concerning (redness, excess moisture, etc), please call and let us know right away.     Should you experience any significant changes in your wound(s) (including redness, increased warmth, increased pain, increased drainage, odor, or fever) or have questions about your wound care, please contact the [facility] at [phone] Monday-Thursday from 8:00 am - 4:30 pm, or Friday from 8:00 am - 2:30 pm.  If you need help with your wound outside these hours and cannot wait until we are again available, contact your home-care company (if applicable), your PCP, or go to the nearest emergency room

## 2024-05-16 ENCOUNTER — HOSPITAL ENCOUNTER (OUTPATIENT)
Dept: WOUND CARE | Age: 89
Discharge: HOME | End: 2024-05-16
Attending: INTERNAL MEDICINE
Payer: MEDICARE

## 2024-05-16 VITALS
TEMPERATURE: 98.3 F | DIASTOLIC BLOOD PRESSURE: 63 MMHG | HEIGHT: 65 IN | WEIGHT: 139 LBS | RESPIRATION RATE: 18 BRPM | SYSTOLIC BLOOD PRESSURE: 101 MMHG | BODY MASS INDEX: 23.16 KG/M2 | HEART RATE: 90 BPM

## 2024-05-16 DIAGNOSIS — I89.0 LYMPHEDEMA DUE TO VENOUS INSUFFICIENCY: Primary | ICD-10-CM

## 2024-05-16 DIAGNOSIS — L97.921 TRAUMATIC ULCER OF LEFT LOWER LEG, LIMITED TO BREAKDOWN OF SKIN (HCC): ICD-10-CM

## 2024-05-16 DIAGNOSIS — I87.2 LYMPHEDEMA DUE TO VENOUS INSUFFICIENCY: Primary | ICD-10-CM

## 2024-05-16 PROBLEM — L92.9 HYPERGRANULATION: Status: RESOLVED | Noted: 2023-09-09 | Resolved: 2024-05-16

## 2024-05-16 PROCEDURE — 29581 APPL MULTLAYER CMPRN SYS LEG: CPT

## 2024-05-16 RX ORDER — LIDOCAINE HYDROCHLORIDE 40 MG/ML
SOLUTION TOPICAL ONCE
OUTPATIENT
Start: 2024-05-16 | End: 2024-05-16

## 2024-05-16 RX ORDER — LIDOCAINE 50 MG/G
OINTMENT TOPICAL ONCE
Status: DISCONTINUED | OUTPATIENT
Start: 2024-05-16 | End: 2024-05-17 | Stop reason: HOSPADM

## 2024-05-16 RX ORDER — BACITRACIN ZINC AND POLYMYXIN B SULFATE 500; 1000 [USP'U]/G; [USP'U]/G
OINTMENT TOPICAL ONCE
Status: DISCONTINUED | OUTPATIENT
Start: 2024-05-16 | End: 2024-05-17 | Stop reason: HOSPADM

## 2024-05-16 RX ORDER — TRIAMCINOLONE ACETONIDE 1 MG/G
OINTMENT TOPICAL ONCE
OUTPATIENT
Start: 2024-05-16 | End: 2024-05-16

## 2024-05-16 RX ORDER — BACITRACIN ZINC AND POLYMYXIN B SULFATE 500; 1000 [USP'U]/G; [USP'U]/G
OINTMENT TOPICAL ONCE
OUTPATIENT
Start: 2024-05-16 | End: 2024-05-16

## 2024-05-16 RX ORDER — LIDOCAINE 50 MG/G
OINTMENT TOPICAL ONCE
OUTPATIENT
Start: 2024-05-16 | End: 2024-05-16

## 2024-05-16 RX ORDER — LIDOCAINE HYDROCHLORIDE 40 MG/ML
SOLUTION TOPICAL ONCE
Status: DISCONTINUED | OUTPATIENT
Start: 2024-05-16 | End: 2024-05-17 | Stop reason: HOSPADM

## 2024-05-16 RX ORDER — LIDOCAINE 40 MG/G
CREAM TOPICAL ONCE
Status: DISCONTINUED | OUTPATIENT
Start: 2024-05-16 | End: 2024-05-17 | Stop reason: HOSPADM

## 2024-05-16 RX ORDER — LIDOCAINE 40 MG/G
CREAM TOPICAL ONCE
OUTPATIENT
Start: 2024-05-16 | End: 2024-05-16

## 2024-05-16 RX ORDER — TRIAMCINOLONE ACETONIDE 1 MG/G
OINTMENT TOPICAL ONCE
Status: DISCONTINUED | OUTPATIENT
Start: 2024-05-16 | End: 2024-05-17 | Stop reason: HOSPADM

## 2024-05-16 ASSESSMENT — PAIN SCALES - GENERAL: PAINLEVEL_OUTOF10: 0

## 2024-05-16 NOTE — PLAN OF CARE
Pt to the [facility] for wound assessment. Wound healed. Pt to continue with documented plan of care and to follow up in the [facility] in 1 week. Pt. aware to call sooner with any problems or questions/concerns. MD orders and D/C instructions reviewed, pt verbalized understanding.

## 2024-05-17 NOTE — DISCHARGE INSTRUCTIONS
Wound Care Center Physician Orders and Discharge Instructions  [facility]  [address], [address]  [place] [id number]  Telephone: [phone]      Fax: [phone]        Your home care company:   N/A     Your wound-care supplies will be provided by:  weekly wraps     NAME:  Anne Stokes   YOB: 1933  PRIMARY DIAGNOSIS FOR WOUND CARE CENTER:  Pyoderma gangrenosum/trauma     Wound cleansing:  Do not scrub or use excessive force.  Wash hands with soap and water before and after dressing changes.  Prior to applying a clean dressing, cleanse wound with normal saline, wound cleanser, or mild soap and water. Ask your physician or nurse before getting the wound(s) wet in the shower.                Wound care for home:     BLE areas of rash:  Lotrisone 1-2x/day to red circular rash areas     Right and Left Lower Legs:  Aquaphor to dry areas  Pt's personal compression garment (leave compression sock on all week, remove velcro compression garment nightly, place back on in am)    Left heel:  Betadine ointment  Small mepilex border  Ok to remove in a few days        Your physician has ordered Compression for treatment of venous ulcers, venous insufficiency,and/or Lymphedema.   * Do not remove until your next scheduled visit in [facility]- do not get wet.    * If your wrap falls down, becomes painful or you have decreased sensation, and/or excessive drainage- please call the [facility] for RN visit to get your compression wrap changed   * You need to exercice as tolerated- walking is good   * Elevate your legs preferrably above level of your heart when sitting as much as possible   * The Goal of this therapy is to reduce Edema and get into long term compression garments to control venous insufficiency, lymphedema and reduce occurrence of venous ulcers      Please note, all wounds (unless stated otherwise here) were mechanically debrided at the time of cleansing here in the wound-care center

## 2024-05-23 ENCOUNTER — HOSPITAL ENCOUNTER (OUTPATIENT)
Dept: WOUND CARE | Age: 89
Discharge: HOME | End: 2024-05-23
Attending: INTERNAL MEDICINE
Payer: MEDICARE

## 2024-05-23 VITALS
RESPIRATION RATE: 18 BRPM | HEART RATE: 82 BPM | BODY MASS INDEX: 24.22 KG/M2 | HEIGHT: 65 IN | SYSTOLIC BLOOD PRESSURE: 139 MMHG | DIASTOLIC BLOOD PRESSURE: 95 MMHG | WEIGHT: 145.4 LBS | TEMPERATURE: 97.8 F

## 2024-05-23 PROCEDURE — 99213 OFFICE O/P EST LOW 20 MIN: CPT

## 2024-05-23 PROCEDURE — 99213 OFFICE O/P EST LOW 20 MIN: CPT | Performed by: INTERNAL MEDICINE

## 2024-05-23 RX ORDER — CLOTRIMAZOLE AND BETAMETHASONE DIPROPIONATE 10; .64 MG/G; MG/G
CREAM TOPICAL
Qty: 45 G | Refills: 1 | Status: SHIPPED | OUTPATIENT
Start: 2024-05-23

## 2024-05-23 ASSESSMENT — PAIN SCALES - GENERAL: PAINLEVEL_OUTOF10: 0

## 2024-05-23 NOTE — PLAN OF CARE
Pt to the [facility] for wound assessment. LLE wound healed. Pt to continue with documented plan of care and to follow up in the [facility] as needed. Pt. aware to call sooner with any problems or questions/concerns. MD orders and D/C instructions reviewed, pt verbalized understanding.     5/23-The first of every month...  June 1st-stop 1 dose of colchicine  July 1st-stop 1 dose of sulfa  Aug 1st-stop additional dose of colchicine  Sept 1st- stop additional dose of sulfa  Continue until off both medications completely     5/23-Follow up with Dr. Max for preventative vein treatment-Dr. Patterosn will call and update him  5/23-Please give pt her left over tacrolimus to take home  5/23-Rx for lotrisone sent in to [facility]

## 2024-05-26 PROBLEM — L97.921 TRAUMATIC ULCER OF LEFT LOWER LEG, LIMITED TO BREAKDOWN OF SKIN (HCC): Status: RESOLVED | Noted: 2023-09-18 | Resolved: 2024-05-26

## 2024-05-26 PROBLEM — L97.322 ULCER OF LEFT ANKLE, WITH FAT LAYER EXPOSED (HCC): Status: RESOLVED | Noted: 2022-07-15 | Resolved: 2024-05-26

## 2024-05-31 ENCOUNTER — APPOINTMENT (OUTPATIENT)
Dept: WOUND CARE | Age: 89
End: 2024-05-31
Attending: INTERNAL MEDICINE
Payer: MEDICARE

## 2024-06-07 ENCOUNTER — APPOINTMENT (OUTPATIENT)
Dept: WOUND CARE | Age: 89
End: 2024-06-07
Attending: INTERNAL MEDICINE
Payer: MEDICARE

## 2024-06-11 NOTE — PROGRESS NOTES
Perfect serve sent to Martina Ryan MD:      11/26/21 8:27 AM   [phone] Hospital or Facility: [facility] From: Deangelo Sebastian RE: [place] 02/03/1933 RM: [id number]     Lab called and notified me that urine sample collected on 11/24 came back positive for ESBL. Will place contact precautions.      Need Callback: NO CALLBACK REQ C5 MED SURG / Frazier Buerger no

## 2024-06-14 ENCOUNTER — APPOINTMENT (OUTPATIENT)
Dept: WOUND CARE | Age: 89
End: 2024-06-14
Attending: INTERNAL MEDICINE
Payer: MEDICARE

## 2024-06-19 ENCOUNTER — TELEPHONE (OUTPATIENT)
Dept: CARDIOLOGY CLINIC | Age: 89
End: 2024-06-19

## 2024-06-19 NOTE — TELEPHONE ENCOUNTER
Patient is new referral from Dr. Patterson for further evaluation of venous insufficiency.   Please call patient and offer office visit with Dr. Gilbert in [place] 7/17/24 at 245 pm.  Thanks

## 2024-07-03 ENCOUNTER — OFFICE VISIT (OUTPATIENT)
Dept: ENT CLINIC | Age: 89
End: 2024-07-03
Payer: MEDICARE

## 2024-07-03 VITALS
SYSTOLIC BLOOD PRESSURE: 140 MMHG | HEART RATE: 80 BPM | RESPIRATION RATE: 16 BRPM | DIASTOLIC BLOOD PRESSURE: 88 MMHG | WEIGHT: 140 LBS | HEIGHT: 65 IN | BODY MASS INDEX: 23.32 KG/M2

## 2024-07-03 DIAGNOSIS — H93.13 TINNITUS OF BOTH EARS: ICD-10-CM

## 2024-07-03 DIAGNOSIS — H90.3 ASYMMETRIC SNHL (SENSORINEURAL HEARING LOSS): Primary | ICD-10-CM

## 2024-07-03 PROCEDURE — 99204 OFFICE O/P NEW MOD 45 MIN: CPT | Performed by: STUDENT IN AN ORGANIZED HEALTH CARE EDUCATION/TRAINING PROGRAM

## 2024-07-03 PROCEDURE — 1123F ACP DISCUSS/DSCN MKR DOCD: CPT | Performed by: STUDENT IN AN ORGANIZED HEALTH CARE EDUCATION/TRAINING PROGRAM

## 2024-07-03 ASSESSMENT — ENCOUNTER SYMPTOMS
SHORTNESS OF BREATH: 0
RHINORRHEA: 0
VOMITING: 0
EYE PAIN: 0
NAUSEA: 0
COUGH: 0

## 2024-07-03 NOTE — PROGRESS NOTES
[facility]  DIVISION OF OTOLARYNGOLOGY- HEAD & NECK SURGERY  CONSULT    Patient Name: Anne Stokes  Medical Record Number:  [id number]  Primary Care Physician:  Jose Manuel Noble DO  Date of Consultation: 7/3/2024    Chief Complaint:   Chief Complaint   Patient presents with    New Patient     Hearing loss and dizziness      HISTORY OF PRESENT ILLNESS  Anne is a(n) 89 y.o. female who presents for evaluation of decreased hearing.  She presented to the hearing speech and deaf center for hearing aids and was subsequently referred for an asymmetry that was noted on exam.  She notes that the left ear has been worse than the right for a while.  She does have a history of multiple eardrum perforation the left side which seem to always heal on their own.  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    Probable pyoderma gangrenosum    Tinea corporis     Past Surgical History:   Procedure Laterality Date    IR INS PICC VAD W SQ PORT GREATER THAN 5 Left 11/29/2021    unable to advance PICC catheter completely into SVC    TONSILLECTOMY      VEIN SURGERY Right 10/27/2016    saphenous VNUS closure     Family History   Problem Relation Age of Onset    Stroke Mother     Stroke Father      Social History     Socioeconomic History    Marital status:      Spouse name: Not on file    Number of children: Not on file    Years of education: Not on file    Highest education level: Not on file   Occupational History    Not on file   Tobacco Use    Smoking status: Never    Smokeless tobacco: Never   Vaping

## 2024-07-16 NOTE — PROGRESS NOTES
Interventional Cardiology Consultation     Anne Stokes  2/3/1933    PCP: Jose Manuel Noble DO  Referring Physician: Dr. Patterson  Reason for Referral: venous insufficiency  Chief Complaint: leg pain     Subjective:   History of Present Illness: The patient is 89 y.o. female with a past medical history significant for CHF, afib, DVT, and cellulitis. Patient with history of reoccurring venous ulcers. Patient presents today as new referral from Dr. Patterson for further evaluation of venous insufficiency. Admits to being diagnosed with leg ulcers 3 years that took that long to heal. Treatment with compression at the wound care center resulted in eventual resolution. Currently has no complaints. Is compliant with her compression.       Past Medical History:   Diagnosis Date    Acute respiratory failure due to severe acute respiratory syndrome coronavirus 2 (SARS-CoV-2) infection ([facility]) 11/12/2021    Cellulitis of left lower leg 04/21/2019    Cellulitis of right ankle 07/28/2022    CHF (congestive heart failure) ([facility])     Closed pelvic rim fracture 09/04/2014    COVID 12/2021    Fibromyalgia     Foot drop, right foot     Fractures     History of blood transfusion     1997    History of dental surgery     lanced gums, 4/30/24    History of temporal artery biopsy 10/06/2020    Hx of blood clots     PNA (pneumonia) 12/1997    Prepatellar bursitis of right knee 09/15/2020    Pyoderma gangrenosum 2022    Rheumatoid arthritis ([facility])     Venous stasis ulcer of left ankle with fat layer exposed with varicose veins ([facility]) 10/03/2022    Venous ulcer of left leg ([facility]) 08/2019    Venous ulcer of right leg ([facility]) 10/2016     Past Surgical History:   Procedure Laterality Date    IR INS PICC VAD W SQ PORT GREATER THAN 5 Left 11/29/2021    unable to advance PICC catheter completely into SVC    TONSILLECTOMY      VEIN SURGERY Right 10/27/2016    saphenous VNUS closure     Family History   Problem Relation Age of Onset

## 2024-07-17 ENCOUNTER — OFFICE VISIT (OUTPATIENT)
Dept: CARDIOLOGY CLINIC | Age: 89
End: 2024-07-17

## 2024-07-17 VITALS
SYSTOLIC BLOOD PRESSURE: 154 MMHG | HEART RATE: 75 BPM | WEIGHT: 144 LBS | OXYGEN SATURATION: 99 % | HEIGHT: 65 IN | BODY MASS INDEX: 23.99 KG/M2 | DIASTOLIC BLOOD PRESSURE: 90 MMHG

## 2024-07-17 DIAGNOSIS — I83.893 VARICOSE VEINS OF BILATERAL LOWER EXTREMITIES WITH OTHER COMPLICATIONS: ICD-10-CM

## 2024-07-17 DIAGNOSIS — I87.2 VENOUS INSUFFICIENCY: Primary | ICD-10-CM

## 2024-07-30 ENCOUNTER — HOSPITAL ENCOUNTER (OUTPATIENT)
Dept: VASCULAR LAB | Age: 89
Discharge: HOME | End: 2024-08-01
Attending: INTERNAL MEDICINE
Payer: MEDICARE

## 2024-07-30 DIAGNOSIS — I83.893 VARICOSE VEINS OF BILATERAL LOWER EXTREMITIES WITH OTHER COMPLICATIONS: ICD-10-CM

## 2024-07-30 DIAGNOSIS — I87.2 VENOUS INSUFFICIENCY: ICD-10-CM

## 2024-07-30 LAB
VAS LEFT CFV RFX: 2.7 S
VAS LEFT FV RFX: 4.8 S
VAS LEFT GSV AT KNEE DIAM: 3.6 MM
VAS LEFT GSV BK DIST DIAM: 2.1 MM
VAS LEFT GSV BK MID DIAM: 2.1 MM
VAS LEFT GSV BK PROX DIAM: 3.5 MM
VAS LEFT GSV JUNC DIAM: 8.1 MM
VAS LEFT GSV THIGH DIST DIAM: 3.6 MM
VAS LEFT GSV THIGH MID DIAM: 4.8 MM
VAS LEFT GSV THIGH PROX DIAM: 5.4 MM
VAS LEFT PERFORATOR 2 DIAM: 3.4 MM
VAS LEFT PERFORATOR DIAM: 1.8 MM
VAS LEFT POP RFX: 1.1 S
VAS LEFT SSV JUNCTION DIAM: 3.4 MM
VAS LEFT SSV MID DIAM: 1.1 MM
VAS LEFT SSV PROX DIAM: 2.2 MM
VAS RIGHT CFV RFX: 1.2 S
VAS RIGHT FV RFX: 1.3 S
VAS RIGHT GSV AT KNEE DIAM: 2.6 MM
VAS RIGHT GSV BK DIST DIAM: 4 MM
VAS RIGHT GSV BK DIST RFX: 0.6 S
VAS RIGHT GSV BK MID DIAM: 3.8 MM
VAS RIGHT GSV BK PROX DIAM: 3.5 MM
VAS RIGHT GSV BK PROX RFX: 1.1 S
VAS RIGHT GSV JUNC DIAM: 5.6 MM
VAS RIGHT GSV JUNC RFX: 1.6 S
VAS RIGHT GSV THIGH DIST DIAM: 1.6 MM
VAS RIGHT GSV THIGH MID DIAM: 3.5 MM
VAS RIGHT GSV THIGH PROX DIAM: 3.4 MM
VAS RIGHT PERFORATOR 2 DIAM: 2.6 MM
VAS RIGHT PERFORATOR DIAM: 1.8 MM
VAS RIGHT PERFORATOR RFX: 1.1 S
VAS RIGHT POP RFX: 1.4 S
VAS RIGHT SSV MID DIAM: 1.8 MM
VAS RIGHT SSV PROX DIAM: 1.5 MM

## 2024-07-30 PROCEDURE — 93970 EXTREMITY STUDY: CPT

## 2024-07-30 PROCEDURE — 93970 EXTREMITY STUDY: CPT | Performed by: SURGERY

## 2024-07-31 NOTE — DISCHARGE INSTRUCTIONS
Wound Care Center Physician Orders and Discharge Instructions  [facility]  [address], [address]  [place] [id number]  Telephone: [phone]      Fax: [phone]        Your home care company:   N/A     Your wound-care supplies will be provided by:  weekly wraps     NAME:  Anne Stokes   YOB: 1933  PRIMARY DIAGNOSIS FOR WOUND CARE CENTER:  Pyoderma gangrenosum/trauma     Wound cleansing:  Do not scrub or use excessive force.  Wash hands with soap and water before and after dressing changes.  Prior to applying a clean dressing, cleanse wound with normal saline, wound cleanser, or mild soap and water. Ask your physician or nurse before getting the wound(s) wet in the shower.                Wound care for home:    Left medial lower leg:   Marked area only-clobetasol, silver alginate, stacked 2x2 bolster  Build up ankles with specialist cast padding as needed  Foam to anterior ankle  Accuwrap lite (lightly wrapped)  Keep clean, dry, and intact all week    Right Lower Leg:  Aquaphor to dry areas  Pt's personal compression garment (leave compression sock on all week, remove velcro compression garment nightly, place back on in am)        Your physician has ordered Compression for treatment of venous ulcers, venous insufficiency,and/or Lymphedema.   * Do not remove until your next scheduled visit in [facility]- do not get wet.    * If your wrap falls down, becomes painful or you have decreased sensation, and/or excessive drainage- please call the [facility] for RN visit to get your compression wrap changed   * You need to exercice as tolerated- walking is good   * Elevate your legs preferrably above level of your heart when sitting as much as possible   * The Goal of this therapy is to reduce Edema and get into long term compression garments to control venous insufficiency, lymphedema and reduce occurrence of venous ulcers      Please note, all wounds (unless stated otherwise here) were

## 2024-08-01 ENCOUNTER — HOSPITAL ENCOUNTER (OUTPATIENT)
Dept: WOUND CARE | Age: 89
Discharge: HOME | End: 2024-08-01
Attending: INTERNAL MEDICINE
Payer: MEDICARE

## 2024-08-01 VITALS
HEART RATE: 62 BPM | WEIGHT: 142 LBS | TEMPERATURE: 97.4 F | DIASTOLIC BLOOD PRESSURE: 90 MMHG | BODY MASS INDEX: 23.66 KG/M2 | SYSTOLIC BLOOD PRESSURE: 146 MMHG | RESPIRATION RATE: 16 BRPM | HEIGHT: 65 IN

## 2024-08-01 DIAGNOSIS — I89.0 LYMPHEDEMA DUE TO VENOUS INSUFFICIENCY: Primary | ICD-10-CM

## 2024-08-01 DIAGNOSIS — L97.921 ULCER OF LEFT LOWER LEG, LIMITED TO BREAKDOWN OF SKIN (HCC): ICD-10-CM

## 2024-08-01 DIAGNOSIS — I87.2 LYMPHEDEMA DUE TO VENOUS INSUFFICIENCY: Primary | ICD-10-CM

## 2024-08-01 PROCEDURE — 29581 APPL MULTLAYER CMPRN SYS LEG: CPT

## 2024-08-01 RX ORDER — LIDOCAINE 50 MG/G
OINTMENT TOPICAL ONCE
OUTPATIENT
Start: 2024-08-01 | End: 2024-08-01

## 2024-08-01 RX ORDER — CEFADROXIL 500 MG/1
500 CAPSULE ORAL EVERY 12 HOURS SCHEDULED
Qty: 14 CAPSULE | Refills: 0 | Status: SHIPPED | OUTPATIENT
Start: 2024-08-01 | End: 2024-08-08

## 2024-08-01 RX ORDER — LIDOCAINE 50 MG/G
OINTMENT TOPICAL ONCE
Status: DISCONTINUED | OUTPATIENT
Start: 2024-08-01 | End: 2024-08-02 | Stop reason: HOSPADM

## 2024-08-01 RX ORDER — LIDOCAINE HYDROCHLORIDE 40 MG/ML
SOLUTION TOPICAL ONCE
OUTPATIENT
Start: 2024-08-01 | End: 2024-08-01

## 2024-08-01 RX ORDER — LIDOCAINE HYDROCHLORIDE 40 MG/ML
SOLUTION TOPICAL ONCE
Status: DISCONTINUED | OUTPATIENT
Start: 2024-08-01 | End: 2024-08-02 | Stop reason: HOSPADM

## 2024-08-01 RX ORDER — TRIAMCINOLONE ACETONIDE 1 MG/G
OINTMENT TOPICAL ONCE
Status: DISCONTINUED | OUTPATIENT
Start: 2024-08-01 | End: 2024-08-02 | Stop reason: HOSPADM

## 2024-08-01 RX ORDER — BACITRACIN ZINC AND POLYMYXIN B SULFATE 500; 1000 [USP'U]/G; [USP'U]/G
OINTMENT TOPICAL ONCE
OUTPATIENT
Start: 2024-08-01 | End: 2024-08-01

## 2024-08-01 RX ORDER — TRIAMCINOLONE ACETONIDE 1 MG/G
OINTMENT TOPICAL ONCE
OUTPATIENT
Start: 2024-08-01 | End: 2024-08-01

## 2024-08-01 RX ORDER — BACITRACIN ZINC AND POLYMYXIN B SULFATE 500; 1000 [USP'U]/G; [USP'U]/G
OINTMENT TOPICAL ONCE
Status: DISCONTINUED | OUTPATIENT
Start: 2024-08-01 | End: 2024-08-02 | Stop reason: HOSPADM

## 2024-08-01 RX ORDER — LIDOCAINE 40 MG/G
CREAM TOPICAL ONCE
Status: DISCONTINUED | OUTPATIENT
Start: 2024-08-01 | End: 2024-08-02 | Stop reason: HOSPADM

## 2024-08-01 RX ORDER — LIDOCAINE 40 MG/G
CREAM TOPICAL ONCE
OUTPATIENT
Start: 2024-08-01 | End: 2024-08-01

## 2024-08-01 ASSESSMENT — PAIN DESCRIPTION - FREQUENCY: FREQUENCY: INTERMITTENT

## 2024-08-01 ASSESSMENT — PAIN DESCRIPTION - ORIENTATION: ORIENTATION: LEFT

## 2024-08-01 ASSESSMENT — PAIN - FUNCTIONAL ASSESSMENT: PAIN_FUNCTIONAL_ASSESSMENT: ACTIVITIES ARE NOT PREVENTED

## 2024-08-01 ASSESSMENT — PAIN DESCRIPTION - LOCATION: LOCATION: LEG

## 2024-08-01 ASSESSMENT — PAIN SCALES - GENERAL: PAINLEVEL_OUTOF10: 7

## 2024-08-01 ASSESSMENT — PAIN DESCRIPTION - PAIN TYPE: TYPE: ACUTE PAIN

## 2024-08-01 ASSESSMENT — PAIN DESCRIPTION - DESCRIPTORS: DESCRIPTORS: ACHING

## 2024-08-01 ASSESSMENT — PAIN DESCRIPTION - ONSET: ONSET: ON-GOING

## 2024-08-01 NOTE — PLAN OF CARE
Pt to the [facility] as a new return for wound assessment. Left venous medial lower leg wound noted. Inflamed mauro-wound, warm/not hot to the touch per jeffrey Pompa antibiotic called into pharmacy. PT states she's off of sulfa and colchine. Family at bedside. Pt to continue with documented plan of care and to follow up in the [facility] in 1 week. Pt. aware to call sooner with any problems or questions/concerns. MD orders and D/C instructions reviewed, pt verbalized understanding.

## 2024-08-02 NOTE — DISCHARGE INSTRUCTIONS
Wound Care Center Physician Orders and Discharge Instructions  [facility]  [address], [address]  [place] [address]  Telephone: [phone]      Fax: [phone]        Your home care company:   N/A     Your wound-care supplies will be provided by:  weekly wraps     NAME:  Anne Stokes   YOB: 1933  PRIMARY DIAGNOSIS FOR WOUND CARE CENTER:  Pyoderma gangrenosum/trauma     Wound cleansing:  Do not scrub or use excessive force.  Wash hands with soap and water before and after dressing changes.  Prior to applying a clean dressing, cleanse wound with normal saline, wound cleanser, or mild soap and water. Ask your physician or nurse before getting the wound(s) wet in the shower.                Wound care for home:    Left medial lower leg:  Aquaphor to dry skin   Marked area only-clobetasol, calmoseptine stacked 2x2 bolster  Build up ankles with specialist cast padding as needed  Foam to anterior ankle  Accuwrap lite (lightly wrapped)  Keep clean, dry, and intact all week    May take dressing off next Thursday and go back to wearing compression garments      Right Lower Leg:  Aquaphor to dry areas  Pt's personal compression garment (leave compression sock on all week, remove velcro compression garment nightly, place back on in am)        Your physician has ordered Compression for treatment of venous ulcers, venous insufficiency,and/or Lymphedema.   * Do not remove until your next scheduled visit in [facility]- do not get wet.    * If your wrap falls down, becomes painful or you have decreased sensation, and/or excessive drainage- please call the [facility] for RN visit to get your compression wrap changed   * You need to exercice as tolerated- walking is good   * Elevate your legs preferrably above level of your heart when sitting as much as possible   * The Goal of this therapy is to reduce Edema and get into long term compression garments to control venous insufficiency, lymphedema

## 2024-08-07 ENCOUNTER — TELEPHONE (OUTPATIENT)
Dept: CARDIOLOGY CLINIC | Age: 89
End: 2024-08-07

## 2024-08-07 NOTE — TELEPHONE ENCOUNTER
Date of Procedure: Wednesday 11/6/24 @ [place] [facility]     Procedure time: 2:00 pm     I called and spoke to Anne and she is agreeable to date and time. I emailed Anne all her Varithena information/instructions. Encouraged to call with any questions or concerns.

## 2024-08-08 ENCOUNTER — HOSPITAL ENCOUNTER (OUTPATIENT)
Dept: WOUND CARE | Age: 89
Discharge: HOME | End: 2024-08-08
Payer: MEDICARE

## 2024-08-08 VITALS
DIASTOLIC BLOOD PRESSURE: 87 MMHG | HEART RATE: 77 BPM | TEMPERATURE: 97.8 F | SYSTOLIC BLOOD PRESSURE: 133 MMHG | RESPIRATION RATE: 18 BRPM | BODY MASS INDEX: 22.63 KG/M2 | HEIGHT: 66 IN | WEIGHT: 140.8 LBS

## 2024-08-08 DIAGNOSIS — I87.2 LYMPHEDEMA DUE TO VENOUS INSUFFICIENCY: Primary | ICD-10-CM

## 2024-08-08 DIAGNOSIS — I89.0 LYMPHEDEMA DUE TO VENOUS INSUFFICIENCY: Primary | ICD-10-CM

## 2024-08-08 DIAGNOSIS — L97.921 ULCER OF LEFT LOWER LEG, LIMITED TO BREAKDOWN OF SKIN (HCC): ICD-10-CM

## 2024-08-08 PROCEDURE — 29581 APPL MULTLAYER CMPRN SYS LEG: CPT

## 2024-08-08 RX ORDER — LIDOCAINE HYDROCHLORIDE 40 MG/ML
SOLUTION TOPICAL ONCE
Status: CANCELLED | OUTPATIENT
Start: 2024-08-08 | End: 2024-08-08

## 2024-08-08 RX ORDER — LIDOCAINE 40 MG/G
CREAM TOPICAL ONCE
Status: CANCELLED | OUTPATIENT
Start: 2024-08-08 | End: 2024-08-08

## 2024-08-08 RX ORDER — LIDOCAINE 50 MG/G
OINTMENT TOPICAL ONCE
Status: CANCELLED | OUTPATIENT
Start: 2024-08-08 | End: 2024-08-08

## 2024-08-08 RX ORDER — LIDOCAINE 40 MG/G
CREAM TOPICAL ONCE
Status: DISCONTINUED | OUTPATIENT
Start: 2024-08-08 | End: 2024-08-09 | Stop reason: HOSPADM

## 2024-08-08 RX ORDER — TRIAMCINOLONE ACETONIDE 1 MG/G
OINTMENT TOPICAL ONCE
Status: CANCELLED | OUTPATIENT
Start: 2024-08-08 | End: 2024-08-08

## 2024-08-08 RX ORDER — BACITRACIN ZINC AND POLYMYXIN B SULFATE 500; 1000 [USP'U]/G; [USP'U]/G
OINTMENT TOPICAL ONCE
Status: CANCELLED | OUTPATIENT
Start: 2024-08-08 | End: 2024-08-08

## 2024-08-08 NOTE — PLAN OF CARE
Pt to the [facility] for follow up appointment.  Wound healed.  Will apply weekly compression wrap to lower leg.  Pt can remove compression wrap on next Thursday, gently wash leg, and apply velcro garment.  Pt to follow up in the [facility] as needed.  Discharge instructions reviewed with patient, all questions answered, copy given to patient. Dressings were applied to all wounds per M.D. Instructions at this visit.

## 2024-08-29 PROBLEM — L97.921 ULCER OF LEFT LOWER LEG, LIMITED TO BREAKDOWN OF SKIN (HCC): Status: RESOLVED | Noted: 2023-09-18 | Resolved: 2024-08-29

## 2024-08-29 PROBLEM — L03.116 CELLULITIS OF LEFT LOWER EXTREMITY: Status: RESOLVED | Noted: 2019-04-21 | Resolved: 2024-08-29

## 2024-09-12 NOTE — DISCHARGE INSTRUCTIONS
Wound Care Center Physician Orders and Discharge Instructions  [facility]  [address], [address]  [place] [address]  Telephone: [phone]      Fax: [phone]        Your home care company:   N/A     Your wound-care supplies will be provided by:  weekly wraps     NAME:  Anne Stokes   YOB: 1933  PRIMARY DIAGNOSIS FOR WOUND CARE CENTER:  Pyoderma gangrenosum     Wound cleansing:  Do not scrub or use excessive force.  Wash hands with soap and water before and after dressing changes.  Prior to applying a clean dressing, cleanse wound with normal saline, wound cleanser, or mild soap and water. Ask your physician or nurse before getting the wound(s) wet in the shower.                Wound care for home:     Left Lower Leg Wounds (distal and proximal medial ankle):     Aquaphor circumferential leg below knee entire for itching (where wrap will end)   Xeroform to purple proximal areas   Aquaphor to dry  Lotrisone to any rash   Two small areas marked in blue marker:  Tacrolimus and to pink mauro-wound on medial ankle   Nexodyn  Collagen   Hydrogel   Foam over wound  Specialist cast padding to build up lower leg   Compri-2 Lite-lightly wrapped   Leave in place all week.      Left Toes:   Per Dr. Patterson    Nexodyn  Collagen to 2nd toe  Xeroform to 2nd toe  Tubular Gauze doubled   Coban      Right Lower Leg:  Healed- Pt's personal compression garment            Your physician has ordered Compression for treatment of venous ulcers, venous insufficiency,and/or Lymphedema.   * Do not remove until your next scheduled visit in [facility]- do not get wet.    * If your wrap falls down, becomes painful or you have decreased sensation, and/or excessive drainage- please call the [facility] for RN visit to get your compression wrap changed   * You need to exercice as tolerated- walking is good   * Elevate your legs preferrably above level of your heart when sitting as much as possible   * The Goal of 
Unknown

## 2024-11-06 ENCOUNTER — PROCEDURE VISIT (OUTPATIENT)
Dept: CARDIOLOGY CLINIC | Age: 89
End: 2024-11-06

## 2024-11-06 DIAGNOSIS — I89.0 LYMPHEDEMA DUE TO VENOUS INSUFFICIENCY: Primary | ICD-10-CM

## 2024-11-06 DIAGNOSIS — I87.2 LYMPHEDEMA DUE TO VENOUS INSUFFICIENCY: Primary | ICD-10-CM

## 2024-11-06 NOTE — PROGRESS NOTES
[facility]   Venous Ablation Procedure Note      Patient Name: Anne Stokes  MRN: [id number]  Date of procedure: November 6, 2024  Dictating Physician: Gilberto Gilbert MD, [facility]   Pre-Operative Diagnosis: CEAP class 3 venous insufficiency with pain, tenderness, and edema and incompetent right  great saphenous vein (GSV), chronic venous insufficiency   Procedure Performed:Ultrasound guided microfoam chemical ablation with varithena of the right  GSV from below the saphenofemoral junction to the mid calf access site   Anesthesia: local 1% lidocaine cutaneous anesthetic   CPT codes:   59410    Indications for Procedure: This is a 89 y.o.female with severe symptomatic venous reflux disease despite 3 months of conservative medical therapy.   Procedure: The patient walked to the procedure room. All applicable staff donned appropriate apparel. A procedure time out was performed to confirm correct patient, correct extremity, correct procedure and correct room set including presence of all applicable supplies, devices and drugs. Monitoring devices were appropriately positioned. A duplex ultrasound scan confirmed the location and incompetence of the Great Saphenous Vein and its course marked on the skin together with the dilated tributaries. The extent of treatment of the Great Saphenous Vein and the (associated varicosities) was determined through Ultrasound mapping. The limb was prepped and sterile drapes were applied. A skin welt was made to facilitate access using 1.0% lidocaine at the chosen puncture site in the lower thigh overlying the Great Saphenous Vein. The skin was punctured through the anaesthetized skin with the venous access needle and advanced under ultrasound guidance and the vein(s) to be treated was punctured with a micro-puncture needle followed by passage of a 0.018 guide wire through the punctured needle and a 4 Nepalese dilator sheath. Catheter access was established to the target vessel(s)

## 2024-12-11 ENCOUNTER — APPOINTMENT (OUTPATIENT)
Dept: GENERAL RADIOLOGY | Age: 88
End: 2024-12-11
Payer: MEDICARE

## 2024-12-11 ENCOUNTER — APPOINTMENT (OUTPATIENT)
Dept: CT IMAGING | Age: 88
End: 2024-12-11
Payer: MEDICARE

## 2024-12-11 ENCOUNTER — HOSPITAL ENCOUNTER (EMERGENCY)
Age: 88
Discharge: ANOTHER HOSPITAL | End: 2024-12-12
Attending: EMERGENCY MEDICINE
Payer: MEDICARE

## 2024-12-11 DIAGNOSIS — J98.2 PNEUMOMEDIASTINUM (HCC): ICD-10-CM

## 2024-12-11 DIAGNOSIS — K22.3 ESOPHAGEAL PERFORATION: Primary | ICD-10-CM

## 2024-12-11 LAB
ALBUMIN SERPL-MCNC: 4.1 G/DL (ref 3.4–5)
ALBUMIN/GLOB SERPL: 1.8 {RATIO} (ref 1.1–2.2)
ALP SERPL-CCNC: 60 U/L (ref 40–129)
ALT SERPL-CCNC: 24 U/L (ref 10–40)
ANION GAP SERPL CALCULATED.3IONS-SCNC: 13 MMOL/L (ref 3–16)
AST SERPL-CCNC: 33 U/L (ref 15–37)
BASOPHILS # BLD: 0 K/UL (ref 0–0.2)
BASOPHILS NFR BLD: 0.3 %
BILIRUB SERPL-MCNC: 1.4 MG/DL (ref 0–1)
BUN SERPL-MCNC: 27 MG/DL (ref 7–20)
CALCIUM SERPL-MCNC: 10.6 MG/DL (ref 8.3–10.6)
CHLORIDE SERPL-SCNC: 106 MMOL/L (ref 99–110)
CO2 SERPL-SCNC: 22 MMOL/L (ref 21–32)
CREAT SERPL-MCNC: 0.8 MG/DL (ref 0.6–1.2)
DEPRECATED RDW RBC AUTO: 15.5 % (ref 12.4–15.4)
EOSINOPHIL # BLD: 0 K/UL (ref 0–0.6)
EOSINOPHIL NFR BLD: 0.4 %
GFR SERPLBLD CREATININE-BSD FMLA CKD-EPI: 69 ML/MIN/{1.73_M2}
GLUCOSE BLD-MCNC: 94 MG/DL (ref 70–99)
GLUCOSE SERPL-MCNC: 114 MG/DL (ref 70–99)
HCT VFR BLD AUTO: 45.3 % (ref 36–48)
HGB BLD-MCNC: 14.5 G/DL (ref 12–16)
INR PPP: 1.57 (ref 0.85–1.15)
LACTATE BLDV-SCNC: 1.6 MMOL/L (ref 0.4–2)
LIPASE SERPL-CCNC: 42 U/L (ref 13–60)
LYMPHOCYTES # BLD: 0.7 K/UL (ref 1–5.1)
LYMPHOCYTES NFR BLD: 6.7 %
MCH RBC QN AUTO: 32.9 PG (ref 26–34)
MCHC RBC AUTO-ENTMCNC: 32.1 G/DL (ref 31–36)
MCV RBC AUTO: 102.8 FL (ref 80–100)
MONOCYTES # BLD: 0.7 K/UL (ref 0–1.3)
MONOCYTES NFR BLD: 6.5 %
NEUTROPHILS # BLD: 9.6 K/UL (ref 1.7–7.7)
NEUTROPHILS NFR BLD: 86.1 %
NT-PROBNP SERPL-MCNC: 2069 PG/ML (ref 0–449)
PERFORMED ON: NORMAL
PLATELET # BLD AUTO: 137 K/UL (ref 135–450)
PMV BLD AUTO: 8.1 FL (ref 5–10.5)
POTASSIUM SERPL-SCNC: 4.4 MMOL/L (ref 3.5–5.1)
PROT SERPL-MCNC: 6.4 G/DL (ref 6.4–8.2)
PROTHROMBIN TIME: 18.9 SEC (ref 11.9–14.9)
RBC # BLD AUTO: 4.41 M/UL (ref 4–5.2)
SODIUM SERPL-SCNC: 141 MMOL/L (ref 136–145)
TROPONIN, HIGH SENSITIVITY: 30 NG/L (ref 0–14)
TROPONIN, HIGH SENSITIVITY: 31 NG/L (ref 0–14)
WBC # BLD AUTO: 11.1 K/UL (ref 4–11)

## 2024-12-11 PROCEDURE — 85610 PROTHROMBIN TIME: CPT

## 2024-12-11 PROCEDURE — 84484 ASSAY OF TROPONIN QUANT: CPT

## 2024-12-11 PROCEDURE — 99285 EMERGENCY DEPT VISIT HI MDM: CPT

## 2024-12-11 PROCEDURE — 71045 X-RAY EXAM CHEST 1 VIEW: CPT

## 2024-12-11 PROCEDURE — 70491 CT SOFT TISSUE NECK W/DYE: CPT

## 2024-12-11 PROCEDURE — 36415 COLL VENOUS BLD VENIPUNCTURE: CPT

## 2024-12-11 PROCEDURE — 83880 ASSAY OF NATRIURETIC PEPTIDE: CPT

## 2024-12-11 PROCEDURE — 96375 TX/PRO/DX INJ NEW DRUG ADDON: CPT

## 2024-12-11 PROCEDURE — 93005 ELECTROCARDIOGRAM TRACING: CPT | Performed by: EMERGENCY MEDICINE

## 2024-12-11 PROCEDURE — 80053 COMPREHEN METABOLIC PANEL: CPT

## 2024-12-11 PROCEDURE — 6360000004 HC RX CONTRAST MEDICATION: Performed by: EMERGENCY MEDICINE

## 2024-12-11 PROCEDURE — 96365 THER/PROPH/DIAG IV INF INIT: CPT

## 2024-12-11 PROCEDURE — 83690 ASSAY OF LIPASE: CPT

## 2024-12-11 PROCEDURE — 6360000002 HC RX W HCPCS: Performed by: EMERGENCY MEDICINE

## 2024-12-11 PROCEDURE — 83605 ASSAY OF LACTIC ACID: CPT

## 2024-12-11 PROCEDURE — 85025 COMPLETE CBC W/AUTO DIFF WBC: CPT

## 2024-12-11 PROCEDURE — 2580000003 HC RX 258: Performed by: EMERGENCY MEDICINE

## 2024-12-11 PROCEDURE — 96376 TX/PRO/DX INJ SAME DRUG ADON: CPT

## 2024-12-11 PROCEDURE — 96367 TX/PROPH/DG ADDL SEQ IV INF: CPT

## 2024-12-11 RX ORDER — VANCOMYCIN HYDROCHLORIDE 1.5 G/300ML
25 INJECTION, SOLUTION INTRAVITREAL ONCE
Status: COMPLETED | OUTPATIENT
Start: 2024-12-11 | End: 2024-12-12

## 2024-12-11 RX ORDER — 0.9 % SODIUM CHLORIDE 0.9 %
500 INTRAVENOUS SOLUTION INTRAVENOUS ONCE
Status: COMPLETED | OUTPATIENT
Start: 2024-12-11 | End: 2024-12-11

## 2024-12-11 RX ORDER — MORPHINE SULFATE 2 MG/ML
2 INJECTION, SOLUTION INTRAMUSCULAR; INTRAVENOUS ONCE
Status: COMPLETED | OUTPATIENT
Start: 2024-12-11 | End: 2024-12-11

## 2024-12-11 RX ORDER — FLUCONAZOLE 2 MG/ML
200 INJECTION, SOLUTION INTRAVENOUS ONCE
Status: DISCONTINUED | OUTPATIENT
Start: 2024-12-11 | End: 2024-12-12 | Stop reason: HOSPADM

## 2024-12-11 RX ORDER — ONDANSETRON 2 MG/ML
4 INJECTION INTRAMUSCULAR; INTRAVENOUS ONCE
Status: COMPLETED | OUTPATIENT
Start: 2024-12-11 | End: 2024-12-11

## 2024-12-11 RX ORDER — IOPAMIDOL 755 MG/ML
75 INJECTION, SOLUTION INTRAVASCULAR
Status: COMPLETED | OUTPATIENT
Start: 2024-12-11 | End: 2024-12-11

## 2024-12-11 RX ORDER — METRONIDAZOLE 500 MG/100ML
500 INJECTION, SOLUTION INTRAVENOUS ONCE
Status: COMPLETED | OUTPATIENT
Start: 2024-12-11 | End: 2024-12-11

## 2024-12-11 RX ADMIN — SODIUM CHLORIDE 500 ML: 9 INJECTION, SOLUTION INTRAVENOUS at 19:15

## 2024-12-11 RX ADMIN — ONDANSETRON 4 MG: 2 INJECTION, SOLUTION INTRAMUSCULAR; INTRAVENOUS at 19:16

## 2024-12-11 RX ADMIN — MEROPENEM 1000 MG: 1 INJECTION INTRAVENOUS at 21:30

## 2024-12-11 RX ADMIN — MORPHINE SULFATE 2 MG: 2 INJECTION, SOLUTION INTRAMUSCULAR; INTRAVENOUS at 23:02

## 2024-12-11 RX ADMIN — VANCOMYCIN HYDROCHLORIDE 1500 MG: 1.5 INJECTION, SOLUTION INTRAVITREAL at 23:05

## 2024-12-11 RX ADMIN — IOPAMIDOL 75 ML: 755 INJECTION, SOLUTION INTRAVENOUS at 20:25

## 2024-12-11 RX ADMIN — MORPHINE SULFATE 2 MG: 2 INJECTION, SOLUTION INTRAMUSCULAR; INTRAVENOUS at 19:16

## 2024-12-11 RX ADMIN — METRONIDAZOLE 500 MG: 500 SOLUTION INTRAVENOUS at 22:05

## 2024-12-11 RX ADMIN — MORPHINE SULFATE 2 MG: 2 INJECTION, SOLUTION INTRAMUSCULAR; INTRAVENOUS at 22:09

## 2024-12-11 ASSESSMENT — PAIN DESCRIPTION - LOCATION
LOCATION: FLANK
LOCATION: THROAT
LOCATION: THROAT

## 2024-12-11 ASSESSMENT — PAIN SCALES - GENERAL
PAINLEVEL_OUTOF10: 8

## 2024-12-11 NOTE — ED PROVIDER NOTES
[facility]  ED  EMERGENCY DEPARTMENT ENCOUNTER        Pt Name: Anne Stokes  MRN: [id number]  Birthdate 2/3/1933  Date of evaluation: 12/11/2024  Provider: Jose Juan Blake MD  PCP: Jose Manuel Noble DO      CHIEF COMPLAINT       Chief Complaint   Patient presents with    Post-op Problem     Pt had transesophageal echo performed today. She has c/o 10/10 throat pain, neck, and ear pain accompanied by blood tinged spit.        HISTORY OFPRESENT ILLNESS   (Location/Symptom, Timing/Onset, Context/Setting, Quality, Duration, Modifying Factors,Severity)  Note limiting factors.     Anne Stokes is a 89 y.o. female presenting today due to concern for having a SHARLENE at [facility] with cardiology earlier today and stating she got done around noon and initially went home without any issues but a few hours later started getting a lot of neck pain more on the right side along with hoarse voice and some blood-tinged sputum.  She reports slight upper chest discomfort but mainly reports having neck pain.  She feels slightly short of breath.  She denies any fever.  She is on Eliquis although reports not taking it yesterday due to needing the procedure today.  No reported falls or trauma.  She states that they tried 3 times to do the echocardiogram in order to get the imaging.  Due to concern for significant neck pain, she was brought to the ED by EMS for further evaluation.        REVIEW OF SYSTEMS    (2-9 systems for level 4, 10 or more for level 5)     Review of Systems   Constitutional:  Negative for chills and fever.   HENT:  Positive for trouble swallowing and voice change. Negative for dental problem and facial swelling.    Eyes:  Negative for pain and visual disturbance.   Respiratory:  Positive for cough and shortness of breath.    Cardiovascular:  Positive for chest pain (upper).   Gastrointestinal:  Negative for abdominal pain and vomiting.   Musculoskeletal:  Positive for neck pain. Negative for  MD Hayden (electronically signed)            Jose Juan Blake MD  12/13/24 [id number]

## 2024-12-11 NOTE — ED TRIAGE NOTES
Pt arrives via squad from home for post procedural issue. Pt had transesophageal echo performed today. She has c/o 10/10 throat pain, neck, and ear pain accompanied by blood tinged spit.     Pt is alert and oriented at time of triage and reports that she uses a walker at baseline.

## 2024-12-12 VITALS
HEART RATE: 85 BPM | DIASTOLIC BLOOD PRESSURE: 75 MMHG | BODY MASS INDEX: 23.08 KG/M2 | OXYGEN SATURATION: 92 % | TEMPERATURE: 98.3 F | RESPIRATION RATE: 16 BRPM | WEIGHT: 143 LBS | SYSTOLIC BLOOD PRESSURE: 97 MMHG

## 2024-12-12 LAB
EKG DIAGNOSIS: NORMAL
EKG Q-T INTERVAL: 362 MS
EKG QRS DURATION: 88 MS
EKG QTC CALCULATION (BAZETT): 445 MS
EKG R AXIS: 124 DEGREES
EKG T AXIS: -15 DEGREES
EKG VENTRICULAR RATE: 91 BPM

## 2024-12-12 PROCEDURE — 93010 ELECTROCARDIOGRAM REPORT: CPT | Performed by: INTERNAL MEDICINE

## 2024-12-12 NOTE — CONSULTS
Placed call to [facility] @ 2110  RE:  esophageal perforation - MD Flavio Begum MD called back @ 2121

## 2024-12-12 NOTE — CONSULTS
Pharmacy Note  Vancomycin Consult for ED       Consult received from Dr. Blake to dose Vancomycin x1 for treatment of Intra-abdominal infection.     Allergies:  Sulfamethoxazole-trimethoprim, Augmentin [amoxicillin-pot clavulanate], Dapsone, Erythromycin, Gabapentin, Hydrochlorothiazide, Minocycline, Silver sulfadiazine, and Doxycycline      Tmax: 98.3 F    Recent Labs     12/11/24 1918   CREATININE 0.8     Estimated Creatinine Clearance: 43 mL/min (based on SCr of 0.8 mg/dL).    Recent Labs     12/11/24 1918   WBC 11.1*       Wt Readings from Last 1 Encounters:   12/11/24 64.9 kg (143 lb)         Vancomycin 1500 mg (~ 25 mg/kg) IV once x1 ordered.    Please re-consult if admitted & would like Vancomycin to continue.      Thank you for the consult.   Tati Ocampo, PharmD 12/11/2024 9:40 PM

## 2024-12-12 NOTE — ED NOTES
When writer pulled 2 mg morphine, did not realize glass container of medication was broken until upon entering patient's soon, 2mg of morphine wasted with Jessica DAI and another 2mg dose of morphine pulled for patient.

## 2024-12-13 ASSESSMENT — ENCOUNTER SYMPTOMS
FACIAL SWELLING: 0
VOMITING: 0
COUGH: 1
TROUBLE SWALLOWING: 1
EYE PAIN: 0
ABDOMINAL PAIN: 0
VOICE CHANGE: 1
SHORTNESS OF BREATH: 1

## 2024-12-27 ENCOUNTER — TELEPHONE (OUTPATIENT)
Dept: WOUND CARE | Age: 88
End: 2024-12-27

## 2024-12-27 NOTE — PROGRESS NOTES
Pt called asking if a new order could be sent for compression socks and wraps for both legs. F:[phone]

## 2024-12-30 NOTE — TELEPHONE ENCOUNTER
The patient called to request new compression socks be ordered. She said they are called [employer] size medium 15-25.    She provided the fax number [phone]

## 2025-01-15 NOTE — PROGRESS NOTES
Pt returned call to wound care. She called [facility] and they informed her they have not received any orders.

## 2025-01-15 NOTE — TELEPHONE ENCOUNTER
Patient left voicemail asking if garments were supposed to be delivered to her house or if they were being delivered at the [facility]

## 2025-02-11 NOTE — PROGRESS NOTES
[facility] Progress Note    Prosper Bernstein     : 2/3/1933    DATE OF VISIT:  7/15/2022    Subjective: Prosper Bernstein is a 80 y.o. female who has a venous ulcer located on the bilateral, medial ankle. Significant symptoms or pertinent wound history since last visit: feeling about the same overall -- I get a sense that some of her edema- and infection-related pain was replaced with debridement- and compression-related pain. Doing fairly well with daytime Tylenol and nighttime tramadol. Some GI upset with the Abx that I called in on Monday, when her culture came back, though nothing severe. No F/C/D, diarrhea, rash, pruritus. Additional ulcer(s) noted? no.      Her current medication list consists of Denosumab, Tocilizumab, acetaminophen, apixaban, furosemide, losartan, and metoprolol succinate. Also has some tramadol for PRN nighttime use. Has had about 4 days of Bactrim and linezolid so far. She thinks the latter is the one that's upsetting her stomach.      Allergies: Erythromycin, Hydrochlorothiazide, Silver sulfadiazine, and Doxycycline    Objective:     Vitals:    07/15/22 1003   BP: (!) 162/87   Pulse: 61   Resp: 18   Temp: 98.8 °F (37.1 °C)   TempSrc: Oral   Weight: 150 lb (68 kg)   Height: 5' 4\" (1.626 m)     Constitutional:  well-developed, well-nourished, fatigued, NAD, in some discomfort  Psychiatric:  oriented to person, place and time; mood and affect appropriate for the situation  Eyes:  pupils equal, round and reactive to light; sclerae anicteric, conjunctivae not pale  Cardiovascular:  bilateral pedal pulses palpable, feet warm, good cap refill; trace left lower extremity edema, more mild-moderate right sided, usual hemosiderin and atrophe miroslava, and some patchy areas of indurated skin thickening - edema a bit better than last week  Lymphatic:  no inguinal or popliteal adenopathy, no angitis, and I think a resolving RLE cellulitis  Neuro: still definite allodynia in the wound beds  Musculoskeletal:  no clubbing, cyanosis or petechiae; RLE and LLE with no gross effusions, joint misalignment or acute arthritis  Gay-ulcer skin: indurated, pink, warm and dry on the left, but some fading redness and mild maceration on the right  Ulcer(s): left side very small, pale, white, fibrotic, a bit of fibrin and biofilm; right side a larger cluster, a bit of pink and red tissue, but mostly fibronecrotic, into fat tissue, some biofilm, serous exudate, no pus, perhaps a bit less inflamed than last week. Photos also saved in electronic chart. Today's wound measurements, per RN documentation:  Wound 07/15/22 # 3 Rt Medial lower Leg, Venous, Partial Thickness, Onset 06/22-Wound Length (cm): 6.5 cm    Wound 07/15/22 # 3 Rt Medial lower Leg, Venous, Partial Thickness, Onset 06/22-Wound Width (cm): 3.5 cm    Wound 07/15/22 # 3 Rt Medial lower Leg, Venous, Partial Thickness, Onset 06/22-Wound Depth (cm): 0.2 cm  _________________    WCx from last week -- light growth of Stenotrophomonas, VRE, and two CONS. Not sure which one of those was really pathogenic, but she did look cellulitic last week, looks better in that regard this week.     Assessment:     Patient Active Problem List   Diagnosis Code    HTN (hypertension) I10    Localized osteoarthrosis, lower leg M17.10    Eczema L30.9    Foot drop, right M21.371    Lumbosacral spondylosis without myelopathy M47.817    Normocytic anemia D64.9    Peripheral neuropathy G62.9    Senile osteoporosis M81.0    Bilateral lower extremity edema R60.0    CKD (chronic kidney disease) stage 3, GFR 30-59 ml/min ([facility]) N18.30    Peripheral venous insufficiency I87.2    Ulcer of right ankle, with fat layer exposed ([facility] [address] L97.312    PMR (polymyalgia rheumatica) ([facility]) M35.3    Giant cell arteritis ([facility]) M31.6    Acute respiratory failure due to severe acute respiratory syndrome coronavirus 2 (SARS-CoV-2) infection ([facility]) U07.1, J96.00    Current chronic use of systemic steroids Z79.52    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 Factors contributing to occurrence and/or persistence of the chronic ulcer include edema and venous stasis. Medical necessity of today's visit is shown by the above documentation. Sharp debridement is indicated today, based upon the exam findings in the wound(s) above. Procedure note:     Consent obtained. Time out performed per [facility] Man[facility]anares policy. Anesthetic  Anesthetic: 4% Lidocaine Cream     Using a curette and #15 blade scalpel, I sharply debrided the bilateral, medial ankle ulcer(s) down through and including the removal of subcutaneous tissue. The type(s) of tissue debrided included fibrin, biofilm, and necrotic/eschar. Total Surface Area Debrided: about 15 sq cm. The ulcers were then irrigated with normal saline solution. The procedure was completed with a small amount of bleeding, and hemostasis was with pressure. The patient tolerated the procedure well, with no significant complications. The patient's level of pain during and after the procedure was monitored. Post-debridement measurements, if different from pre-debridement, are in the flowsheet as well. Discharge plan:     Treatment in the wound care center today, per RN documentation: Wound 07/15/22 # 3 Rt Medial lower Leg, Venous, Partial Thickness, Onset 06/22-Dressing/Treatment: Other (comment) (triad,OpAg,ABD,[id number])  Wound 07/15/22 Wound #4 Left Inner Ankle, Venous, Partial (onset 6/2022)-Dressing/Treatment: Other (comment) (triad,pso,4x4s,[id number]).     Per physician order, bilateral application of Female

## 2025-02-20 ENCOUNTER — APPOINTMENT (OUTPATIENT)
Dept: GENERAL RADIOLOGY | Age: 89
DRG: 291 | End: 2025-02-20
Payer: MEDICARE

## 2025-02-20 ENCOUNTER — HOSPITAL ENCOUNTER (INPATIENT)
Age: 89
LOS: 3 days | Discharge: HOME HEALTH | DRG: 291 | End: 2025-02-23
Attending: EMERGENCY MEDICINE | Admitting: INTERNAL MEDICINE
Payer: MEDICARE

## 2025-02-20 DIAGNOSIS — I50.9 ACUTE EXACERBATION OF CHRONIC HEART FAILURE (HCC): Primary | ICD-10-CM

## 2025-02-20 DIAGNOSIS — R60.0 BILATERAL LOWER EXTREMITY EDEMA: ICD-10-CM

## 2025-02-20 DIAGNOSIS — I73.9 PVD (PERIPHERAL VASCULAR DISEASE): ICD-10-CM

## 2025-02-20 DIAGNOSIS — R79.89 ELEVATED TROPONIN: ICD-10-CM

## 2025-02-20 DIAGNOSIS — R06.09 DOE (DYSPNEA ON EXERTION): ICD-10-CM

## 2025-02-20 DIAGNOSIS — R74.01 TRANSAMINITIS: ICD-10-CM

## 2025-02-20 DIAGNOSIS — R53.1 GENERALIZED WEAKNESS: ICD-10-CM

## 2025-02-20 DIAGNOSIS — L97.501 SKIN ULCER OF TOE, LIMITED TO BREAKDOWN OF SKIN, UNSPECIFIED LATERALITY (HCC): ICD-10-CM

## 2025-02-20 DIAGNOSIS — J90 PLEURAL EFFUSION ON LEFT: ICD-10-CM

## 2025-02-20 PROBLEM — I50.33 ACUTE ON CHRONIC DIASTOLIC CHF (CONGESTIVE HEART FAILURE) (HCC): Status: ACTIVE | Noted: 2025-02-20

## 2025-02-20 LAB
ALBUMIN SERPL-MCNC: 3.9 G/DL (ref 3.4–5)
ALBUMIN/GLOB SERPL: 1.3 {RATIO} (ref 1.1–2.2)
ALP SERPL-CCNC: 151 U/L (ref 40–129)
ALT SERPL-CCNC: 67 U/L (ref 10–40)
ANION GAP SERPL CALCULATED.3IONS-SCNC: 11 MMOL/L (ref 3–16)
AST SERPL-CCNC: 57 U/L (ref 15–37)
BASOPHILS # BLD: 0 K/UL (ref 0–0.2)
BASOPHILS NFR BLD: 0.2 %
BILIRUB SERPL-MCNC: 1.1 MG/DL (ref 0–1)
BILIRUB UR QL STRIP.AUTO: NEGATIVE
BUN SERPL-MCNC: 37 MG/DL (ref 7–20)
CALCIUM SERPL-MCNC: 10.4 MG/DL (ref 8.3–10.6)
CHLORIDE SERPL-SCNC: 104 MMOL/L (ref 99–110)
CLARITY UR: CLEAR
CO2 SERPL-SCNC: 23 MMOL/L (ref 21–32)
COLOR UR: YELLOW
CREAT SERPL-MCNC: 1 MG/DL (ref 0.6–1.2)
DEPRECATED RDW RBC AUTO: 15.7 % (ref 12.4–15.4)
EKG DIAGNOSIS: NORMAL
EKG Q-T INTERVAL: 360 MS
EKG QRS DURATION: 98 MS
EKG QTC CALCULATION (BAZETT): 399 MS
EKG R AXIS: 134 DEGREES
EKG T AXIS: 2 DEGREES
EKG VENTRICULAR RATE: 74 BPM
EOSINOPHIL # BLD: 0 K/UL (ref 0–0.6)
EOSINOPHIL NFR BLD: 0.6 %
FLUAV RNA RESP QL NAA+PROBE: NOT DETECTED
FLUBV RNA RESP QL NAA+PROBE: NOT DETECTED
GFR SERPLBLD CREATININE-BSD FMLA CKD-EPI: 53 ML/MIN/{1.73_M2}
GLUCOSE BLD-MCNC: 105 MG/DL (ref 70–99)
GLUCOSE SERPL-MCNC: 109 MG/DL (ref 70–99)
GLUCOSE UR STRIP.AUTO-MCNC: 100 MG/DL
HCT VFR BLD AUTO: 45.7 % (ref 36–48)
HGB BLD-MCNC: 15.4 G/DL (ref 12–16)
HGB UR QL STRIP.AUTO: NEGATIVE
INR PPP: 1.39 (ref 0.85–1.15)
KETONES UR STRIP.AUTO-MCNC: NEGATIVE MG/DL
LACTATE BLDV-SCNC: 2.2 MMOL/L (ref 0.4–1.9)
LACTATE BLDV-SCNC: 2.2 MMOL/L (ref 0.4–1.9)
LEUKOCYTE ESTERASE UR QL STRIP.AUTO: NEGATIVE
LYMPHOCYTES # BLD: 0.7 K/UL (ref 1–5.1)
LYMPHOCYTES NFR BLD: 8.2 %
MCH RBC QN AUTO: 34.1 PG (ref 26–34)
MCHC RBC AUTO-ENTMCNC: 33.7 G/DL (ref 31–36)
MCV RBC AUTO: 101.1 FL (ref 80–100)
MONOCYTES # BLD: 0.6 K/UL (ref 0–1.3)
MONOCYTES NFR BLD: 7 %
NEUTROPHILS # BLD: 7 K/UL (ref 1.7–7.7)
NEUTROPHILS NFR BLD: 84 %
NITRITE UR QL STRIP.AUTO: NEGATIVE
NT-PROBNP SERPL-MCNC: 2194 PG/ML (ref 0–449)
PERFORMED ON: ABNORMAL
PH UR STRIP.AUTO: 5.5 [PH] (ref 5–8)
PLATELET # BLD AUTO: 196 K/UL (ref 135–450)
PMV BLD AUTO: 7.7 FL (ref 5–10.5)
POTASSIUM SERPL-SCNC: 4.6 MMOL/L (ref 3.5–5.1)
PROT SERPL-MCNC: 7 G/DL (ref 6.4–8.2)
PROT UR STRIP.AUTO-MCNC: NEGATIVE MG/DL
PROTHROMBIN TIME: 17.3 SEC (ref 11.9–14.9)
RBC # BLD AUTO: 4.52 M/UL (ref 4–5.2)
SARS-COV-2 RNA RESP QL NAA+PROBE: NOT DETECTED
SODIUM SERPL-SCNC: 138 MMOL/L (ref 136–145)
SP GR UR STRIP.AUTO: 1.01 (ref 1–1.03)
TROPONIN, HIGH SENSITIVITY: 27 NG/L (ref 0–14)
TROPONIN, HIGH SENSITIVITY: 28 NG/L (ref 0–14)
UA COMPLETE W REFLEX CULTURE PNL UR: ABNORMAL
UA DIPSTICK W REFLEX MICRO PNL UR: ABNORMAL
URN SPEC COLLECT METH UR: ABNORMAL
UROBILINOGEN UR STRIP-ACNC: 0.2 E.U./DL
WBC # BLD AUTO: 8.4 K/UL (ref 4–11)

## 2025-02-20 PROCEDURE — 93010 ELECTROCARDIOGRAM REPORT: CPT | Performed by: INTERNAL MEDICINE

## 2025-02-20 PROCEDURE — 2500000003 HC RX 250 WO HCPCS: Performed by: INTERNAL MEDICINE

## 2025-02-20 PROCEDURE — 96374 THER/PROPH/DIAG INJ IV PUSH: CPT

## 2025-02-20 PROCEDURE — 93005 ELECTROCARDIOGRAM TRACING: CPT | Performed by: PHYSICIAN ASSISTANT

## 2025-02-20 PROCEDURE — 87636 SARSCOV2 & INF A&B AMP PRB: CPT

## 2025-02-20 PROCEDURE — 81003 URINALYSIS AUTO W/O SCOPE: CPT

## 2025-02-20 PROCEDURE — 71045 X-RAY EXAM CHEST 1 VIEW: CPT

## 2025-02-20 PROCEDURE — 6370000000 HC RX 637 (ALT 250 FOR IP): Performed by: INTERNAL MEDICINE

## 2025-02-20 PROCEDURE — 83605 ASSAY OF LACTIC ACID: CPT

## 2025-02-20 PROCEDURE — 85610 PROTHROMBIN TIME: CPT

## 2025-02-20 PROCEDURE — 2060000000 HC ICU INTERMEDIATE R&B

## 2025-02-20 PROCEDURE — 6360000002 HC RX W HCPCS: Performed by: EMERGENCY MEDICINE

## 2025-02-20 PROCEDURE — 99285 EMERGENCY DEPT VISIT HI MDM: CPT

## 2025-02-20 PROCEDURE — 85025 COMPLETE CBC W/AUTO DIFF WBC: CPT

## 2025-02-20 PROCEDURE — 36415 COLL VENOUS BLD VENIPUNCTURE: CPT

## 2025-02-20 PROCEDURE — 80053 COMPREHEN METABOLIC PANEL: CPT

## 2025-02-20 PROCEDURE — 84484 ASSAY OF TROPONIN QUANT: CPT

## 2025-02-20 PROCEDURE — 83880 ASSAY OF NATRIURETIC PEPTIDE: CPT

## 2025-02-20 RX ORDER — POTASSIUM CHLORIDE 1500 MG/1
40 TABLET, EXTENDED RELEASE ORAL PRN
Status: DISCONTINUED | OUTPATIENT
Start: 2025-02-20 | End: 2025-02-23 | Stop reason: HOSPADM

## 2025-02-20 RX ORDER — ACETAMINOPHEN 650 MG/1
650 SUPPOSITORY RECTAL EVERY 6 HOURS PRN
Status: DISCONTINUED | OUTPATIENT
Start: 2025-02-20 | End: 2025-02-23 | Stop reason: HOSPADM

## 2025-02-20 RX ORDER — PROCHLORPERAZINE EDISYLATE 5 MG/ML
10 INJECTION INTRAMUSCULAR; INTRAVENOUS EVERY 6 HOURS PRN
Status: DISCONTINUED | OUTPATIENT
Start: 2025-02-20 | End: 2025-02-23 | Stop reason: HOSPADM

## 2025-02-20 RX ORDER — FUROSEMIDE 10 MG/ML
40 INJECTION INTRAMUSCULAR; INTRAVENOUS ONCE
Status: COMPLETED | OUTPATIENT
Start: 2025-02-20 | End: 2025-02-20

## 2025-02-20 RX ORDER — PREDNISONE 20 MG/1
20 TABLET ORAL DAILY
Status: DISPENSED | OUTPATIENT
Start: 2025-02-20 | End: 2025-02-23

## 2025-02-20 RX ORDER — SODIUM CHLORIDE 0.9 % (FLUSH) 0.9 %
5-40 SYRINGE (ML) INJECTION EVERY 12 HOURS SCHEDULED
Status: DISCONTINUED | OUTPATIENT
Start: 2025-02-20 | End: 2025-02-23 | Stop reason: HOSPADM

## 2025-02-20 RX ORDER — ACETAMINOPHEN 325 MG/1
650 TABLET ORAL EVERY 6 HOURS PRN
Status: DISCONTINUED | OUTPATIENT
Start: 2025-02-20 | End: 2025-02-23 | Stop reason: HOSPADM

## 2025-02-20 RX ORDER — METOPROLOL SUCCINATE 50 MG/1
100 TABLET, EXTENDED RELEASE ORAL DAILY
Status: DISCONTINUED | OUTPATIENT
Start: 2025-02-20 | End: 2025-02-23 | Stop reason: HOSPADM

## 2025-02-20 RX ORDER — POLYETHYLENE GLYCOL 3350 17 G/17G
17 POWDER, FOR SOLUTION ORAL DAILY PRN
Status: DISCONTINUED | OUTPATIENT
Start: 2025-02-20 | End: 2025-02-23 | Stop reason: HOSPADM

## 2025-02-20 RX ORDER — METHOCARBAMOL 500 MG/1
500 TABLET, FILM COATED ORAL 4 TIMES DAILY
Status: DISCONTINUED | OUTPATIENT
Start: 2025-02-20 | End: 2025-02-23 | Stop reason: HOSPADM

## 2025-02-20 RX ORDER — FUROSEMIDE 10 MG/ML
40 INJECTION INTRAMUSCULAR; INTRAVENOUS 2 TIMES DAILY
Status: DISCONTINUED | OUTPATIENT
Start: 2025-02-20 | End: 2025-02-22

## 2025-02-20 RX ORDER — SODIUM CHLORIDE 0.9 % (FLUSH) 0.9 %
5-40 SYRINGE (ML) INJECTION PRN
Status: DISCONTINUED | OUTPATIENT
Start: 2025-02-20 | End: 2025-02-23 | Stop reason: HOSPADM

## 2025-02-20 RX ORDER — SODIUM CHLORIDE 9 MG/ML
INJECTION, SOLUTION INTRAVENOUS PRN
Status: DISCONTINUED | OUTPATIENT
Start: 2025-02-20 | End: 2025-02-23 | Stop reason: HOSPADM

## 2025-02-20 RX ORDER — POTASSIUM CHLORIDE 7.45 MG/ML
10 INJECTION INTRAVENOUS PRN
Status: DISCONTINUED | OUTPATIENT
Start: 2025-02-20 | End: 2025-02-23 | Stop reason: HOSPADM

## 2025-02-20 RX ORDER — MAGNESIUM SULFATE IN WATER 40 MG/ML
2000 INJECTION, SOLUTION INTRAVENOUS PRN
Status: DISCONTINUED | OUTPATIENT
Start: 2025-02-20 | End: 2025-02-23 | Stop reason: HOSPADM

## 2025-02-20 RX ADMIN — ACETAMINOPHEN 650 MG: 325 TABLET ORAL at 21:51

## 2025-02-20 RX ADMIN — APIXABAN 2.5 MG: 2.5 TABLET, FILM COATED ORAL at 21:52

## 2025-02-20 RX ADMIN — SODIUM CHLORIDE, PRESERVATIVE FREE 10 ML: 5 INJECTION INTRAVENOUS at 21:55

## 2025-02-20 RX ADMIN — FUROSEMIDE 40 MG: 10 INJECTION, SOLUTION INTRAMUSCULAR; INTRAVENOUS at 13:18

## 2025-02-20 RX ADMIN — METHOCARBAMOL TABLETS 500 MG: 500 TABLET, COATED ORAL at 21:52

## 2025-02-20 ASSESSMENT — PAIN DESCRIPTION - LOCATION
LOCATION: ANKLE
LOCATION: TOE (COMMENT WHICH ONE)

## 2025-02-20 ASSESSMENT — PAIN SCALES - GENERAL
PAINLEVEL_OUTOF10: 3
PAINLEVEL_OUTOF10: 0
PAINLEVEL_OUTOF10: 0
PAINLEVEL_OUTOF10: 7

## 2025-02-20 ASSESSMENT — PAIN DESCRIPTION - ORIENTATION
ORIENTATION: LEFT
ORIENTATION: RIGHT

## 2025-02-20 ASSESSMENT — PAIN SCALES - WONG BAKER
WONGBAKER_NUMERICALRESPONSE: NO HURT

## 2025-02-20 ASSESSMENT — PAIN DESCRIPTION - DESCRIPTORS
DESCRIPTORS: ACHING
DESCRIPTORS: ACHING

## 2025-02-20 ASSESSMENT — PAIN DESCRIPTION - FREQUENCY: FREQUENCY: INTERMITTENT

## 2025-02-20 ASSESSMENT — PAIN - FUNCTIONAL ASSESSMENT
PAIN_FUNCTIONAL_ASSESSMENT: 0-10
PAIN_FUNCTIONAL_ASSESSMENT: ACTIVITIES ARE NOT PREVENTED

## 2025-02-20 ASSESSMENT — PAIN DESCRIPTION - PAIN TYPE: TYPE: ACUTE PAIN

## 2025-02-20 NOTE — ED PROVIDER NOTES
[facility] EMERGENCY DEPARTMENT     EMERGENCY DEPARTMENT ENCOUNTER     Location: [facility] EMERGENCY DEPARTMENT  2/20/2025  Note Started: 12:25 PM EST 2/20/25      Patient Identification  Anne Stokes is a 89 y.o. female  Chief Complaint   Patient presents with    Leg Swelling    Leg Pain    Circulatory Problem     Patient was at a doctors appt today due to bilateral leg swelling and pain. Pt reports she was told she has peripheral vascular disease and was told to go to the ED immediately for evaluation and treatment. Pt reporting no pain at this time. Says she just feels fatigued with very little strength.     Fatigue       HPI:Anne Stokes was evaluated in the Emergency Department for foot swelling.  Patient reports that she has been trying to get a hold of her cardiologist to [facility] for the last 3 days and has not gotten a call back.  She has noticed increasing leg swelling of both legs and both feet after being discontinued from furosemide treatment recently.  She has self reinitiated furosemide starting yesterday.  She denies any chest pain or fevers.  She also noticed the bluish discoloration of her feet and a wound on the second toe of each foot and for this reason came in. Although initial history and physical exam information was obtained by REECE/NPP/MD/DO (who also dictated a record of this visit), I personally saw the patient and performed and made/approved the management plan and take responsibility for the patient management.    PHYSICAL EXAM:  Patient has severe pitting edema of both lower extremities.  There is palpable DP pulse on both sides though.  There does appear to be a small ulcerated lesion on the dorsum of the second toe on each foot.  No definite cellulitis although both feet are purplish in color.    EKG Interpreted by swapna Stokes A-fib rate of 74, right axis deviation, no ischemic findings, no significant change from EKG dated 11 December 2024    XR CHEST PORTABLE 
1201 02/20/25 1326   BP: (!) 136/109 (!) 136/109 (!) 132/101   Pulse: 77  76   Resp: 18 20 18   Temp: 97.8 °F (36.6 °C) 97.8 °F (36.6 °C)    TempSrc:  Oral    SpO2: 100%  97%   Weight: 66.2 kg (146 lb)         Patient was given the following medications:  Medications   furosemide (LASIX) injection 40 mg (40 mg IntraVENous Given 2/20/25 1318)       Patient was evaluated by both myself and MD Natalio.     CC/HPI Summary, DDx, ED course, and Reassessment: Patient here with feelings of generalized weakness, dyspnea on exertion and lower extremity edema with a purpleish discoloration to her feet and toes.  Differential diagnoses: ACS, CHF, pulmonary edema, pleural effusions, pneumonia, UTI, anemia, DARVIN, peripheral vascular disease, venous stasis    The patient was evaluated in ED room 8  Vital signs Blood pressure (!) 132/101, pulse 76, temperature 97.8 °F (36.6 °C), temperature source Oral, resp. rate 18, weight 66.2 kg (146 lb), SpO2 97%.    Patient has quite a bit of edema to her lower legs and feet.  Palpating her distal pulses was a little difficult but bilateral pedal and posterior tibialis pulses were able to be easily confirmed with Doppler.  EKG reveals A-fib, rate 74  CBC white count 8.4 with H&H 15.4 and 45.7  CMP glucose 109, BUN is 37 and creatinine 1.0.  Total bilirubin is slightly elevated 1.1 with alk phos 151, ALT 67 and AST 57  Troponin 28, repeat 27  BNP 2194  PT 17.3 and INR 1.39  Lactate 2.2  UA negative  Portable chest x-ray: Small left pleural effusion with adjacent atelectasis.  Cardiomegaly.    Patient was reassessed and she remains hemodynamically stable.  She is not hypoxic.  Again there was concern regarding blood flow to her feet because of the purpleish discoloration but pulses are easily assessed and even more so with Doppler.  She appears fluid overloaded and has symptoms consistent with that as well.  She is given Lasix 40 mg IV and we will plan to admit for acute on chronic

## 2025-02-20 NOTE — CARE COORDINATION
Case Management Assessment  Initial Evaluation    Date/Time of Evaluation: 2/20/2025 2:55 PM  Assessment Completed by: MURPHY Braxton    If patient is discharged prior to next notation, then this note serves as note for discharge by case management.    Patient Name: Anne Stokes                   YOB: 1933  Diagnosis: No admission diagnoses are documented for this encounter.                   Date / Time: 2/20/2025 11:34 AM    Patient Admission Status: Emergency   Readmission Risk (Low < 19, Mod (19-27), High > 27): No data recorded  Current PCP: Jose Manuel Noble, DO  PCP verified by CM? (P) Yes    Chart Reviewed: Yes      History Provided by: Patient  Patient Orientation: Alert and Oriented    Patient Cognition: Alert    Hospitalization in the last 30 days (Readmission):  No    If yes, Readmission Assessment in CM Navigator will be completed.    Advance Directives:      Code Status: Prior   Patient's Primary Decision Maker is: (P) Named in Scanned ACP Document    Primary Decision Maker: Clarissa Pennington - Child - [phone]    Secondary Decision Maker: Luis Angel Stokes - Child - [phone]    Discharge Planning:    Patient lives with: (P) Alone Type of Home: (P) House  Primary Care Giver: Self  Patient Support Systems include: Children   Current Financial resources: (P) None  Current community resources: (P) ECF/Home Care  Current services prior to admission: (P) Durable Medical Equipment            Current DME: (P) Walker            Type of Home Care services:  (P) None    ADLS  Prior functional level: (P) Assistance with the following:, Shopping  Current functional level: (P) Assistance with the following:, Shopping    PT AM-PAC:   /24  OT AM-PAC:   /24    Family can provide assistance at DC: (P) Yes  Would you like Case Management to discuss the discharge plan with any other family members/significant others, and if so, who? (P) No  Plans to Return to Present Housing: (P) Yes  Other Identified

## 2025-02-20 NOTE — ED NOTES
Patient assisted to bedside commode - back to bed.  Updated on ready room.  Provided with food and fresh warm blanket.  No further needs at this time.

## 2025-02-20 NOTE — H&P
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  She was recently admitted to [facility] from 12/12 - 12/20 because an outpatient SHARLENE resulted in an esophageal rupture.  She required a hematoma evacuation.  An esophagram on 12/18 showed no leak.    Her weights over the last couple months are as follows:   139 on 12/23  137 on 12/27  144 on 1/8  146 on 1/30 (instructed to take an extra furosemide tablet for 3 consecutive days by the [facility] ED)  143 on 2/5  146 on 2/12    Now she presents to our ED with a weight of 146 lbs (similar to her weights over the last 6 weeks).  She was sent by her PCP because of worsening BLE edema and dusky leg discoloration.  She also has rales on exam.  BNP was 2200.  CXR showed cardiomegaly and a small L pleural effusion.  She was given IV furosemide in the ED and hospital medicine was called for admission.        General appearance: No apparent distress, appears stated age and cooperative.  HEENT: Pupils equal, round.  Conjunctivae/corneas clear.  Neck: No jugular venous distention.   Respiratory:  Normal respiratory effort.  Bilaterally without Wheezes/Rhonchi.  Bibasilar rales.   Cardiovascular: Normal rate and regular rhythm with normal S1/S2 without rubs or gallops.  3/6 systolic murmur at the apex.  Abdomen: Soft, non-tender, non-distended with normal bowel sounds.  Musculoskeletal: No cyanosis bilaterally.  2+ BLE partially-pitting edema.    Skin: No jaundice.  BLE dusky, cool, but have DP pulses.   Neurologic:  Neurovascularly intact without any focal sensory/motor deficits. Cranial nerves: II-XII intact, grossly non-focal.  Psychiatric: Alert and fully oriented, has limited insight into her medical history.        Assessment and Plan:        Acute on chronic

## 2025-02-20 NOTE — ED NOTES
Anne Stokes is a 89 y.o. female admitted for  Principal Problem:    Acute on chronic diastolic CHF (congestive heart failure) (HCC)  Resolved Problems:    * No resolved hospital problems. *  .   Patient Home via family with   Chief Complaint   Patient presents with    Leg Swelling    Leg Pain    Circulatory Problem     Patient was at a doctors appt today due to bilateral leg swelling and pain. Pt reports she was told she has peripheral vascular disease and was told to go to the ED immediately for evaluation and treatment. Pt reporting no pain at this time. Says she just feels fatigued with very little strength.     Fatigue   .  Patient is alert and Person, Place, Time, and Situation  Patient's baseline mobility: Baseline Mobility: Independent - standby assist  Code Status: Limited   Cardiac Rhythm:       Is patient on baseline Oxygen: no how many Liters none :   Abnormal Assessment Findings: swollen - purple feet    Isolation: None      NIH Score:    C-SSRS: Risk of Suicide: No Risk  Bedside swallow:        Active LDA's:   Peripheral IV 02/20/25 Right Antecubital (Active)   Site Assessment Clean, dry & intact 02/20/25 1202   Line Status Blood return noted 02/20/25 1202   Phlebitis Assessment No symptoms 02/20/25 1202   Infiltration Assessment 0 02/20/25 1202           Family/Caregiver Present no Any Concerns: no   Restraints no  Sitter no         Vitals:      Vitals:    02/20/25 1154 02/20/25 1201 02/20/25 1326   BP: (!) 136/109 (!) 136/109 (!) 132/101   Pulse: 77  76   Resp: 18 20 18   Temp: 97.8 °F (36.6 °C) 97.8 °F (36.6 °C)    TempSrc:  Oral    SpO2: 100%  97%   Weight: 66.2 kg (146 lb)         Last documented pain score (0-10 scale) Pain Level: 0  Pain medication administered No.    Pertinent or High Risk Medications/Drips: No.    Pending Blood Product Administration: no    Abnormal labs:   Abnormal Labs Reviewed   CBC WITH AUTO DIFFERENTIAL - Abnormal; Notable for the following components:       Result Value

## 2025-02-20 NOTE — ED NOTES
Patient assisted up to bedside commode.  Back to bed without difficulty.  Updated on plan of care and plan for admission

## 2025-02-21 ENCOUNTER — HOSPITAL ENCOUNTER (INPATIENT)
Dept: VASCULAR LAB | Age: 89
Discharge: HOME | DRG: 291 | End: 2025-02-23
Attending: STUDENT IN AN ORGANIZED HEALTH CARE EDUCATION/TRAINING PROGRAM
Payer: MEDICARE

## 2025-02-21 LAB
ALBUMIN SERPL-MCNC: 3.4 G/DL (ref 3.4–5)
ALP SERPL-CCNC: 131 U/L (ref 40–129)
ALT SERPL-CCNC: 57 U/L (ref 10–40)
ANION GAP SERPL CALCULATED.3IONS-SCNC: 9 MMOL/L (ref 3–16)
AST SERPL-CCNC: 47 U/L (ref 15–37)
BILIRUB DIRECT SERPL-MCNC: 0.6 MG/DL (ref 0–0.3)
BILIRUB INDIRECT SERPL-MCNC: 0.4 MG/DL (ref 0–1)
BILIRUB SERPL-MCNC: 1 MG/DL (ref 0–1)
BUN SERPL-MCNC: 39 MG/DL (ref 7–20)
CALCIUM SERPL-MCNC: 10.4 MG/DL (ref 8.3–10.6)
CHLORIDE SERPL-SCNC: 104 MMOL/L (ref 99–110)
CO2 SERPL-SCNC: 26 MMOL/L (ref 21–32)
CREAT SERPL-MCNC: 1 MG/DL (ref 0.6–1.2)
DEPRECATED RDW RBC AUTO: 15.8 % (ref 12.4–15.4)
GFR SERPLBLD CREATININE-BSD FMLA CKD-EPI: 53 ML/MIN/{1.73_M2}
GLUCOSE SERPL-MCNC: 103 MG/DL (ref 70–99)
HCT VFR BLD AUTO: 44 % (ref 36–48)
HGB BLD-MCNC: 14.8 G/DL (ref 12–16)
LACTATE BLDV-SCNC: 1.6 MMOL/L (ref 0.4–2)
MAGNESIUM SERPL-MCNC: 2.23 MG/DL (ref 1.8–2.4)
MCH RBC QN AUTO: 34.1 PG (ref 26–34)
MCHC RBC AUTO-ENTMCNC: 33.6 G/DL (ref 31–36)
MCV RBC AUTO: 101.2 FL (ref 80–100)
PLATELET # BLD AUTO: 182 K/UL (ref 135–450)
PMV BLD AUTO: 7.4 FL (ref 5–10.5)
POTASSIUM SERPL-SCNC: 4.5 MMOL/L (ref 3.5–5.1)
PROT SERPL-MCNC: 6.1 G/DL (ref 6.4–8.2)
RBC # BLD AUTO: 4.34 M/UL (ref 4–5.2)
SODIUM SERPL-SCNC: 139 MMOL/L (ref 136–145)
WBC # BLD AUTO: 7.9 K/UL (ref 4–11)

## 2025-02-21 PROCEDURE — 97530 THERAPEUTIC ACTIVITIES: CPT

## 2025-02-21 PROCEDURE — 97166 OT EVAL MOD COMPLEX 45 MIN: CPT

## 2025-02-21 PROCEDURE — 6370000000 HC RX 637 (ALT 250 FOR IP): Performed by: NURSE PRACTITIONER

## 2025-02-21 PROCEDURE — 97110 THERAPEUTIC EXERCISES: CPT

## 2025-02-21 PROCEDURE — 6360000002 HC RX W HCPCS: Performed by: INTERNAL MEDICINE

## 2025-02-21 PROCEDURE — 80048 BASIC METABOLIC PNL TOTAL CA: CPT

## 2025-02-21 PROCEDURE — 6370000000 HC RX 637 (ALT 250 FOR IP): Performed by: STUDENT IN AN ORGANIZED HEALTH CARE EDUCATION/TRAINING PROGRAM

## 2025-02-21 PROCEDURE — 97116 GAIT TRAINING THERAPY: CPT

## 2025-02-21 PROCEDURE — 93922 UPR/L XTREMITY ART 2 LEVELS: CPT

## 2025-02-21 PROCEDURE — 36415 COLL VENOUS BLD VENIPUNCTURE: CPT

## 2025-02-21 PROCEDURE — 97162 PT EVAL MOD COMPLEX 30 MIN: CPT

## 2025-02-21 PROCEDURE — 80076 HEPATIC FUNCTION PANEL: CPT

## 2025-02-21 PROCEDURE — 6370000000 HC RX 637 (ALT 250 FOR IP): Performed by: INTERNAL MEDICINE

## 2025-02-21 PROCEDURE — 85027 COMPLETE CBC AUTOMATED: CPT

## 2025-02-21 PROCEDURE — 2060000000 HC ICU INTERMEDIATE R&B

## 2025-02-21 PROCEDURE — 83605 ASSAY OF LACTIC ACID: CPT

## 2025-02-21 PROCEDURE — 83735 ASSAY OF MAGNESIUM: CPT

## 2025-02-21 PROCEDURE — 2500000003 HC RX 250 WO HCPCS: Performed by: INTERNAL MEDICINE

## 2025-02-21 RX ORDER — TRAMADOL HYDROCHLORIDE 50 MG/1
50 TABLET ORAL EVERY 6 HOURS PRN
Status: DISCONTINUED | OUTPATIENT
Start: 2025-02-21 | End: 2025-02-23 | Stop reason: HOSPADM

## 2025-02-21 RX ORDER — LOSARTAN POTASSIUM 25 MG/1
12.5 TABLET ORAL DAILY
Status: DISCONTINUED | OUTPATIENT
Start: 2025-02-21 | End: 2025-02-22

## 2025-02-21 RX ADMIN — TRAMADOL HYDROCHLORIDE 50 MG: 50 TABLET, COATED ORAL at 00:49

## 2025-02-21 RX ADMIN — SODIUM CHLORIDE, PRESERVATIVE FREE 5 ML: 5 INJECTION INTRAVENOUS at 20:31

## 2025-02-21 RX ADMIN — APIXABAN 2.5 MG: 2.5 TABLET, FILM COATED ORAL at 20:21

## 2025-02-21 RX ADMIN — FUROSEMIDE 40 MG: 10 INJECTION, SOLUTION INTRAMUSCULAR; INTRAVENOUS at 17:22

## 2025-02-21 RX ADMIN — METHOCARBAMOL TABLETS 500 MG: 500 TABLET, COATED ORAL at 12:30

## 2025-02-21 RX ADMIN — METOPROLOL SUCCINATE 100 MG: 50 TABLET, EXTENDED RELEASE ORAL at 09:10

## 2025-02-21 RX ADMIN — APIXABAN 2.5 MG: 2.5 TABLET, FILM COATED ORAL at 09:07

## 2025-02-21 RX ADMIN — FUROSEMIDE 40 MG: 10 INJECTION, SOLUTION INTRAMUSCULAR; INTRAVENOUS at 09:10

## 2025-02-21 RX ADMIN — METHOCARBAMOL TABLETS 500 MG: 500 TABLET, COATED ORAL at 20:21

## 2025-02-21 RX ADMIN — LOSARTAN POTASSIUM 12.5 MG: 25 TABLET, FILM COATED ORAL at 12:31

## 2025-02-21 RX ADMIN — METHOCARBAMOL TABLETS 500 MG: 500 TABLET, COATED ORAL at 09:10

## 2025-02-21 RX ADMIN — METHOCARBAMOL TABLETS 500 MG: 500 TABLET, COATED ORAL at 17:22

## 2025-02-21 RX ADMIN — PREDNISONE 20 MG: 20 TABLET ORAL at 09:07

## 2025-02-21 RX ADMIN — SODIUM CHLORIDE, PRESERVATIVE FREE 10 ML: 5 INJECTION INTRAVENOUS at 09:11

## 2025-02-21 RX ADMIN — FUROSEMIDE 40 MG: 10 INJECTION, SOLUTION INTRAMUSCULAR; INTRAVENOUS at 00:49

## 2025-02-21 ASSESSMENT — PAIN DESCRIPTION - LOCATION: LOCATION: TOE (COMMENT WHICH ONE)

## 2025-02-21 ASSESSMENT — PAIN DESCRIPTION - ORIENTATION: ORIENTATION: LEFT

## 2025-02-21 ASSESSMENT — PAIN SCALES - GENERAL
PAINLEVEL_OUTOF10: 0
PAINLEVEL_OUTOF10: 0
PAINLEVEL_OUTOF10: 7

## 2025-02-21 ASSESSMENT — PAIN DESCRIPTION - DESCRIPTORS: DESCRIPTORS: ACHING

## 2025-02-21 ASSESSMENT — PAIN SCALES - WONG BAKER: WONGBAKER_NUMERICALRESPONSE: NO HURT

## 2025-02-21 NOTE — CONSULTS
Consult Placed     Who: Dr. Walt Mckeon/ Podiatry  Date:  Time:     Electronically signed by Carmen Quinn on 2/21/2025 at 12:10 PM

## 2025-02-21 NOTE — CONSULTS
Consult Placed     Who: [facility]  Date:  Time:     Electronically signed by Carmen Quinn on 2/21/2025 at 7:44 AM

## 2025-02-21 NOTE — CARE COORDINATION
Received consult from MD regarding potential need for SNF. Seen by social work in ED. Patient from home alone. She reported being independent with ADL's. Will await PT/OT recommendations and if SNF needed will discuss patient preference for discharge. Patient alert and oriented. She would need pre-cert and need both disciplines to see PT and OT.

## 2025-02-21 NOTE — PLAN OF CARE
Problem: Chronic Conditions and Co-morbidities  Goal: Patient's chronic conditions and co-morbidity symptoms are monitored and maintained or improved  2/21/2025 1653 by Brittni Patricia RN  Outcome: Progressing  Note:   CHF Care Plan      Patient's EF (Ejection Fraction) is greater than 40%    Heart Failure Medications:  Diuretics:: Furosemide    (One of the following REQUIRED for EF </= 40%/SYSTOLIC FAILURE but MAY be used in EF% >40%/DIASTOLIC FAILURE)        ACE:: None        ARB:: Losartan         ARNI:: None    (Beta Blockers)  NON- Evidenced Based Beta Blocker (for EF% >40%/DIASTOLIC FAILURE): None    Evidenced Based Beta Blocker::(REQUIRED for EF% <40%/SYSTOLIC FAILURE) Metoprolol SUCCinate- Toprol XL  ...................................................................................................................................................    Failed to redirect to the Timeline version of the Monitor110 SmartLink.      Patient's weights and intake/output reviewed    Daily Weight log at bedside, patient/family participation in use of log: \"yes    Patient's current weight today shows a difference of 8 lbs less than last documented weight.      Intake/Output Summary (Last 24 hours) at 2/21/2025 1653  Last data filed at 2/21/2025 1510  Gross per 24 hour   Intake 431 ml   Output 2050 ml   Net -1619 ml       Education Booklet Provided: yes    Comorbidities Reviewed Yes    Patient has a past medical history of Acute respiratory failure due to severe acute respiratory syndrome coronavirus 2 (SARS-CoV-2) infection ([facility]), Cellulitis of left lower leg, Cellulitis of right ankle, CHF (congestive heart failure) ([facility]), Closed pelvic rim fracture, COVID, Fibromyalgia, Foot drop, right foot, Fractures, History of blood transfusion, History of dental surgery, History of temporal artery biopsy, Hx of blood clots, PNA (pneumonia), Prepatellar bursitis of right knee, Pyoderma gangrenosum (HCC), Rheumatoid arthritis (HCC),

## 2025-02-21 NOTE — PLAN OF CARE
CHF Care Plan      Patient's EF (Ejection Fraction) is greater than 40%    Heart Failure Medications:  Diuretics:: Furosemide    (One of the following REQUIRED for EF </= 40%/SYSTOLIC FAILURE but MAY be used in EF% >40%/DIASTOLIC FAILURE)        ACE:: None        ARB:: None         ARNI:: None    (Beta Blockers)  NON- Evidenced Based Beta Blocker (for EF% >40%/DIASTOLIC FAILURE): none    Evidenced Based Beta Blocker::(REQUIRED for EF% <40%/SYSTOLIC FAILURE) Metoprolol SUCCinate- Toprol XL  ...................................................................................................................................................    Failed to redirect to the Timeline version of the [employer] SmartLink.      Patient's weights and intake/output reviewed    Daily Weight log at bedside, patient/family participation in use of log: \"yes    Patient's current weight today shows a difference of 8 lbs less than last documented weight.      Intake/Output Summary (Last 24 hours) at 2/21/2025 0725  Last data filed at 2/21/2025 0650  Gross per 24 hour   Intake 120 ml   Output 1200 ml   Net -1080 ml       Education Booklet Provided: yes    Comorbidities Reviewed Yes    Patient has a past medical history of Acute respiratory failure due to severe acute respiratory syndrome coronavirus 2 (SARS-CoV-2) infection ([facility]), Cellulitis of left lower leg, Cellulitis of right ankle, CHF (congestive heart failure) ([facility]), Closed pelvic rim fracture, COVID, Fibromyalgia, Foot drop, right foot, Fractures, History of blood transfusion, History of dental surgery, History of temporal artery biopsy, Hx of blood clots, PNA (pneumonia), Prepatellar bursitis of right knee, Pyoderma gangrenosum (HCC), Rheumatoid arthritis (HCC), Venous stasis ulcer of left ankle with fat layer exposed with varicose veins (HCC), Venous ulcer of left leg (HCC), and Venous ulcer of right leg (HCC).     >>For CHF and Comorbidity documentation on Education Time and Topics,

## 2025-02-21 NOTE — CONSULTS
CARDIOLOGY CONSULTATION        Patient Name: Anne Stokes  Date of admission: 2/20/2025 11:34 AM  Admission Dx: PVD (peripheral vascular disease) [I73.9]  LEMUS (dyspnea on exertion) [R06.09]  Transaminitis [R74.01]  Generalized weakness [R53.1]  Elevated troponin [R79.89]  Pleural effusion on left [J90]  Bilateral lower extremity edema [R60.0]  Acute on chronic diastolic CHF (congestive heart failure) (HCC) [I50.33]  Acute exacerbation of chronic heart failure (HCC) [I50.9]  Requesting Physician: Fawad Jose MD  Primary Care physician: Jose Manuel Noble DO    Reason for Consultation/Chief Complaint: SOB/LE Edema,moderate- severe MR 2/2 torn chord, recent esophageal perforation on SHARLENE 12/2024 at [facility]    History of Present Illness:     Anne Stokes is a very pleasant 89 y.o. 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  Patient's weight was up to 146 pounds which is similar to what her weight has been.  Patient reports she has had lower extremity edema for several weeks.  States shortness of breath for several weeks as well.  Denies any chest pain.  Denies palpitations, dizziness, near-syncope or evangelina syncope.  She does state since receiving diuretics here her breathing is close to her baseline.  Patient is aware that she is not a surgical candidate for the torn chordae resulting in moderate-severe mitral regurgitation.  Patient states that her feet were blue and purple and they continue to be.  She has superficial ulcers digit on the left

## 2025-02-22 LAB
ANION GAP SERPL CALCULATED.3IONS-SCNC: 9 MMOL/L (ref 3–16)
BUN SERPL-MCNC: 33 MG/DL (ref 7–20)
CALCIUM SERPL-MCNC: 10.2 MG/DL (ref 8.3–10.6)
CHLORIDE SERPL-SCNC: 101 MMOL/L (ref 99–110)
CO2 SERPL-SCNC: 29 MMOL/L (ref 21–32)
CREAT SERPL-MCNC: 0.9 MG/DL (ref 0.6–1.2)
GFR SERPLBLD CREATININE-BSD FMLA CKD-EPI: 60 ML/MIN/{1.73_M2}
GLUCOSE SERPL-MCNC: 97 MG/DL (ref 70–99)
MAGNESIUM SERPL-MCNC: 2.16 MG/DL (ref 1.8–2.4)
POTASSIUM SERPL-SCNC: 4.3 MMOL/L (ref 3.5–5.1)
SODIUM SERPL-SCNC: 139 MMOL/L (ref 136–145)

## 2025-02-22 PROCEDURE — 6370000000 HC RX 637 (ALT 250 FOR IP): Performed by: INTERNAL MEDICINE

## 2025-02-22 PROCEDURE — 6360000002 HC RX W HCPCS: Performed by: INTERNAL MEDICINE

## 2025-02-22 PROCEDURE — 99232 SBSQ HOSP IP/OBS MODERATE 35: CPT | Performed by: NURSE PRACTITIONER

## 2025-02-22 PROCEDURE — 6370000000 HC RX 637 (ALT 250 FOR IP): Performed by: NURSE PRACTITIONER

## 2025-02-22 PROCEDURE — 6370000000 HC RX 637 (ALT 250 FOR IP): Performed by: STUDENT IN AN ORGANIZED HEALTH CARE EDUCATION/TRAINING PROGRAM

## 2025-02-22 PROCEDURE — 36415 COLL VENOUS BLD VENIPUNCTURE: CPT

## 2025-02-22 PROCEDURE — 80048 BASIC METABOLIC PNL TOTAL CA: CPT

## 2025-02-22 PROCEDURE — 83735 ASSAY OF MAGNESIUM: CPT

## 2025-02-22 PROCEDURE — 1200000000 HC SEMI PRIVATE

## 2025-02-22 PROCEDURE — 2500000003 HC RX 250 WO HCPCS: Performed by: INTERNAL MEDICINE

## 2025-02-22 PROCEDURE — 86038 ANTINUCLEAR ANTIBODIES: CPT

## 2025-02-22 RX ORDER — FUROSEMIDE 40 MG/1
40 TABLET ORAL DAILY
Status: DISCONTINUED | OUTPATIENT
Start: 2025-02-23 | End: 2025-02-23 | Stop reason: HOSPADM

## 2025-02-22 RX ADMIN — APIXABAN 2.5 MG: 2.5 TABLET, FILM COATED ORAL at 08:16

## 2025-02-22 RX ADMIN — SODIUM CHLORIDE, PRESERVATIVE FREE 10 ML: 5 INJECTION INTRAVENOUS at 10:05

## 2025-02-22 RX ADMIN — PREDNISONE 20 MG: 20 TABLET ORAL at 08:16

## 2025-02-22 RX ADMIN — SODIUM CHLORIDE, PRESERVATIVE FREE 10 ML: 5 INJECTION INTRAVENOUS at 21:34

## 2025-02-22 RX ADMIN — ACETAMINOPHEN 650 MG: 325 TABLET ORAL at 21:39

## 2025-02-22 RX ADMIN — ACETAMINOPHEN 650 MG: 325 TABLET ORAL at 05:41

## 2025-02-22 RX ADMIN — APIXABAN 5 MG: 5 TABLET, FILM COATED ORAL at 21:34

## 2025-02-22 RX ADMIN — EMPAGLIFLOZIN 10 MG: 10 TABLET, FILM COATED ORAL at 12:04

## 2025-02-22 RX ADMIN — METOPROLOL SUCCINATE 100 MG: 50 TABLET, EXTENDED RELEASE ORAL at 08:16

## 2025-02-22 RX ADMIN — METHOCARBAMOL TABLETS 500 MG: 500 TABLET, COATED ORAL at 17:57

## 2025-02-22 RX ADMIN — LOSARTAN POTASSIUM 12.5 MG: 25 TABLET, FILM COATED ORAL at 08:16

## 2025-02-22 RX ADMIN — TRAMADOL HYDROCHLORIDE 50 MG: 50 TABLET, COATED ORAL at 23:10

## 2025-02-22 RX ADMIN — METHOCARBAMOL TABLETS 500 MG: 500 TABLET, COATED ORAL at 12:04

## 2025-02-22 RX ADMIN — METHOCARBAMOL TABLETS 500 MG: 500 TABLET, COATED ORAL at 08:16

## 2025-02-22 RX ADMIN — APIXABAN 2.5 MG: 2.5 TABLET, FILM COATED ORAL at 11:01

## 2025-02-22 RX ADMIN — FUROSEMIDE 40 MG: 10 INJECTION, SOLUTION INTRAMUSCULAR; INTRAVENOUS at 08:16

## 2025-02-22 RX ADMIN — METHOCARBAMOL TABLETS 500 MG: 500 TABLET, COATED ORAL at 21:34

## 2025-02-22 ASSESSMENT — PAIN SCALES - GENERAL
PAINLEVEL_OUTOF10: 6
PAINLEVEL_OUTOF10: 4
PAINLEVEL_OUTOF10: 4

## 2025-02-22 ASSESSMENT — PAIN DESCRIPTION - LOCATION
LOCATION: ANKLE
LOCATION: ANKLE

## 2025-02-22 ASSESSMENT — PAIN DESCRIPTION - ORIENTATION: ORIENTATION: RIGHT

## 2025-02-22 NOTE — CONSULTS
Podiatry Consult Note    History of Present Illness:              The patient is a pleasant 89 year old woman with worsening edema at the right and left lower extremities and discoloration at her toes. She has had swelling at the right and left lower extremities for years.  She even had some venous stasis wounds treated in wound care for years.  In the past few days, her edema at the right and left lower extremity became worse.  She was seen be her PCP who noted the edema and some dusky discoloration at her feet.  Her PCP sent her to the ED for further work-up.  At the time of encounter, the patient reported her symptoms of swelling and discoloration at her feet were improving. She denied n/v/f/c and SOB.     Past Medical History:        Diagnosis Date    Acute respiratory failure due to severe acute respiratory syndrome coronavirus 2 (SARS-CoV-2) infection ([facility]) 11/12/2021    Cellulitis of left lower leg 04/21/2019    Cellulitis of right ankle 07/28/2022    CHF (congestive heart failure) ([facility])     Closed pelvic rim fracture 09/04/2014    COVID 12/2021    Fibromyalgia     Foot drop, right foot     Fractures     History of blood transfusion     1997    History of dental surgery     lanced gums, 4/30/24    History of temporal artery biopsy 10/06/2020    Hx of blood clots     PNA (pneumonia) 12/1997    Prepatellar bursitis of right knee 09/15/2020    Pyoderma gangrenosum ([facility]) 2022    Rheumatoid arthritis ([facility])     Venous stasis ulcer of left ankle with fat layer exposed with varicose veins ([facility]) 10/03/2022    Venous ulcer of left leg ([facility]) 08/2019    Venous ulcer of right leg ([facility]) 10/2016       Past Surgical History:        Procedure Laterality Date    IR INSERT PICC VAD W SQ PORT >5 YEARS Left 11/29/2021    unable to advance PICC catheter completely into SVC    SAPHENOUS VEIN ABLATION Right 11/06/2024    Varithena ablation of the GSV from below the saphenofemoral junction to the mid calf    TONSILLECTOMY      VEIN

## 2025-02-23 VITALS
DIASTOLIC BLOOD PRESSURE: 74 MMHG | BODY MASS INDEX: 21.21 KG/M2 | HEART RATE: 84 BPM | HEIGHT: 66 IN | RESPIRATION RATE: 15 BRPM | SYSTOLIC BLOOD PRESSURE: 94 MMHG | WEIGHT: 132 LBS | OXYGEN SATURATION: 98 % | TEMPERATURE: 97.5 F

## 2025-02-23 LAB
ANA SER QL IA: NEGATIVE
ANION GAP SERPL CALCULATED.3IONS-SCNC: 11 MMOL/L (ref 3–16)
BUN SERPL-MCNC: 36 MG/DL (ref 7–20)
CALCIUM SERPL-MCNC: 10.9 MG/DL (ref 8.3–10.6)
CHLORIDE SERPL-SCNC: 101 MMOL/L (ref 99–110)
CO2 SERPL-SCNC: 25 MMOL/L (ref 21–32)
CREAT SERPL-MCNC: 0.9 MG/DL (ref 0.6–1.2)
GFR SERPLBLD CREATININE-BSD FMLA CKD-EPI: 60 ML/MIN/{1.73_M2}
GLUCOSE SERPL-MCNC: 82 MG/DL (ref 70–99)
MAGNESIUM SERPL-MCNC: 2.39 MG/DL (ref 1.8–2.4)
POTASSIUM SERPL-SCNC: 4.2 MMOL/L (ref 3.5–5.1)
SODIUM SERPL-SCNC: 137 MMOL/L (ref 136–145)
VAS LEFT ABI: 1.02
VAS LEFT ARM BP: 123 MMHG
VAS LEFT DORSALIS PEDIS BP: 122 MMHG
VAS LEFT PTA BP: 125 MMHG
VAS LEFT TBI: 1.01
VAS LEFT TOE PRESSURE: 124 MMHG
VAS RIGHT ABI: 1.04
VAS RIGHT ARM BP: 117 MMHG
VAS RIGHT DORSALIS PEDIS BP: 128 MMHG
VAS RIGHT PTA BP: 121 MMHG
VAS RIGHT TBI: 1.53
VAS RIGHT TOE PRESSURE: 188 MMHG

## 2025-02-23 PROCEDURE — 6370000000 HC RX 637 (ALT 250 FOR IP): Performed by: NURSE PRACTITIONER

## 2025-02-23 PROCEDURE — 83735 ASSAY OF MAGNESIUM: CPT

## 2025-02-23 PROCEDURE — 93922 UPR/L XTREMITY ART 2 LEVELS: CPT | Performed by: SURGERY

## 2025-02-23 PROCEDURE — 6370000000 HC RX 637 (ALT 250 FOR IP): Performed by: INTERNAL MEDICINE

## 2025-02-23 PROCEDURE — 36415 COLL VENOUS BLD VENIPUNCTURE: CPT

## 2025-02-23 PROCEDURE — 2500000003 HC RX 250 WO HCPCS: Performed by: INTERNAL MEDICINE

## 2025-02-23 PROCEDURE — 80048 BASIC METABOLIC PNL TOTAL CA: CPT

## 2025-02-23 RX ORDER — SPIRONOLACTONE 25 MG/1
12.5 TABLET ORAL DAILY
Qty: 30 TABLET | Refills: 3 | Status: SHIPPED | OUTPATIENT
Start: 2025-02-23

## 2025-02-23 RX ORDER — FUROSEMIDE 40 MG/1
40 TABLET ORAL DAILY
Qty: 60 TABLET | Refills: 3 | Status: SHIPPED | OUTPATIENT
Start: 2025-02-24

## 2025-02-23 RX ORDER — SPIRONOLACTONE 25 MG/1
12.5 TABLET ORAL DAILY
Status: DISCONTINUED | OUTPATIENT
Start: 2025-02-23 | End: 2025-02-23 | Stop reason: HOSPADM

## 2025-02-23 RX ADMIN — METHOCARBAMOL TABLETS 500 MG: 500 TABLET, COATED ORAL at 08:33

## 2025-02-23 RX ADMIN — SODIUM CHLORIDE, PRESERVATIVE FREE 10 ML: 5 INJECTION INTRAVENOUS at 08:33

## 2025-02-23 RX ADMIN — SPIRONOLACTONE 12.5 MG: 25 TABLET ORAL at 11:46

## 2025-02-23 RX ADMIN — EMPAGLIFLOZIN 10 MG: 10 TABLET, FILM COATED ORAL at 08:33

## 2025-02-23 RX ADMIN — APIXABAN 5 MG: 5 TABLET, FILM COATED ORAL at 08:33

## 2025-02-23 RX ADMIN — METHOCARBAMOL TABLETS 500 MG: 500 TABLET, COATED ORAL at 11:46

## 2025-02-23 RX ADMIN — FUROSEMIDE 40 MG: 40 TABLET ORAL at 08:33

## 2025-02-23 RX ADMIN — METOPROLOL SUCCINATE 100 MG: 50 TABLET, EXTENDED RELEASE ORAL at 08:33

## 2025-02-23 NOTE — DISCHARGE INSTR - COC
Continuity of Care Form    Patient Name: Anne Stokes   :  2/3/1933  MRN:  [id number]    Admit date:  2025  Discharge date:  25     Code Status Order: Limited   Advance Directives:   Advance Care Flowsheet Documentation             Admitting Physician:  Fawad Jose MD  PCP: Jose Manuel Noble DO    Discharging Nurse: Asiya  Discharging Hospital Unit/Room#: 0361/0361-01  Discharging Unit Phone Number: [phone]    Emergency Contact:   Extended Emergency Contact Information  Primary Emergency Contact: Clarissa Pennington  Address:  [place] [address] [facility]  Home Phone: [phone]  Mobile Phone: [phone]  Relation: Child  Secondary Emergency Contact: Luis Angel Stokes  Address: [address] [place] [address]  Home Phone: [phone]  Work Phone: [phone]  Mobile Phone: [phone]  Relation: Child    Past Surgical History:  Past Surgical History:   Procedure Laterality Date    IR INSERT PICC VAD W SQ PORT >5 YEARS Left 2021    unable to advance PICC catheter completely into SVC    SAPHENOUS VEIN ABLATION Right 2024    Varithena ablation of the GSV from below the saphenofemoral junction to the mid calf    TONSILLECTOMY      VEIN SURGERY Right 10/27/2016    saphenous VNUS closure       Immunization History:   Immunization History   Administered Date(s) Administered    COVID-19, MODERNA BLUE border, Primary or Immunocompromised, (age 12y+), IM, 100 mcg/0.5mL 2021, 2021    COVID-19, MODERNA Bivalent, (age 12y+), IM, 50 mcg/0.5 mL 2022    COVID-19, PFIZER, (age 12y+), IM, 30mcg/0.3mL 2024       Active Problems:  Patient Active Problem List   Diagnosis Code    HTN (hypertension) I10    Localized osteoarthrosis, lower leg M17.10    Eczema L30.9    Foot drop, right M21.371    Lumbosacral spondylosis without myelopathy M47.817    Normocytic anemia D64.9    Peripheral neuropathy G62.9    Senile

## 2025-02-23 NOTE — PROGRESS NOTES
Occupational Therapy    Chart reviewed, attempted to see pt for OT eval  this date;  per RN just back to bed. Will attempt again this pm.    Kimberly Cruz,OT  
/80   Pulse 67   Temp 97.3 °F (36.3 °C) (Oral)   Resp 18   Wt 62.9 kg (138 lb 10.7 oz)   SpO2 92%   BMI 22.38 kg/m²  on room air.  Pt resting quietly in bed, denies pain, discomfort or shortness of breath.  Lungs clear upper and middle lobes, crackles in bases.  Atrial Fibrillation on tele.  External catheter in place.  Pt being turned and repositioned.  BLE elevated off bed onto pillows with heels floated off pillows.   Pt denies any needs or assistance at this time.  Therapy at bedside at this time.   Brittni Patricia, RN  2/21/2025      
4 Eyes Skin Assessment     NAME:  Anne Stokes  YOB: 1933  MEDICAL RECORD NUMBER:  [id number]    The patient is being assessed for  Admission    I agree that at least one RN has performed a thorough Head to Toe Skin Assessment on the patient. ALL assessment sites listed below have been assessed.      Areas assessed by both nurses:    Head, Face, Ears, Shoulders, Back, Chest, Arms, Elbows, Hands, Sacrum. Buttock, Coccyx, Ischium, Legs. Feet and Heels, and Under Medical Devices         Does the Patient have a Wound? Yes wound(s) were present on assessment. LDA wound assessment was Initiated and completed by RN       Wilber Prevention initiated by RN: No  Wound Care Orders initiated by RN: No    Pressure Injury (Stage 3,4, Unstageable, DTI, NWPT, and Complex wounds) if present, place Wound referral order by RN under : No    New Ostomies, if present place, Ostomy referral order under : No     Nurse 1 eSignature: Electronically signed by Noé Heard RN on 2/20/25 at 6:53 PM EST    **SHARE this note so that the co-signing nurse can place an eSignature**    Nurse 2 eSignature: Electronically signed by Diane Sanchez RN on 2/20/25 at 6:54 PM EST   
[facility]  Cardiology  Progress Note    Admission date:  2025    Reason for follow up visit: Valvular heart disease    HPI/CC: Anne Stokes is a 89 y.o. female who presented 2025 for worsening lower extremity edema.  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  Rhythm overnight has been aflutter 70s.    Subjective: She feels much better, edema resolved and no significant shortness of breath or chest pain.     Vitals:  Blood pressure 126/85, pulse 81, temperature 97.3 °F (36.3 °C), temperature source Oral, resp. rate 16, weight 59.8 kg (131 lb 12.8 oz), SpO2 94%.  Temp  Av.5 °F (36.4 °C)  Min: 97.2 °F (36.2 °C)  Max: 98.2 °F (36.8 °C)  Pulse  Av.5  Min: 67  Max: 92  BP  Min: 105/74  Max: 136/89  SpO2  Av.3 %  Min: 92 %  Max: 97 %    24 hour I/O    Intake/Output Summary (Last 24 hours) at 2025 0707  Last data filed at 2025 0542  Gross per 24 hour   Intake 531 ml   Output 3050 ml   Net -2519 ml     Current Facility-Administered Medications   Medication Dose Route Frequency Provider Last Rate Last Admin    traMADol (ULTRAM) tablet 50 mg  50 mg Oral Q6H PRN Britney Coon, APRN - CNP   50 mg at 25 0049    losartan (COZAAR) tablet 12.5 mg  12.5 mg Oral Daily Franki Springer DO   12.5 mg at 25 1231    apixaban (ELIQUIS) tablet 2.5 mg  2.5 mg Oral BID Fawad Jose MD   2.5 mg at 25    metoprolol succinate (TOPROL XL) extended release tablet 100 mg  100 mg Oral Daily Fawad Jose MD   100 mg at 25 0910    sodium chloride flush 0.9 % injection 5-40 mL  5-40 mL IntraVENous 2 times per day Fawad Jose MD   5 mL at 25    sodium chloride flush 0.9 % injection 5-40 mL  5-40 mL IntraVENous PRN Fawad Jose MD        0.9 % sodium chloride infusion   IntraVENous PRN Fawad Jose MD        potassium chloride (KLOR-CON M) extended release tablet 40 mEq  40 mEq Oral PRN Damon, 
CHF Care Plan      Patient's EF (Ejection Fraction) is greater than 40%    Heart Failure Medications:  Diuretics:: Furosemide    (One of the following REQUIRED for EF </= 40%/SYSTOLIC FAILURE but MAY be used in EF% >40%/DIASTOLIC FAILURE)        ACE:: None        ARB:: Losartan         ARNI:: None    (Beta Blockers)  NON- Evidenced Based Beta Blocker (for EF% >40%/DIASTOLIC FAILURE):  none    Evidenced Based Beta Blocker::(REQUIRED for EF% <40%/SYSTOLIC FAILURE) Metoprolol SUCCinate- Toprol XL  ...................................................................................................................................................    Failed to redirect to the Timeline version of the [employer] SmartLink.      Patient's weights and intake/output reviewed    Daily Weight log at bedside, patient/family participation in use of log: \"yes    Patient's current weight today shows a difference of 7 lbs less than last documented weight.      Intake/Output Summary (Last 24 hours) at 2/22/2025 0905  Last data filed at 2/22/2025 0542  Gross per 24 hour   Intake 400 ml   Output 2850 ml   Net -2450 ml       Education Booklet Provided: yes    Comorbidities Reviewed Yes    Patient has a past medical history of Acute respiratory failure due to severe acute respiratory syndrome coronavirus 2 (SARS-CoV-2) infection ([facility]), Cellulitis of left lower leg, Cellulitis of right ankle, CHF (congestive heart failure) ([facility]), Closed pelvic rim fracture, COVID, Fibromyalgia, Foot drop, right foot, Fractures, History of blood transfusion, History of dental surgery, History of temporal artery biopsy, Hx of blood clots, PNA (pneumonia), Prepatellar bursitis of right knee, Pyoderma gangrenosum (HCC), Rheumatoid arthritis (HCC), Venous stasis ulcer of left ankle with fat layer exposed with varicose veins (HCC), Venous ulcer of left leg (HCC), and Venous ulcer of right leg (HCC).     >>For CHF and Comorbidity documentation on Education Time and 
CHF Care Plan      Patient's EF (Ejection Fraction) is greater than 40%    Heart Failure Medications:  Diuretics:: Furosemide    (One of the following REQUIRED for EF </= 40%/SYSTOLIC FAILURE but MAY be used in EF% >40%/DIASTOLIC FAILURE)        ACE:: None        ARB:: Losartan         ARNI:: None    (Beta Blockers)  NON- Evidenced Based Beta Blocker (for EF% >40%/DIASTOLIC FAILURE): None    Evidenced Based Beta Blocker::(REQUIRED for EF% <40%/SYSTOLIC FAILURE) Metoprolol SUCCinate- Toprol XL  ...................................................................................................................................................    Failed to redirect to the Timeline version of the [employer] SmartLink.      Patient's weights and intake/output reviewed    Daily Weight log at bedside, patient/family participation in use of log: \"yes    Patient's current weight today shows a difference of 6.82 lbs less than last documented weight.      Intake/Output Summary (Last 24 hours) at 2/22/2025 0800  Last data filed at 2/22/2025 0542  Gross per 24 hour   Intake 531 ml   Output 3050 ml   Net -2519 ml       Education Booklet Provided: yes    Comorbidities Reviewed Yes    Patient has a past medical history of Acute respiratory failure due to severe acute respiratory syndrome coronavirus 2 (SARS-CoV-2) infection ([facility]), Cellulitis of left lower leg, Cellulitis of right ankle, CHF (congestive heart failure) ([facility]), Closed pelvic rim fracture, COVID, Fibromyalgia, Foot drop, right foot, Fractures, History of blood transfusion, History of dental surgery, History of temporal artery biopsy, Hx of blood clots, PNA (pneumonia), Prepatellar bursitis of right knee, Pyoderma gangrenosum (HCC), Rheumatoid arthritis (HCC), Venous stasis ulcer of left ankle with fat layer exposed with varicose veins (HCC), Venous ulcer of left leg (HCC), and Venous ulcer of right leg (HCC).     >>For CHF and Comorbidity documentation on Education Time and 
CHF Care Plan      Patient's EF (Ejection Fraction) is greater than 40%    Heart Failure Medications:  Diuretics:: Furosemide    (One of the following REQUIRED for EF </= 40%/SYSTOLIC FAILURE but MAY be used in EF% >40%/DIASTOLIC FAILURE)        ACE:: None        ARB:: Losartan         ARNI:: None    (Beta Blockers)  NON- Evidenced Based Beta Blocker (for EF% >40%/DIASTOLIC FAILURE): None    Evidenced Based Beta Blocker::(REQUIRED for EF% <40%/SYSTOLIC FAILURE) Metoprolol SUCCinate- Toprol XL  ...................................................................................................................................................    Failed to redirect to the Timeline version of the [employer] SmartLink.      Patient's weights and intake/output reviewed    Daily Weight log at bedside, patient/family participation in use of log: \"yes    Patient's current weight today shows a difference of 1 lbs more than last documented weight.      Intake/Output Summary (Last 24 hours) at 2/23/2025 0754  Last data filed at 2/23/2025 0426  Gross per 24 hour   Intake 720 ml   Output 1300 ml   Net -580 ml       Education Booklet Provided: yes    Comorbidities Reviewed Yes    Patient has a past medical history of Acute respiratory failure due to severe acute respiratory syndrome coronavirus 2 (SARS-CoV-2) infection ([facility]), Cellulitis of left lower leg, Cellulitis of right ankle, CHF (congestive heart failure) ([facility]), Closed pelvic rim fracture, COVID, Fibromyalgia, Foot drop, right foot, Fractures, History of blood transfusion, History of dental surgery, History of temporal artery biopsy, Hx of blood clots, PNA (pneumonia), Prepatellar bursitis of right knee, Pyoderma gangrenosum (HCC), Rheumatoid arthritis (HCC), Venous stasis ulcer of left ankle with fat layer exposed with varicose veins (HCC), Venous ulcer of left leg (HCC), and Venous ulcer of right leg (HCC).     >>For CHF and Comorbidity documentation on Education Time and 
Callled to obtain report and/or how pt will need to transfer. no answer. Will await call back at [phone]  
Occupational Therapy  [address]  Occupational Therapy Initial /discharge Assessment    Name: Anne Stokes  : 2/3/1933  MRN: [id number]  Date of Service: 2025    Discharge Recommendations:  Home with assist PRN          Patient Diagnosis(es): The primary encounter diagnosis was Acute exacerbation of chronic heart failure (HCC). Diagnoses of Generalized weakness, LEMUS (dyspnea on exertion), Bilateral lower extremity edema, Pleural effusion on left, PVD (peripheral vascular disease), Transaminitis, Elevated troponin, and Skin ulcer of toe, limited to breakdown of skin, unspecified laterality (HCC) were also pertinent to this visit.  Past Medical History:  has a past medical history of Acute respiratory failure due to severe acute respiratory syndrome coronavirus 2 (SARS-CoV-2) infection (HCC), Cellulitis of left lower leg, Cellulitis of right ankle, CHF (congestive heart failure) (HCC), Closed pelvic rim fracture, COVID, Fibromyalgia, Foot drop, right foot, Fractures, History of blood transfusion, History of dental surgery, History of temporal artery biopsy, Hx of blood clots, PNA (pneumonia), Prepatellar bursitis of right knee, Pyoderma gangrenosum (HCC), Rheumatoid arthritis (HCC), Venous stasis ulcer of left ankle with fat layer exposed with varicose veins (HCC), Venous ulcer of left leg (HCC), and Venous ulcer of right leg (HCC).  Past Surgical History:  has a past surgical history that includes Tonsillectomy; Vein Surgery (Right, 10/27/2016); IR INSERT PICC VAD W SQ PORT >5 YEARS (Left, 2021); and SAPHENOUS VEIN ABLATION (Right, 2024).           Assessment  Assessment: pt from home alone admitted for PVD, acute on chronic CHF; normally pt independent with IADL's & functional mobility with 4 WW; pt demonstrates supervision with RW for bathroom mobility, standing ADL's, independent with LE dressing; able to verbalize good understanding of HF management with re: 
Patient oriented to room, call light, and orders in place. BLE pedal pulses are red with less purple discoloration this morning. RN able to access pedal pulses without doppler. She complained of pain to left great and 2nd toe that woke her up out of her sleep pain scored 7/10. On call made aware new order given, medication effective. Patient verbalized bilateral feet feel much better swelling has went down since last night. Patient oriented on diet order and fluid restriction. Patient stated she doesn't feel safe standing felt like her legs were giving out on her yesterday. She weighed using bed scale this morning. Fall precautions in place, call light and bedside table within reach. Patient able to make all needs known.   
Pt back to floor from vascular, CMU made aware.  Brittni Patricia, RN  2/21/2025    
Pt going off floor to vascular, CMU made aware.  Brittni Patricia, RN  2/21/2025    
Pt ok for discharge per MD. Discharge instructions and script given to pt and pt son. IV removed without complications. Tele box removed and returned. No questions or concerns at this time. Transported by wheelchair to personal car. All personal belonging packed and ready.   
Pt out of shower. Back in bed  
Pt verbalizing readiness to dc. Pt currently taking shower. Pt swelling improved with compression socks. Pt concerned about ability to place compression socks on at home due to RA and mobility limitation. Pt provided with ace wraps and compression socks for home use.   
Pt with legs elevated on pillow, pt refusing bar on bed to elevate legs due to pt states position is painful. Rn educated pt on importance of elevation and MD placed order for bar to be used, pt continues to refuse but agreeable to pillow support and compression socks.   
a small L pleural effusion.  She was given IV furosemide in the ED and hospital medicine was called for admission.      Assessment/Plan:        Acute on chronic diastolic CHF, in part due to valvular disease.  Furosemide IV (she normally takes 40 po qd).  Will consider spironolactone.  Would avoid empagliflozin in light of her toe ulcers, also concerned about her age and frailty, seems prone to orthostasis with falls.  Cardiology consulted.     BLE venous stasis.  Elevating her legs will be very important - ordered this.  Also compression stockings and low-sodium diet.     PAF.  Apixaban, metoprolol.     RA.  She is finishing up a short course of prednisone, last dose 2/22.  No long term medications.       DVT Prophylaxis: anticoagulation as above  Disposition: PT/OT eval ordered.  Ready when adequately diuresed and when cardiology signs off, perhaps 2/22 - 2/23.  She lives at home alone.                 --------------------------------------------------------------        Billing info:  I discuss expected discharge date and discharge needs with the  for this patient today.  This patient has an acute illness or a severe exacerbation of a chronic illness and without ongoing treatment there would be threat to life or bodily function.  Therefore, today's billing level will in part depend upon whether any of the following criteria are met today:  [x] drugs with significant risk requiring lab monitoring (diuretic/Cr for DARVIN, vanc/Cr for DARVIN, insulin/glucose for hypoglycemia, contrast/Cr for DARVIN, anticoag/lab for risk of bleeding)  [] imaging or rhythm strip interpretation  [] 3 of these 4: consult reviewed, labs reviewed, labs ordered, collateral history  [] major procedure or surgery with significant risk  [] change in code status or decision to escalate care  [] spent 50 minutes working on this patient today           Diet: ADULT DIET; Regular; Low Sodium (2 gm); 1800 ml    Medications:        Infusion 
  --------------------------------------------------    [facility] (any 2 required for High level billing)    A. Problems (any 1)  [] Acute/Chronic Illness/injury posing ongoing threat to life and/or bodily function without ongoing treatment    [] Severe exacerbation of chronic illness    --------------------------------------------------  B. Risk of Treatment (any 1)    [] Drugs/treatments that require intensive monitoring for toxicity    [] IV ABX (Vancomycin, Aminoglycosides, etc)     [] Post-Cath/Contrast study requiring serial monitoring    [] IV Narcotic analgesia    [] Aggressive IV diuresis    [] Hypertonic Saline    [] Critical electrolyte abnormalities requiring IV replacement    [] Insulin - Scheduled/SSI or Insulin gtt    [] Anticoagulation (Heparin gtt or Coumadin - other anticoagulants in special circumstances)    [] Cardiac Medications (IV Amiodarone/Diltiazem, Tikosyn, etc)    [] Hemodialysis    [] Other -    [] Change in code status    [] Decision to escalate care    [] Major surgery/procedure with associated risk factors    --------------------------------------------------  C. Data (any 2)    [] Data Review (any 3)    [] Consultant notes from yesterday/today    [] All available current labs reviewed interpreted for clinical significance    [] Appropriate follow-up labs were ordered  [] Collateral history obtained     [] Independent Interpretation of tests (any 1)    [] Telemetry (Rhythm Strip) personally reviewed and interpreted        [] Imaging personally reviewed and interpreted     [] Discussion (any 1)  [] Multi-Disciplinary Rounds with Case Management  [] Discussed management of the case with           Labs:  Personally reviewed on 2/22/2025 and interpreted for clinical significance as documented above.     Recent Labs     02/20/25  1202 02/21/25  0454   WBC 8.4 7.9   HGB 15.4 14.8   HCT 45.7 44.0    182     Recent Labs     02/20/25  1202 02/21/25  0454 02/22/25  0455    139 139   K 
75  Heart Rate Source: Monitor  Respirations: 20  SpO2: 95 %  O2 Device: None (Room air)  BP: 126/89  MAP (Calculated): 101  BP Location: Left upper arm  BP Method: Automatic  Patient Position: Sitting;Up in chair     Observation/Palpation  Posture: Fair  Edema: BLE edema and redness  Gross Assessment  AROM: Generally decreased, functional  Strength: Generally decreased, functional  Coordination: Generally decreased, functional  Pain  Pre-Pain: 0                Bed Mobility Training  Bed Mobility Training: Yes  Overall Level of Assistance: Stand by assistance  Interventions: Safety awareness training;Verbal cues  Rolling: Stand by assistance  Supine to Sit: Stand by assistance  Scooting: Stand by assistance  Balance  Sitting: Intact  Standing: Impaired  Standing - Static: Fair;Occasional  Standing - Dynamic: Fair;Occasional  Transfer Training  Transfer Training: Yes  Overall Level of Assistance: Contact guard assistance  Interventions: Verbal cues;Safety awareness training (Cueing for hand placement, controlled ascent/descent, safety)  Sit to Stand: Contact guard assistance  Stand to Sit: Contact guard assistance  Bed to Chair: Contact guard assistance  Gait  Gait Training: Yes  Overall Level of Assistance: Contact guard assistance;Stand by assistance  Distance (ft): 20 Feet  Assistive Device: Walker, rolling;Gait belt  Interventions: Verbal cues;Safety awareness training (Cueing for RW management and safety)  Speed/Lakia: Slow;Pace decreased (< 100 feet/min)  Step Length: Left shortened;Right shortened  Gait Abnormalities: Decreased step clearance;Trunk sway increased                                                                     AM-PAC - Mobility    AM-PAC Basic Mobility - Inpatient   How much help is needed turning from your back to your side while in a flat bed without using bedrails?: A Little  How much help is needed moving from lying on your back to sitting on the side of a flat bed without using

## 2025-02-23 NOTE — PLAN OF CARE
Problem: Safety - Adult  Goal: Free from fall injury  Outcome: Progressing     Problem: Chronic Conditions and Co-morbidities  Goal: Patient's chronic conditions and co-morbidity symptoms are monitored and maintained or improved  Outcome: Progressing     Problem: Discharge Planning  Goal: Discharge to home or other facility with appropriate resources  2/22/2025 2159 by Dali Herrera, RN  Outcome: Progressing  2/22/2025 0943 by Asiya Cannon RN  Outcome: Progressing     Problem: ABCDS Injury Assessment  Goal: Absence of physical injury  Outcome: Progressing     Problem: Pain  Goal: Verbalizes/displays adequate comfort level or baseline comfort level  Outcome: Progressing

## 2025-02-23 NOTE — DISCHARGE INSTRUCTIONS
F/u with cardiology as outpatient in 1-2 weeks and have BMP checked at that time, record daily weights at home.  Elevate your legs higher than your heart for at least 30 minutes twice per day.

## 2025-02-23 NOTE — CARE COORDINATION
Neither [facility] nor [facility] can service. Called and left vm for referral to [facility]

## 2025-02-23 NOTE — CARE COORDINATION
CASE MANAGEMENT DISCHARGE SUMMARY      Discharge to: Home with ref to [facility]    IMM given: (date) 2/21      Transportation:    Family/car: yes      Confirmed discharge plan with:     Patient: yes     Family: no per pt     RN, name: Asiya DAI    Pt recently active with [facility] and would like to resume    Note: Discharging nurse to complete EMILY, reconcile AVS, and place final copy with patient's discharge packet. RN to ensure that written prescriptions for  Level II medications are sent with patient to the facility as per protocol.

## 2025-02-23 NOTE — DISCHARGE SUMMARY
Discharge Summary    Name:  Anne Stokes /Age/Sex: 2/3/1933 (89 y.o. 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  She was recently admitted to [facility] from  -  because an outpatient SHARLENE resulted in an esophageal rupture.  She required a hematoma evacuation.  An esophagram on  showed no leak.     Her weights over the last couple months are as follows:   139 on   137 on   144 on   146 on  (instructed to take an extra furosemide tablet for 3 consecutive days by the [facility] ED)  143 on   146 on      Now she presents to our ED with a weight of 146 lbs (similar to her weights over the last 6 weeks).  She was sent by her PCP because of worsening BLE edema and dusky leg discoloration.  She also has rales on exam.  BNP was 2200.  CXR showed cardiomegaly and a small L pleural effusion.  She was given IV furosemide in the ED and hospital medicine was called for admission.          Acute on chronic diastolic CHF, in part due to valvular disease (being medically managed by [facility]).  Furosemide IV, promptly lost 15 lbs in two days (large portion of her total body weight).  Switched back to her usual furosemide 40 po qd, added low-dose spironolactone.  [facility] started empagliflozin as outpatient on , restarted here on , prioritizing this over ACEi/ARB/ARNi.  F/u with cardiology as outpatient in 1-2 weeks and have BMP checked at that time, record daily weights at home.     BLE venous stasis.  Elevating her legs will be very important - needs to continue as outpatient.  Also compression stockings and low-sodium diet.  F/u

## 2025-02-23 NOTE — PLAN OF CARE
CHF Care Plan      Patient's EF (Ejection Fraction) is greater than 40%    Heart Failure Medications:  Diuretics:: Furosemide    (One of the following REQUIRED for EF </= 40%/SYSTOLIC FAILURE but MAY be used in EF% >40%/DIASTOLIC FAILURE)        ACE:: None        ARB:: Losartan         ARNI:: None    (Beta Blockers)  NON- Evidenced Based Beta Blocker (for EF% >40%/DIASTOLIC FAILURE): None    Evidenced Based Beta Blocker::(REQUIRED for EF% <40%/SYSTOLIC FAILURE) Metoprolol SUCCinate- Toprol XL  ...................................................................................................................................................    Failed to redirect to the Timeline version of the [employer] SmartLink.      Patient's weights and intake/output reviewed    Daily Weight log at bedside, patient/family participation in use of log: \"yes    Patient's current weight today shows a difference of 1 lbs more than last documented weight.      Intake/Output Summary (Last 24 hours) at 2/23/2025 0427  Last data filed at 2/23/2025 0426  Gross per 24 hour   Intake 720 ml   Output 2000 ml   Net -1280 ml       Education Booklet Provided: yes    Comorbidities Reviewed Yes    Patient has a past medical history of Acute respiratory failure due to severe acute respiratory syndrome coronavirus 2 (SARS-CoV-2) infection (HCC), Cellulitis of left lower leg, Cellulitis of right ankle, CHF (congestive heart failure) (HCC), Closed pelvic rim fracture, COVID, Fibromyalgia, Foot drop, right foot, Fractures, History of blood transfusion, History of dental surgery, History of temporal artery biopsy, Hx of blood clots, PNA (pneumonia), Prepatellar bursitis of right knee, Pyoderma gangrenosum (HCC), Rheumatoid arthritis (HCC), Venous stasis ulcer of left ankle with fat layer exposed with varicose veins (HCC), Venous ulcer of left leg (HCC), and Venous ulcer of right leg (HCC).     >>For CHF and Comorbidity documentation on Education Time and

## 2025-04-23 NOTE — PLAN OF CARE
Follow up visit in [facility] today. Wounds stable, showing signs of improvement. Wound debridement per Dr. Patterson, Pt tolerated well. To continue weekly compression wrap for edema control in LLE. Pt's personal compression wrap to RLE. Toe wrap with collagen and xeroform to 2nd toe placed to left toes per Dr. Patterson.  F/u in [facility] in 1 week as ordered, pt. Aware to call sooner with any changes or questions/concerns. Discharge instructions reviewed with patient, all questions answered, copy given to patient. Dressings were applied to all wounds per M.D. 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  Early melanoma in situ could not be excluded.  BRAF V600E IHC positive, excluding Spitz nevus.   left mid anterior lower leg benign compound nevus   right proximal anterior lower leg benign compound nevus   right anterior shoulder Spitz nevus excised, no atypia   left thenar forearm mildly dysplastic compound nevus and compound nevus status post excision           Patient spends quite a bit of time outside-rowing, hiking.  Does well with sun protection.     Strong family history of a grandmother  malignant melanoma.  Maternal aunt with history of melanoma in situ     Father with a history of basal cell carcinoma.  Mother with a history of dysplastic nevus          Review of Systems:  Constitutional: Reports general sense of well-being       Past Medical History, Surgical History, Family History, Medications and Allergies reviewed.    Social History:   Social History     Socioeconomic History    Marital status: Single     Spouse name: Not on file    Number of children: Not on file    Years of education: Not on file    Highest education level: Not on file   Occupational History    Not on file   Tobacco Use    Smoking status: Never    Smokeless tobacco: Never   Vaping Use    Vaping status: Never Used   Substance and Sexual Activity    Alcohol use: No    Drug use: No    Sexual activity: Not on file   Other Topics Concern    Not on file   Social History Narrative    Not on file     Social Drivers of Health     Financial Resource Strain: Not on file   Food Insecurity: Unknown (2024)    [facility]    Food Insecurities     Worried about running out of food: Not on file     Food

## 2025-06-16 NOTE — DISCHARGE INSTRUCTIONS
products for home use, including multiple products for a single wound if applicable, are medically necessary in order to achieve the best chance at timely wound healing. See provider documentation for details if needed. Substituted dressings applied in the [facility] today, if applicable:      N/a     New orders for this week (labs, imaging, medications, etc.):     You may walk as much as you can tolerate. Continue elevating legs   You may take Benadryl at night only for itching - please be aware this may cause dry mouth and some sleepiness/dizziness      Additional instructions for specific diagnoses:     Continue Sulfasalazine three times daily     [place]ose #3 - 10/21/22  [place]asis #4 - 10/28/22  Bellerose #5 - 11/4/22  Bellerose #6 - 11/11/22   [place]asis #7 -11-22-22  Bellerose #8 12/1/22   Bellerose #9  12/9/22  Bellerose #10 12/21/22         F/U Appointment  with Dr. Igncaio Gabriel in 1 week                                               at                                    .     Your nurse  is Mario Alberto Mendoza RN     If we applied slip-resistant hospital socks today, be sure to remove them at least once a day to inspect your toes or feet, even if you're not changing the wraps or dressings underneath. If you see anything concerning (redness, excess moisture, etc), please call and let us know right away.      Should you experience any significant changes in your wound(s) (including redness, increased warmth, increased pain, increased drainage, odor, or fever) or have questions about your wound care, please contact the [address] at [phone] Monday-Thursday from 8:00 am - 4:30 pm, or Friday from 8:00 am - 2:30 pm.  If you need help with your wound outside these hours and cannot wait until we are again available, contact your home-care company (if applicable), your PCP, or go to the nearest emergency room Principal Discharge DX:	Evaluation by medical service required   1